# Patient Record
Sex: MALE | Race: BLACK OR AFRICAN AMERICAN | Employment: OTHER | URBAN - METROPOLITAN AREA
[De-identification: names, ages, dates, MRNs, and addresses within clinical notes are randomized per-mention and may not be internally consistent; named-entity substitution may affect disease eponyms.]

---

## 2017-12-01 ENCOUNTER — HOSPITAL ENCOUNTER (OUTPATIENT)
Age: 72
Discharge: HOME | End: 2017-12-01
Payer: MEDICARE

## 2017-12-01 LAB
ABSOLUTE EOS #: 0 K/UL (ref 0–0.4)
ABSOLUTE IMMATURE GRANULOCYTE: 0 K/UL (ref 0–0.3)
ABSOLUTE LYMPH #: 1.66 K/UL (ref 1–4.8)
ABSOLUTE MONO #: 0.55 K/UL (ref 0.1–0.8)
ANION GAP SERPL CALCULATED.3IONS-SCNC: 14 MMOL/L (ref 9–17)
BASOPHILS # BLD: 0 % (ref 0–2)
BASOPHILS ABSOLUTE: 0 K/UL (ref 0–0.2)
BUN BLDV-MCNC: 11 MG/DL (ref 8–23)
BUN/CREAT BLD: ABNORMAL (ref 9–20)
CALCIUM SERPL-MCNC: 9.5 MG/DL (ref 8.6–10.4)
CHLORIDE BLD-SCNC: 101 MMOL/L (ref 98–107)
CO2: 29 MMOL/L (ref 20–31)
CREAT SERPL-MCNC: 0.89 MG/DL (ref 0.7–1.2)
DIFFERENTIAL TYPE: ABNORMAL
EOSINOPHILS RELATIVE PERCENT: 0 % (ref 1–4)
GFR AFRICAN AMERICAN: >60 ML/MIN
GFR NON-AFRICAN AMERICAN: >60 ML/MIN
GFR SERPL CREATININE-BSD FRML MDRD: ABNORMAL ML/MIN/{1.73_M2}
GFR SERPL CREATININE-BSD FRML MDRD: ABNORMAL ML/MIN/{1.73_M2}
GLUCOSE BLD-MCNC: 208 MG/DL (ref 70–99)
HCT VFR BLD CALC: 32.7 % (ref 40.7–50.3)
HEMOGLOBIN: 8.8 G/DL (ref 13–17)
IMMATURE GRANULOCYTES: 0 %
LYMPHOCYTES # BLD: 21 % (ref 24–44)
MCH RBC QN AUTO: 24 PG (ref 25.2–33.5)
MCHC RBC AUTO-ENTMCNC: 26.9 G/DL (ref 28.4–34.8)
MCV RBC AUTO: 89.3 FL (ref 82.6–102.9)
MONOCYTES # BLD: 7 % (ref 1–7)
MORPHOLOGY: ABNORMAL
PDW BLD-RTO: 18.5 % (ref 11.8–14.4)
PLATELET # BLD: 386 K/UL (ref 138–453)
PLATELET ESTIMATE: ABNORMAL
PMV BLD AUTO: 9.9 FL (ref 8.1–13.5)
POTASSIUM SERPL-SCNC: 3.4 MMOL/L (ref 3.7–5.3)
RBC # BLD: 3.66 M/UL (ref 4.21–5.77)
RBC # BLD: ABNORMAL 10*6/UL
SEG NEUTROPHILS: 72 % (ref 36–66)
SEGMENTED NEUTROPHILS ABSOLUTE COUNT: 5.69 K/UL (ref 1.8–7.7)
SODIUM BLD-SCNC: 144 MMOL/L (ref 135–144)
WBC # BLD: 7.9 K/UL (ref 3.5–11.3)
WBC # BLD: ABNORMAL 10*3/UL

## 2017-12-01 PROCEDURE — 85025 COMPLETE CBC W/AUTO DIFF WBC: CPT

## 2017-12-01 PROCEDURE — 80048 BASIC METABOLIC PNL TOTAL CA: CPT

## 2017-12-01 PROCEDURE — 36415 COLL VENOUS BLD VENIPUNCTURE: CPT

## 2017-12-13 ENCOUNTER — HOSPITAL ENCOUNTER (INPATIENT)
Dept: CARDIAC CATH/INVASIVE PROCEDURES | Age: 72
LOS: 4 days | Discharge: HOME | DRG: 287 | End: 2017-12-17
Attending: INTERNAL MEDICINE | Admitting: INTERNAL MEDICINE
Payer: MEDICARE

## 2017-12-13 DIAGNOSIS — I50.23 ACUTE ON CHRONIC SYSTOLIC CHF (CONGESTIVE HEART FAILURE) (HCC): ICD-10-CM

## 2017-12-13 LAB
ANION GAP SERPL CALCULATED.3IONS-SCNC: 18 MMOL/L (ref 9–17)
BUN BLDV-MCNC: 13 MG/DL (ref 8–23)
BUN/CREAT BLD: ABNORMAL (ref 9–20)
CALCIUM SERPL-MCNC: 8.4 MG/DL (ref 8.6–10.4)
CHLORIDE BLD-SCNC: 100 MMOL/L (ref 98–107)
CO2: 25 MMOL/L (ref 20–31)
CREAT SERPL-MCNC: 0.92 MG/DL (ref 0.7–1.2)
GFR AFRICAN AMERICAN: >60 ML/MIN
GFR NON-AFRICAN AMERICAN: >60 ML/MIN
GFR SERPL CREATININE-BSD FRML MDRD: ABNORMAL ML/MIN/{1.73_M2}
GFR SERPL CREATININE-BSD FRML MDRD: ABNORMAL ML/MIN/{1.73_M2}
GLUCOSE BLD-MCNC: 118 MG/DL (ref 74–100)
GLUCOSE BLD-MCNC: 197 MG/DL (ref 70–99)
GLUCOSE BLD-MCNC: 225 MG/DL (ref 75–110)
MAGNESIUM: 1.5 MG/DL (ref 1.6–2.6)
POC POTASSIUM: 3.3 MMOL/L (ref 3.5–4.5)
POTASSIUM SERPL-SCNC: 3.1 MMOL/L (ref 3.7–5.3)
SODIUM BLD-SCNC: 143 MMOL/L (ref 135–144)

## 2017-12-13 PROCEDURE — 1200000000 HC SEMI PRIVATE

## 2017-12-13 PROCEDURE — C1894 INTRO/SHEATH, NON-LASER: HCPCS

## 2017-12-13 PROCEDURE — 93571 IV DOP VEL&/PRESS C FLO 1ST: CPT | Performed by: INTERNAL MEDICINE

## 2017-12-13 PROCEDURE — 6370000000 HC RX 637 (ALT 250 FOR IP)

## 2017-12-13 PROCEDURE — 6360000002 HC RX W HCPCS

## 2017-12-13 PROCEDURE — C1725 CATH, TRANSLUMIN NON-LASER: HCPCS

## 2017-12-13 PROCEDURE — 93458 L HRT ARTERY/VENTRICLE ANGIO: CPT | Performed by: INTERNAL MEDICINE

## 2017-12-13 PROCEDURE — B2111ZZ FLUOROSCOPY OF MULTIPLE CORONARY ARTERIES USING LOW OSMOLAR CONTRAST: ICD-10-PCS | Performed by: INTERNAL MEDICINE

## 2017-12-13 PROCEDURE — 7100000011 HC PHASE II RECOVERY - ADDTL 15 MIN

## 2017-12-13 PROCEDURE — 7100000010 HC PHASE II RECOVERY - FIRST 15 MIN

## 2017-12-13 PROCEDURE — 2500000003 HC RX 250 WO HCPCS

## 2017-12-13 PROCEDURE — 93005 ELECTROCARDIOGRAM TRACING: CPT

## 2017-12-13 PROCEDURE — C1769 GUIDE WIRE: HCPCS

## 2017-12-13 PROCEDURE — 80048 BASIC METABOLIC PNL TOTAL CA: CPT

## 2017-12-13 PROCEDURE — 2580000003 HC RX 258: Performed by: INTERNAL MEDICINE

## 2017-12-13 PROCEDURE — 36620 INSERTION CATHETER ARTERY: CPT

## 2017-12-13 PROCEDURE — C1887 CATHETER, GUIDING: HCPCS

## 2017-12-13 PROCEDURE — 4A023N8 MEASUREMENT OF CARDIAC SAMPLING AND PRESSURE, BILATERAL, PERCUTANEOUS APPROACH: ICD-10-PCS | Performed by: INTERNAL MEDICINE

## 2017-12-13 PROCEDURE — B2151ZZ FLUOROSCOPY OF LEFT HEART USING LOW OSMOLAR CONTRAST: ICD-10-PCS | Performed by: INTERNAL MEDICINE

## 2017-12-13 PROCEDURE — 82947 ASSAY GLUCOSE BLOOD QUANT: CPT

## 2017-12-13 PROCEDURE — 83735 ASSAY OF MAGNESIUM: CPT

## 2017-12-13 PROCEDURE — 6370000000 HC RX 637 (ALT 250 FOR IP): Performed by: INTERNAL MEDICINE

## 2017-12-13 PROCEDURE — 6360000002 HC RX W HCPCS: Performed by: INTERNAL MEDICINE

## 2017-12-13 PROCEDURE — 2060000000 HC ICU INTERMEDIATE R&B

## 2017-12-13 PROCEDURE — 36415 COLL VENOUS BLD VENIPUNCTURE: CPT

## 2017-12-13 PROCEDURE — 84132 ASSAY OF SERUM POTASSIUM: CPT

## 2017-12-13 RX ORDER — TRAMADOL HYDROCHLORIDE 50 MG/1
50 TABLET ORAL DAILY PRN
Status: ON HOLD | COMMUNITY
End: 2018-08-24 | Stop reason: HOSPADM

## 2017-12-13 RX ORDER — LOSARTAN POTASSIUM 50 MG/1
50 TABLET ORAL DAILY
Status: DISCONTINUED | OUTPATIENT
Start: 2017-12-14 | End: 2017-12-17 | Stop reason: HOSPADM

## 2017-12-13 RX ORDER — BENZONATATE 100 MG/1
100 CAPSULE ORAL 3 TIMES DAILY PRN
Status: DISCONTINUED | OUTPATIENT
Start: 2017-12-13 | End: 2017-12-17 | Stop reason: HOSPADM

## 2017-12-13 RX ORDER — LOSARTAN POTASSIUM 25 MG/1
25 TABLET ORAL ONCE
Status: COMPLETED | OUTPATIENT
Start: 2017-12-13 | End: 2017-12-14

## 2017-12-13 RX ORDER — ONDANSETRON 2 MG/ML
4 INJECTION INTRAMUSCULAR; INTRAVENOUS EVERY 6 HOURS PRN
Status: DISCONTINUED | OUTPATIENT
Start: 2017-12-13 | End: 2017-12-17 | Stop reason: HOSPADM

## 2017-12-13 RX ORDER — GLIPIZIDE 5 MG/1
5 TABLET ORAL
Status: DISCONTINUED | OUTPATIENT
Start: 2017-12-14 | End: 2017-12-17 | Stop reason: HOSPADM

## 2017-12-13 RX ORDER — LENALIDOMIDE 10 MG/1
10 CAPSULE ORAL DAILY
Status: ON HOLD | COMMUNITY
End: 2018-01-14 | Stop reason: HOSPADM

## 2017-12-13 RX ORDER — POTASSIUM CHLORIDE 20MEQ/15ML
40 LIQUID (ML) ORAL ONCE
Status: COMPLETED | OUTPATIENT
Start: 2017-12-13 | End: 2017-12-13

## 2017-12-13 RX ORDER — FENOFIBRATE 54 MG/1
54 TABLET ORAL DAILY
Status: DISCONTINUED | OUTPATIENT
Start: 2017-12-14 | End: 2017-12-17 | Stop reason: HOSPADM

## 2017-12-13 RX ORDER — NICOTINE POLACRILEX 4 MG
15 LOZENGE BUCCAL PRN
Status: DISCONTINUED | OUTPATIENT
Start: 2017-12-13 | End: 2017-12-17 | Stop reason: HOSPADM

## 2017-12-13 RX ORDER — DEXTROSE MONOHYDRATE 50 MG/ML
100 INJECTION, SOLUTION INTRAVENOUS PRN
Status: DISCONTINUED | OUTPATIENT
Start: 2017-12-13 | End: 2017-12-17 | Stop reason: HOSPADM

## 2017-12-13 RX ORDER — SIMVASTATIN 40 MG
40 TABLET ORAL NIGHTLY
Status: DISCONTINUED | OUTPATIENT
Start: 2017-12-13 | End: 2017-12-17 | Stop reason: HOSPADM

## 2017-12-13 RX ORDER — DEXAMETHASONE 4 MG/1
4 TABLET ORAL WEEKLY
Status: ON HOLD | COMMUNITY
End: 2018-01-14 | Stop reason: HOSPADM

## 2017-12-13 RX ORDER — FENOFIBRIC ACID 35 MG/1
35 TABLET ORAL DAILY
Status: ON HOLD | COMMUNITY
End: 2018-01-14 | Stop reason: HOSPADM

## 2017-12-13 RX ORDER — POTASSIUM CHLORIDE 20MEQ/15ML
40 LIQUID (ML) ORAL PRN
Status: DISCONTINUED | OUTPATIENT
Start: 2017-12-13 | End: 2017-12-17 | Stop reason: HOSPADM

## 2017-12-13 RX ORDER — FAMOTIDINE 20 MG/1
20 TABLET, FILM COATED ORAL 2 TIMES DAILY
Status: DISCONTINUED | OUTPATIENT
Start: 2017-12-13 | End: 2017-12-17 | Stop reason: HOSPADM

## 2017-12-13 RX ORDER — ASPIRIN 81 MG/1
81 TABLET, CHEWABLE ORAL DAILY
Status: DISCONTINUED | OUTPATIENT
Start: 2017-12-14 | End: 2017-12-17 | Stop reason: HOSPADM

## 2017-12-13 RX ORDER — LOSARTAN POTASSIUM 25 MG/1
25 TABLET ORAL DAILY
Status: ON HOLD | COMMUNITY
End: 2017-12-17 | Stop reason: HOSPADM

## 2017-12-13 RX ORDER — HYDROCHLOROTHIAZIDE 12.5 MG/1
12.5 CAPSULE, GELATIN COATED ORAL DAILY
COMMUNITY
End: 2018-01-11

## 2017-12-13 RX ORDER — LOSARTAN POTASSIUM 25 MG/1
25 TABLET ORAL DAILY
Status: DISCONTINUED | OUTPATIENT
Start: 2017-12-14 | End: 2017-12-13

## 2017-12-13 RX ORDER — DIPHENHYDRAMINE HCL 25 MG
25 TABLET ORAL EVERY 6 HOURS PRN
Status: DISCONTINUED | OUTPATIENT
Start: 2017-12-13 | End: 2017-12-17 | Stop reason: HOSPADM

## 2017-12-13 RX ORDER — SIMVASTATIN 40 MG
40 TABLET ORAL NIGHTLY
Status: ON HOLD | COMMUNITY
End: 2019-06-13 | Stop reason: HOSPADM

## 2017-12-13 RX ORDER — SODIUM CHLORIDE 9 MG/ML
INJECTION, SOLUTION INTRAVENOUS CONTINUOUS
Status: DISCONTINUED | OUTPATIENT
Start: 2017-12-13 | End: 2017-12-16

## 2017-12-13 RX ORDER — CARVEDILOL 12.5 MG/1
12.5 TABLET ORAL 2 TIMES DAILY WITH MEALS
Status: DISCONTINUED | OUTPATIENT
Start: 2017-12-13 | End: 2017-12-17 | Stop reason: HOSPADM

## 2017-12-13 RX ORDER — TRAMADOL HYDROCHLORIDE 50 MG/1
50 TABLET ORAL EVERY 6 HOURS PRN
Status: DISCONTINUED | OUTPATIENT
Start: 2017-12-13 | End: 2017-12-17 | Stop reason: HOSPADM

## 2017-12-13 RX ORDER — FUROSEMIDE 10 MG/ML
40 INJECTION INTRAMUSCULAR; INTRAVENOUS 2 TIMES DAILY
Status: DISCONTINUED | OUTPATIENT
Start: 2017-12-14 | End: 2017-12-14

## 2017-12-13 RX ORDER — GLIPIZIDE 5 MG/1
5 TABLET ORAL
Status: ON HOLD | COMMUNITY
End: 2019-06-13 | Stop reason: HOSPADM

## 2017-12-13 RX ORDER — POTASSIUM CHLORIDE 7.45 MG/ML
10 INJECTION INTRAVENOUS PRN
Status: DISCONTINUED | OUTPATIENT
Start: 2017-12-13 | End: 2017-12-17 | Stop reason: HOSPADM

## 2017-12-13 RX ORDER — MAGNESIUM SULFATE 1 G/100ML
1 INJECTION INTRAVENOUS PRN
Status: DISCONTINUED | OUTPATIENT
Start: 2017-12-13 | End: 2017-12-17 | Stop reason: HOSPADM

## 2017-12-13 RX ORDER — BENZONATATE 100 MG/1
100 CAPSULE ORAL 3 TIMES DAILY PRN
COMMUNITY
End: 2018-01-11

## 2017-12-13 RX ORDER — DEXTROSE MONOHYDRATE 25 G/50ML
12.5 INJECTION, SOLUTION INTRAVENOUS PRN
Status: DISCONTINUED | OUTPATIENT
Start: 2017-12-13 | End: 2017-12-17 | Stop reason: HOSPADM

## 2017-12-13 RX ORDER — CARVEDILOL 12.5 MG/1
12.5 TABLET ORAL 2 TIMES DAILY WITH MEALS
Status: DISCONTINUED | OUTPATIENT
Start: 2017-12-14 | End: 2017-12-13

## 2017-12-13 RX ORDER — ZOLPIDEM TARTRATE 5 MG/1
5 TABLET ORAL NIGHTLY PRN
Status: DISPENSED | OUTPATIENT
Start: 2017-12-13 | End: 2017-12-16

## 2017-12-13 RX ORDER — POTASSIUM CHLORIDE 20 MEQ/1
40 TABLET, EXTENDED RELEASE ORAL PRN
Status: DISCONTINUED | OUTPATIENT
Start: 2017-12-13 | End: 2017-12-17 | Stop reason: HOSPADM

## 2017-12-13 RX ORDER — LENALIDOMIDE 10 MG/1
10 CAPSULE ORAL DAILY
Status: DISCONTINUED | OUTPATIENT
Start: 2017-12-14 | End: 2017-12-15

## 2017-12-13 RX ADMIN — TRAMADOL HYDROCHLORIDE 50 MG: 50 TABLET, FILM COATED ORAL at 22:57

## 2017-12-13 RX ADMIN — INSULIN LISPRO 1 UNITS: 100 INJECTION, SOLUTION INTRAVENOUS; SUBCUTANEOUS at 23:07

## 2017-12-13 RX ADMIN — SODIUM CHLORIDE: 0.9 INJECTION, SOLUTION INTRAVENOUS at 17:18

## 2017-12-13 RX ADMIN — SODIUM CHLORIDE: 0.9 INJECTION, SOLUTION INTRAVENOUS at 10:47

## 2017-12-13 RX ADMIN — SIMVASTATIN 40 MG: 40 TABLET, FILM COATED ORAL at 22:57

## 2017-12-13 RX ADMIN — Medication 40 MEQ: at 11:04

## 2017-12-13 RX ADMIN — ZOLPIDEM TARTRATE 5 MG: 5 TABLET ORAL at 22:57

## 2017-12-13 RX ADMIN — ENOXAPARIN SODIUM 90 MG: 100 INJECTION SUBCUTANEOUS at 22:57

## 2017-12-13 RX ADMIN — FAMOTIDINE 20 MG: 20 TABLET, FILM COATED ORAL at 22:57

## 2017-12-13 ASSESSMENT — PAIN SCALES - GENERAL
PAINLEVEL_OUTOF10: 6
PAINLEVEL_OUTOF10: 0

## 2017-12-13 NOTE — PROGRESS NOTES
Patient post cath to [facility] # 5 for recovery. Right groin soft with arterial line sutured with gauze dressing intact. Assessment obtained. Reviewed recovery pathway with patient and wife. Taking fluids well. Food ordered. Resting with side rails up 2/2 with call light in reach. Dr. Myers Suly in and spoke with patient and wife.

## 2017-12-13 NOTE — PROGRESS NOTES
Patient admitted, consent signed, all questions answered. Pt ready for procedure. Call light to reach with side rails up 2 of 2. Bilateral groins clipped. Wife at bedside with patient.

## 2017-12-13 NOTE — H&P
Review of patient's allergies indicates not on file. Social History:        Family History: family history is not on file. REVIEW OF SYSTEMS:    · Constitutional: there has been no unanticipated weight loss. There's been No change in energy level, No change in activity level. · Eyes: No visual changes or diplopia. No scleral icterus. · ENT: No Headaches  · Cardiovascular: As mentioned in HPI  · Respiratory: As mentioned in HPI  · Gastrointestinal: No abdominal pain. No change in bowel or bladder habits. · Genitourinary: No dysuria, trouble voiding, or hematuria. · Musculoskeletal:  No gait disturbance, No weakness or joint complaints. · Integumentary: No rash or pruritis. · Neurological: No headache, diplopia, change in muscle strength, numbness or tingling. No change in gait, balance, coordination, mood, affect, memory, mentation, behavior. · Psychiatric: No anxiety, or depression. · Endocrine: No temperature intolerance. No excessive thirst, fluid intake, or urination. No tremor. · Hematologic/Lymphatic: No abnormal bruising or bleeding, blood clots or swollen lymph nodes. · Allergic/Immunologic: No nasal congestion or hives. PHYSICAL EXAM:      There were no vitals taken for this visit. Constitutional and General Appearance: alert, cooperative, no distress and appears stated age  [de-identified]: PERRL, no cervical lymphadenopathy. No masses palpable. Normal oral mucosa  Respiratory:  · Normal excursion and expansion without use of accessory muscles  · Resp Auscultation: Good respiratory effort. No for increased work of breathing.  On auscultation: clear to auscultation bilaterally  Cardiovascular:  · The apical impulse is not displaced  · Heart tones are crisp and normal. regular S1 and S2.  · Jugular venous pulsation Normal  · The carotid upstroke is normal in amplitude and contour without delay or bruit  · Peripheral pulses are symmetrical and full   Abdomen:   · No masses or tenderness  · Bowel sounds present  Extremities:  ·  No Cyanosis or Clubbing  ·  Lower extremity edema: No  ·  Skin: Warm and dry  Neurological:  · Alert and oriented. · Moves all extremities well  · No abnormalities of mood, affect, memory, mentation, or behavior are noted    DATA:    Diagnostics:      ECHO:     .    Labs:     CBC: No results for input(s): WBC, HGB, HCT, PLT in the last 72 hours. BMP: No results for input(s): NA, K, CO2, BUN, CREATININE, LABGLOM, GLUCOSE in the last 72 hours. BNP: No results for input(s): BNP in the last 72 hours. PT/INR: No results for input(s): PROTIME, INR in the last 72 hours. APTT:No results for input(s): APTT in the last 72 hours. CARDIAC ENZYMES:No results for input(s): CKTOTAL, CKMB, CKMBINDEX, TROPONINI in the last 72 hours. FASTING LIPID PANEL:  Lab Results   Component Value Date    HDL 36 06/10/2016    TRIG 159 06/10/2016     LIVER PROFILE:No results for input(s): AST, ALT, LABALBU in the last 72 hours. IMPRESSION:      1. 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 History of multiple myeloma on chemotherapy3. History of hyperlipidemia on Zocor.   4. History of DVT on Xarelto.   5. History of Type 2 diabetes mellitus.   6. History of chronic smoking and I did  him to stop smoking.   7. History of echocardiogram done a few weeks back which showed severely reduced left ventricular systolic function with  No significant valvular regurgitation or stenosis seen.   8. History of hypertension.     There is no problem list on file for this patient. RECOMMENDATIONS:  1. Proceed with left and right heart cath to assess for CAD and assess volume status      Discussed with patient and Nurse.     Electronically signed by Jai Chirinos MD on 12/13/2017 at 9:01 AM  Cardiology Fellow

## 2017-12-13 NOTE — PROGRESS NOTES
Right arterial line removed per J. [place]air RN per manual pressure for 12 mins then venous line removed manually with pressure held for 5 mins. Recovery pathway reviewed with patient. Patient tolerated well. Right groin soft with band aide dry and intact. Resting with side rails up 2/2 and call light in reach. Continues taking fluids well.

## 2017-12-14 ENCOUNTER — APPOINTMENT (OUTPATIENT)
Dept: GENERAL RADIOLOGY | Age: 72
DRG: 287 | End: 2017-12-14
Attending: INTERNAL MEDICINE
Payer: MEDICARE

## 2017-12-14 LAB
ANION GAP SERPL CALCULATED.3IONS-SCNC: 15 MMOL/L (ref 9–17)
BNP INTERPRETATION: ABNORMAL
BUN BLDV-MCNC: 11 MG/DL (ref 8–23)
BUN/CREAT BLD: ABNORMAL (ref 9–20)
CALCIUM SERPL-MCNC: 7.9 MG/DL (ref 8.6–10.4)
CHLORIDE BLD-SCNC: 104 MMOL/L (ref 98–107)
CHOLESTEROL/HDL RATIO: 3.1
CHOLESTEROL: 98 MG/DL
CO2: 25 MMOL/L (ref 20–31)
CREAT SERPL-MCNC: 0.83 MG/DL (ref 0.7–1.2)
EKG ATRIAL RATE: 81 BPM
EKG ATRIAL RATE: 86 BPM
EKG P AXIS: 52 DEGREES
EKG P AXIS: 54 DEGREES
EKG P-R INTERVAL: 126 MS
EKG P-R INTERVAL: 134 MS
EKG Q-T INTERVAL: 432 MS
EKG Q-T INTERVAL: 440 MS
EKG QRS DURATION: 134 MS
EKG QRS DURATION: 138 MS
EKG QTC CALCULATION (BAZETT): 501 MS
EKG QTC CALCULATION (BAZETT): 526 MS
EKG R AXIS: -48 DEGREES
EKG R AXIS: -74 DEGREES
EKG T AXIS: -133 DEGREES
EKG T AXIS: 72 DEGREES
EKG VENTRICULAR RATE: 81 BPM
EKG VENTRICULAR RATE: 86 BPM
ESTIMATED AVERAGE GLUCOSE: 148 MG/DL
GFR AFRICAN AMERICAN: >60 ML/MIN
GFR NON-AFRICAN AMERICAN: >60 ML/MIN
GFR SERPL CREATININE-BSD FRML MDRD: ABNORMAL ML/MIN/{1.73_M2}
GFR SERPL CREATININE-BSD FRML MDRD: ABNORMAL ML/MIN/{1.73_M2}
GLUCOSE BLD-MCNC: 152 MG/DL (ref 75–110)
GLUCOSE BLD-MCNC: 154 MG/DL (ref 75–110)
GLUCOSE BLD-MCNC: 157 MG/DL (ref 70–99)
HBA1C MFR BLD: 6.8 % (ref 4–6)
HCT VFR BLD CALC: 27.6 % (ref 40.7–50.3)
HDLC SERPL-MCNC: 32 MG/DL
HEMOGLOBIN: 7.5 G/DL (ref 13–17)
LDL CHOLESTEROL: 33 MG/DL (ref 0–130)
MAGNESIUM: 1.6 MG/DL (ref 1.6–2.6)
MCH RBC QN AUTO: 24.1 PG (ref 25.2–33.5)
MCHC RBC AUTO-ENTMCNC: 27.2 G/DL (ref 28.4–34.8)
MCV RBC AUTO: 88.7 FL (ref 82.6–102.9)
PDW BLD-RTO: 18.8 % (ref 11.8–14.4)
PLATELET # BLD: 305 K/UL (ref 138–453)
PMV BLD AUTO: 10.4 FL (ref 8.1–13.5)
POTASSIUM SERPL-SCNC: 3.7 MMOL/L (ref 3.7–5.3)
PRO-BNP: 822 PG/ML
RBC # BLD: 3.11 M/UL (ref 4.21–5.77)
SODIUM BLD-SCNC: 144 MMOL/L (ref 135–144)
TRIGL SERPL-MCNC: 166 MG/DL
TSH SERPL DL<=0.05 MIU/L-ACNC: 1.13 MIU/L (ref 0.3–5)
VLDLC SERPL CALC-MCNC: ABNORMAL MG/DL (ref 1–30)
WBC # BLD: 5.9 K/UL (ref 3.5–11.3)

## 2017-12-14 PROCEDURE — 94664 DEMO&/EVAL PT USE INHALER: CPT

## 2017-12-14 PROCEDURE — 93970 EXTREMITY STUDY: CPT

## 2017-12-14 PROCEDURE — 80048 BASIC METABOLIC PNL TOTAL CA: CPT

## 2017-12-14 PROCEDURE — 6370000000 HC RX 637 (ALT 250 FOR IP): Performed by: INTERNAL MEDICINE

## 2017-12-14 PROCEDURE — 6360000002 HC RX W HCPCS: Performed by: INTERNAL MEDICINE

## 2017-12-14 PROCEDURE — 83880 ASSAY OF NATRIURETIC PEPTIDE: CPT

## 2017-12-14 PROCEDURE — 99223 1ST HOSP IP/OBS HIGH 75: CPT | Performed by: THORACIC SURGERY (CARDIOTHORACIC VASCULAR SURGERY)

## 2017-12-14 PROCEDURE — 94726 PLETHYSMOGRAPHY LUNG VOLUMES: CPT

## 2017-12-14 PROCEDURE — 85027 COMPLETE CBC AUTOMATED: CPT

## 2017-12-14 PROCEDURE — 1200000000 HC SEMI PRIVATE

## 2017-12-14 PROCEDURE — 83036 HEMOGLOBIN GLYCOSYLATED A1C: CPT

## 2017-12-14 PROCEDURE — 84443 ASSAY THYROID STIM HORMONE: CPT

## 2017-12-14 PROCEDURE — 36415 COLL VENOUS BLD VENIPUNCTURE: CPT

## 2017-12-14 PROCEDURE — 94760 N-INVAS EAR/PLS OXIMETRY 1: CPT

## 2017-12-14 PROCEDURE — 94060 EVALUATION OF WHEEZING: CPT

## 2017-12-14 PROCEDURE — 94727 GAS DIL/WSHOT DETER LNG VOL: CPT

## 2017-12-14 PROCEDURE — 93005 ELECTROCARDIOGRAM TRACING: CPT

## 2017-12-14 PROCEDURE — 94640 AIRWAY INHALATION TREATMENT: CPT

## 2017-12-14 PROCEDURE — 71020 XR CHEST STANDARD TWO VW: CPT

## 2017-12-14 PROCEDURE — 83735 ASSAY OF MAGNESIUM: CPT

## 2017-12-14 PROCEDURE — 80061 LIPID PANEL: CPT

## 2017-12-14 PROCEDURE — 94728 AIRWY RESIST BY OSCILLOMETRY: CPT

## 2017-12-14 PROCEDURE — 93880 EXTRACRANIAL BILAT STUDY: CPT

## 2017-12-14 PROCEDURE — 6360000002 HC RX W HCPCS: Performed by: NURSE PRACTITIONER

## 2017-12-14 PROCEDURE — 88738 HGB QUANT TRANSCUTANEOUS: CPT

## 2017-12-14 PROCEDURE — 2060000000 HC ICU INTERMEDIATE R&B

## 2017-12-14 PROCEDURE — 94729 DIFFUSING CAPACITY: CPT

## 2017-12-14 PROCEDURE — 82947 ASSAY GLUCOSE BLOOD QUANT: CPT

## 2017-12-14 PROCEDURE — 76937 US GUIDE VASCULAR ACCESS: CPT

## 2017-12-14 RX ORDER — CHLORHEXIDINE GLUCONATE 0.12 MG/ML
15 RINSE ORAL ONCE
Status: CANCELLED | OUTPATIENT
Start: 2017-12-14 | End: 2017-12-14

## 2017-12-14 RX ORDER — CHLORHEXIDINE GLUCONATE 4 G/100ML
SOLUTION TOPICAL SEE ADMIN INSTRUCTIONS
Status: CANCELLED | OUTPATIENT
Start: 2017-12-14

## 2017-12-14 RX ORDER — ASPIRIN 81 MG/1
81 TABLET ORAL
Status: CANCELLED | OUTPATIENT
Start: 2017-12-18 | End: 2017-12-18

## 2017-12-14 RX ORDER — MAGNESIUM OXIDE 400 MG/1
400 TABLET ORAL 2 TIMES DAILY
Status: DISCONTINUED | OUTPATIENT
Start: 2017-12-14 | End: 2017-12-14

## 2017-12-14 RX ORDER — FUROSEMIDE 10 MG/ML
40 INJECTION INTRAMUSCULAR; INTRAVENOUS 3 TIMES DAILY
Status: DISCONTINUED | OUTPATIENT
Start: 2017-12-14 | End: 2017-12-15

## 2017-12-14 RX ADMIN — ZOLPIDEM TARTRATE 5 MG: 5 TABLET ORAL at 21:21

## 2017-12-14 RX ADMIN — FENOFIBRATE 54 MG: 54 TABLET ORAL at 10:34

## 2017-12-14 RX ADMIN — INSULIN LISPRO 1 UNITS: 100 INJECTION, SOLUTION INTRAVENOUS; SUBCUTANEOUS at 10:34

## 2017-12-14 RX ADMIN — CARVEDILOL 12.5 MG: 12.5 TABLET, FILM COATED ORAL at 10:33

## 2017-12-14 RX ADMIN — POTASSIUM CHLORIDE 40 MEQ: 20 TABLET, EXTENDED RELEASE ORAL at 00:47

## 2017-12-14 RX ADMIN — GLIPIZIDE 5 MG: 5 TABLET ORAL at 10:33

## 2017-12-14 RX ADMIN — FUROSEMIDE 40 MG: 10 INJECTION, SOLUTION INTRAMUSCULAR; INTRAVENOUS at 14:39

## 2017-12-14 RX ADMIN — ENOXAPARIN SODIUM 90 MG: 100 INJECTION SUBCUTANEOUS at 10:32

## 2017-12-14 RX ADMIN — LOSARTAN POTASSIUM 25 MG: 25 TABLET, FILM COATED ORAL at 00:47

## 2017-12-14 RX ADMIN — SIMVASTATIN 40 MG: 40 TABLET, FILM COATED ORAL at 21:21

## 2017-12-14 RX ADMIN — FAMOTIDINE 20 MG: 20 TABLET, FILM COATED ORAL at 10:32

## 2017-12-14 RX ADMIN — MAGNESIUM GLUCONATE 500 MG ORAL TABLET 400 MG: 500 TABLET ORAL at 21:21

## 2017-12-14 RX ADMIN — ENOXAPARIN SODIUM 90 MG: 100 INJECTION SUBCUTANEOUS at 21:21

## 2017-12-14 RX ADMIN — MAGNESIUM GLUCONATE 500 MG ORAL TABLET 400 MG: 500 TABLET ORAL at 02:28

## 2017-12-14 RX ADMIN — CARVEDILOL 12.5 MG: 12.5 TABLET, FILM COATED ORAL at 17:52

## 2017-12-14 RX ADMIN — INSULIN LISPRO 1 UNITS: 100 INJECTION, SOLUTION INTRAVENOUS; SUBCUTANEOUS at 21:31

## 2017-12-14 RX ADMIN — MAGNESIUM GLUCONATE 500 MG ORAL TABLET 400 MG: 500 TABLET ORAL at 10:33

## 2017-12-14 RX ADMIN — TRAMADOL HYDROCHLORIDE 50 MG: 50 TABLET, FILM COATED ORAL at 21:21

## 2017-12-14 RX ADMIN — CARVEDILOL 12.5 MG: 12.5 TABLET, FILM COATED ORAL at 02:29

## 2017-12-14 RX ADMIN — LENALIDOMIDE 10 MG: 10 CAPSULE ORAL at 10:46

## 2017-12-14 RX ADMIN — FAMOTIDINE 20 MG: 20 TABLET, FILM COATED ORAL at 21:21

## 2017-12-14 ASSESSMENT — PAIN SCALES - GENERAL
PAINLEVEL_OUTOF10: 0

## 2017-12-14 NOTE — PROGRESS NOTES
Patient transported to room 3011. Right groin assessed by Delio Simmons and senthilr. Groin soft, no bleeding or hematoma noted.   Pedal pulses to be checked by Jasmin with doppler

## 2017-12-14 NOTE — PROGRESS NOTES
Patient seen this morning. Dr. Bartlett Lat aware of consult. He will see the patient later today. Planning for CABG on Monday 12/18/17. Pre op testing ordered.     Electronically signed by Karen López CNP on 12/14/2017 at 8:47 AM

## 2017-12-14 NOTE — PROGRESS NOTES
Nutrition Assessment    Type and Reason for Visit: Initial, Positive Nutrition Screen (Difficulty swallowing- sometimes)    Nutrition Recommendations: Continue current diet. Start ONS- Ensure Clear BID to provide 400 kcals and 14 g protein for decreased appetite. Malnutrition Assessment:  · Malnutrition Status: No malnutrition    Nutrition Diagnosis:   · Problem: Inadequate oral intake  · Etiology: related to Insufficient energy/nutrient consumption     Signs and symptoms:  as evidenced by Intake 25-50%    Nutrition Assessment:  · Subjective Assessment: Plan for CABG Monday-12/18. Pt reported appetite is very low. Observed pt lunch (grilled cheese and chicken noodle soup)- approximately 25% consumed. Pt seemed nervous and stated that all the testing is contributing to decreased appetite. Discussed swallowing difficulties w/ pt who reports he is able to tolerate normal texutred foods w/ no difficulty. Pt reported taking Boost supplement at home and would like to try Ensure Clear ONS. · Current Nutrition Therapies:  · Oral Diet Orders: 2gm Sodium, Carb Control 4 Carbs/Meal, Cardiac   · Oral Diet intake: 1-25%  · Anthropometric Measures:  · Ht: 5' 9\" (175.3 cm)   · Current Body Wt: 178 lb 2.1 oz (80.8 kg)  · Ideal Body Wt: 160 lb (72.6 kg)   · % Ideal Body 11%  · BMI Classification: BMI 25.0 - 29.9 Overweight  · Comparative Standards (Estimated Nutrition Needs):  · Estimated Daily Total Kcal: 0935-4368 kcals/day  · Estimated Daily Protein (g): 80-90 g/day    Estimated Intake vs Estimated Needs: Intake Less Than Needs    Nutrition Risk Level: Moderate    Nutrition Interventions:   Continue current diet, Start ONS  Continued Inpatient Monitoring, Education Not Indicated    Nutrition Evaluation:   · Evaluation: Goals set   · Goals: Po intake to meet >75% of estimated nutrient needs    · Monitoring: Meal Intake, Diet Tolerance    See Adult Nutrition Doc Flowsheet for more detail.      Electronically signed by Shawanda Hinds DTR on 12/14/17 at 2:59 PM    Contact Number: [phone]

## 2017-12-15 LAB
ABSOLUTE EOS #: 0.32 K/UL (ref 0–0.4)
ABSOLUTE IMMATURE GRANULOCYTE: 0 K/UL (ref 0–0.3)
ABSOLUTE LYMPH #: 3.81 K/UL (ref 1–4.8)
ABSOLUTE MONO #: 0.49 K/UL (ref 0.1–0.8)
ALBUMIN SERPL-MCNC: 3.6 G/DL (ref 3.5–5.2)
ALBUMIN/GLOBULIN RATIO: 1.5 (ref 1–2.5)
ALP BLD-CCNC: 48 U/L (ref 40–129)
ALT SERPL-CCNC: 11 U/L (ref 5–41)
ANION GAP SERPL CALCULATED.3IONS-SCNC: 18 MMOL/L (ref 9–17)
AST SERPL-CCNC: 9 U/L
BASOPHILS # BLD: 0 % (ref 0–2)
BASOPHILS ABSOLUTE: 0 K/UL (ref 0–0.2)
BILIRUB SERPL-MCNC: 0.47 MG/DL (ref 0.3–1.2)
BUN BLDV-MCNC: 13 MG/DL (ref 8–23)
BUN/CREAT BLD: ABNORMAL (ref 9–20)
CALCIUM SERPL-MCNC: 8.6 MG/DL (ref 8.6–10.4)
CHLORIDE BLD-SCNC: 100 MMOL/L (ref 98–107)
CO2: 25 MMOL/L (ref 20–31)
CREAT SERPL-MCNC: 0.97 MG/DL (ref 0.7–1.2)
DIFFERENTIAL TYPE: ABNORMAL
EOSINOPHILS RELATIVE PERCENT: 4 % (ref 1–4)
GFR AFRICAN AMERICAN: >60 ML/MIN
GFR NON-AFRICAN AMERICAN: >60 ML/MIN
GFR SERPL CREATININE-BSD FRML MDRD: ABNORMAL ML/MIN/{1.73_M2}
GFR SERPL CREATININE-BSD FRML MDRD: ABNORMAL ML/MIN/{1.73_M2}
GLUCOSE BLD-MCNC: 100 MG/DL (ref 75–110)
GLUCOSE BLD-MCNC: 116 MG/DL (ref 70–99)
GLUCOSE BLD-MCNC: 138 MG/DL (ref 75–110)
GLUCOSE BLD-MCNC: 172 MG/DL (ref 75–110)
GLUCOSE BLD-MCNC: 173 MG/DL (ref 75–110)
HCT VFR BLD CALC: 32 % (ref 40.7–50.3)
HEMOGLOBIN: 8.6 G/DL (ref 13–17)
IMMATURE GRANULOCYTES: 0 %
INR BLD: 1
LV EF: 36 %
LVEF MODALITY: NORMAL
LYMPHOCYTES # BLD: 47 % (ref 24–44)
MCH RBC QN AUTO: 23.6 PG (ref 25.2–33.5)
MCHC RBC AUTO-ENTMCNC: 26.9 G/DL (ref 28.4–34.8)
MCV RBC AUTO: 87.9 FL (ref 82.6–102.9)
MONOCYTES # BLD: 6 % (ref 1–7)
MORPHOLOGY: ABNORMAL
NUCLEATED RED BLOOD CELLS: 2 PER 100 WBC
PARTIAL THROMBOPLASTIN TIME: 26.3 SEC (ref 21.3–31.3)
PDW BLD-RTO: 18.8 % (ref 11.8–14.4)
PLATELET # BLD: 349 K/UL (ref 138–453)
PLATELET ESTIMATE: ABNORMAL
PMV BLD AUTO: 10.2 FL (ref 8.1–13.5)
POTASSIUM SERPL-SCNC: 3.3 MMOL/L (ref 3.7–5.3)
POTASSIUM SERPL-SCNC: 3.8 MMOL/L (ref 3.7–5.3)
PROTHROMBIN TIME: 10.4 SEC (ref 9.4–12.6)
RBC # BLD: 3.64 M/UL (ref 4.21–5.77)
RBC # BLD: ABNORMAL 10*6/UL
SEG NEUTROPHILS: 43 % (ref 36–66)
SEGMENTED NEUTROPHILS ABSOLUTE COUNT: 3.48 K/UL (ref 1.8–7.7)
SODIUM BLD-SCNC: 143 MMOL/L (ref 135–144)
TOTAL PROTEIN: 6 G/DL (ref 6.4–8.3)
WBC # BLD: 8.1 K/UL (ref 3.5–11.3)
WBC # BLD: ABNORMAL 10*3/UL

## 2017-12-15 PROCEDURE — 99232 SBSQ HOSP IP/OBS MODERATE 35: CPT | Performed by: INTERNAL MEDICINE

## 2017-12-15 PROCEDURE — 93306 TTE W/DOPPLER COMPLETE: CPT

## 2017-12-15 PROCEDURE — 85730 THROMBOPLASTIN TIME PARTIAL: CPT

## 2017-12-15 PROCEDURE — 99233 SBSQ HOSP IP/OBS HIGH 50: CPT | Performed by: NURSE PRACTITIONER

## 2017-12-15 PROCEDURE — 85025 COMPLETE CBC W/AUTO DIFF WBC: CPT

## 2017-12-15 PROCEDURE — 1200000000 HC SEMI PRIVATE

## 2017-12-15 PROCEDURE — 84132 ASSAY OF SERUM POTASSIUM: CPT

## 2017-12-15 PROCEDURE — 6370000000 HC RX 637 (ALT 250 FOR IP): Performed by: INTERNAL MEDICINE

## 2017-12-15 PROCEDURE — 99223 1ST HOSP IP/OBS HIGH 75: CPT | Performed by: INTERNAL MEDICINE

## 2017-12-15 PROCEDURE — 80053 COMPREHEN METABOLIC PANEL: CPT

## 2017-12-15 PROCEDURE — 2060000000 HC ICU INTERMEDIATE R&B

## 2017-12-15 PROCEDURE — 6370000000 HC RX 637 (ALT 250 FOR IP): Performed by: NURSE PRACTITIONER

## 2017-12-15 PROCEDURE — 85610 PROTHROMBIN TIME: CPT

## 2017-12-15 PROCEDURE — 6360000002 HC RX W HCPCS: Performed by: INTERNAL MEDICINE

## 2017-12-15 PROCEDURE — 36415 COLL VENOUS BLD VENIPUNCTURE: CPT

## 2017-12-15 PROCEDURE — 82947 ASSAY GLUCOSE BLOOD QUANT: CPT

## 2017-12-15 RX ORDER — ALBUTEROL SULFATE 2.5 MG/3ML
2.5 SOLUTION RESPIRATORY (INHALATION) EVERY 6 HOURS PRN
Status: DISCONTINUED | OUTPATIENT
Start: 2017-12-15 | End: 2017-12-17 | Stop reason: HOSPADM

## 2017-12-15 RX ORDER — DEXAMETHASONE 4 MG/1
40 TABLET ORAL
Status: DISCONTINUED | OUTPATIENT
Start: 2017-12-18 | End: 2017-12-17 | Stop reason: HOSPADM

## 2017-12-15 RX ORDER — FLUTICASONE PROPIONATE 50 MCG
1 SPRAY, SUSPENSION (ML) NASAL DAILY
Status: DISCONTINUED | OUTPATIENT
Start: 2017-12-15 | End: 2017-12-17 | Stop reason: HOSPADM

## 2017-12-15 RX ORDER — FUROSEMIDE 10 MG/ML
40 INJECTION INTRAMUSCULAR; INTRAVENOUS 2 TIMES DAILY
Status: DISCONTINUED | OUTPATIENT
Start: 2017-12-15 | End: 2017-12-16

## 2017-12-15 RX ORDER — POTASSIUM CHLORIDE 20 MEQ/1
40 TABLET, EXTENDED RELEASE ORAL ONCE
Status: COMPLETED | OUTPATIENT
Start: 2017-12-15 | End: 2017-12-15

## 2017-12-15 RX ADMIN — POTASSIUM CHLORIDE 40 MEQ: 20 TABLET, EXTENDED RELEASE ORAL at 12:07

## 2017-12-15 RX ADMIN — CARVEDILOL 12.5 MG: 12.5 TABLET, FILM COATED ORAL at 09:21

## 2017-12-15 RX ADMIN — ENOXAPARIN SODIUM 90 MG: 100 INJECTION SUBCUTANEOUS at 09:21

## 2017-12-15 RX ADMIN — FAMOTIDINE 20 MG: 20 TABLET, FILM COATED ORAL at 21:06

## 2017-12-15 RX ADMIN — SIMVASTATIN 40 MG: 40 TABLET, FILM COATED ORAL at 21:06

## 2017-12-15 RX ADMIN — ENOXAPARIN SODIUM 90 MG: 100 INJECTION SUBCUTANEOUS at 21:06

## 2017-12-15 RX ADMIN — INSULIN LISPRO 1 UNITS: 100 INJECTION, SOLUTION INTRAVENOUS; SUBCUTANEOUS at 09:24

## 2017-12-15 RX ADMIN — FLUTICASONE PROPIONATE 1 SPRAY: 50 SPRAY, METERED NASAL at 18:17

## 2017-12-15 RX ADMIN — LOSARTAN POTASSIUM 50 MG: 50 TABLET, FILM COATED ORAL at 09:21

## 2017-12-15 RX ADMIN — MAGNESIUM GLUCONATE 500 MG ORAL TABLET 400 MG: 500 TABLET ORAL at 09:21

## 2017-12-15 RX ADMIN — CARVEDILOL 12.5 MG: 12.5 TABLET, FILM COATED ORAL at 18:14

## 2017-12-15 RX ADMIN — INSULIN LISPRO 1 UNITS: 100 INJECTION, SOLUTION INTRAVENOUS; SUBCUTANEOUS at 21:08

## 2017-12-15 RX ADMIN — POTASSIUM CHLORIDE 40 MEQ: 40 SOLUTION ORAL at 09:31

## 2017-12-15 RX ADMIN — GLIPIZIDE 5 MG: 5 TABLET ORAL at 09:21

## 2017-12-15 RX ADMIN — INSULIN LISPRO 1 UNITS: 100 INJECTION, SOLUTION INTRAVENOUS; SUBCUTANEOUS at 12:07

## 2017-12-15 RX ADMIN — ASPIRIN 81 MG: 81 TABLET, CHEWABLE ORAL at 09:21

## 2017-12-15 RX ADMIN — MAGNESIUM GLUCONATE 500 MG ORAL TABLET 400 MG: 500 TABLET ORAL at 21:06

## 2017-12-15 RX ADMIN — FAMOTIDINE 20 MG: 20 TABLET, FILM COATED ORAL at 09:20

## 2017-12-15 RX ADMIN — BENZONATATE 100 MG: 100 CAPSULE ORAL at 09:24

## 2017-12-15 NOTE — CONSULTS
[address] [facility] 30                                   CONSULTATION    PATIENT NAME: Alberto Smith :        1945  MED REC NO:   [id number]                             ROOM:       4263  ACCOUNT NO:   [de-identified]                           ADMIT DATE: 2017  PROVIDER:     Ryan Min MD    CONSULT DATE:  2017    REFERRING PROVIDER:  Dr. Oni Castillo. REASON FOR CONSULTATION:  Consideration evaluation of coronary artery  disease. CHIEF COMPLAINT:  \"I can't do anything. \"    HISTORY OF PRESENT ILLNESS:  The patient is a 61-year-old black male who  for the past 2-3 months has been having difficulty with shortness of breath  with exertion and fatigue. This has been coming on gradually. It has made  him such that his activity has curtailed. The patient also has chest  heaviness at times with exertion. He denies syncope or presyncope. He  denies palpitations. The patient has had work up and was seen by Dr. Nay Johnston  in consultation. An echocardiogram in his office was performed, the  results are not available to me at this time, but the ejection on left  ventriculogram was approximately 20% during the time of his cardiac  catheterization. After discussion with Dr. Nay Johnston who also did fractional flow reserve on the  left anterior descending artery at my request, which was positive, we had  decided to admit the patient and diurese him and treat him with some after  load reduction in preparation for recommend for a coronary bypass. ALLERGIES:  None known allergies. PAST SURGICAL HISTORY:  The patient has had bowel resection for tumor in  the past.  This was done in . He has had colonoscopy. He has had  worked for glaucoma in both eyes and he feels much better that way.     PAST MEDICAL HISTORY:  The patient has been diagnosed with multiple myeloma  approximately

## 2017-12-15 NOTE — PROCEDURES
[address] [facility] 30                                PULMONARY FUNCTION    PATIENT NAME: Yuliya Lubin                       :        1945  MED REC NO:   [id number]                             ROOM:       3011  ACCOUNT NO:   [de-identified]                           ADMIT DATE: 2017  PROVIDER:     Marielle Vidales    DATE OF PROCEDURE:  2017    Spirometry is within normal range. No change with bronchodilator therapy. Lung volumes show some air trapping. Diffusion capacity is normal.  Flow  volume loop shows an obstructive ventilatory pattern. IMPRESSION:  The patient could possibly have an obstructive ventilatory  defect. Clinical correlation is recommended, but the patient does not meet  the GOLD criteria for obstruction.         Luci Connell    D: 2017 17:19:44       T: 12/15/2017 0:47:17     [email or web address]  Job#: [id number]     Doc#: [id number]    CC:

## 2017-12-15 NOTE — CONSULTS
Pulmonary/Critical Care consultation    Patient's name:  Mitchell Esteban. Medical Record Number: [id number]  Patient's account/billing number: [de-identified]  Patient's YOB: 1945  Age: 81 y.o. Date of Admission: 12/13/2017  8:32 PM  Date of Consult: 12/15/2017      Primary Care Physician: Stephani Villarreal MD      Code Status: Full Code    Reason for consult: COPD      HISTORY OF PRESENT ILLNESS:   History was obtained from the patient. Mr. Mitchell Carrillo is a 81 y.o. Male with PMHx of multiple myeloma, DM type II, CHF, CAD, Asthma and COPD, who was admitted for worsening SOB. He is s/p cath. Patient complains of some shortness of breath and a productive cough of clear sputum. He says the shortness of breath is worse than his baseline but his cough is better than his baseline. He has no chest pain. He denies any hospitalizations for his COPD. He says he has not had an asthma exacerbation since he was a kid. He uses albuterol inhaler at home every couple of weeks. He does not have MORE and does not use home oxygen or CPAP. He is a smoker of 1ppd for many years except for the last 3 days during his hospital stay. Chest xray (12.14.17) - no pulmonary edema or lung consolidation.          Past Medical History:        Diagnosis Date    Arthritis     all over    Cardiomyopathy [facility])     CHF (congestive heart failure) (HCC)     COPD (chronic obstructive pulmonary disease) (HCC)     [facility] [address]     DVT of leg (deep venous thrombosis) ([facility] [address]     Hyperlipidemia     Hypertension     Multiple myeloma (HCC)     Neuropathy ([facility] [address]        Past Surgical History:        Procedure Laterality Date    ABDOMEN SURGERY  2002    CARDIAC CATHETERIZATION  12/13/2017    right and left heart cath with Dr. Jabari Gonzalez       Allergies:    No Known Allergies      Home Meds:   Prior to Admission medications    Medication Sig Start Date End Date Taking? Authorizing Provider   losartan (COZAAR) 25 MG tablet Take 25 mg by mouth daily   Yes Historical Provider, MD   simvastatin (ZOCOR) 40 MG tablet Take 40 mg by mouth nightly   Yes Historical Provider, MD   fenofibric acid (FIBRICOR) 35 MG TABS tablet Take 35 mg by mouth daily   Yes Historical Provider, MD   NONFORMULARY Latanoprost opth 0.005% 1 drop ou   Yes Historical Provider, MD   NONFORMULARY Dextran 70-hypromellose 0.3% opth sol one drop OU PRN   Yes Historical Provider, MD   rivaroxaban (XARELTO) 20 MG TABS tablet Take 20 mg by mouth   Yes Historical Provider, MD   NONFORMULARY Pro-air HFA 1 ouff PRN   Yes Historical Provider, MD   lenalidomide (REVLIMID) 10 MG chemo capsule Take 10 mg by mouth daily On 21 days then off for 7 days   Yes Historical Provider, MD   dexamethasone (DECADRON) 4 MG tablet Take 4 mg by mouth once a week 10 pills once per week   Yes Historical Provider, MD   glipiZIDE (GLUCOTROL) 5 MG tablet Take 5 mg by mouth every morning (before breakfast)   Yes Historical Provider, MD   traMADol (ULTRAM) 50 MG tablet Take 50 mg by mouth daily as needed for Pain . Yes Historical Provider, MD   benzonatate (TESSALON) 100 MG capsule Take 100 mg by mouth 3 times daily as needed for Cough    Historical Provider, MD   hydrochlorothiazide (MICROZIDE) 12.5 MG capsule Take 12.5 mg by mouth daily    Historical Provider, MD   Cyanocobalamin (VITAMIN B 12 PO) Take 1,000 mcg by mouth daily    Historical Provider, MD       Family History:   History reviewed. No pertinent family history. Social History:   TOBACCO:   reports that he has been smoking. He has never used smokeless tobacco.  ETOH:   reports that he drinks alcohol. DRUGS:  reports that he uses drugs, including Marijuana.             REVIEW OF SYSTEMS:    Review of Systems -   General ROS: Completed and except as mentioned above were negative   Psychological ROS:  Completed and except as mentioned above were negative  Ophthalmic old male who is alert, oriented, cooperative and in no apparent distress. He is on room air with Spo2 97%. Head:normocephalic and atraumatic. EENT:   XENIA. No conjunctival injections. Septum was midline, mucosa was without erythema, exudates or cobblestoning. No thrush was noted. Mallampati   Neck: Supple without thyromegaly. No elevated JVP. Trachea was midline. Respiratory: Chest was symmetrical.  Breath sounds bilaterally were clear to auscultation. There were no wheezes, rhonchi or rales. There is no intercostal retraction or use of accessory muscles. Cardiovascular: Regular without murmur, clicks, gallops or rubs. Abdomen: Slightly rounded and soft without organomegaly. No rebound tenderness, rigidity or guarding was appreciated. Lymphatic: No lymphadenopathy. Musculoskeletal: Normal curvature of the spine. No gross muscle weakness. Extremities:  No lower extremity edema, ulcerations, tenderness, varicosities or erythema. Muscle size, tone and strength are normal.  No involuntary movements are noted. Skin:  Warm and dry. Good color, turgor and pigmentation. No lesions or scars. No cyanosis or clubbing  Neurological/Psychiatric: The patient's general behavior, level of consciousness, thought content and emotional status is normal.            Laboratory findings:-    CBC:   Recent Labs      12/15/17   0647   WBC  8.1   HGB  8.6*   PLT  349     BMP:  Recent Labs      12/13/17   2212  12/14/17   0626  12/15/17   0647   NA  143  144  143   K  3.1*  3.7  3.3*   CL  100  104  100   CO2  25  25  25   BUN  13  11  13   CREATININE  0.92  0.83  0.97   GLUCOSE  197*  157*  116*     S. Calcium:  Recent Labs      12/15/17   0647   CALCIUM  8.6     S. Ionized Calcium:No results for input(s): IONCA in the last 72 hours. S. Magnesium:  Recent Labs      12/14/17   0626   MG  1.6     S. Phosphorus:No results for input(s): PHOS in the last 72 hours.   S. Glucose:  Recent Labs      12/13/17   2140

## 2017-12-15 NOTE — CONSULTS
[facility]    Consultation Note                         Today's Date: 12/15/2017  Patient Name: Davie Cranker. Date of admission: 12/13/2017  8:32 PM  Patient's age: 81 y.o., 1945  Admission Dx: Acute on chronic systolic CHF (congestive heart failure) (HCC) [I50.23]  Multiple vessel coronary artery disease [I25.10]  Multiple vessel coronary artery disease [I25.10]    Reason for Consult: patient co-management  Requesting Physician: Worley Aase, MD    CHIEF COMPLAINT:  SOB    History Obtained From:  patient, electronic medical record    HISTORY OF PRESENT ILLNESS:      The patient is a 81 y.o.  male who is admitted to the hospital for SOB. He has significant past medical h/o DM, HTN, HLD, DVT on xarelto, MM on revlimid and decadron, currently admitted for worsenning SOB and found to have decompensated CHF with reduced EF of 20% and left heart cath showing multivessel disease and currently being evaluated by [facility] for CABG. Plan is for CABG on Monday [facility]. We  Have been consulted in regards of MM. He had DVT in march /april and says he was suppose to ba on a blood thinner because of increased risk of clotting because of steroids and revlimid but didn't take it for sometime and developed DVT following that in right leg and was started on xarelto following that. His hemonc dr is Dr Иван Hussein and they contacted him yesterday and per him he is not to take revlimid  till the time he sees him in feb 2018 appointment. Past Medical History:   has a past medical history of Arthritis; Cardiomyopathy ([address] CHF (congestive heart failure) ([address] COPD (chronic obstructive pulmonary disease) ([address] Diabetes mellitus ([address] DVT of leg (deep venous thrombosis) ([address] Hyperlipidemia; Hypertension; Multiple myeloma ([address] and Neuropathy ([address]     Past Surgical History:   has a past surgical history that CASSY Montaño        magnesium oxide (MAG-OX) tablet 400 mg  400 mg Oral BID Pia Gonzales MD   400 mg at 12/15/17 0921    0.9 % sodium chloride infusion   Intravenous Continuous Unique Hardy MD 50 mL/hr at 12/13/17 1718      benzonatate (TESSALON) capsule 100 mg  100 mg Oral TID PRN Pia Gonzales MD   100 mg at 12/15/17 0924    fenofibrate (TRICOR) tablet 54 mg  54 mg Oral Daily Pia Gonzales MD   54 mg at 12/14/17 1034    glipiZIDE (GLUCOTROL) tablet 5 mg  5 mg Oral QAM AC Pia Gonzales MD   5 mg at 12/15/17 1530    lenalidomide (REVLIMID) chemo capsule 10 mg  10 mg Oral Daily Pia Gonzales MD   10 mg at 12/14/17 1046    simvastatin (ZOCOR) tablet 40 mg  40 mg Oral Nightly Pia Gonzales MD   40 mg at 12/14/17 2121    traMADol (ULTRAM) tablet 50 mg  50 mg Oral Q6H PRN Pia Gonzales MD   50 mg at 12/14/17 2121    potassium chloride (KLOR-CON M) extended release tablet 40 mEq  40 mEq Oral PRN Pia Gonzales MD   40 mEq at 12/14/17 0047    Or    potassium chloride 20 MEQ/15ML (10%) oral solution 40 mEq  40 mEq Oral PRN Pia Gonzales MD   40 mEq at 12/15/17 0931    Or    potassium chloride 10 mEq/100 mL IVPB (Peripheral Line)  10 mEq Intravenous PRN Pia Gonzales MD        magnesium sulfate 1 g in dextrose 5% 100 mL IVPB  1 g Intravenous PRN Pia Gonzales MD        ondansetron [facility]) injection 4 mg  4 mg Intravenous Q6H PRN Pia Gonzales MD        famotidine (PEPCID) tablet 20 mg  20 mg Oral BID Pia Gonzales MD   20 mg at 12/15/17 0920    enoxaparin (LOVENOX) injection 90 mg  1 mg/kg Subcutaneous BID Pia Gonzales MD   90 mg at 12/15/17 5553    aspirin chewable tablet 81 mg  81 mg Oral Daily Pia Gonzales MD   81 mg at 12/15/17 0921    glucose (GLUTOSE) 40 % oral gel 15 g  15 g Oral PRN Pia Gonzales MD        dextrose 50 % solution 12.5 g  12.5 g Intravenous PRN iPa Gonzales MD        glucagon (rDNA) injection 1 mg  1 mg Intramuscular PRN Miguel Ángelis Jannette Deng MD        dextrose 5 % solution  100 mL/hr Intravenous PRN Jose Ramon Almaguer MD        insulin lispro (HUMALOG) injection vial 0-6 Units  0-6 Units Subcutaneous TID  Jose Ramon Almaguer MD   1 Units at 12/15/17 1207    insulin lispro (HUMALOG) injection vial 0-3 Units  0-3 Units Subcutaneous Nightly Jose Ramon Almaguer MD   1 Units at 12/14/17 2131    diphenhydrAMINE (BENADRYL) tablet 25 mg  25 mg Oral Q6H PRN Deep Teague MD        zolpidem (AMBIEN) tablet 5 mg  5 mg Oral Nightly PRN Jose Ramon Almaguer MD   5 mg at 12/14/17 2121    carvedilol (COREG) tablet 12.5 mg  12.5 mg Oral BID  Jose Ramon Almaguer MD   12.5 mg at 12/15/17 4926    losartan (COZAAR) tablet 50 mg  50 mg Oral Daily Jose Ramon Almaguer MD   50 mg at 12/15/17 2444       Allergies:  Review of patient's allergies indicates no known allergies. Social History:   reports that he has been smoking. He has never used smokeless tobacco. He reports that he drinks alcohol. He reports that he uses drugs, including Marijuana. Family History: family history is not on file. REVIEW OF SYSTEMS:      Constitutional: No fever or chills. No night sweats, no weight loss   Eyes: No eye discharge, double vision, or eye pain   HEENT: negative for sore mouth, sore throat, hoarseness and voice change   Respiratory: negative for cough , sputum, dyspnea, wheezing, hemoptysis, chest pain   Cardiovascular: negative for chest pain, dyspnea, palpitations, orthopnea, PND   Gastrointestinal: negative for nausea, vomiting, diarrhea, constipation, abdominal pain, Dysphagia, hematemesis and hematochezia   Genitourinary: negative for frequency, dysuria, nocturia, urinary incontinence, and hematuria   Integument: negative for rash, skin lesions, bruises.    Hematologic/Lymphatic: negative for easy bruising, bleeding, lymphadenopathy, or petechiae   Endocrine: negative for heat or cold intolerance,weight changes, change in bowel habits and hair loss   Musculoskeletal: negative for myalgias, arthralgias, pain, joint swelling,and bone pain   Neurological: negative for headaches, dizziness, seizures, weakness, numbness        PHYSICAL EXAM:        /68   Pulse 71   Temp 98.4 °F (36.9 °C) (Oral)   Resp 17   Ht 5' 9\" (1.753 m)   Wt 179 lb 12.8 oz (81.6 kg)   SpO2 98%   BMI 26.55 kg/m²    Temp (24hrs), Av.6 °F (37 °C), Min:98.4 °F (36.9 °C), Max:98.8 °F (37.1 °C)      General appearance - well appearing, no in pain or distress   Mental status - alert and cooperative   Eyes - pupils equal and reactive, extraocular eye movements intact   Ears - bilateral TM's and external ear canals normal   Mouth - mucous membranes moist, pharynx normal without lesions   Neck - supple, no significant adenopathy   Lymphatics - no palpable lymphadenopathy, no hepatosplenomegaly   Chest - clear to auscultation, no wheezes, rales or rhonchi, symmetric air entry   Heart - normal rate, regular rhythm, normal S1, S2, no murmurs  Abdomen - soft, nontender, nondistended, no masses or organomegaly   Neurological - alert, oriented, normal speech, no focal findings or movement disorder noted   Musculoskeletal - no joint tenderness, deformity or swelling   Extremities - peripheral pulses normal, no pedal edema, no clubbing or cyanosis   Skin - normal coloration and turgor, no rashes, no suspicious skin lesions noted ,      DATA:      Labs:     CBC:   Recent Labs      17   0626  12/15/17   0647   WBC  5.9  8.1   RBC  3.11*  3.64*   HGB  7.5*  8.6*   HCT  27.6*  32.0*   MCV  88.7  87.9   RDW  18.8*  18.8*   PLT  305  349     BMP:   Recent Labs      17   2212  17   0626  12/15/17   0647   NA  143  144  143   K  3.1*  3.7  3.3*   CL  100  104  100   CO2  25  25  25   BUN  13  11  13   CREATININE  0.92  0.83  0.97     ANEMIA STUDIES  No results for input(s): LABIRON, TIBC, FERRITIN, NBKSYQMR10, FOLATE, OCCULTBLD in the last 72 hours.   PT/INR:   Recent Labs      12/15/17   0647   PROTIME  10.4   INR  1.0     APTT:   Recent Labs 12/15/17   0647   APTT  26.3     LFTS  Recent Labs      12/15/17   0647   ALKPHOS  48   ALT  11   AST  9   BILITOT  0.47   LABALBU  3.6       Radiology    Doppler LE - negative for DVT        IMPRESSION:   1. IgG kappa MM on revlimid and decadron  2. Multivessel CAD with LEF 20%   3. DVT on xarelto at home - currently on full dose lovenox for CABG  4. Current smoker    RECOMMENDATIONS:  1. I had a detailed discussion with the patient and personally went over the results of your lab workup, pathology report and imaging studies. 2. Ig G kappa MM in follow up with Dr Lola Felipe and gets medicines from [place] [facility], Dr Lola Felipe consulted Dr Mauro Mancilla at [facility] - 5/19/16, for possibility of PBSCT, but he refused and also not deemed a suitable candidate for that. Currently on revlimid and decadron 40 mg once per week. 3. CABG on Monday. 4. Will stop revlimid for now and can resume after follow up with his hemonc Dr - Dr Enrique Saenz in office on Feb 2018  5. Continue decadron at same dose preoperatively - no need for stress dose. Plan to be discussed with the attending. Thank you for asking us to see this patient. Sheyla Murray MD  PGY-2, Internal medicine resident  [place], [place]    Attending Physician Statement   I have discussed the care of this patient, including pertinent history and exam findings, with the resident. I have seen and examined the patient and the key elements of all parts of the encounter have been performed by me. I agree with the assessment, plan and orders as documented by the resident and with changes made to the note.     Hold of MM treatment until recovery from surgery  Monitor counts    Ling Bell MD  Hematology/Oncology    Cell: [phone]

## 2017-12-15 NOTE — CONSULTS
Pulmonary/Critical Care consultation    Patient's name:  Savannah Burgess. Medical Record Number: [id number]  Patient's account/billing number: [de-identified]  Patient's YOB: 1945  Age: 81 y.o. Date of Admission: 12/13/2017  8:32 PM  Date of Consult: 12/15/2017      Primary Care Physician: Damaris Grewal MD      Code Status: Full Code    Reason for consult: COPD      HISTORY OF PRESENT ILLNESS:   History was obtained from the patient. Mr. Savannah Amaya is a 81 y.o. Male with PMHx of multiple myeloma, DM type II, CHF, CAD, Asthma and COPD, who was admitted for worsening SOB. He is s/p cath. Patient complains of some shortness of breath and a productive cough of clear sputum. He says the shortness of breath is worse than his baseline but his cough is better than his baseline. He has no chest pain. He denies any hospitalizations for his COPD. He says he has not had an asthma exacerbation since he was a kid. He uses albuterol inhaler at home every couple of weeks. He does not have MORE and does not use home oxygen or CPAP. He is a smoker of 1ppd for many years except for the last 3 days during his hospital stay. No corticosteroid use in the past 1 year for COPD. He is on dexamethasone once a week at 4 mg now, but he does not know why he is on medication. No problem with prolonged ventilation following abdominal surgery in 2002  No wheezing. No orthopnea or PND    Chest xray (12.14.17) - no pulmonary edema or lung consolidation.          Past Medical History:        Diagnosis Date    Arthritis     all over    Cardiomyopathy [facility])     CHF (congestive heart failure) (HCC)     [facility]     Diabetes mellitus [facility]     DVT of leg (deep venous thrombosis) [facility]     Hyperlipidemia     Hypertension     Multiple myeloma (HCC)     Neuropathy ([facility]        Past Surgical History:        Procedure Laterality Date [address]  2002    CARDIAC CATHETERIZATION  12/13/2017    right and left heart cath with Dr. Jabari Gonzalez       Allergies:    No Known Allergies      Home Meds:   Prior to Admission medications    Medication Sig Start Date End Date Taking? Authorizing Provider   losartan (COZAAR) 25 MG tablet Take 25 mg by mouth daily   Yes Historical Provider, MD   simvastatin (ZOCOR) 40 MG tablet Take 40 mg by mouth nightly   Yes Historical Provider, MD   fenofibric acid (FIBRICOR) 35 MG TABS tablet Take 35 mg by mouth daily   Yes Historical Provider, MD   NONFORMULARY Latanoprost opth 0.005% 1 drop ou   Yes Historical Provider, MD   NONFORMULARY Dextran 70-hypromellose 0.3% opth sol one drop OU PRN   Yes Historical Provider, MD   rivaroxaban (XARELTO) 20 MG TABS tablet Take 20 mg by mouth   Yes Historical Provider, MD [facility] Pro-air HFA 1 ouff PRN   Yes Historical Provider, MD   lenalidomide (REVLIMID) 10 MG chemo capsule Take 10 mg by mouth daily On 21 days then off for 7 days   Yes Historical Provider, MD   dexamethasone (DECADRON) 4 MG tablet Take 4 mg by mouth once a week 10 pills once per week   Yes Historical Provider, MD   glipiZIDE (GLUCOTROL) 5 MG tablet Take 5 mg by mouth every morning (before breakfast)   Yes Historical Provider, MD   traMADol (ULTRAM) 50 MG tablet Take 50 mg by mouth daily as needed for Pain . Yes Historical Provider, MD   benzonatate (TESSALON) 100 MG capsule Take 100 mg by mouth 3 times daily as needed for Cough    Historical Provider, MD   hydrochlorothiazide (MICROZIDE) 12.5 MG capsule Take 12.5 mg by mouth daily    Historical Provider, MD   Cyanocobalamin (VITAMIN B 12 PO) Take 1,000 mcg by mouth daily    Historical Provider, MD       Family History:   History reviewed. No pertinent family history. Social History:   TOBACCO:   reports that he has been smoking. He has never used smokeless tobacco.  ETOH:   reports that he drinks alcohol.   DRUGS:  reports that he uses drugs, including Marijuana. REVIEW OF SYSTEMS:    Review of Systems -   General ROS: Completed and except as mentioned above were negative   Psychological ROS:  Completed and except as mentioned above were negative  Ophthalmic ROS:  Completed and except as mentioned above were negative  ENT ROS:  Completed and except as mentioned above were negative  Allergy and Immunology ROS:  Completed and except as mentioned above were negative  Hematological and Lymphatic ROS:  Completed and except as mentioned above were negative  Endocrine ROS: Completed and except as mentioned above were negative  Breast ROS:  Completed and except as mentioned above were negative  Respiratory ROS:  Completed and except as mentioned above were negative  Cardiovascular ROS:  Completed and except as mentioned above were negative  Gastrointestinal ROS: Completed and except as mentioned above were negative  Genito-Urinary ROS:  Completed and except as mentioned above were negative  Musculoskeletal ROS:  Completed and except as mentioned above were negative  Neurological ROS:  Completed and except as mentioned above were negative  Dermatological ROS:  Completed and except as mentioned above were negative          Physical Exam:    Vitals: /68   Pulse 71   Temp 98.4 °F (36.9 °C) (Oral)   Resp 17   Ht 5' 9\" (1.753 m)   Wt 179 lb 12.8 oz (81.6 kg)   SpO2 98%   BMI 26.55 kg/m²     Last Body weight:   Wt Readings from Last 3 Encounters:   12/15/17 179 lb 12.8 oz (81.6 kg)       Body Mass Index : Body mass index is 26.55 kg/m².       Intake and Output summary:     Intake/Output Summary (Last 24 hours) at 12/15/17 1516  Last data filed at 12/15/17 1038   Gross per 24 hour   Intake              790 ml   Output             1300 ml   Net             -510 ml       Physical Examination:   PHYSICAL EXAMINATION:  Vitals:    12/15/17 0352 12/15/17 0430 12/15/17 0735 12/15/17 1145   BP: 117/69  113/81 114/68   Pulse: 71  70 71 Resp: 19 11 17   Temp: 98.7 °F (37.1 °C)  98.7 °F (37.1 °C) 98.4 °F (36.9 °C)   TempSrc: Oral  Oral Oral   SpO2:   97% 98%   Weight:  179 lb 12.8 oz (81.6 kg)     Height:         Constitutional: This is a well developed, well nourished,  81y.o. year old male who is alert, oriented, cooperative and in no apparent distress. He is on room air with Spo2 97%. Head:normocephalic and atraumatic. EENT:   XENIA. No conjunctival injections. Septum was midline, mucosa was without erythema, exudates or cobblestoning. No thrush was noted. Mallampati   Neck: Supple without thyromegaly. No elevated JVP. Trachea was midline. Respiratory: Chest was symmetrical.  Breath sounds bilaterally were clear to auscultation. There were no wheezes, rhonchi or rales. There is no intercostal retraction or use of accessory muscles. Cardiovascular: Regular without murmur, clicks, gallops or rubs. Abdomen: Slightly rounded and soft without organomegaly. No rebound tenderness, rigidity or guarding was appreciated. Lymphatic: No lymphadenopathy. Musculoskeletal: Normal curvature of the spine. No gross muscle weakness. Extremities:  No lower extremity edema, ulcerations, tenderness, varicosities or erythema. Muscle size, tone and strength are normal.  No involuntary movements are noted. Skin:  Warm and dry. Good color, turgor and pigmentation. No lesions or scars. No cyanosis or clubbing  Neurological/Psychiatric: The patient's general behavior, level of consciousness, thought content and emotional status is normal.            Laboratory findings:-    CBC:   Recent Labs      12/15/17   0647   WBC  8.1   HGB  8.6*   PLT  349     BMP:    Recent Labs      12/13/17   2212  12/14/17   0626  12/15/17   0647  12/15/17   1409   NA  143  144  143   --    K  3.1*  3.7  3.3*  3.8   CL  100  104  100   --    CO2  25  25  25   --    BUN  13  11  13   --    CREATININE  0.92  0.83  0.97   --    GLUCOSE  197*  157*  116*   --      S. Calcium:  Recent Labs      12/15/17   0647   CALCIUM  8.6     S. Ionized Calcium:No results for input(s): IONCA in the last 72 hours. S. Magnesium:  Recent Labs      12/14/17   0626   MG  1.6     S. Phosphorus:No results for input(s): PHOS in the last 72 hours. S. Glucose:  Recent Labs      12/14/17   2125  12/15/17   0738  12/15/17   1148   POCGLU  152*  138*  173*     Glycosylated hemoglobin A1C:   Recent Labs      12/14/17   0626   LABA1C  6.8*     INR:   Recent Labs      12/15/17   0647   INR  1.0     Hepatic functions:   Recent Labs      12/15/17   0647   ALKPHOS  48   ALT  11   AST  9   PROT  6.0*   BILITOT  0.47   LABALBU  3.6     Pancreatic functions:No results for input(s): LACTA, AMYLASE in the last 72 hours. S. Lactic Acid: No results for input(s): LACTA in the last 72 hours. Cardiac enzymes:No results for input(s): CKTOTAL, CKMB, CKMBINDEX, TROPONINI in the last 72 hours. BNP:No results for input(s): BNP in the last 72 hours. Lipid profile:   Recent Labs      12/14/17   0626   CHOL  98   TRIG  166*   HDL  32*     Blood Gases: No results found for: PH, PCO2, PO2, HCO3, O2SAT  Thyroid functions:   Lab Results   Component Value Date    TSH 1.13 12/14/2017        He had pulmonary function tests done recently which did not meet the goal criteria for COPD, but does show obstructive pattern on the flow volume loop. Radiological reports:     Chest xray (12.14.17) - no pulmonary edema or lung consolidation. Assessment and Plan     Active Problems:    Acute on chronic systolic CHF (congestive heart failure) (HCC)    Multiple vessel coronary artery disease    COPD-very early stage. Nicotine addiction. History of multiple myeloma. Previous history of DVT. Corticosteroid use    Plan:    1. Continue to monitor fluid balance   2. Supplemental oxygen as needed  3.  for smoking cessation . 4. Continue bronchodilators. 5. Supplemental oxygen as needed. 6. DVT prophylaxis.   Patient is

## 2017-12-15 NOTE — PROGRESS NOTES
[facility]   Progress Note                   Date:   12/15/2017  Patient name: Gloria Rabago. Date of admission:  12/13/2017  8:32 PM  MRN:   [id number]  YOB: 1945  PCP: Brook Gibson MD    Reason for Admission: Acute on chronic systolic CHF (congestive heart failure) ([facility] [I50.23]  Multiple vessel coronary artery disease [I25.10]  Multiple vessel coronary artery disease [I25.10]    Subjective:       Clinical Changes / Abnormalities: Patient seen and examined sitting quietly in room with family at bedside. Denies Cp. States last PM he was returning from bathroom and all of a sudden became very SOB and sweating. State his BP \"dropped to 40. \" States he returned to bed and all resolved in several minutes. Denies any further episodes such as this. Per RN patient refused Lasix and Cozaar but did take her Coreg. Patient states he is scared that his BP will drop again. Telemetry and vitals reviewed.  Could not find recording of hypotension.    -2.26 L since admission    ROS    Medications:   Scheduled Meds:   [START ON 12/18/2017] dexamethasone  40 mg Oral Once per day on Mon    potassium chloride  40 mEq Oral Once    magnesium oxide  400 mg Oral BID    furosemide  40 mg Intravenous TID    fenofibrate  54 mg Oral Daily    glipiZIDE  5 mg Oral QAM AC    lenalidomide  10 mg Oral Daily    simvastatin  40 mg Oral Nightly    famotidine  20 mg Oral BID    enoxaparin  1 mg/kg Subcutaneous BID    aspirin  81 mg Oral Daily    insulin lispro  0-6 Units Subcutaneous TID WC    insulin lispro  0-3 Units Subcutaneous Nightly    carvedilol  12.5 mg Oral BID WC    losartan  50 mg Oral Daily     Continuous Infusions:   sodium chloride 50 mL/hr at 12/13/17 1718    dextrose       CBC:   Recent Labs      12/14/17   0626  12/15/17   0647   WBC  5.9  8.1   HGB  7.5*  8.6*   PLT  305  349     BMP:    Recent Labs      12/13/17   2212  12/14/17   0626  12/15/17   0647   NA  143  144  143 scheduled for [facility] on Monday. Reviewed in detail with patient. Questions and concerns addressed. Continue ASA, Bb, Lovenox, ARB, & Statin. 2. Dm2 - Stable. Continue Glipizide & Humalog Ss. Discussed importance of diet and exercise in DM management.     Electronically signed by CASSY Caldwell on 12/15/2017 at 11:07 [facility].  [phone]

## 2017-12-15 NOTE — PROGRESS NOTES
[facility]  Progress Note      Subjective:  Mr. Tristian Nix feels well today with no acute complaints. Pain is controlled. Denies chest pain or shortness of breath. Plan of care reviewed and questions answered. Patient expressed  Concern over whether surgery will be covered by his insurance. Physical Exam  Vitals:  Vitals:    12/15/17 1145   BP: 114/68   Pulse: 71   Resp: 17   Temp: 98.4 °F (36.9 °C)   SpO2: 98%     I/O last 3 completed shifts: In: 750 [P.O.:750]  Out: 1100 [Urine:1100]  I/O this shift:  In: 240 [P.O.:240]  Out: 200 [Urine:200]      General: Alert and Oriented x3. Resting in bed. No apparent distress. HEENT:  Normocephalic and atraumatic. PERRL. EOMI. Lips and oral mucosa moist and without lesions. Neck:  Supple. Trachea midline. Chest:  No abnormality. Equal and symmetric expansion with respiration. Lungs: Scattered rhonchi throughout, clears some with cough  Cardiac:  Regular rate and rhythm without murmurs, rubs or gallops. Abdomen:  Soft, non-tender, Normoactive bowel sounds. No masses or organomegaly. Extremities:  No Edema. Intact pulses in all four extremities. Musculoskeletal:  Intact range of motion of peripheral joints. grossly normal  Neurologic:  Cranial nerves are grossly intact. Non-focal sensory deficits on exam.  Psychiatric: Mood and affect are appropriate.         Current Medications:    Current Facility-Administered Medications:     [START ON 12/18/2017] dexamethasone (DECADRON) tablet 40 mg, 40 mg, Oral, Once per day on Mon, Ruby Larsen CNP    furosemide (LASIX) injection 40 mg, 40 mg, Intravenous, BID, CASSY Le    magnesium oxide (MAG-OX) tablet 400 mg, 400 mg, Oral, BID, Edwina Robins MD, 400 mg at 12/15/17 3453    0.9 % sodium chloride infusion, , Intravenous, Continuous, Unique Hunter MD, Last Rate: 50 mL/hr at 12/13/17 1718    benzonatate (TESSALON) capsule 100 mg, 100 mg, Oral, TID PRN, Edwina Robins MD, 100 mg at 12/15/17 0924    fenofibrate (TRICOR) tablet 54 mg, 54 mg, Oral, Daily, Phuong Block MD, 54 mg at 12/14/17 1034    glipiZIDE (GLUCOTROL) tablet 5 mg, 5 mg, Oral, QAM AC, Phuong Block MD, 5 mg at 12/15/17 9090    lenalidomide (REVLIMID) chemo capsule 10 mg, 10 mg, Oral, Daily, Phuong Block MD, 10 mg at 12/14/17 1046    simvastatin (ZOCOR) tablet 40 mg, 40 mg, Oral, Nightly, Phuong Block MD, 40 mg at 12/14/17 2121    traMADol (ULTRAM) tablet 50 mg, 50 mg, Oral, Q6H PRN, Phuong Block MD, 50 mg at 12/14/17 2121    potassium chloride (KLOR-CON M) extended release tablet 40 mEq, 40 mEq, Oral, PRN, 40 mEq at 12/14/17 0047 **OR** potassium chloride 20 MEQ/15ML (10%) oral solution 40 mEq, 40 mEq, Oral, PRN, 40 mEq at 12/15/17 0931 **OR** potassium chloride 10 mEq/100 mL IVPB (Peripheral Line), 10 mEq, Intravenous, PRN, Phuong Block MD    magnesium sulfate 1 g in dextrose 5% 100 mL IVPB, 1 g, Intravenous, PRN, Phuong Block MD    ondansetron [facility]) injection 4 mg, 4 mg, Intravenous, Q6H PRN, Phuong Block MD    famotidine (PEPCID) tablet 20 mg, 20 mg, Oral, BID, Phuong Block MD, 20 mg at 12/15/17 0920    enoxaparin (LOVENOX) injection 90 mg, 1 mg/kg, Subcutaneous, BID, Phuong Block MD, 90 mg at 12/15/17 6907    aspirin chewable tablet 81 mg, 81 mg, Oral, Daily, Phuong Block MD, 81 mg at 12/15/17 0921    glucose (GLUTOSE) 40 % oral gel 15 g, 15 g, Oral, PRN, Phuong Block MD    dextrose 50 % solution 12.5 g, 12.5 g, Intravenous, PRN, Phuong Block MD    glucagon (rDNA) injection 1 mg, 1 mg, Intramuscular, PRN, Phuong Block MD    dextrose 5 % solution, 100 mL/hr, Intravenous, PRN, Phuong Block MD    insulin lispro (HUMALOG) injection vial 0-6 Units, 0-6 Units, Subcutaneous, TID WC, Phuong Block MD, 1 Units at 12/15/17 1207    insulin lispro (HUMALOG) injection vial 0-3 Units, 0-3 Units, Subcutaneous, Nightly, Phuong Block MD, 1 Units at 12/14/17 1139    diphenhydrAMINE (BENADRYL) tablet 25 mg, 25 mg, Oral, Q6H PRN, Maxim Pitt MD    zolpidem (AMBIEN) tablet 5 mg, 5 mg, Oral, Nightly PRN, Trinidad Ramirez MD, 5 mg at 12/14/17 2121    carvedilol (COREG) tablet 12.5 mg, 12.5 mg, Oral, BID WC, Trinidad Ramirez MD, 12.5 mg at 12/15/17 0900    losartan (COZAAR) tablet 50 mg, 50 mg, Oral, Daily, Trinidad Ramirez MD, 50 mg at 12/15/17 5087    Data:  CBC:   Recent Labs      12/14/17   0626  12/15/17   0647   WBC  5.9  8.1   HGB  7.5*  8.6*   HCT  27.6*  32.0*   MCV  88.7  87.9   PLT  305  349     BMP:   Recent Labs      12/13/17   2212  12/14/17   0626  12/15/17   0647   NA  143  144  143   K  3.1*  3.7  3.3*   CL  100  104  100   CO2  25  25  25   BUN  13  11  13   CREATININE  0.92  0.83  0.97   MG  1.5*  1.6   --      Accucheck Glucoses:   Recent Labs      12/13/17   2140  12/14/17   0741  12/14/17   2125  12/15/17   0738  12/15/17   1148   POCGLU  225*  154*  152*  138*  173*     Cardiac Enzymes: No results for input(s): CKTOTAL, CKMB, CKMBINDEX, TROPONINI in the last 72 hours. PTT/PT/INR:   Recent Labs      12/15/17   0647   PROTIME  10.4   INR  1.0     Recent Labs      12/15/17   0647   APTT  26.3         Assessment & Plan:   1. Planning for CABG on Monday 12/18/17   Consult to pulmonary for congestion, COPD, SOB - appreciate recs   Consult to Oncology due to multiple myeloma. Patient not taking his medication (as per his schedule) and is to restart next week. Will need guidance as to whether this therapy needs to be put on hold post op. And any other recommendations related to cardiac surgery. Echo completed - EF 35%   Potassium low, replacing with PO supplement, recheck later today   Carotid scan, vein map completed. Chronic DVT in Right femoral and popliteal.   BP low overnight, possibly due to diuresis from lasix. Cozaar held this morning, Rn will follow up with cardiology to adjust medication.       The above recommendations including medications and orders were discussed and agreed upon with  the attending on service for the cardiothoracic surgery group today.      Sammy Friday, CNP  Phone: [phone]

## 2017-12-16 LAB
ANION GAP SERPL CALCULATED.3IONS-SCNC: 13 MMOL/L (ref 9–17)
BUN BLDV-MCNC: 11 MG/DL (ref 8–23)
BUN/CREAT BLD: ABNORMAL (ref 9–20)
CALCIUM SERPL-MCNC: 8.2 MG/DL (ref 8.6–10.4)
CHLORIDE BLD-SCNC: 105 MMOL/L (ref 98–107)
CO2: 23 MMOL/L (ref 20–31)
CREAT SERPL-MCNC: 0.8 MG/DL (ref 0.7–1.2)
GFR AFRICAN AMERICAN: >60 ML/MIN
GFR NON-AFRICAN AMERICAN: >60 ML/MIN
GFR SERPL CREATININE-BSD FRML MDRD: ABNORMAL ML/MIN/{1.73_M2}
GFR SERPL CREATININE-BSD FRML MDRD: ABNORMAL ML/MIN/{1.73_M2}
GLUCOSE BLD-MCNC: 101 MG/DL (ref 75–110)
GLUCOSE BLD-MCNC: 113 MG/DL (ref 70–99)
GLUCOSE BLD-MCNC: 147 MG/DL (ref 75–110)
GLUCOSE BLD-MCNC: 151 MG/DL (ref 75–110)
GLUCOSE BLD-MCNC: 164 MG/DL (ref 75–110)
MAGNESIUM: 1.7 MG/DL (ref 1.6–2.6)
POTASSIUM SERPL-SCNC: 3.7 MMOL/L (ref 3.7–5.3)
SODIUM BLD-SCNC: 141 MMOL/L (ref 135–144)

## 2017-12-16 PROCEDURE — 83735 ASSAY OF MAGNESIUM: CPT

## 2017-12-16 PROCEDURE — 36415 COLL VENOUS BLD VENIPUNCTURE: CPT

## 2017-12-16 PROCEDURE — 99232 SBSQ HOSP IP/OBS MODERATE 35: CPT | Performed by: INTERNAL MEDICINE

## 2017-12-16 PROCEDURE — 80048 BASIC METABOLIC PNL TOTAL CA: CPT

## 2017-12-16 PROCEDURE — 82947 ASSAY GLUCOSE BLOOD QUANT: CPT

## 2017-12-16 PROCEDURE — 6360000002 HC RX W HCPCS: Performed by: INTERNAL MEDICINE

## 2017-12-16 PROCEDURE — 6360000002 HC RX W HCPCS: Performed by: NURSE PRACTITIONER

## 2017-12-16 PROCEDURE — 99233 SBSQ HOSP IP/OBS HIGH 50: CPT | Performed by: THORACIC SURGERY (CARDIOTHORACIC VASCULAR SURGERY)

## 2017-12-16 PROCEDURE — 6370000000 HC RX 637 (ALT 250 FOR IP): Performed by: INTERNAL MEDICINE

## 2017-12-16 PROCEDURE — 2060000000 HC ICU INTERMEDIATE R&B

## 2017-12-16 PROCEDURE — 1200000000 HC SEMI PRIVATE

## 2017-12-16 RX ORDER — FUROSEMIDE 40 MG/1
40 TABLET ORAL 2 TIMES DAILY
Status: DISCONTINUED | OUTPATIENT
Start: 2017-12-16 | End: 2017-12-17 | Stop reason: HOSPADM

## 2017-12-16 RX ADMIN — CARVEDILOL 12.5 MG: 12.5 TABLET, FILM COATED ORAL at 09:34

## 2017-12-16 RX ADMIN — FAMOTIDINE 20 MG: 20 TABLET, FILM COATED ORAL at 21:42

## 2017-12-16 RX ADMIN — ENOXAPARIN SODIUM 90 MG: 100 INJECTION SUBCUTANEOUS at 09:35

## 2017-12-16 RX ADMIN — FUROSEMIDE 40 MG: 40 TABLET ORAL at 16:38

## 2017-12-16 RX ADMIN — ASPIRIN 81 MG: 81 TABLET, CHEWABLE ORAL at 09:35

## 2017-12-16 RX ADMIN — TRAMADOL HYDROCHLORIDE 50 MG: 50 TABLET, FILM COATED ORAL at 22:29

## 2017-12-16 RX ADMIN — MAGNESIUM GLUCONATE 500 MG ORAL TABLET 400 MG: 500 TABLET ORAL at 21:41

## 2017-12-16 RX ADMIN — ZOLPIDEM TARTRATE 5 MG: 5 TABLET ORAL at 22:00

## 2017-12-16 RX ADMIN — INSULIN LISPRO 1 UNITS: 100 INJECTION, SOLUTION INTRAVENOUS; SUBCUTANEOUS at 21:45

## 2017-12-16 RX ADMIN — MAGNESIUM GLUCONATE 500 MG ORAL TABLET 400 MG: 500 TABLET ORAL at 09:35

## 2017-12-16 RX ADMIN — SIMVASTATIN 40 MG: 40 TABLET, FILM COATED ORAL at 21:42

## 2017-12-16 RX ADMIN — INSULIN LISPRO 1 UNITS: 100 INJECTION, SOLUTION INTRAVENOUS; SUBCUTANEOUS at 09:33

## 2017-12-16 RX ADMIN — INSULIN LISPRO 1 UNITS: 100 INJECTION, SOLUTION INTRAVENOUS; SUBCUTANEOUS at 12:33

## 2017-12-16 RX ADMIN — FAMOTIDINE 20 MG: 20 TABLET, FILM COATED ORAL at 09:35

## 2017-12-16 RX ADMIN — CARVEDILOL 12.5 MG: 12.5 TABLET, FILM COATED ORAL at 16:39

## 2017-12-16 RX ADMIN — TRAMADOL HYDROCHLORIDE 50 MG: 50 TABLET, FILM COATED ORAL at 00:06

## 2017-12-16 RX ADMIN — ENOXAPARIN SODIUM 90 MG: 100 INJECTION SUBCUTANEOUS at 21:45

## 2017-12-16 RX ADMIN — FUROSEMIDE 40 MG: 10 INJECTION, SOLUTION INTRAMUSCULAR; INTRAVENOUS at 09:36

## 2017-12-16 RX ADMIN — GLIPIZIDE 5 MG: 5 TABLET ORAL at 09:35

## 2017-12-16 RX ADMIN — BENZONATATE 100 MG: 100 CAPSULE ORAL at 09:35

## 2017-12-16 RX ADMIN — BENZONATATE 100 MG: 100 CAPSULE ORAL at 16:41

## 2017-12-16 RX ADMIN — LOSARTAN POTASSIUM 50 MG: 50 TABLET, FILM COATED ORAL at 09:35

## 2017-12-16 RX ADMIN — ZOLPIDEM TARTRATE 5 MG: 5 TABLET ORAL at 00:12

## 2017-12-16 RX ADMIN — FENOFIBRATE 54 MG: 54 TABLET ORAL at 16:40

## 2017-12-16 ASSESSMENT — PAIN SCALES - GENERAL
PAINLEVEL_OUTOF10: 7
PAINLEVEL_OUTOF10: 6

## 2017-12-16 NOTE — PROGRESS NOTES
mg/kg Subcutaneous BID    aspirin  81 mg Oral Daily    insulin lispro  0-6 Units Subcutaneous TID WC    insulin lispro  0-3 Units Subcutaneous Nightly    carvedilol  12.5 mg Oral BID WC    losartan  50 mg Oral Daily     Continuous Infusions:   sodium chloride 50 mL/hr at 12/13/17 1718    dextrose       PRN Meds:albuterol, benzonatate, traMADol, potassium chloride **OR** potassium chloride **OR** potassium chloride, magnesium sulfate, ondansetron, glucose, dextrose, glucagon (rDNA), dextrose, diphenhydrAMINE, zolpidem    Objective:      Physical Exam:  Vitals: /74   Pulse 70   Temp 97.7 °F (36.5 °C) (Oral)   Resp 12   Ht 5' 9\" (1.753 m)   Wt 179 lb 12.8 oz (81.6 kg)   SpO2 98%   BMI 26.55 kg/m²   24 hour intake/output:  Intake/Output Summary (Last 24 hours) at 12/16/17 1025  Last data filed at 12/15/17 1846   Gross per 24 hour   Intake              480 ml   Output              200 ml   Net              280 ml     Last 3 weights:   Wt Readings from Last 3 Encounters:   12/15/17 179 lb 12.8 oz (81.6 kg)         Physical Examination:   PHYSICAL EXAMINATION:  Vitals:    12/15/17 1145 12/15/17 1846 12/16/17 0000 12/16/17 0726   BP: 114/68  114/78 137/74   Pulse: 71  70    Resp: 17  12    Temp: 98.4 °F (36.9 °C) 98.2 °F (36.8 °C)  97.7 °F (36.5 °C)   TempSrc: Oral Axillary  Oral   SpO2: 98%   98%   Weight:       Height:         General appearance - alert, well appearing, and in no distress  Mental status - alert, oriented to person, place, and time  Eyes - pupils equal and reactive, extraocular eye movements intact  Mouth - mucous membranes moist, pharynx normal without lesions  Neck - supple, no significant adenopathy  Chest - clear to auscultation, no wheezes, rales or rhonchi, symmetric air entry  Heart - normal rate, regular rhythm, normal S1, S2  Abdomen - soft, nontender, nondistended  Neurological - alert, oriented, normal speech, no focal deficits   Extremities - peripheral pulses normal, no Lung volumes show some air trapping. Diffusion capacity is normal.  Flow  volume loop shows an obstructive ventilatory pattern.     IMPRESSION:  The patient could possibly have an obstructive ventilatory  defect. Clinical correlation is recommended, but the patient does not meet  the GOLD criteria for obstruction. Assessment:    Active Problems:    Acute on chronic systolic CHF (congestive heart failure) (HCC)    Multiple vessel coronary artery disease    Multiple myeloma not having achieved remission ([address]    COPD-very early stage. Nicotine addiction. History of multiple myeloma. Previous history of DVT. Corticosteroid use         Plan:    Smoking cessation  Continue bronchodilators  Diuresis per primary  Supplemental O2 PRN  DVT prophylaxis. Patient is being treated with Lovenox. Patient has no contraindications for having cardiac surgery. He is risk for postoperative pulmonary complications is low. Thank you for having us involved in the care of your patient. Please call us if you have any questions or concerns. Maninder Rodriguez MD      12/16/2017, 10:25 AM    Pulmonary & Critical Care    Attending Physician Statement  I have discussed the care of Donald Kelly., including pertinent history and exam findings with the resident. I have reviewed the key elements of all parts of the encounter with the resident. I have seen and examined the patient with the resident. I agree with the assessment and plan and status of the problem list as documented. Events noted I have reviewed the CT surgery evaluation and cardiology note. Pulmonary function test results seen and per report normal spirometry and normal diffusion capacity and flow volume loop suggestive of obstructive entry impairment summary does not have significant obstruction on Pulmicort function tests, he is okay to undergo surgery from pulmonary standpoint.   Optimize cardiac ischemic treatment and treatment for CHF per cardiology      Kirsten Goddard MD  12/16/2017 4:13 PM

## 2017-12-16 NOTE — PROGRESS NOTES
Patient history well noted. Patient feels great. Examination lungs are clear minimal fatty peripheral edema. Heart distant heart sounds. The patient prefers not to have any intervention at this point in time. He feels that an operation is not what he wants at this point in time. I have discussed the alternatives benefits and risks and treatments for his condition in the presence of his wife. I have discussed this patient with the cardiology service. Their plan is to transition him to oral diuretics today knowing now that he wants to be discharged to home. They will discharge him in the morning.

## 2017-12-16 NOTE — PROGRESS NOTES
Date:                           12/16/2017  Patient name:           Alma Rosa Armenta. Date of admission:  12/13/2017  8:32 PM  MRN:   [id number]  YOB: 1945  PCP:                           Karla Garcia MD        Reason for consult: Multiple myeloma  Subjective:       Patient seen and examined. Labs in vitals reviewed. Patient denies new complaint or any interval event. Patient's shortness of breath has improved with diuresis. His edema has also improved. Patient's open-heart surgery has been postponed for now. REVIEW OF SYSTEMS:  Constitutional: No fever or chills. No night sweats, no weight loss   Eyes: No eye discharge, double vision, or eye pain   HEENT: negative for sore mouth, sore throat, hoarseness and voice change   Respiratory: negative for cough , sputum, dyspnea, wheezing, hemoptysis, chest pain   Cardiovascular: negative for chest pain, dyspnea, palpitations, orthopnea, PND   Gastrointestinal: negative for nausea, vomiting, diarrhea, constipation, abdominal pain, Dysphagia, hematemesis and hematochezia   Genitourinary: negative for frequency, dysuria, nocturia, urinary incontinence, and hematuria   Integument: negative for rash, skin lesions, bruises.    Hematologic/Lymphatic: negative for easy bruising, bleeding, lymphadenopathy, or petechiae   Endocrine: negative for heat or cold intolerance,weight changes, change in bowel habits and hair loss   Musculoskeletal: negative for myalgias, arthralgias, pain, joint swelling,and bone pain   Neurological: negative for headaches, dizziness, seizures, weakness, numbness        Objective:     Vitals: BP (!) 127/99   Pulse 70   Temp 98 °F (36.7 °C) (Oral)   Resp 12   Ht 5' 9\" (1.753 m)   Wt 179 lb 12.8 oz (81.6 kg)   SpO2 97%   BMI 26.55 kg/m²      General appearance - well appearing, no in pain or distress   Mental status - alert and cooperative   Eyes - pupils equal and reactive, extraocular eye movements intact   Ears - bilateral TM's and external ear canals normal   Mouth - mucous membranes moist, pharynx normal without lesions   Neck - supple, no significant adenopathy   Lymphatics - no palpable lymphadenopathy, no hepatosplenomegaly   Chest - clear to auscultation, no wheezes, rales or rhonchi, symmetric air entry   Heart - normal rate, regular rhythm, normal S1, S2, no murmurs  Abdomen - soft, nontender, nondistended, no masses or organomegaly   Neurological - alert, oriented, normal speech, no focal findings or movement disorder noted   Musculoskeletal - no joint tenderness, deformity or swelling   Extremities - peripheral pulses normal, no pedal edema, no clubbing or cyanosis   Skin - normal coloration and turgor, no rashes, no suspicious skin lesions noted ,          Data:    No intake/output data recorded. In: 240 [P.O.:240]  Out: 200 [Urine:200]    CBC:   Recent Labs      12/14/17   0626  12/15/17   0647   WBC  5.9  8.1   HGB  7.5*  8.6*   PLT  305  349     BMP:    Recent Labs      12/14/17   0626  12/15/17   0647  12/15/17   1409  12/16/17   0622   NA  144  143   --   141   K  3.7  3.3*  3.8  3.7   CL  104  100   --   105   CO2  25  25   --   23   BUN  11  13   --   11   CREATININE  0.83  0.97   --   0.80   GLUCOSE  157*  116*   --   113*     Hepatic:   Recent Labs      12/15/17   0647   AST  9   ALT  11   BILITOT  0.47   ALKPHOS  48     INR:   Recent Labs      12/15/17   0647   INR  1.0     PTT:No results for input(s): PTT in the last 72 hours. Xr Chest Standard (2 Vw)    Result Date: 12/14/2017  EXAMINATION: TWO VIEWS OF THE CHEST 12/14/2017 9:08 am COMPARISON: June 27, 2006 the HISTORY: ORDERING SYSTEM PROVIDED HISTORY: pre op CABG TECHNOLOGIST PROVIDED HISTORY: Reason for exam:->pre op CABG FINDINGS: Stable mild cardiac silhouette enlargement. No pulmonary edema. No focal lung consolidation or infiltrate. No pleural effusion or pneumothorax.  Round osseous structures overlying the fibrocalcific plaque causing a <50%  fibrocalcific plaque causing a <50%  stenosis based on velocities. stenosis based on velocities. The internal carotid artery has an  irregular fibrocalcific plaque       The internal carotid artery has an  causing a <50% stenosis based on     irregular fibrocalcific plaque  velocities. causing a <50% stenosis based on                                       velocities. The external carotid artery is  normal.                              The external carotid artery has an                                       irregular fibrocalcific plaque  The vertebral artery is patent with  causing a <50% stenosis based on  antegrade flow. velocities. The vertebral artery is patent with                                       antegrade flow. Allergies   - Allergy:Aspirin(Drug). Blood Pressure:Left arm 120/72 mmHg. Patient Status: In Patient. Technical Quality:Good visualization. Limitation reason:had to use low color flow setting to fill out carotid. Velocities are measured in cm/s ; Diameters are measured in mm Carotid Right Measurements +------------+-------+-------+--------+-------+------------+---------------+ ! Location    ! PSV    ! EDV    ! Angle   ! RI     !%Stenosis   ! Tortuosity     ! +------------+-------+-------+--------+-------+------------+---------------+ ! Prox CCA    !94.3   !12.6   !60      !0.87   !            !               ! +------------+-------+-------+--------+-------+------------+---------------+ ! Mid CCA     !149    !12.8   !60      !0.91   !            !               ! +------------+-------+-------+--------+-------+------------+---------------+ ! Dist CCA    ! 55     !11.4   !60      !0.79   !            !               ! +------------+-------+-------+--------+-------+------------+---------------+ ! Bulb        !81.5   !14.1   ! 60      !0.83   !            !               ! 188 pounds, 85.3 kg  #   Patient Acct  [de-identified]   BSA:        2.03 m^2    BMI:       26.97 kg/m^2  #   MR #          [id number]     Sonographer             Kaveh Fields   Accession #   [id number]   Interpreting Physician  Renan Beaver   Referring                 Referring Physician     Noy Hyde, CNP  Nurse  Practitioner  Procedure Type of Study:   Veins: Lower Extremities DVT Study, Venous Scan Lower Bilateral.  Indications for Study:Pre-op OHS. Risk Factors   - The patient's risk factor(s) include: diet-treated diabetes mellitus and     arterial hypertension.   - The patient has a current/recent (within 1 year) tobacco history. - The patient's last creatinine was 0.9 mg/dl. Findings:   Right                                  Left  The proximal femoral vein appears      The common femoral, femoral,  minimally partially compressible with  popliteal and tibial veins  good recanalization and hyperechoic    demonstrate normal compressibility  echoes. and augmentation. A hyperechoic echo is noted in the     Normal compressibility of the great  popliteal with complete                saphenous vein. compressibility. Normal compressibility of the small  The common femoral, mid to distal      saphenous vein. femoral, popliteal and tibial veins  demonstrate normal compressibility and  augmentation. Normal compressibility of the great  saphenous vein. Normal compressibility of the small  saphenous vein. Allergies   - Allergy:Aspirin(Drug). Patient Status: In Patient. Technical Quality:Good visualization. Velocities are measured in cm/s ; Diameters are measured in mm Right Lower Extremities DVT Study Measurements Right 2D Measurements +------------------------------------+----------+---------------+----------+ ! Location                            ! Visualized! Compressibility! Thrombosis! +------------------------------------+----------+---------------+----------+ ! Common Femoral                      !Yes       ! Yes            ! None      ! +------------------------------------+----------+---------------+----------+ ! Prox Femoral                        !Yes       ! Partial        !Chronic   ! +------------------------------------+----------+---------------+----------+ ! Mid Femoral                         !Yes       ! Yes            ! None      ! +------------------------------------+----------+---------------+----------+ ! Dist Femoral                        !Yes       ! Yes            ! None      ! +------------------------------------+----------+---------------+----------+ ! Deep Femoral                        !No        !               !          ! +------------------------------------+----------+---------------+----------+ ! Popliteal                           !Yes       ! Yes            ! Chronic   ! +------------------------------------+----------+---------------+----------+ ! Sapheno Femoral Junction            ! Yes       ! Yes            ! None      ! +------------------------------------+----------+---------------+----------+ ! PTV                                 ! Yes       ! Yes            ! None      ! +------------------------------------+----------+---------------+----------+ ! Peroneal                            !Partial   !Yes            ! None      ! +------------------------------------+----------+---------------+----------+ ! Gastroc                             ! Yes       ! Yes            ! None      ! +------------------------------------+----------+---------------+----------+ ! GSV Thigh                           ! Yes       ! Yes            ! None      ! +------------------------------------+----------+---------------+----------+ ! GSV Knee                            ! Yes       ! Yes            ! None      ! +------------------------------------+----------+---------------+----------+ ! GSV Ankle Nurse  Practitioner  Procedure Type of Study:   Veins: Lower Extremity Vein Mapping. Indications for Study:Pre-op OHS. Risk Factors   - The patient's risk factor(s) include: diet-treated diabetes mellitus and     arterial hypertension.   - The patient has a current/recent (within 1 year) tobacco history. - The patient's last creatinine was 0.9 mg/dl. Findings:   Right                                      Left  The proximal femoral vein appears          Common femoral and superficial  minimally partially compressible with good femoral veins are compressible. recanalization. The common femoral vein appears  compressible. Allergies   - Allergy:Aspirin(Drug). Patient Status: In Patient. Technical Quality:Good visualization. Velocities are measured in cm/s ; Diameters are measured in mm +--------------------------------------++--------+-----+----+--------+-----+ ! Superficial - Great Saphenous Vein    ! ! Right   ! ! Left!        !     ! +--------------------------------------++--------+-----IgG kappa MM on revlimid and decadron  Multivessel CAD with LEF 20%   DVT on xarelto at home - currently on full dose lovenox for CABG  Current smoker  +----+--------+-----+ ! Location                              ! !Diameter! Depth! !Diameter! Depth! +--------------------------------------++--------+-----+----+--------+-----+ ! Sapheno Femoral Junction              ! !4.78    !     !    !3.05    !     ! +--------------------------------------++--------+-----+----+--------+-----+ ! GSV Mid Thigh                         !!2.05    !     !    !1.49    !     ! +--------------------------------------++--------+-----+----+--------+-----+ ! GSV Knee                              !!1.56    !     !    !0.91    !     ! +--------------------------------------++--------+-----+----+--------+-----+ ! GSV High Calf                         !!1.73    !     !    !0.83    !     ! +--------------------------------------++--------+-----+----+--------+-----+ ! GSV Low Calf                          !!1       !     !    !0.5     !     ! +--------------------------------------++--------+-----+----+--------+-----+ ! GSV Ankle                             ! !1.75    !     !    !0.83    !     ! +--------------------------------------++--------+-----+----+--------+-----+   Conclusions   Summary   Simultaneous real time imaging utilizing B-Mode, color doppler and  spectral waveform analysis was performed on the bilateral lower  extremities for venous examination of vein mapping.  Findings are:   Right:  Superficial veins are patent with size listed above   Left:  Superficial veins are patent with size listed above   Signature   ----------------------------------------------------------------  Electronically signed by Ness LeonardoSonographer) on  12/14/2017 01:11 PM  ----------------------------------------------------------------   ----------------------------------------------------------------  Electronically signed by Bc Torres(Interpreting physician)  on 12/14/2017 03:11 PM  ----------------------------------------------------------------          Problem Lists:   Primary Problem:  <principal problem not specified>   Current Problems:  Active Hospital Problems    Diagnosis Date Noted    Multiple vessel coronary artery disease [I25.10] 12/14/2017     Priority: High    Acute on chronic systolic CHF [facility]congestive heart failure) [facility]) [I50.23] 12/13/2017     Priority: High    Multiple myeloma not having achieved remission ([facility] [C90.00]      PMH:  Past Medical History:   Diagnosis Date    Arthritis     all over    Cardiomyopathy ([address]     CHF (congestive heart failure) ([address]     COPD (chronic obstructive pulmonary disease) ([address]     Diabetes mellitus ([address]     DVT of leg (deep venous thrombosis) ([address]     Hyperlipidemia     Hypertension     Multiple myeloma ([address]     Neuropathy ([address] Allergies: No Known Allergies     Assessment    IgG kappa MM on revlimid and decadron  Multivessel CAD with LEF 20%   DVT on xarelto at home - currently on full dose lovenox for CABG  Current smoker          Plan   I had a detailed discussion with the patient and personally went over the results of the lab workup, pathology report and imaging studies. Plans for open her surgery has been postponed for now. Patient is going to be medically managed with diuretics for CHF. Discussed the natural history of multiple myeloma. Patient has been maintained on Revlimid 10 mg daily 3 weeks on followed by 1 week off along with weekly Decadron. Recommend restarting maintains dose as previously planned. Continue anticoagulation with Xeralto at home. Patient advised to follow with his oncologist as scheduled.

## 2017-12-16 NOTE — PROGRESS NOTES
- [phone]    Attending Cardiologist Addendum: I have reviewed and performed the history, physical, subjective, objective, assessment, and plan with the resident/fellow and agree with the note. I performed the history and physical personally. I have made changes to the note above as needed. Thank you for allowing me to participate in the care of this patient, please do not hesitate to call if you have any questions.     Caitlin Quick MD MSc. [facility]  [phone]

## 2017-12-17 VITALS
HEIGHT: 69 IN | OXYGEN SATURATION: 98 % | HEART RATE: 65 BPM | BODY MASS INDEX: 25.86 KG/M2 | TEMPERATURE: 97.7 F | SYSTOLIC BLOOD PRESSURE: 121 MMHG | RESPIRATION RATE: 21 BRPM | DIASTOLIC BLOOD PRESSURE: 73 MMHG | WEIGHT: 174.6 LBS

## 2017-12-17 LAB
ANION GAP SERPL CALCULATED.3IONS-SCNC: 14 MMOL/L (ref 9–17)
BUN BLDV-MCNC: 14 MG/DL (ref 8–23)
BUN/CREAT BLD: ABNORMAL (ref 9–20)
CALCIUM SERPL-MCNC: 9.5 MG/DL (ref 8.6–10.4)
CHLORIDE BLD-SCNC: 97 MMOL/L (ref 98–107)
CO2: 26 MMOL/L (ref 20–31)
CREAT SERPL-MCNC: 1.09 MG/DL (ref 0.7–1.2)
GFR AFRICAN AMERICAN: >60 ML/MIN
GFR NON-AFRICAN AMERICAN: >60 ML/MIN
GFR SERPL CREATININE-BSD FRML MDRD: ABNORMAL ML/MIN/{1.73_M2}
GFR SERPL CREATININE-BSD FRML MDRD: ABNORMAL ML/MIN/{1.73_M2}
GLUCOSE BLD-MCNC: 119 MG/DL (ref 70–99)
GLUCOSE BLD-MCNC: 122 MG/DL (ref 75–110)
GLUCOSE BLD-MCNC: 163 MG/DL (ref 75–110)
MAGNESIUM: 1.7 MG/DL (ref 1.6–2.6)
POTASSIUM SERPL-SCNC: 3.4 MMOL/L (ref 3.7–5.3)
SODIUM BLD-SCNC: 137 MMOL/L (ref 135–144)

## 2017-12-17 PROCEDURE — 99232 SBSQ HOSP IP/OBS MODERATE 35: CPT | Performed by: INTERNAL MEDICINE

## 2017-12-17 PROCEDURE — 36415 COLL VENOUS BLD VENIPUNCTURE: CPT

## 2017-12-17 PROCEDURE — 6370000000 HC RX 637 (ALT 250 FOR IP): Performed by: INTERNAL MEDICINE

## 2017-12-17 PROCEDURE — 80048 BASIC METABOLIC PNL TOTAL CA: CPT

## 2017-12-17 PROCEDURE — 83735 ASSAY OF MAGNESIUM: CPT

## 2017-12-17 PROCEDURE — 82947 ASSAY GLUCOSE BLOOD QUANT: CPT

## 2017-12-17 PROCEDURE — 94762 N-INVAS EAR/PLS OXIMTRY CONT: CPT

## 2017-12-17 PROCEDURE — 6360000002 HC RX W HCPCS: Performed by: INTERNAL MEDICINE

## 2017-12-17 RX ORDER — LOSARTAN POTASSIUM 50 MG/1
50 TABLET ORAL DAILY
Qty: 30 TABLET | Refills: 3 | Status: ON HOLD | OUTPATIENT
Start: 2017-12-18 | End: 2018-01-14 | Stop reason: HOSPADM

## 2017-12-17 RX ORDER — ASPIRIN 81 MG/1
81 TABLET, CHEWABLE ORAL DAILY
Qty: 30 TABLET | Refills: 3 | Status: ON HOLD | OUTPATIENT
Start: 2017-12-18 | End: 2019-06-13 | Stop reason: HOSPADM

## 2017-12-17 RX ORDER — CARVEDILOL 12.5 MG/1
12.5 TABLET ORAL 2 TIMES DAILY WITH MEALS
Qty: 60 TABLET | Refills: 3 | Status: SHIPPED | OUTPATIENT
Start: 2017-12-17 | End: 2018-03-07 | Stop reason: DRUGHIGH

## 2017-12-17 RX ORDER — FUROSEMIDE 40 MG/1
40 TABLET ORAL 2 TIMES DAILY
Qty: 60 TABLET | Refills: 3 | Status: ON HOLD | OUTPATIENT
Start: 2017-12-17 | End: 2018-01-14 | Stop reason: HOSPADM

## 2017-12-17 RX ORDER — FAMOTIDINE 20 MG/1
20 TABLET, FILM COATED ORAL 2 TIMES DAILY
Qty: 60 TABLET | Refills: 3 | Status: SHIPPED | OUTPATIENT
Start: 2017-12-17

## 2017-12-17 RX ADMIN — CARVEDILOL 12.5 MG: 12.5 TABLET, FILM COATED ORAL at 09:46

## 2017-12-17 RX ADMIN — MAGNESIUM GLUCONATE 500 MG ORAL TABLET 400 MG: 500 TABLET ORAL at 09:44

## 2017-12-17 RX ADMIN — ASPIRIN 81 MG: 81 TABLET, CHEWABLE ORAL at 09:46

## 2017-12-17 RX ADMIN — FENOFIBRATE 54 MG: 54 TABLET ORAL at 09:55

## 2017-12-17 RX ADMIN — FLUTICASONE PROPIONATE 1 SPRAY: 50 SPRAY, METERED NASAL at 09:53

## 2017-12-17 RX ADMIN — ENOXAPARIN SODIUM 90 MG: 100 INJECTION SUBCUTANEOUS at 09:45

## 2017-12-17 RX ADMIN — FENOFIBRATE 54 MG: 54 TABLET ORAL at 09:53

## 2017-12-17 RX ADMIN — FAMOTIDINE 20 MG: 20 TABLET, FILM COATED ORAL at 09:44

## 2017-12-17 RX ADMIN — FUROSEMIDE 40 MG: 40 TABLET ORAL at 09:45

## 2017-12-17 RX ADMIN — LOSARTAN POTASSIUM 50 MG: 50 TABLET, FILM COATED ORAL at 09:45

## 2017-12-17 RX ADMIN — GLIPIZIDE 5 MG: 5 TABLET ORAL at 09:44

## 2017-12-17 NOTE — PROGRESS NOTES
PULMONARY PROGRESS NOTE      Patient:  Monse Shine. YOB: 1945    MRN: [id number]     Acct: [de-identified]     Admit date: 12/13/2017    REASON FOR initial CONSULT:- COPD    Pt seen and examined at bedside, he had discussion with the CT surgery yesterday and ultimately they decided not to have the CABG at this time and CT surgery had inform cardiology service   No acute issues overnight  He is ambulating without problem  Denies dyspnea, chest pain  Minimal cough cough or sputum production. Complain of mild postnasal dripping      Subjective:   Review of Systems -   General ROS: Completed and except as mentioned above were negative   Psychological ROS:  Completed and except as mentioned above were negative  Allergy and Immunology ROS:  Completed and except as mentioned above were negative  Hematological and Lymphatic ROS:  Completed and except as mentioned above were negative  Respiratory ROS:  Completed and except as mentioned above were negative  Cardiovascular ROS:  Completed and except as mentioned above were negative  Gastrointestinal ROS: Completed and except as mentioned above were negative  Genito-Urinary ROS:  Completed and except as mentioned above were negative  Musculoskeletal ROS:  Completed and except as mentioned above were negative  Neurological ROS:  Completed and except as mentioned above were negative  Dermatological ROS:  Completed and except as mentioned above were negative      Diet:  Diet NPO Time Specified Exceptions are: Sips with Meds  Dietary Nutrition Supplements: Clear Liquid Oral Supplement  DIET GENERAL; Carb Control: 4 carbs/meal (approximate 1800 kcals/day); Low Sodium (2 GM);  Daily Fluid Restriction: 1800 ml      Medications:Current Inpatient    Scheduled Meds:   furosemide  40 mg Oral BID    [START ON 12/18/2017] dexamethasone  40 mg Oral Once per day on Mon    fluticasone  1 spray Each Nare Daily    magnesium oxide  400 mg Oral BID    fenofibrate  54 mg Oral Daily    glipiZIDE  5 mg Oral QAM AC    simvastatin  40 mg Oral Nightly    famotidine  20 mg Oral BID    enoxaparin  1 mg/kg Subcutaneous BID    aspirin  81 mg Oral Daily    insulin lispro  0-6 Units Subcutaneous TID WC    insulin lispro  0-3 Units Subcutaneous Nightly    carvedilol  12.5 mg Oral BID WC    losartan  50 mg Oral Daily     Continuous Infusions:   dextrose       PRN Meds:albuterol, benzonatate, traMADol, potassium chloride **OR** potassium chloride **OR** potassium chloride, magnesium sulfate, ondansetron, glucose, dextrose, glucagon (rDNA), dextrose, diphenhydrAMINE    Objective:      Physical Exam:  Vitals: /73   Pulse 65   Temp 97.7 °F (36.5 °C) (Oral)   Resp 21   Ht 5' 9\" (1.753 m)   Wt 174 lb 9.6 oz (79.2 kg)   SpO2 98%   BMI 25.78 kg/m²   24 hour intake/output:No intake or output data in the 24 hours ending 12/17/17 0934  Last 3 weights:   Wt Readings from Last 3 Encounters:   12/17/17 174 lb 9.6 oz (79.2 kg)         Physical Examination:   PHYSICAL EXAMINATION:  Vitals:    12/16/17 2033 12/17/17 0115 12/17/17 0446 12/17/17 0654   BP: 106/67 (!) 107/93 115/74 121/73   Pulse: 77 69 65    Resp: 12 16 21    Temp: 99.4 °F (37.4 °C)   97.7 °F (36.5 °C)   TempSrc: Oral   Oral   SpO2: 96% 97%  98%   Weight:  174 lb 9.6 oz (79.2 kg)     Height:  5' 9\" (1.753 m)       General appearance - alert, well appearing, and in no distress  Mental status - alert, oriented to person, place, and time  Eyes - pupils equal and reactive, extraocular eye movements intact  Mouth - mucous membranes moist, pharynx normal without lesions  Neck - supple, no significant adenopathy  Chest - clear to auscultation, no wheezes, rales or rhonchi, symmetric air entry  Heart - normal rate, regular rhythm, normal S1, S2  Abdomen - soft, nontender, nondistended  Neurological - alert, oriented, normal speech, no focal deficits   Extremities - peripheral pulses normal, no pedal edema  Skin - normal coloration and turgor, no rashes      CBC:   Recent Labs      12/15/17   0647   WBC  8.1   HGB  8.6*   PLT  349     BMP:    Recent Labs      12/15/17   0647  12/15/17   1409  12/16/17   0622   NA  143   --   141   K  3.3*  3.8  3.7   CL  100   --   105   CO2  25   --   23   BUN  13   --   11   CREATININE  0.97   --   0.80   GLUCOSE  116*   --   113*     Calcium:  Recent Labs      12/16/17   0622   CALCIUM  8.2*     Ionized Calcium:No results for input(s): IONCA in the last 72 hours. Magnesium:  Recent Labs      12/16/17   0622   MG  1.7     Phosphorus:No results for input(s): PHOS in the last 72 hours. BNP:No results for input(s): BNP in the last 72 hours. Glucose:  Recent Labs      12/16/17   1627  12/16/17   2036  12/17/17   0658   POCGLU  101  164*  122*     HgbA1C:   No results for input(s): LABA1C in the last 72 hours. INR:   Recent Labs      12/15/17   0647   INR  1.0     Hepatic:   Recent Labs      12/15/17   0647   ALKPHOS  48   ALT  11   AST  9   PROT  6.0*   BILITOT  0.47   LABALBU  3.6     Amylase and Lipase:No results for input(s): LACTA, AMYLASE in the last 72 hours. Lactic Acid: No results for input(s): LACTA in the last 72 hours. CARDIAC ENZYMES:No results for input(s): CKTOTAL, CKMB, CKMBINDEX, TROPONINI in the last 72 hours. BNP: No results for input(s): BNP in the last 72 hours. Lipids: No results for input(s): CHOL, TRIG, HDL, LDLCALC in the last 72 hours. Invalid input(s): LDL  ABGs: No results found for: PH, PCO2, PO2, HCO3, O2SAT  Thyroid:   Lab Results   Component Value Date    TSH 1.13 12/14/2017      Urinalysis: No results for input(s): BACTERIA, BLOODU, CLARITYU, COLORU, PHUR, PROTEINU, RBCUA, SPECGRAV, BILIRUBINUR, NITRU, WBCUA, LEUKOCYTESUR, GLUCOSEU in the last 72 hours. CULTURES:    DATE OF PROCEDURE:  12/14/2017     Spirometry is within normal range. No change with bronchodilator therapy.    Lung volumes show some air trapping. Diffusion capacity is normal.  Flow  volume loop shows an obstructive ventilatory pattern.     IMPRESSION:  The patient could possibly have an obstructive ventilatory  defect. Clinical correlation is recommended, but the patient does not meet  the GOLD criteria for obstruction. Assessment:      Multivessel coronary artery disease. 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 Nicotine addiction. History of multiple myeloma. Previous history of DVT. Corticosteroid use for multiple myeloma         Plan:    Smoking cessation  Bronchodilators if needed. Optimization of cardiac treatment and coronary ischemia by cardiology  Diuresis per primary if needed  Supplemental O2 PRN  DVT prophylaxis. Patient has no contraindications for having cardiac surgery. his risk for postoperative pulmonary complications is low. Thank you for having us involved in the care of your patient. Please call us if you have any questions or concerns.       Johnson Osorio MD  12/17/2017 9:34 AM  Pulmonary & Critical Care

## 2017-12-17 NOTE — PROGRESS NOTES
movements intact   Ears - bilateral TM's and external ear canals normal   Mouth - mucous membranes moist, pharynx normal without lesions   Neck - supple, no significant adenopathy   Lymphatics - no palpable lymphadenopathy, no hepatosplenomegaly   Chest - clear to auscultation, no wheezes, rales or rhonchi, symmetric air entry   Heart - normal rate, regular rhythm, normal S1, S2, no murmurs  Abdomen - soft, nontender, nondistended, no masses or organomegaly   Neurological - alert, oriented, normal speech, no focal findings or movement disorder noted   Musculoskeletal - no joint tenderness, deformity or swelling   Extremities - peripheral pulses normal, no pedal edema, no clubbing or cyanosis   Skin - normal coloration and turgor, no rashes, no suspicious skin lesions noted ,          Data:    No intake/output data recorded. No intake/output data recorded. CBC:   Recent Labs      12/15/17   0647   WBC  8.1   HGB  8.6*   PLT  349     BMP:    Recent Labs      12/15/17   0647  12/15/17   1409  12/16/17   0622   NA  143   --   141   K  3.3*  3.8  3.7   CL  100   --   105   CO2  25   --   23   BUN  13   --   11   CREATININE  0.97   --   0.80   GLUCOSE  116*   --   113*     Hepatic:   Recent Labs      12/15/17   0647   AST  9   ALT  11   BILITOT  0.47   ALKPHOS  48     INR:   Recent Labs      12/15/17   0647   INR  1.0     PTT:No results for input(s): PTT in the last 72 hours. Xr Chest Standard (2 Vw)    Result Date: 12/14/2017  EXAMINATION: TWO VIEWS OF THE CHEST 12/14/2017 9:08 am COMPARISON: June 27, 2006 the HISTORY: ORDERING SYSTEM PROVIDED HISTORY: pre op CABG TECHNOLOGIST PROVIDED HISTORY: Reason for exam:->pre op CABG FINDINGS: Stable mild cardiac silhouette enlargement. No pulmonary edema. No focal lung consolidation or infiltrate. No pleural effusion or pneumothorax. Round osseous structures overlying the proximal left humerus suspicious for intra-articular loose bodies.      Stable mild cardiac silhouette enlargement. Otherwise, no pulmonary edema or lung consolidation. Vascular Carotid Duplex Bilateral    Result Date: 12/14/2017    [facility]  Vascular Carotid Procedure   Patient Name  DURAND       Date of Study           12/14/2017                Surya Us. Date of Birth 1945  Gender                  Male   Age           81 year(s)  Race                    Black   Room Number   3011        Height:                 70 inch, 177.8 cm   Corporate ID  [id number]  Weight:                 188 pounds, 85.3 kg  #   Patient Acct  [de-identified]   BSA:        2.03 m^2    BMI:       26.97 kg/m^2  #   MR #          [id number]     Sonographer             Jovon Dawkins   Accession #   [id number]   Interpreting Physician  Manoj Meng   Referring                 Referring Physician     Radha Pranay, CNP  Nurse  Practitioner  Procedure Type of Study:   Cerebral: Carotid, Carotid Scan Bilateral.  Indications for Study:Pre-op OHS. Comments:Basic Classification of ICA Stenosis: PSV - Peak Systolic Velocity Normal: No plaque or calcification identified, no elevation of PSV Mild: <50% spectral broadening without increased PSV Moderate: 50 - 69% PSV >125 - <230 cm/sec Severe: 70 - 99% PSV >230 cm/sec Critical: 80 - 99% PSV >230cm/sec and/or End Diastolic Velocities >356RD/QGC Risk Factors   - The patient's risk factor(s) include: diet-treated diabetes mellitus and     arterial hypertension.   - The patient has a current/recent (within 1 year) tobacco history. - The patient's last creatinine was 0.9 mg/dl.  Comments: Right BP not taken due to presence of an I.V. in the right arm (poor I.V. access) CHF, cardiomyopathy Findings:   Right                                Left  The common carotid artery is normal. The common carotid artery is normal.   The carotid bulb has an irregular    The carotid bulb has an irregular  fibrocalcific plaque causing a <50%  fibrocalcific plaque causing a <50%  stenosis based on velocities. stenosis based on velocities. The internal carotid artery has an  irregular fibrocalcific plaque       The internal carotid artery has an  causing a <50% stenosis based on     irregular fibrocalcific plaque  velocities. causing a <50% stenosis based on                                       velocities. The external carotid artery is  normal.                              The external carotid artery has an                                       irregular fibrocalcific plaque  The vertebral artery is patent with  causing a <50% stenosis based on  antegrade flow. velocities. The vertebral artery is patent with                                       antegrade flow. Allergies   - Allergy:Aspirin(Drug). Blood Pressure:Left arm 120/72 mmHg. Patient Status: In Patient. Technical Quality:Good visualization. Limitation reason:had to use low color flow setting to fill out carotid. Velocities are measured in cm/s ; Diameters are measured in mm Carotid Right Measurements +------------+-------+-------+--------+-------+------------+---------------+ ! Location    ! PSV    ! EDV    ! Angle   ! RI     !%Stenosis   ! Tortuosity     ! +------------+-------+-------+--------+-------+------------+---------------+ ! Prox CCA    !94.3   !12.6   !60      !0.87   !            !               ! +------------+-------+-------+--------+-------+------------+---------------+ ! Mid CCA     !149    !12.8   !60      !0.91   !            !               ! +------------+-------+-------+--------+-------+------------+---------------+ ! Dist CCA    ! 55     !11.4   !60      !0.79   !            !               ! +------------+-------+-------+--------+-------+------------+---------------+ ! Bulb        !81.5   !14.1   ! 60      !0.83   !            !               ! +------------+-------+-------+--------+-------+------------+---------------+ ! Prox ICA    !81.5 !29.3   ! 60      !0.64   !            !               ! +------------+-------+-------+--------+-------+------------+---------------+ ! Dist ICA    !83.3   !25.8   !60      !0.69   !            !               ! +------------+-------+-------+--------+-------+------------+---------------+ ! Prox ECA    !63.9   !8.79   !60      !0.86   !            !               ! +------------+-------+-------+--------+-------+------------+---------------+ ! Vertebral   !72.7   !27     !60      !0.63   !            !               ! +------------+-------+-------+--------+-------+------------+---------------+   - There is antegrade vertebral flow noted on the right side. - Additional Measurements:ICAPSV/CCAPSV 0.88. ICAEDV/CCAEDV 2.33. Carotid Left Measurements +------------+-------+-------+--------+-------+------------+---------------+ ! Location    ! PSV    ! EDV    ! Angle   ! RI     !%Stenosis   ! Tortuosity     ! +------------+-------+-------+--------+-------+------------+---------------+ ! Prox CCA    !108    !21.2   ! 60      !0.8    ! !               ! +------------+-------+-------+--------+-------+------------+---------------+ ! Mid CCA     !136    !28.3   ! 60      !0.79   !            !               ! +------------+-------+-------+--------+-------+------------+---------------+ ! Dist CCA    !175    !32.4   !60      !0.81   !            !               ! +------------+-------+-------+--------+-------+------------+---------------+ ! Bulb        !142    !29.5   !60      !0.79   !            !               ! +------------+-------+-------+--------+-------+------------+---------------+ ! Prox ICA    !49.8   !10.6   ! 60      !0.79   !            !               ! +------------+-------+-------+--------+-------+------------+---------------+ ! Mid ICA     !62.4   !21.6   !0       !0.65   !            !               ! +------------+-------+-------+--------+-------+------------+---------------+ ! Dist ICA    !61.9   !23.1   !0       !0.63 !            !               ! +------------+-------+-------+--------+-------+------------+---------------+ ! Prox ECA    !110    !17.7   ! 60      !0.84   !            !               ! +------------+-------+-------+--------+-------+------------+---------------+ ! Vertebral   !41.6   !11     !60      !0.74   !            !               ! +------------+-------+-------+--------+-------+------------+---------------+   - There is antegrade vertebral flow noted on the left side. - Additional Measurements:ICAPSV/CCAPSV 0.58. ICAEDV/CCAEDV 1.09. Conclusions   Summary   Simultaneous real time imaging utilizing B-Mode, color flow doppler and  spectral waveform analysis was performed on the bilateral extracerebral  vascular system. The study demonstrates:   Right:  Internal carotid artery has a mild, <50% stenosis based on velocities. Left:  Internal carotid artery has a mild, <50% stenosis based on velocities. Signature   ----------------------------------------------------------------  Electronically signed by Ness LeonardoSonographer) on  12/14/2017 01:02 PM  ----------------------------------------------------------------   ----------------------------------------------------------------  Electronically signed by Bc Torres(Interpreting physician)  on 12/14/2017 03:09 PM  ----------------------------------------------------------------      Vl Dup Lower Extremity Venous Bilateral    Result Date: 12/14/2017    [facility]  Vascular Lower Extremities DVT Study Procedure   Patient Name  DURAND       Date of Study           12/14/2017                Isaac Mendiola.    Date of Birth 1945  Gender                  Male   Age           81 year(s)  Race                    Black   Room Number   3011        Height:                 70 inch, 177.8 cm   Corporate ID  [id number]  Weight:                 188 pounds, 85.3 kg  #   Patient Acct  [de-identified]   BSA:        2.03 m^2    BMI:       26.97 kg/m^2  #   MR #          [id number]     Sonographer             Emilie Sebastian   Accession #   [id number]   Interpreting Physician  Lenny Boggs   Referring                 Referring Physician     Derrick Jackson, CNP  Nurse  Practitioner  Procedure Type of Study:   Veins: Lower Extremities DVT Study, Venous Scan Lower Bilateral.  Indications for Study:Pre-op OHS. Risk Factors   - The patient's risk factor(s) include: diet-treated diabetes mellitus and     arterial hypertension.   - The patient has a current/recent (within 1 year) tobacco history. - The patient's last creatinine was 0.9 mg/dl. Findings:   Right                                  Left  The proximal femoral vein appears      The common femoral, femoral,  minimally partially compressible with  popliteal and tibial veins  good recanalization and hyperechoic    demonstrate normal compressibility  echoes. and augmentation. A hyperechoic echo is noted in the     Normal compressibility of the great  popliteal with complete                saphenous vein. compressibility. Normal compressibility of the small  The common femoral, mid to distal      saphenous vein. femoral, popliteal and tibial veins  demonstrate normal compressibility and  augmentation. Normal compressibility of the great  saphenous vein. Normal compressibility of the small  saphenous vein. Allergies   - Allergy:Aspirin(Drug). Patient Status: In Patient. Technical Quality:Good visualization. Velocities are measured in cm/s ; Diameters are measured in mm Right Lower Extremities DVT Study Measurements Right 2D Measurements +------------------------------------+----------+---------------+----------+ ! Location                            ! Visualized! Compressibility! Thrombosis! +------------------------------------+----------+---------------+----------+ ! Common Femoral                      !Yes       ! Yes            ! None      ! !Phasic!      !                                ! +---------------------------+------+------+--------------------------------+  Conclusions   Summary   Simultaneous real time imaging utilizing B-Mode, color doppler and  spectral waveform analysis was performed on the bilateral lower  extremities for venous examination of the deep and superficial systems. Findings are:   Right:  Chronic deep venous thrombosis identified in the femoral vein. Chronic deep venous thrombosis identified in the popliteal vein. Left:  No evidence of deep or superficial venous thrombosis. Signature   ----------------------------------------------------------------  Electronically signed by Ness LeonardoSonographer) on  12/14/2017 01:17 PM  ----------------------------------------------------------------   ----------------------------------------------------------------  Electronically signed by Bc Torres(Interpreting physician)  on 12/14/2017 03:05 PM  ----------------------------------------------------------------      Vl Vein Mapping Lower Bilateral    Result Date: 12/14/2017    [facility]  Vascular Lower Extremity Vein Mapping Procedure   Patient Name  DURAND       Date of Study           12/14/2017                Ivan Monzon. Date of Birth 1945  Gender                  Male   Age           81 year(s)  Race                    Black   Room Number   3011        Height:                 70 inch, 177.8 cm   Corporate ID  [id number]  Weight:                 188 pounds, 85.3 kg  #   Patient Acct  [de-identified]   BSA:        2.03 m^2    BMI:       26.97 kg/m^2  #   MR #          [id number]     Sonographer             Delfina Carter   Accession #   [id number]   Interpreting Physician  Que Pratt   Referring                 Referring Physician     Hugo Lazo, CNP  Nurse  Practitioner  Procedure Type of Study:   Veins: Lower Extremity Vein Mapping. Indications for Study:Pre-op OHS.  Risk Factors   - The patient's risk factor(s) include: diet-treated diabetes mellitus and     arterial hypertension.   - The patient has a current/recent (within 1 year) tobacco history. - The patient's last creatinine was 0.9 mg/dl. Findings:   Right                                      Left  The proximal femoral vein appears          Common femoral and superficial  minimally partially compressible with good femoral veins are compressible. recanalization. The common femoral vein appears  compressible. Allergies   - Allergy:Aspirin(Drug). Patient Status: In Patient. Technical Quality:Good visualization. Velocities are measured in cm/s ; Diameters are measured in mm +--------------------------------------++--------+-----+----+--------+-----+ ! Superficial - Great Saphenous Vein    ! ! Right   ! ! Left!        !     ! +--------------------------------------++--------+-----IgG kappa MM on revlimid and decadron  Multivessel CAD with LEF 20%   DVT on xarelto at home - currently on full dose lovenox for CABG  Current smoker  +----+--------+-----+ ! Location                              ! !Diameter! Depth! !Diameter! Depth! +--------------------------------------++--------+-----+----+--------+-----+ ! Sapheno Femoral Junction              ! !4.78    !     !    !3.05    !     ! +--------------------------------------++--------+-----+----+--------+-----+ ! GSV Mid Thigh                         !!2.05    !     !    !1.49    !     ! +--------------------------------------++--------+-----+----+--------+-----+ ! GSV Knee                              !!1.56    !     !    !0.91    !     ! +--------------------------------------++--------+-----+----+--------+-----+ ! GSV High Calf                         !!1.73    !     !    !0.83    !     ! +--------------------------------------++--------+-----+----+--------+-----+ ! GSV Low Calf                          !!1       !     !    !0.5     ! ! +--------------------------------------++--------+-----+----+--------+-----+ ! GSV Ankle                             ! !1.75    !     !    !0.83    !     ! +--------------------------------------++--------+-----+----+--------+-----+   Conclusions   Summary   Simultaneous real time imaging utilizing B-Mode, color doppler and  spectral waveform analysis was performed on the bilateral lower  extremities for venous examination of vein mapping.  Findings are:   Right:  Superficial veins are patent with size listed above   Left:  Superficial veins are patent with size listed above   Signature   ----------------------------------------------------------------  Electronically signed by Ness Calzada(Sonographer) on  12/14/2017 01:11 PM  ----------------------------------------------------------------   ----------------------------------------------------------------  Electronically signed by Seda TorresInterpreting physician)  on 12/14/2017 03:11 PM  ----------------------------------------------------------------          Problem Lists:   Primary Problem:  <principal problem not specified>   Current Problems:  Active Hospital Problems    Diagnosis Date Noted    Multiple vessel coronary artery disease [I25.10] 12/14/2017     Priority: High    Acute on chronic systolic CHF (congestive heart failure) ([facility] [I50.23] 12/13/2017     Priority: High    Multiple myeloma not having achieved remission ([facility] [C90.00]     Chronic obstructive pulmonary disease ([facility] [J44.9]      PMH:  Past Medical History:   Diagnosis Date    Arthritis     all over    Cardiomyopathy ([facility]     CHF (congestive heart failure) ([facility]     COPD (chronic obstructive pulmonary disease) ([facility]     Diabetes mellitus ([facility]     DVT of leg (deep venous thrombosis) (HCC)     Hyperlipidemia     Hypertension     Multiple myeloma (HCC)     Neuropathy (HCC)       Allergies: No Known Allergies     Assessment    IgG kappa MM on revlimid and decadron  Multivessel CAD with LEF 20% now improved to 35%  DVT on xarelto at home - currently on full dose lovenox for CABG  Current smoker      Plan   I had a detailed discussion with the patient and personally went over the results of the lab workup, pathology report and imaging studies. Plans for open her surgery has been postponed for now. Patient is going to be medically managed with diuretics for CHF. Discussed the natural history of multiple myeloma. Patient has been maintained on Revlimid 10 mg daily 3 weeks on followed by 1 week off along with weekly Decadron. Recommend restarting maintains dose as previously planned. Continue anticoagulation with Xeralto at home. Patient advised to follow with his oncologist as scheduled. Electronically signed by Abel Calderon MD on 12/17/2017 at 11:08 AM      Attending Physician Statement  The patient was seen and examined during rounds, I have discussed the care of Kobi Hernandez., including pertinent history and exam findings with the resident. I have reviewed the key elements of all parts of the encounter with the resident. I agree with the assessment, and status of the problem list as documented.    Additional assessment/ plan       Electronically signed by   Barrett Holt MD    on 12/29/2017 at 10:54 AM

## 2017-12-17 NOTE — PROGRESS NOTES
[facility]   Progress Note                   Date:   12/17/2017  Patient name: Balwinder Gill. Date of admission:  12/13/2017  8:32 PM  MRN:   [id number]  YOB: 1945  PCP: Veronika Brand MD    Reason for Admission: Acute on chronic systolic CHF (congestive heart failure) ([facility] [I50.23]  Multiple vessel coronary artery disease [I25.10]  Multiple vessel coronary artery disease [I25.10]    Subjective:       Pt refused surgery and wants to go home  Blood pressure and HR stable  -2.2 l since admission    ROS    Medications:   Scheduled Meds:   furosemide  40 mg Oral BID    [START ON 12/18/2017] dexamethasone  40 mg Oral Once per day on Mon    fluticasone  1 spray Each Nare Daily    magnesium oxide  400 mg Oral BID    fenofibrate  54 mg Oral Daily    glipiZIDE  5 mg Oral QAM AC    simvastatin  40 mg Oral Nightly    famotidine  20 mg Oral BID    enoxaparin  1 mg/kg Subcutaneous BID    aspirin  81 mg Oral Daily    insulin lispro  0-6 Units Subcutaneous TID WC    insulin lispro  0-3 Units Subcutaneous Nightly    carvedilol  12.5 mg Oral BID WC    losartan  50 mg Oral Daily     Continuous Infusions:   dextrose       CBC:   Recent Labs      12/15/17   0647   WBC  8.1   HGB  8.6*   PLT  349     BMP:    Recent Labs      12/15/17   0647  12/15/17   1409  12/16/17   0622   NA  143   --   141   K  3.3*  3.8  3.7   CL  100   --   105   CO2  25   --   23   BUN  13   --   11   CREATININE  0.97   --   0.80   GLUCOSE  116*   --   113*     Hepatic:   Recent Labs      12/15/17   0647   AST  9   ALT  11   BILITOT  0.47   ALKPHOS  48     Troponin: No results for input(s): TROPONINI in the last 72 hours. BNP: No results for input(s): BNP in the last 72 hours. Lipids:   No results for input(s): CHOL, HDL in the last 72 hours.     Invalid input(s): LDLCALCU  INR:   Recent Labs      12/15/17   0647   INR  1.0       Objective:   Vitals: /73   Pulse 65   Temp 97.7 °F (36.5 °C) (Oral) Resp 21   Ht 5' 9\" (1.753 m)   Wt 174 lb 9.6 oz (79.2 kg)   SpO2 98%   BMI 25.78 kg/m²   General appearance: alert and cooperative with exam  HEENT: Head: Normocephalic, no lesions, without obvious abnormality. Neck: no JVD, trachea midline, no adenopathy  Lungs: Diminished throughout. No rales, rhonchi, wheezing. On RA  Heart: Regular rate and rhythm, s1/s2 auscultated, no murmurs  Abdomen: soft, non-tender, bowel sounds active  Extremities: no edema  Neurologic: not done    Echo 12/15/17  LV chamber dimension is mildly dilated with mild left ventricular  hypertrophy. Systolic function is moderately globally reduced with a  calculated EF of 36%. Evidence of diastolic dysfunction. Left atrium is at upper limits of normal with increased LA volume. Cardiac Cath 12/13/17  Severe multivessel CAD.   Severely reduced left ventricular systolic function.   Moderately increased pulmonary pressure, severe increased wedge pressure   Normal CO and CI. Assessment / Acute Cardiac Problems:   1. New onset Systolic CHF  2. MVD  3. DM2    Patient Active Problem List:     Acute on chronic systolic CHF (congestive heart failure) (HCC)     Multiple vessel coronary artery disease      Plan of Treatment:   1. Acute Systolic CHF - improving. On po Lasix  40 mg BID. Patient refused surgery for now. Continue ASA, Bb, Lovenox, ARB, & Statin. 2. Hem on following for MM   3. Dm2 - Stable. Continue Glipizide & Humalog Ss. Sreedhar Leyva MD      Department of Internal Medicine  [facility]         12/17/2017, 10:40 AM        Attending Cardiologist Addendum: I have reviewed and performed the history, physical, subjective, objective, assessment, and plan with the resident/fellow and agree with the note. I performed the history and physical personally. I have made changes to the note above as needed.     Thank you for allowing me to participate in the care of this patient, please do not

## 2017-12-17 NOTE — CARE COORDINATION
Discharge [id number] [facility]  Clinical Case Management Department  Written by: Verenice Hahn RN    Patient Name: Gloria Rabago.   Attending Provider: No att. providers found  Admit Date: 2017  8:32 PM  MRN: [id number]  Account: [de-identified]                     : 1945  Discharge Date: 2017      Disposition: home    Verenice Hahn RN

## 2017-12-17 NOTE — DISCHARGE INSTR - DIET

## 2017-12-18 ENCOUNTER — TELEPHONE (OUTPATIENT)
Dept: CARDIOTHORACIC SURGERY | Age: 72
End: 2017-12-18

## 2017-12-21 ENCOUNTER — HOSPITAL ENCOUNTER (OUTPATIENT)
Age: 72
Discharge: HOME | End: 2017-12-21
Payer: MEDICARE

## 2017-12-21 DIAGNOSIS — I50.23 ACUTE ON CHRONIC SYSTOLIC CHF (CONGESTIVE HEART FAILURE) (HCC): ICD-10-CM

## 2017-12-21 LAB
ANION GAP SERPL CALCULATED.3IONS-SCNC: 16 MMOL/L (ref 9–17)
BUN BLDV-MCNC: 44 MG/DL (ref 8–23)
BUN/CREAT BLD: ABNORMAL (ref 9–20)
CALCIUM SERPL-MCNC: 10.3 MG/DL (ref 8.6–10.4)
CHLORIDE BLD-SCNC: 95 MMOL/L (ref 98–107)
CO2: 26 MMOL/L (ref 20–31)
CREAT SERPL-MCNC: 1.83 MG/DL (ref 0.7–1.2)
GFR AFRICAN AMERICAN: 45 ML/MIN
GFR NON-AFRICAN AMERICAN: 37 ML/MIN
GFR SERPL CREATININE-BSD FRML MDRD: ABNORMAL ML/MIN/{1.73_M2}
GFR SERPL CREATININE-BSD FRML MDRD: ABNORMAL ML/MIN/{1.73_M2}
GLUCOSE BLD-MCNC: 219 MG/DL (ref 70–99)
POTASSIUM SERPL-SCNC: 4.2 MMOL/L (ref 3.7–5.3)
SODIUM BLD-SCNC: 137 MMOL/L (ref 135–144)

## 2017-12-21 PROCEDURE — 80048 BASIC METABOLIC PNL TOTAL CA: CPT

## 2017-12-21 PROCEDURE — 36415 COLL VENOUS BLD VENIPUNCTURE: CPT

## 2018-01-02 NOTE — DISCHARGE SUMMARY
[facility]  Discharge Note                 Name:  Balwinder Gill. YOB: 1945  Social Security Number:  [id number]  Medical Record Number:  [id number]    Date of Admission:  12/13/2017  Date of Discharge:  1/2/2018    Admitting physician: Donald Schuster MD    Discharge Attending: Dr. Rustam Hernandez 86 Physician: Veronika Brand MD  Consultants: None  Discharge to home    HOSPITAL ADMISSION PROBLEM LIST:  Patient Active Problem List   Diagnosis    Acute on chronic systolic [facility] (congestive [facility] failure) [facility]    Multiple vessel coronary artery disease    Multiple myeloma not having achieved remission ([facility]    Chronic obstructive pulmonary disease ([facility]         Procedures: echo and cardiac catherization    HOSPITAL COURSE :   The patient was admitted for: new onset systolic heart failure and elective cath. He had an abnormal Echo with reduced EF and patient was feeling more short of breath. Hospital Procedures if any: cardiac catherization and echo. Patient was offered surgery for multivessel disease. Patient made a decision to not undergo surgrey on this admission but follow up with CT surgery outpatient. We managed him medically and discharged home. Medications changes recommendation: As per discharge list   Follow Up Plan: 4 weeks as outpatient with CT surgery  Echo 12/15/17  LV chamber dimension is mildly dilated with mild left ventricular  hypertrophy. Systolic function is moderately globally reduced with a  calculated EF of 36%. Evidence of diastolic dysfunction.   Left atrium is at upper limits of normal with increased LA volume.     Cardiac Cath 12/13/17  Severe multivessel CAD.   Severely reduced left ventricular systolic function.   Moderately increased pulmonary pressure, severe increased wedge pressure   Normal CO and CI.       Discharge exam:   Vitals:    12/17/17 0654   BP: 121/73   Pulse:    Resp:    Temp: 97.7 °F (36.5 °C)   SpO2: 98%       Patient is feeling much better, denies any chest pain,k dyspnea, orthopnea or palpitations. Neuro: normal  Chest: Clear to ausculation. No wheezing. Cardiac: Cor RRR  Abdomen/groin: soft, non-tender, without masses or organomegaly  Lower extremity edema: none     Follow up with primary care provider 1 week  Follow up with cardiology 4 weeks  Follow up with other consultant physicians at their directions. Discharge Medications:   Jacob Cerrato.    Home Medication Instructions [id number]:[id number]    Printed on:01/02/18 6834   Medication Information                      aspirin 81 MG chewable tablet  Take 1 tablet by mouth daily             benzonatate (TESSALON) 100 MG capsule  Take 100 mg by mouth 3 times daily as needed for Cough             carvedilol (COREG) 12.5 MG tablet  Take 1 tablet by mouth 2 times daily (with meals)             Cyanocobalamin (VITAMIN B 12 PO)  Take 1,000 mcg by mouth daily             dexamethasone (DECADRON) 4 MG tablet  Take 4 mg by mouth once a week 10 pills once per week             famotidine (PEPCID) 20 MG tablet  Take 1 tablet by mouth 2 times daily             fenofibric acid (FIBRICOR) 35 MG TABS tablet  Take 35 mg by mouth daily             furosemide (LASIX) 40 MG tablet  Take 1 tablet by mouth 2 times daily             glipiZIDE (GLUCOTROL) 5 MG tablet  Take 5 mg by mouth every morning (before breakfast)             hydrochlorothiazide (MICROZIDE) 12.5 MG capsule  Take 12.5 mg by mouth daily             lenalidomide (REVLIMID) 10 MG chemo capsule  Take 10 mg by mouth daily On 21 days then off for 7 days             losartan (COZAAR) 50 MG tablet  Take 1 tablet by mouth daily             magnesium oxide (MAG-OX) 400 (241.3 Mg) MG TABS tablet  Take 1 tablet by mouth 2 times daily             NONFORMULARY  Latanoprost opth 0.005% 1 drop ou             NONFORMULARY  Dextran 70-hypromellose 0.3% opth sol one drop OU PRN             NONFORMULARY  Pro-air HFA 1 ouff PRN             rivaroxaban (XARELTO) 20 MG TABS tablet  Take 20 mg by mouth             simvastatin (ZOCOR) 40 MG tablet  Take 40 mg by mouth nightly             traMADol (ULTRAM) 50 MG tablet  Take 50 mg by mouth daily as needed for Pain . Discussed with patient and nursing. Medications and discharge instructions reviewed with patient and nursing. Patient counseled in detail regarding medication compliance and risk factor modification.      Albino Prabhakar MD      Department of Internal Medicine  [facility]         1/2/2018, 10:09 AM

## 2018-01-11 ENCOUNTER — HOSPITAL ENCOUNTER (INPATIENT)
Age: 73
LOS: 3 days | Discharge: HOME | DRG: 378 | End: 2018-01-14
Attending: EMERGENCY MEDICINE | Admitting: FAMILY MEDICINE
Payer: MEDICARE

## 2018-01-11 ENCOUNTER — APPOINTMENT (OUTPATIENT)
Dept: GENERAL RADIOLOGY | Age: 73
DRG: 378 | End: 2018-01-11
Payer: MEDICARE

## 2018-01-11 DIAGNOSIS — E87.6 HYPOKALEMIA: Primary | ICD-10-CM

## 2018-01-11 DIAGNOSIS — D61.811 OTHER DRUG-INDUCED PANCYTOPENIA (HCC): ICD-10-CM

## 2018-01-11 DIAGNOSIS — D64.9 LOW HEMOGLOBIN: ICD-10-CM

## 2018-01-11 LAB
ABSOLUTE EOS #: 0.43 K/UL (ref 0–0.44)
ABSOLUTE IMMATURE GRANULOCYTE: 0.07 K/UL (ref 0–0.3)
ABSOLUTE LYMPH #: 1.73 K/UL (ref 1.1–3.7)
ABSOLUTE MONO #: 0.72 K/UL (ref 0.1–1.2)
ANION GAP SERPL CALCULATED.3IONS-SCNC: 15 MMOL/L (ref 9–17)
BASOPHILS # BLD: 0 % (ref 0–2)
BASOPHILS ABSOLUTE: 0 K/UL (ref 0–0.2)
BUN BLDV-MCNC: 17 MG/DL (ref 8–23)
BUN/CREAT BLD: ABNORMAL (ref 9–20)
CALCIUM SERPL-MCNC: 8.9 MG/DL (ref 8.6–10.4)
CHLORIDE BLD-SCNC: 96 MMOL/L (ref 98–107)
CO2: 27 MMOL/L (ref 20–31)
CREAT SERPL-MCNC: 1.09 MG/DL (ref 0.7–1.2)
DIFFERENTIAL TYPE: ABNORMAL
EKG ATRIAL RATE: 78 BPM
EKG P AXIS: 78 DEGREES
EKG P-R INTERVAL: 140 MS
EKG Q-T INTERVAL: 440 MS
EKG QRS DURATION: 148 MS
EKG QTC CALCULATION (BAZETT): 501 MS
EKG R AXIS: -62 DEGREES
EKG T AXIS: 81 DEGREES
EKG VENTRICULAR RATE: 78 BPM
EOSINOPHILS RELATIVE PERCENT: 6 % (ref 1–4)
GFR AFRICAN AMERICAN: >60 ML/MIN
GFR NON-AFRICAN AMERICAN: >60 ML/MIN
GFR SERPL CREATININE-BSD FRML MDRD: ABNORMAL ML/MIN/{1.73_M2}
GFR SERPL CREATININE-BSD FRML MDRD: ABNORMAL ML/MIN/{1.73_M2}
GLUCOSE BLD-MCNC: 126 MG/DL (ref 70–99)
HCT VFR BLD CALC: 22.4 % (ref 40.7–50.3)
HCT VFR BLD CALC: 23.1 % (ref 40.7–50.3)
HEMOGLOBIN: 6.1 G/DL (ref 13–17)
HEMOGLOBIN: 6.7 G/DL (ref 13–17)
IMMATURE GRANULOCYTES: 1 %
INR BLD: 1
LACTIC ACID, WHOLE BLOOD: 2.5 MMOL/L (ref 0.7–2.1)
LACTIC ACID, WHOLE BLOOD: 2.9 MMOL/L (ref 0.7–2.1)
LYMPHOCYTES # BLD: 24 % (ref 24–43)
MCH RBC QN AUTO: 23.4 PG (ref 25.2–33.5)
MCHC RBC AUTO-ENTMCNC: 27.2 G/DL (ref 28.4–34.8)
MCV RBC AUTO: 85.8 FL (ref 82.6–102.9)
MONOCYTES # BLD: 10 % (ref 3–12)
MORPHOLOGY: ABNORMAL
PDW BLD-RTO: 20.3 % (ref 11.8–14.4)
PLATELET # BLD: 426 K/UL (ref 138–453)
PLATELET ESTIMATE: ABNORMAL
PMV BLD AUTO: 10 FL (ref 8.1–13.5)
POC TROPONIN I: 0.02 NG/ML (ref 0–0.1)
POC TROPONIN I: 0.04 NG/ML (ref 0–0.1)
POC TROPONIN INTERP: NORMAL
POC TROPONIN INTERP: NORMAL
POTASSIUM SERPL-SCNC: 2.9 MMOL/L (ref 3.7–5.3)
PROTHROMBIN TIME: 11.2 SEC (ref 9.4–12.6)
RBC # BLD: 2.61 M/UL (ref 4.21–5.77)
RBC # BLD: ABNORMAL 10*6/UL
SEG NEUTROPHILS: 59 % (ref 36–65)
SEGMENTED NEUTROPHILS ABSOLUTE COUNT: 4.25 K/UL (ref 1.5–8.1)
SODIUM BLD-SCNC: 138 MMOL/L (ref 135–144)
WBC # BLD: 7.2 K/UL (ref 3.5–11.3)
WBC # BLD: ABNORMAL 10*3/UL

## 2018-01-11 PROCEDURE — 83605 ASSAY OF LACTIC ACID: CPT

## 2018-01-11 PROCEDURE — 85014 HEMATOCRIT: CPT

## 2018-01-11 PROCEDURE — 82272 OCCULT BLD FECES 1-3 TESTS: CPT

## 2018-01-11 PROCEDURE — 86901 BLOOD TYPING SEROLOGIC RH(D): CPT

## 2018-01-11 PROCEDURE — 6370000000 HC RX 637 (ALT 250 FOR IP): Performed by: FAMILY MEDICINE

## 2018-01-11 PROCEDURE — 71046 X-RAY EXAM CHEST 2 VIEWS: CPT

## 2018-01-11 PROCEDURE — 84484 ASSAY OF TROPONIN QUANT: CPT

## 2018-01-11 PROCEDURE — 2580000003 HC RX 258: Performed by: FAMILY MEDICINE

## 2018-01-11 PROCEDURE — 2580000003 HC RX 258: Performed by: NURSE PRACTITIONER

## 2018-01-11 PROCEDURE — 93005 ELECTROCARDIOGRAM TRACING: CPT

## 2018-01-11 PROCEDURE — 2500000003 HC RX 250 WO HCPCS: Performed by: FAMILY MEDICINE

## 2018-01-11 PROCEDURE — P9016 RBC LEUKOCYTES REDUCED: HCPCS

## 2018-01-11 PROCEDURE — 2060000000 HC ICU INTERMEDIATE R&B

## 2018-01-11 PROCEDURE — 85018 HEMOGLOBIN: CPT

## 2018-01-11 PROCEDURE — 36430 TRANSFUSION BLD/BLD COMPNT: CPT

## 2018-01-11 PROCEDURE — 86920 COMPATIBILITY TEST SPIN: CPT

## 2018-01-11 PROCEDURE — 6370000000 HC RX 637 (ALT 250 FOR IP): Performed by: NURSE PRACTITIONER

## 2018-01-11 PROCEDURE — 86850 RBC ANTIBODY SCREEN: CPT

## 2018-01-11 PROCEDURE — 86900 BLOOD TYPING SEROLOGIC ABO: CPT

## 2018-01-11 PROCEDURE — 36415 COLL VENOUS BLD VENIPUNCTURE: CPT

## 2018-01-11 PROCEDURE — 80048 BASIC METABOLIC PNL TOTAL CA: CPT

## 2018-01-11 PROCEDURE — 85025 COMPLETE CBC W/AUTO DIFF WBC: CPT

## 2018-01-11 PROCEDURE — 99285 EMERGENCY DEPT VISIT HI MDM: CPT

## 2018-01-11 PROCEDURE — 85610 PROTHROMBIN TIME: CPT

## 2018-01-11 RX ORDER — SODIUM CHLORIDE 0.9 % (FLUSH) 0.9 %
10 SYRINGE (ML) INJECTION EVERY 12 HOURS
Status: DISCONTINUED | OUTPATIENT
Start: 2018-01-11 | End: 2018-01-14 | Stop reason: HOSPADM

## 2018-01-11 RX ORDER — FUROSEMIDE 20 MG/1
20 TABLET ORAL 2 TIMES DAILY
Status: DISCONTINUED | OUTPATIENT
Start: 2018-01-12 | End: 2018-01-14 | Stop reason: HOSPADM

## 2018-01-11 RX ORDER — CARVEDILOL 12.5 MG/1
12.5 TABLET ORAL 2 TIMES DAILY WITH MEALS
Status: DISCONTINUED | OUTPATIENT
Start: 2018-01-12 | End: 2018-01-14 | Stop reason: HOSPADM

## 2018-01-11 RX ORDER — SODIUM CHLORIDE 9 MG/ML
INJECTION, SOLUTION INTRAVENOUS CONTINUOUS
Status: DISCONTINUED | OUTPATIENT
Start: 2018-01-11 | End: 2018-01-14 | Stop reason: HOSPADM

## 2018-01-11 RX ORDER — MORPHINE SULFATE 2 MG/ML
2 INJECTION, SOLUTION INTRAMUSCULAR; INTRAVENOUS
Status: DISCONTINUED | OUTPATIENT
Start: 2018-01-11 | End: 2018-01-14 | Stop reason: HOSPADM

## 2018-01-11 RX ORDER — LOSARTAN POTASSIUM 50 MG/1
50 TABLET ORAL DAILY
Status: DISCONTINUED | OUTPATIENT
Start: 2018-01-12 | End: 2018-01-13

## 2018-01-11 RX ORDER — ACETAMINOPHEN 325 MG/1
650 TABLET ORAL EVERY 4 HOURS PRN
Status: DISCONTINUED | OUTPATIENT
Start: 2018-01-11 | End: 2018-01-14 | Stop reason: HOSPADM

## 2018-01-11 RX ORDER — POTASSIUM CHLORIDE 20 MEQ/1
40 TABLET, EXTENDED RELEASE ORAL DAILY
Status: DISCONTINUED | OUTPATIENT
Start: 2018-01-11 | End: 2018-01-14 | Stop reason: HOSPADM

## 2018-01-11 RX ORDER — GLIPIZIDE 5 MG/1
5 TABLET ORAL
Status: DISCONTINUED | OUTPATIENT
Start: 2018-01-12 | End: 2018-01-12

## 2018-01-11 RX ORDER — ACETAMINOPHEN 325 MG/1
650 TABLET ORAL EVERY 4 HOURS PRN
Status: DISCONTINUED | OUTPATIENT
Start: 2018-01-11 | End: 2018-01-11 | Stop reason: SDUPTHER

## 2018-01-11 RX ORDER — ALBUTEROL SULFATE 90 UG/1
1 AEROSOL, METERED RESPIRATORY (INHALATION) EVERY 6 HOURS PRN
Status: DISCONTINUED | OUTPATIENT
Start: 2018-01-11 | End: 2018-01-14 | Stop reason: HOSPADM

## 2018-01-11 RX ORDER — 0.9 % SODIUM CHLORIDE 0.9 %
250 INTRAVENOUS SOLUTION INTRAVENOUS ONCE
Status: COMPLETED | OUTPATIENT
Start: 2018-01-11 | End: 2018-01-12

## 2018-01-11 RX ORDER — SIMVASTATIN 40 MG
40 TABLET ORAL NIGHTLY
Status: DISCONTINUED | OUTPATIENT
Start: 2018-01-11 | End: 2018-01-14 | Stop reason: HOSPADM

## 2018-01-11 RX ORDER — SODIUM CHLORIDE 0.9 % (FLUSH) 0.9 %
10 SYRINGE (ML) INJECTION EVERY 12 HOURS SCHEDULED
Status: DISCONTINUED | OUTPATIENT
Start: 2018-01-11 | End: 2018-01-14 | Stop reason: HOSPADM

## 2018-01-11 RX ORDER — ALBUTEROL SULFATE 90 UG/1
1 AEROSOL, METERED RESPIRATORY (INHALATION) EVERY 6 HOURS PRN
COMMUNITY

## 2018-01-11 RX ORDER — POTASSIUM CHLORIDE 7.45 MG/ML
10 INJECTION INTRAVENOUS PRN
Status: DISCONTINUED | OUTPATIENT
Start: 2018-01-11 | End: 2018-01-14 | Stop reason: HOSPADM

## 2018-01-11 RX ORDER — MORPHINE SULFATE 4 MG/ML
4 INJECTION, SOLUTION INTRAMUSCULAR; INTRAVENOUS
Status: DISCONTINUED | OUTPATIENT
Start: 2018-01-11 | End: 2018-01-14 | Stop reason: HOSPADM

## 2018-01-11 RX ORDER — SODIUM CHLORIDE 0.9 % (FLUSH) 0.9 %
10 SYRINGE (ML) INJECTION PRN
Status: DISCONTINUED | OUTPATIENT
Start: 2018-01-11 | End: 2018-01-14 | Stop reason: HOSPADM

## 2018-01-11 RX ORDER — GABAPENTIN 100 MG/1
100 CAPSULE ORAL 3 TIMES DAILY
Status: ON HOLD | COMMUNITY
End: 2019-06-13 | Stop reason: HOSPADM

## 2018-01-11 RX ORDER — GABAPENTIN 100 MG/1
100 CAPSULE ORAL DAILY
Status: DISCONTINUED | OUTPATIENT
Start: 2018-01-12 | End: 2018-01-14 | Stop reason: HOSPADM

## 2018-01-11 RX ORDER — TRAMADOL HYDROCHLORIDE 50 MG/1
50 TABLET ORAL EVERY 6 HOURS PRN
Status: DISCONTINUED | OUTPATIENT
Start: 2018-01-11 | End: 2018-01-14 | Stop reason: HOSPADM

## 2018-01-11 RX ORDER — ONDANSETRON 2 MG/ML
4 INJECTION INTRAMUSCULAR; INTRAVENOUS EVERY 6 HOURS PRN
Status: DISCONTINUED | OUTPATIENT
Start: 2018-01-11 | End: 2018-01-14 | Stop reason: HOSPADM

## 2018-01-11 RX ADMIN — SODIUM CHLORIDE 250 ML: 0.9 INJECTION, SOLUTION INTRAVENOUS at 23:30

## 2018-01-11 RX ADMIN — ESOMEPRAZOLE SODIUM 8 MG/HR: 40 INJECTION INTRAVENOUS at 22:24

## 2018-01-11 RX ADMIN — TRAMADOL HYDROCHLORIDE 50 MG: 50 TABLET, FILM COATED ORAL at 22:24

## 2018-01-11 RX ADMIN — MAGNESIUM GLUCONATE 500 MG ORAL TABLET 400 MG: 500 TABLET ORAL at 22:36

## 2018-01-11 RX ADMIN — POTASSIUM CHLORIDE 40 MEQ: 1500 TABLET, EXTENDED RELEASE ORAL at 18:34

## 2018-01-11 RX ADMIN — SODIUM CHLORIDE: 9 INJECTION, SOLUTION INTRAVENOUS at 22:35

## 2018-01-11 RX ADMIN — Medication 10 ML: at 22:24

## 2018-01-11 RX ADMIN — SIMVASTATIN 40 MG: 40 TABLET, FILM COATED ORAL at 22:36

## 2018-01-11 ASSESSMENT — PAIN DESCRIPTION - LOCATION: LOCATION: HEAD

## 2018-01-11 ASSESSMENT — ENCOUNTER SYMPTOMS
ABDOMINAL PAIN: 0
VOMITING: 0
DIARRHEA: 0
SHORTNESS OF BREATH: 1
CONSTIPATION: 0
NAUSEA: 0

## 2018-01-11 ASSESSMENT — PAIN SCALES - GENERAL
PAINLEVEL_OUTOF10: 6
PAINLEVEL_OUTOF10: 0
PAINLEVEL_OUTOF10: 10

## 2018-01-11 ASSESSMENT — PAIN DESCRIPTION - DESCRIPTORS: DESCRIPTORS: ACHING

## 2018-01-11 NOTE — ED PROVIDER NOTES
STVZ 4B STEPDOWN  Emergency Department Encounter  Mid Level Provider     Pt Name: Ameena Gutierrez MRN: [id number]  Birthdate 1945  Date of evaluation: 1/11/18  PCP:  Eladio Rivera MD    [address] [place] [address]       Chief Complaint   Patient presents with    Abnormal Lab     sent by physician for hgb 5.9       HISTORY OF PRESENT ILLNESS  (Location/Symptom, Timing/Onset, Context/Setting, Quality, Duration, Modifying Factors, Severity.)      Ameena Gutierrez is a 81 y.o. male who presents with Low hemoglobin per [facility] physician. Patient reports he sees him every 3 months for his multiple myeloma. Patient reports he has been taking medication for myeloma since 2016 and has not felt well since. Patient reports he has chronic symptoms of shortness of breath, fatigue and just not feeling well since 2016. Patient denies any chest pain, denies abdominal pain. Patient denies any nausea vomiting or diarrhea or constipation. Patient denies fever chills or myalgias. Patient denies any dysuria or blood in his stool. Patient was once told he should have a triple bypass however they opted for medical management. Patient also with diabetes and lung disease. Patient has a history of blood clots also. Patient is on Xarelto, and aspirin. Wife at bedside. PAST MEDICAL / SURGICAL / SOCIAL / FAMILY HISTORY      has a past medical history of Arthritis; CAD (coronary artery disease); Cardiomyopathy [facility]); CHF (congestive heart failure) ([facility] [address] [facility] COPD (chronic obstructive pulmonary disease) ([facility] [address] Diabetes mellitus ([facility] [address] [facility] DVT of leg (deep venous thrombosis) ([facility] [address] Hyperlipidemia; Hypertension; Multiple myeloma ([facility] [address] and Neuropathy ([facility] [address] has a past surgical history that includes Cardiac catheterization (12/13/2017); hernia repair (1967); and Abdomen surgery (2002).     Social History     Social History    Marital status:      Spouse name: N/A    Number of children: N/A    Years of education: N/A Occupational History    Not on file. Social History Main Topics    Smoking status: Current Every Day Smoker    Smokeless tobacco: Never Used      Comment: a little less than a pack a day    Alcohol use Yes      Comment: 2-3 beers a week     Drug use: Yes     Types: Marijuana      Comment: 0.5 daily     Sexual activity: Not on file     Other Topics Concern    Not on file     Social History Narrative    No narrative on file       No family history on file. Allergies:  Review of patient's allergies indicates no known allergies. Home Medications:  Prior to Admission medications    Medication Sig Start Date End Date Taking? Authorizing Provider   albuterol sulfate  (90 Base) MCG/ACT inhaler Inhale 1 puff into the lungs every 6 hours as needed for Wheezing   Yes Historical Provider, MD   gabapentin (NEURONTIN) 100 MG capsule Take 100 mg by mouth daily.    Yes Historical Provider, MD   losartan (COZAAR) 50 MG tablet Take 1 tablet by mouth daily 12/18/17  Yes Jenise Machado MD   aspirin 81 MG chewable tablet Take 1 tablet by mouth daily 12/18/17  Yes Jenise Machado MD   carvedilol (COREG) 12.5 MG tablet Take 1 tablet by mouth 2 times daily (with meals) 12/17/17  Yes Jenise Machado MD   famotidine (PEPCID) 20 MG tablet Take 1 tablet by mouth 2 times daily 12/17/17  Yes Jenise Machado MD   furosemide (LASIX) 40 MG tablet Take 1 tablet by mouth 2 times daily 12/17/17  Yes Jenise Machado MD   magnesium oxide (MAG-OX) 400 (241.3 Mg) MG TABS tablet Take 1 tablet by mouth 2 times daily 12/17/17  Yes Jenise Machado MD   simvastatin (ZOCOR) 40 MG tablet Take 40 mg by mouth nightly   Yes Historical Provider, MD   fenofibric acid (FIBRICOR) 35 MG TABS tablet Take 35 mg by mouth daily   Yes Historical Provider, MD   NONFORMULARY Latanoprost opth 0.005% 1 drop ou   Yes Historical Provider, MD   NONFORMULARY Dextran 70-hypromellose 0.3% opth sol one drop OU PRN   Yes Historical Provider, MD   Cyanocobalamin on velocities. stenosis based on velocities. The internal carotid artery has an  irregular fibrocalcific plaque       The internal carotid artery has an  causing a <50% stenosis based on     irregular fibrocalcific plaque  velocities. causing a <50% stenosis based on                                       velocities. The external carotid artery is  normal.                              The external carotid artery has an                                       irregular fibrocalcific plaque  The vertebral artery is patent with  causing a <50% stenosis based on  antegrade flow. velocities. The vertebral artery is patent with                                       antegrade flow. Allergies   - Allergy:Aspirin(Drug). Blood Pressure:Left arm 120/72 mmHg. Patient Status: In Patient. Technical Quality:Good visualization. Limitation reason:had to use low color flow setting to fill out carotid. Velocities are measured in cm/s ; Diameters are measured in mm Carotid Right Measurements +------------+-------+-------+--------+-------+------------+---------------+ ! Location    ! PSV    ! EDV    ! Angle   ! RI     !%Stenosis   ! Tortuosity     ! +------------+-------+-------+--------+-------+------------+---------------+ ! Prox CCA    !94.3   !12.6   !60      !0.87   !            !               ! +------------+-------+-------+--------+-------+------------+---------------+ ! Mid CCA     !149    !12.8   !60      !0.91   !            !               ! +------------+-------+-------+--------+-------+------------+---------------+ ! Dist CCA    ! 55     !11.4   !60      !0.79   !            !               ! +------------+-------+-------+--------+-------+------------+---------------+ ! Bulb        !81.5   !14.1   ! 60      !0.83   !            !               ! +------------+-------+-------+--------+-------+------------+---------------+ ! Prox ICA    !81.5 #          [id number]     Sonographer             Saige Dutton   Accession #   [id number]   Interpreting Physician  Taylor Segovia   Referring                 Referring Physician     Elina Rodriguez, CNP  Nurse  Practitioner  Procedure Type of Study:   Veins: Lower Extremities DVT Study, Venous Scan Lower Bilateral.  Indications for Study:Pre-op OHS. Risk Factors   - The patient's risk factor(s) include: diet-treated diabetes mellitus and     arterial hypertension.   - The patient has a current/recent (within 1 year) tobacco history. - The patient's last creatinine was 0.9 mg/dl. Findings:   Right                                  Left  The proximal femoral vein appears      The common femoral, femoral,  minimally partially compressible with  popliteal and tibial veins  good recanalization and hyperechoic    demonstrate normal compressibility  echoes. and augmentation. A hyperechoic echo is noted in the     Normal compressibility of the great  popliteal with complete                saphenous vein. compressibility. Normal compressibility of the small  The common femoral, mid to distal      saphenous vein. femoral, popliteal and tibial veins  demonstrate normal compressibility and  augmentation. Normal compressibility of the great  saphenous vein. Normal compressibility of the small  saphenous vein. Allergies   - Allergy:Aspirin(Drug). Patient Status: In Patient. Technical Quality:Good visualization. Velocities are measured in cm/s ; Diameters are measured in mm Right Lower Extremities DVT Study Measurements Right 2D Measurements +------------------------------------+----------+---------------+----------+ ! Location                            ! Visualized! Compressibility! Thrombosis! +------------------------------------+----------+---------------+----------+ ! Common Femoral                      !Yes       ! Yes            ! None      ! !Phasic!      !                                ! +---------------------------+------+------+--------------------------------+  Conclusions   Summary   Simultaneous real time imaging utilizing B-Mode, color doppler and  spectral waveform analysis was performed on the bilateral lower  extremities for venous examination of the deep and superficial systems. Findings are:   Right:  Chronic deep venous thrombosis identified in the femoral vein. Chronic deep venous thrombosis identified in the popliteal vein. Left:  No evidence of deep or superficial venous thrombosis. Signature   ----------------------------------------------------------------  Electronically signed by Ness LeonardoSonographer) on  12/14/2017 01:17 PM  ----------------------------------------------------------------   ----------------------------------------------------------------  Electronically signed by Seda TorresInterpreting physician)  on 12/14/2017 03:05 PM  ----------------------------------------------------------------      Vl Vein Mapping Lower Bilateral    Result Date: 12/14/2017    [facility]  Vascular Lower Extremity Vein Mapping Procedure   Patient Name  DURAND       Date of Study           12/14/2017                Karyle Ida. Date of Birth 1945  Gender                  Male   Age           81 year(s)  Race                    Black   Room Number   3011        Height:                 70 inch, 177.8 cm   Corporate ID  [id number]  Weight:                 188 pounds, 85.3 kg  #   Patient Acct  [de-identified]   BSA:        2.03 m^2    BMI:       26.97 kg/m^2  #   MR #          [id number]     Sonographer             Madhav Carr   Accession #   [id number]   Interpreting Physician  Geraldene Lombard   Referring                 Referring Physician     Angie Saldana CNP  Nurse  Practitioner  Procedure Type of Study:   Veins: Lower Extremity Vein Mapping. Indications for Study:Pre-op OHS.  Risk Factors   - The patient's

## 2018-01-12 ENCOUNTER — ANESTHESIA (OUTPATIENT)
Dept: OPERATING ROOM | Age: 73
DRG: 378 | End: 2018-01-12
Payer: MEDICARE

## 2018-01-12 ENCOUNTER — ANESTHESIA EVENT (OUTPATIENT)
Dept: OPERATING ROOM | Age: 73
DRG: 378 | End: 2018-01-12
Payer: MEDICARE

## 2018-01-12 VITALS
OXYGEN SATURATION: 98 % | SYSTOLIC BLOOD PRESSURE: 100 MMHG | DIASTOLIC BLOOD PRESSURE: 46 MMHG | RESPIRATION RATE: 25 BRPM

## 2018-01-12 LAB
ABO/RH: NORMAL
ABSOLUTE EOS #: 0.32 K/UL (ref 0–0.4)
ABSOLUTE IMMATURE GRANULOCYTE: 0 K/UL (ref 0–0.3)
ABSOLUTE LYMPH #: 1.28 K/UL (ref 1–4.8)
ABSOLUTE MONO #: 0.72 K/UL (ref 0.1–0.8)
ABSOLUTE RETIC #: 0.08 M/UL (ref 0.03–0.08)
ALBUMIN SERPL-MCNC: 2.8 G/DL (ref 3.5–5.2)
ALBUMIN/GLOBULIN RATIO: 1.2 (ref 1–2.5)
ALP BLD-CCNC: 30 U/L (ref 40–129)
ALT SERPL-CCNC: 13 U/L (ref 5–41)
ANION GAP SERPL CALCULATED.3IONS-SCNC: 12 MMOL/L (ref 9–17)
ANTIBODY SCREEN: NEGATIVE
ARM BAND NUMBER: NORMAL
AST SERPL-CCNC: 20 U/L
ATYPICAL LYMPHOCYTE ABSOLUTE COUNT: 0.08 K/UL
ATYPICAL LYMPHOCYTES: 1 %
BASOPHILS # BLD: 0 % (ref 0–2)
BASOPHILS ABSOLUTE: 0 K/UL (ref 0–0.2)
BILIRUB SERPL-MCNC: 0.58 MG/DL (ref 0.3–1.2)
BLD PROD TYP BPU: NORMAL
BLD PROD TYP BPU: NORMAL
BUN BLDV-MCNC: 13 MG/DL (ref 8–23)
BUN/CREAT BLD: ABNORMAL (ref 9–20)
CALCIUM SERPL-MCNC: 8.2 MG/DL (ref 8.6–10.4)
CHLORIDE BLD-SCNC: 101 MMOL/L (ref 98–107)
CO2: 25 MMOL/L (ref 20–31)
CREAT SERPL-MCNC: 0.82 MG/DL (ref 0.7–1.2)
CROSSMATCH RESULT: NORMAL
CROSSMATCH RESULT: NORMAL
DATE, STOOL #1: NORMAL
DATE, STOOL #2: NORMAL
DATE, STOOL #3: NORMAL
DIFFERENTIAL TYPE: ABNORMAL
DISPENSE STATUS BLOOD BANK: NORMAL
DISPENSE STATUS BLOOD BANK: NORMAL
EOSINOPHILS RELATIVE PERCENT: 4 % (ref 1–4)
EXPIRATION DATE: NORMAL
FERRITIN: 72 UG/L (ref 30–400)
GFR AFRICAN AMERICAN: >60 ML/MIN
GFR NON-AFRICAN AMERICAN: >60 ML/MIN
GFR SERPL CREATININE-BSD FRML MDRD: ABNORMAL ML/MIN/{1.73_M2}
GFR SERPL CREATININE-BSD FRML MDRD: ABNORMAL ML/MIN/{1.73_M2}
GLUCOSE BLD-MCNC: 113 MG/DL (ref 70–99)
GLUCOSE BLD-MCNC: 125 MG/DL (ref 75–110)
GLUCOSE BLD-MCNC: 180 MG/DL (ref 75–110)
GLUCOSE BLD-MCNC: 64 MG/DL (ref 75–110)
GLUCOSE BLD-MCNC: 78 MG/DL (ref 75–110)
HAPTOGLOBIN: 205 MG/DL (ref 30–200)
HCT VFR BLD CALC: 26.2 % (ref 40.7–50.3)
HCT VFR BLD CALC: 26.5 % (ref 40.7–50.3)
HEMOCCULT SP1 STL QL: NEGATIVE
HEMOCCULT SP2 STL QL: NORMAL
HEMOCCULT SP3 STL QL: NORMAL
HEMOGLOBIN: 7.6 G/DL (ref 13–17)
HEMOGLOBIN: 7.8 G/DL (ref 13–17)
HEMOGLOBIN: 8.1 G/DL (ref 13–17)
IMMATURE GRANULOCYTES: 0 %
IMMATURE RETIC FRACT: 37.3 % (ref 2.7–18.3)
INR BLD: 1
IRON SATURATION: 66 % (ref 20–55)
IRON: 299 UG/DL (ref 59–158)
LYMPHOCYTES # BLD: 16 % (ref 24–44)
MAGNESIUM: 1.8 MG/DL (ref 1.6–2.6)
MCH RBC QN AUTO: 25.2 PG (ref 25.2–33.5)
MCH RBC QN AUTO: 25.4 PG (ref 25.2–33.5)
MCHC RBC AUTO-ENTMCNC: 29 G/DL (ref 28.4–34.8)
MCHC RBC AUTO-ENTMCNC: 29.4 G/DL (ref 28.4–34.8)
MCV RBC AUTO: 86.3 FL (ref 82.6–102.9)
MCV RBC AUTO: 86.8 FL (ref 82.6–102.9)
MONOCYTES # BLD: 9 % (ref 1–7)
MORPHOLOGY: ABNORMAL
NUCLEATED RED BLOOD CELLS: 1 PER 100 WBC
PDW BLD-RTO: 18.2 % (ref 11.8–14.4)
PDW BLD-RTO: 18.4 % (ref 11.8–14.4)
PLATELET # BLD: 326 K/UL (ref 138–453)
PLATELET # BLD: 335 K/UL (ref 138–453)
PLATELET ESTIMATE: ABNORMAL
PMV BLD AUTO: 10 FL (ref 8.1–13.5)
PMV BLD AUTO: 9.8 FL (ref 8.1–13.5)
POTASSIUM SERPL-SCNC: 3.3 MMOL/L (ref 3.7–5.3)
PROTHROMBIN TIME: 10.8 SEC (ref 9.4–12.6)
RBC # BLD: 3.02 M/UL (ref 4.21–5.77)
RBC # BLD: 3.07 M/UL (ref 4.21–5.77)
RBC # BLD: ABNORMAL 10*6/UL
RETIC %: 2.8 % (ref 0.5–1.9)
RETIC HEMOGLOBIN: 17.4 PG (ref 28.2–35.7)
SEG NEUTROPHILS: 70 % (ref 36–66)
SEGMENTED NEUTROPHILS ABSOLUTE COUNT: 5.6 K/UL (ref 1.8–7.7)
SODIUM BLD-SCNC: 138 MMOL/L (ref 135–144)
TIME, STOOL #1: NORMAL
TIME, STOOL #2: NORMAL
TIME, STOOL #3: NORMAL
TOTAL IRON BINDING CAPACITY: 452 UG/DL (ref 250–450)
TOTAL PROTEIN: 5.1 G/DL (ref 6.4–8.3)
TRANSFUSION STATUS: NORMAL
TRANSFUSION STATUS: NORMAL
UNIT DIVISION: 0
UNIT DIVISION: 0
UNIT NUMBER: NORMAL
UNIT NUMBER: NORMAL
UNSATURATED IRON BINDING CAPACITY: 153 UG/DL (ref 112–347)
WBC # BLD: 6.7 K/UL (ref 3.5–11.3)
WBC # BLD: 8 K/UL (ref 3.5–11.3)
WBC # BLD: ABNORMAL 10*3/UL

## 2018-01-12 PROCEDURE — 80053 COMPREHEN METABOLIC PANEL: CPT

## 2018-01-12 PROCEDURE — 99223 1ST HOSP IP/OBS HIGH 75: CPT | Performed by: INTERNAL MEDICINE

## 2018-01-12 PROCEDURE — 83550 IRON BINDING TEST: CPT

## 2018-01-12 PROCEDURE — 6370000000 HC RX 637 (ALT 250 FOR IP): Performed by: NURSE PRACTITIONER

## 2018-01-12 PROCEDURE — 3700000000 HC ANESTHESIA ATTENDED CARE: Performed by: INTERNAL MEDICINE

## 2018-01-12 PROCEDURE — 94762 N-INVAS EAR/PLS OXIMTRY CONT: CPT

## 2018-01-12 PROCEDURE — 83735 ASSAY OF MAGNESIUM: CPT

## 2018-01-12 PROCEDURE — 85610 PROTHROMBIN TIME: CPT

## 2018-01-12 PROCEDURE — 7100000000 HC PACU RECOVERY - FIRST 15 MIN: Performed by: INTERNAL MEDICINE

## 2018-01-12 PROCEDURE — 6370000000 HC RX 637 (ALT 250 FOR IP): Performed by: FAMILY MEDICINE

## 2018-01-12 PROCEDURE — 7100000001 HC PACU RECOVERY - ADDTL 15 MIN: Performed by: INTERNAL MEDICINE

## 2018-01-12 PROCEDURE — 2060000000 HC ICU INTERMEDIATE R&B

## 2018-01-12 PROCEDURE — 85027 COMPLETE CBC AUTOMATED: CPT

## 2018-01-12 PROCEDURE — 3609012800 HC EGD DIAGNOSTIC ONLY: Performed by: INTERNAL MEDICINE

## 2018-01-12 PROCEDURE — 85045 AUTOMATED RETICULOCYTE COUNT: CPT

## 2018-01-12 PROCEDURE — 99222 1ST HOSP IP/OBS MODERATE 55: CPT | Performed by: FAMILY MEDICINE

## 2018-01-12 PROCEDURE — 85018 HEMOGLOBIN: CPT

## 2018-01-12 PROCEDURE — 6360000002 HC RX W HCPCS

## 2018-01-12 PROCEDURE — 83010 ASSAY OF HAPTOGLOBIN QUANT: CPT

## 2018-01-12 PROCEDURE — 0DJ08ZZ INSPECTION OF UPPER INTESTINAL TRACT, VIA NATURAL OR ARTIFICIAL OPENING ENDOSCOPIC: ICD-10-PCS | Performed by: INTERNAL MEDICINE

## 2018-01-12 PROCEDURE — 2580000003 HC RX 258: Performed by: ANESTHESIOLOGY

## 2018-01-12 PROCEDURE — 82947 ASSAY GLUCOSE BLOOD QUANT: CPT

## 2018-01-12 PROCEDURE — 2580000003 HC RX 258

## 2018-01-12 PROCEDURE — 85025 COMPLETE CBC W/AUTO DIFF WBC: CPT

## 2018-01-12 PROCEDURE — 82728 ASSAY OF FERRITIN: CPT

## 2018-01-12 PROCEDURE — 36415 COLL VENOUS BLD VENIPUNCTURE: CPT

## 2018-01-12 PROCEDURE — 83540 ASSAY OF IRON: CPT

## 2018-01-12 RX ORDER — DEXTROSE MONOHYDRATE 25 G/50ML
12.5 INJECTION, SOLUTION INTRAVENOUS PRN
Status: DISCONTINUED | OUTPATIENT
Start: 2018-01-12 | End: 2018-01-14 | Stop reason: HOSPADM

## 2018-01-12 RX ORDER — NICOTINE POLACRILEX 4 MG
15 LOZENGE BUCCAL PRN
Status: DISCONTINUED | OUTPATIENT
Start: 2018-01-12 | End: 2018-01-14 | Stop reason: HOSPADM

## 2018-01-12 RX ORDER — DEXTROSE MONOHYDRATE 50 MG/ML
100 INJECTION, SOLUTION INTRAVENOUS PRN
Status: DISCONTINUED | OUTPATIENT
Start: 2018-01-12 | End: 2018-01-14 | Stop reason: HOSPADM

## 2018-01-12 RX ORDER — POTASSIUM CHLORIDE 20MEQ/15ML
40 LIQUID (ML) ORAL PRN
Status: DISCONTINUED | OUTPATIENT
Start: 2018-01-12 | End: 2018-01-14 | Stop reason: HOSPADM

## 2018-01-12 RX ORDER — ZOLPIDEM TARTRATE 5 MG/1
5 TABLET ORAL NIGHTLY PRN
Status: DISCONTINUED | OUTPATIENT
Start: 2018-01-12 | End: 2018-01-14 | Stop reason: HOSPADM

## 2018-01-12 RX ORDER — POTASSIUM CHLORIDE 7.45 MG/ML
10 INJECTION INTRAVENOUS PRN
Status: DISCONTINUED | OUTPATIENT
Start: 2018-01-12 | End: 2018-01-14 | Stop reason: HOSPADM

## 2018-01-12 RX ORDER — PROPOFOL 10 MG/ML
INJECTION, EMULSION INTRAVENOUS PRN
Status: DISCONTINUED | OUTPATIENT
Start: 2018-01-12 | End: 2018-01-12 | Stop reason: SDUPTHER

## 2018-01-12 RX ORDER — SODIUM CHLORIDE 9 MG/ML
INJECTION, SOLUTION INTRAVENOUS CONTINUOUS PRN
Status: DISCONTINUED | OUTPATIENT
Start: 2018-01-12 | End: 2018-01-12 | Stop reason: SDUPTHER

## 2018-01-12 RX ORDER — POTASSIUM CHLORIDE 20 MEQ/1
40 TABLET, EXTENDED RELEASE ORAL PRN
Status: DISCONTINUED | OUTPATIENT
Start: 2018-01-12 | End: 2018-01-14 | Stop reason: HOSPADM

## 2018-01-12 RX ADMIN — TRAMADOL HYDROCHLORIDE 50 MG: 50 TABLET, FILM COATED ORAL at 21:21

## 2018-01-12 RX ADMIN — CARVEDILOL 12.5 MG: 12.5 TABLET, FILM COATED ORAL at 17:46

## 2018-01-12 RX ADMIN — PROPOFOL 50 MG: 10 INJECTION, EMULSION INTRAVENOUS at 13:07

## 2018-01-12 RX ADMIN — SIMVASTATIN 40 MG: 40 TABLET, FILM COATED ORAL at 20:26

## 2018-01-12 RX ADMIN — LOSARTAN POTASSIUM 50 MG: 50 TABLET, FILM COATED ORAL at 08:03

## 2018-01-12 RX ADMIN — DEXTROSE MONOHYDRATE 12.5 G: 25 INJECTION, SOLUTION INTRAVENOUS at 12:55

## 2018-01-12 RX ADMIN — GLIPIZIDE 5 MG: 5 TABLET ORAL at 08:03

## 2018-01-12 RX ADMIN — ZOLPIDEM TARTRATE 5 MG: 5 TABLET ORAL at 22:26

## 2018-01-12 RX ADMIN — POTASSIUM CHLORIDE 40 MEQ: 1500 TABLET, EXTENDED RELEASE ORAL at 08:03

## 2018-01-12 RX ADMIN — MAGNESIUM GLUCONATE 500 MG ORAL TABLET 400 MG: 500 TABLET ORAL at 08:03

## 2018-01-12 RX ADMIN — FUROSEMIDE 20 MG: 20 TABLET ORAL at 08:03

## 2018-01-12 RX ADMIN — MAGNESIUM GLUCONATE 500 MG ORAL TABLET 400 MG: 500 TABLET ORAL at 20:26

## 2018-01-12 RX ADMIN — SODIUM CHLORIDE: 9 INJECTION, SOLUTION INTRAVENOUS at 13:00

## 2018-01-12 RX ADMIN — GABAPENTIN 100 MG: 100 CAPSULE ORAL at 08:03

## 2018-01-12 RX ADMIN — PROPOFOL 30 MG: 10 INJECTION, EMULSION INTRAVENOUS at 13:10

## 2018-01-12 RX ADMIN — FUROSEMIDE 20 MG: 20 TABLET ORAL at 17:46

## 2018-01-12 RX ADMIN — CARVEDILOL 12.5 MG: 12.5 TABLET, FILM COATED ORAL at 08:03

## 2018-01-12 ASSESSMENT — PULMONARY FUNCTION TESTS
PIF_VALUE: 1
PIF_VALUE: 0
PIF_VALUE: 1
PIF_VALUE: 0
PIF_VALUE: 1
PIF_VALUE: 0
PIF_VALUE: 0

## 2018-01-12 ASSESSMENT — PAIN DESCRIPTION - LOCATION: LOCATION: BACK

## 2018-01-12 ASSESSMENT — PAIN SCALES - GENERAL
PAINLEVEL_OUTOF10: 0
PAINLEVEL_OUTOF10: 7

## 2018-01-12 ASSESSMENT — PAIN DESCRIPTION - FREQUENCY: FREQUENCY: CONTINUOUS

## 2018-01-12 ASSESSMENT — ENCOUNTER SYMPTOMS
STRIDOR: 0
SHORTNESS OF BREATH: 0

## 2018-01-12 ASSESSMENT — PAIN DESCRIPTION - ONSET: ONSET: ON-GOING

## 2018-01-12 ASSESSMENT — PAIN DESCRIPTION - PAIN TYPE: TYPE: CHRONIC PAIN

## 2018-01-12 ASSESSMENT — PAIN - FUNCTIONAL ASSESSMENT: PAIN_FUNCTIONAL_ASSESSMENT: 0-10

## 2018-01-12 ASSESSMENT — PAIN DESCRIPTION - PROGRESSION: CLINICAL_PROGRESSION: NOT CHANGED

## 2018-01-12 ASSESSMENT — PAIN DESCRIPTION - DESCRIPTORS: DESCRIPTORS: ACHING;CONSTANT

## 2018-01-12 NOTE — H&P
[facility]    HISTORY AND PHYSICAL EXAMINATION            Date:   1/12/2018  Patient name:  Hiwot Wharton. Date of admission:  1/11/2018  5:10 PM  MRN:   [id number]  Account:  [de-identified]  YOB: 1945  PCP:    Babs Louise MD  Room:   [facility]1041-  Code Status:    Full Code    Chief Complaint:     Chief Complaint   Patient presents with    Abnormal Lab     sent by physician for hgb 5.9       History Obtained From:     patient, electronic medical record    History of Present Illness:     80 yo m with h/o multiple myeloma, h/o DVT (unprovoked, over a year ago) on xarelto, multivessel CAD on cath on 12/13, combined CHF with EF of 36% was sent to ED due to low hb of 5.9. In ED, Hb rechecked which was 6.7. Patient has been on xarelto for over a year for h/o unprovoked DVT, and patient reports having black stool for a long time. Patient denies any abdominal pain, n/v/d. Patient was taking lenalidomide for multiple myeloma, which was held yesterday. As patient is having normal platelet and wbc ct, bleeding was suspected. Guaiac was done in ED, which was positive. Transfusion started in ED, and received 2 units of prbc. Hb improved to 7.8 this morning. Patient has recently had cardiac cath done on 12/13. Found to have severe multi vessel CAD. CTS was consulted at that time. However, CABG was not done during last admission. Patient was supposed to follow up with them in 4 weeks. Pt was not aware of it.    Patient reports intermittent chest pain, which improves with rest.       Past Medical History:     Past Medical History:   Diagnosis Date    Arthritis     all over    CAD (coronary artery disease)     Cardiomyopathy ([address]     CHF (congestive heart failure) (HCC)     COPD (chronic obstructive pulmonary disease) ([address]     Diabetes mellitus ([address]     DVT of leg (deep venous thrombosis) ([address]     Hyperlipidemia Temp 98.5 °F (36.9 °C) (Oral)   Resp 16   Ht 5' 9\" (1.753 m)   Wt 177 lb 7.5 oz (80.5 kg)   SpO2 96%   BMI 26.21 kg/m²   Temp (24hrs), Av.5 °F (36.9 °C), Min:98 °F (36.7 °C), Max:99 °F (37.2 °C)    No results for input(s): POCGLU in the last 72 hours. Intake/Output Summary (Last 24 hours) at 18 1117  Last data filed at 18 0514   Gross per 24 hour   Intake             1616 ml   Output                0 ml   Net             1616 ml       General Appearance:  alert, well appearing, and in no acute distress  Mental status: oriented to person, place, and time with normal affect  Head:  normocephalic, atraumatic. Eye: no icterus, redness, pupils equal and reactive, extraocular eye movements intact, conjunctiva clear  Ear: normal external ear, no discharge, hearing intact  Nose:  no drainage noted  Mouth: mucous membranes moist  Neck: supple, no carotid bruits, thyroid not palpable  Lungs: Bilateral equal air entry, clear to ausculation, no wheezing, rales or rhonchi, normal effort  Cardiovascular: normal rate, regular rhythm, no murmur  Abdomen: Soft, nontender, nondistended, normal bowel sounds, no hepatomegaly or splenomegaly  Neurologic: There are no new focal motor or sensory deficits, normal muscle tone and bulk, no abnormal sensation, normal speech, cranial nerves II through XII grossly intact  Skin: No gross lesions, rashes, bruising or bleeding on exposed skin area  Extremities:  peripheral pulses palpable, no pedal edema or calf pain with palpation          Investigations:    Echo 12/15/17  LV chamber dimension is mildly dilated with mild left ventricular  hypertrophy. Systolic function is moderately globally reduced with a  calculated EF of 36%. Evidence of diastolic dysfunction.   Left atrium is at upper limits of normal with increased LA volume.     Cardiac Cath 17  Severe multivessel CAD.   Severely reduced left ventricular systolic function.   Moderately increased pulmonary pressure, severe increased wedge pressure   Normal CO and CI.     Laboratory Testing:  Recent Results (from the past 24 hour(s))   CBC Auto Differential    Collection Time: 01/11/18  5:33 PM   Result Value Ref Range    WBC 7.2 3.5 - 11.3 k/uL    RBC 2.61 (L) 4.21 - 5.77 m/uL    Hemoglobin 6.1 (LL) 13.0 - 17.0 g/dL    Hematocrit 22.4 (L) 40.7 - 50.3 %    MCV 85.8 82.6 - 102.9 fL    MCH 23.4 (L) 25.2 - 33.5 pg    MCHC 27.2 (L) 28.4 - 34.8 g/dL    RDW 20.3 (H) 11.8 - 14.4 %    Platelets 411 584 - 642 k/uL    MPV 10.0 8.1 - 13.5 fL    Differential Type NOT REPORTED     WBC Morphology NOT REPORTED     RBC Morphology NOT REPORTED     Platelet Estimate NOT REPORTED     Immature Granulocytes 1 (H) 0 %    Seg Neutrophils 59 36 - 65 %    Lymphocytes 24 24 - 43 %    Monocytes 10 3 - 12 %    Eosinophils % 6 (H) 1 - 4 %    Basophils 0 0 - 2 %    Absolute Immature Granulocyte 0.07 0.00 - 0.30 k/uL    Segs Absolute 4.25 1.50 - 8.10 k/uL    Absolute Lymph # 1.73 1.10 - 3.70 k/uL    Absolute Mono # 0.72 0.10 - 1.20 k/uL    Absolute Eos # 0.43 0.00 - 0.44 k/uL    Basophils # 0.00 0.00 - 0.20 k/uL    Morphology ANISOCYTOSIS PRESENT    Basic Metabolic Panel    Collection Time: 01/11/18  5:33 PM   Result Value Ref Range    Glucose 126 (H) 70 - 99 mg/dL    BUN 17 8 - 23 mg/dL    CREATININE 1.09 0.70 - 1.20 mg/dL    Bun/Cre Ratio NOT REPORTED 9 - 20    Calcium 8.9 8.6 - 10.4 mg/dL    Sodium 138 135 - 144 mmol/L    Potassium 2.9 (LL) 3.7 - 5.3 mmol/L    Chloride 96 (L) 98 - 107 mmol/L    CO2 27 20 - 31 mmol/L    Anion Gap 15 9 - 17 mmol/L    GFR Non-African American >60 >60 mL/min    GFR African American >60 >60 mL/min    GFR Comment          GFR Staging NOT REPORTED    Protime-INR    Collection Time: 01/11/18  5:33 PM   Result Value Ref Range    Protime 11.2 9.4 - 12.6 sec    INR 1.0    POCT troponin    Collection Time: 01/11/18  5:35 PM   Result Value Ref Range    POC Troponin I 0.04 0.00 - 0.10 ng/mL    POC Troponin Interp       The Troponin-I (POC) results cannot be compared to the Troponin-T results. TYPE AND SCREEN    Collection Time: 01/11/18  5:37 PM   Result Value Ref Range    Expiration Date 01/14/2018     Arm Band Number BE [id number]     ABO/Rh O POSITIVE     Antibody Screen NEGATIVE     Unit Number [id number]     Product Code Leukocyte Reduced Red Cell     Unit Divison 0     Dispense Status TRANSFUSED     Transfusion Status OK TO TRANSFUSE     Crossmatch Result COMPATIBLE     Unit Number [id number]     Product Code Leukocyte Reduced Red Cell     Unit Divison 0     Dispense Status TRANSFUSED     Transfusion Status OK TO TRANSFUSE     Crossmatch Result COMPATIBLE    Lactic Acid, Whole Blood    Collection Time: 01/11/18  5:49 PM   Result Value Ref Range    Lactic Acid, Whole Blood 2.9 (H) 0.7 - 2.1 mmol/L   EKG 12 Lead    Collection Time: 01/11/18  5:51 PM   Result Value Ref Range    Ventricular Rate 78 BPM    Atrial Rate 78 BPM    P-R Interval 140 ms    QRS Duration 148 ms    Q-T Interval 440 ms    QTc Calculation (Bazett) 501 ms    P Axis 78 degrees    R Axis -62 degrees    T Axis 81 degrees   POCT troponin    Collection Time: 01/11/18  8:35 PM   Result Value Ref Range    POC Troponin I 0.02 0.00 - 0.10 ng/mL    POC Troponin Interp       The Troponin-I (POC) results cannot be compared to the Troponin-T results.    LACTIC ACID, WHOLE BLOOD    Collection Time: 01/11/18  9:56 PM   Result Value Ref Range    Lactic Acid, Whole Blood 2.5 (H) 0.7 - 2.1 mmol/L   HEMOGLOBIN AND HEMATOCRIT, BLOOD    Collection Time: 01/11/18 10:49 PM   Result Value Ref Range    Hemoglobin 6.7 (LL) 13.0 - 17.0 g/dL    Hematocrit 23.1 (L) 40.7 - 50.3 %   CBC    Collection Time: 01/12/18  3:56 AM   Result Value Ref Range    WBC 6.7 3.5 - 11.3 k/uL    RBC 3.07 (L) 4.21 - 5.77 m/uL    Hemoglobin 7.8 (L) 13.0 - 17.0 g/dL    Hematocrit 26.5 (L) 40.7 - 50.3 %    MCV 86.3 82.6 - 102.9 fL    MCH 25.4 25.2 - 33.5 pg    MCHC 29.4 28.4 - 34.8 g/dL    RDW 18.2 (H) 11.8 -

## 2018-01-12 NOTE — PROGRESS NOTES
Cardiac Testing     ECHO 12/15/17: EF 36%, DD, LA is at upper limits of normal with ^LA volume. CATH 12/13/17: Severe MVD. EF 20%. Severely elevated left sided filling pressures. Moderate PTN. Normal CO/CI.

## 2018-01-12 NOTE — CONSULTS
 Acute on chronic systolic CHF (congestive heart failure) (HCC)    Multiple vessel coronary artery disease    Multiple myeloma not having achieved remission (HCC)    Chronic obstructive pulmonary disease (HCC)    Anemia     - preoperative stratification for need for EGD  - possible GI bleed  - Known multivessel coronary artery disease not revascularized at this time  - Known systolic heart failure with EF 36% due to above  - Hypertension  - history of DVT was on xarelto  - dyslipidemia    RECOMMENDATIONS:  - unfortunately due to the fact the patient has not been revascularized as well as his arm low ejection fraction the patient is at high risk for invasive procedures  - his risk can be slightly minimized there is definitely no general and there is very minimal minimal anesthesia  - Continue holding blood thinners at this time  - GI is following and is currently on a PPI drip  - Blood transfusions when necessary  - We'll follow    Discussed with patient and nursing. Thank you for allowing me to participate in the care of this patient, please do not hesitate to call if you have any questions. Radha Ellison, [id number] [facility]  [facility]  [phone]

## 2018-01-12 NOTE — CARE COORDINATION
Case Management Initial Discharge Plan  Hiwot Dio.,         Readmission Risk              Readmission Risk:        24.75       Age 74 or Greater:  1    Admitted from SNF or Requires Paid or Family Care:  0    Currently has CHF,COPD,ARF,CRI,or is on dialysis:  4    Takes more than 5 Prescription Medications:  4    Takes Digoxin,Insulin,Anticoagulants,Narcotics or ASA/Plavix:  [address] in Past 12 Months:  10    On Disability:  0    Patient Considers own Health:  3.75            Met with:{Persons; family members:17808:o} to discuss discharge plans. Information verified: address, contacts, phone number, , insurance {YES / NO:32137:o}  PCP: Babs Louise MD  Date of last visit: ***    Insurance Provider: ***    Discharge Planning  Current Residence:  Private Residence  Living Arrangements:  Spouse/Significant Other   Home has *** stories/*** stairs to climb  Support Systems:  Spouse/Significant Other, Family Members  Current Services PTA:  [facility] Supplier: ***  Patient able to perform ADL's:{CHP DME FADF:[id number]:W}  DME used to aid ambulation prior to admission: ***/during admission***    Potential Assistance Needed:  N/A    Pharmacy: ***   Potential Assistance Purchasing Medications:  No  Does patient want to participate in local refill/ meds to beds program?  No    Patient agreeable to home care: {YES / NO:63816:o}  Freedom of choice provided:  {Yes/No-Ex:482576}      Type of Home Care Services:  None  Patient expects to be discharged to:  home    Prior SNF/Rehab Placement and Facility: ***  Agreeable to SNF/Rehab: {YES / NO:11021:o}  Freedom of choice provided: {Yes/No-Ex:583994}   Evaluation: {Yes/No-Ex:427380}    Expected Discharge date:      Follow Up Appointment: Best Day/ Time:      Transportation provider: ***  Transportation arrangements needed for discharge: {YES / NO:05155:o}***    Discharge Plan: ***        Electronically signed by Annette Somers RN on 18 at 11:18 AM

## 2018-01-12 NOTE — PROGRESS NOTES
Fingerstick blood sugar 64. Test repeated with result of 78. Dr Nick Stewart notified of both results. Orders received.

## 2018-01-12 NOTE — CONSULTS
(ULTRAM) 50 MG tablet Take 50 mg by mouth daily as needed for Pain . Yes Historical Provider, MD   NONFORMULARY Pro-air HFA 1 ouff PRN    Historical Provider, MD       CURRENT MEDICATIONS:  Scheduled Meds:   potassium chloride  40 mEq Oral Daily    sodium chloride flush  10 mL Intravenous Q12H    carvedilol  12.5 mg Oral BID WC    furosemide  20 mg Oral BID    gabapentin  100 mg Oral Daily    glipiZIDE  5 mg Oral QAM AC    losartan  50 mg Oral Daily    magnesium oxide  400 mg Oral BID    simvastatin  40 mg Oral Nightly    sodium chloride flush  10 mL Intravenous 2 times per day    sodium chloride flush  10 mL Intravenous 2 times per day    sodium chloride  250 mL Intravenous Once     Continuous Infusions:   sodium chloride 75 mL/hr at 01/11/18 2235    pantoprozole (PROTONIX) infusion 8 mg/hr (01/11/18 2224)     PRN Meds:potassium chloride **OR** potassium chloride **OR** potassium chloride, albuterol sulfate HFA, traMADol, sodium chloride flush, acetaminophen, morphine **OR** morphine, ondansetron, potassium chloride, sodium chloride flush    SOCIAL HISTORY:     Tobacco:   reports that he has been smoking. He has never used smokeless tobacco.  Alcohol:   reports that he drinks alcohol. Illicit drugs:  reports that he uses drugs, including Marijuana. FAMILY HISTORY:     No family history on file. REVIEW OF SYSTEMS:  10 out of 14 systems were reviewed and were negative, as the patient recalls, except for what is noted in the HPI.     PHYSICAL EXAM:    /62   Pulse 75   Temp 98 °F (36.7 °C) (Oral)   Resp 22   Ht 5' 9\" (1.753 m)   Wt 177 lb 7.5 oz (80.5 kg)   SpO2 96%   BMI 26.21 kg/m²     GENERAL:   Well developed, Well nourished, No apparent distress  HEAD:   Normocephalic, Atraumatic  EENT:   EOMI, Sclera not icteric, Oropharynx moist  NECK:   Supple, Trachea midline  LUNGS:  CTA Bilaterally  HEART:  RRR, No murmur  ABDOMEN:   Soft, Nontender, Nondistended, BS WNL  EXT:   No

## 2018-01-12 NOTE — CONSULTS
cold intolerance,weight changes, change in bowel habits and hair loss   Musculoskeletal: negative for myalgias, arthralgias, pain, joint swelling,and bone pain   Neurological: negative for headaches, dizziness, seizures, weakness, numbness    PHYSICAL EXAM:      BP (!) 116/55   Pulse 67   Temp 98.5 °F (36.9 °C) (Oral)   Resp 16   Ht 5' 9\" (1.753 m)   Wt 177 lb 7.5 oz (80.5 kg)   SpO2 96%   BMI 26.21 kg/m²    Temp (24hrs), Av.5 °F (36.9 °C), Min:98 °F (36.7 °C), Max:99 °F (37.2 °C)    General appearance - well appearing, no in pain or distress   Mental status - alert and cooperative   Eyes - pupils equal and reactive, extraocular eye movements intact   Mouth - mucous membranes moist, pharynx normal without lesions   Neck - supple, no significant adenopathy   Lymphatics - no palpable lymphadenopathy, no hepatosplenomegaly   Chest - clear to auscultation, no wheezes, rales or rhonchi, symmetric air entry   Heart - normal rate, regular rhythm, normal S1, S2, no murmurs  Abdomen - soft, nontender, nondistended, no masses or organomegaly   Neurological - alert, oriented, normal speech, no focal findings or movement disorder noted   Musculoskeletal - no joint tenderness, deformity or swelling   Extremities - peripheral pulses normal, + pedal edema, no clubbing or cyanosis   Skin - normal coloration and turgor, no rashes, no suspicious skin lesions noted ,    DATA:    Labs:   CBC:   Recent Labs      18   1733  18   2249  18   0356   WBC  7.2   --   6.7   HGB  6.1*  6.7*  7.8*   HCT  22.4*  23.1*  26.5*   PLT  426   --   326     BMP:   Recent Labs      18   1733  18   0356   NA  138  138   K  2.9*  3.3*   CO2  27  25   BUN  17  13   CREATININE  1.09  0.82   LABGLOM  >60  >60   GLUCOSE  126*  113*     PT/INR:   Recent Labs      18   1733  18   0645   PROTIME  11.2  10.8   INR  1.0  1.0       IMAGING DATA:  Xr Chest Standard (2 Vw)    Result Date: 2018  EXAMINATION: TWO VIEWS OF THE CHEST 1/11/2018 6:15 pm COMPARISON: 12/14/2017 HISTORY: ORDERING SYSTEM PROVIDED HISTORY: SOB TECHNOLOGIST PROVIDED HISTORY: Reason for exam:->SOB FINDINGS: Lungs: Clear Mediastinum: Unremarkable Pleura: No pleural effusion or pneumothorax Other: Unremarkable. No acute pulmonary process. Xr Chest Standard (2 Vw)    Result Date: 12/14/2017  EXAMINATION: TWO VIEWS OF THE CHEST 12/14/2017 9:08 am COMPARISON: June 27, 2006 the HISTORY: ORDERING SYSTEM PROVIDED HISTORY: pre op CABG TECHNOLOGIST PROVIDED HISTORY: Reason for exam:->pre op CABG FINDINGS: Stable mild cardiac silhouette enlargement. No pulmonary edema. No focal lung consolidation or infiltrate. No pleural effusion or pneumothorax. Round osseous structures overlying the proximal left humerus suspicious for intra-articular loose bodies. Stable mild cardiac silhouette enlargement. Otherwise, no pulmonary edema or lung consolidation. Vascular Carotid Duplex Bilateral    Result Date: 12/14/2017    Conclusions   Summary   Simultaneous real time imaging utilizing B-Mode, color flow doppler and  spectral waveform analysis was performed on the bilateral extracerebral  vascular system. The study demonstrates:   Right:  Internal carotid artery has a mild, <50% stenosis based on velocities. Left:  Internal carotid artery has a mild, <50% stenosis based on velocities. Vl Dup Lower Extremity Venous Bilateral    Result Date: 12/14/2017   Conclusions   Summary   Simultaneous real time imaging utilizing B-Mode, color doppler and  spectral waveform analysis was performed on the bilateral lower  extremities for venous examination of the deep and superficial systems. Findings are:   Right:  Chronic deep venous thrombosis identified in the femoral vein. Chronic deep venous thrombosis identified in the popliteal vein. Left:  No evidence of deep or superficial venous thrombosis.       Vl Vein Mapping Lower Bilateral    Result Date: 12/14/2017    Conclusions   Summary   Simultaneous real time imaging utilizing B-Mode, color doppler and  spectral waveform analysis was performed on the bilateral lower  extremities for venous examination of vein mapping. 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 Immunoglobulin G kappa multiple myeloma on lenalidomide and steroids. The last dose was one day before admission. 2. History of provoked DVT in 2016 on xarelto due to chemotherapy  3. Acute blood loss anemia status post one unit of transfusion  4. Ischemic cardiomyopathy and systolic heart failure    RECOMMENDATIONS:  1. Hold on anticoagulation for now  2. Hold on chemotherapy including lenalidomide and steroids for now  3. Follow-up investigation by GI  4. We will follow    Discussed with patient and Nurse. Thank you for asking us to see this patient. MD Nasim  Hematology resident   Attending Physician Statement  The patient was seen and examined during rounds, I have discussed the care of Felton Norton., including pertinent history and exam findings with the resident. I have reviewed the key elements of all parts of the encounter with the resident. I agree with the assessment, and status of the problem list as documented.    Additional assessment/ plan        Aldair Good MD  Hematology Oncology  [phone]  Electronically signed by Roberto Garza MD on 1/12/2018 at 10:23 PM

## 2018-01-12 NOTE — ANESTHESIA PRE PROCEDURE
Sweta Cheng MD   50 mg at 01/11/18 2224    simvastatin (ZOCOR) tablet 40 mg  40 mg Oral Nightly Praful Perez MD   40 mg at 01/11/18 2236    0.9 % sodium chloride infusion   Intravenous Continuous Praful Perez MD 75 mL/hr at 01/11/18 2235      sodium chloride flush 0.9 % injection 10 mL  10 mL Intravenous 2 times per day Praful Perez MD        sodium chloride flush 0.9 % injection 10 mL  10 mL Intravenous PRN Praful Perez MD        acetaminophen (TYLENOL) tablet 650 mg  650 mg Oral Q4H PRN Praful Perez MD        morphine injection 2 mg  2 mg Intravenous Q2H PRN Praful Perez MD        Or   Serenity Buenrostro morphine (PF) injection 4 mg  4 mg Intravenous Q2H PRN Praful Perez MD        ondansetron [facility]) injection 4 mg  4 mg Intravenous Q6H PRN Praful Perez MD        esomeprazole (NEXIUM) 80 mg in sodium chloride 0.9 % 100 mL infusion  8 mg/hr Intravenous Continuous Praful Perez MD 10 mL/hr at 01/11/18 2224 8 mg/hr at 01/11/18 2224    potassium chloride 10 mEq/100 mL IVPB (Peripheral Line)  10 mEq Intravenous PRN Praful Perez MD        sodium chloride flush 0.9 % injection 10 mL  10 mL Intravenous 2 times per day Praful Perez MD   10 mL at 01/11/18 2224    sodium chloride flush 0.9 % injection 10 mL  10 mL Intravenous PRN Praful Perez MD           Allergies:  No Known Allergies    Problem List:    Patient Active Problem List   Diagnosis Code    Acute on chronic systolic CHF (congestive heart failure) ([facility]) I50.23    Multiple vessel coronary artery disease I25.10    Multiple myeloma not having achieved remission ([facility]) C90.00    Chronic obstructive pulmonary disease ([facility] [address] J44.9    Anemia D64.9       Past Medical History:        Diagnosis Date    Arthritis     all over    CAD (coronary artery disease)     Cardiomyopathy ([facility] [address]     CHF (congestive heart failure) ([facility] [address]     COPD (chronic obstructive pulmonary disease) ([facility] [address]     Diabetes mellitus ([facility] [address]     [facility]     Hyperlipidemia     Hypertension     Multiple myeloma ([facility] [address]     Neuropathy ([facility] [address]        Past Surgical History:        Procedure Laterality Date    ABDOMEN SURGERY  2002    CARDIAC CATHETERIZATION  12/13/2017    right and left heart cath with Dr. Alena Weinstein       Social History:    Social History   Substance Use Topics    Smoking status: Current Every Day Smoker    Smokeless tobacco: Never Used      Comment: a little less than a pack a day    Alcohol use Yes      Comment: 2-3 beers a week                                 Ready to quit: Not Answered  Counseling given: Not Answered      Vital Signs (Current):   Vitals:    01/12/18 0300 01/12/18 0330 01/12/18 0800 01/12/18 1220   BP: (!) 133/90 133/62 (!) 116/55 (!) 109/59   Pulse: 73 75 67 68   Resp: 24 22 16 16   Temp: 98 °F (36.7 °C)  98.5 °F (36.9 °C) 97.2 °F (36.2 °C)   TempSrc: Oral  Oral Temporal   SpO2: 98% 96%  99%   Weight:       Height:                                                  BP Readings from Last 3 Encounters:   01/12/18 (!) 109/59   12/17/17 121/73       NPO Status: Time of last liquid consumption: 1800                        Time of last solid consumption: 1800                        Date of last liquid consumption: 01/11/18                        Date of last solid food consumption: 01/11/18    BMI:   Wt Readings from Last 3 Encounters:   01/11/18 177 lb 7.5 oz (80.5 kg)   12/17/17 174 lb 9.6 oz (79.2 kg)     Body mass index is 26.21 kg/m².     CBC:   Lab Results   Component Value Date    WBC 6.7 01/12/2018    RBC 3.07 01/12/2018    HGB 8.1 01/12/2018    HCT 26.5 01/12/2018    MCV 86.3 01/12/2018    RDW 18.2 01/12/2018     01/12/2018       CMP:   Lab Results   Component Value Date     01/12/2018    K 3.3 01/12/2018     01/12/2018    CO2 25 01/12/2018    BUN 13 01/12/2018    CREATININE 0.82 01/12/2018    GFRAA >60 01/12/2018    LABGLOM >60 01/12/2018    GLUCOSE 113 01/12/2018    PROT 5.1 01/12/2018    CALCIUM 8.2 01/12/2018 dyslipidemia     RECOMMENDATIONS:  - unfortunately due to the fact the patient has not been revascularized as well as his arm low ejection fraction the patient is at high risk for invasive procedures  - his risk can be slightly minimized there is definitely no general and there is very minimal minimal anesthesia  - Continue holding blood thinners at this time  - GI is following and is currently on a PPI drip  - Blood transfusions when necessary  - We'll follow     Discussed with patient and nursing.     Thank you for allowing me to participate in the care of this patient, please do not hesitate to call if you have any questions.     Kaleb Clarke, [id number] [facility]  [address] [place]  [phone]  )  Induction: intravenous. Anesthetic plan and risks discussed with patient. Narrative     Sinus rhythm with Premature supraventricular complexes  Right bundle branch block  Left anterior fascicular block  ** Bifascicular block **  Minimal voltage criteria for LVH, may be normal variant  T wave abnormality, consider lateral ischemia  Abnormal ECG  When compared with ECG of 14-DEC-2017 02:01,  Premature ventricular complexes are no longer Present  Premature supraventricular complexes are now Present  (RBBB and left anterior fascicular block) is now Present   Scans on Order [id number]     Scan on 1/12/2018 11:53 AM by SCANNING, [facility]       CONCLUSIONS    Summary  LV chamber dimension is mildly dilated with mild left ventricular  hypertrophy. Systolic function is moderately globally reduced with a  calculated EF of 36%. Evidence of diastolic dysfunction. Left atrium is at upper limits of normal with increased LA volume.     Signature  ----------------------------------------------------------------------------   Electronically signed by Parker Morales) on 12/15/2017

## 2018-01-12 NOTE — PROGRESS NOTES
Nutrition Assessment    Type and Reason for Visit: Initial, Positive Nutrition Screen (Weight Loss, Poor Intakes, Difficulty Chewing/Swallowing)    Nutrition Recommendations: Continue Clear Liquid diet as tolerated. Provide Ensure clear liquid ONS. Monitor for chewing/swallowing issues - consult Speech Therapy for a swallow study as needed. Malnutrition Assessment:  · Malnutrition Status: Insufficient data    Nutrition Diagnosis:   · Problem: Inadequate oral intake  · Etiology: related to Insufficient energy/nutrient consumption     Signs and symptoms:  as evidenced by recent start of clear liquid diet    Nutrition Assessment:  · Subjective Assessment: Pt admitted with a low hemoglobin. Pt NPO this morning for an EGD to be completed. Noted pt reports fatigue with c/o black stools x past few days. Family reports occasional dysphagia which they report is d/t medications. Weight loss reported - minimal recent weight changes per EHR. Pt with poor oral intakes PTA. · Wound Type: None  · Current Nutrition Therapies:  · Oral Diet Orders: Clear Liquid   · Oral Diet intake:  (recently started)  · Oral Nutrition Supplement (ONS) Orders: None  · Anthropometric Measures:  · Ht: 5' 9\" (175.3 cm)   · Current Body Wt: 177 lb 7.5 oz (80.5 kg)  · Admission Body Wt: 174 lb (78.9 kg)  · Ideal Body Wt: 159 lb 13.3 oz (72.5 kg), % Ideal Body 111%  · BMI Classification: BMI 25.0 - 29.9 Overweight  · Comparative Standards (Estimated Nutrition Needs):  · Estimated Daily Total Kcal: 3416-6441 kcal  · Estimated Daily Protein (g):  gm protein    Estimated Intake vs Estimated Needs: Intake Less Than Needs    Nutrition Risk Level: High    Nutrition Interventions:   Continue current diet, Start ONS  Continued Inpatient Monitoring, Education Not Indicated    Nutrition Evaluation:   · Evaluation: Goals set   · Goals: Oral intakes to meet at least 50% of estimated nutrient needs.     · Monitoring: Diet Progression, Meal Intake,

## 2018-01-13 LAB
ANION GAP SERPL CALCULATED.3IONS-SCNC: 14 MMOL/L (ref 9–17)
BUN BLDV-MCNC: 8 MG/DL (ref 8–23)
BUN/CREAT BLD: ABNORMAL (ref 9–20)
CALCIUM SERPL-MCNC: 7.9 MG/DL (ref 8.6–10.4)
CHLORIDE BLD-SCNC: 105 MMOL/L (ref 98–107)
CO2: 22 MMOL/L (ref 20–31)
CREAT SERPL-MCNC: 0.83 MG/DL (ref 0.7–1.2)
GFR AFRICAN AMERICAN: >60 ML/MIN
GFR NON-AFRICAN AMERICAN: >60 ML/MIN
GFR SERPL CREATININE-BSD FRML MDRD: ABNORMAL ML/MIN/{1.73_M2}
GFR SERPL CREATININE-BSD FRML MDRD: ABNORMAL ML/MIN/{1.73_M2}
GLUCOSE BLD-MCNC: 104 MG/DL (ref 75–110)
GLUCOSE BLD-MCNC: 104 MG/DL (ref 75–110)
GLUCOSE BLD-MCNC: 108 MG/DL (ref 75–110)
GLUCOSE BLD-MCNC: 234 MG/DL (ref 75–110)
GLUCOSE BLD-MCNC: 94 MG/DL (ref 70–99)
HEMOGLOBIN: 7.6 G/DL (ref 13–17)
HEMOGLOBIN: 7.7 G/DL (ref 13–17)
HEMOGLOBIN: 7.8 G/DL (ref 13–17)
HEMOGLOBIN: 8.3 G/DL (ref 13–17)
POTASSIUM SERPL-SCNC: 3 MMOL/L (ref 3.7–5.3)
SODIUM BLD-SCNC: 141 MMOL/L (ref 135–144)

## 2018-01-13 PROCEDURE — 6370000000 HC RX 637 (ALT 250 FOR IP): Performed by: FAMILY MEDICINE

## 2018-01-13 PROCEDURE — 36415 COLL VENOUS BLD VENIPUNCTURE: CPT

## 2018-01-13 PROCEDURE — 99232 SBSQ HOSP IP/OBS MODERATE 35: CPT | Performed by: INTERNAL MEDICINE

## 2018-01-13 PROCEDURE — 93005 ELECTROCARDIOGRAM TRACING: CPT

## 2018-01-13 PROCEDURE — 85018 HEMOGLOBIN: CPT

## 2018-01-13 PROCEDURE — 6370000000 HC RX 637 (ALT 250 FOR IP): Performed by: NURSE PRACTITIONER

## 2018-01-13 PROCEDURE — 2060000000 HC ICU INTERMEDIATE R&B

## 2018-01-13 PROCEDURE — 94762 N-INVAS EAR/PLS OXIMTRY CONT: CPT

## 2018-01-13 PROCEDURE — 80048 BASIC METABOLIC PNL TOTAL CA: CPT

## 2018-01-13 PROCEDURE — 99232 SBSQ HOSP IP/OBS MODERATE 35: CPT | Performed by: FAMILY MEDICINE

## 2018-01-13 PROCEDURE — 82947 ASSAY GLUCOSE BLOOD QUANT: CPT

## 2018-01-13 PROCEDURE — 6370000000 HC RX 637 (ALT 250 FOR IP): Performed by: INTERNAL MEDICINE

## 2018-01-13 RX ORDER — LOSARTAN POTASSIUM 25 MG/1
25 TABLET ORAL DAILY
Status: DISCONTINUED | OUTPATIENT
Start: 2018-01-14 | End: 2018-01-14 | Stop reason: HOSPADM

## 2018-01-13 RX ADMIN — LOSARTAN POTASSIUM 50 MG: 50 TABLET, FILM COATED ORAL at 12:50

## 2018-01-13 RX ADMIN — CARVEDILOL 12.5 MG: 12.5 TABLET, FILM COATED ORAL at 12:50

## 2018-01-13 RX ADMIN — POLYETHYLENE GLYCOL 3350, SODIUM SULFATE ANHYDROUS, SODIUM BICARBONATE, SODIUM CHLORIDE, POTASSIUM CHLORIDE 2000 ML: 236; 22.74; 6.74; 5.86; 2.97 POWDER, FOR SOLUTION ORAL at 17:59

## 2018-01-13 RX ADMIN — INSULIN LISPRO 2 UNITS: 100 INJECTION, SOLUTION INTRAVENOUS; SUBCUTANEOUS at 18:23

## 2018-01-13 RX ADMIN — SIMVASTATIN 40 MG: 40 TABLET, FILM COATED ORAL at 21:12

## 2018-01-13 RX ADMIN — POTASSIUM CHLORIDE 40 MEQ: 1500 TABLET, EXTENDED RELEASE ORAL at 12:50

## 2018-01-13 RX ADMIN — FUROSEMIDE 20 MG: 20 TABLET ORAL at 12:49

## 2018-01-13 RX ADMIN — MAGNESIUM GLUCONATE 500 MG ORAL TABLET 400 MG: 500 TABLET ORAL at 21:12

## 2018-01-13 RX ADMIN — MAGNESIUM GLUCONATE 500 MG ORAL TABLET 400 MG: 500 TABLET ORAL at 12:49

## 2018-01-13 RX ADMIN — TRAMADOL HYDROCHLORIDE 50 MG: 50 TABLET, FILM COATED ORAL at 12:49

## 2018-01-13 RX ADMIN — FUROSEMIDE 20 MG: 20 TABLET ORAL at 18:00

## 2018-01-13 RX ADMIN — GABAPENTIN 100 MG: 100 CAPSULE ORAL at 12:49

## 2018-01-13 ASSESSMENT — PAIN SCALES - GENERAL: PAINLEVEL_OUTOF10: 6

## 2018-01-13 NOTE — PROGRESS NOTES
>60   --    --    --    --   >60   GFRAA  >60   --    --    --    --   >60   CALCIUM  8.9   --    --    --    --   8.2*   TROPONINI   --   0.04   --   0.02   --    --    LACTACIDWB   --    --   2.9*   --   2.5*   --      Recent Labs      01/12/18   0356  01/12/18   1245  01/12/18   1247  01/12/18   1327  01/12/18   2003   PROT  5.1*   --    --    --    --    LABALBU  2.8*   --    --    --    --    AST  20   --    --    --    --    ALT  13   --    --    --    --    ALKPHOS  30*   --    --    --    --    BILITOT  0.58   --    --    --    --    POCGLU   --   64*  78  125*  180*         No results found for: SPECIAL  No results found for: CULTURE    No results found for: POCPH, PHART, PH, POCPCO2, UQT8YBI, PCO2, POCPO2, PO2ART, PO2, POCHCO3, MZJ2ZVM, HCO3, NBEA, PBEA, BEART, BE, THGBART, THB, TPH7MMH, XJQW1TPR, N7LAGXEM, O2SAT, FIO2    Radiology:    Xr Chest Standard (2 Vw)    Result Date: 1/11/2018  EXAMINATION: TWO VIEWS OF THE CHEST 1/11/2018 6:15 pm COMPARISON: 12/14/2017 HISTORY: ORDERING SYSTEM PROVIDED HISTORY: SOB TECHNOLOGIST PROVIDED HISTORY: Reason for exam:->SOB FINDINGS: Lungs: Clear Mediastinum: Unremarkable Pleura: No pleural effusion or pneumothorax Other: Unremarkable. No acute pulmonary process. Vascular Carotid Duplex Bilateral    Result Date: 12/14/2017    [facility]  Vascular Carotid Procedure   Patient Name  DURAND       Date of Study           12/14/2017                Karel Cowan.    Date of Birth 1945  Gender                  Male   Age           81 year(s)  Race                    Black   Room Number   3011        Height:                 70 inch, 177.8 cm   Corporate ID  [id number]  Weight:                 188 pounds, 85.3 kg  #   Patient Acct  [de-identified]   BSA:        2.03 m^2    BMI:       26.97 kg/m^2  #   MR #          [id number]     Sonographer             David Pod   Accession #   [id number]   Interpreting Physician  Marisol Sharma   Referring !Chronic   ! +------------------------------------+----------+---------------+----------+ ! Sapheno Femoral Junction            ! Yes       ! Yes            ! None      ! +------------------------------------+----------+---------------+----------+ ! PTV                                 ! Yes       ! Yes            ! None      ! +------------------------------------+----------+---------------+----------+ ! Peroneal                            !Partial   !Yes            ! None      ! +------------------------------------+----------+---------------+----------+ ! Gastroc                             ! Yes       ! Yes            ! None      ! +------------------------------------+----------+---------------+----------+ ! GSV Thigh                           ! Yes       ! Yes            ! None      ! +------------------------------------+----------+---------------+----------+ ! GSV Knee                            ! Yes       ! Yes            ! None      ! +------------------------------------+----------+---------------+----------+ ! GSV Ankle                           ! Yes       ! Yes            ! None      ! +------------------------------------+----------+---------------+----------+ ! SSV                                 ! Yes       ! Yes            ! None      ! +------------------------------------+----------+---------------+----------+ Right Doppler Measurements +---------------------------+------+------+--------------------------------+ ! Location                   ! Signal!Reflux! Reflux (msec)                   ! +---------------------------+------+------+--------------------------------+ ! Common Femoral             !Phasic!      !                                ! +---------------------------+------+------+--------------------------------+ ! Prox Femoral               !Phasic!      !                                ! +---------------------------+------+------+--------------------------------+ ! Popliteal                  !Phasic!      ! ! +---------------------------+------+------+--------------------------------+ Left Lower Extremities DVT Study Measurements Left 2D Measurements +------------------------------------+----------+---------------+----------+ ! Location                            ! Visualized! Compressibility! Thrombosis! +------------------------------------+----------+---------------+----------+ ! Common Femoral                      !Yes       ! Yes            ! None      ! +------------------------------------+----------+---------------+----------+ ! Prox Femoral                        !Yes       ! Yes            ! None      ! +------------------------------------+----------+---------------+----------+ ! Mid Femoral                         !Yes       ! Yes            ! None      ! +------------------------------------+----------+---------------+----------+ ! Dist Femoral                        !Yes       ! Yes            ! None      ! +------------------------------------+----------+---------------+----------+ ! Deep Femoral                        !No        !               !          ! +------------------------------------+----------+---------------+----------+ ! Popliteal                           !Yes       ! Yes            ! None      ! +------------------------------------+----------+---------------+----------+ ! Sapheno Femoral Junction            ! Yes       ! Yes            ! None      ! +------------------------------------+----------+---------------+----------+ ! PTV                                 ! Yes       ! Yes            ! None      ! +------------------------------------+----------+---------------+----------+ ! Peroneal                            !No        !               !          ! +------------------------------------+----------+---------------+----------+ ! Gastroc                             ! Yes       ! Yes            ! None      ! +------------------------------------+----------+---------------+----------+ ! TORYV Thigh !Yes       !Yes            ! None      ! +------------------------------------+----------+---------------+----------+ ! GSV Knee                            ! Yes       ! Yes            ! None      ! +------------------------------------+----------+---------------+----------+ ! GSV Ankle                           ! Yes       ! Yes            ! None      ! +------------------------------------+----------+---------------+----------+ ! SSV                                 ! Yes       ! Yes            ! None      ! +------------------------------------+----------+---------------+----------+ Left Doppler Measurements +---------------------------+------+------+--------------------------------+ ! Location                   ! Signal!Reflux! Reflux (msec)                   ! +---------------------------+------+------+--------------------------------+ ! Common Femoral             !Phasic!      !                                ! +---------------------------+------+------+--------------------------------+ ! Prox Femoral               !Phasic!      !                                ! +---------------------------+------+------+--------------------------------+ ! Popliteal                  !Phasic!      !                                ! +---------------------------+------+------+--------------------------------+  Conclusions   Summary   Simultaneous real time imaging utilizing B-Mode, color doppler and  spectral waveform analysis was performed on the bilateral lower  extremities for venous examination of the deep and superficial systems. Findings are:   Right:  Chronic deep venous thrombosis identified in the femoral vein. Chronic deep venous thrombosis identified in the popliteal vein. Left:  No evidence of deep or superficial venous thrombosis.    Signature   ----------------------------------------------------------------  Electronically signed by Ness LeonardoSonographer) on  12/14/2017 01:17 PM !Superficial - Great Saphenous Vein    ! ! Right   ! ! Left!        !     ! +--------------------------------------++--------+-----+----+--------+-----+ ! Location                              ! !Diameter! Depth! !Diameter! Depth! +--------------------------------------++--------+-----+----+--------+-----+ ! Sapheno Femoral Junction              ! !4.78    !     !    !3.05    !     ! +--------------------------------------++--------+-----+----+--------+-----+ ! GSV Mid Thigh                         !!2.05    !     !    !1.49    !     ! +--------------------------------------++--------+-----+----+--------+-----+ ! GSV Knee                              !!1.56    !     !    !0.91    !     ! +--------------------------------------++--------+-----+----+--------+-----+ ! GSV High Calf                         !!1.73    !     !    !0.83    !     ! +--------------------------------------++--------+-----+----+--------+-----+ ! GSV Low Calf                          !!1       !     !    !0.5     !     ! +--------------------------------------++--------+-----+----+--------+-----+ ! GSV Ankle                             ! !1.75    !     !    !0.83    !     ! +--------------------------------------++--------+-----+----+--------+-----+   Conclusions   Summary   Simultaneous real time imaging utilizing B-Mode, color doppler and  spectral waveform analysis was performed on the bilateral lower  extremities for venous examination of vein mapping.  Findings are:   Right:  Superficial veins are patent with size listed above   Left:  Superficial veins are patent with size listed above   Signature   ----------------------------------------------------------------  Electronically signed by Ness LeonardoSonographer) on  12/14/2017 01:11 PM  ----------------------------------------------------------------   ----------------------------------------------------------------  Electronically signed by Bc Torres(Interpreting physician)  on

## 2018-01-13 NOTE — PLAN OF CARE
Yandel Das 21    Second Visit Note  For more detailed information please refer to the progress note of the day      1/13/2018    6:58 PM    Name:   Bar Ramires MRN:     [id number]     Acct:      [de-identified]   Room:   1528/9286-37   Day:  2  Admit Date:  1/11/2018  5:10 PM    PCP:   Dickson Meza MD  Code Status:  Full Code        Pt vitals were reviewed   New labs were reviewed   Patient was seen    Patient was initially refusing to stay for colonoscopy. Patient now is willing to stay.        Updated plan :     - plan for colonoscopy tomorrow         Sahara Gaines MD  1/13/2018  6:58 PM

## 2018-01-14 ENCOUNTER — ANESTHESIA EVENT (OUTPATIENT)
Dept: OPERATING ROOM | Age: 73
DRG: 378 | End: 2018-01-14
Payer: MEDICARE

## 2018-01-14 ENCOUNTER — ANESTHESIA (OUTPATIENT)
Dept: OPERATING ROOM | Age: 73
DRG: 378 | End: 2018-01-14
Payer: MEDICARE

## 2018-01-14 VITALS
OXYGEN SATURATION: 100 % | WEIGHT: 181.4 LBS | HEIGHT: 69 IN | RESPIRATION RATE: 16 BRPM | BODY MASS INDEX: 26.87 KG/M2 | SYSTOLIC BLOOD PRESSURE: 110 MMHG | TEMPERATURE: 98.1 F | HEART RATE: 59 BPM | DIASTOLIC BLOOD PRESSURE: 61 MMHG

## 2018-01-14 VITALS — DIASTOLIC BLOOD PRESSURE: 31 MMHG | OXYGEN SATURATION: 100 % | SYSTOLIC BLOOD PRESSURE: 81 MMHG

## 2018-01-14 LAB
ABSOLUTE EOS #: 0.31 K/UL (ref 0–0.44)
ABSOLUTE IMMATURE GRANULOCYTE: 0.03 K/UL (ref 0–0.3)
ABSOLUTE LYMPH #: 2.09 K/UL (ref 1.1–3.7)
ABSOLUTE MONO #: 1.04 K/UL (ref 0.1–1.2)
ANION GAP SERPL CALCULATED.3IONS-SCNC: 15 MMOL/L (ref 9–17)
BASOPHILS # BLD: 0 % (ref 0–2)
BASOPHILS ABSOLUTE: <0.03 K/UL (ref 0–0.2)
BUN BLDV-MCNC: 6 MG/DL (ref 8–23)
BUN/CREAT BLD: ABNORMAL (ref 9–20)
CALCIUM SERPL-MCNC: 7.9 MG/DL (ref 8.6–10.4)
CHLORIDE BLD-SCNC: 105 MMOL/L (ref 98–107)
CO2: 21 MMOL/L (ref 20–31)
CREAT SERPL-MCNC: 0.77 MG/DL (ref 0.7–1.2)
DIFFERENTIAL TYPE: ABNORMAL
EOSINOPHILS RELATIVE PERCENT: 5 % (ref 1–4)
GFR AFRICAN AMERICAN: >60 ML/MIN
GFR NON-AFRICAN AMERICAN: >60 ML/MIN
GFR SERPL CREATININE-BSD FRML MDRD: ABNORMAL ML/MIN/{1.73_M2}
GFR SERPL CREATININE-BSD FRML MDRD: ABNORMAL ML/MIN/{1.73_M2}
GLUCOSE BLD-MCNC: 117 MG/DL (ref 75–110)
GLUCOSE BLD-MCNC: 86 MG/DL (ref 70–99)
GLUCOSE BLD-MCNC: 93 MG/DL (ref 75–110)
GLUCOSE BLD-MCNC: 94 MG/DL (ref 75–110)
GLUCOSE BLD-MCNC: 94 MG/DL (ref 75–110)
HCT VFR BLD CALC: 26.6 % (ref 40.7–50.3)
HEMOGLOBIN: 7.6 G/DL (ref 13–17)
HEMOGLOBIN: 7.6 G/DL (ref 13–17)
HEMOGLOBIN: 7.8 G/DL (ref 13–17)
HEMOGLOBIN: 7.9 G/DL (ref 13–17)
IMMATURE GRANULOCYTES: 1 %
LYMPHOCYTES # BLD: 32 % (ref 24–43)
MAGNESIUM: 1.6 MG/DL (ref 1.6–2.6)
MCH RBC QN AUTO: 26 PG (ref 25.2–33.5)
MCHC RBC AUTO-ENTMCNC: 29.3 G/DL (ref 28.4–34.8)
MCV RBC AUTO: 88.7 FL (ref 82.6–102.9)
MONOCYTES # BLD: 16 % (ref 3–12)
PDW BLD-RTO: 19.3 % (ref 11.8–14.4)
PLATELET # BLD: 257 K/UL (ref 138–453)
PLATELET ESTIMATE: ABNORMAL
PMV BLD AUTO: 9.9 FL (ref 8.1–13.5)
POTASSIUM SERPL-SCNC: 3.1 MMOL/L (ref 3.7–5.3)
RBC # BLD: 3 M/UL (ref 4.21–5.77)
RBC # BLD: ABNORMAL 10*6/UL
SEG NEUTROPHILS: 47 % (ref 36–65)
SEGMENTED NEUTROPHILS ABSOLUTE COUNT: 3.11 K/UL (ref 1.5–8.1)
SODIUM BLD-SCNC: 141 MMOL/L (ref 135–144)
WBC # BLD: 6.6 K/UL (ref 3.5–11.3)
WBC # BLD: ABNORMAL 10*3/UL

## 2018-01-14 PROCEDURE — 94762 N-INVAS EAR/PLS OXIMTRY CONT: CPT

## 2018-01-14 PROCEDURE — 80048 BASIC METABOLIC PNL TOTAL CA: CPT

## 2018-01-14 PROCEDURE — 0DJD8ZZ INSPECTION OF LOWER INTESTINAL TRACT, VIA NATURAL OR ARTIFICIAL OPENING ENDOSCOPIC: ICD-10-PCS | Performed by: INTERNAL MEDICINE

## 2018-01-14 PROCEDURE — 6370000000 HC RX 637 (ALT 250 FOR IP): Performed by: NURSE PRACTITIONER

## 2018-01-14 PROCEDURE — 99232 SBSQ HOSP IP/OBS MODERATE 35: CPT | Performed by: FAMILY MEDICINE

## 2018-01-14 PROCEDURE — 2500000003 HC RX 250 WO HCPCS: Performed by: NURSE ANESTHETIST, CERTIFIED REGISTERED

## 2018-01-14 PROCEDURE — 99232 SBSQ HOSP IP/OBS MODERATE 35: CPT | Performed by: INTERNAL MEDICINE

## 2018-01-14 PROCEDURE — 3609027000 HC COLONOSCOPY: Performed by: INTERNAL MEDICINE

## 2018-01-14 PROCEDURE — 82947 ASSAY GLUCOSE BLOOD QUANT: CPT

## 2018-01-14 PROCEDURE — 6360000002 HC RX W HCPCS: Performed by: NURSE ANESTHETIST, CERTIFIED REGISTERED

## 2018-01-14 PROCEDURE — 7100000000 HC PACU RECOVERY - FIRST 15 MIN: Performed by: INTERNAL MEDICINE

## 2018-01-14 PROCEDURE — 83735 ASSAY OF MAGNESIUM: CPT

## 2018-01-14 PROCEDURE — 3700000001 HC ADD 15 MINUTES (ANESTHESIA): Performed by: INTERNAL MEDICINE

## 2018-01-14 PROCEDURE — 3700000000 HC ANESTHESIA ATTENDED CARE: Performed by: INTERNAL MEDICINE

## 2018-01-14 PROCEDURE — 7100000001 HC PACU RECOVERY - ADDTL 15 MIN: Performed by: INTERNAL MEDICINE

## 2018-01-14 PROCEDURE — 85018 HEMOGLOBIN: CPT

## 2018-01-14 PROCEDURE — 85025 COMPLETE CBC W/AUTO DIFF WBC: CPT

## 2018-01-14 PROCEDURE — 36415 COLL VENOUS BLD VENIPUNCTURE: CPT

## 2018-01-14 PROCEDURE — 6370000000 HC RX 637 (ALT 250 FOR IP): Performed by: FAMILY MEDICINE

## 2018-01-14 PROCEDURE — 2580000003 HC RX 258: Performed by: NURSE ANESTHETIST, CERTIFIED REGISTERED

## 2018-01-14 PROCEDURE — 6370000000 HC RX 637 (ALT 250 FOR IP): Performed by: INTERNAL MEDICINE

## 2018-01-14 RX ORDER — PROPOFOL 10 MG/ML
INJECTION, EMULSION INTRAVENOUS PRN
Status: DISCONTINUED | OUTPATIENT
Start: 2018-01-14 | End: 2018-01-14 | Stop reason: SDUPTHER

## 2018-01-14 RX ORDER — FUROSEMIDE 20 MG/1
20 TABLET ORAL 2 TIMES DAILY
Qty: 60 TABLET | Refills: 3 | Status: ON HOLD | OUTPATIENT
Start: 2018-01-14 | End: 2019-06-13 | Stop reason: HOSPADM

## 2018-01-14 RX ORDER — MIDAZOLAM HYDROCHLORIDE 1 MG/ML
INJECTION INTRAMUSCULAR; INTRAVENOUS PRN
Status: DISCONTINUED | OUTPATIENT
Start: 2018-01-14 | End: 2018-01-14 | Stop reason: SDUPTHER

## 2018-01-14 RX ORDER — SODIUM CHLORIDE 9 MG/ML
INJECTION, SOLUTION INTRAVENOUS CONTINUOUS PRN
Status: DISCONTINUED | OUTPATIENT
Start: 2018-01-14 | End: 2018-01-14 | Stop reason: SDUPTHER

## 2018-01-14 RX ORDER — LOSARTAN POTASSIUM 25 MG/1
25 TABLET ORAL DAILY
Qty: 30 TABLET | Refills: 3 | Status: ON HOLD | OUTPATIENT
Start: 2018-01-14 | End: 2018-08-26 | Stop reason: HOSPADM

## 2018-01-14 RX ORDER — LIDOCAINE HYDROCHLORIDE 10 MG/ML
INJECTION, SOLUTION INFILTRATION; PERINEURAL PRN
Status: DISCONTINUED | OUTPATIENT
Start: 2018-01-14 | End: 2018-01-14 | Stop reason: SDUPTHER

## 2018-01-14 RX ORDER — FENTANYL CITRATE 50 UG/ML
INJECTION, SOLUTION INTRAMUSCULAR; INTRAVENOUS PRN
Status: DISCONTINUED | OUTPATIENT
Start: 2018-01-14 | End: 2018-01-14 | Stop reason: SDUPTHER

## 2018-01-14 RX ADMIN — SODIUM CHLORIDE: 9 INJECTION, SOLUTION INTRAVENOUS at 14:31

## 2018-01-14 RX ADMIN — LIDOCAINE HYDROCHLORIDE 10 MG: 10 INJECTION, SOLUTION INFILTRATION; PERINEURAL at 14:34

## 2018-01-14 RX ADMIN — POLYETHYLENE GLYCOL 3350, SODIUM SULFATE ANHYDROUS, SODIUM BICARBONATE, SODIUM CHLORIDE, POTASSIUM CHLORIDE 2000 ML: 236; 22.74; 6.74; 5.86; 2.97 POWDER, FOR SOLUTION ORAL at 00:16

## 2018-01-14 RX ADMIN — ZOLPIDEM TARTRATE 5 MG: 5 TABLET ORAL at 03:32

## 2018-01-14 RX ADMIN — FENTANYL CITRATE 100 MCG: 50 INJECTION, SOLUTION INTRAMUSCULAR; INTRAVENOUS at 14:32

## 2018-01-14 RX ADMIN — PROPOFOL 20 MG: 10 INJECTION, EMULSION INTRAVENOUS at 14:38

## 2018-01-14 RX ADMIN — PROPOFOL 20 MG: 10 INJECTION, EMULSION INTRAVENOUS at 14:34

## 2018-01-14 RX ADMIN — MIDAZOLAM HYDROCHLORIDE 2 MG: 1 INJECTION, SOLUTION INTRAMUSCULAR; INTRAVENOUS at 14:32

## 2018-01-14 RX ADMIN — TRAMADOL HYDROCHLORIDE 50 MG: 50 TABLET, FILM COATED ORAL at 00:14

## 2018-01-14 ASSESSMENT — PULMONARY FUNCTION TESTS
PIF_VALUE: 1

## 2018-01-14 ASSESSMENT — PAIN SCALES - GENERAL
PAINLEVEL_OUTOF10: 0
PAINLEVEL_OUTOF10: 0
PAINLEVEL_OUTOF10: 7
PAINLEVEL_OUTOF10: 0

## 2018-01-14 NOTE — FLOWSHEET NOTE
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 Chaplains will remain available to offer spiritual and emotional support as needed.

## 2018-01-14 NOTE — DISCHARGE SUMMARY
normal   Transverse colon: normal   Descending/Sigmoid colon: normal   Rectum/Anus: examined in normal and retroflexed positions and was abnormal:  very minor hemorrhoids   Significant Diagnostic Studies:   Radiology:    Xr Chest Standard (2 Vw)    Result Date: 1/11/2018  EXAMINATION: TWO VIEWS OF THE CHEST 1/11/2018 6:15 pm COMPARISON: 12/14/2017 HISTORY: ORDERING SYSTEM PROVIDED HISTORY: SOB TECHNOLOGIST PROVIDED HISTORY: Reason for exam:->SOB FINDINGS: Lungs: Clear Mediastinum: Unremarkable Pleura: No pleural effusion or pneumothorax Other: Unremarkable. No acute pulmonary process. Consultations:    Consults:     Final Specialist Recommendations/Findings:   IP CONSULT TO HOSPITALIST  IP CONSULT TO GI  IP CONSULT TO HEM/ONC  IP CONSULT TO CARDIOLOGY      The patient was seen and examined on day of discharge and this discharge summary is in conjunction with any daily progress note from day of discharge. Discharge plan:     Disposition: Home    Physician Follow Up:     Candice Quintana MD  [address] [address] [place] [address]  [address]  [phone]    In 1 week  [address] [facility] [address]  [address] [address]  [phone]    In 1 week      Alex Glover MD  [address] [address] [phone]    Schedule an appointment as soon as possible for a visit         Requiring Further Evaluation/Follow Up POST HOSPITALIZATION/Incidental Findings:   Patient need follow up with cardiothoracic surgery for possible.      Diet: cardiac diet    Activity: As tolerated      Discharge Medications:      Medication List      CHANGE how you take these medications    furosemide 20 MG tablet  Commonly known as:  LASIX  Take 1 tablet by mouth 2 times daily  What changed:  · medication strength  · how much to take     losartan 25 MG tablet  Commonly known as:  COZAAR  Take 1 tablet by mouth daily  What changed:  · medication strength  · how much to take        CONTINUE taking these medications    albuterol sulfate  (90 Base) MCG/ACT inhaler     aspirin 81 MG chewable tablet  Take 1 tablet by mouth daily     carvedilol 12.5 MG tablet  Commonly known as:  COREG  Take 1 tablet by mouth 2 times daily (with meals)     famotidine 20 MG tablet  Commonly known as:  PEPCID  Take 1 tablet by mouth 2 times daily     gabapentin 100 MG capsule  Commonly known as:  NEURONTIN     glipiZIDE 5 MG tablet  Commonly known as:  GLUCOTROL     magnesium oxide 400 (241.3 Mg) MG Tabs tablet  Commonly known as:  MAG-OX  Take 1 tablet by mouth 2 times daily     * NONFORMULARY     * NONFORMULARY     * NONFORMULARY     rivaroxaban 20 MG Tabs tablet  Commonly known as:  XARELTO     simvastatin 40 MG tablet  Commonly known as:  ZOCOR     traMADol 50 MG tablet  Commonly known as:  ULTRAM     VITAMIN B 12 PO        * This list has 3 medication(s) that are the same as other medications prescribed for you. Read the directions carefully, and ask your doctor or other care provider to review them with you.             STOP taking these medications    benzonatate 100 MG capsule  Commonly known as:  TESSALON     dexamethasone 4 MG tablet  Commonly known as:  DECADRON     fenofibric acid 35 MG Tabs tablet  Commonly known as:  FIBRICOR     hydrochlorothiazide 12.5 MG capsule  Commonly known as:  MICROZIDE     REVLIMID 10 MG chemo capsule  Generic drug:  lenalidomide           Where to Get Your Medications      These medications were sent to [facility], [address] [place] [address]    Phone:  [phone]   · furosemide 20 MG tablet  · losartan 25 MG tablet         Time Spent on discharge is  34 mins in patient examination, evaluation, counseling as well as medication reconciliation, prescriptions for required medications, discharge plan

## 2018-01-14 NOTE — ANESTHESIA PRE PROCEDURE
Answered  Counseling given: Not Answered      Vital Signs (Current):   Vitals:    01/14/18 0327 01/14/18 0809 01/14/18 1158 01/14/18 1418   BP: (!) 104/47 (!) 106/43 114/62 (!) 80/45   Pulse: 67 66 64 93   Resp: 12 21 13 18   Temp: 98.4 °F (36.9 °C) 98.7 °F (37.1 °C) 98 °F (36.7 °C) 96.8 °F (36 °C)   TempSrc: Oral Oral Oral Temporal   SpO2: 99% 100% 100% 100%   Weight:       Height:                                                  BP Readings from Last 3 Encounters:   01/14/18 (!) 80/45   01/14/18 (!) 84/35   01/12/18 (!) 100/46       NPO Status: Time of last liquid consumption: 1800                        Time of last solid consumption: 1800                        Date of last liquid consumption: 01/11/18                        Date of last solid food consumption: 01/11/18    BMI:   Wt Readings from Last 3 Encounters:   01/13/18 181 lb 6.4 oz (82.3 kg)   12/17/17 174 lb 9.6 oz (79.2 kg)     Body mass index is 26.79 kg/m².     CBC:   Lab Results   Component Value Date    WBC 6.6 01/14/2018    RBC 3.00 01/14/2018    HGB 7.9 01/14/2018    HCT 26.6 01/14/2018    MCV 88.7 01/14/2018    RDW 19.3 01/14/2018     01/14/2018       CMP:   Lab Results   Component Value Date     01/14/2018    K 3.1 01/14/2018     01/14/2018    CO2 21 01/14/2018    BUN 6 01/14/2018    CREATININE 0.77 01/14/2018    GFRAA >60 01/14/2018    LABGLOM >60 01/14/2018    GLUCOSE 86 01/14/2018    PROT 5.1 01/12/2018    CALCIUM 7.9 01/14/2018    BILITOT 0.58 01/12/2018    ALKPHOS 30 01/12/2018    AST 20 01/12/2018    ALT 13 01/12/2018       POC Tests:   Recent Labs      01/14/18   1201   POCGLU  117*       Coags:   Lab Results   Component Value Date    PROTIME 10.8 01/12/2018    INR 1.0 01/12/2018    APTT 26.3 12/15/2017       HCG (If Applicable): No results found for: PREGTESTUR, PREGSERUM, HCG, HCGQUANT     ABGs: No results found for: PHART, PO2ART, OEL5RUO, KNV4KII, BEART, G6MHOBSW     Type & Screen (If Applicable):  No results found

## 2018-01-14 NOTE — PROGRESS NOTES
tomorrow. Discussed with patient and Nurse. Thank you for asking us to see this patient. Autumn Carrel Alkhalili,MD  Cell: [phone]    This note is created with the assistance of a speech recognition program.  While intending to generate a document that actually reflects the content of the visit, the document can still have some errors including those of syntax and sound a like substitutions which may escape proof reading. It such instances, actual meaning can be extrapolated by contextual diversion.

## 2018-01-14 NOTE — PROGRESS NOTES
Yandel Das 21    Progress Note    1/14/2018    7:57 AM    Name:   Kimberly Martinez. MRN:     [id number]     Acct:      [de-identified]   Room:   [address] [place] [address] Day:  3  Admit Date:  1/11/2018  5:10 PM    PCP:   Benitez Dove MD  Code Status:  Full Code    Subjective:     C/C:   Chief Complaint   Patient presents with    Abnormal Lab     sent by physician for hgb 5.9     Interval History Status:     No acute event overnight. Pt asking if he can go home after colonoscopy. Patient denies any chest pain, dizziness, HA, sob. Cardio rec noted: keep Hb > 7.5. Heme/onc : hold AC, lenalidomide and steroid     Brief History:     80 yo m with h/o multiple myeloma, h/o DVT (d/t chemo meds, over a year ago) on xarelto, multivessel CAD on cath on 12/13, combined CHF with EF of 36% was sent to ED due to low hb of 5.9. In ED, Hb rechecked which was 6.7.      Patient has been on xarelto for over a year and patient reports having black stool for a long time. Patient denies any abdominal pain, n/v/d. Patient was taking lenalidomide for multiple myeloma, which was held prior to admission. As patient is having normal platelet and wbc ct, bleeding was suspected. Guaiac was done in ED, which was positive. Transfusion started in ED, and received 2 units of prbc. Hb improved to 7.8 this morning.      Patient has recently had cardiac cath done on 12/13. Found to have severe multi vessel CAD. CTS was consulted at that time. However, CABG was not done during last admission. Patient was supposed to follow up with them in 4 weeks. Pt was not aware of it.    Patient reports intermittent chest pain, which improves with rest.     EGD (1/12/18)  Findings[de-identified]   Esophagus: normal.   Stomach: normal  Duodenum: normal     Recommendations:   -Unremarkable study  -No source of bleeding identified on this study  -Follow Hb  -Ok to start clears and resume medications    Review of Systems:     Constitutional:  negative for chills, fevers, sweats  Respiratory:  negative for cough, dyspnea on exertion, hemoptysis, shortness of breath, wheezing  Cardiovascular:  negative for chest pain, chest pressure/discomfort, lower extremity edema, palpitations  Gastrointestinal:  + black stool, negative for abdominal pain, constipation, diarrhea, nausea, vomiting  Neurological:  negative for dizziness, headache    Medications: Allergies:  No Known Allergies    Current Meds:   Scheduled Meds:    losartan  25 mg Oral Daily    insulin lispro  0-6 Units Subcutaneous TID WC    insulin lispro  0-3 Units Subcutaneous Nightly    potassium chloride  40 mEq Oral Daily    sodium chloride flush  10 mL Intravenous Q12H    carvedilol  12.5 mg Oral BID WC    furosemide  20 mg Oral BID    gabapentin  100 mg Oral Daily    magnesium oxide  400 mg Oral BID    simvastatin  40 mg Oral Nightly    sodium chloride flush  10 mL Intravenous 2 times per day    sodium chloride flush  10 mL Intravenous 2 times per day     Continuous Infusions:    dextrose      sodium chloride 75 mL/hr at 01/11/18 2235     PRN Meds: potassium chloride **OR** potassium chloride **OR** potassium chloride, dextrose, glucose, dextrose, glucagon (rDNA), dextrose, zolpidem, albuterol sulfate HFA, traMADol, sodium chloride flush, acetaminophen, morphine **OR** morphine, ondansetron, potassium chloride, sodium chloride flush    Data:     Past Medical History:   has a past medical history of Arthritis; CAD (coronary artery disease); Cardiomyopathy [facility]); [facility] COPD (chronic obstructive pulmonary disease) ([facility] Diabetes mellitus ([facility] DVT of leg (deep venous thrombosis) ([facility] Hyperlipidemia; Hypertension; Multiple myeloma ([facility] and Neuropathy ([facility] Social History:   reports that he has been smoking. He has never used smokeless tobacco. He reports that he drinks alcohol.  He reports Measurements +---------------------------+------+------+--------------------------------+ ! Location                   ! Signal!Reflux! Reflux (msec)                   ! +---------------------------+------+------+--------------------------------+ ! Common Femoral             !Phasic!      !                                ! +---------------------------+------+------+--------------------------------+ ! Prox Femoral               !Phasic!      !                                ! +---------------------------+------+------+--------------------------------+ ! Popliteal                  !Phasic!      !                                ! +---------------------------+------+------+--------------------------------+ Left Lower Extremities DVT Study Measurements Left 2D Measurements +------------------------------------+----------+---------------+----------+ ! Location                            ! Visualized! Compressibility! Thrombosis! +------------------------------------+----------+---------------+----------+ ! Common Femoral                      !Yes       ! Yes            ! None      ! +------------------------------------+----------+---------------+----------+ ! Prox Femoral                        !Yes       ! Yes            ! None      ! +------------------------------------+----------+---------------+----------+ ! Mid Femoral                         !Yes       ! Yes            ! None      ! +------------------------------------+----------+---------------+----------+ ! Dist Femoral                        !Yes       ! Yes            ! None      ! +------------------------------------+----------+---------------+----------+ ! Deep Femoral                        !No        !               !          ! +------------------------------------+----------+---------------+----------+ ! Popliteal                           !Yes       ! Yes            ! None      ! +------------------------------------+----------+---------------+----------+ ! Sapheno Femoral Junction            ! Yes       ! Yes            ! None      ! +------------------------------------+----------+---------------+----------+ ! PTV                                 ! Yes       ! Yes            ! None      ! +------------------------------------+----------+---------------+----------+ ! Peroneal                            !No        !               !          ! +------------------------------------+----------+---------------+----------+ ! Gastroc                             ! Yes       ! Yes            ! None      ! +------------------------------------+----------+---------------+----------+ ! GSV Thigh                           ! Yes       ! Yes            ! None      ! +------------------------------------+----------+---------------+----------+ ! GSV Knee                            ! Yes       ! Yes            ! None      ! +------------------------------------+----------+---------------+----------+ ! GSV Ankle                           ! Yes       ! Yes            ! None      ! +------------------------------------+----------+---------------+----------+ ! SSV                                 ! Yes       ! Yes            ! None      ! +------------------------------------+----------+---------------+----------+ Left Doppler Measurements +---------------------------+------+------+--------------------------------+ ! Location                   ! Signal!Reflux! Reflux (msec)                   ! +---------------------------+------+------+--------------------------------+ ! Common Femoral             !Phasic!      !                                ! +---------------------------+------+------+--------------------------------+ ! Prox Femoral               !Phasic!      !                                ! +---------------------------+------+------+--------------------------------+ ! Popliteal                  !Phasic!      !                                ! +---------------------------+------+------+--------------------------------+  Conclusions Summary   Simultaneous real time imaging utilizing B-Mode, color doppler and  spectral waveform analysis was performed on the bilateral lower  extremities for venous examination of the deep and superficial systems. Findings are:   Right:  Chronic deep venous thrombosis identified in the femoral vein. Chronic deep venous thrombosis identified in the popliteal vein. Left:  No evidence of deep or superficial venous thrombosis. Signature   ----------------------------------------------------------------  Electronically signed by Ness Calzada(Sonographer) on  12/14/2017 01:17 PM  ----------------------------------------------------------------   ----------------------------------------------------------------  Electronically signed by Bc Torres(Interpreting physician)  on 12/14/2017 03:05 PM  ----------------------------------------------------------------      Vl Vein Mapping Lower Bilateral    Result Date: 12/14/2017    [facility]  Vascular Lower Extremity Vein Mapping Procedure   Patient Name  DURAND       Date of Study           12/14/2017                Roberto Comp. Date of Birth 1945  Gender                  Male   Age           81 year(s)  Race                    Black   Room Number   3011        Height:                 70 inch, 177.8 cm   Corporate ID  [id number]  Weight:                 188 pounds, 85.3 kg  #   Patient Acct  [de-identified]   BSA:        2.03 m^2    BMI:       26.97 kg/m^2  #   MR #          [id number]     Sonographer             Leeann Height   Accession #   [id number]   Interpreting Physician  Anna Moe   Referring                 Referring Physician     Zainab Bahtia CNP  Nurse  Practitioner  Procedure Type of Study:   Veins: Lower Extremity Vein Mapping. Indications for Study:Pre-op [facility].  Risk Factors   - The patient's risk factor(s) include: diet-treated diabetes mellitus and     arterial hypertension.   - The patient has a current/recent (within 1 year) tobacco history. - The patient's last creatinine was 0.9 mg/dl. Findings:   Right                                      Left  The proximal femoral vein appears          Common femoral and superficial  minimally partially compressible with good femoral veins are compressible. recanalization. The common femoral vein appears  compressible. Allergies   - Allergy:Aspirin(Drug). Patient Status: In Patient. Technical Quality:Good visualization. Velocities are measured in cm/s ; Diameters are measured in mm +--------------------------------------++--------+-----+----+--------+-----+ ! Superficial - Great Saphenous Vein    ! ! Right   ! ! Left!        !     ! +--------------------------------------++--------+-----+----+--------+-----+ ! Location                              ! !Diameter! Depth! !Diameter! Depth! +--------------------------------------++--------+-----+----+--------+-----+ ! Sapheno Femoral Junction              ! !4.78    !     !    !3.05    !     ! +--------------------------------------++--------+-----+----+--------+-----+ ! GSV Mid Thigh                         !!2.05    !     !    !1.49    !     ! +--------------------------------------++--------+-----+----+--------+-----+ ! GSV Knee                              !!1.56    !     !    !0.91    !     ! +--------------------------------------++--------+-----+----+--------+-----+ ! GSV High Calf                         !!1.73    !     !    !0.83    !     ! +--------------------------------------++--------+-----+----+--------+-----+ ! GSV Low Calf                          !!1       !     !    !0.5     !     ! +--------------------------------------++--------+-----+----+--------+-----+ ! GSV Ankle                             ! !1.75    !     !    !0.83    !     !  +--------------------------------------++--------+-----+----+--------+-----+   Conclusions   Summary   Simultaneous real time imaging utilizing B-Mode, color doppler and  spectral waveform analysis was performed on the bilateral lower  extremities for venous examination of vein mapping. Findings are:   Right:  Superficial veins are patent with size listed above   Left:  Superficial veins are patent with size listed above   Signature   ----------------------------------------------------------------  Electronically signed by Ness Calzada(Sonographer) on  12/14/2017 01:11 PM  ----------------------------------------------------------------   ----------------------------------------------------------------  Electronically signed by Bc Torres(Interpreting physician)  on 12/14/2017 03:11 PM  ----------------------------------------------------------------        Physical Examination:        General appearance:  alert, cooperative and no distress  Mental Status:  oriented to person, place and time and normal affect  Lungs:  clear to auscultation bilaterally, normal effort  Heart:  regular rate and rhythm, no murmur  Abdomen:  soft, nontender, nondistended, normal bowel sounds, no masses, hepatomegaly, splenomegaly  Extremities:  no edema, redness, tenderness in the calves  Skin:  no gross lesions, rashes, induration    Assessment:        Primary Problem  Low hemoglobin    Active Hospital Problems    Diagnosis Date Noted    Multiple vessel coronary artery disease [I25.10] 12/14/2017     Priority: High    Hypokalemia [E87.6]     Low hemoglobin [D64.9] 01/11/2018    Multiple myel[facility] not having achieved [facility] ([facility] [C90.00]        Plan:        Anemia   - likely from GI bleed. Having black stool.   - received 2units prbc. - lenalidomide held, xarelto, and asa held. - EGD normal, PPI drip stopped   - gentle hydration due to low EF (36%)  - continue to trend H&H- stable       CAD, multivessel disease on cardiac cath on 12/13/17  - supposed to follow up with CTS in 1 month. Refused surgery at last admission. Pt denies refusing surgery.    - cardio consult: keep Hb > 7.5      Multiple myeloma  - pt on

## 2018-01-14 NOTE — PROGRESS NOTES
_    Today's Date: 1/14/2018  Patient Name: Kai Romeo. Date of admission: 1/11/2018  5:10 PM  Patient's age: 81 y.o., 1945  Admission Dx: Anemia [D64.9]    Reason for Consult: management recommendations  Requesting Physician: Garret Young MD    CHIEF COMPLAINT:  Fatigue and black stool    SUBJECTIVE:  The patient was seen and examined. Clinically stable. Continues to have weakness and fatigue. No more black tarry stool. No red blood. He is off anticoagulation. BRIEF CASE HISTORY:    The patient is a 81 y.o.  male who is admitted to the hospital for acute blood loss anemia    Patient has history of multiple myeloma on lenalidomide and steroid, this was complicated by DVT and he has been on Xarelto  He also has a history of ischemic cardiomyopathy and he was evaluated for CABG in December 2017 but he refused and his ejection fraction got better so they didn't proceed with the surgery at that time    Patient was complaining of fatigue for long time however he noticed black stool in the last 2 days. He denied any use of bismuth. He took only 1 pill of iron a day before admission. Denied any fever or any night sweats, weight change, change in bowel habits, hematochezia    He does not use over-the-counter anti-inflammatory medication    We were consulted because of history of multiple myeloma    Past Medical History:   has a past medical history of Arthritis; CAD (coronary artery disease); Cardiomyopathy [facility]); CHF (congestive heart failure) ([facility] COPD (chronic obstructive pulmonary disease) ([facility] Diabetes mellitus ([facility] DVT of leg (deep venous thrombosis) ([facility] Hyperlipidemia; Hypertension; Multiple myeloma ([facility] and Neuropathy ([facility] Past Surgical History:   has a past surgical history that includes Cardiac catheterization (12/13/2017); hernia repair (1967); and Abdomen surgery (2002).      Medications:    Prior to Admission medications    Medication Sig Start Date End

## 2018-01-15 LAB
EKG ATRIAL RATE: 63 BPM
EKG P AXIS: 36 DEGREES
EKG P-R INTERVAL: 140 MS
EKG Q-T INTERVAL: 480 MS
EKG QRS DURATION: 136 MS
EKG QTC CALCULATION (BAZETT): 491 MS
EKG R AXIS: -65 DEGREES
EKG T AXIS: -30 DEGREES
EKG VENTRICULAR RATE: 63 BPM
SURGICAL PATHOLOGY REPORT: NORMAL

## 2018-01-16 LAB — PATHOLOGIST REVIEW: NORMAL

## 2018-01-17 ENCOUNTER — HOSPITAL ENCOUNTER (OUTPATIENT)
Age: 73
Discharge: HOME | End: 2018-01-17
Payer: MEDICARE

## 2018-01-17 DIAGNOSIS — D64.9 LOW HEMOGLOBIN: ICD-10-CM

## 2018-01-17 DIAGNOSIS — D61.811 OTHER DRUG-INDUCED PANCYTOPENIA (HCC): ICD-10-CM

## 2018-01-17 LAB
HCT VFR BLD CALC: 31.4 % (ref 40.7–50.3)
HEMOGLOBIN: 9 G/DL (ref 13–17)
MCH RBC QN AUTO: 25.2 PG (ref 25.2–33.5)
MCHC RBC AUTO-ENTMCNC: 28.7 G/DL (ref 28.4–34.8)
MCV RBC AUTO: 88 FL (ref 82.6–102.9)
NRBC AUTOMATED: 0.4 PER 100 WBC
PDW BLD-RTO: 18.8 % (ref 11.8–14.4)
PLATELET # BLD: 362 K/UL (ref 138–453)
PMV BLD AUTO: 10.3 FL (ref 8.1–13.5)
RBC # BLD: 3.57 M/UL (ref 4.21–5.77)
WBC # BLD: 6.8 K/UL (ref 3.5–11.3)

## 2018-01-17 PROCEDURE — 36415 COLL VENOUS BLD VENIPUNCTURE: CPT

## 2018-01-17 PROCEDURE — 85027 COMPLETE CBC AUTOMATED: CPT

## 2018-01-27 ENCOUNTER — APPOINTMENT (OUTPATIENT)
Dept: GENERAL RADIOLOGY | Age: 73
End: 2018-01-27
Payer: MEDICARE

## 2018-01-27 ENCOUNTER — HOSPITAL ENCOUNTER (EMERGENCY)
Age: 73
Discharge: HOME | End: 2018-01-27
Attending: EMERGENCY MEDICINE
Payer: MEDICARE

## 2018-01-27 VITALS
SYSTOLIC BLOOD PRESSURE: 98 MMHG | HEART RATE: 116 BPM | TEMPERATURE: 100 F | RESPIRATION RATE: 16 BRPM | BODY MASS INDEX: 25.7 KG/M2 | DIASTOLIC BLOOD PRESSURE: 73 MMHG | OXYGEN SATURATION: 96 % | WEIGHT: 174 LBS

## 2018-01-27 DIAGNOSIS — J10.1 INFLUENZA B: Primary | ICD-10-CM

## 2018-01-27 DIAGNOSIS — J20.9 ACUTE BRONCHITIS, UNSPECIFIED ORGANISM: ICD-10-CM

## 2018-01-27 LAB
ABSOLUTE EOS #: 0 K/UL (ref 0–0.44)
ABSOLUTE IMMATURE GRANULOCYTE: 0.21 K/UL (ref 0–0.3)
ABSOLUTE LYMPH #: 1.5 K/UL (ref 1.1–3.7)
ABSOLUTE MONO #: 3 K/UL (ref 0.1–1.2)
ANION GAP SERPL CALCULATED.3IONS-SCNC: 12 MMOL/L (ref 9–17)
BASOPHILS # BLD: 0 % (ref 0–2)
BASOPHILS ABSOLUTE: 0 K/UL (ref 0–0.2)
BUN BLDV-MCNC: 16 MG/DL (ref 8–23)
BUN/CREAT BLD: ABNORMAL (ref 9–20)
CALCIUM SERPL-MCNC: 8.9 MG/DL (ref 8.6–10.4)
CHLORIDE BLD-SCNC: 98 MMOL/L (ref 98–107)
CO2: 28 MMOL/L (ref 20–31)
CREAT SERPL-MCNC: 1.01 MG/DL (ref 0.7–1.2)
DIFFERENTIAL TYPE: ABNORMAL
EOSINOPHILS RELATIVE PERCENT: 0 % (ref 1–4)
GFR AFRICAN AMERICAN: >60 ML/MIN
GFR NON-AFRICAN AMERICAN: >60 ML/MIN
GFR SERPL CREATININE-BSD FRML MDRD: ABNORMAL ML/MIN/{1.73_M2}
GFR SERPL CREATININE-BSD FRML MDRD: ABNORMAL ML/MIN/{1.73_M2}
GLUCOSE BLD-MCNC: 213 MG/DL (ref 70–99)
HCT VFR BLD CALC: 26.2 % (ref 40.7–50.3)
HEMOGLOBIN: 7.5 G/DL (ref 13–17)
IMMATURE GRANULOCYTES: 1 %
LYMPHOCYTES # BLD: 7 % (ref 24–43)
MCH RBC QN AUTO: 24.8 PG (ref 25.2–33.5)
MCHC RBC AUTO-ENTMCNC: 28.6 G/DL (ref 28.4–34.8)
MCV RBC AUTO: 86.8 FL (ref 82.6–102.9)
MONOCYTES # BLD: 14 % (ref 3–12)
MORPHOLOGY: ABNORMAL
NRBC AUTOMATED: 0.1 PER 100 WBC
PDW BLD-RTO: 21.4 % (ref 11.8–14.4)
PLATELET # BLD: 414 K/UL (ref 138–453)
PLATELET ESTIMATE: ABNORMAL
PMV BLD AUTO: 9 FL (ref 8.1–13.5)
POTASSIUM SERPL-SCNC: 3.6 MMOL/L (ref 3.7–5.3)
RBC # BLD: 3.02 M/UL (ref 4.21–5.77)
RBC # BLD: ABNORMAL 10*6/UL
SEG NEUTROPHILS: 78 % (ref 36–65)
SEGMENTED NEUTROPHILS ABSOLUTE COUNT: 16.69 K/UL (ref 1.5–8.1)
SODIUM BLD-SCNC: 138 MMOL/L (ref 135–144)
WBC # BLD: 21.4 K/UL (ref 3.5–11.3)
WBC # BLD: ABNORMAL 10*3/UL

## 2018-01-27 PROCEDURE — 85025 COMPLETE CBC W/AUTO DIFF WBC: CPT

## 2018-01-27 PROCEDURE — 99284 EMERGENCY DEPT VISIT MOD MDM: CPT

## 2018-01-27 PROCEDURE — 2580000003 HC RX 258: Performed by: STUDENT IN AN ORGANIZED HEALTH CARE EDUCATION/TRAINING PROGRAM

## 2018-01-27 PROCEDURE — 71046 X-RAY EXAM CHEST 2 VIEWS: CPT

## 2018-01-27 PROCEDURE — 80048 BASIC METABOLIC PNL TOTAL CA: CPT

## 2018-01-27 PROCEDURE — 6370000000 HC RX 637 (ALT 250 FOR IP): Performed by: STUDENT IN AN ORGANIZED HEALTH CARE EDUCATION/TRAINING PROGRAM

## 2018-01-27 RX ORDER — DIPHENHYDRAMINE HCL 25 MG
25 TABLET ORAL EVERY 6 HOURS PRN
Status: DISCONTINUED | OUTPATIENT
Start: 2018-01-27 | End: 2018-01-27 | Stop reason: HOSPADM

## 2018-01-27 RX ORDER — 0.9 % SODIUM CHLORIDE 0.9 %
1000 INTRAVENOUS SOLUTION INTRAVENOUS ONCE
Status: COMPLETED | OUTPATIENT
Start: 2018-01-27 | End: 2018-01-27

## 2018-01-27 RX ORDER — OSELTAMIVIR PHOSPHATE 75 MG/1
75 CAPSULE ORAL 2 TIMES DAILY
Qty: 10 CAPSULE | Refills: 0 | Status: SHIPPED | OUTPATIENT
Start: 2018-01-27 | End: 2018-02-01

## 2018-01-27 RX ORDER — TRAMADOL HYDROCHLORIDE 50 MG/1
50 TABLET ORAL ONCE
Status: COMPLETED | OUTPATIENT
Start: 2018-01-27 | End: 2018-01-27

## 2018-01-27 RX ORDER — AZITHROMYCIN 250 MG/1
TABLET, FILM COATED ORAL
Qty: 1 PACKET | Refills: 0 | Status: ON HOLD | OUTPATIENT
Start: 2018-01-27 | End: 2018-02-04

## 2018-01-27 RX ORDER — OSELTAMIVIR PHOSPHATE 75 MG/1
75 CAPSULE ORAL ONCE
Status: COMPLETED | OUTPATIENT
Start: 2018-01-27 | End: 2018-01-27

## 2018-01-27 RX ADMIN — SODIUM CHLORIDE 1000 ML: 9 INJECTION, SOLUTION INTRAVENOUS at 12:32

## 2018-01-27 RX ADMIN — TRAMADOL HYDROCHLORIDE 50 MG: 50 TABLET, FILM COATED ORAL at 12:23

## 2018-01-27 RX ADMIN — DIPHENHYDRAMINE HCL 25 MG: 25 TABLET ORAL at 13:07

## 2018-01-27 RX ADMIN — OSELTAMIVIR PHOSPHATE 75 MG: 75 CAPSULE ORAL at 12:23

## 2018-01-27 ASSESSMENT — ENCOUNTER SYMPTOMS
SORE THROAT: 1
APNEA: 0
VOMITING: 0
COUGH: 0
TROUBLE SWALLOWING: 0
WHEEZING: 0
SHORTNESS OF BREATH: 0
ABDOMINAL PAIN: 0
CHEST TIGHTNESS: 0
RHINORRHEA: 1
EYE PAIN: 0
ABDOMINAL DISTENTION: 0
DIARRHEA: 0
CONSTIPATION: 0
BLOOD IN STOOL: 0
NAUSEA: 0

## 2018-01-27 ASSESSMENT — PAIN DESCRIPTION - PROGRESSION: CLINICAL_PROGRESSION: GRADUALLY IMPROVING

## 2018-01-27 ASSESSMENT — PAIN SCALES - GENERAL: PAINLEVEL_OUTOF10: 7

## 2018-01-27 NOTE — ED PROVIDER NOTES
[facility]     Emergency Department     Faculty Attestation    I performed a history and physical examination of the patient and discussed management with the resident. I reviewed the residents note and agree with the documented findings and plan of care. Any areas of disagreement are noted on the chart. I was personally present for the key portions of any procedures. I have documented in the chart those procedures where I was not present during the key portions. I have reviewed the emergency nurses triage note. I agree with the chief complaint, past medical history, past surgical history, allergies, medications, social and family history as documented unless otherwise noted below. For Physician Assistant/ Nurse Practitioner cases/documentation I have personally evaluated this patient and have completed at least one if not all key elements of the E/M (history, physical exam, and MDM). Additional findings are as noted. Patient sent over from the clinic because he has flu and for further workup for chronic anemia. Patient denies any pain. Patient's awake and alert in no distress,'s skin warm and dry, few rhonchi at the bases, no respiratory distress, abdomen soft and nontender, no lower extremity pain or swelling on examination.   Heart exam normal.    Pinky Chung MD  Attending Emergency  Physician              Norm Manzanares MD  01/27/18 [id number]
appointment as soon as possible for a visit in 1 week        DISCHARGE MEDICATIONS:  New Prescriptions    AZITHROMYCIN (ZITHROMAX) 250 MG TABLET    Take 2 tablets on first day, then 1 tablet daily for next 4 days.     OSELTAMIVIR (TAMIFLU) 75 MG CAPSULE    Take 1 capsule by mouth 2 times daily for 5 days       (Please note that portions of this note were completed with a voice recognition program.  Efforts were made to edit the dictations but occasionally words are mis-transcribed.)    Preeti Mckay  Emergency Medicine Resident             Preeti Mckay MD  01/27/18 [id number]

## 2018-02-04 ENCOUNTER — HOSPITAL ENCOUNTER (INPATIENT)
Age: 73
LOS: 2 days | Discharge: HOME | DRG: 683 | End: 2018-02-06
Attending: EMERGENCY MEDICINE | Admitting: FAMILY MEDICINE
Payer: MEDICARE

## 2018-02-04 ENCOUNTER — APPOINTMENT (OUTPATIENT)
Dept: GENERAL RADIOLOGY | Age: 73
DRG: 683 | End: 2018-02-04
Payer: MEDICARE

## 2018-02-04 ENCOUNTER — APPOINTMENT (OUTPATIENT)
Dept: ULTRASOUND IMAGING | Age: 73
DRG: 683 | End: 2018-02-04
Payer: MEDICARE

## 2018-02-04 DIAGNOSIS — E87.6 HYPOKALEMIA: ICD-10-CM

## 2018-02-04 DIAGNOSIS — N17.9 AKI (ACUTE KIDNEY INJURY) (HCC): Primary | ICD-10-CM

## 2018-02-04 DIAGNOSIS — R06.02 SHORTNESS OF BREATH: ICD-10-CM

## 2018-02-04 LAB
-: ABNORMAL
ABSOLUTE EOS #: 0.08 K/UL (ref 0–0.4)
ABSOLUTE IMMATURE GRANULOCYTE: 0.08 K/UL (ref 0–0.3)
ABSOLUTE LYMPH #: 2.64 K/UL (ref 1–4.8)
ABSOLUTE MONO #: 0.64 K/UL (ref 0.1–0.8)
AMORPHOUS: ABNORMAL
ANION GAP SERPL CALCULATED.3IONS-SCNC: 20 MMOL/L (ref 9–17)
BACTERIA: ABNORMAL
BASOPHILS # BLD: 0 % (ref 0–2)
BASOPHILS ABSOLUTE: 0 K/UL (ref 0–0.2)
BILIRUBIN URINE: NEGATIVE
BUN BLDV-MCNC: 22 MG/DL (ref 8–23)
BUN/CREAT BLD: ABNORMAL (ref 9–20)
CALCIUM SERPL-MCNC: 10.5 MG/DL (ref 8.6–10.4)
CASTS UA: ABNORMAL /LPF (ref 0–8)
CHLORIDE BLD-SCNC: 93 MMOL/L (ref 98–107)
CO2: 26 MMOL/L (ref 20–31)
COLOR: ABNORMAL
CREAT SERPL-MCNC: 1.34 MG/DL (ref 0.7–1.2)
CRYSTALS, UA: ABNORMAL /HPF
DIFFERENTIAL TYPE: ABNORMAL
EOSINOPHILS RELATIVE PERCENT: 1 % (ref 1–4)
EPITHELIAL CELLS UA: ABNORMAL /HPF (ref 0–5)
GFR AFRICAN AMERICAN: >60 ML/MIN
GFR NON-AFRICAN AMERICAN: 53 ML/MIN
GFR SERPL CREATININE-BSD FRML MDRD: ABNORMAL ML/MIN/{1.73_M2}
GFR SERPL CREATININE-BSD FRML MDRD: ABNORMAL ML/MIN/{1.73_M2}
GLUCOSE BLD-MCNC: 205 MG/DL (ref 70–99)
GLUCOSE URINE: ABNORMAL
HCT VFR BLD CALC: 30.9 % (ref 40.7–50.3)
HEMOGLOBIN: 9 G/DL (ref 13–17)
IMMATURE GRANULOCYTES: 1 %
KETONES, URINE: NEGATIVE
LEUKOCYTE ESTERASE, URINE: NEGATIVE
LYMPHOCYTES # BLD: 33 % (ref 24–44)
MAGNESIUM: 1.7 MG/DL (ref 1.6–2.6)
MCH RBC QN AUTO: 24.2 PG (ref 25.2–33.5)
MCHC RBC AUTO-ENTMCNC: 29.1 G/DL (ref 28.4–34.8)
MCV RBC AUTO: 83.1 FL (ref 82.6–102.9)
MONOCYTES # BLD: 8 % (ref 1–7)
MORPHOLOGY: ABNORMAL
MUCUS: ABNORMAL
MYOGLOBIN: 41 NG/ML (ref 28–72)
MYOGLOBIN: 59 NG/ML (ref 28–72)
NITRITE, URINE: NEGATIVE
NRBC AUTOMATED: 0.2 PER 100 WBC
OTHER OBSERVATIONS UA: ABNORMAL
PDW BLD-RTO: 20.1 % (ref 11.8–14.4)
PH UA: 5 (ref 5–8)
PLATELET # BLD: 491 K/UL (ref 138–453)
PLATELET ESTIMATE: ABNORMAL
PMV BLD AUTO: 10.1 FL (ref 8.1–13.5)
POC TROPONIN I: 0.05 NG/ML (ref 0–0.1)
POC TROPONIN INTERP: NORMAL
POTASSIUM SERPL-SCNC: 3 MMOL/L (ref 3.7–5.3)
PROTEIN UA: NEGATIVE
RBC # BLD: 3.72 M/UL (ref 4.21–5.77)
RBC # BLD: ABNORMAL 10*6/UL
RBC UA: ABNORMAL /HPF (ref 0–4)
RENAL EPITHELIAL, UA: ABNORMAL /HPF
SEG NEUTROPHILS: 57 % (ref 36–66)
SEGMENTED NEUTROPHILS ABSOLUTE COUNT: 4.56 K/UL (ref 1.8–7.7)
SODIUM BLD-SCNC: 139 MMOL/L (ref 135–144)
SODIUM,UR: <20 MMOL/L
SPECIFIC GRAVITY UA: 1.02 (ref 1–1.03)
TOTAL PROTEIN, URINE: 16 MG/DL
TRICHOMONAS: ABNORMAL
TROPONIN INTERP: NORMAL
TROPONIN INTERP: NORMAL
TROPONIN T: <0.03 NG/ML
TROPONIN T: <0.03 NG/ML
TURBIDITY: ABNORMAL
URINE HGB: NEGATIVE
UROBILINOGEN, URINE: NORMAL
WBC # BLD: 8 K/UL (ref 3.5–11.3)
WBC # BLD: ABNORMAL 10*3/UL
WBC UA: ABNORMAL /HPF (ref 0–5)
YEAST: ABNORMAL

## 2018-02-04 PROCEDURE — 96375 TX/PRO/DX INJ NEW DRUG ADDON: CPT

## 2018-02-04 PROCEDURE — 71045 X-RAY EXAM CHEST 1 VIEW: CPT

## 2018-02-04 PROCEDURE — 83874 ASSAY OF MYOGLOBIN: CPT

## 2018-02-04 PROCEDURE — 80048 BASIC METABOLIC PNL TOTAL CA: CPT

## 2018-02-04 PROCEDURE — 6360000002 HC RX W HCPCS: Performed by: STUDENT IN AN ORGANIZED HEALTH CARE EDUCATION/TRAINING PROGRAM

## 2018-02-04 PROCEDURE — 84300 ASSAY OF URINE SODIUM: CPT

## 2018-02-04 PROCEDURE — 81001 URINALYSIS AUTO W/SCOPE: CPT

## 2018-02-04 PROCEDURE — 83735 ASSAY OF MAGNESIUM: CPT

## 2018-02-04 PROCEDURE — 96374 THER/PROPH/DIAG INJ IV PUSH: CPT

## 2018-02-04 PROCEDURE — 96376 TX/PRO/DX INJ SAME DRUG ADON: CPT

## 2018-02-04 PROCEDURE — 76770 US EXAM ABDO BACK WALL COMP: CPT

## 2018-02-04 PROCEDURE — 6370000000 HC RX 637 (ALT 250 FOR IP): Performed by: FAMILY MEDICINE

## 2018-02-04 PROCEDURE — 84484 ASSAY OF TROPONIN QUANT: CPT

## 2018-02-04 PROCEDURE — 85025 COMPLETE CBC W/AUTO DIFF WBC: CPT

## 2018-02-04 PROCEDURE — 2580000003 HC RX 258: Performed by: STUDENT IN AN ORGANIZED HEALTH CARE EDUCATION/TRAINING PROGRAM

## 2018-02-04 PROCEDURE — 99285 EMERGENCY DEPT VISIT HI MDM: CPT

## 2018-02-04 PROCEDURE — 93005 ELECTROCARDIOGRAM TRACING: CPT

## 2018-02-04 PROCEDURE — 99223 1ST HOSP IP/OBS HIGH 75: CPT | Performed by: FAMILY MEDICINE

## 2018-02-04 PROCEDURE — 1200000000 HC SEMI PRIVATE

## 2018-02-04 PROCEDURE — 36415 COLL VENOUS BLD VENIPUNCTURE: CPT

## 2018-02-04 PROCEDURE — 2580000003 HC RX 258: Performed by: FAMILY MEDICINE

## 2018-02-04 PROCEDURE — 84156 ASSAY OF PROTEIN URINE: CPT

## 2018-02-04 PROCEDURE — 6370000000 HC RX 637 (ALT 250 FOR IP): Performed by: STUDENT IN AN ORGANIZED HEALTH CARE EDUCATION/TRAINING PROGRAM

## 2018-02-04 RX ORDER — CARVEDILOL 6.25 MG/1
6.25 TABLET ORAL 2 TIMES DAILY WITH MEALS
Status: DISCONTINUED | OUTPATIENT
Start: 2018-02-04 | End: 2018-02-06 | Stop reason: HOSPADM

## 2018-02-04 RX ORDER — GABAPENTIN 100 MG/1
100 CAPSULE ORAL DAILY
Status: DISCONTINUED | OUTPATIENT
Start: 2018-02-04 | End: 2018-02-06 | Stop reason: HOSPADM

## 2018-02-04 RX ORDER — MORPHINE SULFATE 2 MG/ML
2 INJECTION, SOLUTION INTRAMUSCULAR; INTRAVENOUS ONCE
Status: COMPLETED | OUTPATIENT
Start: 2018-02-04 | End: 2018-02-04

## 2018-02-04 RX ORDER — CHOLECALCIFEROL (VITAMIN D3) 125 MCG
1000 CAPSULE ORAL DAILY
Status: DISCONTINUED | OUTPATIENT
Start: 2018-02-04 | End: 2018-02-06 | Stop reason: HOSPADM

## 2018-02-04 RX ORDER — TRAMADOL HYDROCHLORIDE 50 MG/1
50 TABLET ORAL DAILY PRN
Status: DISCONTINUED | OUTPATIENT
Start: 2018-02-04 | End: 2018-02-04 | Stop reason: SDUPTHER

## 2018-02-04 RX ORDER — GLIPIZIDE 5 MG/1
5 TABLET ORAL
Status: DISCONTINUED | OUTPATIENT
Start: 2018-02-05 | End: 2018-02-06 | Stop reason: HOSPADM

## 2018-02-04 RX ORDER — SODIUM CHLORIDE 0.9 % (FLUSH) 0.9 %
10 SYRINGE (ML) INJECTION EVERY 12 HOURS SCHEDULED
Status: DISCONTINUED | OUTPATIENT
Start: 2018-02-04 | End: 2018-02-06 | Stop reason: HOSPADM

## 2018-02-04 RX ORDER — DIPHENHYDRAMINE HYDROCHLORIDE 50 MG/ML
12.5 INJECTION INTRAMUSCULAR; INTRAVENOUS ONCE
Status: COMPLETED | OUTPATIENT
Start: 2018-02-04 | End: 2018-02-04

## 2018-02-04 RX ORDER — POTASSIUM CHLORIDE 20MEQ/15ML
40 LIQUID (ML) ORAL PRN
Status: DISCONTINUED | OUTPATIENT
Start: 2018-02-04 | End: 2018-02-06 | Stop reason: HOSPADM

## 2018-02-04 RX ORDER — SIMVASTATIN 40 MG
40 TABLET ORAL NIGHTLY
Status: DISCONTINUED | OUTPATIENT
Start: 2018-02-04 | End: 2018-02-06 | Stop reason: HOSPADM

## 2018-02-04 RX ORDER — POTASSIUM CHLORIDE 20 MEQ/1
40 TABLET, EXTENDED RELEASE ORAL ONCE
Status: COMPLETED | OUTPATIENT
Start: 2018-02-04 | End: 2018-02-04

## 2018-02-04 RX ORDER — SODIUM CHLORIDE 0.9 % (FLUSH) 0.9 %
10 SYRINGE (ML) INJECTION PRN
Status: DISCONTINUED | OUTPATIENT
Start: 2018-02-04 | End: 2018-02-06 | Stop reason: HOSPADM

## 2018-02-04 RX ORDER — ACETAMINOPHEN 325 MG/1
650 TABLET ORAL EVERY 4 HOURS PRN
Status: DISCONTINUED | OUTPATIENT
Start: 2018-02-04 | End: 2018-02-06 | Stop reason: HOSPADM

## 2018-02-04 RX ORDER — POTASSIUM CHLORIDE 20 MEQ/1
40 TABLET, EXTENDED RELEASE ORAL PRN
Status: DISCONTINUED | OUTPATIENT
Start: 2018-02-04 | End: 2018-02-06 | Stop reason: HOSPADM

## 2018-02-04 RX ORDER — 0.9 % SODIUM CHLORIDE 0.9 %
1000 INTRAVENOUS SOLUTION INTRAVENOUS ONCE
Status: COMPLETED | OUTPATIENT
Start: 2018-02-04 | End: 2018-02-04

## 2018-02-04 RX ORDER — 0.9 % SODIUM CHLORIDE 0.9 %
500 INTRAVENOUS SOLUTION INTRAVENOUS ONCE
Status: DISCONTINUED | OUTPATIENT
Start: 2018-02-04 | End: 2018-02-04

## 2018-02-04 RX ORDER — TRAMADOL HYDROCHLORIDE 50 MG/1
100 TABLET ORAL EVERY 6 HOURS PRN
Status: DISCONTINUED | OUTPATIENT
Start: 2018-02-04 | End: 2018-02-06 | Stop reason: HOSPADM

## 2018-02-04 RX ORDER — NICOTINE 21 MG/24HR
1 PATCH, TRANSDERMAL 24 HOURS TRANSDERMAL DAILY PRN
Status: DISCONTINUED | OUTPATIENT
Start: 2018-02-04 | End: 2018-02-06 | Stop reason: HOSPADM

## 2018-02-04 RX ORDER — SODIUM POLYSTYRENE SULFONATE 15 G/60ML
15 SUSPENSION ORAL; RECTAL
Status: DISPENSED | OUTPATIENT
Start: 2018-02-04 | End: 2018-02-04

## 2018-02-04 RX ORDER — SODIUM POLYSTYRENE SULFONATE 15 G/60ML
30 SUSPENSION ORAL; RECTAL
Status: DISPENSED | OUTPATIENT
Start: 2018-02-04 | End: 2018-02-04

## 2018-02-04 RX ORDER — ONDANSETRON 2 MG/ML
4 INJECTION INTRAMUSCULAR; INTRAVENOUS EVERY 6 HOURS PRN
Status: DISCONTINUED | OUTPATIENT
Start: 2018-02-04 | End: 2018-02-06 | Stop reason: HOSPADM

## 2018-02-04 RX ORDER — MORPHINE SULFATE 2 MG/ML
1 INJECTION, SOLUTION INTRAMUSCULAR; INTRAVENOUS
Status: DISCONTINUED | OUTPATIENT
Start: 2018-02-04 | End: 2018-02-04

## 2018-02-04 RX ORDER — ASPIRIN 81 MG/1
81 TABLET, CHEWABLE ORAL DAILY
Status: DISCONTINUED | OUTPATIENT
Start: 2018-02-04 | End: 2018-02-06 | Stop reason: HOSPADM

## 2018-02-04 RX ORDER — POTASSIUM CHLORIDE 7.45 MG/ML
10 INJECTION INTRAVENOUS PRN
Status: DISCONTINUED | OUTPATIENT
Start: 2018-02-04 | End: 2018-02-06 | Stop reason: HOSPADM

## 2018-02-04 RX ORDER — FAMOTIDINE 20 MG/1
20 TABLET, FILM COATED ORAL 2 TIMES DAILY
Status: DISCONTINUED | OUTPATIENT
Start: 2018-02-04 | End: 2018-02-05

## 2018-02-04 RX ORDER — CARVEDILOL 12.5 MG/1
12.5 TABLET ORAL 2 TIMES DAILY WITH MEALS
Status: DISCONTINUED | OUTPATIENT
Start: 2018-02-04 | End: 2018-02-04

## 2018-02-04 RX ORDER — ONDANSETRON 2 MG/ML
4 INJECTION INTRAMUSCULAR; INTRAVENOUS EVERY 8 HOURS PRN
Status: DISCONTINUED | OUTPATIENT
Start: 2018-02-04 | End: 2018-02-04

## 2018-02-04 RX ORDER — SODIUM CHLORIDE 9 MG/ML
INJECTION, SOLUTION INTRAVENOUS CONTINUOUS
Status: DISCONTINUED | OUTPATIENT
Start: 2018-02-04 | End: 2018-02-06

## 2018-02-04 RX ORDER — DOCUSATE SODIUM 100 MG/1
100 CAPSULE, LIQUID FILLED ORAL 2 TIMES DAILY
Status: DISCONTINUED | OUTPATIENT
Start: 2018-02-04 | End: 2018-02-06 | Stop reason: HOSPADM

## 2018-02-04 RX ORDER — TRAMADOL HYDROCHLORIDE 50 MG/1
50 TABLET ORAL EVERY 6 HOURS PRN
Status: DISCONTINUED | OUTPATIENT
Start: 2018-02-04 | End: 2018-02-06 | Stop reason: HOSPADM

## 2018-02-04 RX ADMIN — POTASSIUM CHLORIDE 40 MEQ: 1500 TABLET, EXTENDED RELEASE ORAL at 13:44

## 2018-02-04 RX ADMIN — FAMOTIDINE 20 MG: 20 TABLET, FILM COATED ORAL at 21:11

## 2018-02-04 RX ADMIN — TRAMADOL HYDROCHLORIDE 100 MG: 50 TABLET, FILM COATED ORAL at 21:18

## 2018-02-04 RX ADMIN — MAGNESIUM GLUCONATE 500 MG ORAL TABLET 400 MG: 500 TABLET ORAL at 21:11

## 2018-02-04 RX ADMIN — MORPHINE SULFATE 1 MG: 2 INJECTION, SOLUTION INTRAMUSCULAR; INTRAVENOUS at 13:10

## 2018-02-04 RX ADMIN — SODIUM CHLORIDE 500 ML: 9 INJECTION, SOLUTION INTRAVENOUS at 13:09

## 2018-02-04 RX ADMIN — ONDANSETRON 4 MG: 2 INJECTION, SOLUTION INTRAMUSCULAR; INTRAVENOUS at 13:44

## 2018-02-04 RX ADMIN — CARVEDILOL 6.25 MG: 6.25 TABLET, FILM COATED ORAL at 18:14

## 2018-02-04 RX ADMIN — SIMVASTATIN 40 MG: 40 TABLET, FILM COATED ORAL at 21:11

## 2018-02-04 RX ADMIN — RIVAROXABAN 20 MG: 20 TABLET, FILM COATED ORAL at 18:14

## 2018-02-04 RX ADMIN — DIPHENHYDRAMINE HYDROCHLORIDE 12.5 MG: 50 INJECTION, SOLUTION INTRAMUSCULAR; INTRAVENOUS at 14:41

## 2018-02-04 RX ADMIN — MORPHINE SULFATE 2 MG: 2 INJECTION, SOLUTION INTRAMUSCULAR; INTRAVENOUS at 13:44

## 2018-02-04 RX ADMIN — SODIUM CHLORIDE: 9 INJECTION, SOLUTION INTRAVENOUS at 15:15

## 2018-02-04 ASSESSMENT — ENCOUNTER SYMPTOMS
SINUS PRESSURE: 0
CHOKING: 0
APNEA: 0
VOMITING: 0
PHOTOPHOBIA: 0
SORE THROAT: 0
COUGH: 1
VOICE CHANGE: 0
FACIAL SWELLING: 0
SINUS PAIN: 0
ABDOMINAL DISTENTION: 0
BLOOD IN STOOL: 0
DIARRHEA: 1
COUGH: 0
NAUSEA: 0
CONSTIPATION: 0
CHEST TIGHTNESS: 0
WHEEZING: 0
DIARRHEA: 0
SHORTNESS OF BREATH: 1
ABDOMINAL PAIN: 0

## 2018-02-04 ASSESSMENT — PAIN SCALES - GENERAL
PAINLEVEL_OUTOF10: 9
PAINLEVEL_OUTOF10: 7

## 2018-02-04 ASSESSMENT — PAIN DESCRIPTION - LOCATION: LOCATION: CHEST

## 2018-02-04 ASSESSMENT — PAIN DESCRIPTION - ORIENTATION: ORIENTATION: MID;LEFT

## 2018-02-04 ASSESSMENT — PAIN DESCRIPTION - PAIN TYPE: TYPE: ACUTE PAIN

## 2018-02-04 ASSESSMENT — PAIN DESCRIPTION - FREQUENCY: FREQUENCY: CONTINUOUS

## 2018-02-04 ASSESSMENT — PAIN DESCRIPTION - ONSET: ONSET: PROGRESSIVE

## 2018-02-04 ASSESSMENT — PAIN DESCRIPTION - DESCRIPTORS: DESCRIPTORS: DISCOMFORT

## 2018-02-04 NOTE — H&P
Yandel Das 21    HISTORY AND PHYSICAL EXAMINATION            Date:   2/4/2018  Patient name:  Freedom Mckinney. Date of admission:  2/4/2018 11:57 AM  MRN:   [id number]  Account:  [de-identified]  YOB: 1945  PCP:    Jag Bell MD  Room:   SAMANTHA/SAMANTHA  Code Status:    Prior    Chief Complaint:     Chief Complaint   Patient presents with    Shortness of Breath    Chest Pain       History Obtained From:     patient, electronic medical record    History of Present Illness: The patient is a 81 y.o. Non-/non  male who presents with Shortness of Breath and Chest Pain   and he is admitted to the hospital for the management of  Shortness of breath. Patient came to emergency room with fatigue, exertional dyspnea. He was recently diagnosed with influenza B infection and was discharged home on Tamiflu and Z-Adolfo. For the last few days patient was feeling fatigued and a decrease appetite and increased shortness of breath on mild to moderate exertion . Patient had poor oral intake. Patient denies any chest pain to me . He also had lower ext edema . Patient has known history of multivessel coronary artery disease and was recommended CABG previously in Dec 2017 and  Later decided  To have medical management . Patient also has history of multiple myeloma and is on treatment. He was also recently admitted for anemia and got 2 unit blood transfusion. Patient had EGD and colonoscopy and no source of bleeding was observed. Initial evaluation showed temperature 98.3, tachycardia 110, hypotension 109/55. Lab work showed hypokalemia 3.0, elevated creatinine 1.34, glucose 205, normal white count 8.0, hemoglobin low 9.0. Chest x-ray was negative for pneumothorax, pneumonia, pulmonary congestion. .    Past Medical History:     Past Medical History:   Diagnosis Date    Arthritis     all over    CAD (coronary artery disease)     Cardiomyopathy [facility] [address]     CHF (congestive heart failure) (HCC)     COPD (chronic obstructive pulmonary disease) (HCC)     Diabetes mellitus ([facility] [address]     DVT of leg (deep venous thrombosis) ([facility] [address]     Hyperlipidemia     Hypertension     Multiple myeloma (HCC)     Neuropathy ([facility] [address]         Past Surgical History:     Past Surgical History:   Procedure Laterality Date    ABDOMEN SURGERY  2002    CARDIAC CATHETERIZATION  12/13/2017    right and left heart cath with Dr. Americo Block FLX DX W/TRAMAINE [place] 1978 PFRMD N/A 1/14/2018    COLONOSCOPY performed by Bruno Terry MD at [address] [facility] ESOPHAGOGASTRODUODENOSCOPY TRANSORAL DIAGNOSTIC N/A 1/12/2018    EGD DIAGNOSTIC ONLY performed by Lakisha Herman MD at [place] [address]        Medications Prior to Admission:     Prior to Admission medications    Medication Sig Start Date End Date Taking? Authorizing Provider   azithromycin (ZITHROMAX) 250 MG tablet Take 2 tablets on first day, then 1 tablet daily for next 4 days. 1/27/18   Sveta Schmitz MD   losartan (COZAAR) 25 MG tablet Take 1 tablet by mouth daily 1/14/18   Andres Palma MD   furosemide (LASIX) 20 MG tablet Take 1 tablet by mouth 2 times daily 1/14/18   Andres Palma MD   albuterol sulfate  (90 Base) MCG/ACT inhaler Inhale 1 puff into the lungs every 6 hours as needed for Wheezing    Historical Provider, MD   gabapentin (NEURONTIN) 100 MG capsule Take 100 mg by mouth daily.     Historical Provider, MD   aspirin 81 MG chewable tablet Take 1 tablet by mouth daily 12/18/17   Tanner Jean-Baptiste MD   carvedilol (COREG) 12.5 MG tablet Take 1 tablet by mouth 2 times daily (with meals) 12/17/17   Tanner Jean-Baptiste MD   famotidine (PEPCID) 20 MG tablet Take 1 tablet by mouth 2 times daily 12/17/17   Tanner Jean-Baptiste MD   magnesium oxide (MAG-OX) 400 (241.3 Mg) MG TABS tablet Take 1 tablet by mouth 2 times daily 12/17/17   Tanner Jean-Baptiste MD   simvastatin (ZOCOR) 40 MG tablet Take 40 mg by mouth light-headedness and headaches. Physical Exam:   BP (!) 122/104   Pulse 103   Temp (S) 98.3 °F (36.8 °C) (Oral)   Resp 18   SpO2 96%   Temp (24hrs), Av.3 °F (36.8 °C), Min:98.3 °F (36.8 °C), Max:98.3 °F (36.8 °C)    No results for input(s): POCGLU in the last 72 hours. No intake or output data in the 24 hours ending 18 1447  Physical Exam   Constitutional: He is oriented to person, place, and time. He appears well-developed and well-nourished. No distress. HENT:   Mouth/Throat: Oropharynx is clear and moist. No oropharyngeal exudate. Eyes: Pupils are equal, round, and reactive to light. No scleral icterus. Neck: Neck supple. No JVD present. No tracheal deviation present. No thyromegaly present. Cardiovascular: Normal rate, regular rhythm, normal heart sounds and intact distal pulses. Exam reveals no gallop and no friction rub. No murmur heard. Pulmonary/Chest: Effort normal and breath sounds normal. No respiratory distress. He has no wheezes. He has no rales. He exhibits no tenderness. Abdominal: Soft. Bowel sounds are normal. He exhibits no mass. There is no tenderness. There is no rebound and no guarding. Lymphadenopathy:     He has no cervical adenopathy. Neurological: He is alert and oriented to person, place, and time. No cranial nerve deficit. He exhibits normal muscle tone. Skin: Skin is warm. No rash noted. He is not diaphoretic. Psychiatric: He has a normal mood and affect. His behavior is normal.   Nursing note and vitals reviewed. Lower extremity - right ankle edema . Investigations:      Laboratory Testing:  Recent Results (from the past 24 hour(s))   POCT troponin    Collection Time: 18 12:20 PM   Result Value Ref Range    POC Troponin I 0.05 0.00 - 0.10 ng/mL    POC Troponin Interp       The Troponin-I (POC) results cannot be compared to the Troponin-T results.    CBC WITH AUTO DIFFERENTIAL    Collection Time: 18 12:35 PM   Result Value Ref 12/15/17-LV dilation with LVH, decreased LVEF 36%, evidence of diastolic dysfunction.     CATH 12/13/17:   Findings:     LMCA: Mild irregularities 10-20%. LAD: 70% heavily calcified stenosis, FFR 0.79       LCx: 80% long segment stenosis  OM1: small with proximal hazy 80% stenosis. RCA: 100% proximal stenosis, left to right collaterals.      The LV gram was performed in the BAE 30 position. LVEF: 20%. LV Wall Motion: inferior wall severe hypokinesis with moderate hypokinesis of the anterior wall. Summary -   Severe MVD. EF 20%. Severely elevated left sided filling pressures. Moderate PTN. Normal CO/CI. Colonoscopy 1/14/18  Findings:  Terminal ileum: normal for the last 3-4 inches      Cecum/Ascending colon: normal     Transverse colon: normal     Descending/Sigmoid colon: normal     Rectum/Anus: examined in normal and retroflexed positions and was abnormal:  very minor hemorrhoids     EGD 1/12/18  Findings[de-identified]   Esophagus: normal.   Stomach: normal  Duodenum: normal    Carotid Doppler 12/14/17-mild stenosis bilateral.  Assessment :      Primary Problem  RANDALL (acute kidney injury) [facility])    Active Hospital Problems    Diagnosis Date Noted    Multiple vessel coronary artery disease [I25.10] 12/14/2017     Priority: High    RANDALL (acute kidney injury) ([facility] [address] [N17.9] 02/04/2018    Multiple myeloma not having achieved remission ([facility] [address] [C90.00]     Chronic obstructive pulmonary disease ([facility] [address] [J44.9]        Plan:     Patient status Admit as inpatient in the  Med/Surge    1. Acute kidney injury - likely secondary to dehydration   ( hypotension, tachycardia) likely secondary to recent flu - gentle hydration . Hold losartan and lasix . Check kidney ultrasound, urine lites . 2. Hypotension - decrease Coreg dose. 3. Multivessel CAD - cardiology evaluation. 4. Ischemic cardiomyopathy - EF 35 % - monitor for signs of CHF. Strict I's and O's  5. Hypokalemia - replace   6.  IgG, Kappa multiple myeloma on lenalodomide and dexamethasone -   7. H/o provoked DVT in 2016 - on Xarelto 20 mg daily. 8. Type 2 diabetes mellitus. Controlled on glipizide. Last A1c 6.8.  9. COPD - stable     Consultations:   IP CONSULT TO INTERNAL MEDICINE    Patient is admitted as inpatient status because of co-morbidities listed above, severity of signs and symptoms as outlined, requirement for current medical therapies and most importantly because of direct risk to patient if care not provided in a hospital setting.     Carolina Coffey MD  2/4/2018    Copy sent to Dr. Sánchez Jacobson MD    (Please note that portions of this note were completed with a voice recognition program. Efforts were made to edit the dictations but occasionally words are mis-transcribed.)

## 2018-02-04 NOTE — ED NOTES
Pt states no relief from pain after 1 mg morphine. Pt requesting more pain medication.   Noted pt appears in no acute distress resting on bed     Frankey Splinter, RN  02/04/18 [id number]

## 2018-02-04 NOTE — ED PROVIDER NOTES
[address]  ED  eMERGENCY dEPARTMENT eNCOUnter  Resident    Pt Name: Jesús Antonio. MRN: [id number]  Birthdate 1945  Date of evaluation: 2/4/2018  PCP:  Donald Clayton MD    CHIEF COMPLAINT       Chief Complaint   Patient presents with    Shortness of Breath    Chest Pain       HISTORY OF PRESENT ILLNESS    Jesús Lagunas is a 81 y.o. male :   Pt reports that he has \"felt funny\" for the past two days. He reports mild cough and congestion, increased from his baseline. No change in sputum color. No home O2 use. He quit smoking 5 weeks ago. He was recently in the ED 1/27 and was diagnosed with influenza B. He was sent home on a z-pack and tamiflu. He was supposed to undergo CABG in December but the plan was changed to medical management since her cardiac output improved. He reports dizziness, lightheadedness and mild diarrhea today. Says he has lost 15 lbs over the past 2 weeks. Denies HA, CP, N/V/abd pain. No increased swelling in the legs. REVIEW OF SYSTEMS       Review of Systems   Constitutional: Positive for activity change, appetite change, fatigue and unexpected weight change. Negative for chills, diaphoresis and fever. HENT: Positive for congestion. Negative for facial swelling, sinus pain, sinus pressure and sore throat. Eyes: Negative for photophobia and visual disturbance. Respiratory: Positive for cough and shortness of breath. Negative for apnea, choking, chest tightness and wheezing. Cardiovascular: Positive for palpitations. Negative for chest pain and leg swelling. Gastrointestinal: Positive for diarrhea. Negative for abdominal distention, abdominal pain, constipation, nausea and vomiting. Genitourinary: Negative for difficulty urinating and dysuria. Musculoskeletal: Positive for gait problem. Neurological: Positive for dizziness, weakness and light-headedness. Negative for tremors, numbness and headaches.    Psychiatric/Behavioral: Negative for
15.   Skin: Skin is warm and dry. No rash noted. No erythema. Psychiatric: Mood and affect normal.     Patient is a 11year-old male with significant past medical history, recent heart failure, recent influenza, taking his relatives so, vitals improved with fluids. Concern for dehydration since the patient has not been eating well. EKG Interpretation    Interpreted by emergency department physician    Rhythm: sinus tachycardia  Rate: normal  Axis: normal  Ectopy: none  Conduction: normal  ST Segments: no acute change  T Waves: no acute change  Q Waves: none    Clinical Impression: non-specific EKG, overall grossly unchanged from previous. Jen Dias M.D. Hypokalemia and parish in Generally unwell 80 y/o male. Heart rate improved significantly with fluids. Xray chest negative. Will admit to medicine for further evaluation and treatment. I performed a history and physical examination of the patient and discussed management with the resident. I reviewed the residents note and agree with the documented findings and plan of care. Any areas of disagreement are noted on the chart. I was personally present for the key portions of any procedures. I have documented in the chart those procedures where I was not present during the key portions. I have personally reviewed all images and agree with the resident's interpretation. I have reviewed the emergency nurses triage note. I agree with the chief complaint, past medical history, past surgical history, allergies, medications, social and family history as documented unless otherwise noted below. Documentation of the HPI, Physical Exam and Medical Decision Making performed by medical students or scribes is based on my personal performance of the HPI, PE and MDM.  For Phys Assistant/ Nurse Practitioner cases/documentation I have had a face to face evaluation of this patient and have completed at least one if not all key elements of the E/M (history,

## 2018-02-04 NOTE — PLAN OF CARE
Problem: Falls - Risk of  Goal: Absence of falls  Outcome: Ongoing      Problem: Fluid Volume - Imbalance:  Goal: Absence of imbalanced fluid volume signs and symptoms  Absence of imbalanced fluid volume signs and symptoms   Outcome: Ongoing

## 2018-02-05 LAB
ALBUMIN SERPL-MCNC: 3.2 G/DL (ref 3.5–5.2)
ALBUMIN/GLOBULIN RATIO: 1.7 (ref 1–2.5)
ALP BLD-CCNC: 42 U/L (ref 40–129)
ALT SERPL-CCNC: 18 U/L (ref 5–41)
ANION GAP SERPL CALCULATED.3IONS-SCNC: 9 MMOL/L (ref 9–17)
AST SERPL-CCNC: 11 U/L
BILIRUB SERPL-MCNC: 0.29 MG/DL (ref 0.3–1.2)
BLOOD BANK SPECIMEN: NORMAL
BNP INTERPRETATION: ABNORMAL
BUN BLDV-MCNC: 18 MG/DL (ref 8–23)
BUN/CREAT BLD: ABNORMAL (ref 9–20)
CALCIUM SERPL-MCNC: 9.3 MG/DL (ref 8.6–10.4)
CHLORIDE BLD-SCNC: 101 MMOL/L (ref 98–107)
CO2: 28 MMOL/L (ref 20–31)
CREAT SERPL-MCNC: 0.85 MG/DL (ref 0.7–1.2)
DATE, STOOL #1: NORMAL
DATE, STOOL #2: NORMAL
DATE, STOOL #3: NORMAL
GFR AFRICAN AMERICAN: >60 ML/MIN
GFR NON-AFRICAN AMERICAN: >60 ML/MIN
GFR SERPL CREATININE-BSD FRML MDRD: ABNORMAL ML/MIN/{1.73_M2}
GFR SERPL CREATININE-BSD FRML MDRD: ABNORMAL ML/MIN/{1.73_M2}
GLUCOSE BLD-MCNC: 199 MG/DL (ref 70–99)
HCT VFR BLD CALC: 23.9 % (ref 40.7–50.3)
HCT VFR BLD CALC: 30.9 % (ref 40.7–50.3)
HEMOCCULT SP1 STL QL: NEGATIVE
HEMOCCULT SP2 STL QL: NORMAL
HEMOCCULT SP3 STL QL: NORMAL
HEMOGLOBIN: 6.8 G/DL (ref 13–17)
HEMOGLOBIN: 9.2 G/DL (ref 13–17)
INR BLD: 1.3
MAGNESIUM: 1.8 MG/DL (ref 1.6–2.6)
MCH RBC QN AUTO: 24.3 PG (ref 25.2–33.5)
MCHC RBC AUTO-ENTMCNC: 28.5 G/DL (ref 28.4–34.8)
MCV RBC AUTO: 85.4 FL (ref 82.6–102.9)
MYOGLOBIN: 34 NG/ML (ref 28–72)
NRBC AUTOMATED: 0 PER 100 WBC
PDW BLD-RTO: 19.9 % (ref 11.8–14.4)
PLATELET # BLD: 422 K/UL (ref 138–453)
PMV BLD AUTO: 9.6 FL (ref 8.1–13.5)
POTASSIUM SERPL-SCNC: 3.9 MMOL/L (ref 3.7–5.3)
PRO-BNP: 514 PG/ML
PROTHROMBIN TIME: 13.6 SEC (ref 9.4–12.6)
RBC # BLD: 2.8 M/UL (ref 4.21–5.77)
SODIUM BLD-SCNC: 138 MMOL/L (ref 135–144)
TIME, STOOL #1: NORMAL
TIME, STOOL #2: NORMAL
TIME, STOOL #3: NORMAL
TOTAL PROTEIN: 5.1 G/DL (ref 6.4–8.3)
TROPONIN INTERP: NORMAL
TROPONIN T: <0.03 NG/ML
WBC # BLD: 7.1 K/UL (ref 3.5–11.3)

## 2018-02-05 PROCEDURE — 2500000003 HC RX 250 WO HCPCS: Performed by: FAMILY MEDICINE

## 2018-02-05 PROCEDURE — 86850 RBC ANTIBODY SCREEN: CPT

## 2018-02-05 PROCEDURE — 83880 ASSAY OF NATRIURETIC PEPTIDE: CPT

## 2018-02-05 PROCEDURE — 36415 COLL VENOUS BLD VENIPUNCTURE: CPT

## 2018-02-05 PROCEDURE — 85018 HEMOGLOBIN: CPT

## 2018-02-05 PROCEDURE — G0328 FECAL BLOOD SCRN IMMUNOASSAY: HCPCS

## 2018-02-05 PROCEDURE — 6370000000 HC RX 637 (ALT 250 FOR IP): Performed by: FAMILY MEDICINE

## 2018-02-05 PROCEDURE — 83874 ASSAY OF MYOGLOBIN: CPT

## 2018-02-05 PROCEDURE — 6360000002 HC RX W HCPCS: Performed by: FAMILY MEDICINE

## 2018-02-05 PROCEDURE — 86901 BLOOD TYPING SEROLOGIC RH(D): CPT

## 2018-02-05 PROCEDURE — 85610 PROTHROMBIN TIME: CPT

## 2018-02-05 PROCEDURE — 1200000000 HC SEMI PRIVATE

## 2018-02-05 PROCEDURE — 99232 SBSQ HOSP IP/OBS MODERATE 35: CPT | Performed by: FAMILY MEDICINE

## 2018-02-05 PROCEDURE — 36430 TRANSFUSION BLD/BLD COMPNT: CPT

## 2018-02-05 PROCEDURE — 2580000003 HC RX 258: Performed by: FAMILY MEDICINE

## 2018-02-05 PROCEDURE — 80053 COMPREHEN METABOLIC PANEL: CPT

## 2018-02-05 PROCEDURE — 6370000000 HC RX 637 (ALT 250 FOR IP): Performed by: NURSE PRACTITIONER

## 2018-02-05 PROCEDURE — 83735 ASSAY OF MAGNESIUM: CPT

## 2018-02-05 PROCEDURE — P9016 RBC LEUKOCYTES REDUCED: HCPCS

## 2018-02-05 PROCEDURE — 85027 COMPLETE CBC AUTOMATED: CPT

## 2018-02-05 PROCEDURE — 85014 HEMATOCRIT: CPT

## 2018-02-05 PROCEDURE — 84484 ASSAY OF TROPONIN QUANT: CPT

## 2018-02-05 PROCEDURE — 86920 COMPATIBILITY TEST SPIN: CPT

## 2018-02-05 PROCEDURE — 86900 BLOOD TYPING SEROLOGIC ABO: CPT

## 2018-02-05 RX ORDER — ZOLPIDEM TARTRATE 5 MG/1
5 TABLET ORAL NIGHTLY PRN
Status: DISCONTINUED | OUTPATIENT
Start: 2018-02-05 | End: 2018-02-06 | Stop reason: HOSPADM

## 2018-02-05 RX ORDER — ESOMEPRAZOLE SODIUM 40 MG/5ML
40 INJECTION INTRAVENOUS 2 TIMES DAILY
Status: DISCONTINUED | OUTPATIENT
Start: 2018-02-05 | End: 2018-02-06 | Stop reason: HOSPADM

## 2018-02-05 RX ORDER — 0.9 % SODIUM CHLORIDE 0.9 %
250 INTRAVENOUS SOLUTION INTRAVENOUS ONCE
Status: COMPLETED | OUTPATIENT
Start: 2018-02-05 | End: 2018-02-06

## 2018-02-05 RX ORDER — FUROSEMIDE 10 MG/ML
20 INJECTION INTRAMUSCULAR; INTRAVENOUS ONCE
Status: COMPLETED | OUTPATIENT
Start: 2018-02-05 | End: 2018-02-05

## 2018-02-05 RX ORDER — 0.9 % SODIUM CHLORIDE 0.9 %
10 VIAL (ML) INJECTION DAILY
Status: DISCONTINUED | OUTPATIENT
Start: 2018-02-05 | End: 2018-02-06 | Stop reason: HOSPADM

## 2018-02-05 RX ADMIN — ESOMEPRAZOLE SODIUM 40 MG: 40 INJECTION INTRAVENOUS at 20:46

## 2018-02-05 RX ADMIN — ASPIRIN 81 MG: 81 TABLET, CHEWABLE ORAL at 09:21

## 2018-02-05 RX ADMIN — SODIUM CHLORIDE 10 ML: 9 INJECTION, SOLUTION INTRAMUSCULAR; INTRAVENOUS; SUBCUTANEOUS at 11:37

## 2018-02-05 RX ADMIN — FUROSEMIDE 20 MG: 10 INJECTION, SOLUTION INTRAMUSCULAR; INTRAVENOUS at 15:15

## 2018-02-05 RX ADMIN — ESOMEPRAZOLE SODIUM 40 MG: 40 INJECTION INTRAVENOUS at 11:37

## 2018-02-05 RX ADMIN — MAGNESIUM GLUCONATE 500 MG ORAL TABLET 400 MG: 500 TABLET ORAL at 20:46

## 2018-02-05 RX ADMIN — SODIUM CHLORIDE 250 ML: 9 INJECTION, SOLUTION INTRAVENOUS at 11:36

## 2018-02-05 RX ADMIN — CARVEDILOL 6.25 MG: 6.25 TABLET, FILM COATED ORAL at 09:21

## 2018-02-05 RX ADMIN — SIMVASTATIN 40 MG: 40 TABLET, FILM COATED ORAL at 20:46

## 2018-02-05 RX ADMIN — FAMOTIDINE 20 MG: 20 TABLET, FILM COATED ORAL at 09:21

## 2018-02-05 RX ADMIN — MAGNESIUM GLUCONATE 500 MG ORAL TABLET 400 MG: 500 TABLET ORAL at 09:21

## 2018-02-05 RX ADMIN — GLIPIZIDE 5 MG: 5 TABLET ORAL at 09:21

## 2018-02-05 RX ADMIN — Medication 1000 MCG: at 09:21

## 2018-02-05 RX ADMIN — ZOLPIDEM TARTRATE 5 MG: 5 TABLET ORAL at 21:20

## 2018-02-05 RX ADMIN — GABAPENTIN 100 MG: 100 CAPSULE ORAL at 09:21

## 2018-02-05 RX ADMIN — TRAMADOL HYDROCHLORIDE 50 MG: 50 TABLET, FILM COATED ORAL at 21:20

## 2018-02-05 ASSESSMENT — ENCOUNTER SYMPTOMS
VOMITING: 0
SHORTNESS OF BREATH: 1
NAUSEA: 0
COUGH: 1
CONSTIPATION: 0
WHEEZING: 0
ABDOMINAL PAIN: 0

## 2018-02-05 ASSESSMENT — PAIN SCALES - GENERAL: PAINLEVEL_OUTOF10: 6

## 2018-02-05 NOTE — PROGRESS NOTES
Cardiac Testing     ECHO 12/15/17: EF 36%, DD, LA is at upper limits of normal with ^LA volume.      CATH 12/13/17: Severe MVD. EF 20%. Severely elevated left sided filling pressures. Moderate PTN. Normal CO/CI. 1. History of multiple myeloma on chemotherapy. 2. History of hypertension. 3. History of hyperlipidemia on Zocor. 4. History of DVT on Xarelto. 5. History of Type 2 diabetes mellitus. 6. History of chronic smoking and I did  him to stop smoking. 7. History of echocardiogram done a few weeks back which showed severely reduced left ventricular systolic function with no significant valvular regurgitation or stenosis seen. 8. Cardiac cath 12/13/17 SEVERE MVD EF 20%- Pt refusing CABG at this time   9.  ECHO 12/15/17 Mild LV hypertrophy, EF 36%, DD, Left atrium is at upper limits of normal with increased LA volume

## 2018-02-05 NOTE — PLAN OF CARE
Problem: Fluid Volume - Imbalance:  Goal: Absence of imbalanced fluid volume signs and symptoms  Absence of imbalanced fluid volume signs and symptoms   Outcome: Ongoing  Hemoglobin 6.8 today, 2 units PRBC's ordered for today, patient voiding well and tolerating diet well

## 2018-02-05 NOTE — CONSULTS
1/14/2018); and esophagogastroduodenoscopy transoral diagnostic (N/A, 1/12/2018). Home Medications:    Prior to Admission medications    Medication Sig Start Date End Date Taking? Authorizing Provider   losartan (COZAAR) 25 MG tablet Take 1 tablet by mouth daily 1/14/18   Deforest Essex, MD   furosemide (LASIX) 20 MG tablet Take 1 tablet by mouth 2 times daily 1/14/18   Deforest Essex, MD   albuterol sulfate  (90 Base) MCG/ACT inhaler Inhale 1 puff into the lungs every 6 hours as needed for Wheezing    Historical Provider, MD   gabapentin (NEURONTIN) 100 MG capsule Take 100 mg by mouth daily. Historical Provider, MD   aspirin 81 MG chewable tablet Take 1 tablet by mouth daily 12/18/17   Luis Cuevas MD   carvedilol (COREG) 12.5 MG tablet Take 1 tablet by mouth 2 times daily (with meals) 12/17/17   Luis Cuevas MD   famotidine (PEPCID) 20 MG tablet Take 1 tablet by mouth 2 times daily 12/17/17   Luis Cuevas MD   magnesium oxide (MAG-OX) 400 (241.3 Mg) MG TABS tablet Take 1 tablet by mouth 2 times daily 12/17/17   Luis Cuevas MD   simvastatin (ZOCOR) 40 MG tablet Take 40 mg by mouth nightly    Historical Provider, MD   NONFORMULARY Latanoprost opth 0.005% 1 drop ou    Historical Provider, MD   NONFORMULARY Dextran 70-hypromellose 0.3% opth sol one drop OU PRN    Historical Provider, MD   Cyanocobalamin (VITAMIN B 12 PO) Take 1,000 mcg by mouth daily    Historical Provider, MD   rivaroxaban (XARELTO) 20 MG TABS tablet Take 20 mg by mouth    Historical Provider, MD   NONFORMULARY Pro-air HFA 1 ouff PRN    Historical Provider, MD   glipiZIDE (GLUCOTROL) 5 MG tablet Take 5 mg by mouth every morning (before breakfast)    Historical Provider, MD   traMADol (ULTRAM) 50 MG tablet Take 50 mg by mouth daily as needed for Pain . Historical Provider, MD       Allergies:  Review of patient's allergies indicates no known allergies. Social History:   reports that he has been smoking.   He has never used

## 2018-02-05 NOTE — PROGRESS NOTES
Vitals for the past 24 hrs:   BP Temp Temp src Pulse Resp SpO2 Height Weight   18 1946 121/67 97.5 °F (36.4 °C) Oral 116 16 98 % - -   18 1814 (!) 147/89 - - 100 - - - -   18 1445 112/86 97.9 °F (36.6 °C) Oral 98 18 95 % 5' 9\" (1.753 m) 167 lb 6.4 oz (75.9 kg)   18 1247 (!) 122/104 - - 103 18 96 % - -   18 1233 (!) 155/132 - - 106 20 96 % - -   18 1218 120/80 - - 104 14 97 % - -   18 1213 (!) 109/55 98.3 °F (36.8 °C) Oral 109 15 98 % - -   18 1207 102/66 - - 110 23 96 % - -     Temp (24hrs), Av.9 °F (36.6 °C), Min:97.5 °F (36.4 °C), Max:98.3 °F (36.8 °C)    No results for input(s): POCGLU in the last 72 hours. I/O (24Hr): Intake/Output Summary (Last 24 hours) at 18 0812  Last data filed at 18 0545   Gross per 24 hour   Intake             1366 ml   Output              200 ml   Net             1166 ml       Labs:  Recent Results (from the past 24 hour(s))   EKG 12 Lead    Collection Time: 18 12:02 PM   Result Value Ref Range    Ventricular Rate 108 BPM    Atrial Rate 108 BPM    P-R Interval 148 ms    QRS Duration 136 ms    Q-T Interval 382 ms    QTc Calculation (Bazett) 511 ms    P Axis 67 degrees    R Axis -58 degrees    T Axis 129 degrees   POCT troponin    Collection Time: 18 12:20 PM   Result Value Ref Range    POC Troponin I 0.05 0.00 - 0.10 ng/mL    POC Troponin Interp       The Troponin-I (POC) results cannot be compared to the Troponin-T results.    CBC WITH AUTO DIFFERENTIAL    Collection Time: 18 12:35 PM   Result Value Ref Range    WBC 8.0 3.5 - 11.3 k/uL    RBC 3.72 (L) 4.21 - 5.77 m/uL    Hemoglobin 9.0 (L) 13.0 - 17.0 g/dL    Hematocrit 30.9 (L) 40.7 - 50.3 %    MCV 83.1 82.6 - 102.9 fL    MCH 24.2 (L) 25.2 - 33.5 pg    MCHC 29.1 28.4 - 34.8 g/dL    RDW 20.1 (H) 11.8 - 14.4 %    Platelets 747 (H) 398 - 453 k/uL    MPV 10.1 8.1 - 13.5 fL    NRBC Automated 0.2 (H) 0.0 per 100 WBC    Differential Type NOT REPORTED     WBC respiratory distress. He has no wheezes. Abdominal: Soft. Bowel sounds are normal. He exhibits no distension. There is no tenderness. Musculoskeletal: Normal range of motion. He exhibits no edema, tenderness or deformity. Neurological: He is alert and oriented to person, place, and time. He has normal reflexes. Assessment:        Principal Problem:    RANDALL (acute kidney injury) ([facility] [address]  Active Problems:    Multiple vessel coronary artery disease    Multiple myeloma not having achieved remission ([address] [address]    Chronic obstructive pulmonary disease ([facility] [address]      Plan:          Acute on chronic anemia: Hemoglobin today is 6.8, will transfuse 2 units packed RBCs with Lasix in between    Acute kidney injury: Resolved. Mostly was prerenal secondary to dehydration, hypotension: Continue gentle hydration and BMP daily. Hold Lasix for today and restart it tomorrow    Multivessel coronary artery disease: pending cardiology recommendation    Ischemic cardiac myopathy with ejection fraction of 35%    History of recent influenza B: Finished course of Tamiflu    Hypokalemia: As per protocol    History of DVT in 2016: hold Xarelto    History of multiple myeloma: On chemotherapy    HTN: continue home meds    HPL: continue home meds    DVT and GI prophylaxis    DM2: Continue diabetes home meds, insulin sliding scale, hypoglycemia protocol. COPD: Stable       IV Fluids: sodium chloride Last Rate: 75 mL/hr at 02/04/18 1515,    Electrolytes: Supp as needed  Nutrition:  DIET RENAL;    DVT prophylaxis: reason for no prophylaxis: Acute anemia      Consults: IP CONSULT TO INTERNAL MEDICINE  IP CONSULT TO CARDIOLOGY        Discussed care plan with nurse after getting input from the nurse.     Above plan discussed with the patient in room, who agreed to the above plan     Please call if any questions    Raymond Murphy MD  [facility] Hospitalist  2/5/2018  8:12 AM

## 2018-02-06 VITALS
BODY MASS INDEX: 24.79 KG/M2 | WEIGHT: 167.4 LBS | RESPIRATION RATE: 16 BRPM | HEIGHT: 69 IN | DIASTOLIC BLOOD PRESSURE: 77 MMHG | SYSTOLIC BLOOD PRESSURE: 144 MMHG | HEART RATE: 69 BPM | OXYGEN SATURATION: 94 % | TEMPERATURE: 98.9 F

## 2018-02-06 LAB
ABO/RH: NORMAL
ANION GAP SERPL CALCULATED.3IONS-SCNC: 12 MMOL/L (ref 9–17)
ANTIBODY SCREEN: NEGATIVE
ARM BAND NUMBER: NORMAL
BLD PROD TYP BPU: NORMAL
BLD PROD TYP BPU: NORMAL
BUN BLDV-MCNC: 14 MG/DL (ref 8–23)
BUN/CREAT BLD: ABNORMAL (ref 9–20)
CALCIUM SERPL-MCNC: 8.8 MG/DL (ref 8.6–10.4)
CHLORIDE BLD-SCNC: 104 MMOL/L (ref 98–107)
CO2: 26 MMOL/L (ref 20–31)
CREAT SERPL-MCNC: 0.86 MG/DL (ref 0.7–1.2)
CROSSMATCH RESULT: NORMAL
CROSSMATCH RESULT: NORMAL
DISPENSE STATUS BLOOD BANK: NORMAL
DISPENSE STATUS BLOOD BANK: NORMAL
EXPIRATION DATE: NORMAL
GFR AFRICAN AMERICAN: >60 ML/MIN
GFR NON-AFRICAN AMERICAN: >60 ML/MIN
GFR SERPL CREATININE-BSD FRML MDRD: ABNORMAL ML/MIN/{1.73_M2}
GFR SERPL CREATININE-BSD FRML MDRD: ABNORMAL ML/MIN/{1.73_M2}
GLUCOSE BLD-MCNC: 97 MG/DL (ref 70–99)
HCT VFR BLD CALC: 31 % (ref 40.7–50.3)
HEMOGLOBIN: 9.3 G/DL (ref 13–17)
MCH RBC QN AUTO: 25.8 PG (ref 25.2–33.5)
MCHC RBC AUTO-ENTMCNC: 30 G/DL (ref 28.4–34.8)
MCV RBC AUTO: 86.1 FL (ref 82.6–102.9)
NRBC AUTOMATED: 0 PER 100 WBC
PDW BLD-RTO: 18.4 % (ref 11.8–14.4)
PLATELET # BLD: 431 K/UL (ref 138–453)
PMV BLD AUTO: 9.7 FL (ref 8.1–13.5)
POTASSIUM SERPL-SCNC: 3.4 MMOL/L (ref 3.7–5.3)
RBC # BLD: 3.6 M/UL (ref 4.21–5.77)
SODIUM BLD-SCNC: 142 MMOL/L (ref 135–144)
TRANSFUSION STATUS: NORMAL
TRANSFUSION STATUS: NORMAL
UNIT DIVISION: 0
UNIT DIVISION: 0
UNIT NUMBER: NORMAL
UNIT NUMBER: NORMAL
WBC # BLD: 8 K/UL (ref 3.5–11.3)

## 2018-02-06 PROCEDURE — 80048 BASIC METABOLIC PNL TOTAL CA: CPT

## 2018-02-06 PROCEDURE — 99238 HOSP IP/OBS DSCHRG MGMT 30/<: CPT | Performed by: FAMILY MEDICINE

## 2018-02-06 PROCEDURE — 36415 COLL VENOUS BLD VENIPUNCTURE: CPT

## 2018-02-06 PROCEDURE — 2500000003 HC RX 250 WO HCPCS: Performed by: FAMILY MEDICINE

## 2018-02-06 PROCEDURE — 6370000000 HC RX 637 (ALT 250 FOR IP): Performed by: FAMILY MEDICINE

## 2018-02-06 PROCEDURE — 2580000003 HC RX 258: Performed by: FAMILY MEDICINE

## 2018-02-06 PROCEDURE — 85027 COMPLETE CBC AUTOMATED: CPT

## 2018-02-06 PROCEDURE — 94762 N-INVAS EAR/PLS OXIMTRY CONT: CPT

## 2018-02-06 RX ORDER — 0.9 % SODIUM CHLORIDE 0.9 %
VIAL (ML) INJECTION
Status: DISCONTINUED
Start: 2018-02-06 | End: 2018-02-06 | Stop reason: HOSPADM

## 2018-02-06 RX ORDER — LOSARTAN POTASSIUM 25 MG/1
25 TABLET ORAL DAILY
Status: DISCONTINUED | OUTPATIENT
Start: 2018-02-06 | End: 2018-02-06 | Stop reason: HOSPADM

## 2018-02-06 RX ORDER — FUROSEMIDE 20 MG/1
20 TABLET ORAL 2 TIMES DAILY
Status: DISCONTINUED | OUTPATIENT
Start: 2018-02-06 | End: 2018-02-06 | Stop reason: HOSPADM

## 2018-02-06 RX ADMIN — ASPIRIN 81 MG: 81 TABLET, CHEWABLE ORAL at 08:52

## 2018-02-06 RX ADMIN — DOCUSATE SODIUM 100 MG: 100 CAPSULE ORAL at 08:52

## 2018-02-06 RX ADMIN — GABAPENTIN 100 MG: 100 CAPSULE ORAL at 08:52

## 2018-02-06 RX ADMIN — MAGNESIUM GLUCONATE 500 MG ORAL TABLET 400 MG: 500 TABLET ORAL at 08:52

## 2018-02-06 RX ADMIN — GLIPIZIDE 5 MG: 5 TABLET ORAL at 08:52

## 2018-02-06 RX ADMIN — SODIUM CHLORIDE 10 ML: 9 INJECTION, SOLUTION INTRAMUSCULAR; INTRAVENOUS; SUBCUTANEOUS at 08:58

## 2018-02-06 RX ADMIN — Medication 1000 MCG: at 08:52

## 2018-02-06 RX ADMIN — ESOMEPRAZOLE SODIUM 40 MG: 40 INJECTION INTRAVENOUS at 08:52

## 2018-02-06 RX ADMIN — FUROSEMIDE 20 MG: 20 TABLET ORAL at 09:56

## 2018-02-06 ASSESSMENT — ENCOUNTER SYMPTOMS
COUGH: 0
DIARRHEA: 0
ABDOMINAL PAIN: 0
WHEEZING: 0
VOMITING: 0
NAUSEA: 0
SHORTNESS OF BREATH: 0
CONSTIPATION: 0

## 2018-02-06 NOTE — PROGRESS NOTES
[facility]   Progress Note                   Date:   2/6/2018  Patient name: Iwona Barba. Date of admission:  2/4/2018 11:57 AM  MRN:   [id number]  YOB: 1945  PCP: Melva Estrella MD    Reason for Admission: RANDALL (acute kidney injury) [facility]) [N17.9]    Subjective:       Clinical Changes / Abnormalities: Patient seen & examined in room with wife at bedside. Denies CP or SOB. States he finally gave a stool sample to check for blood and that was negative. Per wife they are going to f/u as outpatient with the GI doctor. NO new cardiac issues/concerns. ROS    Medications:   Scheduled Meds:   furosemide  20 mg Oral BID    sodium chloride (PF)        esomeprazole  40 mg Intravenous BID    And    sodium chloride (PF)  10 mL Intravenous Daily    simvastatin  40 mg Oral Nightly    vitamin B-12  1,000 mcg Oral Daily    glipiZIDE  5 mg Oral QAM AC    aspirin  81 mg Oral Daily    magnesium oxide  400 mg Oral BID    gabapentin  100 mg Oral Daily    sodium chloride flush  10 mL Intravenous 2 times per day    docusate sodium  100 mg Oral BID    carvedilol  6.25 mg Oral BID WC     Continuous Infusions:   CBC:   Recent Labs      02/04/18   1235  02/05/18   0848  02/05/18   2120  02/06/18   0637   WBC  8.0  7.1   --   8.0   HGB  9.0*  6.8*  9.2*  9.3*   PLT  491*  422   --   431     BMP:    Recent Labs      02/04/18   1235  02/05/18   0701  02/06/18   0637   NA  139  138  142   K  3.0*  3.9  3.4*   CL  93*  101  104   CO2  26  28  26   BUN  22  18  14   CREATININE  1.34*  0.85  0.86   GLUCOSE  205*  199*  97     Hepatic:   Recent Labs      02/05/18   0701   AST  11   ALT  18   BILITOT  0.29*   ALKPHOS  42     Troponin:   Recent Labs      02/04/18   1220   TROPONINI  0.05     BNP: No results for input(s): BNP in the last 72 hours. Lipids: No results for input(s): CHOL, HDL in the last 72 hours.     Invalid input(s): LDLCALCU  INR:   Recent Labs      02/05/18   0701   INR  1.3

## 2018-02-06 NOTE — PLAN OF CARE
Problem: Falls - Risk of  Goal: Absence of falls  Outcome: Ongoing      Problem: Fluid Volume - Imbalance:  Goal: Absence of imbalanced fluid volume signs and symptoms  Absence of imbalanced fluid volume signs and symptoms   Outcome: Ongoing      Problem: Pain:  Goal: Pain level will decrease  Pain level will decrease   Outcome: Ongoing    Goal: Control of acute pain  Control of acute pain   Outcome: Ongoing    Goal: Control of chronic pain  Control of chronic pain   Outcome: Ongoing      Problem: Nutrition  Goal: Optimal nutrition therapy  Outcome: Ongoing

## 2018-02-06 NOTE — PROGRESS NOTES
[facility]   [address]    Progress Note    2/6/2018    7:59 AM    Name:   Bar Kurtz. MRN:     [id number]     Acct:      [de-identified]   Room:   [address] [place] [address] Day:  2  Admit Date:  2/4/2018 11:57 AM    PCP:   Dickson Meza MD  Code Status:  Full Code    Subjective:     C/C:   Chief Complaint   Patient presents with    Shortness of Breath    Chest Pain       Interval History Status: significantly  improved    The patient is a 81 y.o.  male who is admitted to the hospital for the management of Shortness of Breath and Chest Pain   Patient seen and examined at the bed side , no new acute events overnight . Feels much better, much less SOB. Stool occult came back negative  Pt denies any Chest pain , new pain, vomiting. Notes from nursing staff and Consults had been reviewed, and the overnight progress had been checked with the nursing staff as well. Brief History:        Patient came to emergency room with fatigue, exertional dyspnea. He was recently diagnosed with influenza B infection and was discharged home on Tamiflu and Z-Adolfo. For the last few days patient was feeling fatigued and a decrease appetite and increased shortness of breath on mild to moderate exertion . Patient had poor oral intake. Patient denies any chest pain to me . He also had lower ext edema . Patient has known history of multivessel coronary artery disease and was recommended CABG previously in Dec 2017 and  Later decided  To have medical management . Patient also has history of multiple myeloma and is on treatment. He was also recently admitted for anemia and got 2 unit blood transfusion. Patient had EGD and colonoscopy and no source of bleeding was observed. Initial evaluation showed temperature 98.3, tachycardia 110, hypotension 109/55. Lab work showed hypokalemia 3.0, elevated creatinine 1.34, glucose 205, normal white count 8.0, hemoglobin low 9.0.   Chest x-ray was negative for pneumothorax, pneumonia, pulmonary congestion. .      Review of Systems:   Review of Systems   Constitutional: Negative for chills, diaphoresis and fever. Respiratory: Negative for cough, shortness of breath and wheezing. Cardiovascular: Negative for chest pain, palpitations and leg swelling. Gastrointestinal: Negative for abdominal pain, constipation, diarrhea, nausea and vomiting. Dark stools ( negative for blood, takes iron)   Genitourinary: Negative for urgency. Skin: Negative for rash. Neurological: Negative for tingling, focal weakness, weakness and headaches. Psychiatric/Behavioral: The patient is not nervous/anxious. Medications: Allergies:  No Known Allergies    Current Meds:   Scheduled Meds:    furosemide  20 mg Oral BID    esomeprazole  40 mg Intravenous BID    And    sodium chloride (PF)  10 mL Intravenous Daily    simvastatin  40 mg Oral Nightly    vitamin B-12  1,000 mcg Oral Daily    glipiZIDE  5 mg Oral QAM AC    aspirin  81 mg Oral Daily    magnesium oxide  400 mg Oral BID    gabapentin  100 mg Oral Daily    sodium chloride flush  10 mL Intravenous 2 times per day    docusate sodium  100 mg Oral BID    carvedilol  6.25 mg Oral BID WC     Continuous Infusions:      PRN Meds: zolpidem, sodium chloride flush, acetaminophen, traMADol **OR** traMADol, ondansetron, nicotine, potassium chloride **OR** potassium chloride **OR** potassium chloride    Data:     Past Medical History:   has a past medical history of Arthritis; CAD (coronary artery disease); Cardiomyopathy [facility]); CHF (congestive heart failure) ([facility] [address] COPD (chronic obstructive pulmonary disease) ([facility] [address] Diabetes mellitus ([facility] [address] DVT of leg (deep venous thrombosis) ([facility] [address] Hyperlipidemia; Hypertension; Multiple myeloma ([facility] [address] and Neuropathy ([facility] [address] Social History:   reports that he has been smoking. He has never used smokeless tobacco. He reports that he drinks alcohol.  He reports that he Stool #1 20,518     Time, Stool #1 UNKNOWN     Occult Blood, Stool #2 NOT REPORTED NEG    Date, Stool #2 NOT REPORTED     Time, Stool #2 NOT REPORTED     Occult Blood, Stool #3 NOT REPORTED NEG    Date, Stool #3 NOT REPORTED     Time, Stool #3 NOT REPORTED    TYPE AND SCREEN    Collection Time: 02/05/18 10:55 AM   Result Value Ref Range    Expiration Date 02/08/2018     Arm Band Number [id number]     ABO/Rh O POSITIVE     Antibody Screen NEGATIVE     Unit Number [id number]     Product Code Leukocyte Reduced Red Cell     Unit Divison 0     Dispense Status TRANSFUSED     Transfusion Status OK TO TRANSFUSE     Crossmatch Result COMPATIBLE     Unit Number [id number]     Product Code Leukocyte Reduced Red Cell     Unit Divison 0     Dispense Status TRANSFUSED     Transfusion Status OK TO TRANSFUSE     Crossmatch Result COMPATIBLE    Blood Bank Specimen    Collection Time: 02/05/18 11:12 AM   Result Value Ref Range    Blood Bank Specimen NOT REPORTED    HEMOGLOBIN AND HEMATOCRIT, BLOOD    Collection Time: 02/05/18  9:20 PM   Result Value Ref Range    Hemoglobin 9.2 (L) 13.0 - 17.0 g/dL    Hematocrit 30.9 (L) 40.7 - 50.3 %   Basic Metabolic Panel    Collection Time: 02/06/18  6:37 AM   Result Value Ref Range    Glucose 97 70 - 99 mg/dL    BUN 14 8 - 23 mg/dL    CREATININE 0.86 0.70 - 1.20 mg/dL    Bun/Cre Ratio NOT REPORTED 9 - 20    Calcium 8.8 8.6 - 10.4 mg/dL    Sodium 142 135 - 144 mmol/L    Potassium 3.4 (L) 3.7 - 5.3 mmol/L    Chloride 104 98 - 107 mmol/L    CO2 26 20 - 31 mmol/L    Anion Gap 12 9 - 17 mmol/L    GFR Non-African American >60 >60 mL/min    GFR African American >60 >60 mL/min    GFR Comment          GFR Staging NOT REPORTED        Radiology:    Us Retroperitoneal Complete 2/4/2018   The right kidney measures 11.6 cm in length and the left kidney measures 11.1 cm in length. Kidneys demonstrate normal cortical echogenicity. No evidence of hydronephrosis or intrarenal stones.  Bladder: Unremarkable

## 2018-02-09 LAB
EKG ATRIAL RATE: 108 BPM
EKG P AXIS: 67 DEGREES
EKG P-R INTERVAL: 148 MS
EKG Q-T INTERVAL: 382 MS
EKG QRS DURATION: 136 MS
EKG QTC CALCULATION (BAZETT): 511 MS
EKG R AXIS: -58 DEGREES
EKG T AXIS: 129 DEGREES
EKG VENTRICULAR RATE: 108 BPM

## 2018-02-12 ENCOUNTER — TELEPHONE (OUTPATIENT)
Dept: GASTROENTEROLOGY | Age: 73
End: 2018-02-12

## 2018-02-22 ENCOUNTER — APPOINTMENT (OUTPATIENT)
Dept: GENERAL RADIOLOGY | Age: 73
End: 2018-02-22
Payer: MEDICARE

## 2018-02-22 ENCOUNTER — HOSPITAL ENCOUNTER (EMERGENCY)
Age: 73
Discharge: HOME | End: 2018-02-22
Attending: EMERGENCY MEDICINE
Payer: MEDICARE

## 2018-02-22 VITALS
BODY MASS INDEX: 24.22 KG/M2 | SYSTOLIC BLOOD PRESSURE: 104 MMHG | OXYGEN SATURATION: 100 % | TEMPERATURE: 98.2 F | WEIGHT: 164 LBS | DIASTOLIC BLOOD PRESSURE: 83 MMHG | HEART RATE: 94 BPM | RESPIRATION RATE: 18 BRPM

## 2018-02-22 DIAGNOSIS — E86.0 DEHYDRATION: ICD-10-CM

## 2018-02-22 DIAGNOSIS — I95.9 HYPOTENSION, UNSPECIFIED HYPOTENSION TYPE: Primary | ICD-10-CM

## 2018-02-22 DIAGNOSIS — E87.6 HYPOKALEMIA: ICD-10-CM

## 2018-02-22 LAB
ABSOLUTE EOS #: 0.23 K/UL (ref 0–0.4)
ABSOLUTE IMMATURE GRANULOCYTE: 0.7 K/UL (ref 0–0.3)
ABSOLUTE LYMPH #: 2.81 K/UL (ref 1–4.8)
ABSOLUTE MONO #: 1.05 K/UL (ref 0.1–0.8)
ANION GAP SERPL CALCULATED.3IONS-SCNC: 16 MMOL/L (ref 9–17)
BASOPHILS # BLD: 0 % (ref 0–2)
BASOPHILS ABSOLUTE: 0 K/UL (ref 0–0.2)
BUN BLDV-MCNC: 20 MG/DL (ref 8–23)
BUN/CREAT BLD: ABNORMAL (ref 9–20)
CALCIUM SERPL-MCNC: 9 MG/DL (ref 8.6–10.4)
CHLORIDE BLD-SCNC: 93 MMOL/L (ref 98–107)
CO2: 28 MMOL/L (ref 20–31)
CREAT SERPL-MCNC: 1.26 MG/DL (ref 0.7–1.2)
DIFFERENTIAL TYPE: ABNORMAL
EKG ATRIAL RATE: 91 BPM
EKG P AXIS: 54 DEGREES
EKG P-R INTERVAL: 136 MS
EKG Q-T INTERVAL: 418 MS
EKG QRS DURATION: 136 MS
EKG QTC CALCULATION (BAZETT): 514 MS
EKG R AXIS: -54 DEGREES
EKG T AXIS: 110 DEGREES
EKG VENTRICULAR RATE: 91 BPM
EOSINOPHILS RELATIVE PERCENT: 2 % (ref 1–4)
GFR AFRICAN AMERICAN: >60 ML/MIN
GFR NON-AFRICAN AMERICAN: 57 ML/MIN
GFR SERPL CREATININE-BSD FRML MDRD: ABNORMAL ML/MIN/{1.73_M2}
GFR SERPL CREATININE-BSD FRML MDRD: ABNORMAL ML/MIN/{1.73_M2}
GLUCOSE BLD-MCNC: 104 MG/DL (ref 70–99)
HCT VFR BLD CALC: 31.9 % (ref 40.7–50.3)
HEMOGLOBIN: 9.4 G/DL (ref 13–17)
IMMATURE GRANULOCYTES: 6 %
LYMPHOCYTES # BLD: 24 % (ref 24–44)
MCH RBC QN AUTO: 25.4 PG (ref 25.2–33.5)
MCHC RBC AUTO-ENTMCNC: 29.5 G/DL (ref 28.4–34.8)
MCV RBC AUTO: 86.2 FL (ref 82.6–102.9)
MONOCYTES # BLD: 9 % (ref 1–7)
MORPHOLOGY: ABNORMAL
NRBC AUTOMATED: 0.3 PER 100 WBC
PDW BLD-RTO: 21.2 % (ref 11.8–14.4)
PLATELET # BLD: 316 K/UL (ref 138–453)
PLATELET ESTIMATE: ABNORMAL
PMV BLD AUTO: 10.7 FL (ref 8.1–13.5)
POC TROPONIN I: 0.02 NG/ML (ref 0–0.1)
POC TROPONIN I: 0.03 NG/ML (ref 0–0.1)
POC TROPONIN INTERP: NORMAL
POC TROPONIN INTERP: NORMAL
POTASSIUM SERPL-SCNC: 2.6 MMOL/L (ref 3.7–5.3)
RBC # BLD: 3.7 M/UL (ref 4.21–5.77)
RBC # BLD: ABNORMAL 10*6/UL
SEG NEUTROPHILS: 59 % (ref 36–66)
SEGMENTED NEUTROPHILS ABSOLUTE COUNT: 6.91 K/UL (ref 1.8–7.7)
SODIUM BLD-SCNC: 137 MMOL/L (ref 135–144)
WBC # BLD: 11.7 K/UL (ref 3.5–11.3)
WBC # BLD: ABNORMAL 10*3/UL

## 2018-02-22 PROCEDURE — 85025 COMPLETE CBC W/AUTO DIFF WBC: CPT

## 2018-02-22 PROCEDURE — 6370000000 HC RX 637 (ALT 250 FOR IP): Performed by: EMERGENCY MEDICINE

## 2018-02-22 PROCEDURE — 84484 ASSAY OF TROPONIN QUANT: CPT

## 2018-02-22 PROCEDURE — 99285 EMERGENCY DEPT VISIT HI MDM: CPT

## 2018-02-22 PROCEDURE — 96360 HYDRATION IV INFUSION INIT: CPT

## 2018-02-22 PROCEDURE — 93005 ELECTROCARDIOGRAM TRACING: CPT

## 2018-02-22 PROCEDURE — 71046 X-RAY EXAM CHEST 2 VIEWS: CPT

## 2018-02-22 PROCEDURE — 96361 HYDRATE IV INFUSION ADD-ON: CPT

## 2018-02-22 PROCEDURE — 2580000003 HC RX 258: Performed by: EMERGENCY MEDICINE

## 2018-02-22 PROCEDURE — 80048 BASIC METABOLIC PNL TOTAL CA: CPT

## 2018-02-22 RX ORDER — CARVEDILOL 3.12 MG/1
3.12 TABLET ORAL 2 TIMES DAILY WITH MEALS
Status: ON HOLD | COMMUNITY
End: 2018-08-26 | Stop reason: HOSPADM

## 2018-02-22 RX ORDER — 0.9 % SODIUM CHLORIDE 0.9 %
1000 INTRAVENOUS SOLUTION INTRAVENOUS ONCE
Status: COMPLETED | OUTPATIENT
Start: 2018-02-22 | End: 2018-02-22

## 2018-02-22 RX ORDER — POTASSIUM CHLORIDE 20 MEQ/1
40 TABLET, EXTENDED RELEASE ORAL ONCE
Status: COMPLETED | OUTPATIENT
Start: 2018-02-22 | End: 2018-02-22

## 2018-02-22 RX ADMIN — POTASSIUM CHLORIDE 40 MEQ: 20 TABLET, EXTENDED RELEASE ORAL at 20:24

## 2018-02-22 RX ADMIN — SODIUM CHLORIDE 1000 ML: 9 INJECTION, SOLUTION INTRAVENOUS at 20:23

## 2018-02-22 ASSESSMENT — ENCOUNTER SYMPTOMS
NAUSEA: 1
DIARRHEA: 0
SHORTNESS OF BREATH: 0
SORE THROAT: 0
CHEST TIGHTNESS: 0
CONSTIPATION: 0
ABDOMINAL PAIN: 0
VOMITING: 0

## 2018-02-23 NOTE — ED PROVIDER NOTES
DAYS AGO FOR LOW BP AND HIS COREG DOSE WAS CUT IN HALF. PO INTAKE SUBOPTIMAL TODAY. NO CHEST PAIN, FALL/TRAUMA, FEVER, VOMITING, DIARRHEA. AWAKE, ALERT, COOP, RESPONSIVE. ORIENTED X4, SPEECH AND COMPREHENSION NORMAL. LUNGS CLEAR DEMETRIUS. CARDIAC-S1S2, RRR, NO MRG. ABD SOFT, NONDISTENDED, NONTENDER, NORMAL BOWEL SOUNDS. VITAL SIGNS AS NOTED. PRIOR TO DISCHARGE, PATIENT REPORTS HE FEELS MUCH IMPROVED. HE WAS ABLE TO AMBULATE IN THE ED AND REPORTED HE FELT WELL. LAB RESULTS REVIEWED-MOD HYPOKALEMIA(ORAL REPLACEMENT PROVIDED), MILD RANDALL. IMP-HYPOTENSION, RESOLVED. PLAN-DISCHARGE, HOLD COREG FOR NOW. SPEAK WITH YOUR CARDIOLOGIST TOMORROW PRIOR TO RESTARTING COREG. RETURN IF SX WORSEN, PROGRESS, RECUR.            Dario Chappell MD  02/22/18 [id number] Merlin Ruvalcaba MD  02/22/18 [id number]

## 2018-02-23 NOTE — ED NOTES
Writer and Giuliano Olguin, [address] [facility] ambulate patient around department. Pt. Ambulates with slow steady gait. Tolerated well. Negative orthostatic BP's, see vitals. Dr. Adrian Mckeon notified.        Leah Camilo RN  02/22/18 [id number]

## 2018-02-23 NOTE — ED PROVIDER NOTES
normocephalic, atraumatic   EYES: PERRL, EOMI, anicteric sclera   ENT: Moist mucous membranes, uvula midline   NECK: Supple, symmetric, trachea midline   BACK: Symmetric, no deformity   LUNGS: Bilateral breath sounds, CTAB, no rales/ronchi/wheezes   CARDIOVASCULAR: RRR, no m/r/g, 2+ pulses throughout   ABDOMEN: Soft, non-tender, non-distended, +BS   NEUROLOGIC:  MAEx4, normal sensorium and gait; normal strength throughout   MUSCULOSKELETAL: No clubbing, cyanosis or edema; bilateral 1+ pitting edema; no calf swelling or tenderness.  Negative Homans sign; no palpable cord, 2+ pulses bilateral upper extremities    SKIN: No rash, pallor or wounds        DIFFERENTIAL  DIAGNOSIS     PLAN (Syed Riggs / Idania Ramirez / EKG):  Orders Placed This Encounter   Procedures    XR CHEST STANDARD (2 VW)    CBC Auto Differential    BASIC METABOLIC PANEL    Telemetry monitoring    Continuous Pulse Oximetry    POCT troponin    POCT troponin    POCT troponin    EKG 12 Lead    Insert peripheral IV       MEDICATIONS ORDERED:  Orders Placed This Encounter   Medications    0.9 % sodium chloride bolus    potassium chloride (KLOR-CON M) extended release tablet 40 mEq       DDX:   Ingestion, infectious, trauma, seizure, AMS, electrolytes, encephalopathy, insulin, opiates, uremia, toxins, sepsis, stroke, N/V, seizure, elderly age, alcohol / drugs / toxidromes, hypoglycemia, Dehydration    DIAGNOSTIC RESULTS / EMERGENCY DEPARTMENT COURSE / MDM     LABS:  Results for orders placed or performed during the hospital encounter of 02/22/18   CBC Auto Differential   Result Value Ref Range    WBC 11.7 (H) 3.5 - 11.3 k/uL    RBC 3.70 (L) 4.21 - 5.77 m/uL    Hemoglobin 9.4 (L) 13.0 - 17.0 g/dL    Hematocrit 31.9 (L) 40.7 - 50.3 %    MCV 86.2 82.6 - 102.9 fL    MCH 25.4 25.2 - 33.5 pg    MCHC 29.5 28.4 - 34.8 g/dL    RDW 21.2 (H) 11.8 - 14.4 %    Platelets 983 422 - 933 k/uL    MPV 10.7 8.1 - 13.5 fL    NRBC Automated 0.3 (H) 0.0 per 100 WBC    Differential midline. Osseous structures and soft tissues are grossly intact. Borderline cardiomegaly and mild central vascular congestion. Xr Chest Portable    Result Date: 2/4/2018  EXAMINATION: SINGLE VIEW OF THE CHEST 2/4/2018 12:41 pm COMPARISON: Chest January 27, 2018. HISTORY: ORDERING SYSTEM PROVIDED HISTORY: SOB TECHNOLOGIST PROVIDED HISTORY: Reason for exam:->SOB FINDINGS: Cardiomegaly is unchanged. No focal lung consolidation, pneumothorax, pleural effusion or free air. Cardiomegaly without acute process. EKG  EKG Interpretation    Interpreted by me    Rhythm: normal sinus   Rate: normal, 91  Axis: normal  Ectopy: none  Conduction: Left bundle branch block  ST Segments: no acute change  T Waves: no acute change  Q Waves: none    Clinical Impression: Normal sinus rhythm with left bundle branch block, no acute change when compared to EKG from 2/4/18    All EKG's are interpreted by the Emergency Department Physician who either signs or Co-signs this chart in the absence of a cardiologist.    EMERGENCY DEPARTMENT COURSE:  ED Course as of Feb 22 2236   Thu Feb 22, 2018 2021 Patient complaining of some lightheadedness. Blood pressure 69/49.  1 L IV fluid bolus ordered. Patient's potassium also 2.6. Oral K-Dur 40 mEq ordered. [DS]   9870 Second troponin 0.02 which is decreased from initial 0.03. Patient was ambulated around the department and had no dizziness. Patient's blood pressure has been stable at approximately 105/85. Patient did have his potassium supplemented in the ED. Instructed to follow up with his primary care physician for recheck of his potassium. Patient also instructed to return to the emergency room for worsening dizziness, syncope, chest pain, shortness of breath, or any other concerning symptoms. Patient and wife state understanding. All questions answered.   [DS]      ED Course User Index  [DS] Tiffanie Covington MD       PROCEDURES:  None    CONSULTS:  None    CRITICAL

## 2018-02-28 ENCOUNTER — OFFICE VISIT (OUTPATIENT)
Dept: GASTROENTEROLOGY | Age: 73
End: 2018-02-28
Payer: MEDICARE

## 2018-02-28 VITALS
HEIGHT: 69 IN | BODY MASS INDEX: 25.03 KG/M2 | HEART RATE: 68 BPM | OXYGEN SATURATION: 99 % | SYSTOLIC BLOOD PRESSURE: 122 MMHG | RESPIRATION RATE: 14 BRPM | DIASTOLIC BLOOD PRESSURE: 70 MMHG | WEIGHT: 169 LBS

## 2018-02-28 DIAGNOSIS — D64.9 LOW HEMOGLOBIN: Primary | ICD-10-CM

## 2018-02-28 PROCEDURE — 99214 OFFICE O/P EST MOD 30 MIN: CPT | Performed by: INTERNAL MEDICINE

## 2018-02-28 PROCEDURE — G8420 CALC BMI NORM PARAMETERS: HCPCS | Performed by: INTERNAL MEDICINE

## 2018-02-28 PROCEDURE — G8484 FLU IMMUNIZE NO ADMIN: HCPCS | Performed by: INTERNAL MEDICINE

## 2018-02-28 PROCEDURE — 4004F PT TOBACCO SCREEN RCVD TLK: CPT | Performed by: INTERNAL MEDICINE

## 2018-02-28 PROCEDURE — 3017F COLORECTAL CA SCREEN DOC REV: CPT | Performed by: INTERNAL MEDICINE

## 2018-02-28 PROCEDURE — 4040F PNEUMOC VAC/ADMIN/RCVD: CPT | Performed by: INTERNAL MEDICINE

## 2018-02-28 PROCEDURE — G8427 DOCREV CUR MEDS BY ELIG CLIN: HCPCS | Performed by: INTERNAL MEDICINE

## 2018-02-28 PROCEDURE — 1111F DSCHRG MED/CURRENT MED MERGE: CPT | Performed by: INTERNAL MEDICINE

## 2018-02-28 PROCEDURE — G8598 ASA/ANTIPLAT THER USED: HCPCS | Performed by: INTERNAL MEDICINE

## 2018-02-28 PROCEDURE — 1123F ACP DISCUSS/DSCN MKR DOCD: CPT | Performed by: INTERNAL MEDICINE

## 2018-02-28 ASSESSMENT — ENCOUNTER SYMPTOMS
COUGH: 1
BACK PAIN: 0
DIARRHEA: 0
GASTROINTESTINAL NEGATIVE: 1
CONSTIPATION: 0
BLOOD IN STOOL: 0
ABDOMINAL DISTENTION: 0
SINUS PRESSURE: 1
ANAL BLEEDING: 0
ALLERGIC/IMMUNOLOGIC NEGATIVE: 1
ABDOMINAL PAIN: 0
NAUSEA: 0
RECTAL PAIN: 0
VOMITING: 0

## 2018-02-28 NOTE — PROGRESS NOTES
Hematological: Bruises/bleeds easily. Psychiatric/Behavioral: Positive for sleep disturbance. Objective:   Physical Exam   Constitutional: He is oriented to person, place, and time. He appears well-developed and well-nourished. No distress. HENT:   Head: Normocephalic and atraumatic. Mouth/Throat: Oropharynx is clear and moist.   Eyes: Pupils are equal, round, and reactive to light. No scleral icterus. Neck: Normal range of motion. Neck supple. No JVD present. No tracheal deviation present. No thyromegaly present. Cardiovascular: Normal rate, regular rhythm, normal heart sounds and intact distal pulses. No murmur heard. Pulmonary/Chest: Effort normal and breath sounds normal. No respiratory distress. He has no wheezes. Abdominal: Soft. Bowel sounds are normal. He exhibits no distension and no mass. There is no tenderness. There is no rebound and no guarding. Musculoskeletal: Normal range of motion. He exhibits no edema or tenderness. Neurological: He is alert and oriented to person, place, and time. He has normal reflexes. Skin: Skin is warm. No rash noted. He is not diaphoretic. No erythema. No pallor. He is not diaphoretic   Psychiatric: He has a normal mood and affect. His behavior is normal. Judgment and thought content normal.   Nursing note and vitals reviewed. Assessment:      1. Low hemoglobin  MRI ENTEROGRAPHY           Plan: Will get an MRI enterography to avoid any stopping of his medication.   As mentioned the EGD and colonoscopy were not significant his anemia could be related to multiple myeloma

## 2018-03-07 ENCOUNTER — INITIAL CONSULT (OUTPATIENT)
Dept: CARDIOTHORACIC SURGERY | Age: 73
End: 2018-03-07
Payer: MEDICARE

## 2018-03-07 VITALS
DIASTOLIC BLOOD PRESSURE: 71 MMHG | HEART RATE: 96 BPM | WEIGHT: 179 LBS | TEMPERATURE: 97.4 F | BODY MASS INDEX: 26.43 KG/M2 | SYSTOLIC BLOOD PRESSURE: 102 MMHG | OXYGEN SATURATION: 99 %

## 2018-03-07 DIAGNOSIS — I25.10 CAD, MULTIPLE VESSEL: Primary | ICD-10-CM

## 2018-03-07 DIAGNOSIS — R06.02 SHORTNESS OF BREATH: ICD-10-CM

## 2018-03-07 PROCEDURE — 99213 OFFICE O/P EST LOW 20 MIN: CPT | Performed by: THORACIC SURGERY (CARDIOTHORACIC VASCULAR SURGERY)

## 2018-03-07 PROCEDURE — G8484 FLU IMMUNIZE NO ADMIN: HCPCS | Performed by: THORACIC SURGERY (CARDIOTHORACIC VASCULAR SURGERY)

## 2018-03-07 PROCEDURE — G8419 CALC BMI OUT NRM PARAM NOF/U: HCPCS | Performed by: THORACIC SURGERY (CARDIOTHORACIC VASCULAR SURGERY)

## 2018-03-07 PROCEDURE — 1123F ACP DISCUSS/DSCN MKR DOCD: CPT | Performed by: THORACIC SURGERY (CARDIOTHORACIC VASCULAR SURGERY)

## 2018-03-07 PROCEDURE — 3017F COLORECTAL CA SCREEN DOC REV: CPT | Performed by: THORACIC SURGERY (CARDIOTHORACIC VASCULAR SURGERY)

## 2018-03-07 PROCEDURE — 4040F PNEUMOC VAC/ADMIN/RCVD: CPT | Performed by: THORACIC SURGERY (CARDIOTHORACIC VASCULAR SURGERY)

## 2018-03-07 PROCEDURE — 1111F DSCHRG MED/CURRENT MED MERGE: CPT | Performed by: THORACIC SURGERY (CARDIOTHORACIC VASCULAR SURGERY)

## 2018-03-07 PROCEDURE — G8598 ASA/ANTIPLAT THER USED: HCPCS | Performed by: THORACIC SURGERY (CARDIOTHORACIC VASCULAR SURGERY)

## 2018-03-07 PROCEDURE — 4004F PT TOBACCO SCREEN RCVD TLK: CPT | Performed by: THORACIC SURGERY (CARDIOTHORACIC VASCULAR SURGERY)

## 2018-03-07 PROCEDURE — G8427 DOCREV CUR MEDS BY ELIG CLIN: HCPCS | Performed by: THORACIC SURGERY (CARDIOTHORACIC VASCULAR SURGERY)

## 2018-03-07 RX ORDER — FERROUS SULFATE 325(65) MG
325 TABLET ORAL EVERY OTHER DAY
Status: ON HOLD | COMMUNITY
End: 2019-06-13 | Stop reason: HOSPADM

## 2018-03-07 RX ORDER — DEXAMETHASONE 4 MG/1
40 TABLET ORAL WEEKLY
Status: ON HOLD | COMMUNITY
End: 2019-06-13 | Stop reason: HOSPADM

## 2018-03-07 RX ORDER — LENALIDOMIDE 10 MG/1
10 CAPSULE ORAL DAILY
Status: ON HOLD | COMMUNITY
End: 2019-06-13 | Stop reason: HOSPADM

## 2018-03-07 RX ORDER — TRAZODONE HYDROCHLORIDE 50 MG/1
1 TABLET ORAL NIGHTLY PRN
COMMUNITY
Start: 2018-02-27

## 2018-03-07 RX ORDER — NITROGLYCERIN 0.4 MG/1
TABLET SUBLINGUAL PRN
Status: ON HOLD | COMMUNITY
Start: 2018-01-05 | End: 2018-08-26 | Stop reason: HOSPADM

## 2018-03-07 RX ORDER — FENOFIBRIC ACID 135 MG/1
1 CAPSULE, DELAYED RELEASE ORAL DAILY
Status: ON HOLD | COMMUNITY
Start: 2018-01-08 | End: 2019-06-13 | Stop reason: HOSPADM

## 2018-03-07 NOTE — PROGRESS NOTES
Chief Complaint:  SOB, second opinion for CABG    HPI:  Mr. Denzel Rider is a 81y.o. year-old male who presents with a history of CAD. Patient was scheduled for a CABG on 12/18/18, but was cancelled due to the decision to medically manage the patient. Since that date, the patient has been in and out of the ER with low hemoglobin levels and low blood pressure. Patient has multiple mylenoma and has had blood transfused due to the low HB levels. Patient had an endoscopy and colonoscopy completed where no bleeding was found. Patient is scheduled for an MRE on 3/13/18. Patient admits to being a 1/2 a pack smoker per day and occasional marijuana user. He reports having SOB on exertion and has gotten worse since getting the flu. He was diagnosed with Type B influenza on 1/27/18 and has had a worsening cough since. Patient also admits to having a DVT and has been on Xarelto since May. Patient denies current chest pain, palpitations, syncope, discomfort, pain ,fever, chills, night sweats, stroke, N/V headache, seizures,  GI and kidney issues. Patent admits to COPD but no use of additional oxygen needed. Cardiac workup results are below. He has been referred for consideration of CABG. PMH:   has a past medical history of Arthritis; CAD (coronary artery disease); Cardiomyopathy [facility]); CHF (congestive heart failure) ([facility] [address] COPD (chronic obstructive pulmonary disease) ([facility] [address] Diabetes mellitus ([facility] [address] DVT of leg (deep venous thrombosis) ([facility] [address] Hyperlipidemia; Hypertension; Multiple myeloma ([facility] [address] and Neuropathy ([facility] [address] PSH:   has a past surgical history that includes Cardiac catheterization (12/13/2017); hernia repair (8228); Abdomen surgery (2002); pr colonoscopy flx dx w/collj spec when pfrmd (N/A, 1/14/2018); and pr esophagogastroduodenoscopy transoral diagnostic (N/A, 1/12/2018).   Allergies:  No Known Allergies  Medications:    Current Outpatient Prescriptions:     fenofibric acid (FIBRICOR) 135 MG CPDR capsule, Take 1 capsule

## 2018-03-13 ENCOUNTER — HOSPITAL ENCOUNTER (OUTPATIENT)
Dept: MRI IMAGING | Age: 73
Discharge: HOME | End: 2018-03-15
Payer: MEDICARE

## 2018-03-13 DIAGNOSIS — D64.9 LOW HEMOGLOBIN: ICD-10-CM

## 2018-03-13 PROCEDURE — 72197 MRI PELVIS W/O & W/DYE: CPT

## 2018-03-13 PROCEDURE — 6360000004 HC RX CONTRAST MEDICATION: Performed by: INTERNAL MEDICINE

## 2018-03-13 PROCEDURE — A9579 GAD-BASE MR CONTRAST NOS,1ML: HCPCS | Performed by: INTERNAL MEDICINE

## 2018-03-13 RX ORDER — SODIUM CHLORIDE 0.9 % (FLUSH) 0.9 %
20 SYRINGE (ML) INJECTION PRN
Status: DISCONTINUED | OUTPATIENT
Start: 2018-03-13 | End: 2018-03-16 | Stop reason: HOSPADM

## 2018-03-13 RX ADMIN — GADOTERIDOL 16 ML: 279.3 INJECTION, SOLUTION INTRAVENOUS at 11:05

## 2018-06-27 ENCOUNTER — OFFICE VISIT (OUTPATIENT)
Dept: GASTROENTEROLOGY | Age: 73
End: 2018-06-27
Payer: MEDICARE

## 2018-06-27 VITALS
SYSTOLIC BLOOD PRESSURE: 122 MMHG | DIASTOLIC BLOOD PRESSURE: 72 MMHG | HEART RATE: 78 BPM | OXYGEN SATURATION: 100 % | RESPIRATION RATE: 14 BRPM | HEIGHT: 70 IN

## 2018-06-27 DIAGNOSIS — D64.9 LOW HEMOGLOBIN: Primary | ICD-10-CM

## 2018-06-27 DIAGNOSIS — D49.0 IPMN (INTRADUCTAL PAPILLARY MUCINOUS NEOPLASM): ICD-10-CM

## 2018-06-27 DIAGNOSIS — C90.01 MULTIPLE MYELOMA IN REMISSION (HCC): ICD-10-CM

## 2018-06-27 PROCEDURE — 4004F PT TOBACCO SCREEN RCVD TLK: CPT | Performed by: INTERNAL MEDICINE

## 2018-06-27 PROCEDURE — 3017F COLORECTAL CA SCREEN DOC REV: CPT | Performed by: INTERNAL MEDICINE

## 2018-06-27 PROCEDURE — 99214 OFFICE O/P EST MOD 30 MIN: CPT | Performed by: INTERNAL MEDICINE

## 2018-06-27 PROCEDURE — 4040F PNEUMOC VAC/ADMIN/RCVD: CPT | Performed by: INTERNAL MEDICINE

## 2018-06-27 PROCEDURE — G8419 CALC BMI OUT NRM PARAM NOF/U: HCPCS | Performed by: INTERNAL MEDICINE

## 2018-06-27 PROCEDURE — 1123F ACP DISCUSS/DSCN MKR DOCD: CPT | Performed by: INTERNAL MEDICINE

## 2018-06-27 PROCEDURE — G8598 ASA/ANTIPLAT THER USED: HCPCS | Performed by: INTERNAL MEDICINE

## 2018-06-27 PROCEDURE — G8427 DOCREV CUR MEDS BY ELIG CLIN: HCPCS | Performed by: INTERNAL MEDICINE

## 2018-06-27 ASSESSMENT — ENCOUNTER SYMPTOMS
BACK PAIN: 0
COUGH: 1
ABDOMINAL PAIN: 0
CONSTIPATION: 0
ALLERGIC/IMMUNOLOGIC NEGATIVE: 1
NAUSEA: 0
SINUS PRESSURE: 0
GASTROINTESTINAL NEGATIVE: 1
ABDOMINAL DISTENTION: 0
BLOOD IN STOOL: 0
VOMITING: 0
RECTAL PAIN: 0
SHORTNESS OF BREATH: 1
DIARRHEA: 0
ANAL BLEEDING: 0

## 2018-08-08 ENCOUNTER — OFFICE VISIT (OUTPATIENT)
Dept: CARDIOTHORACIC SURGERY | Age: 73
End: 2018-08-08
Payer: MEDICARE

## 2018-08-08 VITALS
SYSTOLIC BLOOD PRESSURE: 120 MMHG | TEMPERATURE: 97.5 F | WEIGHT: 184 LBS | HEART RATE: 109 BPM | BODY MASS INDEX: 26.34 KG/M2 | DIASTOLIC BLOOD PRESSURE: 68 MMHG | HEIGHT: 70 IN

## 2018-08-08 DIAGNOSIS — Z01.818 PRE-OP TESTING: ICD-10-CM

## 2018-08-08 DIAGNOSIS — I25.10 MULTIPLE VESSEL CORONARY ARTERY DISEASE: Primary | ICD-10-CM

## 2018-08-08 PROCEDURE — 1101F PT FALLS ASSESS-DOCD LE1/YR: CPT | Performed by: THORACIC SURGERY (CARDIOTHORACIC VASCULAR SURGERY)

## 2018-08-08 PROCEDURE — 4004F PT TOBACCO SCREEN RCVD TLK: CPT | Performed by: THORACIC SURGERY (CARDIOTHORACIC VASCULAR SURGERY)

## 2018-08-08 PROCEDURE — G8427 DOCREV CUR MEDS BY ELIG CLIN: HCPCS | Performed by: THORACIC SURGERY (CARDIOTHORACIC VASCULAR SURGERY)

## 2018-08-08 PROCEDURE — 1123F ACP DISCUSS/DSCN MKR DOCD: CPT | Performed by: THORACIC SURGERY (CARDIOTHORACIC VASCULAR SURGERY)

## 2018-08-08 PROCEDURE — G8598 ASA/ANTIPLAT THER USED: HCPCS | Performed by: THORACIC SURGERY (CARDIOTHORACIC VASCULAR SURGERY)

## 2018-08-08 PROCEDURE — 3017F COLORECTAL CA SCREEN DOC REV: CPT | Performed by: THORACIC SURGERY (CARDIOTHORACIC VASCULAR SURGERY)

## 2018-08-08 PROCEDURE — 99214 OFFICE O/P EST MOD 30 MIN: CPT | Performed by: THORACIC SURGERY (CARDIOTHORACIC VASCULAR SURGERY)

## 2018-08-08 PROCEDURE — G8419 CALC BMI OUT NRM PARAM NOF/U: HCPCS | Performed by: THORACIC SURGERY (CARDIOTHORACIC VASCULAR SURGERY)

## 2018-08-08 PROCEDURE — 4040F PNEUMOC VAC/ADMIN/RCVD: CPT | Performed by: THORACIC SURGERY (CARDIOTHORACIC VASCULAR SURGERY)

## 2018-08-08 ASSESSMENT — ENCOUNTER SYMPTOMS
DIARRHEA: 0
WHEEZING: 0
COUGH: 0
VOMITING: 0
CHEST TIGHTNESS: 0
SHORTNESS OF BREATH: 1
CONSTIPATION: 0
ABDOMINAL PAIN: 0
NAUSEA: 0

## 2018-08-08 NOTE — PROGRESS NOTES
36%.  Evidence of diastolic dysfunction. Left atrium is at upper limits of normal with increased LA volume. Cath 12/13/17   Cardiac Arteries and Lesion Findings     LMCA: Mild irregularities 10-20%. LAD: 70% heavily calcified stenosis, FFR 0.79       Lesion on Prox LAD: Proximal subsection. 70% stenosis 15 mm length. Pre    procedure RITA III flow was noted. Good runoff was present.       Devices used       - Verrata Pressure Wire . 014. Number of passes: 1.     LCx: 80% long segment stenosis    RCA: 100% proximal stenosis, left to right collaterals     Coronary Tree      Dominance: Right     LV Analysis  LV function assessed as:Abnormal.  Ejection Fraction  +----------------------------------------------------------------------+---+  ! Method                                                                !EF%! +----------------------------------------------------------------------+---+  ! LV gram                                                               !20 !  +----------------------------------------------------------------------+---+    Assessment & Plan:   1. R/B/A of CABG surgery reviewed with patient and family. Patient understands and agrees to proceed with surgery. Plan for surgery next Wednesday, August 15 at 10 AM with Dr. Junaid Russell. Patient to stop Xarelto on Sunday. Patient had carotids and vein mapping done in December, no need to repeat. Patient will need PFTs and other PAT's. Patient educated on smoking cessation. 2. Follow up with Dr. Junaid Russell as needed    The above recommendations including medications and orders were discussed and agreed upon with Dr. Junaid Russell.     Electronically signed by BI Kern on 8/8/2018 at 10:59 AM

## 2018-08-09 ENCOUNTER — HOSPITAL ENCOUNTER (OUTPATIENT)
Dept: PULMONOLOGY | Age: 73
Discharge: HOME | End: 2018-08-09
Payer: MEDICARE

## 2018-08-09 ENCOUNTER — HOSPITAL ENCOUNTER (OUTPATIENT)
Dept: GENERAL RADIOLOGY | Age: 73
Discharge: HOME | End: 2018-08-11
Payer: MEDICARE

## 2018-08-09 ENCOUNTER — HOSPITAL ENCOUNTER (OUTPATIENT)
Dept: PREADMISSION TESTING | Age: 73
Discharge: HOME | End: 2018-08-13
Payer: MEDICARE

## 2018-08-09 VITALS
BODY MASS INDEX: 25.77 KG/M2 | HEIGHT: 70 IN | WEIGHT: 180 LBS | HEART RATE: 79 BPM | DIASTOLIC BLOOD PRESSURE: 76 MMHG | OXYGEN SATURATION: 100 % | TEMPERATURE: 98.6 F | SYSTOLIC BLOOD PRESSURE: 127 MMHG | RESPIRATION RATE: 16 BRPM

## 2018-08-09 LAB
ABSOLUTE EOS #: 0.53 K/UL (ref 0–0.4)
ABSOLUTE IMMATURE GRANULOCYTE: 0.08 K/UL (ref 0–0.3)
ABSOLUTE LYMPH #: 1.58 K/UL (ref 1–4.8)
ABSOLUTE MONO #: 1.35 K/UL (ref 0.1–0.8)
ALBUMIN SERPL-MCNC: 3.6 G/DL (ref 3.5–5.2)
ALBUMIN/GLOBULIN RATIO: 1.4 (ref 1–2.5)
ALLEN TEST: ABNORMAL
ALP BLD-CCNC: 58 U/L (ref 40–129)
ALT SERPL-CCNC: 23 U/L (ref 5–41)
ANION GAP SERPL CALCULATED.3IONS-SCNC: 12 MMOL/L (ref 9–17)
AST SERPL-CCNC: 11 U/L
BASOPHILS # BLD: 0 % (ref 0–2)
BASOPHILS ABSOLUTE: 0 K/UL (ref 0–0.2)
BILIRUB SERPL-MCNC: 0.31 MG/DL (ref 0.3–1.2)
BILIRUBIN URINE: NEGATIVE
BUN BLDV-MCNC: 14 MG/DL (ref 8–23)
BUN/CREAT BLD: ABNORMAL (ref 9–20)
CALCIUM SERPL-MCNC: 9.2 MG/DL (ref 8.6–10.4)
CARBOXYHEMOGLOBIN: 7.4 % (ref 0–5)
CHLORIDE BLD-SCNC: 103 MMOL/L (ref 98–107)
CO2: 29 MMOL/L (ref 20–31)
COLOR: YELLOW
COMMENT UA: ABNORMAL
CREAT SERPL-MCNC: 0.87 MG/DL (ref 0.7–1.2)
DIFFERENTIAL TYPE: ABNORMAL
EKG ATRIAL RATE: 85 BPM
EKG P AXIS: 71 DEGREES
EKG P-R INTERVAL: 144 MS
EKG Q-T INTERVAL: 452 MS
EKG QRS DURATION: 152 MS
EKG QTC CALCULATION (BAZETT): 537 MS
EKG R AXIS: -48 DEGREES
EKG T AXIS: 71 DEGREES
EKG VENTRICULAR RATE: 85 BPM
EOSINOPHILS RELATIVE PERCENT: 7 % (ref 1–4)
FIO2: ABNORMAL
GFR AFRICAN AMERICAN: >60 ML/MIN
GFR NON-AFRICAN AMERICAN: >60 ML/MIN
GFR SERPL CREATININE-BSD FRML MDRD: ABNORMAL ML/MIN/{1.73_M2}
GFR SERPL CREATININE-BSD FRML MDRD: ABNORMAL ML/MIN/{1.73_M2}
GLUCOSE BLD-MCNC: 85 MG/DL (ref 70–99)
GLUCOSE URINE: NEGATIVE
HCO3 ARTERIAL: 27.5 MMOL/L (ref 22–27)
HCT VFR BLD CALC: 31.3 % (ref 40.7–50.3)
HEMOGLOBIN: 8.8 G/DL (ref 13–17)
IMMATURE GRANULOCYTES: 1 %
INR BLD: 1.3
KETONES, URINE: ABNORMAL
LEUKOCYTE ESTERASE, URINE: NEGATIVE
LYMPHOCYTES # BLD: 21 % (ref 24–44)
MCH RBC QN AUTO: 24.9 PG (ref 25.2–33.5)
MCHC RBC AUTO-ENTMCNC: 28.1 G/DL (ref 28.4–34.8)
MCV RBC AUTO: 88.4 FL (ref 82.6–102.9)
METHEMOGLOBIN: ABNORMAL % (ref 0–1.5)
MODE: ABNORMAL
MONOCYTES # BLD: 18 % (ref 1–7)
MORPHOLOGY: ABNORMAL
MRSA, DNA, NASAL: NORMAL
NEGATIVE BASE EXCESS, ART: ABNORMAL MMOL/L (ref 0–2)
NITRITE, URINE: NEGATIVE
NOTIFICATION TIME: ABNORMAL
NOTIFICATION: ABNORMAL
NRBC AUTOMATED: 1.3 PER 100 WBC
O2 DEVICE/FLOW/%: ABNORMAL
O2 SAT, ARTERIAL: 97.1 % (ref 94–100)
OXYHEMOGLOBIN: ABNORMAL % (ref 95–98)
PARTIAL THROMBOPLASTIN TIME: 29.9 SEC (ref 20.5–30.5)
PATIENT TEMP: 37
PCO2 ARTERIAL: 40.3 MMHG (ref 32–45)
PCO2, ART, TEMP ADJ: ABNORMAL (ref 32–45)
PDW BLD-RTO: 17.2 % (ref 11.8–14.4)
PEEP/CPAP: ABNORMAL
PH ARTERIAL: 7.45 (ref 7.35–7.45)
PH UA: 6.5 (ref 5–8)
PH, ART, TEMP ADJ: ABNORMAL (ref 7.35–7.45)
PLATELET # BLD: 344 K/UL (ref 138–453)
PLATELET ESTIMATE: ABNORMAL
PMV BLD AUTO: 10.4 FL (ref 8.1–13.5)
PO2 ARTERIAL: 71.4 MMHG (ref 75–95)
PO2, ART, TEMP ADJ: ABNORMAL MMHG (ref 75–95)
POSITIVE BASE EXCESS, ART: 3.6 MMOL/L (ref 0–2)
POTASSIUM SERPL-SCNC: 3.3 MMOL/L (ref 3.7–5.3)
PROTEIN UA: NEGATIVE
PROTHROMBIN TIME: 14 SEC (ref 9–12)
PSV: ABNORMAL
PT. POSITION: ABNORMAL
RBC # BLD: 3.54 M/UL (ref 4.21–5.77)
RBC # BLD: ABNORMAL 10*6/UL
RESPIRATORY RATE: ABNORMAL
SAMPLE SITE: ABNORMAL
SEG NEUTROPHILS: 53 % (ref 36–66)
SEGMENTED NEUTROPHILS ABSOLUTE COUNT: 3.96 K/UL (ref 1.8–7.7)
SET RATE: ABNORMAL
SODIUM BLD-SCNC: 144 MMOL/L (ref 135–144)
SPECIFIC GRAVITY UA: 1.02 (ref 1–1.03)
SPECIMEN DESCRIPTION: NORMAL
TEXT FOR RESPIRATORY: ABNORMAL
TOTAL HB: ABNORMAL G/DL (ref 12–16)
TOTAL PROTEIN: 6.1 G/DL (ref 6.4–8.3)
TOTAL RATE: ABNORMAL
TURBIDITY: CLEAR
URINE HGB: NEGATIVE
UROBILINOGEN, URINE: NORMAL
VT: ABNORMAL
WBC # BLD: 7.5 K/UL (ref 3.5–11.3)
WBC # BLD: ABNORMAL 10*3/UL

## 2018-08-09 PROCEDURE — 94727 GAS DIL/WSHOT DETER LNG VOL: CPT

## 2018-08-09 PROCEDURE — 94726 PLETHYSMOGRAPHY LUNG VOLUMES: CPT

## 2018-08-09 PROCEDURE — 81003 URINALYSIS AUTO W/O SCOPE: CPT

## 2018-08-09 PROCEDURE — 86901 BLOOD TYPING SEROLOGIC RH(D): CPT

## 2018-08-09 PROCEDURE — 93005 ELECTROCARDIOGRAM TRACING: CPT

## 2018-08-09 PROCEDURE — 86850 RBC ANTIBODY SCREEN: CPT

## 2018-08-09 PROCEDURE — 94664 DEMO&/EVAL PT USE INHALER: CPT

## 2018-08-09 PROCEDURE — 71046 X-RAY EXAM CHEST 2 VIEWS: CPT

## 2018-08-09 PROCEDURE — 85610 PROTHROMBIN TIME: CPT

## 2018-08-09 PROCEDURE — 86900 BLOOD TYPING SEROLOGIC ABO: CPT

## 2018-08-09 PROCEDURE — 82805 BLOOD GASES W/O2 SATURATION: CPT

## 2018-08-09 PROCEDURE — 87086 URINE CULTURE/COLONY COUNT: CPT

## 2018-08-09 PROCEDURE — 36415 COLL VENOUS BLD VENIPUNCTURE: CPT

## 2018-08-09 PROCEDURE — 36600 WITHDRAWAL OF ARTERIAL BLOOD: CPT

## 2018-08-09 PROCEDURE — 85025 COMPLETE CBC W/AUTO DIFF WBC: CPT

## 2018-08-09 PROCEDURE — 94729 DIFFUSING CAPACITY: CPT

## 2018-08-09 PROCEDURE — 94640 AIRWAY INHALATION TREATMENT: CPT

## 2018-08-09 PROCEDURE — 87641 MR-STAPH DNA AMP PROBE: CPT

## 2018-08-09 PROCEDURE — 94060 EVALUATION OF WHEEZING: CPT

## 2018-08-09 PROCEDURE — 85730 THROMBOPLASTIN TIME PARTIAL: CPT

## 2018-08-09 PROCEDURE — 80053 COMPREHEN METABOLIC PANEL: CPT

## 2018-08-09 PROCEDURE — 88738 HGB QUANT TRANSCUTANEOUS: CPT

## 2018-08-09 RX ORDER — POTASSIUM CHLORIDE 750 MG/1
10 CAPSULE, EXTENDED RELEASE ORAL 2 TIMES DAILY
COMMUNITY
End: 2020-04-10 | Stop reason: SDUPTHER

## 2018-08-09 RX ORDER — LATANOPROST 50 UG/ML
1 SOLUTION/ DROPS OPHTHALMIC NIGHTLY
COMMUNITY
End: 2019-09-18 | Stop reason: ALTCHOICE

## 2018-08-09 RX ORDER — SODIUM CHLORIDE, SODIUM LACTATE, POTASSIUM CHLORIDE, CALCIUM CHLORIDE 600; 310; 30; 20 MG/100ML; MG/100ML; MG/100ML; MG/100ML
1000 INJECTION, SOLUTION INTRAVENOUS CONTINUOUS
Status: CANCELLED | OUTPATIENT
Start: 2018-08-09

## 2018-08-09 NOTE — PRE-PROCEDURE INSTRUCTIONS
CV[facility] Pre-op teaching done with pt and his spouse during PAT, questions answered    OR date: Aug 15, 2018 Wednesday 10am arrival 7am   Procedure Planned:  Cabg  Surgeon:  C. Mandy Harada    Family present: spouse    Pt nickname:  Linda  CONTACT PERSONS :    #1 Fannie Burns                                          [phone]    #2 Kailee Mei [phone]      Content of Class  Waiting room                                  Chest tubes                                  General Routines  Visiting hours                                  Invasive lines                                 Incentive spirometer  ETT/Wean                                      Pacemaker wires                          C&DB  Hibiclens shower                            Discharge plans                              Post-[facility]    DISCHARGE PLANNING: goal is to go home they will think about VNS as spouse is a RN but worked NICU    Living Arrangements: Lives with spouse in [address] home with 8 steps up to [address] and BR 7 steps down to FR    Will there be someone at home to assist you at discharge: yes spouse    : discharge planning     Face sheet verified address ph# and PCP Dr. João Cooper as correct. He is also seen in the [place] with Dr. Regino Wharton. He was not very active at home \"I have a lot of medical issues\"  He does ambulate without any assistive devices. He has been treated for Multiple Myeloma and is currently receiving steroids and has elevated Glucose levels. He has some pedal edema whiich he states is normal for him and takes Lasix daily. They stated he has not been taking his Coreg related to his BP low 90's sys and plan on calling the Cardiologist office today regarding when to hold med as I explained it was an important medication.     Hub sent correction of face sheet to add cell phone number of Hilton Lam pt's spouse

## 2018-08-10 LAB
CULTURE: NORMAL
Lab: NORMAL
SPECIMEN DESCRIPTION: NORMAL
STATUS: NORMAL

## 2018-08-10 NOTE — PROCEDURES
[address] [address]                                PULMONARY FUNCTION    PATIENT NAME: Max Gates                       :        1945  MED REC NO:   [id number]                             ROOM:  ACCOUNT NO:   [de-identified]                           ADMIT DATE: 2018  PROVIDER:     Laureano Weinstein    DATE OF PROCEDURE:  2018    The patient's flow-volume loop shows a combined obstructive and restrictive  defect. FEV1 is 68% of predicted with 4% bronchodilator change. FVC is  71% of predicted with 2% bronchodilator change. FEV1/FVC is ratio is 73. Lung volumes by body box show TLC 74% of predicted, % of predicted. Diffusion capacity is severely reduced at 38% of predicted. 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 Diffusion capacity is  severely reduced, which could be due to underlying anemia, parenchymal  disease, or pulmonary vascular disease. Clinical correlation is advised.         Maribel Abdul    D: 2018 21:53:54       T: 08/10/2018 3:22:36     [email or web address]  Job#: [id number]     Doc#: [id number]    CC:

## 2018-08-14 PROCEDURE — 99024 POSTOP FOLLOW-UP VISIT: CPT | Performed by: NURSE PRACTITIONER

## 2018-08-14 ASSESSMENT — ENCOUNTER SYMPTOMS
VOMITING: 0
EYE DISCHARGE: 0
NAUSEA: 0
CONSTIPATION: 0
TROUBLE SWALLOWING: 0
COUGH: 0
SHORTNESS OF BREATH: 1
ABDOMINAL PAIN: 0
CHEST TIGHTNESS: 0
EYE REDNESS: 0

## 2018-08-14 NOTE — H&P
disturbance. The patient is not nervous/anxious. Past Medical History:   Diagnosis Date    Arthritis     all over    CAD (coronary artery disease)     [facility]     CHF (congestive heart failure) (HCC)     COPD (chronic obstructive pulmonary disease) ([facility]     Diabetes mellitus ([facility]     DVT of leg (deep venous thrombosis) ([facility]     RIGHT    Hyperlipidemia     Hypertension     Multiple myeloma ([facility] 2014    Neuropathy     Wears dentures     FULL UPPER, PARTIAL LOWER     Past Surgical History:   Procedure Laterality Date    ABDOMEN SURGERY  2002    STOMAL TUMOR    CARDIAC CATHETERIZATION  12/13/2017    right and left heart cath with Dr. Romie Boas Bilateral     HERNIA REPAIR  1967    [place] COLONOSCOPY FLX DX W/COLLJ SPEC WHEN PFRMD N/A 1/14/2018    COLONOSCOPY performed by Sharif Gorman MD at [address] [place] [address] ESOPHAGOGASTRODUODENOSCOPY TRANSORAL DIAGNOSTIC N/A 1/12/2018    EGD DIAGNOSTIC ONLY performed by Barrett Cabrera MD at [facility] [address] Right      Allergies   Allergen Reactions    Nuts [Peanut-Containing Drug Products] Other (See Comments)     abd pain     Family History   Problem Relation Age of Onset    Diabetes Mother     Stroke Father     Other Sister         PE    Stroke Brother      Social History     Social History    Marital status:      Spouse name: N/A    Number of children: N/A    Years of education: N/A     Occupational History    Not on file.      Social History Main Topics    Smoking status: Current Every Day Smoker     Packs/day: 0.50     Years: 30.00     Types: Cigarettes    Smokeless tobacco: Never Used      Comment: a little less than a pack a day    Alcohol use Yes      Comment: rarely    Drug use: Yes     Types: Marijuana      Comment: 0.5 daily     Sexual activity: Not on file     Other Topics Concern    Not on file     Social History Narrative    No narrative on file       No current Exam:  There were no vitals filed for this visit. Weight:           No intake/output data recorded. Physical Exam   Constitutional: He is oriented to person, place, and time. He appears well-developed and well-nourished. HENT:   Head: Normocephalic and atraumatic. Eyes: Pupils are equal, round, and reactive to light. Conjunctivae are normal. Left eye exhibits no discharge. Neck: Normal range of motion. Neck supple. No JVD present. Cardiovascular: Normal rate, regular rhythm, normal heart sounds and intact distal pulses. PMI is not displaced. Exam reveals no gallop, no distant heart sounds and no friction rub. No murmur heard. Pulmonary/Chest: Effort normal and breath sounds normal.   Abdominal: Soft. Bowel sounds are normal.   Musculoskeletal: Normal range of motion. Neurological: He is alert and oriented to person, place, and time. He has normal reflexes. Skin: Skin is warm and dry. Psychiatric: He has a normal mood and affect. His behavior is normal. Judgment and thought content normal.   Nursing note and vitals reviewed. Data:    CBC: No results for input(s): WBC, HGB, HCT, MCV, PLT in the last 72 hours. BMP: No results for input(s): NA, K, CL, CO2, PHOS, BUN, CREATININE, MG in the last 72 hours. Invalid input(s): CA  Accucheck Glucoses:   No results for input(s): POCGLU in the last 72 hours. Cardiac Enzymes: No results for input(s): CKTOTAL, CKMB, CKMBINDEX, TROPONINI in the last 72 hours. PTT/PT/INR:   No results for input(s): PROTIME, INR in the last 72 hours. No results for input(s): APTT in the last 72 hours.   Liver Profile:  Lab Results   Component Value Date    AST 11 08/09/2018    ALT 23 08/09/2018    BILITOT 0.31 08/09/2018    ALKPHOS 58 08/09/2018     Lab Results   Component Value Date    CHOL 98 12/14/2017    HDL 32 12/14/2017    TRIG 166 12/14/2017     TSH:   Lab Results   Component Value Date    TSH 1.13 12/14/2017     UA:   Lab Results   Component Value Date COLORU YELLOW 08/09/2018    PHUR 6.5 08/09/2018    WBCUA 0 TO 2 02/04/2018    RBCUA 0 TO 2 02/04/2018    MUCUS NOT REPORTED 02/04/2018    TRICHOMONAS NOT REPORTED 02/04/2018    YEAST NOT REPORTED 02/04/2018    BACTERIA NOT REPORTED 02/04/2018    SPECGRAV 1.022 08/09/2018    LEUKOCYTESUR NEGATIVE 08/09/2018    UROBILINOGEN Normal 08/09/2018    BILIRUBINUR NEGATIVE 08/09/2018    GLUCOSEU NEGATIVE 08/09/2018    AMORPHOUS NOT REPORTED 02/04/2018       Imaging Studies:    ECHO 12/15/17  Summary  LV chamber dimension is mildly dilated with mild left ventricular  hypertrophy. Systolic function is moderately globally reduced with a  calculated EF of 36%. Evidence of diastolic dysfunction. Left atrium is at upper limits of normal with increased LA volume.     Cath 12/13/17   Cardiac Arteries and Lesion Findings     LMCA: Mild irregularities 10-20%. LAD: 70% heavily calcified stenosis, FFR 0.79       Lesion on Prox LAD: Proximal subsection. 70% stenosis 15 mm length. Pre    procedure RITA III flow was noted. Good runoff was present.       Devices used       - Crucellrata Pressure Wire . 014. Number of passes: 1.     LCx: 80% long segment stenosis    RCA: 100% proximal stenosis, left to right collaterals     Coronary Tree      Dominance: Right     LV Analysis  LV function assessed as:Abnormal.  Ejection Fraction  +----------------------------------------------------------------------+---+  ! Method                                                                !EF%! +----------------------------------------------------------------------+---+  ! LV gram                                                               !20 !  +----------------------------------------------------------------------+---+       Assessment & Plan:  Patient Active Problem List   Diagnosis    Acute on chronic systolic CHF (congestive heart failure) (HCC)    Multiple vessel coronary artery disease    Multiple myeloma not having achieved remission ([facility]    Chronic obstructive pulmonary disease (HCC)    Low hemoglobin    Hypokalemia    Other drug-induced pancytopenia (HCC)    RANDALL (acute kidney injury) ([facility]     Plan for CABG tomorrow with Dr. Sandy Lawrence. Pre op testing reviewed. The above recommendations including medications and orders were discussed and agreed upon with Dr. Sandy Lawrence, the attending on service for the cardiothoracic surgery group today.      CASSY Suggs CNP

## 2018-08-15 ENCOUNTER — ANESTHESIA EVENT (OUTPATIENT)
Dept: OPERATING ROOM | Age: 73
DRG: 235 | End: 2018-08-15
Payer: MEDICARE

## 2018-08-15 ENCOUNTER — HOSPITAL ENCOUNTER (INPATIENT)
Age: 73
LOS: 11 days | Discharge: NURSING, REHAB OR LONG-TERM CARE | DRG: 235 | End: 2018-08-26
Attending: THORACIC SURGERY (CARDIOTHORACIC VASCULAR SURGERY) | Admitting: THORACIC SURGERY (CARDIOTHORACIC VASCULAR SURGERY)
Payer: MEDICARE

## 2018-08-15 DIAGNOSIS — Z95.1 S/P CABG (CORONARY ARTERY BYPASS GRAFT): Primary | ICD-10-CM

## 2018-08-15 PROCEDURE — 2580000003 HC RX 258: Performed by: THORACIC SURGERY (CARDIOTHORACIC VASCULAR SURGERY)

## 2018-08-15 PROCEDURE — 2500000003 HC RX 250 WO HCPCS: Performed by: THORACIC SURGERY (CARDIOTHORACIC VASCULAR SURGERY)

## 2018-08-15 PROCEDURE — 6370000000 HC RX 637 (ALT 250 FOR IP): Performed by: PHYSICIAN ASSISTANT

## 2018-08-15 PROCEDURE — 6360000002 HC RX W HCPCS: Performed by: THORACIC SURGERY (CARDIOTHORACIC VASCULAR SURGERY)

## 2018-08-15 PROCEDURE — 6370000000 HC RX 637 (ALT 250 FOR IP): Performed by: THORACIC SURGERY (CARDIOTHORACIC VASCULAR SURGERY)

## 2018-08-15 PROCEDURE — 6370000000 HC RX 637 (ALT 250 FOR IP): Performed by: NURSE PRACTITIONER

## 2018-08-15 PROCEDURE — 2000000000 HC ICU R&B

## 2018-08-15 PROCEDURE — 2580000003 HC RX 258: Performed by: NURSE PRACTITIONER

## 2018-08-15 PROCEDURE — 6370000000 HC RX 637 (ALT 250 FOR IP)

## 2018-08-15 RX ORDER — CARVEDILOL 3.12 MG/1
3.12 TABLET ORAL ONCE
Status: CANCELLED | OUTPATIENT
Start: 2018-08-15 | End: 2018-08-15

## 2018-08-15 RX ORDER — TRAZODONE HYDROCHLORIDE 50 MG/1
50 TABLET ORAL NIGHTLY
Status: DISCONTINUED | OUTPATIENT
Start: 2018-08-15 | End: 2018-08-16

## 2018-08-15 RX ORDER — SODIUM CHLORIDE, SODIUM LACTATE, POTASSIUM CHLORIDE, CALCIUM CHLORIDE 600; 310; 30; 20 MG/100ML; MG/100ML; MG/100ML; MG/100ML
INJECTION, SOLUTION INTRAVENOUS CONTINUOUS
Status: DISCONTINUED | OUTPATIENT
Start: 2018-08-15 | End: 2018-08-16

## 2018-08-15 RX ORDER — SODIUM CHLORIDE 0.9 % (FLUSH) 0.9 %
10 SYRINGE (ML) INJECTION PRN
Status: CANCELLED | OUTPATIENT
Start: 2018-08-15

## 2018-08-15 RX ORDER — ASPIRIN 81 MG/1
81 TABLET ORAL
Status: DISCONTINUED | OUTPATIENT
Start: 2018-08-15 | End: 2018-08-16

## 2018-08-15 RX ORDER — CARVEDILOL 3.12 MG/1
3.12 TABLET ORAL 2 TIMES DAILY WITH MEALS
Status: DISCONTINUED | OUTPATIENT
Start: 2018-08-15 | End: 2018-08-16

## 2018-08-15 RX ORDER — AMIODARONE HYDROCHLORIDE 200 MG/1
200 TABLET ORAL 3 TIMES DAILY
Status: DISCONTINUED | OUTPATIENT
Start: 2018-08-15 | End: 2018-08-16

## 2018-08-15 RX ORDER — GABAPENTIN 300 MG/1
300 CAPSULE ORAL DAILY
Status: DISCONTINUED | OUTPATIENT
Start: 2018-08-15 | End: 2018-08-26 | Stop reason: HOSPADM

## 2018-08-15 RX ORDER — LATANOPROST 50 UG/ML
1 SOLUTION/ DROPS OPHTHALMIC NIGHTLY
Status: DISCONTINUED | OUTPATIENT
Start: 2018-08-15 | End: 2018-08-26 | Stop reason: HOSPADM

## 2018-08-15 RX ORDER — ATORVASTATIN CALCIUM 40 MG/1
40 TABLET, FILM COATED ORAL NIGHTLY
Status: DISCONTINUED | OUTPATIENT
Start: 2018-08-15 | End: 2018-08-16

## 2018-08-15 RX ORDER — SODIUM CHLORIDE 9 MG/ML
INJECTION, SOLUTION INTRAVENOUS CONTINUOUS
Status: CANCELLED | OUTPATIENT
Start: 2018-08-16

## 2018-08-15 RX ORDER — SODIUM CHLORIDE 0.9 % (FLUSH) 0.9 %
10 SYRINGE (ML) INJECTION EVERY 12 HOURS SCHEDULED
Status: CANCELLED | OUTPATIENT
Start: 2018-08-15

## 2018-08-15 RX ORDER — MAGNESIUM HYDROXIDE 1200 MG/15ML
LIQUID ORAL CONTINUOUS PRN
Status: COMPLETED | OUTPATIENT
Start: 2018-08-15 | End: 2018-08-16

## 2018-08-15 RX ORDER — CHLORHEXIDINE GLUCONATE 0.12 MG/ML
15 RINSE ORAL ONCE
Status: COMPLETED | OUTPATIENT
Start: 2018-08-15 | End: 2018-08-15

## 2018-08-15 RX ORDER — CHLORHEXIDINE GLUCONATE 4 G/100ML
SOLUTION TOPICAL SEE ADMIN INSTRUCTIONS
Status: CANCELLED | OUTPATIENT
Start: 2018-08-15

## 2018-08-15 RX ORDER — ASPIRIN 81 MG/1
81 TABLET ORAL
Status: CANCELLED | OUTPATIENT
Start: 2018-08-15 | End: 2018-08-15

## 2018-08-15 RX ORDER — CHLORHEXIDINE GLUCONATE 4 G/100ML
SOLUTION TOPICAL SEE ADMIN INSTRUCTIONS
Status: DISCONTINUED | OUTPATIENT
Start: 2018-08-15 | End: 2018-08-16 | Stop reason: HOSPADM

## 2018-08-15 RX ORDER — SODIUM CHLORIDE 0.9 % (FLUSH) 0.9 %
10 SYRINGE (ML) INJECTION EVERY 12 HOURS SCHEDULED
Status: DISCONTINUED | OUTPATIENT
Start: 2018-08-15 | End: 2018-08-16

## 2018-08-15 RX ORDER — ASPIRIN 81 MG/1
TABLET, CHEWABLE ORAL
Status: COMPLETED
Start: 2018-08-15 | End: 2018-08-15

## 2018-08-15 RX ORDER — CARVEDILOL 3.12 MG/1
3.12 TABLET ORAL ONCE
Status: COMPLETED | OUTPATIENT
Start: 2018-08-15 | End: 2018-08-15

## 2018-08-15 RX ORDER — SODIUM CHLORIDE 0.9 % (FLUSH) 0.9 %
10 SYRINGE (ML) INJECTION PRN
Status: DISCONTINUED | OUTPATIENT
Start: 2018-08-15 | End: 2018-08-16

## 2018-08-15 RX ORDER — CHLORHEXIDINE GLUCONATE 0.12 MG/ML
15 RINSE ORAL ONCE
Status: CANCELLED | OUTPATIENT
Start: 2018-08-15 | End: 2018-08-15

## 2018-08-15 RX ORDER — POLYVINYL ALCOHOL 14 MG/ML
1 SOLUTION/ DROPS OPHTHALMIC PRN
Status: DISCONTINUED | OUTPATIENT
Start: 2018-08-15 | End: 2018-08-26 | Stop reason: HOSPADM

## 2018-08-15 RX ORDER — TRAMADOL HYDROCHLORIDE 50 MG/1
50 TABLET ORAL DAILY
Status: DISCONTINUED | OUTPATIENT
Start: 2018-08-15 | End: 2018-08-16

## 2018-08-15 RX ADMIN — AMIODARONE HYDROCHLORIDE 200 MG: 200 TABLET ORAL at 22:06

## 2018-08-15 RX ADMIN — DESMOPRESSIN ACETATE 40 MG: 0.2 TABLET ORAL at 22:06

## 2018-08-15 RX ADMIN — CARVEDILOL 3.12 MG: 3.12 TABLET, FILM COATED ORAL at 18:30

## 2018-08-15 RX ADMIN — TRAMADOL HYDROCHLORIDE 50 MG: 50 TABLET, FILM COATED ORAL at 14:16

## 2018-08-15 RX ADMIN — GABAPENTIN 300 MG: 300 CAPSULE ORAL at 14:15

## 2018-08-15 RX ADMIN — MUPIROCIN: 20 OINTMENT TOPICAL at 22:08

## 2018-08-15 RX ADMIN — CHLORHEXIDINE GLUCONATE 15 ML: 1.2 RINSE ORAL at 09:03

## 2018-08-15 RX ADMIN — AMIODARONE HYDROCHLORIDE 200 MG: 200 TABLET ORAL at 09:04

## 2018-08-15 RX ADMIN — SODIUM CHLORIDE, POTASSIUM CHLORIDE, SODIUM LACTATE AND CALCIUM CHLORIDE: 600; 310; 30; 20 INJECTION, SOLUTION INTRAVENOUS at 09:00

## 2018-08-15 RX ADMIN — LATANOPROST 1 DROP: 50 SOLUTION OPHTHALMIC at 22:07

## 2018-08-15 RX ADMIN — ASPIRIN 81 MG 81 MG: 81 TABLET ORAL at 09:04

## 2018-08-15 RX ADMIN — TRAZODONE HYDROCHLORIDE 50 MG: 50 TABLET ORAL at 22:06

## 2018-08-15 RX ADMIN — AMIODARONE HYDROCHLORIDE 200 MG: 200 TABLET ORAL at 14:15

## 2018-08-15 RX ADMIN — CARVEDILOL 3.12 MG: 3.12 TABLET, FILM COATED ORAL at 09:04

## 2018-08-15 RX ADMIN — SODIUM CHLORIDE, PRESERVATIVE FREE 10 ML: 5 INJECTION INTRAVENOUS at 22:09

## 2018-08-15 RX ADMIN — MUPIROCIN: 20 OINTMENT TOPICAL at 09:03

## 2018-08-15 ASSESSMENT — PAIN DESCRIPTION - PAIN TYPE: TYPE: CHRONIC PAIN;NEUROPATHIC PAIN

## 2018-08-15 ASSESSMENT — PAIN DESCRIPTION - FREQUENCY: FREQUENCY: INTERMITTENT

## 2018-08-15 ASSESSMENT — PAIN DESCRIPTION - ONSET: ONSET: GRADUAL

## 2018-08-15 ASSESSMENT — PAIN DESCRIPTION - LOCATION: LOCATION: LEG;FOOT

## 2018-08-15 ASSESSMENT — PAIN SCALES - GENERAL: PAINLEVEL_OUTOF10: 6

## 2018-08-15 ASSESSMENT — PAIN DESCRIPTION - PROGRESSION: CLINICAL_PROGRESSION: GRADUALLY WORSENING

## 2018-08-15 ASSESSMENT — PAIN DESCRIPTION - ORIENTATION: ORIENTATION: RIGHT;LEFT;LOWER

## 2018-08-15 ASSESSMENT — PAIN DESCRIPTION - DESCRIPTORS: DESCRIPTORS: NUMBNESS;TINGLING

## 2018-08-15 NOTE — PLAN OF CARE
Problem: Falls - Risk of:  Goal: Will remain free from falls  Will remain free from falls   Outcome: Ongoing  No falls this shift.

## 2018-08-15 NOTE — PROGRESS NOTES
Pt. Admitted to room 1005, direct admit, for planned CABG with Dr. Naseem Bettencourt. Clipped, VSS. Home medications reviewed. Per Dr. Fany Mora, anesthesiologist, writer inquired with CT surgery team about transfusing 1 u PRBC for preoperative Hgb of 8.8. Per Dr. Naseem Bettencourt, surgeon, transfusion not needed. Will monitor Hgb/Hct in surgery.

## 2018-08-15 NOTE — ANESTHESIA PRE PROCEDURE
chlorhexidine (HIBICLENS) 4 % liquid   Topical See Admin Instructions CASSY Mixon CNP        sodium chloride 0.9 % irrigation    Continuous PRN Ingrid Schwartz MD   4,000 mL at 08/15/18 0915    gelatin adsorbable (GELFOAM) powder    PRN Ingrid Schwartz MD   2 g at 08/15/18 0915    heparin infusion 2 units/mL in 0.9% NaCl    Continuous PRN Ingrid Schwartz MD   Stopped at 08/15/18 1030    sodium chloride 0.9 % 100 mL with papaverine 60 mg    PRN Ingrid Schwartz MD        thrombin kit    PRN Ingrid Schwartz MD   10,000 Units at 08/15/18 0915    vancomycin (VANCOCIN) injection    PRN Ingrid Schwartz MD   2 g at 08/15/18 0915    carvedilol (COREG) tablet 3.125 mg  3.125 mg Oral BID WC Lauri Patient, PA   3.125 mg at 08/15/18 1830    gabapentin (NEURONTIN) capsule 300 mg  300 mg Oral Daily Salix Patient, PA   300 mg at 08/15/18 1415    latanoprost (XALATAN) 0.005 % ophthalmic solution 1 drop  1 drop Both Eyes Nightly Lauri Patient, PA   1 drop at 08/15/18 2207    traZODone (DESYREL) tablet 50 mg  50 mg Oral Nightly Salix Patient, PA   50 mg at 08/15/18 2206    atorvastatin (LIPITOR) tablet 40 mg  40 mg Oral Nightly Lauri Patient, PA   40 mg at 08/15/18 2206    polyvinyl alcohol (LIQUIFILM TEARS) 1.4 % ophthalmic solution 1 drop  1 drop Both Eyes PRN Lauri Patient, PA        traMADol (ULTRAM) tablet 50 mg  50 mg Oral Daily CASSY Mixon CNP   50 mg at 08/15/18 1416       Allergies:     Allergies   Allergen Reactions    Nuts [Peanut-Containing Drug Products] Other (See Comments)     abd pain       Problem List:    Patient Active Problem List   Diagnosis Code    Acute on chronic systolic CHF (congestive heart failure) ([facility]) I50.23    Multiple vessel coronary artery disease I25.10    Multiple myeloma not having achieved remission ([facility]) C90.00    Chronic obstructive pulmonary disease ([facility]) J44.9    Low hemoglobin D64.9    Hypokalemia E87.6    Other drug-induced pancytopenia ([facility] D61.811    RANDALL (acute kidney injury) ([facility] N17.9    CAD in native artery I25.10       Past Medical History:        Diagnosis Date    Arthritis     all over    CAD (coronary artery disease)     Cardi[facility]     CHF (congestive heart failure) (HCC)     COPD [facility]ch[facility] obstructive pulmonary disease) ([facility]     Diabetes mellitus ([facility]     DVT of leg (deep venous thrombosis) ([facility]     RIGHT    Hyperlipidemia     Hypertension     Multiple myeloma ([facility] 2014    Neuropathy     Wears dentures     FULL UPPER, PARTIAL LOWER       Past Surgical History:        Procedure Laterality Date    ABDOMEN SURGERY  2002    STOMAL TUMOR    CARDIAC CATHETERIZATION  12/13/2017    right and left heart cath with Dr. Johanna Cheek COLONOSCOPY FLX DX W/TRAMAINE [place] 1978 PFRMD N/A 1/14/2018    COLONOSCOPY performed by Nimisha Park MD at [address] [place] [address] ESOPHAGOGASTRODUODENOSCOPY TRANSORAL DIAGNOSTIC N/A 1/12/2018    EGD DIAGNOSTIC ONLY performed by Hansa Flaherty MD at [facility] [address] Right        Social History:    Social History   Substance Use Topics    Smoking status: Current Every Day Smoker     Packs/day: 0.50     Years: 30.00     Types: Cigarettes    Smokeless tobacco: Never Used      Comment: a little less than a pack a day    Alcohol use Yes      Comment: rarely                                Ready to quit: Not Answered  Counseling given: Not Answered      Vital Signs (Current):   Vitals:    08/15/18 2353 08/16/18 0100 08/16/18 0400 08/16/18 0624   BP: 117/73  126/73 138/77   Pulse: 87 86 84 96   Resp: 22 24 19 26   Temp: 99.3 °F (37.4 °C)  99.5 °F (37.5 °C) 99.2 °F (37.3 °C)   TempSrc: Oral  Oral Oral   SpO2: 96% 96% 99% 94%   Weight:       Height:                                                  BP Readings from Last 3 Encounters:   08/16/18 138/77   08/09/18 127/76   08/08/18 120/68       NPO Status: Time of last Ventricle  Normal right ventricular size and function. Mitral Valve  Normal mitral valve leaflets. Aortic Valve  Normal aortic valve structure and function without stenosis or  regurgitation. Tricuspid Valve  Tricuspid valve leaflets appear to be normal.  Estimated right ventricular systolic pressure is 29 mmHg. Pulmonic Valve  Normal doppler velocities across the pulmonic valve. Pericardial Effusion  No significant pericardial effusion is seen.

## 2018-08-15 NOTE — CARE COORDINATION
Case Management Initial Discharge Plan  Bernadavid Tay,         Readmission Risk              Risk of Unplanned Readmission:        18               Met with:patient to discuss discharge plans.    Information verified: address, contacts, phone number, , insurance Yes  PCP: Cecelia Mariano MD  Date of last visit: July 3    Insurance Provider: Medicare, [address]    Discharge Planning    Living Arrangements:  Spouse/Significant Other   Support Systems:  Spouse/Significant Other    Home has 2 stories  0 stairs to climb to get into front door, 8stairs to climb to reach second floor  Location of bedroom/bathroom in home second    Patient able to perform ADL's:Independent    Current Services (outpatient & in home) none  DME equipment: none  DME provider: none    Pharmacy: [facility] on [address] Medications:  No  Does patient want to participate in local refill/ meds to beds program?  No    Potential Assistance Needed:  Home Care    Patient agreeable to home care: TBD  [place] of choice provided:  TBD    Prior SNF/Rehab Placement and Facility: no   Agreeable to SNF/Rehab: No  [place] of choice provided: no   Evaluation: no    Expected Discharge date:  18  Patient expects to be discharged to:  home  Follow Up Appointment: Best Day/ Time: Monday PM    Transportation provider: wife  Transportation arrangements needed for discharge: No    Discharge Plan: surgery postponed until  plan is home with wife, may be interested in home care        Electronically signed by Wan Meyer RN on 8/15/18 at 10:31 AM

## 2018-08-16 ENCOUNTER — ANESTHESIA (OUTPATIENT)
Dept: OPERATING ROOM | Age: 73
DRG: 235 | End: 2018-08-16
Payer: MEDICARE

## 2018-08-16 ENCOUNTER — APPOINTMENT (OUTPATIENT)
Dept: GENERAL RADIOLOGY | Age: 73
DRG: 235 | End: 2018-08-16
Attending: THORACIC SURGERY (CARDIOTHORACIC VASCULAR SURGERY)
Payer: MEDICARE

## 2018-08-16 VITALS — DIASTOLIC BLOOD PRESSURE: 69 MMHG | OXYGEN SATURATION: 100 % | SYSTOLIC BLOOD PRESSURE: 103 MMHG

## 2018-08-16 LAB
ALLEN TEST: ABNORMAL
ALLEN TEST: NORMAL
ANION GAP SERPL CALCULATED.3IONS-SCNC: 22 MMOL/L (ref 9–17)
ANION GAP: 14 MMOL/L (ref 7–16)
BUN BLDV-MCNC: 14 MG/DL (ref 8–23)
BUN/CREAT BLD: ABNORMAL (ref 9–20)
CALCIUM IONIZED: 1.11 MMOL/L (ref 1.13–1.33)
CALCIUM SERPL-MCNC: 7.9 MG/DL (ref 8.6–10.4)
CHLORIDE BLD-SCNC: 103 MMOL/L (ref 98–107)
CO2: 18 MMOL/L (ref 20–31)
CREAT SERPL-MCNC: 0.94 MG/DL (ref 0.7–1.2)
FIO2: 30
FIO2: 60
FIO2: ABNORMAL
GFR AFRICAN AMERICAN: >60 ML/MIN
GFR NON-AFRICAN AMERICAN: >60 ML/MIN
GFR NON-AFRICAN AMERICAN: >60 ML/MIN
GFR SERPL CREATININE-BSD FRML MDRD: >60 ML/MIN
GFR SERPL CREATININE-BSD FRML MDRD: ABNORMAL ML/MIN/{1.73_M2}
GFR SERPL CREATININE-BSD FRML MDRD: ABNORMAL ML/MIN/{1.73_M2}
GFR SERPL CREATININE-BSD FRML MDRD: NORMAL ML/MIN/{1.73_M2}
GLUCOSE BLD-MCNC: 125 MG/DL (ref 75–110)
GLUCOSE BLD-MCNC: 130 MG/DL (ref 75–110)
GLUCOSE BLD-MCNC: 147 MG/DL (ref 75–110)
GLUCOSE BLD-MCNC: 147 MG/DL (ref 75–110)
GLUCOSE BLD-MCNC: 152 MG/DL (ref 75–110)
GLUCOSE BLD-MCNC: 159 MG/DL (ref 74–100)
GLUCOSE BLD-MCNC: 166 MG/DL (ref 75–110)
GLUCOSE BLD-MCNC: 174 MG/DL (ref 74–100)
GLUCOSE BLD-MCNC: 174 MG/DL (ref 75–110)
GLUCOSE BLD-MCNC: 191 MG/DL (ref 74–100)
GLUCOSE BLD-MCNC: 199 MG/DL (ref 74–100)
GLUCOSE BLD-MCNC: 208 MG/DL (ref 74–100)
GLUCOSE BLD-MCNC: 212 MG/DL (ref 70–99)
GLUCOSE BLD-MCNC: 215 MG/DL (ref 75–110)
GLUCOSE BLD-MCNC: 225 MG/DL (ref 74–100)
HCT VFR BLD CALC: 29 % (ref 40.7–50.3)
HEMOGLOBIN: 8.3 G/DL (ref 13–17)
INR BLD: 1.4
MAGNESIUM: 2.8 MG/DL (ref 1.6–2.6)
MCH RBC QN AUTO: 25.9 PG (ref 25.2–33.5)
MCHC RBC AUTO-ENTMCNC: 28.6 G/DL (ref 28.4–34.8)
MCV RBC AUTO: 90.3 FL (ref 82.6–102.9)
MODE: ABNORMAL
MODE: NORMAL
NEGATIVE BASE EXCESS, ART: 1 (ref 0–2)
NEGATIVE BASE EXCESS, ART: 2 (ref 0–2)
NEGATIVE BASE EXCESS, ART: 3 (ref 0–2)
NEGATIVE BASE EXCESS, ART: ABNORMAL (ref 0–2)
NEGATIVE BASE EXCESS, ART: NORMAL (ref 0–2)
NRBC AUTOMATED: 1.1 PER 100 WBC
O2 DEVICE/FLOW/%: ABNORMAL
O2 DEVICE/FLOW/%: NORMAL
PARTIAL THROMBOPLASTIN TIME: 24.3 SEC (ref 20.5–30.5)
PATIENT TEMP: ABNORMAL
PATIENT TEMP: NORMAL
PDW BLD-RTO: 17.4 % (ref 11.8–14.4)
PLATELET # BLD: 226 K/UL (ref 138–453)
PMV BLD AUTO: 9.9 FL (ref 8.1–13.5)
POC CHLORIDE: 104 MMOL/L (ref 98–107)
POC CREATININE: 0.83 MG/DL (ref 0.51–1.19)
POC HCO3: 21.6 MMOL/L (ref 21–28)
POC HCO3: 23.4 MMOL/L (ref 21–28)
POC HCO3: 24.5 MMOL/L (ref 21–28)
POC HCO3: 25 MMOL/L (ref 21–28)
POC HCO3: 25.2 MMOL/L (ref 21–28)
POC HCO3: 26.5 MMOL/L (ref 21–28)
POC HCO3: 28.5 MMOL/L (ref 21–28)
POC HEMATOCRIT: 17 % (ref 41–53)
POC HEMATOCRIT: 18 % (ref 41–53)
POC HEMATOCRIT: 22 % (ref 41–53)
POC HEMATOCRIT: 26 % (ref 41–53)
POC HEMATOCRIT: 29 % (ref 41–53)
POC HEMATOCRIT: 29 % (ref 41–53)
POC HEMOGLOBIN: 5.7 G/DL (ref 13.5–17.5)
POC HEMOGLOBIN: 6 G/DL (ref 13.5–17.5)
POC HEMOGLOBIN: 7.5 G/DL (ref 13.5–17.5)
POC HEMOGLOBIN: 9 G/DL (ref 13.5–17.5)
POC HEMOGLOBIN: 9.8 G/DL (ref 13.5–17.5)
POC HEMOGLOBIN: 9.9 G/DL (ref 13.5–17.5)
POC IONIZED CALCIUM: 0.99 MMOL/L (ref 1.15–1.33)
POC IONIZED CALCIUM: 0.99 MMOL/L (ref 1.15–1.33)
POC IONIZED CALCIUM: 1.14 MMOL/L (ref 1.15–1.33)
POC IONIZED CALCIUM: 1.17 MMOL/L (ref 1.15–1.33)
POC IONIZED CALCIUM: 1.19 MMOL/L (ref 1.15–1.33)
POC IONIZED CALCIUM: 1.22 MMOL/L (ref 1.15–1.33)
POC O2 SATURATION: 100 % (ref 94–98)
POC O2 SATURATION: 98 % (ref 94–98)
POC O2 SATURATION: 98 % (ref 94–98)
POC PCO2 TEMP: ABNORMAL MM HG
POC PCO2 TEMP: NORMAL MM HG
POC PCO2: 35.3 MM HG (ref 35–48)
POC PCO2: 39.7 MM HG (ref 35–48)
POC PCO2: 42 MM HG (ref 35–48)
POC PCO2: 42.4 MM HG (ref 35–48)
POC PCO2: 44.1 MM HG (ref 35–48)
POC PCO2: 44.2 MM HG (ref 35–48)
POC PCO2: 44.8 MM HG (ref 35–48)
POC PH TEMP: ABNORMAL
POC PH TEMP: NORMAL
POC PH: 7.33 (ref 7.35–7.45)
POC PH: 7.35 (ref 7.35–7.45)
POC PH: 7.36 (ref 7.35–7.45)
POC PH: 7.38 (ref 7.35–7.45)
POC PH: 7.39 (ref 7.35–7.45)
POC PH: 7.43 (ref 7.35–7.45)
POC PH: 7.44 (ref 7.35–7.45)
POC PO2 TEMP: ABNORMAL MM HG
POC PO2 TEMP: NORMAL MM HG
POC PO2: 104.3 MM HG (ref 83–108)
POC PO2: 107.8 MM HG (ref 83–108)
POC PO2: 236.8 MM HG (ref 83–108)
POC PO2: 306.2 MM HG (ref 83–108)
POC PO2: 357.5 MM HG (ref 83–108)
POC PO2: 405.7 MM HG (ref 83–108)
POC PO2: 437.9 MM HG (ref 83–108)
POC POTASSIUM: 3.3 MMOL/L (ref 3.5–4.5)
POC POTASSIUM: 3.3 MMOL/L (ref 3.5–4.5)
POC POTASSIUM: 3.4 MMOL/L (ref 3.5–4.5)
POC POTASSIUM: 3.9 MMOL/L (ref 3.5–4.5)
POC POTASSIUM: 4 MMOL/L (ref 3.5–4.5)
POC POTASSIUM: 4 MMOL/L (ref 3.5–4.5)
POC SODIUM: 144 MMOL/L (ref 138–146)
POSITIVE BASE EXCESS, ART: 0 (ref 0–3)
POSITIVE BASE EXCESS, ART: 0 (ref 0–3)
POSITIVE BASE EXCESS, ART: 2 (ref 0–3)
POSITIVE BASE EXCESS, ART: 4 (ref 0–3)
POSITIVE BASE EXCESS, ART: ABNORMAL (ref 0–3)
POTASSIUM SERPL-SCNC: 3.6 MMOL/L (ref 3.7–5.3)
POTASSIUM SERPL-SCNC: 4.6 MMOL/L (ref 3.7–5.3)
PROTHROMBIN TIME: 14.2 SEC (ref 9–12)
RBC # BLD: 3.21 M/UL (ref 4.21–5.77)
SAMPLE SITE: ABNORMAL
SAMPLE SITE: NORMAL
SODIUM BLD-SCNC: 143 MMOL/L (ref 135–144)
TCO2 (CALC), ART: 23 MMOL/L (ref 22–29)
TCO2 (CALC), ART: 25 MMOL/L (ref 22–29)
TCO2 (CALC), ART: 26 MMOL/L (ref 22–29)
TCO2 (CALC), ART: 26 MMOL/L (ref 22–29)
TCO2 (CALC), ART: 27 MMOL/L (ref 22–29)
TCO2 (CALC), ART: 28 MMOL/L (ref 22–29)
TCO2 (CALC), ART: 30 MMOL/L (ref 22–29)
WBC # BLD: 15.2 K/UL (ref 3.5–11.3)

## 2018-08-16 PROCEDURE — L8670 VASCULAR GRAFT, SYNTHETIC: HCPCS | Performed by: THORACIC SURGERY (CARDIOTHORACIC VASCULAR SURGERY)

## 2018-08-16 PROCEDURE — 2780000010 HC IMPLANT OTHER: Performed by: THORACIC SURGERY (CARDIOTHORACIC VASCULAR SURGERY)

## 2018-08-16 PROCEDURE — 82947 ASSAY GLUCOSE BLOOD QUANT: CPT

## 2018-08-16 PROCEDURE — 84132 ASSAY OF SERUM POTASSIUM: CPT

## 2018-08-16 PROCEDURE — 5A1221Z PERFORMANCE OF CARDIAC OUTPUT, CONTINUOUS: ICD-10-PCS | Performed by: THORACIC SURGERY (CARDIOTHORACIC VASCULAR SURGERY)

## 2018-08-16 PROCEDURE — 82435 ASSAY OF BLOOD CHLORIDE: CPT

## 2018-08-16 PROCEDURE — 82803 BLOOD GASES ANY COMBINATION: CPT

## 2018-08-16 PROCEDURE — 6360000002 HC RX W HCPCS: Performed by: THORACIC SURGERY (CARDIOTHORACIC VASCULAR SURGERY)

## 2018-08-16 PROCEDURE — 33517 CABG ARTERY-VEIN SINGLE: CPT | Performed by: THORACIC SURGERY (CARDIOTHORACIC VASCULAR SURGERY)

## 2018-08-16 PROCEDURE — 85027 COMPLETE CBC AUTOMATED: CPT

## 2018-08-16 PROCEDURE — 85347 COAGULATION TIME ACTIVATED: CPT

## 2018-08-16 PROCEDURE — 94770 HC ETCO2 MONITOR DAILY: CPT

## 2018-08-16 PROCEDURE — 6360000002 HC RX W HCPCS: Performed by: NURSE ANESTHETIST, CERTIFIED REGISTERED

## 2018-08-16 PROCEDURE — 36415 COLL VENOUS BLD VENIPUNCTURE: CPT

## 2018-08-16 PROCEDURE — 87086 URINE CULTURE/COLONY COUNT: CPT

## 2018-08-16 PROCEDURE — 3700000000 HC ANESTHESIA ATTENDED CARE: Performed by: THORACIC SURGERY (CARDIOTHORACIC VASCULAR SURGERY)

## 2018-08-16 PROCEDURE — 82565 ASSAY OF CREATININE: CPT

## 2018-08-16 PROCEDURE — 83735 ASSAY OF MAGNESIUM: CPT

## 2018-08-16 PROCEDURE — 71045 X-RAY EXAM CHEST 1 VIEW: CPT

## 2018-08-16 PROCEDURE — 82330 ASSAY OF CALCIUM: CPT

## 2018-08-16 PROCEDURE — 2580000003 HC RX 258: Performed by: PHYSICIAN ASSISTANT

## 2018-08-16 PROCEDURE — 06BP4ZZ EXCISION OF RIGHT SAPHENOUS VEIN, PERCUTANEOUS ENDOSCOPIC APPROACH: ICD-10-PCS | Performed by: THORACIC SURGERY (CARDIOTHORACIC VASCULAR SURGERY)

## 2018-08-16 PROCEDURE — 3600000018 HC SURGERY OHS ADDTL 15MIN: Performed by: THORACIC SURGERY (CARDIOTHORACIC VASCULAR SURGERY)

## 2018-08-16 PROCEDURE — 2580000003 HC RX 258: Performed by: THORACIC SURGERY (CARDIOTHORACIC VASCULAR SURGERY)

## 2018-08-16 PROCEDURE — 94762 N-INVAS EAR/PLS OXIMTRY CONT: CPT

## 2018-08-16 PROCEDURE — 2500000003 HC RX 250 WO HCPCS: Performed by: THORACIC SURGERY (CARDIOTHORACIC VASCULAR SURGERY)

## 2018-08-16 PROCEDURE — 6370000000 HC RX 637 (ALT 250 FOR IP): Performed by: NURSE ANESTHETIST, CERTIFIED REGISTERED

## 2018-08-16 PROCEDURE — P9041 ALBUMIN (HUMAN),5%, 50ML: HCPCS | Performed by: PHYSICIAN ASSISTANT

## 2018-08-16 PROCEDURE — 021009W BYPASS CORONARY ARTERY, ONE ARTERY FROM AORTA WITH AUTOLOGOUS VENOUS TISSUE, OPEN APPROACH: ICD-10-PCS | Performed by: THORACIC SURGERY (CARDIOTHORACIC VASCULAR SURGERY)

## 2018-08-16 PROCEDURE — 33508 ENDOSCOPIC VEIN HARVEST: CPT | Performed by: THORACIC SURGERY (CARDIOTHORACIC VASCULAR SURGERY)

## 2018-08-16 PROCEDURE — 6370000000 HC RX 637 (ALT 250 FOR IP): Performed by: NURSE PRACTITIONER

## 2018-08-16 PROCEDURE — 7100000011 HC PHASE II RECOVERY - ADDTL 15 MIN

## 2018-08-16 PROCEDURE — 2100000001 HC CVICU R&B

## 2018-08-16 PROCEDURE — 7100000010 HC PHASE II RECOVERY - FIRST 15 MIN

## 2018-08-16 PROCEDURE — 84295 ASSAY OF SERUM SODIUM: CPT

## 2018-08-16 PROCEDURE — 80048 BASIC METABOLIC PNL TOTAL CA: CPT

## 2018-08-16 PROCEDURE — 02100Z9 BYPASS CORONARY ARTERY, ONE ARTERY FROM LEFT INTERNAL MAMMARY, OPEN APPROACH: ICD-10-PCS | Performed by: THORACIC SURGERY (CARDIOTHORACIC VASCULAR SURGERY)

## 2018-08-16 PROCEDURE — 2720000010 HC SURG SUPPLY STERILE: Performed by: THORACIC SURGERY (CARDIOTHORACIC VASCULAR SURGERY)

## 2018-08-16 PROCEDURE — C1773 RET DEV, INSERTABLE: HCPCS | Performed by: THORACIC SURGERY (CARDIOTHORACIC VASCULAR SURGERY)

## 2018-08-16 PROCEDURE — 2500000003 HC RX 250 WO HCPCS: Performed by: PHYSICIAN ASSISTANT

## 2018-08-16 PROCEDURE — 6370000000 HC RX 637 (ALT 250 FOR IP)

## 2018-08-16 PROCEDURE — C9290 INJ, BUPIVACAINE LIPOSOME: HCPCS | Performed by: THORACIC SURGERY (CARDIOTHORACIC VASCULAR SURGERY)

## 2018-08-16 PROCEDURE — 85576 BLOOD PLATELET AGGREGATION: CPT

## 2018-08-16 PROCEDURE — 2580000003 HC RX 258: Performed by: NURSE ANESTHETIST, CERTIFIED REGISTERED

## 2018-08-16 PROCEDURE — 6360000002 HC RX W HCPCS: Performed by: PHYSICIAN ASSISTANT

## 2018-08-16 PROCEDURE — 85390 FIBRINOLYSINS SCREEN I&R: CPT

## 2018-08-16 PROCEDURE — 94002 VENT MGMT INPAT INIT DAY: CPT

## 2018-08-16 PROCEDURE — 85384 FIBRINOGEN ACTIVITY: CPT

## 2018-08-16 PROCEDURE — B24BZZ4 ULTRASONOGRAPHY OF HEART WITH AORTA, TRANSESOPHAGEAL: ICD-10-PCS | Performed by: ANESTHESIOLOGY

## 2018-08-16 PROCEDURE — S0028 INJECTION, FAMOTIDINE, 20 MG: HCPCS | Performed by: PHYSICIAN ASSISTANT

## 2018-08-16 PROCEDURE — 6370000000 HC RX 637 (ALT 250 FOR IP): Performed by: PHYSICIAN ASSISTANT

## 2018-08-16 PROCEDURE — 2500000003 HC RX 250 WO HCPCS: Performed by: NURSE ANESTHETIST, CERTIFIED REGISTERED

## 2018-08-16 PROCEDURE — 33533 CABG ARTERIAL SINGLE: CPT | Performed by: THORACIC SURGERY (CARDIOTHORACIC VASCULAR SURGERY)

## 2018-08-16 PROCEDURE — 85610 PROTHROMBIN TIME: CPT

## 2018-08-16 PROCEDURE — 3700000001 HC ADD 15 MINUTES (ANESTHESIA): Performed by: THORACIC SURGERY (CARDIOTHORACIC VASCULAR SURGERY)

## 2018-08-16 PROCEDURE — 2709999900 HC NON-CHARGEABLE SUPPLY: Performed by: THORACIC SURGERY (CARDIOTHORACIC VASCULAR SURGERY)

## 2018-08-16 PROCEDURE — 85014 HEMATOCRIT: CPT

## 2018-08-16 PROCEDURE — 3600000008 HC SURGERY OHS BASE: Performed by: THORACIC SURGERY (CARDIOTHORACIC VASCULAR SURGERY)

## 2018-08-16 PROCEDURE — 2720000000 HC MISC SURG SUPPLY STERILE $0-50: Performed by: THORACIC SURGERY (CARDIOTHORACIC VASCULAR SURGERY)

## 2018-08-16 PROCEDURE — 6370000000 HC RX 637 (ALT 250 FOR IP): Performed by: THORACIC SURGERY (CARDIOTHORACIC VASCULAR SURGERY)

## 2018-08-16 PROCEDURE — 85730 THROMBOPLASTIN TIME PARTIAL: CPT

## 2018-08-16 RX ORDER — SODIUM CHLORIDE 9 MG/ML
INJECTION, SOLUTION INTRAVENOUS CONTINUOUS
Status: DISCONTINUED | OUTPATIENT
Start: 2018-08-16 | End: 2018-08-17

## 2018-08-16 RX ORDER — DEXTROSE MONOHYDRATE 25 G/50ML
12.5 INJECTION, SOLUTION INTRAVENOUS PRN
Status: DISCONTINUED | OUTPATIENT
Start: 2018-08-16 | End: 2018-08-26 | Stop reason: HOSPADM

## 2018-08-16 RX ORDER — ONDANSETRON 2 MG/ML
4 INJECTION INTRAMUSCULAR; INTRAVENOUS EVERY 8 HOURS PRN
Status: DISCONTINUED | OUTPATIENT
Start: 2018-08-16 | End: 2018-08-26 | Stop reason: HOSPADM

## 2018-08-16 RX ORDER — ROCURONIUM BROMIDE 10 MG/ML
INJECTION, SOLUTION INTRAVENOUS PRN
Status: DISCONTINUED | OUTPATIENT
Start: 2018-08-16 | End: 2018-08-16 | Stop reason: SDUPTHER

## 2018-08-16 RX ORDER — FENTANYL CITRATE 50 UG/ML
50 INJECTION, SOLUTION INTRAMUSCULAR; INTRAVENOUS
Status: DISCONTINUED | OUTPATIENT
Start: 2018-08-16 | End: 2018-08-26 | Stop reason: HOSPADM

## 2018-08-16 RX ORDER — HEPARIN SODIUM 1000 [USP'U]/ML
INJECTION, SOLUTION INTRAVENOUS; SUBCUTANEOUS PRN
Status: DISCONTINUED | OUTPATIENT
Start: 2018-08-16 | End: 2018-08-16 | Stop reason: HOSPADM

## 2018-08-16 RX ORDER — FAMOTIDINE 20 MG/1
20 TABLET, FILM COATED ORAL 2 TIMES DAILY
Status: DISCONTINUED | OUTPATIENT
Start: 2018-08-18 | End: 2018-08-26 | Stop reason: HOSPADM

## 2018-08-16 RX ORDER — ACETAMINOPHEN 325 MG/1
650 TABLET ORAL EVERY 4 HOURS PRN
Status: DISCONTINUED | OUTPATIENT
Start: 2018-08-16 | End: 2018-08-26 | Stop reason: HOSPADM

## 2018-08-16 RX ORDER — FENTANYL CITRATE 50 UG/ML
INJECTION, SOLUTION INTRAMUSCULAR; INTRAVENOUS PRN
Status: DISCONTINUED | OUTPATIENT
Start: 2018-08-16 | End: 2018-08-16 | Stop reason: SDUPTHER

## 2018-08-16 RX ORDER — SODIUM CHLORIDE, SODIUM LACTATE, POTASSIUM CHLORIDE, CALCIUM CHLORIDE 600; 310; 30; 20 MG/100ML; MG/100ML; MG/100ML; MG/100ML
INJECTION, SOLUTION INTRAVENOUS CONTINUOUS PRN
Status: DISCONTINUED | OUTPATIENT
Start: 2018-08-16 | End: 2018-08-16 | Stop reason: SDUPTHER

## 2018-08-16 RX ORDER — HEPARIN SODIUM 1000 [USP'U]/ML
INJECTION, SOLUTION INTRAVENOUS; SUBCUTANEOUS PRN
Status: DISCONTINUED | OUTPATIENT
Start: 2018-08-16 | End: 2018-08-16 | Stop reason: SDUPTHER

## 2018-08-16 RX ORDER — CLOPIDOGREL BISULFATE 75 MG/1
75 TABLET ORAL DAILY
Status: DISCONTINUED | OUTPATIENT
Start: 2018-08-17 | End: 2018-08-20

## 2018-08-16 RX ORDER — HYDRALAZINE HYDROCHLORIDE 20 MG/ML
5 INJECTION INTRAMUSCULAR; INTRAVENOUS EVERY 5 MIN PRN
Status: DISCONTINUED | OUTPATIENT
Start: 2018-08-16 | End: 2018-08-26 | Stop reason: HOSPADM

## 2018-08-16 RX ORDER — CHLORHEXIDINE GLUCONATE 0.12 MG/ML
15 RINSE ORAL 2 TIMES DAILY
Status: DISCONTINUED | OUTPATIENT
Start: 2018-08-16 | End: 2018-08-24

## 2018-08-16 RX ORDER — LIDOCAINE HYDROCHLORIDE 10 MG/ML
INJECTION, SOLUTION EPIDURAL; INFILTRATION; INTRACAUDAL; PERINEURAL PRN
Status: DISCONTINUED | OUTPATIENT
Start: 2018-08-16 | End: 2018-08-16 | Stop reason: SDUPTHER

## 2018-08-16 RX ORDER — POLYETHYLENE GLYCOL 3350 17 G/17G
17 POWDER, FOR SOLUTION ORAL DAILY
Status: DISCONTINUED | OUTPATIENT
Start: 2018-08-17 | End: 2018-08-26 | Stop reason: HOSPADM

## 2018-08-16 RX ORDER — PROTAMINE SULFATE 10 MG/ML
INJECTION, SOLUTION INTRAVENOUS PRN
Status: DISCONTINUED | OUTPATIENT
Start: 2018-08-16 | End: 2018-08-16 | Stop reason: SDUPTHER

## 2018-08-16 RX ORDER — POTASSIUM CHLORIDE 29.8 MG/ML
20 INJECTION INTRAVENOUS PRN
Status: DISCONTINUED | OUTPATIENT
Start: 2018-08-16 | End: 2018-08-26 | Stop reason: HOSPADM

## 2018-08-16 RX ORDER — PROPOFOL 10 MG/ML
INJECTION, EMULSION INTRAVENOUS CONTINUOUS PRN
Status: DISCONTINUED | OUTPATIENT
Start: 2018-08-16 | End: 2018-08-16 | Stop reason: SDUPTHER

## 2018-08-16 RX ORDER — ASPIRIN 81 MG/1
TABLET, CHEWABLE ORAL
Status: COMPLETED
Start: 2018-08-16 | End: 2018-08-16

## 2018-08-16 RX ORDER — AMIODARONE HYDROCHLORIDE 200 MG/1
200 TABLET ORAL 2 TIMES DAILY
Status: DISCONTINUED | OUTPATIENT
Start: 2018-08-16 | End: 2018-08-20

## 2018-08-16 RX ORDER — MEPERIDINE HYDROCHLORIDE 50 MG/ML
25 INJECTION INTRAMUSCULAR; INTRAVENOUS; SUBCUTANEOUS
Status: ACTIVE | OUTPATIENT
Start: 2018-08-16 | End: 2018-08-16

## 2018-08-16 RX ORDER — SODIUM CHLORIDE 0.9 % (FLUSH) 0.9 %
10 SYRINGE (ML) INJECTION PRN
Status: DISCONTINUED | OUTPATIENT
Start: 2018-08-16 | End: 2018-08-26 | Stop reason: HOSPADM

## 2018-08-16 RX ORDER — TRAZODONE HYDROCHLORIDE 50 MG/1
50 TABLET ORAL NIGHTLY
Status: DISCONTINUED | OUTPATIENT
Start: 2018-08-17 | End: 2018-08-26 | Stop reason: HOSPADM

## 2018-08-16 RX ORDER — DOCUSATE SODIUM 100 MG/1
100 CAPSULE, LIQUID FILLED ORAL 2 TIMES DAILY
Status: DISCONTINUED | OUTPATIENT
Start: 2018-08-17 | End: 2018-08-26 | Stop reason: HOSPADM

## 2018-08-16 RX ORDER — DEXTROSE MONOHYDRATE 50 MG/ML
100 INJECTION, SOLUTION INTRAVENOUS PRN
Status: DISCONTINUED | OUTPATIENT
Start: 2018-08-16 | End: 2018-08-26 | Stop reason: HOSPADM

## 2018-08-16 RX ORDER — MAGNESIUM SULFATE 1 G/100ML
1 INJECTION INTRAVENOUS PRN
Status: DISCONTINUED | OUTPATIENT
Start: 2018-08-16 | End: 2018-08-26 | Stop reason: HOSPADM

## 2018-08-16 RX ORDER — ACETAMINOPHEN 650 MG/1
650 SUPPOSITORY RECTAL EVERY 4 HOURS PRN
Status: DISCONTINUED | OUTPATIENT
Start: 2018-08-16 | End: 2018-08-26 | Stop reason: HOSPADM

## 2018-08-16 RX ORDER — OXYCODONE HYDROCHLORIDE AND ACETAMINOPHEN 5; 325 MG/1; MG/1
1 TABLET ORAL EVERY 4 HOURS PRN
Status: DISCONTINUED | OUTPATIENT
Start: 2018-08-16 | End: 2018-08-17

## 2018-08-16 RX ORDER — ATORVASTATIN CALCIUM 40 MG/1
40 TABLET, FILM COATED ORAL NIGHTLY
Status: DISCONTINUED | OUTPATIENT
Start: 2018-08-17 | End: 2018-08-22

## 2018-08-16 RX ORDER — EPHEDRINE SULFATE 50 MG/ML
INJECTION, SOLUTION INTRAVENOUS PRN
Status: DISCONTINUED | OUTPATIENT
Start: 2018-08-16 | End: 2018-08-16 | Stop reason: SDUPTHER

## 2018-08-16 RX ORDER — SODIUM CHLORIDE 0.9 % (FLUSH) 0.9 %
10 SYRINGE (ML) INJECTION EVERY 12 HOURS SCHEDULED
Status: DISCONTINUED | OUTPATIENT
Start: 2018-08-16 | End: 2018-08-26 | Stop reason: HOSPADM

## 2018-08-16 RX ORDER — MIDAZOLAM HYDROCHLORIDE 1 MG/ML
INJECTION INTRAMUSCULAR; INTRAVENOUS PRN
Status: DISCONTINUED | OUTPATIENT
Start: 2018-08-16 | End: 2018-08-16 | Stop reason: SDUPTHER

## 2018-08-16 RX ORDER — ASPIRIN 81 MG/1
81 TABLET ORAL DAILY
Status: DISCONTINUED | OUTPATIENT
Start: 2018-08-16 | End: 2018-08-26 | Stop reason: HOSPADM

## 2018-08-16 RX ORDER — GLYCOPYRROLATE 1 MG/5 ML
SYRINGE (ML) INTRAVENOUS PRN
Status: DISCONTINUED | OUTPATIENT
Start: 2018-08-16 | End: 2018-08-16 | Stop reason: SDUPTHER

## 2018-08-16 RX ORDER — ALBUMIN, HUMAN INJ 5% 5 %
25 SOLUTION INTRAVENOUS PRN
Status: DISCONTINUED | OUTPATIENT
Start: 2018-08-16 | End: 2018-08-26 | Stop reason: HOSPADM

## 2018-08-16 RX ORDER — BISACODYL 10 MG
10 SUPPOSITORY, RECTAL RECTAL DAILY PRN
Status: DISCONTINUED | OUTPATIENT
Start: 2018-08-16 | End: 2018-08-26 | Stop reason: HOSPADM

## 2018-08-16 RX ORDER — LISINOPRIL 2.5 MG/1
2.5 TABLET ORAL DAILY
Status: DISCONTINUED | OUTPATIENT
Start: 2018-08-17 | End: 2018-08-17

## 2018-08-16 RX ORDER — OXYCODONE HYDROCHLORIDE AND ACETAMINOPHEN 5; 325 MG/1; MG/1
2 TABLET ORAL EVERY 4 HOURS PRN
Status: DISCONTINUED | OUTPATIENT
Start: 2018-08-16 | End: 2018-08-17

## 2018-08-16 RX ORDER — DIPHENHYDRAMINE HCL 25 MG
25 TABLET ORAL NIGHTLY PRN
Status: DISCONTINUED | OUTPATIENT
Start: 2018-08-17 | End: 2018-08-26 | Stop reason: HOSPADM

## 2018-08-16 RX ORDER — PROTAMINE SULFATE 10 MG/ML
50 INJECTION, SOLUTION INTRAVENOUS
Status: ACTIVE | OUTPATIENT
Start: 2018-08-16 | End: 2018-08-16

## 2018-08-16 RX ORDER — M-VIT,TX,IRON,MINS/CALC/FOLIC 27MG-0.4MG
1 TABLET ORAL
Status: DISCONTINUED | OUTPATIENT
Start: 2018-08-17 | End: 2018-08-26 | Stop reason: HOSPADM

## 2018-08-16 RX ORDER — FENTANYL CITRATE 50 UG/ML
25 INJECTION, SOLUTION INTRAMUSCULAR; INTRAVENOUS
Status: DISCONTINUED | OUTPATIENT
Start: 2018-08-16 | End: 2018-08-26 | Stop reason: HOSPADM

## 2018-08-16 RX ORDER — PROPOFOL 10 MG/ML
INJECTION, EMULSION INTRAVENOUS PRN
Status: DISCONTINUED | OUTPATIENT
Start: 2018-08-16 | End: 2018-08-16 | Stop reason: SDUPTHER

## 2018-08-16 RX ORDER — NICOTINE POLACRILEX 4 MG
15 LOZENGE BUCCAL PRN
Status: DISCONTINUED | OUTPATIENT
Start: 2018-08-16 | End: 2018-08-26 | Stop reason: HOSPADM

## 2018-08-16 RX ADMIN — LIDOCAINE HYDROCHLORIDE 50 MG: 10 INJECTION, SOLUTION EPIDURAL; INFILTRATION; INTRACAUDAL at 09:01

## 2018-08-16 RX ADMIN — HEPARIN SODIUM 25000 UNITS: 1000 INJECTION, SOLUTION INTRAVENOUS; SUBCUTANEOUS at 11:18

## 2018-08-16 RX ADMIN — ROCURONIUM BROMIDE 25 MG: 10 INJECTION INTRAVENOUS at 10:59

## 2018-08-16 RX ADMIN — DEXTROSE MONOHYDRATE 150 MG: 50 INJECTION, SOLUTION INTRAVENOUS at 10:05

## 2018-08-16 RX ADMIN — FAMOTIDINE 20 MG: 10 INJECTION, SOLUTION INTRAVENOUS at 19:57

## 2018-08-16 RX ADMIN — SODIUM CHLORIDE, POTASSIUM CHLORIDE, SODIUM LACTATE AND CALCIUM CHLORIDE: 600; 310; 30; 20 INJECTION, SOLUTION INTRAVENOUS at 08:52

## 2018-08-16 RX ADMIN — EPHEDRINE SULFATE 10 MG: 50 INJECTION, SOLUTION INTRAMUSCULAR; INTRAVENOUS; SUBCUTANEOUS at 10:59

## 2018-08-16 RX ADMIN — FENTANYL CITRATE 50 MCG: 50 INJECTION, SOLUTION INTRAMUSCULAR; INTRAVENOUS at 11:26

## 2018-08-16 RX ADMIN — ASPIRIN 81 MG 81 MG: 81 TABLET ORAL at 06:30

## 2018-08-16 RX ADMIN — ROCURONIUM BROMIDE 25 MG: 10 INJECTION INTRAVENOUS at 10:23

## 2018-08-16 RX ADMIN — EPHEDRINE SULFATE 10 MG: 50 INJECTION, SOLUTION INTRAMUSCULAR; INTRAVENOUS; SUBCUTANEOUS at 11:21

## 2018-08-16 RX ADMIN — OXYCODONE HYDROCHLORIDE AND ACETAMINOPHEN 2 TABLET: 5; 325 TABLET ORAL at 17:42

## 2018-08-16 RX ADMIN — PROTAMINE SULFATE 240 MG: 10 INJECTION, SOLUTION INTRAVENOUS at 12:41

## 2018-08-16 RX ADMIN — SODIUM CHLORIDE 2 G: 0.9 INJECTION, SOLUTION INTRAVENOUS at 10:04

## 2018-08-16 RX ADMIN — FENTANYL CITRATE 100 MCG: 50 INJECTION, SOLUTION INTRAMUSCULAR; INTRAVENOUS at 13:10

## 2018-08-16 RX ADMIN — Medication 2 G: at 19:57

## 2018-08-16 RX ADMIN — FENTANYL CITRATE 100 MCG: 50 INJECTION, SOLUTION INTRAMUSCULAR; INTRAVENOUS at 10:27

## 2018-08-16 RX ADMIN — LATANOPROST 1 DROP: 50 SOLUTION OPHTHALMIC at 19:57

## 2018-08-16 RX ADMIN — NICARDIPINE HYDROCHLORIDE 10 MG/HR: 0.1 INJECTION, SOLUTION INTRAVENOUS at 18:07

## 2018-08-16 RX ADMIN — PHENYLEPHRINE HYDROCHLORIDE 100 MCG: 10 INJECTION INTRAVENOUS at 09:11

## 2018-08-16 RX ADMIN — SODIUM CHLORIDE, POTASSIUM CHLORIDE, SODIUM LACTATE AND CALCIUM CHLORIDE: 600; 310; 30; 20 INJECTION, SOLUTION INTRAVENOUS at 10:15

## 2018-08-16 RX ADMIN — FENTANYL CITRATE 50 MCG: 50 INJECTION, SOLUTION INTRAMUSCULAR; INTRAVENOUS at 17:56

## 2018-08-16 RX ADMIN — EPHEDRINE SULFATE 15 MG: 50 INJECTION, SOLUTION INTRAMUSCULAR; INTRAVENOUS; SUBCUTANEOUS at 10:12

## 2018-08-16 RX ADMIN — ALBUMIN (HUMAN) 25 G: 12.5 INJECTION, SOLUTION INTRAVENOUS at 17:05

## 2018-08-16 RX ADMIN — FENTANYL CITRATE 100 MCG: 50 INJECTION, SOLUTION INTRAMUSCULAR; INTRAVENOUS at 10:35

## 2018-08-16 RX ADMIN — SODIUM CHLORIDE: 9 INJECTION, SOLUTION INTRAVENOUS at 13:54

## 2018-08-16 RX ADMIN — SODIUM CHLORIDE 10 G/HR: 900 INJECTION, SOLUTION INTRAVENOUS at 10:04

## 2018-08-16 RX ADMIN — ASPIRIN 81 MG: 81 TABLET, DELAYED RELEASE ORAL at 15:11

## 2018-08-16 RX ADMIN — FENTANYL CITRATE 50 MCG: 50 INJECTION, SOLUTION INTRAMUSCULAR; INTRAVENOUS at 09:57

## 2018-08-16 RX ADMIN — AMIODARONE HYDROCHLORIDE 200 MG: 200 TABLET ORAL at 15:01

## 2018-08-16 RX ADMIN — POTASSIUM CHLORIDE 20 MEQ: 400 INJECTION, SOLUTION INTRAVENOUS at 14:59

## 2018-08-16 RX ADMIN — POTASSIUM CHLORIDE 20 MEQ: 400 INJECTION, SOLUTION INTRAVENOUS at 13:56

## 2018-08-16 RX ADMIN — HYDRALAZINE HYDROCHLORIDE 5 MG: 20 INJECTION INTRAMUSCULAR; INTRAVENOUS at 17:55

## 2018-08-16 RX ADMIN — FENTANYL CITRATE 50 MCG: 50 INJECTION, SOLUTION INTRAMUSCULAR; INTRAVENOUS at 08:55

## 2018-08-16 RX ADMIN — PROPOFOL 140 MG: 10 INJECTION, EMULSION INTRAVENOUS at 09:01

## 2018-08-16 RX ADMIN — ROCURONIUM BROMIDE 50 MG: 10 INJECTION INTRAVENOUS at 09:01

## 2018-08-16 RX ADMIN — CALCIUM CHLORIDE 1 G: 100 INJECTION INTRAVENOUS; INTRAVENTRICULAR at 23:25

## 2018-08-16 RX ADMIN — FENTANYL CITRATE 150 MCG: 50 INJECTION, SOLUTION INTRAMUSCULAR; INTRAVENOUS at 09:52

## 2018-08-16 RX ADMIN — SODIUM CHLORIDE, POTASSIUM CHLORIDE, SODIUM LACTATE AND CALCIUM CHLORIDE: 600; 310; 30; 20 INJECTION, SOLUTION INTRAVENOUS at 11:32

## 2018-08-16 RX ADMIN — HEPARIN SODIUM 10000 UNITS: 1000 INJECTION, SOLUTION INTRAVENOUS; SUBCUTANEOUS at 11:31

## 2018-08-16 RX ADMIN — MIDAZOLAM HYDROCHLORIDE 2 MG: 1 INJECTION, SOLUTION INTRAMUSCULAR; INTRAVENOUS at 08:55

## 2018-08-16 RX ADMIN — ROCURONIUM BROMIDE 20 MG: 10 INJECTION INTRAVENOUS at 12:16

## 2018-08-16 RX ADMIN — EPHEDRINE SULFATE 15 MG: 50 INJECTION, SOLUTION INTRAMUSCULAR; INTRAVENOUS; SUBCUTANEOUS at 11:19

## 2018-08-16 RX ADMIN — Medication 10 ML: at 18:45

## 2018-08-16 RX ADMIN — SODIUM CHLORIDE, POTASSIUM CHLORIDE, SODIUM LACTATE AND CALCIUM CHLORIDE: 600; 310; 30; 20 INJECTION, SOLUTION INTRAVENOUS at 12:51

## 2018-08-16 RX ADMIN — ROCURONIUM BROMIDE 30 MG: 10 INJECTION INTRAVENOUS at 11:33

## 2018-08-16 RX ADMIN — EPINEPHRINE 0.02 MCG/KG/MIN: 1 INJECTION PARENTERAL at 10:10

## 2018-08-16 RX ADMIN — PROPOFOL 15 MCG/KG/MIN: 10 INJECTION, EMULSION INTRAVENOUS at 12:59

## 2018-08-16 RX ADMIN — CEFAZOLIN SODIUM 2 G: 2 SOLUTION INTRAVENOUS at 10:02

## 2018-08-16 RX ADMIN — CHLORHEXIDINE GLUCONATE 0.12% ORAL RINSE 15 ML: 1.2 LIQUID ORAL at 19:57

## 2018-08-16 RX ADMIN — DEXTROSE 1 MG/MIN: 5 SOLUTION INTRAVENOUS at 10:16

## 2018-08-16 RX ADMIN — FENTANYL CITRATE 50 MCG: 50 INJECTION, SOLUTION INTRAMUSCULAR; INTRAVENOUS at 22:02

## 2018-08-16 RX ADMIN — SODIUM CHLORIDE 4 UNITS/HR: 9 INJECTION, SOLUTION INTRAVENOUS at 10:22

## 2018-08-16 RX ADMIN — MUPIROCIN: 20 OINTMENT TOPICAL at 06:31

## 2018-08-16 RX ADMIN — CEFAZOLIN SODIUM 2 G: 2 SOLUTION INTRAVENOUS at 12:53

## 2018-08-16 RX ADMIN — MUPIROCIN: 20 OINTMENT TOPICAL at 19:57

## 2018-08-16 RX ADMIN — SODIUM CHLORIDE 2 UNITS/HR: 9 INJECTION, SOLUTION INTRAVENOUS at 13:25

## 2018-08-16 RX ADMIN — HYDRALAZINE HYDROCHLORIDE 5 MG: 20 INJECTION INTRAMUSCULAR; INTRAVENOUS at 18:06

## 2018-08-16 RX ADMIN — AMIODARONE HYDROCHLORIDE 200 MG: 200 TABLET ORAL at 06:37

## 2018-08-16 RX ADMIN — FAMOTIDINE 20 MG: 10 INJECTION, SOLUTION INTRAVENOUS at 15:11

## 2018-08-16 RX ADMIN — Medication 0.1 MG: at 11:01

## 2018-08-16 RX ADMIN — ROCURONIUM BROMIDE 50 MG: 10 INJECTION INTRAVENOUS at 09:29

## 2018-08-16 RX ADMIN — SODIUM CHLORIDE, POTASSIUM CHLORIDE, SODIUM LACTATE AND CALCIUM CHLORIDE: 600; 310; 30; 20 INJECTION, SOLUTION INTRAVENOUS at 09:35

## 2018-08-16 RX ADMIN — FENTANYL CITRATE 100 MCG: 50 INJECTION, SOLUTION INTRAMUSCULAR; INTRAVENOUS at 10:21

## 2018-08-16 RX ADMIN — PROTAMINE SULFATE 10 MG: 10 INJECTION, SOLUTION INTRAVENOUS at 12:37

## 2018-08-16 RX ADMIN — MUPIROCIN: 20 OINTMENT TOPICAL at 15:01

## 2018-08-16 RX ADMIN — CHLORHEXIDINE GLUCONATE 0.12% ORAL RINSE 15 ML: 1.2 LIQUID ORAL at 15:01

## 2018-08-16 RX ADMIN — FENTANYL CITRATE 150 MCG: 50 INJECTION, SOLUTION INTRAMUSCULAR; INTRAVENOUS at 10:39

## 2018-08-16 RX ADMIN — HYDRALAZINE HYDROCHLORIDE 5 MG: 20 INJECTION INTRAMUSCULAR; INTRAVENOUS at 18:00

## 2018-08-16 RX ADMIN — SODIUM CHLORIDE, POTASSIUM CHLORIDE, SODIUM LACTATE AND CALCIUM CHLORIDE: 600; 310; 30; 20 INJECTION, SOLUTION INTRAVENOUS at 09:28

## 2018-08-16 ASSESSMENT — PULMONARY FUNCTION TESTS
PIF_VALUE: 22
PIF_VALUE: 23
PIF_VALUE: 22
PIF_VALUE: 20
PIF_VALUE: 20
PIF_VALUE: 22
PIF_VALUE: 23
PIF_VALUE: 1
PIF_VALUE: 21
PIF_VALUE: 22
PIF_VALUE: 28
PIF_VALUE: 1
PIF_VALUE: 22
PIF_VALUE: 22
PIF_VALUE: 21
PIF_VALUE: 19
PIF_VALUE: 27
PIF_VALUE: 22
PIF_VALUE: 14
PIF_VALUE: 22
PIF_VALUE: 0
PIF_VALUE: 21
PIF_VALUE: 23
PIF_VALUE: 23
PIF_VALUE: 21
PIF_VALUE: 22
PIF_VALUE: 21
PIF_VALUE: 22
PIF_VALUE: 26
PIF_VALUE: 3
PIF_VALUE: 21
PIF_VALUE: 23
PIF_VALUE: 21
PIF_VALUE: 21
PIF_VALUE: 20
PIF_VALUE: 22
PIF_VALUE: 21
PIF_VALUE: 21
PIF_VALUE: 25
PIF_VALUE: 23
PIF_VALUE: 1
PIF_VALUE: 1
PIF_VALUE: 23
PIF_VALUE: 21
PIF_VALUE: 1
PIF_VALUE: 21
PIF_VALUE: 20
PIF_VALUE: 23
PIF_VALUE: 1
PIF_VALUE: 22
PIF_VALUE: 1
PIF_VALUE: 14
PIF_VALUE: 1
PIF_VALUE: 20
PIF_VALUE: 2
PIF_VALUE: 23
PIF_VALUE: 24
PIF_VALUE: 22
PIF_VALUE: 22
PIF_VALUE: 21
PIF_VALUE: 22
PIF_VALUE: 27
PIF_VALUE: 27
PIF_VALUE: 24
PIF_VALUE: 24
PIF_VALUE: 27
PIF_VALUE: 19
PIF_VALUE: 0
PIF_VALUE: 21
PIF_VALUE: 21
PIF_VALUE: 20
PIF_VALUE: 22
PIF_VALUE: 1
PIF_VALUE: 22
PIF_VALUE: 1
PIF_VALUE: 0
PIF_VALUE: 6
PIF_VALUE: 23
PIF_VALUE: 26
PIF_VALUE: 22
PIF_VALUE: 23
PIF_VALUE: 25
PIF_VALUE: 22
PIF_VALUE: 22
PIF_VALUE: 1
PIF_VALUE: 23
PIF_VALUE: 21
PIF_VALUE: 22
PIF_VALUE: 22
PIF_VALUE: 0
PIF_VALUE: 1
PIF_VALUE: 2
PIF_VALUE: 2
PIF_VALUE: 22
PIF_VALUE: 1
PIF_VALUE: 22
PIF_VALUE: 1
PIF_VALUE: 25
PIF_VALUE: 21
PIF_VALUE: 0
PIF_VALUE: 1
PIF_VALUE: 25
PIF_VALUE: 21
PIF_VALUE: 20
PIF_VALUE: 1
PIF_VALUE: 22
PIF_VALUE: 24
PIF_VALUE: 22
PIF_VALUE: 25
PIF_VALUE: 21
PIF_VALUE: 1
PIF_VALUE: 2
PIF_VALUE: 24
PIF_VALUE: 3
PIF_VALUE: 25
PIF_VALUE: 21
PIF_VALUE: 20
PIF_VALUE: 23
PIF_VALUE: 21
PIF_VALUE: 22
PIF_VALUE: 1
PIF_VALUE: 22
PIF_VALUE: 0
PIF_VALUE: 21
PIF_VALUE: 20
PIF_VALUE: 22
PIF_VALUE: 21
PIF_VALUE: 1
PIF_VALUE: 21
PIF_VALUE: 21
PIF_VALUE: 0
PIF_VALUE: 22
PIF_VALUE: 22
PIF_VALUE: 0
PIF_VALUE: 28
PIF_VALUE: 24
PIF_VALUE: 23
PIF_VALUE: 22
PIF_VALUE: 22
PIF_VALUE: 25
PIF_VALUE: 1
PIF_VALUE: 23
PIF_VALUE: 23
PIF_VALUE: 1
PIF_VALUE: 25
PIF_VALUE: 22
PIF_VALUE: 21
PIF_VALUE: 1
PIF_VALUE: 21
PIF_VALUE: 20
PIF_VALUE: 0
PIF_VALUE: 26
PIF_VALUE: 22
PIF_VALUE: 22
PIF_VALUE: 23
PIF_VALUE: 27
PIF_VALUE: 1
PIF_VALUE: 21
PIF_VALUE: 24
PIF_VALUE: 22
PIF_VALUE: 22
PIF_VALUE: 1
PIF_VALUE: 22
PIF_VALUE: 0
PIF_VALUE: 22
PIF_VALUE: 1
PIF_VALUE: 22
PIF_VALUE: 1
PIF_VALUE: 19
PIF_VALUE: 22
PIF_VALUE: 0
PIF_VALUE: 22
PIF_VALUE: 21
PIF_VALUE: 1
PIF_VALUE: 22
PIF_VALUE: 23
PIF_VALUE: 25
PIF_VALUE: 22
PIF_VALUE: 24
PIF_VALUE: 23
PIF_VALUE: 29
PIF_VALUE: 25
PIF_VALUE: 20
PIF_VALUE: 0
PIF_VALUE: 21
PIF_VALUE: 19
PIF_VALUE: 1
PIF_VALUE: 22
PIF_VALUE: 0
PIF_VALUE: 20
PIF_VALUE: 1
PIF_VALUE: 23
PIF_VALUE: 21
PIF_VALUE: 0
PIF_VALUE: 23
PIF_VALUE: 22
PIF_VALUE: 23
PIF_VALUE: 1
PIF_VALUE: 0
PIF_VALUE: 20
PIF_VALUE: 22
PIF_VALUE: 1
PIF_VALUE: 23
PIF_VALUE: 22
PIF_VALUE: 22
PIF_VALUE: 24
PIF_VALUE: 22
PIF_VALUE: 21
PIF_VALUE: 23
PIF_VALUE: 0
PIF_VALUE: 24
PIF_VALUE: 23
PIF_VALUE: 20
PIF_VALUE: 27
PIF_VALUE: 23
PIF_VALUE: 0
PIF_VALUE: 23
PIF_VALUE: 22
PIF_VALUE: 21
PIF_VALUE: 23
PIF_VALUE: 0
PIF_VALUE: 23
PIF_VALUE: 21
PIF_VALUE: 23
PIF_VALUE: 22
PIF_VALUE: 20
PIF_VALUE: 21
PIF_VALUE: 22
PIF_VALUE: 26
PIF_VALUE: 11
PIF_VALUE: 23
PIF_VALUE: 23
PIF_VALUE: 25
PIF_VALUE: 22
PIF_VALUE: 21
PIF_VALUE: 23
PIF_VALUE: 1
PIF_VALUE: 22
PIF_VALUE: 20
PIF_VALUE: 20
PIF_VALUE: 21
PIF_VALUE: 1
PIF_VALUE: 22
PIF_VALUE: 20
PIF_VALUE: 0
PIF_VALUE: 22
PIF_VALUE: 19
PIF_VALUE: 0
PIF_VALUE: 25
PIF_VALUE: 0
PIF_VALUE: 22
PIF_VALUE: 24
PIF_VALUE: 22
PIF_VALUE: 23
PIF_VALUE: 21
PIF_VALUE: 35
PIF_VALUE: 24
PIF_VALUE: 22
PIF_VALUE: 1
PIF_VALUE: 22
PIF_VALUE: 1
PIF_VALUE: 25
PIF_VALUE: 21
PIF_VALUE: 22
PIF_VALUE: 21
PIF_VALUE: 1
PIF_VALUE: 22
PIF_VALUE: 21
PIF_VALUE: 1
PIF_VALUE: 1
PIF_VALUE: 21

## 2018-08-16 ASSESSMENT — PAIN SCALES - GENERAL
PAINLEVEL_OUTOF10: 0
PAINLEVEL_OUTOF10: 9

## 2018-08-16 ASSESSMENT — PAIN DESCRIPTION - LOCATION: LOCATION: CHEST

## 2018-08-16 ASSESSMENT — PAIN DESCRIPTION - PAIN TYPE: TYPE: SURGICAL PAIN

## 2018-08-16 NOTE — ANESTHESIA PROCEDURE NOTES
Central Venous Line:    A central venous line was placed using surface landmarks, in the OR for the following indication(s): central venous access and CVP monitoring. 8/16/2018 9:23 AM8/16/2018 9:28 AM    Sterility preparation included the following: hand hygiene performed prior to procedure, maximum sterile barriers used and sterile technique used to drape from head to toe. The patient was placed in Trendelenburg position. The left subclavian vein was prepped. The site was prepped with Chloraprep. Catheter size: 8 Fr. 16 (length), double lumen was placed. During the procedure, the following specific steps were taken: target vein identified, needle advanced into vein and blood aspirated and guidewire advanced into vein. Intravenous verification was obtained by venous blood return. Post insertion care included: all ports aspirated, all ports flushed easily, guidewire removed intact, Biopatch applied, line sutured in place and dressing applied. During the procedure the patient experienced: patient tolerated procedure well with no complications.       Insertion site scrubbed per usage guidelines?: Yes  Skin prep agent dried for 3 minutes prior to procedure?:yes  Anesthesia type: general  Staffing  Anesthesiologist: Gayle Short  Performed: Anesthesiologist   Preanesthetic Checklist  Completed: patient identified, site marked, surgical consent, pre-op evaluation, timeout performed, IV checked, risks and benefits discussed, monitors and equipment checked, anesthesia consent given, oxygen available and patient being monitored
performed without complications. 2. LVEF 30 % preop. Post-CPB is 35%. Inferior segments remain hypokinetic. LV dilatation, moderate. 3. Pre-op Valvular abnormalities Moderate mitral regurgitation. Mild TR. Post-op: Mild to moderate mitral regurgitation. 4. No Aortic dissection  5. Significant findings were communicated to CTS.     Electronically signed by Kip Xavier MD on 8/16/2018 at 1:08 PM

## 2018-08-16 NOTE — PROGRESS NOTES
08/16/18 1944   Vent Information   Vent Mode CPAP   Pressure Support 6 cmH20   FiO2  30 %   PEEP/CPAP 5   Vent Patient Data   Vt Exhaled 389 mL   Rate Measured 20 br/min     Weaning trial started.

## 2018-08-16 NOTE — BRIEF OP NOTE
Brief Postoperative Note  ______________________________________________________________    Patient: Marcos Epps. YOB: 1945  MRN: [id number]  Date of Procedure: 8/16/2018    Pre-Op Diagnosis: CORONARY ARTERY DISEASE     Post-Op Diagnosis: Same       Procedure(s):  CABG CORONARY ARTERY BYPASS ON  PUMP, DEV AGUILAR ORESTES ([facility]# [id number])  Attempted off pump based on cardiomegaly unsuccessful convert to on pump  1.  Lima-lad  2. [id number]  Anesthesia: General    Surgeon(s):  Mihaela Johnson MD    Staff:  First Assistant: eRnetta Duggan RN  Scrub Person First: Delta Toney     Estimated Blood Loss: 437SM    Complications: none    Specimens:   ID Type Source Tests Collected by Time Destination   1 : DIAZ INSERTION URINE CULTURE Urine Bladder URINE CULTURE Mihaela Johnson MD 8/16/2018 2830              Drains:   Chest Tube 2 Anterior Pleural 28 Setswana (Active)       Urethral Catheter Temperature probe 16 fr (Active)       Findings: preop ef% 20 postop ef35% medistem flows 40-60ml/min pi<1.3    Oksana Muller MD  Date: 8/16/2018  Time: 2:03 PM

## 2018-08-16 NOTE — PLAN OF CARE
Problem: OXYGENATION/RESPIRATORY FUNCTION  Goal: Patient will maintain patent airway  Outcome: Ongoing    Goal: Patient will achieve/maintain normal respiratory rate/effort  Respiratory rate and effort will be within normal limits for the patient  Outcome: Ongoing      Problem: MECHANICAL VENTILATION  Goal: Patient will maintain patent airway  Outcome: Ongoing    Goal: Oral health is maintained or improved  Outcome: Ongoing    Goal: ET tube will be managed safely  Outcome: Ongoing    Goal: Ability to express needs and understand communication  Outcome: Ongoing    Goal: Mobility/activity is maintained at optimum level for patient  Outcome: Ongoing      Problem: SKIN INTEGRITY  Goal: Skin integrity is maintained or improved  Outcome: Ongoing

## 2018-08-16 NOTE — FLOWSHEET NOTE
visited patient per pre-surgery list and initial rounding visits. Patient was sitting up in hospital bed, watching TV, when  visited. Patient shared that he had anticipated having \"surgery this morning,\" however, it was delayed and rescheduled for \"tomorrow morning. \" Per patient, he will be undergoing \"at least a triple bypass. \" Patient shared that he has good support in his spouse, Ashley Chen, and sister-in-law, Tenzin Felder. Patient was coping well during visit, however, shared feelings of frustration over the delay in surgery. Per patient, he does not have a home [place], however, he receives spiritual care from the  at his [facility].  provided presence and support to patient and encouraged him to reach out for spiritual support throughout his healing and recovery in the hospital. Patient thanked  for visit and support. Chaplains will remain available to offer spiritual and emotional support as needed. [facility]     08/15/18 2128   Encounter Summary   Services provided to: Patient   Referral/Consult From: Multi-disciplinary team  (Pre-Surgery List)   Support System Spouse; Family members   Place of [facility] None   Continue Visiting (8/15/2018)   Complexity of Encounter Moderate   Length of Encounter 15 minutes   Spiritual Assessment Completed Yes   Spiritual/Pentecostal   Type Spiritual support   Assessment Approachable;Helplessness; Resentful   Intervention Active listening;Explored feelings, thoughts, concerns;Nurtured hope;Sustaining presence/ Ministry of presence   Outcome Comfort;Expressed gratitude

## 2018-08-16 NOTE — PLAN OF CARE
Problem: Falls - Risk of:  Goal: Will remain free from falls  Will remain free from falls   Outcome: Ongoing  No falls this shift. Problem: Cardiac Output - Decreased:  Goal: Hemodynamic stability will improve  Hemodynamic stability will improve   Outcome: Ongoing  Continuous cardiac monitoring continues, with [place] catheter. Problem: Pain:  Goal: Control of acute pain  Control of acute pain   Outcome: Ongoing  Medicated for incisional pain, as ordered.

## 2018-08-17 ENCOUNTER — APPOINTMENT (OUTPATIENT)
Dept: GENERAL RADIOLOGY | Age: 73
DRG: 235 | End: 2018-08-17
Attending: THORACIC SURGERY (CARDIOTHORACIC VASCULAR SURGERY)
Payer: MEDICARE

## 2018-08-17 LAB
ANION GAP SERPL CALCULATED.3IONS-SCNC: 15 MMOL/L (ref 9–17)
BUN BLDV-MCNC: 15 MG/DL (ref 8–23)
BUN/CREAT BLD: ABNORMAL (ref 9–20)
CALCIUM SERPL-MCNC: 8.7 MG/DL (ref 8.6–10.4)
CHLORIDE BLD-SCNC: 111 MMOL/L (ref 98–107)
CO2: 22 MMOL/L (ref 20–31)
CREAT SERPL-MCNC: 1.1 MG/DL (ref 0.7–1.2)
CULTURE: NO GROWTH
GFR AFRICAN AMERICAN: >60 ML/MIN
GFR NON-AFRICAN AMERICAN: >60 ML/MIN
GFR SERPL CREATININE-BSD FRML MDRD: ABNORMAL ML/MIN/{1.73_M2}
GFR SERPL CREATININE-BSD FRML MDRD: ABNORMAL ML/MIN/{1.73_M2}
GLUCOSE BLD-MCNC: 102 MG/DL (ref 75–110)
GLUCOSE BLD-MCNC: 104 MG/DL (ref 70–99)
GLUCOSE BLD-MCNC: 137 MG/DL (ref 75–110)
GLUCOSE BLD-MCNC: 142 MG/DL (ref 75–110)
GLUCOSE BLD-MCNC: 160 MG/DL (ref 75–110)
GLUCOSE BLD-MCNC: 166 MG/DL (ref 75–110)
GLUCOSE BLD-MCNC: 310 MG/DL (ref 75–110)
GLUCOSE BLD-MCNC: 67 MG/DL (ref 75–110)
GLUCOSE BLD-MCNC: 75 MG/DL (ref 75–110)
GLUCOSE BLD-MCNC: 84 MG/DL (ref 75–110)
GLUCOSE BLD-MCNC: 87 MG/DL (ref 75–110)
GLUCOSE BLD-MCNC: 87 MG/DL (ref 75–110)
GLUCOSE BLD-MCNC: 88 MG/DL (ref 75–110)
GLUCOSE BLD-MCNC: 94 MG/DL (ref 75–110)
HCT VFR BLD CALC: 26.4 % (ref 40.7–50.3)
HCT VFR BLD CALC: 26.6 % (ref 40.7–50.3)
HEMOGLOBIN: 7.8 G/DL (ref 13–17)
HEMOGLOBIN: 7.8 G/DL (ref 13–17)
INR BLD: 1.3
Lab: NORMAL
MAGNESIUM: 2 MG/DL (ref 1.6–2.6)
MCH RBC QN AUTO: 25.8 PG (ref 25.2–33.5)
MCH RBC QN AUTO: 25.9 PG (ref 25.2–33.5)
MCHC RBC AUTO-ENTMCNC: 29.3 G/DL (ref 28.4–34.8)
MCHC RBC AUTO-ENTMCNC: 29.5 G/DL (ref 28.4–34.8)
MCV RBC AUTO: 87.7 FL (ref 82.6–102.9)
MCV RBC AUTO: 88.1 FL (ref 82.6–102.9)
NRBC AUTOMATED: 3 PER 100 WBC
NRBC AUTOMATED: 3.4 PER 100 WBC
PDW BLD-RTO: 17.2 % (ref 11.8–14.4)
PDW BLD-RTO: 17.3 % (ref 11.8–14.4)
PLATELET # BLD: 194 K/UL (ref 138–453)
PLATELET # BLD: 198 K/UL (ref 138–453)
PMV BLD AUTO: 10.4 FL (ref 8.1–13.5)
PMV BLD AUTO: 9.5 FL (ref 8.1–13.5)
POC ANGLE TEG W HEP: 73.5 DEG (ref 59–74)
POC ANGLE TEG W HEP: 77.4 DEG (ref 59–74)
POC ANGLE TEG: 55.1 DEG (ref 59–74)
POC ANGLE TEG: 70 DEG (ref 59–74)
POC EPL TEG W/HEP: 2 % (ref 0–15)
POC EPL TEG W/HEP: 7.9 % (ref 0–15)
POC EPL TEG: 0.4 % (ref 0–15)
POC EPL TEG: 3.4 % (ref 0–15)
POC KINETICS TEG W HEP: 1 MIN (ref 1–3)
POC KINETICS TEG W HEP: 1.2 MIN (ref 1–3)
POC KINETICS TEG: 1.1 MIN (ref 1–3)
POC KINETICS TEG: 1.5 MIN (ref 1–3)
POC LY30(LYSIS) TEG W HEP: 2 % (ref 0–8)
POC LY30(LYSIS) TEG W HEP: 7.9 % (ref 0–8)
POC LY30(LYSIS) TEG: 0.4 % (ref 0–8)
POC LY30(LYSIS) TEG: 3.4 % (ref 0–8)
POC MA(MAX CLOT) TEG: 68.3 MM (ref 55–74)
POC MA(MAX CLOT) TEG: 75.6 MM (ref 55–74)
POC MAX CLOT TEG W HEP: 70.1 MM (ref 55–74)
POC MAX CLOT TEG W HEP: 77.8 MM (ref 55–74)
POC REACTION TIME TEG W HEP: 6.5 MIN (ref 4–9)
POC REACTION TIME TEG W HEP: 6.6 MIN (ref 4–9)
POC REACTION TIME TEG: 5.4 MIN (ref 4–9)
POC REACTION TIME TEG: 6.2 MIN (ref 4–9)
POTASSIUM SERPL-SCNC: 4.1 MMOL/L (ref 3.7–5.3)
POTASSIUM SERPL-SCNC: 4.2 MMOL/L (ref 3.7–5.3)
PROTHROMBIN TIME: 13.6 SEC (ref 9–12)
RBC # BLD: 3.01 M/UL (ref 4.21–5.77)
RBC # BLD: 3.02 M/UL (ref 4.21–5.77)
SODIUM BLD-SCNC: 148 MMOL/L (ref 135–144)
SPECIMEN DESCRIPTION: NORMAL
STATUS: NORMAL
TEG COMMENT: ABNORMAL
TEG COMMENT: NORMAL
WBC # BLD: 11.2 K/UL (ref 3.5–11.3)
WBC # BLD: 11.5 K/UL (ref 3.5–11.3)

## 2018-08-17 PROCEDURE — 6370000000 HC RX 637 (ALT 250 FOR IP): Performed by: INTERNAL MEDICINE

## 2018-08-17 PROCEDURE — 97535 SELF CARE MNGMENT TRAINING: CPT

## 2018-08-17 PROCEDURE — 97166 OT EVAL MOD COMPLEX 45 MIN: CPT

## 2018-08-17 PROCEDURE — 6360000002 HC RX W HCPCS

## 2018-08-17 PROCEDURE — 2500000003 HC RX 250 WO HCPCS: Performed by: THORACIC SURGERY (CARDIOTHORACIC VASCULAR SURGERY)

## 2018-08-17 PROCEDURE — G8987 SELF CARE CURRENT STATUS: HCPCS

## 2018-08-17 PROCEDURE — 2580000003 HC RX 258: Performed by: THORACIC SURGERY (CARDIOTHORACIC VASCULAR SURGERY)

## 2018-08-17 PROCEDURE — 6370000000 HC RX 637 (ALT 250 FOR IP): Performed by: PHYSICIAN ASSISTANT

## 2018-08-17 PROCEDURE — 6360000002 HC RX W HCPCS: Performed by: PHYSICIAN ASSISTANT

## 2018-08-17 PROCEDURE — 83735 ASSAY OF MAGNESIUM: CPT

## 2018-08-17 PROCEDURE — 94660 CPAP INITIATION&MGMT: CPT

## 2018-08-17 PROCEDURE — 2140000001 HC CVICU INTERMEDIATE R&B

## 2018-08-17 PROCEDURE — 2500000003 HC RX 250 WO HCPCS: Performed by: PHYSICIAN ASSISTANT

## 2018-08-17 PROCEDURE — 99024 POSTOP FOLLOW-UP VISIT: CPT | Performed by: PHYSICIAN ASSISTANT

## 2018-08-17 PROCEDURE — P9041 ALBUMIN (HUMAN),5%, 50ML: HCPCS | Performed by: PHYSICIAN ASSISTANT

## 2018-08-17 PROCEDURE — 36415 COLL VENOUS BLD VENIPUNCTURE: CPT

## 2018-08-17 PROCEDURE — 71045 X-RAY EXAM CHEST 1 VIEW: CPT

## 2018-08-17 PROCEDURE — S0028 INJECTION, FAMOTIDINE, 20 MG: HCPCS | Performed by: PHYSICIAN ASSISTANT

## 2018-08-17 PROCEDURE — 85027 COMPLETE CBC AUTOMATED: CPT

## 2018-08-17 PROCEDURE — 85610 PROTHROMBIN TIME: CPT

## 2018-08-17 PROCEDURE — G8988 SELF CARE GOAL STATUS: HCPCS

## 2018-08-17 PROCEDURE — 99406 BEHAV CHNG SMOKING 3-10 MIN: CPT

## 2018-08-17 PROCEDURE — 80048 BASIC METABOLIC PNL TOTAL CA: CPT

## 2018-08-17 PROCEDURE — 2100000001 HC CVICU R&B

## 2018-08-17 PROCEDURE — 82947 ASSAY GLUCOSE BLOOD QUANT: CPT

## 2018-08-17 PROCEDURE — 2580000003 HC RX 258: Performed by: PHYSICIAN ASSISTANT

## 2018-08-17 PROCEDURE — 6360000002 HC RX W HCPCS: Performed by: THORACIC SURGERY (CARDIOTHORACIC VASCULAR SURGERY)

## 2018-08-17 PROCEDURE — 94762 N-INVAS EAR/PLS OXIMTRY CONT: CPT

## 2018-08-17 RX ORDER — SODIUM CHLORIDE 9 MG/ML
INJECTION, SOLUTION INTRAVENOUS CONTINUOUS
Status: DISCONTINUED | OUTPATIENT
Start: 2018-08-17 | End: 2018-08-23

## 2018-08-17 RX ORDER — LORAZEPAM 0.5 MG/1
0.25 TABLET ORAL 3 TIMES DAILY PRN
Status: DISCONTINUED | OUTPATIENT
Start: 2018-08-17 | End: 2018-08-17

## 2018-08-17 RX ORDER — BUPROPION HYDROCHLORIDE 100 MG/1
100 TABLET ORAL 2 TIMES DAILY
Status: DISCONTINUED | OUTPATIENT
Start: 2018-08-17 | End: 2018-08-17

## 2018-08-17 RX ORDER — TRAMADOL HYDROCHLORIDE 50 MG/1
50 TABLET ORAL EVERY 6 HOURS PRN
Status: DISCONTINUED | OUTPATIENT
Start: 2018-08-17 | End: 2018-08-26 | Stop reason: HOSPADM

## 2018-08-17 RX ORDER — FUROSEMIDE 10 MG/ML
20 INJECTION INTRAMUSCULAR; INTRAVENOUS 2 TIMES DAILY
Status: COMPLETED | OUTPATIENT
Start: 2018-08-17 | End: 2018-08-17

## 2018-08-17 RX ORDER — NICOTINE 21 MG/24HR
1 PATCH, TRANSDERMAL 24 HOURS TRANSDERMAL DAILY
Status: DISCONTINUED | OUTPATIENT
Start: 2018-08-17 | End: 2018-08-23

## 2018-08-17 RX ORDER — FUROSEMIDE 10 MG/ML
20 INJECTION INTRAMUSCULAR; INTRAVENOUS ONCE
Status: COMPLETED | OUTPATIENT
Start: 2018-08-17 | End: 2018-08-17

## 2018-08-17 RX ORDER — GLIPIZIDE 10 MG/1
10 TABLET ORAL
Status: DISCONTINUED | OUTPATIENT
Start: 2018-08-18 | End: 2018-08-17

## 2018-08-17 RX ORDER — GLIPIZIDE 5 MG/1
5 TABLET ORAL
Status: DISCONTINUED | OUTPATIENT
Start: 2018-08-18 | End: 2018-08-18

## 2018-08-17 RX ORDER — BUPROPION HYDROCHLORIDE 150 MG/1
150 TABLET, EXTENDED RELEASE ORAL 2 TIMES DAILY
Status: DISCONTINUED | OUTPATIENT
Start: 2018-08-17 | End: 2018-08-18

## 2018-08-17 RX ORDER — FUROSEMIDE 10 MG/ML
40 INJECTION INTRAMUSCULAR; INTRAVENOUS ONCE
Status: COMPLETED | OUTPATIENT
Start: 2018-08-17 | End: 2018-08-17

## 2018-08-17 RX ORDER — FUROSEMIDE 10 MG/ML
INJECTION INTRAMUSCULAR; INTRAVENOUS
Status: COMPLETED
Start: 2018-08-17 | End: 2018-08-17

## 2018-08-17 RX ADMIN — MUPIROCIN: 20 OINTMENT TOPICAL at 20:08

## 2018-08-17 RX ADMIN — ENOXAPARIN SODIUM 40 MG: 40 INJECTION SUBCUTANEOUS at 08:52

## 2018-08-17 RX ADMIN — TRAZODONE HYDROCHLORIDE 50 MG: 50 TABLET ORAL at 20:08

## 2018-08-17 RX ADMIN — CHLORHEXIDINE GLUCONATE 0.12% ORAL RINSE 15 ML: 1.2 LIQUID ORAL at 20:08

## 2018-08-17 RX ADMIN — AMIODARONE HYDROCHLORIDE 200 MG: 200 TABLET ORAL at 21:07

## 2018-08-17 RX ADMIN — AMIODARONE HYDROCHLORIDE 200 MG: 200 TABLET ORAL at 08:51

## 2018-08-17 RX ADMIN — Medication 2 G: at 14:00

## 2018-08-17 RX ADMIN — ALBUMIN (HUMAN) 25 G: 12.5 INJECTION, SOLUTION INTRAVENOUS at 14:05

## 2018-08-17 RX ADMIN — FUROSEMIDE 20 MG: 10 INJECTION, SOLUTION INTRAMUSCULAR; INTRAVENOUS at 01:15

## 2018-08-17 RX ADMIN — POTASSIUM CHLORIDE 20 MEQ: 400 INJECTION, SOLUTION INTRAVENOUS at 01:25

## 2018-08-17 RX ADMIN — FUROSEMIDE 20 MG: 10 INJECTION INTRAMUSCULAR; INTRAVENOUS at 01:15

## 2018-08-17 RX ADMIN — SODIUM CHLORIDE: 9 INJECTION, SOLUTION INTRAVENOUS at 08:44

## 2018-08-17 RX ADMIN — GABAPENTIN 300 MG: 300 CAPSULE ORAL at 08:51

## 2018-08-17 RX ADMIN — FUROSEMIDE 40 MG: 10 INJECTION INTRAMUSCULAR; INTRAVENOUS at 06:26

## 2018-08-17 RX ADMIN — FOLIC ACID: 5 INJECTION, SOLUTION INTRAMUSCULAR; INTRAVENOUS; SUBCUTANEOUS at 16:09

## 2018-08-17 RX ADMIN — DESMOPRESSIN ACETATE 40 MG: 0.2 TABLET ORAL at 20:08

## 2018-08-17 RX ADMIN — METOPROLOL TARTRATE 25 MG: 25 TABLET ORAL at 08:51

## 2018-08-17 RX ADMIN — FUROSEMIDE 20 MG: 10 INJECTION, SOLUTION INTRAVENOUS at 10:08

## 2018-08-17 RX ADMIN — MUPIROCIN: 20 OINTMENT TOPICAL at 08:52

## 2018-08-17 RX ADMIN — ASPIRIN 81 MG: 81 TABLET, DELAYED RELEASE ORAL at 08:51

## 2018-08-17 RX ADMIN — FAMOTIDINE 20 MG: 10 INJECTION, SOLUTION INTRAVENOUS at 08:52

## 2018-08-17 RX ADMIN — CLOPIDOGREL 75 MG: 75 TABLET, FILM COATED ORAL at 08:52

## 2018-08-17 RX ADMIN — LATANOPROST 1 DROP: 50 SOLUTION OPHTHALMIC at 20:09

## 2018-08-17 RX ADMIN — CHLORHEXIDINE GLUCONATE 0.12% ORAL RINSE 15 ML: 1.2 LIQUID ORAL at 08:52

## 2018-08-17 RX ADMIN — Medication 2 G: at 20:08

## 2018-08-17 RX ADMIN — BUPROPION HYDROCHLORIDE 150 MG: 150 TABLET, EXTENDED RELEASE ORAL at 21:07

## 2018-08-17 RX ADMIN — POTASSIUM CHLORIDE 20 MEQ: 400 INJECTION, SOLUTION INTRAVENOUS at 06:27

## 2018-08-17 RX ADMIN — DOCUSATE SODIUM 100 MG: 100 CAPSULE ORAL at 20:08

## 2018-08-17 RX ADMIN — OXYCODONE HYDROCHLORIDE AND ACETAMINOPHEN 2 TABLET: 5; 325 TABLET ORAL at 05:05

## 2018-08-17 RX ADMIN — MULTIPLE VITAMINS W/ MINERALS TAB 1 TABLET: TAB at 08:51

## 2018-08-17 RX ADMIN — Medication 2 G: at 05:05

## 2018-08-17 RX ADMIN — OXYCODONE HYDROCHLORIDE AND ACETAMINOPHEN 2 TABLET: 5; 325 TABLET ORAL at 09:12

## 2018-08-17 RX ADMIN — DOCUSATE SODIUM 100 MG: 100 CAPSULE ORAL at 08:52

## 2018-08-17 RX ADMIN — FUROSEMIDE 40 MG: 10 INJECTION, SOLUTION INTRAVENOUS at 06:26

## 2018-08-17 RX ADMIN — POLYETHYLENE GLYCOL 3350 17 G: 17 POWDER, FOR SOLUTION ORAL at 08:53

## 2018-08-17 RX ADMIN — FUROSEMIDE 20 MG: 10 INJECTION, SOLUTION INTRAVENOUS at 20:08

## 2018-08-17 ASSESSMENT — PAIN DESCRIPTION - ORIENTATION: ORIENTATION: RIGHT;LEFT

## 2018-08-17 ASSESSMENT — PAIN DESCRIPTION - PAIN TYPE
TYPE: SURGICAL PAIN
TYPE: SURGICAL PAIN

## 2018-08-17 ASSESSMENT — PAIN DESCRIPTION - LOCATION: LOCATION: INCISION;CHEST

## 2018-08-17 ASSESSMENT — PAIN SCALES - GENERAL
PAINLEVEL_OUTOF10: 7

## 2018-08-17 ASSESSMENT — PULMONARY FUNCTION TESTS: PIF_VALUE: 13

## 2018-08-17 NOTE — PROGRESS NOTES
Physical Therapy  DATE: 2018    NAME: Pa Main MRN: [id number]   : 1945    Patient not seen this date for Physical Therapy due to:  [] Blood transfusion in progress  [] Hemodialysis  []  Patient Declined  [] Spine Precautions   [] Strict Bedrest  [] Surgery/ Procedure  [] Testing      [x] Other--pt difficult transfer to chair via OT/RN; RN requested PT to check pt PM        [] PT being discontinued at this time. Patient independent. No further needs. [] PT being discontinued at this time as the patient has been transferred to palliative care. No further needs.     Eric Valadez, PT

## 2018-08-17 NOTE — PROGRESS NOTES
metoprolol tartrate  25 mg Oral BID    lisinopril  2.5 mg Oral Daily    amiodarone  200 mg Oral BID    chlorhexidine  15 mL Mouth/Throat BID    mupirocin   Nasal BID    therapeutic multivitamin-minerals  1 tablet Oral Daily with breakfast    atorvastatin  40 mg Oral Nightly    enoxaparin  40 mg Subcutaneous Daily    aspirin  81 mg Oral Daily    clopidogrel  75 mg Oral Daily    insulin lispro  0-12 Units Subcutaneous TID WC    insulin lispro  0-6 Units Subcutaneous Nightly    gabapentin  300 mg Oral Daily    latanoprost  1 drop Both Eyes Nightly     Continuous Infusions:    sodium chloride 75 mL/hr at 08/16/18 1354    insulin (HUMAN R) non-weight based infusion 2.04 Units/hr (08/17/18 0559)    dextrose      niCARdipine Stopped (08/16/18 1825)       Exam:   General: alert and oriented to person, place and time, well-developed and well-nourished, in no acute distress  Heart:Normal S1 and S2.  Regular rhythm. No murmurs, gallops, or rubs. , Pacing wires  Yes  Lungs: diminished breath sounds bibasilar Equal and symmetric expansion with respiration. Chest tubes to suction  Abdomen: soft, non tender, non distended, BSx4  Extremities: 1+ edema, intact pulses in all four extremities  Musculoskeletal:  Intact range of motion of peripheral joints. grossly normal  Neurologic:  Non-focal sensory deficits on exam.  Psychiatric: Mood and affect are appropriate. Wounds: clean and dry, healing appropriately, dressing in place       Assessment/Plan:   Patient Active Problem List   Diagnosis    Acute on chronic systolic CHF (congestive heart failure) ([address]    Multiple vessel coronary artery disease    Multiple myeloma not having achieved remission ([address]    Chronic obstructive pulmonary disease (HCC)    Low hemoglobin    Hypokalemia    Other drug-induced pancytopenia ([address]    RANDALL (acute kidney injury) ([address]    CAD in native artery       1. S/p cabg x 2   POD 1  2.  HD - NSR, stable, off pressors, extubated  Will start lopressor 25, hold on ACE today  Lasix 20 bid  D/c Dave Pederson cordis, Foley  Will need ECHO later to eval for LifeVest  Ambulate, IS, OOBTC  ADT, d/c insulin post op 24 hrs  Stable creat - 1.10  ABLA - hb 7.8, plt 198    DVT ppx: Lovenox & SCD's while stationary  Patient is on BB, Plavix, ASA, Statin therapy per protocol   Plan for discharge when medically stable    The above recommendations including medications and orders were discussed and agreed upon with  the attending on service for the cardiothoracic surgery group today.     Electronically signed by BI Ricardo on 8/17/2018 at 7:22 AM

## 2018-08-17 NOTE — PROGRESS NOTES
Pt on CPAP 5 / PS 6 with 30% FiO2 since 1944. ABG results and pulmonary mechanics:    PH 7.40  PaCO2 35.3  PaO2 104.3  HCO3 21.6  BE -2.9       Vt 500ml  VC 1000ml  NIF-25 cmH20  Cuff Leak Poitive  RR 25 non labored br/min     Pt awake and following commands.      RN to notify Dr Keegan Ordaz for extubation orders

## 2018-08-17 NOTE — PROGRESS NOTES
Smoking Cessation - topics covered   [x]  Health Risks  [x]  Benefits of Quitting   [x]  Smoking Cessation  []  Patient has no history of tobacco use  []  Patient is former smoker. []  No need for tobacco cessation education. [x]  Booklet given (declined)  [x]  Patient verbalizes understanding. []  Patient denies need for tobacco cessation education.   Jolyn Aschoff  3:17 PM

## 2018-08-17 NOTE — PLAN OF CARE
Problem: Gas Exchange - Impaired:  Goal: Levels of oxygenation will improve  Levels of oxygenation will improve   Outcome: Ongoing  Pt on continuous bipap, O2 sats and SVO2 monitoring in place.

## 2018-08-17 NOTE — CONSULTS
BID  metoprolol tartrate (LOPRESSOR) tablet 25 mg, 25 mg, Oral, BID  amiodarone (CORDARONE) tablet 200 mg, 200 mg, Oral, BID  chlorhexidine (PERIDEX) 0.12 % solution 15 mL, 15 mL, Mouth/Throat, BID  hydrALAZINE (APRESOLINE) injection 5 mg, 5 mg, Intravenous, Q5 Min PRN  mupirocin (BACTROBAN) 2 % ointment, , Nasal, BID  therapeutic multivitamin-minerals 1 tablet, 1 tablet, Oral, Daily with breakfast  atorvastatin (LIPITOR) tablet 40 mg, 40 mg, Oral, Nightly  enoxaparin (LOVENOX) injection 40 mg, 40 mg, Subcutaneous, Daily  aspirin EC tablet 81 mg, 81 mg, Oral, Daily  clopidogrel (PLAVIX) tablet 75 mg, 75 mg, Oral, Daily  albumin human 5 % bottle 25 g, 25 g, Intravenous, PRN  insulin regular (HUMULIN R;NOVOLIN R) 100 Units in sodium chloride 0.9 % 100 mL infusion, 1 Units/hr, Intravenous, Continuous  insulin lispro (HUMALOG) injection vial 0-12 Units, 0-12 Units, Subcutaneous, TID WC  insulin lispro (HUMALOG) injection vial 0-6 Units, 0-6 Units, Subcutaneous, Nightly  glucose (GLUTOSE) 40 % oral gel 15 g, 15 g, Oral, PRN  dextrose 50 % solution 12.5 g, 12.5 g, Intravenous, PRN  glucagon (rDNA) injection 1 mg, 1 mg, Intramuscular, PRN  dextrose 5 % solution, 100 mL/hr, Intravenous, PRN  niCARdipine (CARDENE) 20 mg in 0.9 % sodium chloride 200 mL infusion, 10 mg/hr, Intravenous, Continuous  gabapentin (NEURONTIN) capsule 300 mg, 300 mg, Oral, Daily  latanoprost (XALATAN) 0.005 % ophthalmic solution 1 drop, 1 drop, Both Eyes, Nightly  polyvinyl alcohol (LIQUIFILM TEARS) 1.4 % ophthalmic solution 1 drop, 1 drop, Both Eyes, PRN    Allergies:  Nuts [peanut-containing drug products]    Social History:   reports that he has been smoking Cigarettes. He has a 15.00 pack-year smoking history. He has never used smokeless tobacco. He reports that he drinks alcohol. He reports that he uses drugs, including Marijuana. Family History: family history includes Diabetes in his mother;  Other in his sister; Stroke in his brother and father. No h/o sudden cardiac death. No for premature CAD    REVIEW OF SYSTEMS:    · Constitutional: Fatigue  · Eyes: No visual changes or diplopia. No scleral icterus. · ENT: No Headaches  · Cardiovascular: Remaining as above  · Respiratory: Mild shortness of breath   · Gastrointestinal: No abdominal pain. No change in bowel or bladder habits. · Genitourinary: No dysuria, trouble voiding, or hematuria. · Musculoskeletal:  No gait disturbance, No weakness or joint complaints. · Integumentary: No rash or pruritis. · Neurological: No headache, diplopia, change in muscle strength, numbness or tingling. No change in gait, balance, coordination, mood, affect, memory, mentation, behavior. · Psychiatric: No anxiety, or depression. · Endocrine: No temperature intolerance. No excessive thirst, fluid intake, or urination. No tremor. · Hematologic/Lymphatic: No abnormal bruising or bleeding, blood clots or swollen lymph nodes. · Allergic/Immunologic: No nasal congestion or hives. PHYSICAL EXAM:      /72   Pulse 76   Temp 98.1 °F (36.7 °C)   Resp 22   Ht 5' 9.5\" (1.765 m)   Wt 177 lb 0.5 oz (80.3 kg) Comment: per bed scale  SpO2 100%   BMI 25.77 kg/m²      Intake/Output Summary (Last 24 hours) at 08/17/18 0908  Last data filed at 08/17/18 0800   Gross per 24 hour   Intake          5856.29 ml   Output             4400 ml   Net          1456.29 ml         Constitutional and General Appearance: alert, cooperative, no distress and appears stated age  HEENT: PERRL, no cervical lymphadenopathy. No masses palpable. Normal oral mucosa  Respiratory:  · Normal excursion and expansion without use of accessory muscles  · Resp Auscultation: Good respiratory effort. No for increased work of breathing.  On auscultation: clear to auscultation bilaterally  Cardiovascular:  · The apical impulse is not displaced  · Heart tones are crisp and normal. regular S1 and S2.  · Jugular venous pulsation Normal  · The carotid

## 2018-08-17 NOTE — OP NOTE
[address] [facility] [address]                                 OPERATIVE REPORT    PATIENT NAME: Nilson Lockhart                       :        1945  MED REC NO:   [id number]                             ROOM:       0738  ACCOUNT NO:   [de-identified]                           ADMIT DATE: 08/15/2018  PROVIDER:     Royal Misha Figueredo    DATE OF PROCEDURE:  2018    IDENTIFICATION:  A 89-QOBG-AFW gentleman. PREOPERATIVE DIAGNOSIS:  Coronary artery disease. POSTOPERATIVE DIAGNOSIS:  Coronary artery disease. PROCEDURE:  CABG x2, initially planned for off-pump, but due to the  patient's cardiomegaly, unable to manipulate the heart without severe  hypotension. The patient converted to on-pump, short run, kept warm during  the procedure. Bypass was performed LIMA to LAD followed by saphenous vein  graft to OM1. SURGEON:  Royal June. Woo Vásquez MD    ASSISTED BY:  Gabriel Atkins CNP and Francoise Butcher PA-C who were essential  in harvesting a length-and-a-half of vein graft for the right greater  saphenous vein with ligation of 4-0 silk sutures of the branches. ESTIMATED BLOOD LOSS:  250 mL. FINDINGS:  Preop EF was 20%, postop EF was approximately 35%, Medistim flow  was 40-60 mL per minute with PI of less than 1.3. INDICATION FOR PROCEDURE:  The patient is a 82-year-old gentleman,  complaining of shortness of breath, chest discomfort; underwent cardiac  catheterization, found to have severe multi-vessel coronary artery disease. The patient had a 100% occluded right with significant disease of the LAD  and circumflex territory. The patient was referred for elective coronary  bypass. The patient understood the risks and benefits of the procedure and  freely consented. DESCRIPTION OF PROCEDURE:  The patient was brought to the operating room  and placed supine on the operating table.   After adequate IV Echo demonstrated improved cardiac contractility. EF, preop  assessment, as an outpatient was 20% and postop 35%. The Medistim flows  were 40-60 mL per minute with PI of less than 1.2 on average. Sites were  checked for hemostasis. Two chest tubes were placed in the subxiphoid  position through separate stab incision and secured to the skin with 2-0  silk pop-off, placed deep in the pericardial well. Six #6 sternal wires  were used to reapproximate the sternum. 250 mL of Exparel were injected in  the periosteum. Deep sternal fascia and linea alba was closed with 0 PDS  suture. Skin was closed with 3-0 and 4-0 Monocryl. Staples applied. The  patient was transferred to the ICU. Needle and sponge counts correct at  the end of the case.         Patt Ly    D: 08/16/2018 14:11:25       T: 08/16/2018 16:11:33     [email or web address]  Job#: [id number]     Doc#: [id number]    CC:

## 2018-08-17 NOTE — PROGRESS NOTES
Order obtain for extubation. SpO2 of 100 on 30% FiO2. Patient extubated and placed on 4 liters/min via nasal cannula. Post extubation SpO2 is 100% with HR  83 bpm and RR 20 breaths/min. Patient had moderate cough that was productive of white sputum. Extubation Well tolerated by patient. .   Breath Sounds: clear / diminished throughout     [place]   9:10 PM

## 2018-08-17 NOTE — PROGRESS NOTES
Cardiac Testing      CABG 8/16/18: LIMA-LAD, SVG-OM1.     ECHO 12/15/17: EF 36%, DD, LA is at upper limits of normal with ^LA volume.      CATH 12/13/17: Severe MVD. EF 20%. Severely elevated left sided filling pressures. Moderate PTN. Normal CO/CI.      1. History of multiple myeloma on chemotherapy. 2. History of hypertension. 3. History of hyperlipidemia on Zocor. 4. History of DVT on Xarelto. 5. History of Type 2 diabetes mellitus. 6. History of chronic smoking and I did  him to stop smoking. 7. History of echocardiogram done a few weeks back which showed severely reduced left ventricular systolic function with no significant valvular regurgitation or stenosis seen. 8. Cardiac cath 12/13/17 SEVERE MVD EF 20%- Pt refusing CABG at this time   9.  ECHO 12/15/17 Mild LV hypertrophy, EF 36%, DD, Left atrium is at upper limits of normal with increased LA volume

## 2018-08-17 NOTE — FLOWSHEET NOTE
visited patient per spiritual care consult for \"post-O[facility]\" visit. Per nurse, patient's surgery \"went well\" and patient has been \"extubated. \" Patient was awake when  entered room. Per patient, he had a \"really hard day yesterday. \" Patient talked about feeling that he \"was going to die. \" Patient shared that he had been in pain and was beginning to feel better this morning. Patient expressed relief when acknowledging that his wife would be visiting with him today.  validated the difficult recovery patient is experiencing and shared words of encouragement with him. Patient's spouse called unit while  was outside room. She received update from nurse and was expected to arrive this morning. Patient thanked  for visit and support. Chaplains will remain available to offer spiritual and emotional support as needed. Maddie Casper        08/17/18 0600   Encounter Summary   Services provided to: Patient   Referral/Consult From: Multi-disciplinary team  ([address] [place] [address])   Continue Visiting (8/16/2018)   Complexity of Encounter Moderate   Length of Encounter 15 minutes   Routine   Type Post-procedure   Assessment Calm; Approachable;Coping;Helplessness   Intervention Active listening;Explored feelings, thoughts, concerns;Nurtured hope;Sustaining presence/ Ministry of presence   Outcome Comfort;Expressed gratitude   Spiritual/Congregation   Type Spiritual support

## 2018-08-18 ENCOUNTER — APPOINTMENT (OUTPATIENT)
Dept: GENERAL RADIOLOGY | Age: 73
DRG: 235 | End: 2018-08-18
Attending: THORACIC SURGERY (CARDIOTHORACIC VASCULAR SURGERY)
Payer: MEDICARE

## 2018-08-18 LAB
ABO/RH: NORMAL
ALLEN TEST: NORMAL
ALLEN TEST: POSITIVE
ANION GAP SERPL CALCULATED.3IONS-SCNC: 21 MMOL/L (ref 9–17)
ANION GAP SERPL CALCULATED.3IONS-SCNC: 24 MMOL/L (ref 9–17)
ANION GAP: 18 MMOL/L (ref 7–16)
ANTIBODY SCREEN: NEGATIVE
ARM BAND NUMBER: NORMAL
BLD PROD TYP BPU: NORMAL
BLD PROD TYP BPU: NORMAL
BUN BLDV-MCNC: 33 MG/DL (ref 8–23)
BUN BLDV-MCNC: 35 MG/DL (ref 8–23)
BUN/CREAT BLD: ABNORMAL (ref 9–20)
BUN/CREAT BLD: ABNORMAL (ref 9–20)
CALCIUM SERPL-MCNC: 8 MG/DL (ref 8.6–10.4)
CALCIUM SERPL-MCNC: 8.2 MG/DL (ref 8.6–10.4)
CHLORIDE BLD-SCNC: 102 MMOL/L (ref 98–107)
CHLORIDE BLD-SCNC: 108 MMOL/L (ref 98–107)
CO2: 19 MMOL/L (ref 20–31)
CO2: 19 MMOL/L (ref 20–31)
CREAT SERPL-MCNC: 1.99 MG/DL (ref 0.7–1.2)
CREAT SERPL-MCNC: 2.18 MG/DL (ref 0.7–1.2)
CROSSMATCH RESULT: NORMAL
CROSSMATCH RESULT: NORMAL
DISPENSE STATUS BLOOD BANK: NORMAL
DISPENSE STATUS BLOOD BANK: NORMAL
EKG ATRIAL RATE: 77 BPM
EKG P AXIS: 61 DEGREES
EKG P-R INTERVAL: 148 MS
EKG Q-T INTERVAL: 406 MS
EKG QRS DURATION: 134 MS
EKG QTC CALCULATION (BAZETT): 459 MS
EKG R AXIS: -50 DEGREES
EKG T AXIS: 109 DEGREES
EKG VENTRICULAR RATE: 77 BPM
EXPIRATION DATE: NORMAL
FIO2: 50
FIO2: 50
GFR AFRICAN AMERICAN: 36 ML/MIN
GFR AFRICAN AMERICAN: 40 ML/MIN
GFR NON-AFRICAN AMERICAN: 30 ML/MIN
GFR NON-AFRICAN AMERICAN: 33 ML/MIN
GFR NON-AFRICAN AMERICAN: 35 ML/MIN
GFR SERPL CREATININE-BSD FRML MDRD: 43 ML/MIN
GFR SERPL CREATININE-BSD FRML MDRD: ABNORMAL ML/MIN/{1.73_M2}
GLUCOSE BLD-MCNC: 109 MG/DL (ref 74–100)
GLUCOSE BLD-MCNC: 124 MG/DL (ref 70–99)
GLUCOSE BLD-MCNC: 128 MG/DL (ref 75–110)
GLUCOSE BLD-MCNC: 151 MG/DL (ref 74–100)
GLUCOSE BLD-MCNC: 157 MG/DL (ref 70–99)
GLUCOSE BLD-MCNC: 199 MG/DL (ref 75–110)
GLUCOSE BLD-MCNC: 217 MG/DL (ref 75–110)
GLUCOSE BLD-MCNC: 226 MG/DL (ref 75–110)
GLUCOSE BLD-MCNC: 61 MG/DL (ref 75–110)
HCT VFR BLD CALC: 24.4 % (ref 40.7–50.3)
HCT VFR BLD CALC: 25.9 % (ref 40.7–50.3)
HEMOGLOBIN: 6.9 G/DL (ref 13–17)
HEMOGLOBIN: 7.3 G/DL (ref 13–17)
INR BLD: 2.1
INR BLD: 2.2
LACTIC ACID, WHOLE BLOOD: 2.2 MMOL/L (ref 0.7–2.1)
LACTIC ACID, WHOLE BLOOD: 6.1 MMOL/L (ref 0.7–2.1)
LV EF: 29 %
LVEF MODALITY: NORMAL
MAGNESIUM: 2.3 MG/DL (ref 1.6–2.6)
MCH RBC QN AUTO: 25.4 PG (ref 25.2–33.5)
MCH RBC QN AUTO: 26.6 PG (ref 25.2–33.5)
MCHC RBC AUTO-ENTMCNC: 26.6 G/DL (ref 28.4–34.8)
MCHC RBC AUTO-ENTMCNC: 29.9 G/DL (ref 28.4–34.8)
MCV RBC AUTO: 89.1 FL (ref 82.6–102.9)
MCV RBC AUTO: 95.2 FL (ref 82.6–102.9)
MODE: ABNORMAL
MODE: NORMAL
NEGATIVE BASE EXCESS, ART: 10 (ref 0–2)
NEGATIVE BASE EXCESS, ART: 2 (ref 0–2)
NRBC AUTOMATED: 4.6 PER 100 WBC
NRBC AUTOMATED: 8.5 PER 100 WBC
O2 DEVICE/FLOW/%: ABNORMAL
O2 DEVICE/FLOW/%: NORMAL
PARTIAL THROMBOPLASTIN TIME: 31.6 SEC (ref 20.5–30.5)
PATIENT TEMP: ABNORMAL
PATIENT TEMP: NORMAL
PDW BLD-RTO: 17.2 % (ref 11.8–14.4)
PDW BLD-RTO: 18.3 % (ref 11.8–14.4)
PLATELET # BLD: 102 K/UL (ref 138–453)
PLATELET # BLD: 132 K/UL (ref 138–453)
PMV BLD AUTO: 11 FL (ref 8.1–13.5)
PMV BLD AUTO: 11.5 FL (ref 8.1–13.5)
POC CHLORIDE: 111 MMOL/L (ref 98–107)
POC CREATININE: 1.89 MG/DL (ref 0.51–1.19)
POC HCO3: 17 MMOL/L (ref 21–28)
POC HCO3: 22.1 MMOL/L (ref 21–28)
POC HEMATOCRIT: 21 % (ref 41–53)
POC HEMOGLOBIN: 7.2 G/DL (ref 13.5–17.5)
POC IONIZED CALCIUM: 1.28 MMOL/L (ref 1.15–1.33)
POC LACTIC ACID: 9.71 MMOL/L (ref 0.56–1.39)
POC O2 SATURATION: 93 % (ref 94–98)
POC O2 SATURATION: 97 % (ref 94–98)
POC PCO2 TEMP: ABNORMAL MM HG
POC PCO2 TEMP: NORMAL MM HG
POC PCO2: 35.4 MM HG (ref 35–48)
POC PCO2: 42.1 MM HG (ref 35–48)
POC PH TEMP: ABNORMAL
POC PH TEMP: NORMAL
POC PH: 7.21 (ref 7.35–7.45)
POC PH: 7.4 (ref 7.35–7.45)
POC PO2 TEMP: ABNORMAL MM HG
POC PO2 TEMP: NORMAL MM HG
POC PO2: 82 MM HG (ref 83–108)
POC PO2: 93.9 MM HG (ref 83–108)
POC POTASSIUM: 4.3 MMOL/L (ref 3.5–4.5)
POC SODIUM: 146 MMOL/L (ref 138–146)
POSITIVE BASE EXCESS, ART: ABNORMAL (ref 0–3)
POSITIVE BASE EXCESS, ART: NORMAL (ref 0–3)
POTASSIUM SERPL-SCNC: 4.6 MMOL/L (ref 3.7–5.3)
POTASSIUM SERPL-SCNC: 5.2 MMOL/L (ref 3.7–5.3)
PROTHROMBIN TIME: 21.3 SEC (ref 9–12)
PROTHROMBIN TIME: 22.1 SEC (ref 9–12)
RBC # BLD: 2.72 M/UL (ref 4.21–5.77)
RBC # BLD: 2.74 M/UL (ref 4.21–5.77)
SAMPLE SITE: ABNORMAL
SAMPLE SITE: NORMAL
SODIUM BLD-SCNC: 145 MMOL/L (ref 135–144)
SODIUM BLD-SCNC: 148 MMOL/L (ref 135–144)
TCO2 (CALC), ART: 18 MMOL/L (ref 22–29)
TCO2 (CALC), ART: 23 MMOL/L (ref 22–29)
TRANSFUSION STATUS: NORMAL
TRANSFUSION STATUS: NORMAL
TROPONIN INTERP: ABNORMAL
TROPONIN INTERP: ABNORMAL
TROPONIN T: 0.24 NG/ML
TROPONIN T: 0.26 NG/ML
UNIT DIVISION: 0
UNIT DIVISION: 0
UNIT NUMBER: NORMAL
UNIT NUMBER: NORMAL
WBC # BLD: 13.3 K/UL (ref 3.5–11.3)
WBC # BLD: 21.9 K/UL (ref 3.5–11.3)

## 2018-08-18 PROCEDURE — 6360000002 HC RX W HCPCS: Performed by: PHYSICIAN ASSISTANT

## 2018-08-18 PROCEDURE — 86900 BLOOD TYPING SEROLOGIC ABO: CPT

## 2018-08-18 PROCEDURE — 36430 TRANSFUSION BLD/BLD COMPNT: CPT

## 2018-08-18 PROCEDURE — 51702 INSERT TEMP BLADDER CATH: CPT

## 2018-08-18 PROCEDURE — 93005 ELECTROCARDIOGRAM TRACING: CPT

## 2018-08-18 PROCEDURE — 5A1945Z RESPIRATORY VENTILATION, 24-96 CONSECUTIVE HOURS: ICD-10-PCS | Performed by: THORACIC SURGERY (CARDIOTHORACIC VASCULAR SURGERY)

## 2018-08-18 PROCEDURE — 87086 URINE CULTURE/COLONY COUNT: CPT

## 2018-08-18 PROCEDURE — 6360000002 HC RX W HCPCS: Performed by: EMERGENCY MEDICINE

## 2018-08-18 PROCEDURE — 94003 VENT MGMT INPAT SUBQ DAY: CPT

## 2018-08-18 PROCEDURE — 86901 BLOOD TYPING SEROLOGIC RH(D): CPT

## 2018-08-18 PROCEDURE — 6360000002 HC RX W HCPCS: Performed by: THORACIC SURGERY (CARDIOTHORACIC VASCULAR SURGERY)

## 2018-08-18 PROCEDURE — 83735 ASSAY OF MAGNESIUM: CPT

## 2018-08-18 PROCEDURE — 6370000000 HC RX 637 (ALT 250 FOR IP): Performed by: PHYSICIAN ASSISTANT

## 2018-08-18 PROCEDURE — P9041 ALBUMIN (HUMAN),5%, 50ML: HCPCS | Performed by: PHYSICIAN ASSISTANT

## 2018-08-18 PROCEDURE — 6370000000 HC RX 637 (ALT 250 FOR IP): Performed by: THORACIC SURGERY (CARDIOTHORACIC VASCULAR SURGERY)

## 2018-08-18 PROCEDURE — 2580000003 HC RX 258: Performed by: PHYSICIAN ASSISTANT

## 2018-08-18 PROCEDURE — 2500000003 HC RX 250 WO HCPCS: Performed by: THORACIC SURGERY (CARDIOTHORACIC VASCULAR SURGERY)

## 2018-08-18 PROCEDURE — 93306 TTE W/DOPPLER COMPLETE: CPT

## 2018-08-18 PROCEDURE — 36600 WITHDRAWAL OF ARTERIAL BLOOD: CPT

## 2018-08-18 PROCEDURE — 71045 X-RAY EXAM CHEST 1 VIEW: CPT

## 2018-08-18 PROCEDURE — 6370000000 HC RX 637 (ALT 250 FOR IP): Performed by: STUDENT IN AN ORGANIZED HEALTH CARE EDUCATION/TRAINING PROGRAM

## 2018-08-18 PROCEDURE — 82565 ASSAY OF CREATININE: CPT

## 2018-08-18 PROCEDURE — 85014 HEMATOCRIT: CPT

## 2018-08-18 PROCEDURE — 82330 ASSAY OF CALCIUM: CPT

## 2018-08-18 PROCEDURE — 80048 BASIC METABOLIC PNL TOTAL CA: CPT

## 2018-08-18 PROCEDURE — 82803 BLOOD GASES ANY COMBINATION: CPT

## 2018-08-18 PROCEDURE — 6360000002 HC RX W HCPCS

## 2018-08-18 PROCEDURE — 85027 COMPLETE CBC AUTOMATED: CPT

## 2018-08-18 PROCEDURE — 82435 ASSAY OF BLOOD CHLORIDE: CPT

## 2018-08-18 PROCEDURE — P9016 RBC LEUKOCYTES REDUCED: HCPCS

## 2018-08-18 PROCEDURE — 85730 THROMBOPLASTIN TIME PARTIAL: CPT

## 2018-08-18 PROCEDURE — 0BH18EZ INSERTION OF ENDOTRACHEAL AIRWAY INTO TRACHEA, VIA NATURAL OR ARTIFICIAL OPENING ENDOSCOPIC: ICD-10-PCS | Performed by: THORACIC SURGERY (CARDIOTHORACIC VASCULAR SURGERY)

## 2018-08-18 PROCEDURE — 2100000001 HC CVICU R&B

## 2018-08-18 PROCEDURE — 36415 COLL VENOUS BLD VENIPUNCTURE: CPT

## 2018-08-18 PROCEDURE — 84132 ASSAY OF SERUM POTASSIUM: CPT

## 2018-08-18 PROCEDURE — 84484 ASSAY OF TROPONIN QUANT: CPT

## 2018-08-18 PROCEDURE — 2500000003 HC RX 250 WO HCPCS

## 2018-08-18 PROCEDURE — 86920 COMPATIBILITY TEST SPIN: CPT

## 2018-08-18 PROCEDURE — 84295 ASSAY OF SERUM SODIUM: CPT

## 2018-08-18 PROCEDURE — 94770 HC ETCO2 MONITOR DAILY: CPT

## 2018-08-18 PROCEDURE — 94762 N-INVAS EAR/PLS OXIMTRY CONT: CPT

## 2018-08-18 PROCEDURE — 94002 VENT MGMT INPAT INIT DAY: CPT

## 2018-08-18 PROCEDURE — 2500000003 HC RX 250 WO HCPCS: Performed by: EMERGENCY MEDICINE

## 2018-08-18 PROCEDURE — 86850 RBC ANTIBODY SCREEN: CPT

## 2018-08-18 PROCEDURE — 85610 PROTHROMBIN TIME: CPT

## 2018-08-18 PROCEDURE — 83605 ASSAY OF LACTIC ACID: CPT

## 2018-08-18 PROCEDURE — 82947 ASSAY GLUCOSE BLOOD QUANT: CPT

## 2018-08-18 RX ORDER — FUROSEMIDE 10 MG/ML
INJECTION INTRAMUSCULAR; INTRAVENOUS
Status: COMPLETED
Start: 2018-08-18 | End: 2018-08-18

## 2018-08-18 RX ORDER — PROPOFOL 10 MG/ML
20 INJECTION, EMULSION INTRAVENOUS
Status: DISCONTINUED | OUTPATIENT
Start: 2018-08-18 | End: 2018-08-18

## 2018-08-18 RX ORDER — PROPOFOL 10 MG/ML
INJECTION, EMULSION INTRAVENOUS
Status: COMPLETED
Start: 2018-08-18 | End: 2018-08-18

## 2018-08-18 RX ORDER — ETOMIDATE 2 MG/ML
10 INJECTION INTRAVENOUS ONCE
Status: COMPLETED | OUTPATIENT
Start: 2018-08-18 | End: 2018-08-18

## 2018-08-18 RX ORDER — FUROSEMIDE 10 MG/ML
20 INJECTION INTRAMUSCULAR; INTRAVENOUS 2 TIMES DAILY
Status: DISCONTINUED | OUTPATIENT
Start: 2018-08-18 | End: 2018-08-20

## 2018-08-18 RX ORDER — SUCCINYLCHOLINE CHLORIDE 20 MG/ML
100 INJECTION INTRAMUSCULAR; INTRAVENOUS ONCE
Status: COMPLETED | OUTPATIENT
Start: 2018-08-18 | End: 2018-08-18

## 2018-08-18 RX ORDER — CALCIUM CHLORIDE 100 MG/ML
1 INJECTION INTRAVENOUS; INTRAVENTRICULAR ONCE
Status: COMPLETED | OUTPATIENT
Start: 2018-08-18 | End: 2018-08-18

## 2018-08-18 RX ADMIN — INSULIN LISPRO 2 UNITS: 100 INJECTION, SOLUTION INTRAVENOUS; SUBCUTANEOUS at 08:57

## 2018-08-18 RX ADMIN — SODIUM BICARBONATE 50 MEQ: 84 INJECTION, SOLUTION INTRAVENOUS at 03:40

## 2018-08-18 RX ADMIN — CLOPIDOGREL 75 MG: 75 TABLET, FILM COATED ORAL at 08:55

## 2018-08-18 RX ADMIN — Medication 10 ML: at 08:56

## 2018-08-18 RX ADMIN — CHLORHEXIDINE GLUCONATE 0.12% ORAL RINSE 15 ML: 1.2 LIQUID ORAL at 08:56

## 2018-08-18 RX ADMIN — Medication 2 G: at 06:00

## 2018-08-18 RX ADMIN — FAMOTIDINE 20 MG: 20 TABLET, FILM COATED ORAL at 20:27

## 2018-08-18 RX ADMIN — FENTANYL CITRATE 50 MCG: 50 INJECTION, SOLUTION INTRAMUSCULAR; INTRAVENOUS at 04:14

## 2018-08-18 RX ADMIN — FAMOTIDINE 20 MG: 20 TABLET, FILM COATED ORAL at 08:55

## 2018-08-18 RX ADMIN — MUPIROCIN: 20 OINTMENT TOPICAL at 20:27

## 2018-08-18 RX ADMIN — EPINEPHRINE 1 MG: 0.1 INJECTION INTRACARDIAC; INTRAVENOUS at 02:45

## 2018-08-18 RX ADMIN — FUROSEMIDE 20 MG: 10 INJECTION INTRAMUSCULAR; INTRAVENOUS at 18:11

## 2018-08-18 RX ADMIN — INSULIN LISPRO 2 UNITS: 100 INJECTION, SOLUTION INTRAVENOUS; SUBCUTANEOUS at 21:23

## 2018-08-18 RX ADMIN — LATANOPROST 1 DROP: 50 SOLUTION OPHTHALMIC at 20:27

## 2018-08-18 RX ADMIN — ENOXAPARIN SODIUM 40 MG: 40 INJECTION SUBCUTANEOUS at 08:55

## 2018-08-18 RX ADMIN — POLYETHYLENE GLYCOL 3350 17 G: 17 POWDER, FOR SOLUTION ORAL at 08:55

## 2018-08-18 RX ADMIN — PROPOFOL 25 MCG/KG/MIN: 10 INJECTION, EMULSION INTRAVENOUS at 03:15

## 2018-08-18 RX ADMIN — ALBUMIN (HUMAN) 25 G: 12.5 INJECTION, SOLUTION INTRAVENOUS at 04:21

## 2018-08-18 RX ADMIN — GABAPENTIN 300 MG: 300 CAPSULE ORAL at 09:00

## 2018-08-18 RX ADMIN — PROPOFOL 25 MCG/KG/MIN: 10 INJECTION, EMULSION INTRAVENOUS at 08:02

## 2018-08-18 RX ADMIN — INSULIN LISPRO 4 UNITS: 100 INJECTION, SOLUTION INTRAVENOUS; SUBCUTANEOUS at 18:05

## 2018-08-18 RX ADMIN — ETOMIDATE 10 MG: 2 INJECTION INTRAVENOUS at 02:52

## 2018-08-18 RX ADMIN — FENTANYL CITRATE 50 MCG: 50 INJECTION, SOLUTION INTRAMUSCULAR; INTRAVENOUS at 16:21

## 2018-08-18 RX ADMIN — Medication 0.04 MCG/KG/MIN: at 05:00

## 2018-08-18 RX ADMIN — ASPIRIN 81 MG: 81 TABLET, DELAYED RELEASE ORAL at 08:55

## 2018-08-18 RX ADMIN — SODIUM BICARBONATE 50 MEQ: 84 INJECTION, SOLUTION INTRAVENOUS at 02:48

## 2018-08-18 RX ADMIN — INSULIN LISPRO 2 UNITS: 100 INJECTION, SOLUTION INTRAVENOUS; SUBCUTANEOUS at 12:11

## 2018-08-18 RX ADMIN — SUCCINYLCHOLINE CHLORIDE 100 MG: 20 INJECTION, SOLUTION INTRAMUSCULAR; INTRAVENOUS at 02:52

## 2018-08-18 RX ADMIN — FUROSEMIDE 20 MG: 10 INJECTION, SOLUTION INTRAMUSCULAR; INTRAVENOUS at 13:35

## 2018-08-18 RX ADMIN — CALCIUM CHLORIDE 1 G: 100 INJECTION INTRAVENOUS; INTRAVENTRICULAR at 02:49

## 2018-08-18 RX ADMIN — CHLORHEXIDINE GLUCONATE 0.12% ORAL RINSE 15 ML: 1.2 LIQUID ORAL at 20:27

## 2018-08-18 RX ADMIN — DESMOPRESSIN ACETATE 40 MG: 0.2 TABLET ORAL at 20:27

## 2018-08-18 RX ADMIN — GLIPIZIDE 5 MG: 5 TABLET ORAL at 08:55

## 2018-08-18 RX ADMIN — FENTANYL CITRATE 50 MCG: 50 INJECTION, SOLUTION INTRAMUSCULAR; INTRAVENOUS at 05:53

## 2018-08-18 RX ADMIN — MUPIROCIN: 20 OINTMENT TOPICAL at 01:00

## 2018-08-18 RX ADMIN — FENTANYL CITRATE 25 MCG: 50 INJECTION, SOLUTION INTRAMUSCULAR; INTRAVENOUS at 07:59

## 2018-08-18 RX ADMIN — Medication 2 MG/HR: at 14:27

## 2018-08-18 ASSESSMENT — PAIN SCALES - GENERAL
PAINLEVEL_OUTOF10: 0
PAINLEVEL_OUTOF10: 0
PAINLEVEL_OUTOF10: 7
PAINLEVEL_OUTOF10: 5
PAINLEVEL_OUTOF10: 0
PAINLEVEL_OUTOF10: 0

## 2018-08-18 ASSESSMENT — PULMONARY FUNCTION TESTS
PIF_VALUE: 22
PIF_VALUE: 22
PIF_VALUE: 23
PIF_VALUE: 22
PIF_VALUE: 21
PIF_VALUE: 24
PIF_VALUE: 23
PIF_VALUE: 22
PIF_VALUE: 24

## 2018-08-18 NOTE — PLAN OF CARE
Problem: Falls - Risk of:  Goal: Will remain free from falls  Will remain free from falls   Outcome: Ongoing    Goal: Absence of physical injury  Absence of physical injury   Outcome: Ongoing      Problem:  Activity Intolerance:  Goal: Able to perform prescribed physical activity  Able to perform prescribed physical activity   Outcome: Ongoing    Goal: Ability to tolerate increased activity will improve  Ability to tolerate increased activity will improve   Outcome: Ongoing      Problem: Cardiac Output - Decreased:  Goal: Cardiac output within specified parameters  Cardiac output within specified parameters   Outcome: Ongoing      Problem: Fluid Volume - Imbalance:  Goal: Ability to achieve a balanced intake and output will improve  Ability to achieve a balanced intake and output will improve   Outcome: Ongoing    Goal: Chest tube drainage is within specified parameters  Chest tube drainage is within specified parameters   Outcome: Ongoing      Problem: Gas Exchange - Impaired:  Goal: Levels of oxygenation will improve  Levels of oxygenation will improve   Outcome: Ongoing    Goal: Ability to maintain adequate ventilation will improve  Ability to maintain adequate ventilation will improve   Outcome: Ongoing      Problem: Pain:  Goal: Pain level will decrease  Pain level will decrease    Outcome: Ongoing    Goal: Control of chronic pain  Control of chronic pain   Outcome: Ongoing      Problem: Tobacco Use:  Goal: Will participate in inpatient tobacco-use cessation counseling  Will participate in inpatient tobacco-use cessation counseling   Outcome: Ongoing      Problem: Pain:  Goal: Pain level will decrease  Pain level will decrease    Outcome: Ongoing      Problem: Restraint Use - Nonviolent/Non-Self-Destructive Behavior:  Goal: Absence of restraint indications  Absence of restraint indications   Outcome: Ongoing    Goal: Absence of restraint-related injury  Absence of restraint-related injury   Outcome: Ongoing

## 2018-08-18 NOTE — PROCEDURES
PROCEDURE NOTE - EMERGENCY INTUBATION    PATIENT NAME: Andree Huitron. MEDICAL RECORD NO. [id number]  DATE: 8/18/2018  ATTENDING PHYSICIAN: Dr. Lucien García DIAGNOSIS:  Acute Respiratory Failure in the setting of cardiac arrest  POSTOPERATIVE DIAGNOSIS:  Same  PROCEDURE PERFORMED:  Emergency endotracheal intubation  PERFORMING PHYSICIAN: Rose Bacon DO    MEDICATIONS: etomidate intravenously and succinycholine intravenously    DISCUSSION:  Andree Celis is a 72y.o.-year-old male who requires intubation and ventilatory support due to acute respiratory failure in the setting of cardiac arrest.  The history and physical examination were reviewed with nursing staff. CONSENT: Unable to be obtained due to the emergent nature of this procedure. PROCEDURE:  A timeout was initiated by the bedside nurse and was confirmed by those present. The patient was placed in the appropriate position. Cricoid pressure was not required. Intubation was performed by indirect laryngoscopy using a glidescope and a 7.5 cuffed endotracheal tube. There was no vomitus or excessive secretions in the patient's mouth or oropharynx that required suctioning during laryngoscopy. The cuff was then inflated and the tube was secured appropriately at a distance of 25 cm to the dental ridge. Initial confirmation of placement included bilateral breath sounds, an end tidal CO2 detector, absence of sounds over the stomach, tube fogging, adequate chest rise and adequate pulse oximetry reading. A chest x-ray to verify correct placement of the tube has been ordered but is still pending. The patient tolerated the procedure well.      COMPLICATIONS:  None     Rose Bacon DO  3:41 AM, 8/18/18

## 2018-08-18 NOTE — PROCEDURES
Insert Arterial Line  Date/Time:  08/18/18, 5:36 AM  Performed by: Vandana Fox    Patient identity confirmed: arm band and provided demographic data   Time out: Immediately prior to procedure a \"time out\" was called to verify the correct patient, procedure, equipment, support staff. Preparation: Patient was prepped and draped in the usual sterile fashion.     Location:left radial    Kendall's test normal: yes  Needle gauge: 20     Number of attempts: 1  Post-procedure: transparent dressing applied and line secured    Patient tolerance: well

## 2018-08-18 NOTE — PROGRESS NOTES
[facility]   Progress Note                    Date:   8/18/2018  Patient name:  Jose Hernandez. Date of admission:  8/15/2018  7:38 AM  MRN:   [id number]  YOB: 1945  PCP:    Rupali Whitten MD    Reason for Admission:  CAD in native artery [I25.10]    Subjective:       HPI  - The patient is a 72 y.o.  male  with a history of multiple myeloma and anemia requiring multiple blood transfusions, COPD, DVT and CAD who presented for elective CABG. Patient was diagnosed with multivessel disease last year and was initially declined CABG due to his comorbidities. After consultation with his hematologist and further optimization, he eventually underwent CABG on 8/16.      He was extubated and was weaned off pressors yesterday. He received Lasix 20 mg IV and had an output of 3 L but remained net + 2306. He was given a dose of Lasix 40 mg IV yesterday    Clinical Changes / Abnormalities:     ON: stopped pacing and code blue for PEA arrest around 2 42 am, coded for almost a min and Intubated. Received bolus of albumin, Hb 6.8 required one unit of transfusion. ROSC achieved    I/O last 3 completed shifts: In: 6012 [P.O.:240; I.V.:2778; Blood:350]  Out: 7132 [Urine:1405; Chest Tube:190]  No intake/output data recorded. In: 5520 [P.O.:240; I.V.:2778;  Blood:350]  Out: 645 [Urine:455]      Intake/Output Summary (Last 24 hours) at 08/18/18 0834  Last data filed at 08/18/18 0700   Gross per 24 hour   Intake             3368 ml   Output             1145 ml   Net             2223 ml         I/O since admission: 1.3 L liters    Medications:   Scheduled Meds:   glipiZIDE  5 mg Oral QAM AC    nicotine  1 patch Transdermal Daily    buPROPion  150 mg Oral BID    traZODone  50 mg Oral Nightly    sodium chloride flush  10 mL Intravenous 2 times per day    docusate sodium  100 mg Oral BID    polyethylene glycol  17 g Oral Daily    famotidine  20 mg Oral BID    amiodarone  200 mg Oral kg) Comment: per bed scale  SpO2 96%   BMI 26.14 kg/m²    Last Recorded Weight:  [unfilled]  General appearance: intubated sedated  HEENT: Head: Normocephalic, atraumatic without any obvious abnormalities. Neck: JVD absent  Lungs:b/l equal air entry   Heart: S1, S2 no murmurs  Abdomen: soft, nontender with bowel sounds present in all 4 quadrants. Extremities: Lower extremity edema is absent   Integumentum:Intact with no rashes noted.       Diagnostic Studies:   EK/9  Normal sinus rhythm  Left axis deviation  Left bundle branch block  Abnormal ECG    EKG post arrest   - normals sinus, LBBB     2-D echo 12/15/17  LV chamber dimension is mildly dilated with mild left ventricular  hypertrophy. Systolic function is moderately globally reduced with a  calculated EF of 36%. Evidence of diastolic dysfunction.        Cardiac cath: 17  Severe multivessel CAD. % proximal stenosis, left-to-right collaterals , LCx 80% long segment stenosis, LAD 70% LVEF 20%        Intraoperative ORESTES 18  Pre-Op 20%, postoperative LVEF 30%, moderate LV dilatation, hypokinetic inferior segments       Patient Active Problem List:     Acute on chronic systolic CHF (congestive heart failure) (HCC)     Multiple vessel coronary artery disease     Multiple myeloma not having achieved remission (HCC)     Chronic obstructive pulmonary disease (HCC)     Low hemoglobin     Hypokalemia     Other drug-induced pancytopenia (HCC)     RANDALL (acute kidney injury) ([facility] [address]     CAD in native artery      Assessment / Acute Cardiac Problems:   1. MVD s/p CABG ×2 LIMA-LAD, SVG-OM1- 2018  2. Cardiac arrest - PEA  Possible cause intermittent high degree AV block. Evaluated for pacer function no issues with capture at 0.2 mA  3. Lactic acidosis - likely related to hypotension with arrest -  4. COPD  5. Diabetes  6. Multiple myeloma and anemia on chemotherapy  7. DVT on  Xarelto 20 mg as out patient   8.  Anemia - related to blood loss,

## 2018-08-18 NOTE — SIGNIFICANT EVENT
CODE BLUE NOTE      8/18/2018  Lida .  1945  [id number]    Primary Care:  Mariam Gama MD  Referring physician:  Vazquez Stuart MD   Date of Admission:  8/15/2018    A Code Blue was called due to: PEA @ 2:42am. As a member of the overnight critical care team in the neuro ICU I responded to this code blue. The patient was admitted for a CABG. CPR was in progress on my arrival, and the patient was being bagged by the respiratory therapist.     The patient was intubated orally by me and the chest was noted to rise with bagging, and there was appropriate color change noted, with bilaterally equal breath sounds. Please see separate procedure note for additional details. ACLS protocol was followed, and the patient received 1 Epinephrine, 1 calcium, and 1 bicarb. ROSC was achieved 2:48am.  A post ROSC EKG was obtained. Patient was placed on a propofol drip for sedation post intubation. Labs were drawn during the code blue and have not yet resulted. The patient's cardiothoracic surgeon was paged by the nurse, and the MICU resident spoke with cardiology. The patient's cardiothoracic surgeon arrived at bedside before I left the cardiovascular ICU. Critical care time: 20 minutes, excluding procedures.     Patient Active Problem List   Diagnosis Code    Acute on chronic systolic CHF (congestive heart failure) ([facility]) I50.23    Multiple vessel coronary artery disease I25.10    Multiple myeloma not having achieved remission ([facility]) C90.00    Chronic obstructive pulmonary disease ([facility] J44.9    Low hemoglobin D64.9    Hypokalemia E87.6    Other drug-induced pancytopenia ([facility] D61.811    RANDALL (acute kidney injury) ([facility] N17.9    CAD in native artery I25.10        Benji Zhao DO  PGY-2 Emergency Medicine Resident Physician  8/18/2018 3:22 AM

## 2018-08-18 NOTE — PROGRESS NOTES
Physical Therapy  DATE: 2018    NAME: Chang Gonzalez. MRN: [id number]   : 1945    Patient not seen this date for Physical Therapy due to:  [] Blood transfusion in progress  [] Hemodialysis  []  Patient Declined  [] Spine Precautions   [] Strict Bedrest  [] Surgery/ Procedure  [] Testing      [x] Other- RN cx this AM, pt reintubated last night. PT will check back as time allows. [] PT being discontinued at this time. Patient independent. No further needs. [] PT being discontinued at this time as the patient has been transferred to palliative care. No further needs.     Guy Anthony, PT

## 2018-08-19 ENCOUNTER — APPOINTMENT (OUTPATIENT)
Dept: GENERAL RADIOLOGY | Age: 73
DRG: 235 | End: 2018-08-19
Attending: THORACIC SURGERY (CARDIOTHORACIC VASCULAR SURGERY)
Payer: MEDICARE

## 2018-08-19 LAB
ABO/RH: NORMAL
ALLEN TEST: ABNORMAL
ALLEN TEST: ABNORMAL
ANION GAP SERPL CALCULATED.3IONS-SCNC: 13 MMOL/L (ref 9–17)
ANTIBODY SCREEN: NEGATIVE
ARM BAND NUMBER: NORMAL
BLD PROD TYP BPU: NORMAL
BUN BLDV-MCNC: 48 MG/DL (ref 8–23)
BUN/CREAT BLD: ABNORMAL (ref 9–20)
CALCIUM SERPL-MCNC: 8 MG/DL (ref 8.6–10.4)
CHLORIDE BLD-SCNC: 106 MMOL/L (ref 98–107)
CO2: 26 MMOL/L (ref 20–31)
CREAT SERPL-MCNC: 1.33 MG/DL (ref 0.7–1.2)
CROSSMATCH RESULT: NORMAL
CULTURE: NO GROWTH
DISPENSE STATUS BLOOD BANK: NORMAL
EXPIRATION DATE: NORMAL
FIO2: 30
FIO2: 30
GFR AFRICAN AMERICAN: >60 ML/MIN
GFR NON-AFRICAN AMERICAN: 53 ML/MIN
GFR SERPL CREATININE-BSD FRML MDRD: ABNORMAL ML/MIN/{1.73_M2}
GFR SERPL CREATININE-BSD FRML MDRD: ABNORMAL ML/MIN/{1.73_M2}
GLUCOSE BLD-MCNC: 191 MG/DL (ref 75–110)
GLUCOSE BLD-MCNC: 196 MG/DL (ref 75–110)
GLUCOSE BLD-MCNC: 221 MG/DL (ref 70–99)
GLUCOSE BLD-MCNC: 229 MG/DL (ref 75–110)
GLUCOSE BLD-MCNC: 230 MG/DL (ref 74–100)
GLUCOSE BLD-MCNC: 243 MG/DL (ref 74–100)
GLUCOSE BLD-MCNC: 243 MG/DL (ref 75–110)
HCT VFR BLD CALC: 27.9 % (ref 40.7–50.3)
HEMOGLOBIN: 8.4 G/DL (ref 13–17)
HEPARIN INDUCED PLATELET ANTIBODY: 0.09 O.D. (ref 0–0.4)
INR BLD: 1.4
Lab: NORMAL
MAGNESIUM: 2.2 MG/DL (ref 1.6–2.6)
MCH RBC QN AUTO: 26.3 PG (ref 25.2–33.5)
MCHC RBC AUTO-ENTMCNC: 30.1 G/DL (ref 28.4–34.8)
MCV RBC AUTO: 87.2 FL (ref 82.6–102.9)
MODE: ABNORMAL
MODE: ABNORMAL
NEGATIVE BASE EXCESS, ART: ABNORMAL (ref 0–2)
NEGATIVE BASE EXCESS, ART: ABNORMAL (ref 0–2)
NRBC AUTOMATED: 6.5 PER 100 WBC
O2 DEVICE/FLOW/%: ABNORMAL
O2 DEVICE/FLOW/%: ABNORMAL
PATIENT TEMP: ABNORMAL
PATIENT TEMP: ABNORMAL
PDW BLD-RTO: 17.8 % (ref 11.8–14.4)
PLATELET # BLD: 68 K/UL (ref 138–453)
PMV BLD AUTO: 12.3 FL (ref 8.1–13.5)
POC HCO3: 27.9 MMOL/L (ref 21–28)
POC HCO3: 29 MMOL/L (ref 21–28)
POC O2 SATURATION: 97 % (ref 94–98)
POC O2 SATURATION: 97 % (ref 94–98)
POC PCO2 TEMP: ABNORMAL MM HG
POC PCO2 TEMP: ABNORMAL MM HG
POC PCO2: 36.3 MM HG (ref 35–48)
POC PCO2: 37.3 MM HG (ref 35–48)
POC PH TEMP: ABNORMAL
POC PH TEMP: ABNORMAL
POC PH: 7.49 (ref 7.35–7.45)
POC PH: 7.5 (ref 7.35–7.45)
POC PO2 TEMP: ABNORMAL MM HG
POC PO2 TEMP: ABNORMAL MM HG
POC PO2: 83.6 MM HG (ref 83–108)
POC PO2: 86.1 MM HG (ref 83–108)
POSITIVE BASE EXCESS, ART: 4 (ref 0–3)
POSITIVE BASE EXCESS, ART: 5 (ref 0–3)
POTASSIUM SERPL-SCNC: 3.9 MMOL/L (ref 3.7–5.3)
PROTHROMBIN TIME: 14.6 SEC (ref 9–12)
RBC # BLD: 3.2 M/UL (ref 4.21–5.77)
SAMPLE SITE: ABNORMAL
SAMPLE SITE: ABNORMAL
SODIUM BLD-SCNC: 145 MMOL/L (ref 135–144)
SPECIMEN DESCRIPTION: NORMAL
STATUS: NORMAL
TCO2 (CALC), ART: 29 MMOL/L (ref 22–29)
TCO2 (CALC), ART: 30 MMOL/L (ref 22–29)
TRANSFUSION STATUS: NORMAL
UNIT DIVISION: 0
UNIT NUMBER: NORMAL
WBC # BLD: 12.8 K/UL (ref 3.5–11.3)

## 2018-08-19 PROCEDURE — 82803 BLOOD GASES ANY COMBINATION: CPT

## 2018-08-19 PROCEDURE — 6370000000 HC RX 637 (ALT 250 FOR IP): Performed by: STUDENT IN AN ORGANIZED HEALTH CARE EDUCATION/TRAINING PROGRAM

## 2018-08-19 PROCEDURE — 85027 COMPLETE CBC AUTOMATED: CPT

## 2018-08-19 PROCEDURE — 83735 ASSAY OF MAGNESIUM: CPT

## 2018-08-19 PROCEDURE — 6360000002 HC RX W HCPCS

## 2018-08-19 PROCEDURE — 94770 HC ETCO2 MONITOR DAILY: CPT

## 2018-08-19 PROCEDURE — 6360000002 HC RX W HCPCS: Performed by: PHYSICIAN ASSISTANT

## 2018-08-19 PROCEDURE — 6370000000 HC RX 637 (ALT 250 FOR IP): Performed by: PHYSICIAN ASSISTANT

## 2018-08-19 PROCEDURE — 94003 VENT MGMT INPAT SUBQ DAY: CPT

## 2018-08-19 PROCEDURE — 82947 ASSAY GLUCOSE BLOOD QUANT: CPT

## 2018-08-19 PROCEDURE — 94762 N-INVAS EAR/PLS OXIMTRY CONT: CPT

## 2018-08-19 PROCEDURE — 71045 X-RAY EXAM CHEST 1 VIEW: CPT

## 2018-08-19 PROCEDURE — 2100000001 HC CVICU R&B

## 2018-08-19 PROCEDURE — 80048 BASIC METABOLIC PNL TOTAL CA: CPT

## 2018-08-19 PROCEDURE — 6360000002 HC RX W HCPCS: Performed by: THORACIC SURGERY (CARDIOTHORACIC VASCULAR SURGERY)

## 2018-08-19 PROCEDURE — 6370000000 HC RX 637 (ALT 250 FOR IP): Performed by: INTERNAL MEDICINE

## 2018-08-19 PROCEDURE — 85610 PROTHROMBIN TIME: CPT

## 2018-08-19 PROCEDURE — 2500000003 HC RX 250 WO HCPCS

## 2018-08-19 PROCEDURE — 86022 PLATELET ANTIBODIES: CPT

## 2018-08-19 PROCEDURE — 6370000000 HC RX 637 (ALT 250 FOR IP): Performed by: THORACIC SURGERY (CARDIOTHORACIC VASCULAR SURGERY)

## 2018-08-19 PROCEDURE — 2580000003 HC RX 258: Performed by: PHYSICIAN ASSISTANT

## 2018-08-19 RX ADMIN — INSULIN LISPRO 4 UNITS: 100 INJECTION, SOLUTION INTRAVENOUS; SUBCUTANEOUS at 13:13

## 2018-08-19 RX ADMIN — FENTANYL CITRATE 50 MCG: 50 INJECTION, SOLUTION INTRAMUSCULAR; INTRAVENOUS at 15:52

## 2018-08-19 RX ADMIN — CHLORHEXIDINE GLUCONATE 0.12% ORAL RINSE 15 ML: 1.2 LIQUID ORAL at 07:51

## 2018-08-19 RX ADMIN — Medication 4 MG/HR: at 09:10

## 2018-08-19 RX ADMIN — Medication 10 ML: at 11:58

## 2018-08-19 RX ADMIN — GABAPENTIN 300 MG: 300 CAPSULE ORAL at 07:48

## 2018-08-19 RX ADMIN — INSULIN LISPRO 1 UNITS: 100 INJECTION, SOLUTION INTRAVENOUS; SUBCUTANEOUS at 20:44

## 2018-08-19 RX ADMIN — POTASSIUM CHLORIDE 20 MEQ: 400 INJECTION, SOLUTION INTRAVENOUS at 07:17

## 2018-08-19 RX ADMIN — CHLORHEXIDINE GLUCONATE 0.12% ORAL RINSE 15 ML: 1.2 LIQUID ORAL at 20:13

## 2018-08-19 RX ADMIN — AMIODARONE HYDROCHLORIDE 200 MG: 200 TABLET ORAL at 07:48

## 2018-08-19 RX ADMIN — FUROSEMIDE 20 MG: 10 INJECTION INTRAMUSCULAR; INTRAVENOUS at 18:24

## 2018-08-19 RX ADMIN — POTASSIUM CHLORIDE 20 MEQ: 400 INJECTION, SOLUTION INTRAVENOUS at 15:58

## 2018-08-19 RX ADMIN — DESMOPRESSIN ACETATE 40 MG: 0.2 TABLET ORAL at 20:15

## 2018-08-19 RX ADMIN — FENTANYL CITRATE 50 MCG: 50 INJECTION, SOLUTION INTRAMUSCULAR; INTRAVENOUS at 00:25

## 2018-08-19 RX ADMIN — FENTANYL CITRATE 25 MCG: 50 INJECTION, SOLUTION INTRAMUSCULAR; INTRAVENOUS at 08:15

## 2018-08-19 RX ADMIN — FUROSEMIDE 20 MG: 10 INJECTION INTRAMUSCULAR; INTRAVENOUS at 07:51

## 2018-08-19 RX ADMIN — FAMOTIDINE 20 MG: 20 TABLET, FILM COATED ORAL at 07:48

## 2018-08-19 RX ADMIN — ENOXAPARIN SODIUM 40 MG: 40 INJECTION SUBCUTANEOUS at 11:57

## 2018-08-19 RX ADMIN — TRAMADOL HYDROCHLORIDE 50 MG: 50 TABLET, FILM COATED ORAL at 20:21

## 2018-08-19 RX ADMIN — INSULIN LISPRO 4 UNITS: 100 INJECTION, SOLUTION INTRAVENOUS; SUBCUTANEOUS at 07:52

## 2018-08-19 RX ADMIN — TRAMADOL HYDROCHLORIDE 50 MG: 50 TABLET, FILM COATED ORAL at 07:54

## 2018-08-19 RX ADMIN — MUPIROCIN: 20 OINTMENT TOPICAL at 20:16

## 2018-08-19 RX ADMIN — FENTANYL CITRATE 50 MCG: 50 INJECTION, SOLUTION INTRAMUSCULAR; INTRAVENOUS at 05:00

## 2018-08-19 RX ADMIN — INSULIN LISPRO 2 UNITS: 100 INJECTION, SOLUTION INTRAVENOUS; SUBCUTANEOUS at 18:19

## 2018-08-19 RX ADMIN — ASPIRIN 81 MG: 81 TABLET, DELAYED RELEASE ORAL at 07:48

## 2018-08-19 RX ADMIN — CLOPIDOGREL 75 MG: 75 TABLET, FILM COATED ORAL at 07:48

## 2018-08-19 RX ADMIN — FENTANYL CITRATE 50 MCG: 50 INJECTION, SOLUTION INTRAMUSCULAR; INTRAVENOUS at 11:56

## 2018-08-19 RX ADMIN — LATANOPROST 1 DROP: 50 SOLUTION OPHTHALMIC at 20:16

## 2018-08-19 RX ADMIN — FAMOTIDINE 20 MG: 20 TABLET, FILM COATED ORAL at 20:15

## 2018-08-19 RX ADMIN — MUPIROCIN: 20 OINTMENT TOPICAL at 07:51

## 2018-08-19 RX ADMIN — METOPROLOL TARTRATE 12.5 MG: 25 TABLET ORAL at 20:15

## 2018-08-19 RX ADMIN — MULTIPLE VITAMINS W/ MINERALS TAB 1 TABLET: TAB at 07:48

## 2018-08-19 ASSESSMENT — PULMONARY FUNCTION TESTS
PIF_VALUE: 12
PIF_VALUE: 34
PIF_VALUE: 22
PIF_VALUE: 11
PIF_VALUE: 21
PIF_VALUE: 22
PIF_VALUE: 11
PIF_VALUE: 12
PIF_VALUE: 20
PIF_VALUE: 11
PIF_VALUE: 22

## 2018-08-19 NOTE — PROGRESS NOTES
Physical Therapy  DATE: 2018    NAME: Michael Layne MRN: [id number]   : 1945    Patient not seen this date for Physical Therapy due to:  [] Blood transfusion in progress  [] Hemodialysis  []  Patient Declined  [] Spine Precautions   [] Strict Bedrest  [] Surgery/ Procedure  [] Testing      [x] Other   Per Verline Stage RN: still intubated, hope to extubate . Hold today. Recheck        [] PT being discontinued at this time. Patient independent. No further needs. [] PT being discontinued at this time as the patient has been transferred to palliative care. No further needs.     Azalia Garzon, PT

## 2018-08-19 NOTE — PLAN OF CARE
Problem: Restraint Use - Nonviolent/Non-Self-Destructive Behavior:  Goal: Absence of restraint indications  Absence of restraint indications   Outcome: Ongoing    Goal: Absence of restraint-related injury  Absence of restraint-related injury   Outcome: Ongoing  q1h vs and visual checks, circulation checks

## 2018-08-19 NOTE — PROGRESS NOTES
abnormality  Left atrium is mildly dilated. Right ventricle, severe dysfunction with mild dilatation  Moderate to severe tricuspid regurgitation. Estimated right ventricular systolic pressure is 38 mmHg. Mild pulmonary hypertension. Mild pulmonic insufficiency. EK/9  Normal sinus rhythm  Left axis deviation  Left bundle branch block  Abnormal ECG     EKG post arrest   - normals sinus, LBBB     2-D echo 12/15/17  LV chamber dimension is mildly dilated with mild left ventricular  hypertrophy. Systolic function is moderately globally reduced with a  calculated EF of 36%. Evidence of diastolic dysfunction.        Cardiac cath: 17  Severe multivessel CAD. % proximal stenosis, left-to-right collaterals , LCx 80% long segment stenosis, LAD 70% LVEF 20%        Intraoperative ORESTES 18  Pre-Op 20%, postoperative LVEF 30%, moderate LV dilatation, hypokinetic inferior segments       Patient Active Problem List:     Acute on chronic systolic CHF (congestive heart failure) (HCC)     Multiple vessel coronary artery disease     Multiple myeloma not having achieved remission (HCC)     Chronic obstructive pulmonary disease (HCC)     Low hemoglobin     Hypokalemia     Other drug-induced pancytopenia ([facility])     RANDALL (acute kidney injury) ([address]     CAD in native artery      Assessment / Acute Cardiac Problems:   1. MVD s/p CABG ×2 LIMA-LAD, SVG-OM1- 2018  2. Cardiac arrest 2018- PEA  Possible cause intermittent high degree AV block. Evaluated for pacer function no issues with capture at 0.2 mA  3. Noted to have worsened cardiomyopathy after PEA arrest   4. Lactic acidosis - trended down   5. COPD  6. Diabetes  7. Multiple myeloma and anemia on chemotherapy  8. DVT on  Xarelto 20 mg as out patient   9. Anemia - related to blood loss, multiple myeloma       Plan of Treatment:   1. Continue aspirin, Lipitor 40 and Plavix 75 mg.  Okay to be started on heparin per CTS for h/o LE DVT- will hold off today due to worsening thrombocytopenia and re-address when improved   2. Recommend to hold of amiodarone. 3. Continue with IV lasix 20 mg BID - diuresing well if slows down increase the dose of lasix   4. Will give trial of small dose of beta blocker while pacer still in place - metoprolol 12.5 mg BID and follow rhythm and need for pacing with pacer at back of 50   5. Hold ACE inhibitor today due to starting betablocker  6. Will need a repeat limited ECHO in few days once extubated to follow up on systolic function if improved from post arrest state      Renetta Enriquez MD  Fellow, [address]          Attending Cardiologist Addendum: I have reviewed and performed the history, physical, subjective, objective, assessment, and plan with the resident/fellow and agree with the note. I performed the history and physical personally. I have made changes to the note above as needed. Thank you for allowing me to participate in the care of this patient, please do not hesitate to call if you have any questions.     Sherian Habermann, MD MSc. [facility]  [phone]

## 2018-08-20 ENCOUNTER — APPOINTMENT (OUTPATIENT)
Dept: CT IMAGING | Age: 73
DRG: 235 | End: 2018-08-20
Attending: THORACIC SURGERY (CARDIOTHORACIC VASCULAR SURGERY)
Payer: MEDICARE

## 2018-08-20 ENCOUNTER — APPOINTMENT (OUTPATIENT)
Dept: GENERAL RADIOLOGY | Age: 73
DRG: 235 | End: 2018-08-20
Attending: THORACIC SURGERY (CARDIOTHORACIC VASCULAR SURGERY)
Payer: MEDICARE

## 2018-08-20 LAB
ALLEN TEST: ABNORMAL
ANION GAP SERPL CALCULATED.3IONS-SCNC: 8 MMOL/L (ref 9–17)
BUN BLDV-MCNC: 43 MG/DL (ref 8–23)
BUN/CREAT BLD: ABNORMAL (ref 9–20)
CALCIUM SERPL-MCNC: 8.7 MG/DL (ref 8.6–10.4)
CHLORIDE BLD-SCNC: 112 MMOL/L (ref 98–107)
CO2: 28 MMOL/L (ref 20–31)
CREAT SERPL-MCNC: 0.85 MG/DL (ref 0.7–1.2)
FIO2: 30
GFR AFRICAN AMERICAN: >60 ML/MIN
GFR NON-AFRICAN AMERICAN: >60 ML/MIN
GFR SERPL CREATININE-BSD FRML MDRD: ABNORMAL ML/MIN/{1.73_M2}
GFR SERPL CREATININE-BSD FRML MDRD: ABNORMAL ML/MIN/{1.73_M2}
GLUCOSE BLD-MCNC: 177 MG/DL (ref 70–99)
GLUCOSE BLD-MCNC: 187 MG/DL (ref 74–100)
GLUCOSE BLD-MCNC: 188 MG/DL (ref 75–110)
GLUCOSE BLD-MCNC: 194 MG/DL (ref 75–110)
GLUCOSE BLD-MCNC: 195 MG/DL (ref 75–110)
GLUCOSE BLD-MCNC: 219 MG/DL (ref 75–110)
HCT VFR BLD CALC: 27.5 % (ref 40.7–50.3)
HEMOGLOBIN: 7.9 G/DL (ref 13–17)
INR BLD: 1.2
MAGNESIUM: 2 MG/DL (ref 1.6–2.6)
MCH RBC QN AUTO: 26.3 PG (ref 25.2–33.5)
MCHC RBC AUTO-ENTMCNC: 28.7 G/DL (ref 28.4–34.8)
MCV RBC AUTO: 91.7 FL (ref 82.6–102.9)
MODE: ABNORMAL
NEGATIVE BASE EXCESS, ART: ABNORMAL (ref 0–2)
NRBC AUTOMATED: 30.6 PER 100 WBC
O2 DEVICE/FLOW/%: ABNORMAL
PATIENT TEMP: ABNORMAL
PDW BLD-RTO: 18.2 % (ref 11.8–14.4)
PLATELET # BLD: 85 K/UL (ref 138–453)
PMV BLD AUTO: 12 FL (ref 8.1–13.5)
POC HCO3: 33.6 MMOL/L (ref 21–28)
POC O2 SATURATION: 97 % (ref 94–98)
POC PCO2 TEMP: ABNORMAL MM HG
POC PCO2: 45.8 MM HG (ref 35–48)
POC PH TEMP: ABNORMAL
POC PH: 7.47 (ref 7.35–7.45)
POC PO2 TEMP: ABNORMAL MM HG
POC PO2: 86.4 MM HG (ref 83–108)
POSITIVE BASE EXCESS, ART: 9 (ref 0–3)
POTASSIUM SERPL-SCNC: 4.1 MMOL/L (ref 3.7–5.3)
PROTHROMBIN TIME: 12.3 SEC (ref 9–12)
RBC # BLD: 3 M/UL (ref 4.21–5.77)
SAMPLE SITE: ABNORMAL
SODIUM BLD-SCNC: 148 MMOL/L (ref 135–144)
TCO2 (CALC), ART: 35 MMOL/L (ref 22–29)
WBC # BLD: 12.3 K/UL (ref 3.5–11.3)

## 2018-08-20 PROCEDURE — 70450 CT HEAD/BRAIN W/O DYE: CPT

## 2018-08-20 PROCEDURE — 82803 BLOOD GASES ANY COMBINATION: CPT

## 2018-08-20 PROCEDURE — 6370000000 HC RX 637 (ALT 250 FOR IP): Performed by: PHYSICIAN ASSISTANT

## 2018-08-20 PROCEDURE — 89220 SPUTUM SPECIMEN COLLECTION: CPT

## 2018-08-20 PROCEDURE — 97110 THERAPEUTIC EXERCISES: CPT

## 2018-08-20 PROCEDURE — 97162 PT EVAL MOD COMPLEX 30 MIN: CPT

## 2018-08-20 PROCEDURE — 87070 CULTURE OTHR SPECIMN AEROBIC: CPT

## 2018-08-20 PROCEDURE — 85610 PROTHROMBIN TIME: CPT

## 2018-08-20 PROCEDURE — 94770 HC ETCO2 MONITOR DAILY: CPT

## 2018-08-20 PROCEDURE — 2500000003 HC RX 250 WO HCPCS: Performed by: THORACIC SURGERY (CARDIOTHORACIC VASCULAR SURGERY)

## 2018-08-20 PROCEDURE — G8979 MOBILITY GOAL STATUS: HCPCS

## 2018-08-20 PROCEDURE — 80048 BASIC METABOLIC PNL TOTAL CA: CPT

## 2018-08-20 PROCEDURE — 82947 ASSAY GLUCOSE BLOOD QUANT: CPT

## 2018-08-20 PROCEDURE — 6370000000 HC RX 637 (ALT 250 FOR IP): Performed by: STUDENT IN AN ORGANIZED HEALTH CARE EDUCATION/TRAINING PROGRAM

## 2018-08-20 PROCEDURE — 94003 VENT MGMT INPAT SUBQ DAY: CPT

## 2018-08-20 PROCEDURE — 94762 N-INVAS EAR/PLS OXIMTRY CONT: CPT

## 2018-08-20 PROCEDURE — 2100000001 HC CVICU R&B

## 2018-08-20 PROCEDURE — 87205 SMEAR GRAM STAIN: CPT

## 2018-08-20 PROCEDURE — 6360000002 HC RX W HCPCS: Performed by: STUDENT IN AN ORGANIZED HEALTH CARE EDUCATION/TRAINING PROGRAM

## 2018-08-20 PROCEDURE — 6360000002 HC RX W HCPCS: Performed by: PHYSICIAN ASSISTANT

## 2018-08-20 PROCEDURE — 99024 POSTOP FOLLOW-UP VISIT: CPT | Performed by: PHYSICIAN ASSISTANT

## 2018-08-20 PROCEDURE — G8978 MOBILITY CURRENT STATUS: HCPCS

## 2018-08-20 PROCEDURE — 6370000000 HC RX 637 (ALT 250 FOR IP): Performed by: INTERNAL MEDICINE

## 2018-08-20 PROCEDURE — 83735 ASSAY OF MAGNESIUM: CPT

## 2018-08-20 PROCEDURE — 2580000003 HC RX 258: Performed by: PHYSICIAN ASSISTANT

## 2018-08-20 PROCEDURE — 6360000002 HC RX W HCPCS: Performed by: THORACIC SURGERY (CARDIOTHORACIC VASCULAR SURGERY)

## 2018-08-20 PROCEDURE — 71250 CT THORAX DX C-: CPT

## 2018-08-20 PROCEDURE — 85027 COMPLETE CBC AUTOMATED: CPT

## 2018-08-20 PROCEDURE — 71045 X-RAY EXAM CHEST 1 VIEW: CPT

## 2018-08-20 RX ORDER — FLUMAZENIL 0.1 MG/ML
0.5 INJECTION, SOLUTION INTRAVENOUS ONCE
Status: COMPLETED | OUTPATIENT
Start: 2018-08-20 | End: 2018-08-20

## 2018-08-20 RX ORDER — LISINOPRIL 5 MG/1
5 TABLET ORAL DAILY
Status: DISCONTINUED | OUTPATIENT
Start: 2018-08-20 | End: 2018-08-24

## 2018-08-20 RX ORDER — FUROSEMIDE 10 MG/ML
20 INJECTION INTRAMUSCULAR; INTRAVENOUS ONCE
Status: COMPLETED | OUTPATIENT
Start: 2018-08-20 | End: 2018-08-20

## 2018-08-20 RX ORDER — AMIODARONE HYDROCHLORIDE 200 MG/1
200 TABLET ORAL DAILY
Status: DISCONTINUED | OUTPATIENT
Start: 2018-08-21 | End: 2018-08-21

## 2018-08-20 RX ADMIN — MULTIPLE VITAMINS W/ MINERALS TAB 1 TABLET: TAB at 08:08

## 2018-08-20 RX ADMIN — CLOPIDOGREL 75 MG: 75 TABLET, FILM COATED ORAL at 08:09

## 2018-08-20 RX ADMIN — Medication 10 ML: at 21:00

## 2018-08-20 RX ADMIN — INSULIN LISPRO 2 UNITS: 100 INJECTION, SOLUTION INTRAVENOUS; SUBCUTANEOUS at 21:00

## 2018-08-20 RX ADMIN — CHLORHEXIDINE GLUCONATE 0.12% ORAL RINSE 15 ML: 1.2 LIQUID ORAL at 20:45

## 2018-08-20 RX ADMIN — POLYETHYLENE GLYCOL 3350 17 G: 17 POWDER, FOR SOLUTION ORAL at 08:08

## 2018-08-20 RX ADMIN — FLUMAZENIL 0.5 MG: 0.1 INJECTION, SOLUTION INTRAVENOUS at 20:00

## 2018-08-20 RX ADMIN — FAMOTIDINE 20 MG: 20 TABLET, FILM COATED ORAL at 08:08

## 2018-08-20 RX ADMIN — MUPIROCIN: 20 OINTMENT TOPICAL at 08:08

## 2018-08-20 RX ADMIN — CHLORHEXIDINE GLUCONATE 0.12% ORAL RINSE 15 ML: 1.2 LIQUID ORAL at 08:09

## 2018-08-20 RX ADMIN — METOPROLOL TARTRATE 12.5 MG: 25 TABLET ORAL at 08:08

## 2018-08-20 RX ADMIN — MUPIROCIN: 20 OINTMENT TOPICAL at 20:30

## 2018-08-20 RX ADMIN — ENOXAPARIN SODIUM 40 MG: 40 INJECTION SUBCUTANEOUS at 08:08

## 2018-08-20 RX ADMIN — FENTANYL CITRATE 50 MCG: 50 INJECTION, SOLUTION INTRAMUSCULAR; INTRAVENOUS at 04:12

## 2018-08-20 RX ADMIN — FUROSEMIDE 20 MG: 10 INJECTION, SOLUTION INTRAVENOUS at 13:23

## 2018-08-20 RX ADMIN — POTASSIUM CHLORIDE 20 MEQ: 400 INJECTION, SOLUTION INTRAVENOUS at 07:24

## 2018-08-20 RX ADMIN — AMIODARONE HYDROCHLORIDE 200 MG: 200 TABLET ORAL at 08:08

## 2018-08-20 RX ADMIN — ASPIRIN 81 MG: 81 TABLET, DELAYED RELEASE ORAL at 08:09

## 2018-08-20 RX ADMIN — Medication 10 ML: at 08:09

## 2018-08-20 RX ADMIN — FLUMAZENIL 0.5 MG: 0.1 INJECTION, SOLUTION INTRAVENOUS at 11:03

## 2018-08-20 RX ADMIN — GABAPENTIN 300 MG: 300 CAPSULE ORAL at 08:09

## 2018-08-20 RX ADMIN — FLUMAZENIL 0.5 MG: 0.1 INJECTION, SOLUTION INTRAVENOUS at 13:19

## 2018-08-20 RX ADMIN — LATANOPROST 1 DROP: 50 SOLUTION OPHTHALMIC at 20:30

## 2018-08-20 RX ADMIN — FUROSEMIDE 20 MG: 10 INJECTION INTRAMUSCULAR; INTRAVENOUS at 08:08

## 2018-08-20 ASSESSMENT — PULMONARY FUNCTION TESTS
PIF_VALUE: 22
PIF_VALUE: 10
PIF_VALUE: 14
PIF_VALUE: 12

## 2018-08-20 ASSESSMENT — PAIN SCALES - GENERAL
PAINLEVEL_OUTOF10: 0
PAINLEVEL_OUTOF10: 0

## 2018-08-20 NOTE — PROGRESS NOTES
Chuck  Occupational Therapy Not Seen Note    DATE: 2018  Name: Hemanth Perez. : 1945  MRN: [id number]    Patient not available for Occupational Therapy due to:    [] Testing:    [] Hemodialysis    [] Blood Transfusion in Progress    []Refusal by Patient:    [] Surgery/Procedure:    [] Strict Bedrest    [] Sedation    [] Spine Precautions     [] Pt being transferred to palliative care at this time. Spoke with pt/family and OT services to be defered. [] Pt independent with functional mobility and functional tasks. Pt with no OT acute care needs at this time, will defer OT eval.    [x] Other: Just extubated, Dr's in with pt, will try back later this afternoon. @1120am    Next Scheduled Treatment: Later this afternoon.     Signature: RASHID Peters/JOSELYN

## 2018-08-20 NOTE — PROGRESS NOTES
distressed b/c pt not yet extubated; reassurance given)  Follows Commands: Impaired (pt squeezed R hand x1; questionable to command)  Pain Screening  Patient Currently in Pain: Unable to Assess  Vital Signs  WFL       Orientation  Orientation  Overall Orientation Status: Impaired  Orientation Level: Unable to assess (pt didn't answer any questions, didn't follow commands consistently)    Social/Functional History  Social/Functional History  Lives With: Spouse  Type of Home: House  Home Layout: Multi-level  Home Access: Stairs to enter without rails  Entrance Stairs - Number of Steps: 2+2  Bathroom Shower/Tub: Tub/Shower unit  Bathroom Toilet: Standard  Bathroom Equipment: Shower chair, Grab bars in shower  Receives Help From: Family  ADL Assistance: Independent  Homemaking Assistance: Independent  Homemaking Responsibilities: Yes (shared with wife )  Meal Prep Responsibility: Primary  Laundry Responsibility: Secondary  Cleaning Responsibility: Secondary  Shopping Responsibility: Secondary  Ambulation Assistance: Independent  Transfer Assistance: Independent  Active : Yes  Mode of Transportation: Car  Occupation: Retired  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 Info taken from OT evaluation  Objective          PROM RLE (degrees)  RLE PROM: WFL  PROM LLE (degrees)  LLE PROM: WFL  PROM RUE (degrees)  RUE PROM: WFL  PROM LUE (degrees)  LUE PROM: WFL  Strength RLE  Strength RLE: pt did squeeze R hand x 1--per RN, following commands but very delayed response. Pt only squeezed hand x 1--followed no other commands, questionable whether grasp response was to command since pt unable to complete a 2nd time.     Tone RLE  RLE Tone: Normotonic  Tone LLE  LLE Tone: Normotonic  Sensation  Overall Sensation Status:  (AL--per OT eval, pt has neuropathy B feet at Score  AM-PAC Inpatient Mobility Raw Score : 6  AM-PAC Inpatient T-Scale Score : 23.55  Mobility Inpatient CMS 0-100% Score: 100  Mobility Inpatient CMS G-Code Modifier : CN          Goals  Short term goals  Time Frame for Short term goals: 14 visits  Short term goal 1: prevent contractures x 4  Short term goal 2: facilitate active movement x 4  Short term goal 3: mobilize pt as medical status allows and set goals  Patient Goals   Patient goals : pt unable to verbalize goals       Therapy Time   Individual Concurrent Group Co-treatment   Time In [address]         Time Out 0932         Minutes [address] [facility] [address], [place], [place]

## 2018-08-20 NOTE — PROGRESS NOTES
 midazolam Stopped (08/20/18 0600)    sodium chloride 15 mL/hr at 08/17/18 0844    insulin (HUMAN R) non-weight based infusion 2.04 Units/hr (08/17/18 0559)    dextrose       CBC:   Recent Labs      08/18/18   0557  08/19/18   0537  08/20/18   0504   WBC  13.3*  12.8*  12.3*   HGB  7.3*  8.4*  7.9*   PLT  102*  68*  85*     BMP:    Recent Labs      08/18/18   0557  08/19/18   0537  08/20/18   0504   NA  148*  145*  148*   K  4.6  3.9  4.1   CL  108*  106  112*   CO2  19*  26  28   BUN  35*  48*  43*   CREATININE  1.99*  1.33*  0.85   GLUCOSE  157*  221*  177*     Hepatic: No results for input(s): AST, ALT, ALB, BILITOT, ALKPHOS in the last 72 hours. Troponin: No results for input(s): TROPONINI in the last 72 hours. Recent Labs      08/18/18   0305  08/18/18   1106   TROPONINT  0.26*  0.24*     BNP: No results for input(s): PROBNP in the last 72 hours. No results for input(s): BNP in the last 72 hours. Lipids: No results for input(s): CHOL, HDL in the last 72 hours. Invalid input(s): LDLCALCU  INR:   Recent Labs      08/18/18   0557  08/19/18   0537  08/20/18   0504   INR  2.2  1.4  1.2       Objective:   Vitals: /67   Pulse 80   Temp 98.1 °F (36.7 °C) (Oral)   Resp 26   Ht 5' 9\" (1.753 m)   Wt 178 lb 9.2 oz (81 kg) Comment: per bed scale  SpO2 98%   BMI 26.37 kg/m²    Last Recorded Weight:  [unfilled]  General appearance: sedated, intuabted  HEENT: intubated, RIJ   Neck: JVD present  Lungs: Equal air entry b/l  Heart: regular rate and rhythm, S1, S2 normal, no murmur, click, rub or gallop. No pain on palpation of the anterior chest.  Abdomen: soft, nontender with bowel sounds present in all 4 quadrants. Extremities: + LE and UE edema- more in LLE compared to R  Integumentum:Intact with no rashes noted.       Diagnostic Studies:   ECHO     2D echo 8/18/2018  LV EF 29%    2D Echo 08/17/2018  Technically difficult study. Severe LV systolic dysfunction without dilatation.   Global hypokinesis , but cannot rule out segmental wall motion abnormality  Left atrium is mildly dilated. Right ventricle, severe dysfunction with mild dilatation  Moderate to severe tricuspid regurgitation. Estimated right ventricular systolic pressure is 38 mmHg. Mild pulmonary hypertension. Mild pulmonic insufficiency. Intraoperative ORESTES 18  Pre-Op 20%, postoperative LVEF 30%, moderate LV dilatation, hypokinetic inferior segments      EK/9  Normal sinus rhythm  Left axis deviation  Left bundle branch block  Abnormal ECG     EKG post arrest   - normals sinus, LBBB     2-D echo 12/15/17  LV chamber dimension is mildly dilated with mild left ventricular  hypertrophy. Systolic function is moderately globally reduced with a  calculated EF of 36%. Evidence of diastolic dysfunction.        Cardiac cath: 17  Severe multivessel CAD. % proximal stenosis, left-to-right collaterals , LCx 80% long segment stenosis, LAD 70% LVEF 20%               Patient Active Problem List:     Acute on chronic systolic CHF (congestive heart failure) (HCC)     Multiple vessel coronary artery disease     Multiple myeloma not having achieved remission (HCC)     Chronic obstructive pulmonary disease (HCC)     Low hemoglobin     Hypokalemia     Other drug-induced pancytopenia (HCC)     RANDALL (acute kidney injury) ([facility] [address]     CAD in native artery      Assessment / Acute Cardiac Problems:   1. MVD s/p CABG ×2 LIMA-LAD, SVG-OM1- 2018  2. Cardiac arrest 2018- PEA  Possible cause intermittent high degree AV block. Evaluated for pacer function no issues with capture at 0.2 mA  3. Noted to have worsened cardiomyopathy after PEA arrest   4. Lactic acidosis - trended down   5. COPD  6. Diabetes  7. Multiple myeloma and anemia on chemotherapy  8. DVT on  Xarelto 20 mg as out patient   9. Anemia - related to blood loss, multiple myeloma       Plan of Treatment:   1. Continue aspirin, Lipitor 40. Plavix stopped.   Resumed home Xarelto 20 mg daily this evening. 2. Amiodarone reduced to 200 mg daily   3. Reduced IV lasix 20 mg from BID to once daily due to -3L I/O yesterday  4. Continue metoprolol 12.5 mg twice a day while pacer still in place -  follow rhythm and need for pacing with pacer at back up rate of 50   5. Started ACE inhibitor today at 5 mg daily  6. Will need a repeat limited ECHO in few days once extubated to follow up on systolic function if improved from post arrest state. Will likely need Life vest at discharge      Chet Guidry MD  Fellow, [address]                 Attending Physician Statement  I have discussed the care of Yasmeen Powers., including pertinent history and exam findings,  with the cardiology fellow/resident. I have seen and examined the patient and the key elements of all parts of the encounter have been performed by me. I agree with the assessment, plan and orders as documented by the resident with additional recommendations as below:     Patient is now extubated and tolerating NIPPV. He is off pressors and hemodynamically stable. He is in sinus rhythm and did not require further pacing over the last 24 hours. Okay to resume low-dose lopressor and Lisinopril. Decrease amiodarone to 200 daily. Resume anticoagulation with Xarelto if okay with CT surgery.       Sal Kwok MD   [facility]  [facility],  [address]   [phone]

## 2018-08-20 NOTE — PROGRESS NOTES
Order obtained for extubation. SpO2 of 96% on 30% FiO2. Patient extubated and placed on 6 liters/min via nasal cannula. Post extubation SpO2 is 98% with HR  76 bpm and RR 24 breaths/min. Patient had strong cough that was non-productive. Extubation Well tolerated by patient. .   Breath Sounds: clear    ABDRACQUELJADAO VAZ   10:50 AM

## 2018-08-20 NOTE — PROGRESS NOTES
[facility]  Progress Note    8/19/2018 9:01 PM  Surgeon:  Naseem Bettencourt MD          POD# 3    S/P :  Coronary artery bypassx 2  EF: 35 %    Subjective:  Mr. Carol Cervantes seen and examined today. extubated overnight events. Pain controlled. C/o minor site discomfort. Gtt insulin    Vital Signs: /64   Pulse 75   Temp 98.4 °F (36.9 °C) (Oral)   Resp 15   Ht 5' 9\" (1.753 m)   Wt 187 lb 2.7 oz (84.9 kg) Comment: per bed scale  SpO2 96%   BMI 27.64 kg/m²      Admit Weight: Weight: 178 lb 9.2 oz (81 kg)   WEIGHTWeight: 187 lb 2.7 oz (84.9 kg) (per bed scale)     Rhythm: NSR    Labs:   CBC:   Recent Labs      08/18/18 0305 08/18/18   0557  08/19/18   0537   WBC  21.9*  13.3*  12.8*   HGB  6.9*  7.3*  8.4*   HCT  25.9*  24.4*  27.9*   MCV  95.2  89.1  87.2   PLT  132*  102*  68*     BMP:   Recent Labs      08/18/18   0305 08/18/18   0330  08/18/18   0557  08/19/18   0537   NA  145*   --   148*  145*   K  5.2   --   4.6  3.9   CL  102   --   108*  106   CO2  19*   --   19*  26   BUN  33*   --   35*  48*   CREATININE  2.18*  1.89*  1.99*  1.33*     PT/INR:   Recent Labs      08/18/18 0305 08/18/18   0557  08/19/18   0537   PROTIME  21.3*  22.1*  14.6*   INR  2.1  2.2  1.4     APTT:   Recent Labs      08/18/18 0305   APTT  31.6*       Chest X-Ray:  Some vascular congestion. I/O:  I/O last 3 completed shifts: In: 7605 [I.V.:1302]  Out: 4255 [Urine:4095;  Chest Tube:160]  Air Leak:  No air leak     Scheduled Meds:    metoprolol tartrate  12.5 mg Per NG tube BID    furosemide  20 mg Intravenous BID    nicotine  1 patch Transdermal Daily    traZODone  50 mg Oral Nightly    sodium chloride flush  10 mL Intravenous 2 times per day    docusate sodium  100 mg Oral BID    polyethylene glycol  17 g Oral Daily    famotidine  20 mg Oral BID    amiodarone  200 mg Oral BID    chlorhexidine  15 mL Mouth/Throat BID    mupirocin   Nasal BID    therapeutic multivitamin-minerals  1 tablet Oral Daily

## 2018-08-20 NOTE — PROGRESS NOTES
[facility] Cardiothoracic Surgeons  Progress Note    8/20/2018 8:48 AM  Surgeon:  Marija Wells MD          POD# 4     S/P :  Coronary artery bypassx 2  EF: 35 %    Subjective:  Mr. Reva Mortimer seen and examined today. PEA on Friday mary lou. ? Cause. Repeat ECHo no effusion, EF 29%. Vital Signs: /67   Pulse 80   Temp 98.1 °F (36.7 °C) (Oral)   Resp 26   Ht 5' 9\" (1.753 m)   Wt 178 lb 9.2 oz (81 kg) Comment: per bed scale  SpO2 98%   BMI 26.37 kg/m²      Admit Weight: Weight: 178 lb 9.2 oz (81 kg)   WEIGHTWeight: 178 lb 9.2 oz (81 kg) (per bed scale)     Rhythm: NSR    Labs:   CBC:   Recent Labs      08/18/18   0557  08/19/18   0537  08/20/18   0504   WBC  13.3*  12.8*  12.3*   HGB  7.3*  8.4*  7.9*   HCT  24.4*  27.9*  27.5*   MCV  89.1  87.2  91.7   PLT  102*  68*  85*     BMP:   Recent Labs      08/18/18   0557  08/19/18   0537  08/20/18   0504   NA  148*  145*  148*   K  4.6  3.9  4.1   CL  108*  106  112*   CO2  19*  26  28   BUN  35*  48*  43*   CREATININE  1.99*  1.33*  0.85     PT/INR:   Recent Labs      08/18/18   0557  08/19/18   0537  08/20/18   0504   PROTIME  22.1*  14.6*  12.3*   INR  2.2  1.4  1.2     APTT:   Recent Labs      08/18/18   0305   APTT  31.6*         I/O:  I/O last 3 completed shifts: In: 350 [I.V.:290; NG/GT:60]  Out: 3920 [Urine:3900;  Chest Tube:20]  Air Leak:  No air leak     Scheduled Meds:    metoprolol tartrate  12.5 mg Per NG tube BID    furosemide  20 mg Intravenous BID    nicotine  1 patch Transdermal Daily    traZODone  50 mg Oral Nightly    sodium chloride flush  10 mL Intravenous 2 times per day    docusate sodium  100 mg Oral BID    polyethylene glycol  17 g Oral Daily    famotidine  20 mg Oral BID    amiodarone  200 mg Oral BID    chlorhexidine  15 mL Mouth/Throat BID    mupirocin   Nasal BID    therapeutic multivitamin-minerals  1 tablet Oral Daily with breakfast    atorvastatin  40 mg Oral Nightly    enoxaparin  40 mg Subcutaneous Daily    aspirin  81 mg Oral Daily    clopidogrel  75 mg Oral Daily    insulin lispro  0-12 Units Subcutaneous TID     insulin lispro  0-6 Units Subcutaneous Nightly    gabapentin  300 mg Oral Daily    latanoprost  1 drop Both Eyes Nightly     Continuous Infusions:    norepinephrine Stopped (08/18/18 1430)    midazolam Stopped (08/20/18 0600)    sodium chloride 15 mL/hr at 08/17/18 0844    insulin (HUMAN R) non-weight based infusion 2.04 Units/hr (08/17/18 0559)    dextrose         Exam:   General: intubated and sedated  Heart:Normal S1 and S2.  Regular rhythm. No murmurs, gallops, or rubs. , Pacing wires  Yes  Lungs: diminished breath sounds bibasilar Equal and symmetric expansion with respiration. Chest tubes to suction  Abdomen: soft, non tender, non distended, BSx4  Extremities: 1+ edema, intact pulses in all four extremities  Musculoskeletal:  Intact range of motion of peripheral joints. grossly normal  Neurologic:  Non-focal sensory deficits on exam.  Psychiatric: Mood and affect are appropriate. Wounds: clean and dry, healing appropriately, dressing in place       Assessment/Plan:   Patient Active Problem List   Diagnosis    Acute on chronic systolic CHF (congestive heart failure) (HCC)    Multiple vessel coronary artery disease    Multiple myeloma not having achieved remission ([address]    Chronic obstructive pulmonary disease (HCC)    Low hemoglobin    Hypokalemia    Other drug-induced pancytopenia ([address]    RANDALL (acute kidney injury) ([address]    CAD in native artery     ECHO 8/18/18  FINDINGS  Left Atrium  Left atrium is mildly dilated. Left Ventricle  Left ventricle is normal in size. Mild left ventricular hypertrophy. Global left ventricular systolic function is severely reduced. Leftward  compression interventricular septum (D-sign) suggests RV pressure /or volume  overload  Calculated ejection fraction is 29 % . Concentric mild wall thickening with diastolic dysfunction.   Right Atrium  Right atrium was not well visualized. Right Ventricle  Right ventricle, severe dysfunction with mild dilatation  Mitral Valve  Mitral valve is not well visualized. Mitral valve appears normal in structure. Moderate mitral regurgitation. No mitral stenosis. Aortic Valve  Normal aortic valve structure and function without stenosis or  regurgitation. Aortic valve is trileaflet. Tricuspid Valve  Tricuspid valve was not well visualized. Moderate to severe tricuspid regurgitation. Estimated right ventricular systolic pressure is 38 mmHg. Mild pulmonary hypertension. Pulmonic Valve  Pulmonic valve was not well visualized. Mild pulmonic insufficiency. Pericardial Effusion  No significant pericardial effusion is seen. 1. S/p cabg x 2   POD 4  2. HD - NSR, stable, off pressors,intubated  Wean to extubate  Repeat ECHO  Stable creat - 0.85  ABLA - hb 7.9  Thrombocytopenia - improving 85    DVT ppx: Lovenox & SCD's while stationary  Patient is on Plavix, ASA, Statin therapy per protocol   Plan for discharge when medically stable    The above recommendations including medications and orders were discussed and agreed upon with  the attending on service for the cardiothoracic surgery group today.     Electronically signed by BI Odom on 8/20/2018 at 8:48 AM

## 2018-08-21 ENCOUNTER — APPOINTMENT (OUTPATIENT)
Dept: ULTRASOUND IMAGING | Age: 73
DRG: 235 | End: 2018-08-21
Attending: THORACIC SURGERY (CARDIOTHORACIC VASCULAR SURGERY)
Payer: MEDICARE

## 2018-08-21 ENCOUNTER — APPOINTMENT (OUTPATIENT)
Dept: GENERAL RADIOLOGY | Age: 73
DRG: 235 | End: 2018-08-21
Attending: THORACIC SURGERY (CARDIOTHORACIC VASCULAR SURGERY)
Payer: MEDICARE

## 2018-08-21 LAB
ALBUMIN SERPL-MCNC: 3.5 G/DL (ref 3.5–5.2)
ALBUMIN/GLOBULIN RATIO: 1.8 (ref 1–2.5)
ALP BLD-CCNC: 114 U/L (ref 40–129)
ALT SERPL-CCNC: 250 U/L (ref 5–41)
ANION GAP SERPL CALCULATED.3IONS-SCNC: 11 MMOL/L (ref 9–17)
AST SERPL-CCNC: 99 U/L
BILIRUB SERPL-MCNC: 7.69 MG/DL (ref 0.3–1.2)
BILIRUBIN DIRECT: 5.66 MG/DL
BILIRUBIN, INDIRECT: 2.03 MG/DL (ref 0–1)
BUN BLDV-MCNC: 36 MG/DL (ref 8–23)
BUN/CREAT BLD: ABNORMAL (ref 9–20)
CALCIUM SERPL-MCNC: 9.7 MG/DL (ref 8.6–10.4)
CHLORIDE BLD-SCNC: 112 MMOL/L (ref 98–107)
CO2: 28 MMOL/L (ref 20–31)
CREAT SERPL-MCNC: 0.61 MG/DL (ref 0.7–1.2)
GFR AFRICAN AMERICAN: >60 ML/MIN
GFR NON-AFRICAN AMERICAN: >60 ML/MIN
GFR SERPL CREATININE-BSD FRML MDRD: ABNORMAL ML/MIN/{1.73_M2}
GFR SERPL CREATININE-BSD FRML MDRD: ABNORMAL ML/MIN/{1.73_M2}
GLOBULIN: ABNORMAL G/DL (ref 1.5–3.8)
GLUCOSE BLD-MCNC: 185 MG/DL (ref 75–110)
GLUCOSE BLD-MCNC: 197 MG/DL (ref 75–110)
GLUCOSE BLD-MCNC: 213 MG/DL (ref 70–99)
GLUCOSE BLD-MCNC: 218 MG/DL (ref 75–110)
GLUCOSE BLD-MCNC: 226 MG/DL (ref 75–110)
HCT VFR BLD CALC: 28.9 % (ref 40.7–50.3)
HEMOGLOBIN: 8.1 G/DL (ref 13–17)
INR BLD: 1.2
MAGNESIUM: 1.8 MG/DL (ref 1.6–2.6)
MCH RBC QN AUTO: 26.7 PG (ref 25.2–33.5)
MCHC RBC AUTO-ENTMCNC: 28 G/DL (ref 28.4–34.8)
MCV RBC AUTO: 95.4 FL (ref 82.6–102.9)
NRBC AUTOMATED: 4.1 PER 100 WBC
PDW BLD-RTO: 18.3 % (ref 11.8–14.4)
PLATELET # BLD: 145 K/UL (ref 138–453)
PMV BLD AUTO: 10.9 FL (ref 8.1–13.5)
POTASSIUM SERPL-SCNC: 4.7 MMOL/L (ref 3.7–5.3)
PROTHROMBIN TIME: 12.2 SEC (ref 9–12)
RBC # BLD: 3.03 M/UL (ref 4.21–5.77)
SODIUM BLD-SCNC: 151 MMOL/L (ref 135–144)
TOTAL PROTEIN: 5.5 G/DL (ref 6.4–8.3)
WBC # BLD: 14.5 K/UL (ref 3.5–11.3)

## 2018-08-21 PROCEDURE — 99024 POSTOP FOLLOW-UP VISIT: CPT | Performed by: PHYSICIAN ASSISTANT

## 2018-08-21 PROCEDURE — 76604 US EXAM CHEST: CPT

## 2018-08-21 PROCEDURE — 6360000002 HC RX W HCPCS: Performed by: PHYSICIAN ASSISTANT

## 2018-08-21 PROCEDURE — 6370000000 HC RX 637 (ALT 250 FOR IP): Performed by: THORACIC SURGERY (CARDIOTHORACIC VASCULAR SURGERY)

## 2018-08-21 PROCEDURE — 94660 CPAP INITIATION&MGMT: CPT

## 2018-08-21 PROCEDURE — 97535 SELF CARE MNGMENT TRAINING: CPT

## 2018-08-21 PROCEDURE — 80048 BASIC METABOLIC PNL TOTAL CA: CPT

## 2018-08-21 PROCEDURE — 2580000003 HC RX 258: Performed by: PHYSICIAN ASSISTANT

## 2018-08-21 PROCEDURE — 83735 ASSAY OF MAGNESIUM: CPT

## 2018-08-21 PROCEDURE — 97110 THERAPEUTIC EXERCISES: CPT

## 2018-08-21 PROCEDURE — 94640 AIRWAY INHALATION TREATMENT: CPT

## 2018-08-21 PROCEDURE — 94667 MNPJ CHEST WALL 1ST: CPT

## 2018-08-21 PROCEDURE — 71045 X-RAY EXAM CHEST 1 VIEW: CPT

## 2018-08-21 PROCEDURE — 6370000000 HC RX 637 (ALT 250 FOR IP): Performed by: PHYSICIAN ASSISTANT

## 2018-08-21 PROCEDURE — 80076 HEPATIC FUNCTION PANEL: CPT

## 2018-08-21 PROCEDURE — 85610 PROTHROMBIN TIME: CPT

## 2018-08-21 PROCEDURE — 82947 ASSAY GLUCOSE BLOOD QUANT: CPT

## 2018-08-21 PROCEDURE — 94762 N-INVAS EAR/PLS OXIMTRY CONT: CPT

## 2018-08-21 PROCEDURE — 6370000000 HC RX 637 (ALT 250 FOR IP): Performed by: STUDENT IN AN ORGANIZED HEALTH CARE EDUCATION/TRAINING PROGRAM

## 2018-08-21 PROCEDURE — 94668 MNPJ CHEST WALL SBSQ: CPT

## 2018-08-21 PROCEDURE — 94664 DEMO&/EVAL PT USE INHALER: CPT

## 2018-08-21 PROCEDURE — 97530 THERAPEUTIC ACTIVITIES: CPT

## 2018-08-21 PROCEDURE — 85027 COMPLETE CBC AUTOMATED: CPT

## 2018-08-21 PROCEDURE — 2100000001 HC CVICU R&B

## 2018-08-21 RX ORDER — FLUCONAZOLE 2 MG/ML
200 INJECTION, SOLUTION INTRAVENOUS EVERY 24 HOURS
Status: CANCELLED | OUTPATIENT
Start: 2018-08-21

## 2018-08-21 RX ADMIN — CHLORHEXIDINE GLUCONATE 0.12% ORAL RINSE 15 ML: 1.2 LIQUID ORAL at 20:52

## 2018-08-21 RX ADMIN — CHLORHEXIDINE GLUCONATE 0.12% ORAL RINSE 15 ML: 1.2 LIQUID ORAL at 10:49

## 2018-08-21 RX ADMIN — Medication 10 ML: at 20:53

## 2018-08-21 RX ADMIN — NYSTATIN 500000 UNITS: 100000 SUSPENSION ORAL at 18:10

## 2018-08-21 RX ADMIN — LATANOPROST 1 DROP: 50 SOLUTION OPHTHALMIC at 20:52

## 2018-08-21 RX ADMIN — INSULIN LISPRO 2 UNITS: 100 INJECTION, SOLUTION INTRAVENOUS; SUBCUTANEOUS at 18:15

## 2018-08-21 RX ADMIN — HYDRALAZINE HYDROCHLORIDE 5 MG: 20 INJECTION INTRAMUSCULAR; INTRAVENOUS at 23:45

## 2018-08-21 RX ADMIN — INSULIN LISPRO 4 UNITS: 100 INJECTION, SOLUTION INTRAVENOUS; SUBCUTANEOUS at 10:53

## 2018-08-21 RX ADMIN — HYDRALAZINE HYDROCHLORIDE 5 MG: 20 INJECTION INTRAMUSCULAR; INTRAVENOUS at 20:57

## 2018-08-21 ASSESSMENT — PULMONARY FUNCTION TESTS
PIF_VALUE: 13
PIF_VALUE: 12
PIF_VALUE: 11
PIF_VALUE: 12
PIF_VALUE: 11
PIF_VALUE: 11
PIF_VALUE: 12

## 2018-08-21 ASSESSMENT — PAIN SCALES - GENERAL
PAINLEVEL_OUTOF10: 0
PAINLEVEL_OUTOF10: 0

## 2018-08-21 NOTE — PROGRESS NOTES
functional activities   Short term goal 6: demo min A for functional transfers/ mobility to assist with ADL/ functional activities with LRD  Patient Goals   Patient goals : to go home with wife     Therapy Time   Individual Concurrent Group Co-treatment   Time In  0900         Time Out  0930         Minutes                 [address]/JOSELYN

## 2018-08-21 NOTE — PROGRESS NOTES
Nightly     Continuous Infusions:    norepinephrine Stopped (08/18/18 1430)    midazolam Stopped (08/20/18 0600)    sodium chloride 15 mL/hr at 08/17/18 0844    insulin (HUMAN R) non-weight based infusion 2.04 Units/hr (08/17/18 0559)    dextrose         Exam:   General: intubated and sedated  Heart:Normal S1 and S2.  Regular rhythm. No murmurs, gallops, or rubs. , Pacing wires  Yes  Lungs: diminished breath sounds bibasilar Equal and symmetric expansion with respiration. Chest tubes out  Abdomen: soft, non tender, non distended, BSx4  Extremities: 1+ edema, intact pulses in all four extremities  Musculoskeletal:  Intact range of motion of peripheral joints. grossly normal  Neurologic:  Non-focal sensory deficits on exam.  Psychiatric: Mood and affect are appropriate. Wounds: clean and dry, healing appropriately, dressing in place       Assessment/Plan:   Patient Active Problem List   Diagnosis    Acute on chronic systolic CHF (congestive heart failure) (HCC)    Multiple vessel coronary artery disease    Multiple myeloma not having achieved remission ([address]    Chronic obstructive pulmonary disease (HCC)    Low hemoglobin    Hypokalemia    Other drug-induced pancytopenia ([address]    RANDALL (acute kidney injury) ([address]    CAD in native artery     ECHO 8/18/18  FINDINGS  Left Atrium  Left atrium is mildly dilated. Left Ventricle  Left ventricle is normal in size. Mild left ventricular hypertrophy. Global left ventricular systolic function is severely reduced. Leftward  compression interventricular septum (D-sign) suggests RV pressure /or volume  overload  Calculated ejection fraction is 29 % . Concentric mild wall thickening with diastolic dysfunction. Right Atrium  Right atrium was not well visualized. Right Ventricle  Right ventricle, severe dysfunction with mild dilatation  Mitral Valve  Mitral valve is not well visualized. Mitral valve appears normal in structure. Moderate mitral regurgitation.   No

## 2018-08-21 NOTE — PROGRESS NOTES
[facility]   Progress Note                    Date:   8/21/2018  Patient name:  Rose Rodriguez. Date of admission:  8/15/2018  7:38 AM  MRN:   [id number]  YOB: 1945  PCP:    Giovanna Dobbs MD    Reason for Admission:  CAD in native artery [I25.10]    Subjective:       Clinical Changes / Abnormalities:    Patient was extubated yesterday. He continues to require Bipap support. CXR from this morning showed bilateral pleural effusions more on the left. CT surg planning U/S of the left sided pleural effusion to assess the feasibility of tap. He received lasix 20 mg IV yesterday with net I/O -4L  Had intrinsic rhythm through the night          I/O last 3 completed shifts: In: 596 [P.O.:50; I.V.:446; NG/GT:100]  Out: 4763 [Urine:4763]  No intake/output data recorded.       In: 496 [P.O.:50; I.V.:446]  Out: 2688 [Urine:2688]   Net -ve 2.1 L yesterday     Intake/Output Summary (Last 24 hours) at 08/21/18 0813  Last data filed at 08/21/18 0600   Gross per 24 hour   Intake              496 ml   Output             4763 ml   Net            -4267 ml         I/O since admission: +2.2 L liters    Medications:   Scheduled Meds:   lisinopril  5 mg Oral Daily    rivaroxaban  20 mg Oral Daily    metoprolol tartrate  12.5 mg Per NG tube BID    nicotine  1 patch Transdermal Daily    traZODone  50 mg Oral Nightly    sodium chloride flush  10 mL Intravenous 2 times per day    docusate sodium  100 mg Oral BID    polyethylene glycol  17 g Oral Daily    famotidine  20 mg Oral BID    chlorhexidine  15 mL Mouth/Throat BID    therapeutic multivitamin-minerals  1 tablet Oral Daily with breakfast    atorvastatin  40 mg Oral Nightly    aspirin  81 mg Oral Daily    insulin lispro  0-12 Units Subcutaneous TID     insulin lispro  0-6 Units Subcutaneous Nightly    gabapentin  300 mg Oral Daily    latanoprost  1 drop Both Eyes Nightly     Continuous Infusions:   norepinephrine Stopped (08/18/18 dilatation  Moderate to severe tricuspid regurgitation. Estimated right ventricular systolic pressure is 38 mmHg. Mild pulmonary hypertension. Mild pulmonic insufficiency. Intraoperative ORESTES 18  Pre-Op 20%, postoperative LVEF 30%, moderate LV dilatation, hypokinetic inferior segments      EK/9  Normal sinus rhythm  Left axis deviation  Left bundle branch block  Abnormal ECG     EKG post arrest   - normals sinus, LBBB     2-D echo 12/15/17  LV chamber dimension is mildly dilated with mild left ventricular  hypertrophy. Systolic function is moderately globally reduced with a  calculated EF of 36%. Evidence of diastolic dysfunction.        Cardiac cath: 17  Severe multivessel CAD. % proximal stenosis, left-to-right collaterals , LCx 80% long segment stenosis, LAD 70% LVEF 20%               Patient Active Problem List:     Acute on chronic systolic CHF (congestive heart failure) (HCC)     Multiple vessel coronary artery disease     Multiple myeloma not having achieved remission (HCC)     Chronic obstructive pulmonary disease (HCC)     Low hemoglobin     Hypokalemia     Other drug-induced pancytopenia (HCC)     RANDALL (acute kidney injury) ([facility] [address]     CAD in native artery      Assessment / Acute Cardiac Problems:   1. MVD s/p CABG ×2 LIMA-LAD, SVG-OM1- 2018  2. Cardiac arrest 2018- PEA  Possible cause intermittent high degree AV block. Evaluated for pacer function no issues with capture at 0.2 mA  3. Ischemic cardiomyopathy EF 30% 18  4. COPD  5. Diabetes  6. Multiple myeloma and anemia on chemotherapy  7. DVT on  Xarelto 20 mg as out patient   8. Anemia - related to blood loss, multiple myeloma       Plan of Treatment:   1. Continue aspirin, Lipitor 40. Plavix stopped. Resumed home Xarelto 20 mg daily yesterday. 2. Amiodarone reduced to 200 mg daily   3. continue IV lasix 20 mg daily   4.  Increased metoprolol to 25 mg  bid while pacer still in place -  follow rhythm and need

## 2018-08-21 NOTE — CARE COORDINATION
Spoke with patient and wife regarding transitional planning. Discussed SNF, they have SNF list, but have not made a decision.   Will follow for choice

## 2018-08-21 NOTE — PLAN OF CARE
Problem: RESPIRATORY  Intervention: Respiratory assessment  ATELECTASIS     [x]  PREVENT ATELECTASIS  [x]   ASSESS BREATH SOUNDS  []   IMPLEMENT HYPERINFLATION PROTOCOL  [x]  INCENTIVE SPIROMETRY INSTRUCTION  [x]   PATIENT EDUCATION AS NEEDED    PROVIDE ADEQUATE OXYGENATION WITH ACCEPTABLE SP02/ABG'S    [x]  IDENTIFY APPROPRIATE OXYGEN THERAPY  [x]   MONITOR SP02/ABG'S AS NEEDED   [x]   PATIENT EDUCATION AS NEEDED

## 2018-08-21 NOTE — PROGRESS NOTES
Physical Therapy  Facility/Department: [facility] CAR 1  Daily Treatment Note  NAME: Marcos Epps. : 1945  MRN: [id number]    Date of Service: 2018    Discharge Recommendations:  Continue to assess pending progress, Subacute/Skilled Nursing Facility   PT Equipment Recommendations  Equipment Needed:  (TBD)    Patient Diagnosis(es): There were no encounter diagnoses. has a past medical history of Arthritis; CAD (coronary artery disease); Cardiomyopathy [facility]); CHF (congestive heart failure) ([facility] [address] COPD (chronic obstructive pulmonary disease) ([facility] [address] Diabetes mellitus ([facility] [address] [facility] leg [facility] Hyperlipidemia; Hypertension; Multiple myeloma ([facility] Neuropathy; and Wears dentures. has a past surgical history that includes Cardiac catheterization (2017); hernia repair (); Abdomen surgery (); pr colonoscopy flx dx w/collj spec when pfrmd (N/A, 2018); pr esophagogastroduodenoscopy transoral diagnostic (N/A, 2018); Cataract removal with implant (Bilateral); Tear duct surgery (Right); and pr cabg, artery-vein, single (N/A, 2018). Restrictions  Restrictions/Precautions  Restrictions/Precautions: Cardiac, Surgical Protocols, Fall Risk  Required Braces or Orthoses?: No  Position Activity Restriction  Sternal Precautions: No Pushing, No Pulling, 5# Lifting Restrictions  Sternal Precautions: CABG X  Other position/activity restrictions: IV, ART Line, O2, Del Cid, External Pacemaker  Subjective   General  Chart Reviewed: Yes  Response To Previous Treatment: Patient reporting fatigue but able to participate. Family / Caregiver Present: Yes (wife)  Subjective  Subjective: RN agreeable to PT. Pt supine in bed upon arrival, very lethargic. Wife present in room, very supportive to pt and encouraging mobility.   General Comment  Comments: Pt left in bed with call light within reach, wife present  Pain Screening  Patient Currently in Pain: Denies  Vital Signs  Patient Currently in Pain: Denies       Orientation  Orientation  Overall Orientation Status: Impaired  Orientation Level: Oriented to person (to lethargic to answer most questions asked this date)  Objective   Bed mobility  Rolling to Left: Moderate assistance  Rolling to Right: Moderate assistance  Supine to Sit: Maximum assistance (x2)  Sit to Supine: Maximum assistance (x2)  Scooting: Maximal assistance  Comment: HOB elevated, pt offering very little assistance  Transfers  Sit to Stand: Unable to assess  Comment: not safe to attempt at this time  Ambulation  Ambulation?: No     Balance  Posture: Poor (pt unable to lift head while seated EOB)  Sitting - Static: Poor  Sitting - Dynamic: Poor  Comments: pt sat EOB ~3-4 mins with ModA. Pt demo's some attempt to maintain seated balance with core muscles and L UE, unable to lift head to look despite Max vc's  Exercises  Comments: AAROM B UE/LE's in all planes 10x each supine, seated LAQ 15x AAROM         Assessment   Body structures, Functions, Activity limitations: Decreased functional mobility ; Decreased strength;Decreased safe awareness;Decreased cognition;Decreased endurance;Decreased sensation  Assessment: Pt required MaxA x2 for bed mobility and ModA for seated EOB ~3-4 mins. Pt demo's significantly impaired functional mobility, wife reports high PLOF.  Will continue to assess for discharge rec, may be appropriate for IP rehab pending progress with PT, currently most appropriate for SNF  Prognosis: Good  Patient Education: importance of EOB  REQUIRES PT FOLLOW UP: Yes  Activity Tolerance  Activity Tolerance: Patient limited by fatigue;Patient limited by endurance     Goals  Short term goals  Time Frame for Short term goals: 14 visits  Short term goal 1: prevent contractures x 4  Short term goal 2: facilitate active movement x 4  Short term goal 3: mobilize pt as medical status allows and set goals  Patient Goals   Patient goals : pt unable to verbalize goals    Plan Plan  Times per week: 5-6 visits weekly   Times per day: Daily  Current Treatment Recommendations: Strengthening, ROM, Functional Mobility Training  Plan Comment: increase to BID when appropriate  Safety Devices  Type of devices:  All fall risk precautions in place, Call light within reach, Patient at risk for falls, Left in bed, Nurse notified  Restraints  Initially in place: No  Restraints: none     Therapy Time   Individual Concurrent Group Co-treatment   Time In 1005         Time Out 1048         Minutes [place], [place]

## 2018-08-22 ENCOUNTER — APPOINTMENT (OUTPATIENT)
Dept: GENERAL RADIOLOGY | Age: 73
DRG: 235 | End: 2018-08-22
Attending: THORACIC SURGERY (CARDIOTHORACIC VASCULAR SURGERY)
Payer: MEDICARE

## 2018-08-22 ENCOUNTER — APPOINTMENT (OUTPATIENT)
Dept: ULTRASOUND IMAGING | Age: 73
DRG: 235 | End: 2018-08-22
Attending: THORACIC SURGERY (CARDIOTHORACIC VASCULAR SURGERY)
Payer: MEDICARE

## 2018-08-22 LAB
ANION GAP SERPL CALCULATED.3IONS-SCNC: 11 MMOL/L (ref 9–17)
BUN BLDV-MCNC: 33 MG/DL (ref 8–23)
BUN/CREAT BLD: ABNORMAL (ref 9–20)
CALCIUM SERPL-MCNC: 9.8 MG/DL (ref 8.6–10.4)
CHLORIDE BLD-SCNC: 114 MMOL/L (ref 98–107)
CO2: 28 MMOL/L (ref 20–31)
CREAT SERPL-MCNC: 0.61 MG/DL (ref 0.7–1.2)
CULTURE: ABNORMAL
DIRECT EXAM: ABNORMAL
GFR AFRICAN AMERICAN: >60 ML/MIN
GFR NON-AFRICAN AMERICAN: >60 ML/MIN
GFR SERPL CREATININE-BSD FRML MDRD: ABNORMAL ML/MIN/{1.73_M2}
GFR SERPL CREATININE-BSD FRML MDRD: ABNORMAL ML/MIN/{1.73_M2}
GLUCOSE BLD-MCNC: 205 MG/DL (ref 75–110)
GLUCOSE BLD-MCNC: 216 MG/DL (ref 70–99)
GLUCOSE BLD-MCNC: 219 MG/DL (ref 75–110)
GLUCOSE BLD-MCNC: 338 MG/DL (ref 75–110)
GLUCOSE BLD-MCNC: 357 MG/DL (ref 75–110)
HCT VFR BLD CALC: 29.8 % (ref 40.7–50.3)
HEMOGLOBIN: 8.4 G/DL (ref 13–17)
INR BLD: 1.3
Lab: ABNORMAL
MAGNESIUM: 1.8 MG/DL (ref 1.6–2.6)
MAGNESIUM: 2 MG/DL (ref 1.6–2.6)
MCH RBC QN AUTO: 27 PG (ref 25.2–33.5)
MCHC RBC AUTO-ENTMCNC: 28.2 G/DL (ref 28.4–34.8)
MCV RBC AUTO: 95.8 FL (ref 82.6–102.9)
NRBC AUTOMATED: 2 PER 100 WBC
PDW BLD-RTO: 19.5 % (ref 11.8–14.4)
PLATELET # BLD: 173 K/UL (ref 138–453)
PMV BLD AUTO: 11.2 FL (ref 8.1–13.5)
POTASSIUM SERPL-SCNC: 4.3 MMOL/L (ref 3.7–5.3)
POTASSIUM SERPL-SCNC: 4.6 MMOL/L (ref 3.7–5.3)
PROTHROMBIN TIME: 13.6 SEC (ref 9–12)
RBC # BLD: 3.11 M/UL (ref 4.21–5.77)
SODIUM BLD-SCNC: 144 MMOL/L (ref 135–144)
SODIUM BLD-SCNC: 153 MMOL/L (ref 135–144)
SPECIMEN DESCRIPTION: ABNORMAL
STATUS: ABNORMAL
WBC # BLD: 15.2 K/UL (ref 3.5–11.3)

## 2018-08-22 PROCEDURE — 83735 ASSAY OF MAGNESIUM: CPT

## 2018-08-22 PROCEDURE — 92611 MOTION FLUOROSCOPY/SWALLOW: CPT

## 2018-08-22 PROCEDURE — 84295 ASSAY OF SERUM SODIUM: CPT

## 2018-08-22 PROCEDURE — 74230 X-RAY XM SWLNG FUNCJ C+: CPT

## 2018-08-22 PROCEDURE — 2580000003 HC RX 258: Performed by: INTERNAL MEDICINE

## 2018-08-22 PROCEDURE — 36415 COLL VENOUS BLD VENIPUNCTURE: CPT

## 2018-08-22 PROCEDURE — 71045 X-RAY EXAM CHEST 1 VIEW: CPT

## 2018-08-22 PROCEDURE — 6370000000 HC RX 637 (ALT 250 FOR IP): Performed by: THORACIC SURGERY (CARDIOTHORACIC VASCULAR SURGERY)

## 2018-08-22 PROCEDURE — 94762 N-INVAS EAR/PLS OXIMTRY CONT: CPT

## 2018-08-22 PROCEDURE — 80048 BASIC METABOLIC PNL TOTAL CA: CPT

## 2018-08-22 PROCEDURE — G8979 MOBILITY GOAL STATUS: HCPCS

## 2018-08-22 PROCEDURE — 85027 COMPLETE CBC AUTOMATED: CPT

## 2018-08-22 PROCEDURE — 97535 SELF CARE MNGMENT TRAINING: CPT

## 2018-08-22 PROCEDURE — 82947 ASSAY GLUCOSE BLOOD QUANT: CPT

## 2018-08-22 PROCEDURE — 99024 POSTOP FOLLOW-UP VISIT: CPT | Performed by: PHYSICIAN ASSISTANT

## 2018-08-22 PROCEDURE — 2580000003 HC RX 258: Performed by: PHYSICIAN ASSISTANT

## 2018-08-22 PROCEDURE — 6360000002 HC RX W HCPCS: Performed by: INTERNAL MEDICINE

## 2018-08-22 PROCEDURE — 84132 ASSAY OF SERUM POTASSIUM: CPT

## 2018-08-22 PROCEDURE — G8996 SWALLOW CURRENT STATUS: HCPCS

## 2018-08-22 PROCEDURE — 76705 ECHO EXAM OF ABDOMEN: CPT

## 2018-08-22 PROCEDURE — G8978 MOBILITY CURRENT STATUS: HCPCS

## 2018-08-22 PROCEDURE — 6370000000 HC RX 637 (ALT 250 FOR IP): Performed by: STUDENT IN AN ORGANIZED HEALTH CARE EDUCATION/TRAINING PROGRAM

## 2018-08-22 PROCEDURE — 6370000000 HC RX 637 (ALT 250 FOR IP): Performed by: PHYSICIAN ASSISTANT

## 2018-08-22 PROCEDURE — 6360000002 HC RX W HCPCS: Performed by: PHYSICIAN ASSISTANT

## 2018-08-22 PROCEDURE — 85610 PROTHROMBIN TIME: CPT

## 2018-08-22 PROCEDURE — 97530 THERAPEUTIC ACTIVITIES: CPT

## 2018-08-22 PROCEDURE — 6370000000 HC RX 637 (ALT 250 FOR IP): Performed by: INTERNAL MEDICINE

## 2018-08-22 PROCEDURE — G8997 SWALLOW GOAL STATUS: HCPCS

## 2018-08-22 PROCEDURE — 94640 AIRWAY INHALATION TREATMENT: CPT

## 2018-08-22 PROCEDURE — 2140000001 HC CVICU INTERMEDIATE R&B

## 2018-08-22 PROCEDURE — 97164 PT RE-EVAL EST PLAN CARE: CPT

## 2018-08-22 RX ORDER — METOPROLOL TARTRATE 5 MG/5ML
5 INJECTION INTRAVENOUS EVERY 4 HOURS PRN
Status: DISCONTINUED | OUTPATIENT
Start: 2018-08-22 | End: 2018-08-26 | Stop reason: HOSPADM

## 2018-08-22 RX ORDER — FUROSEMIDE 40 MG/1
40 TABLET ORAL DAILY
Status: DISCONTINUED | OUTPATIENT
Start: 2018-08-22 | End: 2018-08-23

## 2018-08-22 RX ORDER — DEXTROSE MONOHYDRATE 50 MG/ML
INJECTION, SOLUTION INTRAVENOUS CONTINUOUS
Status: DISCONTINUED | OUTPATIENT
Start: 2018-08-22 | End: 2018-08-26 | Stop reason: HOSPADM

## 2018-08-22 RX ORDER — AMIODARONE HYDROCHLORIDE 200 MG/1
100 TABLET ORAL 2 TIMES DAILY
Status: DISCONTINUED | OUTPATIENT
Start: 2018-08-22 | End: 2018-08-25

## 2018-08-22 RX ORDER — METOPROLOL TARTRATE 50 MG/1
50 TABLET, FILM COATED ORAL 2 TIMES DAILY
Status: DISCONTINUED | OUTPATIENT
Start: 2018-08-22 | End: 2018-08-22

## 2018-08-22 RX ADMIN — NYSTATIN 500000 UNITS: 100000 SUSPENSION ORAL at 09:54

## 2018-08-22 RX ADMIN — AMIODARONE HYDROCHLORIDE 100 MG: 200 TABLET ORAL at 21:02

## 2018-08-22 RX ADMIN — LATANOPROST 1 DROP: 50 SOLUTION OPHTHALMIC at 21:02

## 2018-08-22 RX ADMIN — AMIODARONE HYDROCHLORIDE 1 MG/MIN: 50 INJECTION, SOLUTION INTRAVENOUS at 07:43

## 2018-08-22 RX ADMIN — DEXTROSE MONOHYDRATE: 50 INJECTION, SOLUTION INTRAVENOUS at 10:05

## 2018-08-22 RX ADMIN — Medication 10 ML: at 21:02

## 2018-08-22 RX ADMIN — RIVAROXABAN 20 MG: 20 TABLET, FILM COATED ORAL at 21:13

## 2018-08-22 RX ADMIN — INSULIN LISPRO 2 UNITS: 100 INJECTION, SOLUTION INTRAVENOUS; SUBCUTANEOUS at 20:23

## 2018-08-22 RX ADMIN — POLYETHYLENE GLYCOL 3350 17 G: 17 POWDER, FOR SOLUTION ORAL at 09:54

## 2018-08-22 RX ADMIN — LISINOPRIL 5 MG: 5 TABLET ORAL at 09:55

## 2018-08-22 RX ADMIN — TRAZODONE HYDROCHLORIDE 50 MG: 50 TABLET ORAL at 21:02

## 2018-08-22 RX ADMIN — INSULIN LISPRO 8 UNITS: 100 INJECTION, SOLUTION INTRAVENOUS; SUBCUTANEOUS at 17:29

## 2018-08-22 RX ADMIN — NYSTATIN 500000 UNITS: 100000 SUSPENSION ORAL at 21:02

## 2018-08-22 RX ADMIN — DOCUSATE SODIUM 100 MG: 100 CAPSULE ORAL at 09:54

## 2018-08-22 RX ADMIN — MULTIPLE VITAMINS W/ MINERALS TAB 1 TABLET: TAB at 09:55

## 2018-08-22 RX ADMIN — INSULIN LISPRO 4 UNITS: 100 INJECTION, SOLUTION INTRAVENOUS; SUBCUTANEOUS at 10:10

## 2018-08-22 RX ADMIN — FAMOTIDINE 20 MG: 20 TABLET, FILM COATED ORAL at 21:03

## 2018-08-22 RX ADMIN — FUROSEMIDE 40 MG: 40 TABLET ORAL at 11:56

## 2018-08-22 RX ADMIN — METOPROLOL TARTRATE 50 MG: 50 TABLET, FILM COATED ORAL at 10:13

## 2018-08-22 RX ADMIN — METOPROLOL TARTRATE 25 MG: 25 TABLET ORAL at 21:03

## 2018-08-22 RX ADMIN — NYSTATIN 500000 UNITS: 100000 SUSPENSION ORAL at 17:33

## 2018-08-22 RX ADMIN — CHLORHEXIDINE GLUCONATE 0.12% ORAL RINSE 15 ML: 1.2 LIQUID ORAL at 09:54

## 2018-08-22 RX ADMIN — CHLORHEXIDINE GLUCONATE 0.12% ORAL RINSE 15 ML: 1.2 LIQUID ORAL at 21:02

## 2018-08-22 RX ADMIN — GABAPENTIN 300 MG: 300 CAPSULE ORAL at 09:55

## 2018-08-22 RX ADMIN — ASPIRIN 81 MG: 81 TABLET, DELAYED RELEASE ORAL at 09:55

## 2018-08-22 RX ADMIN — DOCUSATE SODIUM 100 MG: 100 CAPSULE ORAL at 21:03

## 2018-08-22 RX ADMIN — ENOXAPARIN SODIUM 40 MG: 40 INJECTION SUBCUTANEOUS at 10:11

## 2018-08-22 RX ADMIN — INSULIN LISPRO 10 UNITS: 100 INJECTION, SOLUTION INTRAVENOUS; SUBCUTANEOUS at 11:53

## 2018-08-22 RX ADMIN — FAMOTIDINE 20 MG: 20 TABLET, FILM COATED ORAL at 09:55

## 2018-08-22 ASSESSMENT — PAIN SCALES - GENERAL
PAINLEVEL_OUTOF10: 0

## 2018-08-22 NOTE — PROGRESS NOTES
[facility]   Progress Note                    Date:   8/22/2018  Patient name:  Gladis Lopez. Date of admission:  8/15/2018  7:38 AM  MRN:   [id number]  YOB: 1945  PCP:    Lisa Brambila MD    Reason for Admission:  CAD in native artery [I25.10]    Subjective:       Clinical Changes / Abnormalities:    Patient went into A. Fib  last night and was started on amiodarone drip with which he converted back to sinus rhythm. He continues to be slightly tachycardic with a chart ranging from 80s-100s. He had a swallow study done today. He was weaned off nasal cannula this morning. I/O last 3 completed shifts: In: 580 [I.V.:580]  Out: 2650 [Urine:2650]  No intake/output data recorded.       In: 580 [I.V.:580]  Out: 2650 [Urine:2650]   Net -ve 2.1 L yesterday     Intake/Output Summary (Last 24 hours) at 08/22/18 1009  Last data filed at 08/22/18 0549   Gross per 24 hour   Intake              580 ml   Output             2650 ml   Net            -2070 ml         I/O since admission: +2.2 L liters    Medications:   Scheduled Meds:   amiodarone bolus  150 mg Intravenous Once    enoxaparin  40 mg Subcutaneous Daily    metoprolol tartrate  50 mg Per NG tube BID    nystatin  5 mL Oral 4x Daily    lisinopril  5 mg Oral Daily    rivaroxaban  20 mg Oral Daily    nicotine  1 patch Transdermal Daily    traZODone  50 mg Oral Nightly    sodium chloride flush  10 mL Intravenous 2 times per day    docusate sodium  100 mg Oral BID    polyethylene glycol  17 g Oral Daily    famotidine  20 mg Oral BID    chlorhexidine  15 mL Mouth/Throat BID    therapeutic multivitamin-minerals  1 tablet Oral Daily with breakfast    atorvastatin  40 mg Oral Nightly    aspirin  81 mg Oral Daily    insulin lispro  0-12 Units Subcutaneous TID WC    insulin lispro  0-6 Units Subcutaneous Nightly    gabapentin  300 mg Oral Daily    latanoprost  1 drop Both Eyes Nightly     Continuous Infusions:   amiodarone 450mg/250ml D5W infusion 1 mg/min (08/22/18 0743)    Followed by   [facility] amiodarone 450mg/250ml D5W infusion      dextrose 50 mL/hr at 08/22/18 1005    sodium chloride 15 mL/hr at 08/17/18 0844    insulin (HUMAN R) non-weight based infusion 2.04 Units/hr (08/17/18 0559)    dextrose       CBC:   Recent Labs      08/20/18   0504  08/21/18   1715 08/22/18   0500   WBC  12.3*  14.5*  15.2*   HGB  7.9*  8.1*  8.4*   PLT  85*  145  173     BMP:    Recent Labs      08/20/18   0504  08/21/18   1715 08/22/18   0500   NA  148*  151*  153*   K  4.1  4.7  4.3   CL  112*  112*  114*   CO2  28  28  28   BUN  43*  36*  33*   CREATININE  0.85  0.61*  0.61*   GLUCOSE  177*  213*  216*     Hepatic:   Recent Labs      08/21/18 1715   AST  99*   ALT  250*   BILITOT  7.69*   ALKPHOS  114     Troponin: No results for input(s): TROPONINI in the last 72 hours. No results for input(s): TROPONINT in the last 72 hours. BNP: No results for input(s): PROBNP in the last 72 hours. No results for input(s): BNP in the last 72 hours. Lipids: No results for input(s): CHOL, HDL in the last 72 hours. Invalid input(s): LDLCALCU  INR:   Recent Labs      08/20/18   0504  08/21/18   1715 08/22/18   0500   INR  1.2  1.2  1.3       Objective:   Vitals: /71   Pulse 92   Temp 98.3 °F (36.8 °C) (Oral)   Resp 20   Ht 5' 9\" (1.753 m)   Wt 184 lb 1.4 oz (83.5 kg)   SpO2 99%   BMI 27.18 kg/m²    Last Recorded Weight:  [unfilled]  General appearance: alert and oriented ×2,   HEENT:sclera icteric  Neck: JVD absent   Lungs: Equal air entry b/l  Heart: regular rate and rhythm, S1, S2 normal, no murmur, click, rub or gallop. No pain on palpation of the anterior chest.  Abdomen: soft, nontender with bowel sounds present in all 4 quadrants.   Extremities: No LE edema  Integumentum:Intact with no rashes noted.       Diagnostic Studies:   ECHO     2D echo 8/18/2018  LV EF 29%    2D Echo 08/17/2018  Technically difficult study. Severe LV systolic dysfunction without dilatation. Global hypokinesis , but cannot rule out segmental wall motion abnormality  Left atrium is mildly dilated. Right ventricle, severe dysfunction with mild dilatation  Moderate to severe tricuspid regurgitation. Estimated right ventricular systolic pressure is 38 mmHg. Mild pulmonary hypertension. Mild pulmonic insufficiency. Intraoperative ORESTES 18  Pre-Op 20%, postoperative LVEF 30%, moderate LV dilatation, hypokinetic inferior segments      EK/9  Normal sinus rhythm  Left axis deviation  Left bundle branch block  Abnormal ECG     EKG post arrest   - normals sinus, LBBB     2-D echo 12/15/17  LV chamber dimension is mildly dilated with mild left ventricular  hypertrophy. Systolic function is moderately globally reduced with a  calculated EF of 36%. Evidence of diastolic dysfunction.        Cardiac cath: 17  Severe multivessel CAD. % proximal stenosis, left-to-right collaterals , LCx 80% long segment stenosis, LAD 70% LVEF 20%               Patient Active Problem List:     Acute on chronic systolic CHF (congestive heart failure) ([facility])     Multiple vessel coronary artery disease     Multiple myeloma not having achieved remission (HCC)     Chronic obstructive pulmonary disease (HCC)     Low hemoglobin     Hypokalemia     Other drug-induced pancytopenia (HCC)     RANDALL (acute kidney injury) ([facility] [address]     CAD in native artery      Assessment / Acute Cardiac Problems:   1. MVD s/p CABG ×2 LIMA-LAD, SVG-OM1- 2018  2. Cardiac arrest 2018- PEA  Possible cause intermittent high degree AV block. Evaluated for pacer function no issues with capture at 0.2 mA  3. Ischemic cardiomyopathy EF 30% 18  4. Single episode of A fib with RVR on  am - Currently in sinus rhythm  5. COPD  6. Diabetes  7. Multiple myeloma and anemia on chemotherapy  8. DVT on  Xarelto 20 mg as out patient   9.  Anemia - related to blood loss, multiple

## 2018-08-22 NOTE — PROGRESS NOTES
tolerate at least 30mins of UE/LE exercises in order to increase overall strength   Patient Goals   Patient goals : pt unable to verbalize goals       Therapy Time   Individual Concurrent Group Co-treatment   Time In 1010         Time Out 1033         Minutes [address], [address] [facility]

## 2018-08-22 NOTE — CARE COORDINATION
Met with patient and wife to discuss transitional planning. They are open to SNF, would like referrals made to #1 [facility], #2 [facility], referrals faxed    [phone]  Received call from [facility] at [facility], he received referral and is reviewing    [phone] received call from Rasta Rosales at [facility] asking about Revlimid and marijuana usage. Spoke with patient's wife, they get Revlimid from [place] and have a supply at home.   Patient's wife states patient has not smoked marijuana in 3 months and was smoking about twice a week before that, left  for Roberta at [facility] regarding above information    [phone] received call from Rasta Rosales at [facility], they are accepting    [phone]  Received call from [facility] at [facility], they are declining

## 2018-08-22 NOTE — FLOWSHEET NOTE
08/22/18 0506   Provider Notification   Reason for Communication Evaluate;Critical Value (comment)  (new afib)   Provider Name Dr. Tra Alfaro   Provider Notification Physician  (fellow)   Method of Communication Secure Message   Response Waiting for response   RN spoke with Dr. Tra Alfaro over the phone. Dr. Tra Alfaro ordered metoprolol IVP PRN, amiodarone gtt, and a heparin gtt to be started if OK with CT surgery.

## 2018-08-22 NOTE — PROGRESS NOTES
Nutrition Assessment    Type and Reason for Visit: Initial, Consult (Length of stay, CAD Education)    Nutrition Recommendations:   Continue current diet as tolerated. Start Glucerna (Diabetic) ONS 2 times daily. Encourage/monitor adequacy of oral intakes. Malnutrition Assessment:  · Malnutrition Status: At risk for malnutrition  · Context: Acute illness or injury  · Findings of the 6 clinical characteristics of malnutrition (Minimum of 2 out of 6 clinical characteristics is required to make the diagnosis of moderate or severe Protein Calorie Malnutrition based on AND/ASPEN Guidelines):  1. Energy Intake-Less than or equal to 75%, greater than or equal to 5 days    2. Weight Loss-No significant weight loss,    3. Fat Loss-Unable to assess,    4. Muscle Loss-Unable to assess,    5. Fluid Accumulation-Mild fluid accumulation, Extremities  6.  Strength-Not measured    Nutrition Diagnosis:   · Problem: Inadequate oral intake  · Etiology: related to recent extubation     Signs and symptoms:  as evidenced by diet recently advanced    Nutrition Assessment:  · Subjective Assessment: Chart reviewed for length of stay. Pt asleep x multiple visits- pt family at bedside. Pt s/p CABG 8/16. Pt recently advanced to oral diet this morning. Pt family at bedside states pt had some sherbert today and was hungry. Discussed with family at bedside post CABG nutrition therapy. Pt reports herself and pt are familiar with heart healthy diet. Handout discussed and left at pt bedside. Will follow up with patient on nutrition education as appropriate. · Nutrition-Focused Physical Findings: +1 BLE edema. +2 facial edema. Hypoactive bowel sounds.    · Wound Type: Surgical Wound  · Current Nutrition Therapies:  · Oral Diet Orders: Carb Control 4 Carbs/Meal, Dental Soft   · Oral Diet intake: Unable to assess  · Oral Nutrition Supplement (ONS) Orders: None  · ONS intake: Unable to assess  · Anthropometric Measures:  · Ht: 5' 9\"

## 2018-08-22 NOTE — PROGRESS NOTES
Occupational Therapy  Facility/Department: [facility] CAR 1  Daily Treatment Note  NAME: Gladis Lopez. : 1945  MRN: [id number]    Date of Service: 2018    Discharge Recommendations:  Subacute/Skilled Nursing Facility, Continue to assess pending progress       Patient Diagnosis(es): There were no encounter diagnoses. has a past medical history of Arthritis; CAD (coronary artery disease); Cardiomyopathy [facility]); CHF (congestive heart failure) ([facility] [address] COPD (chronic obstructive pulmonary disease) ([facility] [address] Diabetes mellitus ([facility] DVT of leg (deep venous thrombosis) ([facility] Hyperlipidemia; Hypertension; Multiple myeloma ([facility] Neuropathy; and Wears dentures. has a past surgical history that includes Cardiac catheterization (2017); hernia repair (75); Abdomen surgery (); pr colonoscopy flx dx w/collj spec when pfrmd (N/A, 2018); pr esophagogastroduodenoscopy transoral diagnostic (N/A, 2018); Cataract removal with implant (Bilateral); Tear duct surgery (Right); and pr cabg, artery-vein, single (N/A, 2018). Restrictions  Restrictions/Precautions  Restrictions/Precautions: Cardiac, Surgical Protocols, Fall Risk (orally intubated; per RN, working on extubating)  Required Braces or Orthoses?: No  Position Activity Restriction  Sternal Precautions: No Pushing, No Pulling, 5# Lifting Restrictions  Sternal Precautions: CABG X2  Other position/activity restrictions: O2  Subjective   General  Patient assessed for rehabilitation services?: Yes  Family / Caregiver Present: Yes (wife )  Pain Assessment  Patient Currently in Pain: Denies  Response to Pain Intervention: Patient Satisfied   Objective    ADL  Feeding: Minimal assistance;Setup; Increased time to complete; Adaptive utensils; Beverage management (Issued red foam build up handles for utensils)  Grooming: Minimal assistance;Setup;Verbal cueing; Increased time to complete (washed face, brushed teeth)  Toileting: Dependent/Total RASHID SUTTON/L

## 2018-08-22 NOTE — PLAN OF CARE
Problem: RESPIRATORY  Intervention: Respiratory assessment  ATELECTASIS     [x]  PREVENT ATELECTASIS  [x]   ASSESS BREATH SOUNDS  [x]   IMPLEMENT HYPERINFLATION PROTOCOL  []  INCENTIVE SPIROMETRY INSTRUCTION  [x]   PATIENT EDUCATION AS NEEDED          Comments: PROVIDE ADEQUATE OXYGENATION WITH ACCEPTABLE SP02/ABG'S    [x]  IDENTIFY APPROPRIATE OXYGEN THERAPY  [x]   MONITOR SP02/ABG'S AS NEEDED   [x]   PATIENT EDUCATION AS NEEDED

## 2018-08-22 NOTE — CONSULTS
INITIAL CONSULTATION     HISTORY OF PRESENT ILLNESS: Lida . is a 72 y.o. male with a past history remarkable for chronic anemia, CAD, CHF, DM2, admitted to the hospital on 8/15/2018 and s/p CABG two days ago. GI consulted for abnormal LFT's post op (Tranaminase elevation, & hyperbilirubinemia). He denies history of liver disease, ascites, hepatic encephalopathy or overt GI bleeding. Of note:   EGD/colonoscopy in Feb 2018 was normal except for non-bleeding hemorrhoids. MRE also did not show a small bowel tumor.   Patient had significant, coronary artery disease and congestive heart failure with an ejection fraction of 36%  He was on Xeralto for history of DVT, also he has a  Hx of M M , seen by Dr Racquel Pearson and the [place] [facility]   Hx od stromal tumor 20 years ago    His denying any sign of bleeding at this time  Denied any odynophagia or dysphagia denied any diarrhea or any significant other GI symptoms      PAST MEDICAL HISTORY:     Past Medical History:   Diagnosis Date    Arthritis     all over    CAD (coronary artery disease)     Cardiomyopathy ([address]     CHF (congestive heart failure) (HCC)     COPD (chronic obstructive pulmonary disease) ([address]     Diabetes mellitus ([address]     DVT of leg (deep venous thrombosis) ([address]     RIGHT    Hyperlipidemia     Hypertension     Multiple myeloma ([address] 2014    Neuropathy     Wears dentures     FULL UPPER, PARTIAL LOWER        Past Surgical History:   Procedure Laterality Date    ABDOMEN SURGERY  2002    STOMAL TUMOR    CARDIAC CATHETERIZATION  12/13/2017    right and left heart cath with Dr. Abdulaziz Edmond Bilateral     HERNIA REPAIR  1967    MN CABG, ARTERY-VEIN, SINGLE N/A 8/16/2018    CABG CORONARY ARTERY BYPASS ON  PUMP x 2, SWAN LAUREN, ORESTES ([facility]# [id number]) performed by Vazquez Stuart MD at [address] FLX DX W/TRAMAINE [place] 1976 PFRMD N/A 1/14/2018    COLONOSCOPY performed by Miriam Elias MD at [facility]    IN ESOPHAGOGASTRODUODENOSCOPY TRANSORAL DIAGNOSTIC N/A 1/12/2018    EGD DIAGNOSTIC ONLY performed by Ambrose Maynard MD at [facility] [address] Right           CURRENT MEDICATIONS:       Current Facility-Administered Medications:     metoprolol (LOPRESSOR) injection 5 mg, 5 mg, Intravenous, Q4H PRN, Cecile Ramirez MD    amiodarone (CORDARONE) 150 mg in dextrose 5 % 100 mL bolus, 150 mg, Intravenous, Once **FOLLOWED BY** amiodarone (CORDARONE) 450 mg in dextrose 5 % 250 mL infusion, 1 mg/min, Intravenous, Continuous, Last Rate: 33.3 mL/hr at 08/22/18 0743, 1 mg/min at 08/22/18 0743 **FOLLOWED BY** amiodarone (CORDARONE) 450 mg in dextrose 5 % 250 mL infusion, 0.5 mg/min, Intravenous, Continuous, Cecile Ramirez MD    dextrose 5 % solution, , Intravenous, Continuous, BI Blas    enoxaparin (LOVENOX) injection 40 mg, 40 mg, Subcutaneous, Daily, BI Blas    metoprolol tartrate (LOPRESSOR) tablet 50 mg, 50 mg, Per NG tube, BID, Lanny Morales MD    nystatin (MYCOSTATIN) 830229 UNIT/ML suspension 500,000 Units, 5 mL, Oral, 4x Daily, Taryn Palacios MD, 500,000 Units at 08/21/18 1810    lisinopril (PRINIVIL;ZESTRIL) tablet 5 mg, 5 mg, Oral, Daily, Jd Unger MD, Stopped at 08/20/18 1255    rivaroxaban (XARELTO) tablet 20 mg, 20 mg, Oral, Daily, Jd Unger MD    0.9 % sodium chloride infusion, , Intravenous, Continuous, BI Blas, Last Rate: 15 mL/hr at 08/17/18 0844    traMADol (ULTRAM) tablet 50 mg, 50 mg, Oral, Q6H PRN, Toby Perez MD, 50 mg at 08/19/18 2021    nicotine (NICODERM CQ) 21 MG/24HR 1 patch, 1 patch, Transdermal, Daily, Lanny Morales MD, 1 patch at 08/21/18 1049    traZODone (DESYREL) tablet 50 mg, 50 mg, Oral, Nightly, BI Blas, Stopped at 08/19/18 2000    sodium chloride flush 0.9 % injection 10 mL, 10 mL, Intravenous, 2 times per day, BI Blas, 10 mL at 08/21/18 2053    sodium chloride flush 0.9 % injection 10 mL, 10 mL, Intravenous, PRN, BI Jones, 10 mL at 08/16/18 1845    calcium chloride 1 g in sodium chloride 0.9 % 100 mL IVPB, 1 g, Intravenous, PRN, BI Jones, Stopped at 08/17/18 0025    magnesium sulfate 1 g in dextrose 5% 100 mL IVPB, 1 g, Intravenous, PRN, BI Jones    potassium chloride 20 mEq/50 mL IVPB (Central Line), 20 mEq, Intravenous, PRN, BI Jones, Last Rate: 50 mL/hr at 08/20/18 0724, 20 mEq at 08/20/18 0724    acetaminophen (TYLENOL) tablet 650 mg, 650 mg, Oral, Q4H PRN, BI Jones    acetaminophen (TYLENOL) suppository 650 mg, 650 mg, Rectal, Q4H PRN, BI Jones    fentaNYL (SUBLIMAZE) injection 25 mcg, 25 mcg, Intravenous, Q1H PRN, 25 mcg at 08/19/18 0815 **OR** fentaNYL (SUBLIMAZE) injection 50 mcg, 50 mcg, Intravenous, Q1H PRN, BI Jones, 50 mcg at 08/20/18 0412    diphenhydrAMINE (BENADRYL) tablet 25 mg, 25 mg, Oral, Nightly PRN, BI Jones    docusate sodium (COLACE) capsule 100 mg, 100 mg, Oral, BID, BI Jones, Stopped at 08/18/18 2026    polyethylene glycol (GLYCOLAX) packet 17 g, 17 g, Oral, Daily, BI Jones, 17 g at 08/20/18 0808    magnesium hydroxide (MILK OF MAGNESIA) 400 MG/5ML suspension 30 mL, 30 mL, Oral, Daily PRN, BI Jones    bisacodyl (DULCOLAX) suppository 10 mg, 10 mg, Rectal, Daily PRN, BI Jones    ondansetron (ZOFRAN) injection 4 mg, 4 mg, Intravenous, Q8H PRN, BI Jones    famotidine (PEPCID) tablet 20 mg, 20 mg, Oral, BID, BI Jones, Stopped at 08/20/18 2342    chlorhexidine (PERIDEX) 0.12 % solution 15 mL, 15 mL, Mouth/Throat, BID, BI Jones, 15 mL at 08/21/18 2052    hydrALAZINE (APRESOLINE) injection 5 mg, 5 mg, Intravenous, Q5 Min PRN, BI Jones, 5 mg at 08/21/18 2345    therapeutic multivitamin-minerals 1 tablet, 1 tablet, Oral, Daily with breakfast, BI Jones, 1 tablet at 08/20/18 0808    atorvastatin (LIPITOR) tablet 40 mg, 40 mg, Oral, Nightly, BI Jones, Stopped at 08/20/18 2342    aspirin EC tablet 81 mg, 81 mg, Oral, Daily, BI Aranog, 81 mg at 08/20/18 0809    albumin human 5 % bottle 25 g, 25 g, Intravenous, PRN, BI Arango, 25 g at 08/18/18 0421    insulin regular (HUMULIN R;NOVOLIN R) 100 Units in sodium chloride 0.9 % 100 mL infusion, 1 Units/hr, Intravenous, Continuous, BI Arango, Last Rate: 2 mL/hr at 08/17/18 0559, 2.04 Units/hr at 08/17/18 0559    insulin lispro (HUMALOG) injection vial 0-12 Units, 0-12 Units, Subcutaneous, TID WC, BI Arango, 2 Units at 08/21/18 1815    insulin lispro (HUMALOG) injection vial 0-6 Units, 0-6 Units, Subcutaneous, Nightly, BI Arango, 2 Units at 08/20/18 2100    glucose (GLUTOSE) 40 % oral gel 15 g, 15 g, Oral, PRN, BI Arango    dextrose 50 % solution 12.5 g, 12.5 g, Intravenous, PRN, BI Arango    glucagon (rDNA) injection 1 mg, 1 mg, Intramuscular, PRN, BI Arango    dextrose 5 % solution, 100 mL/hr, Intravenous, PRN, BI Arango    gabapentin (NEURONTIN) capsule 300 mg, 300 mg, Oral, Daily, BI Arango, 300 mg at 08/20/18 0809    latanoprost (XALATAN) 0.005 % ophthalmic solution 1 drop, 1 drop, Both Eyes, Nightly, BI Arango, 1 drop at 08/21/18 2052    polyvinyl alcohol (LIQUIFILM TEARS) 1.4 % ophthalmic solution 1 drop, 1 drop, Both Eyes, PRN, BI Arango       ALLERGIES:   Allergies   Allergen Reactions    Nuts [Peanut-Containing Drug Products] Other (See Comments)     abd pain          FAMILY HISTORY: The patient's family history was reviewed.         SOCIAL HISTORY:   Social History     Social History    Marital status:      Spouse name: N/A    Number of children: N/A    Years of education: N/A     Occupational History    Retired      Social History Main Topics    Smoking status: Current Every Day Smoker     Packs/day: 0.50     Years: 30.00     Types: Cigarettes    Smokeless tobacco: Never Used      Comment: a little less than a pack a day    Alcohol use Yes      Comment: rarely    Drug use: Yes     Types: Marijuana      Comment: 0.5 daily     Sexual activity: Not on file     Other Topics Concern    Not on file     Social History Narrative    No narrative on file         REVIEW OF SYSTEMS: A 12-point review of systems was obtained and pertinent positives and negatives were enumerated above in the history of present illness. All other reviewed systems / symptoms were negative. ROS      PHYSICAL EXAMINATION: Vital signs reviewed per the nursing documentation. /71   Pulse 92   Temp 98.3 °F (36.8 °C) (Oral)   Resp 20   Ht 5' 9\" (1.753 m)   Wt 184 lb 1.4 oz (83.5 kg)   SpO2 99%   BMI 27.18 kg/m²    [unfilled]   Body mass index is 27.18 kg/m². General:  A O x 3 in NAD   Psych: . Normal affect. Mentation normal   HEENT: PERRLA. Clear conjunctivae and sclerae. Moist oral mucosae, no lesions or ulcers. The neck is supple, without lymphadenopathy or jugular venous distension. No masses. Normal thyroid. Cardiovascular: S1 S2 RRR no rubs or murmurs. Pulmonary: clear BL. No accessory muscle usage. Abdominal Exam: Soft, NT ND, no hepato or spleno megaly, +BS, no ascites. No groin masses or lymphadenopathy. Extremities: No edema. Skin: Warm skin. No skin rash. No spider nevi palmar erythema nail dystrophy. Joint: No joint swelling or deformity. Neurological: intact sensory. DTR+.  No asterixis     LABORATORY DATA: Reviewed   Lab Results   Component Value Date    WBC 15.2 (H) 08/22/2018    HGB 8.4 (L) 08/22/2018    HCT 29.8 (L) 08/22/2018    MCV 95.8 08/22/2018     08/22/2018     (H) 08/22/2018    K 4.3 08/22/2018     (H) 08/22/2018    CO2 28 08/22/2018    BUN 33 (H) 08/22/2018    CREATININE 0.61 (L) 08/22/2018    LABALBU 3.5 08/21/2018    BILITOT 7.69 (H) 08/21/2018    ALKPHOS 114 08/21/2018    AST 99 (H) 08/21/2018     (H) 08/21/2018    INR 1.3 08/22/2018      Lab Results   Component Value Date    RBC infarct. There is no evidence of hydrocephalus. Vertebral and internal carotid arteries are calcified. Basal ganglial calcifications are noted. Patchy hypodensity is present in the white matter consistent with chronic microvascular change, moderate. ORBITS: The visualized portion of the orbits demonstrate no acute abnormality. SINUSES: Mild mucoperiosteal thickening is noted in the ethmoid air cells. Remainder of the paranasal sinuses are unremarkable. Mastoid air cells are within normal limits. SOFT TISSUES/SKULL:  No acute abnormality of the visualized skull or soft tissues. Estimated biologic radiation dose for this procedure:1192.61 mGy/cm2. No acute intracranial abnormality. Senescent changes, including chronic microvascular change. Mild sinus inflammation. RECOMMENDATIONS: MRI imaging of the brain may prove helpful for further assessment. Ct Chest Wo Contrast    Result Date: 8/20/2018  EXAMINATION: CT OF THE CHEST WITHOUT CONTRAST 8/20/2018 12:33 pm TECHNIQUE: CT of the chest was performed without the administration of intravenous contrast. Multiplanar reformatted images are provided for review. Dose modulation, iterative reconstruction, and/or weight based adjustment of the mA/kV was utilized to reduce the radiation dose to as low as reasonably achievable. COMPARISON: None. HISTORY: ORDERING SYSTEM PROVIDED HISTORY: CHEST TRAUMA, BLUNT, AORTIC INJURY SUSPECTED FINDINGS: Mediastinum: Atherosclerosis and multivessel coronary artery disease. Patient appears anemic. Tiny lymph nodes in the mediastinum. Patient appears anemic. Lungs/pleura: Moderate bilateral pleural effusions. Small bibasilar atelectatic changes noted. There is mild interlobular septal thickening superimposed. Less than 4 mm nodule in the middle lobe periphery image 71. Some peripheral tiny ground-glass attenuating foci are identified in the anterior right upper lobe periphery image 50. Upper Abdomen: Unremarkable.  Soft tube traverses the diaphragm. Small bilateral pleural effusions are identified. New mild right lower lobe opacification may represent atelectasis versus airspace disease. There is no evidence of pneumothorax. No acute osseous abnormality is detected. The postsurgical lines and tubes are in satisfactory position. Small bilateral pleural effusions. Mild opacification of the right lower lobe which may represent airspace disease versus atelectasis. Us Chest Including Mediastinum    Result Date: 8/21/2018  EXAMINATION: ULTRASOUND OF THE CHEST 8/21/2018 9:26 am COMPARISON: Chest x-ray from 08/21/2018 HISTORY: ORDERING SYSTEM PROVIDED HISTORY: r/o b/l pleural effusions vs atelectasis TECHNOLOGIST PROVIDED HISTORY: Bedside plz r/o b/l pleural effusions vs atelectasis FINDINGS: There are bilateral pleural effusions. Bilateral pleural effusions. IMPRESSION: Mr. Braeden Gilmore is a 72 y.o. male with a past history remarkable for chronic anemia, CAD, CHF, DM2, admitted to the hospital on 8/15/2018 and s/p CABG two days ago. GI consulted for abnormal LFT's post op (Tranaminase elevation, & hyperbilirubinemia). He likely has ischemic hepatopathy 2/2 hypotension during recent surgery  His Bili's are expected to remain high for a few days , prior to dropping. No need for further investigative testing for now, unless, his LFT's and Bili's continue to rise  Optimize hemodynamics and avoid hypotension  Outpatient GI follow-up with Dr. Anita South      Thank you for allowing me to participate in the care of Mr. Dey. For any further questions please do not hesitate to contact me.        Savi Pineda MD

## 2018-08-23 ENCOUNTER — APPOINTMENT (OUTPATIENT)
Dept: GENERAL RADIOLOGY | Age: 73
DRG: 235 | End: 2018-08-23
Attending: THORACIC SURGERY (CARDIOTHORACIC VASCULAR SURGERY)
Payer: MEDICARE

## 2018-08-23 LAB
ANION GAP SERPL CALCULATED.3IONS-SCNC: 11 MMOL/L (ref 9–17)
BUN BLDV-MCNC: 29 MG/DL (ref 8–23)
BUN/CREAT BLD: ABNORMAL (ref 9–20)
CALCIUM SERPL-MCNC: 9.7 MG/DL (ref 8.6–10.4)
CHLORIDE BLD-SCNC: 108 MMOL/L (ref 98–107)
CO2: 25 MMOL/L (ref 20–31)
CREAT SERPL-MCNC: 0.67 MG/DL (ref 0.7–1.2)
GFR AFRICAN AMERICAN: >60 ML/MIN
GFR NON-AFRICAN AMERICAN: >60 ML/MIN
GFR SERPL CREATININE-BSD FRML MDRD: ABNORMAL ML/MIN/{1.73_M2}
GFR SERPL CREATININE-BSD FRML MDRD: ABNORMAL ML/MIN/{1.73_M2}
GLUCOSE BLD-MCNC: 152 MG/DL (ref 70–99)
GLUCOSE BLD-MCNC: 172 MG/DL (ref 75–110)
GLUCOSE BLD-MCNC: 267 MG/DL (ref 75–110)
GLUCOSE BLD-MCNC: 286 MG/DL (ref 75–110)
GLUCOSE BLD-MCNC: 316 MG/DL (ref 75–110)
HCT VFR BLD CALC: 32.3 % (ref 40.7–50.3)
HEMOGLOBIN: 8.9 G/DL (ref 13–17)
INR BLD: 1.8
MAGNESIUM: 1.7 MG/DL (ref 1.6–2.6)
MCH RBC QN AUTO: 26.9 PG (ref 25.2–33.5)
MCHC RBC AUTO-ENTMCNC: 27.6 G/DL (ref 28.4–34.8)
MCV RBC AUTO: 97.6 FL (ref 82.6–102.9)
NRBC AUTOMATED: 1.4 PER 100 WBC
PDW BLD-RTO: 20.9 % (ref 11.8–14.4)
PLATELET # BLD: 192 K/UL (ref 138–453)
PMV BLD AUTO: 11.6 FL (ref 8.1–13.5)
POTASSIUM SERPL-SCNC: 3.7 MMOL/L (ref 3.7–5.3)
PROTHROMBIN TIME: 18.9 SEC (ref 9–12)
RBC # BLD: 3.31 M/UL (ref 4.21–5.77)
SODIUM BLD-SCNC: 144 MMOL/L (ref 135–144)
WBC # BLD: 15.2 K/UL (ref 3.5–11.3)

## 2018-08-23 PROCEDURE — 6370000000 HC RX 637 (ALT 250 FOR IP): Performed by: THORACIC SURGERY (CARDIOTHORACIC VASCULAR SURGERY)

## 2018-08-23 PROCEDURE — 93325 DOPPLER ECHO COLOR FLOW MAPG: CPT

## 2018-08-23 PROCEDURE — 2140000001 HC CVICU INTERMEDIATE R&B

## 2018-08-23 PROCEDURE — 71045 X-RAY EXAM CHEST 1 VIEW: CPT

## 2018-08-23 PROCEDURE — 97530 THERAPEUTIC ACTIVITIES: CPT

## 2018-08-23 PROCEDURE — 82947 ASSAY GLUCOSE BLOOD QUANT: CPT

## 2018-08-23 PROCEDURE — 36415 COLL VENOUS BLD VENIPUNCTURE: CPT

## 2018-08-23 PROCEDURE — 94762 N-INVAS EAR/PLS OXIMTRY CONT: CPT

## 2018-08-23 PROCEDURE — 94640 AIRWAY INHALATION TREATMENT: CPT

## 2018-08-23 PROCEDURE — 97110 THERAPEUTIC EXERCISES: CPT

## 2018-08-23 PROCEDURE — 97116 GAIT TRAINING THERAPY: CPT

## 2018-08-23 PROCEDURE — 83735 ASSAY OF MAGNESIUM: CPT

## 2018-08-23 PROCEDURE — 6370000000 HC RX 637 (ALT 250 FOR IP): Performed by: INTERNAL MEDICINE

## 2018-08-23 PROCEDURE — 85027 COMPLETE CBC AUTOMATED: CPT

## 2018-08-23 PROCEDURE — 93308 TTE F-UP OR LMTD: CPT

## 2018-08-23 PROCEDURE — 80048 BASIC METABOLIC PNL TOTAL CA: CPT

## 2018-08-23 PROCEDURE — 85610 PROTHROMBIN TIME: CPT

## 2018-08-23 PROCEDURE — 6360000002 HC RX W HCPCS: Performed by: PHYSICIAN ASSISTANT

## 2018-08-23 PROCEDURE — 99024 POSTOP FOLLOW-UP VISIT: CPT | Performed by: PHYSICIAN ASSISTANT

## 2018-08-23 PROCEDURE — 97535 SELF CARE MNGMENT TRAINING: CPT

## 2018-08-23 PROCEDURE — 6370000000 HC RX 637 (ALT 250 FOR IP): Performed by: STUDENT IN AN ORGANIZED HEALTH CARE EDUCATION/TRAINING PROGRAM

## 2018-08-23 PROCEDURE — 6370000000 HC RX 637 (ALT 250 FOR IP): Performed by: PHYSICIAN ASSISTANT

## 2018-08-23 PROCEDURE — 2580000003 HC RX 258: Performed by: PHYSICIAN ASSISTANT

## 2018-08-23 RX ORDER — FUROSEMIDE 20 MG/1
20 TABLET ORAL 2 TIMES DAILY
Status: DISCONTINUED | OUTPATIENT
Start: 2018-08-23 | End: 2018-08-26 | Stop reason: HOSPADM

## 2018-08-23 RX ADMIN — NYSTATIN 500000 UNITS: 100000 SUSPENSION ORAL at 09:32

## 2018-08-23 RX ADMIN — Medication 10 ML: at 09:32

## 2018-08-23 RX ADMIN — DOCUSATE SODIUM 100 MG: 100 CAPSULE ORAL at 20:52

## 2018-08-23 RX ADMIN — METOPROLOL TARTRATE 12.5 MG: 25 TABLET ORAL at 10:53

## 2018-08-23 RX ADMIN — CHLORHEXIDINE GLUCONATE 0.12% ORAL RINSE 15 ML: 1.2 LIQUID ORAL at 09:32

## 2018-08-23 RX ADMIN — AMIODARONE HYDROCHLORIDE 100 MG: 200 TABLET ORAL at 20:52

## 2018-08-23 RX ADMIN — METOPROLOL TARTRATE 25 MG: 25 TABLET ORAL at 20:52

## 2018-08-23 RX ADMIN — TRAMADOL HYDROCHLORIDE 50 MG: 50 TABLET, FILM COATED ORAL at 18:46

## 2018-08-23 RX ADMIN — FUROSEMIDE 20 MG: 20 TABLET ORAL at 09:52

## 2018-08-23 RX ADMIN — INSULIN LISPRO 2 UNITS: 100 INJECTION, SOLUTION INTRAVENOUS; SUBCUTANEOUS at 09:25

## 2018-08-23 RX ADMIN — FAMOTIDINE 20 MG: 20 TABLET, FILM COATED ORAL at 20:53

## 2018-08-23 RX ADMIN — INSULIN LISPRO 3 UNITS: 100 INJECTION, SOLUTION INTRAVENOUS; SUBCUTANEOUS at 20:58

## 2018-08-23 RX ADMIN — POTASSIUM CHLORIDE 20 MEQ: 400 INJECTION, SOLUTION INTRAVENOUS at 08:00

## 2018-08-23 RX ADMIN — TRAMADOL HYDROCHLORIDE 50 MG: 50 TABLET, FILM COATED ORAL at 03:39

## 2018-08-23 RX ADMIN — ASPIRIN 81 MG: 81 TABLET, DELAYED RELEASE ORAL at 09:32

## 2018-08-23 RX ADMIN — FAMOTIDINE 20 MG: 20 TABLET, FILM COATED ORAL at 09:31

## 2018-08-23 RX ADMIN — POTASSIUM CHLORIDE 20 MEQ: 400 INJECTION, SOLUTION INTRAVENOUS at 05:47

## 2018-08-23 RX ADMIN — FUROSEMIDE 20 MG: 20 TABLET ORAL at 17:08

## 2018-08-23 RX ADMIN — MAGNESIUM SULFATE HEPTAHYDRATE 1 G: 1 INJECTION, SOLUTION INTRAVENOUS at 06:28

## 2018-08-23 RX ADMIN — MAGNESIUM SULFATE HEPTAHYDRATE 1 G: 1 INJECTION, SOLUTION INTRAVENOUS at 05:21

## 2018-08-23 RX ADMIN — LATANOPROST 1 DROP: 50 SOLUTION OPHTHALMIC at 20:53

## 2018-08-23 RX ADMIN — TRAZODONE HYDROCHLORIDE 50 MG: 50 TABLET ORAL at 20:52

## 2018-08-23 RX ADMIN — GABAPENTIN 300 MG: 300 CAPSULE ORAL at 09:31

## 2018-08-23 RX ADMIN — MULTIPLE VITAMINS W/ MINERALS TAB 1 TABLET: TAB at 09:31

## 2018-08-23 RX ADMIN — NYSTATIN 500000 UNITS: 100000 SUSPENSION ORAL at 20:53

## 2018-08-23 RX ADMIN — AMIODARONE HYDROCHLORIDE 100 MG: 200 TABLET ORAL at 10:53

## 2018-08-23 RX ADMIN — POLYETHYLENE GLYCOL 3350 17 G: 17 POWDER, FOR SOLUTION ORAL at 09:31

## 2018-08-23 RX ADMIN — INSULIN LISPRO 6 UNITS: 100 INJECTION, SOLUTION INTRAVENOUS; SUBCUTANEOUS at 17:08

## 2018-08-23 RX ADMIN — NYSTATIN 500000 UNITS: 100000 SUSPENSION ORAL at 17:08

## 2018-08-23 RX ADMIN — CHLORHEXIDINE GLUCONATE 0.12% ORAL RINSE 15 ML: 1.2 LIQUID ORAL at 20:53

## 2018-08-23 RX ADMIN — TRAMADOL HYDROCHLORIDE 50 MG: 50 TABLET, FILM COATED ORAL at 12:58

## 2018-08-23 RX ADMIN — INSULIN LISPRO 8 UNITS: 100 INJECTION, SOLUTION INTRAVENOUS; SUBCUTANEOUS at 12:54

## 2018-08-23 RX ADMIN — DOCUSATE SODIUM 100 MG: 100 CAPSULE ORAL at 09:31

## 2018-08-23 RX ADMIN — RIVAROXABAN 20 MG: 20 TABLET, FILM COATED ORAL at 17:08

## 2018-08-23 RX ADMIN — LISINOPRIL 5 MG: 5 TABLET ORAL at 14:41

## 2018-08-23 ASSESSMENT — PAIN DESCRIPTION - PROGRESSION: CLINICAL_PROGRESSION: GRADUALLY WORSENING

## 2018-08-23 ASSESSMENT — PAIN SCALES - GENERAL
PAINLEVEL_OUTOF10: 0
PAINLEVEL_OUTOF10: 5
PAINLEVEL_OUTOF10: 6
PAINLEVEL_OUTOF10: 8

## 2018-08-23 NOTE — PROGRESS NOTES
08/23/18   0439   WBC  14.5*  15.2*  15.2*   HGB  8.1*  8.4*  8.9*   PLT  145  173  192     BMP:    Recent Labs      08/21/18   1715  08/22/18   0500  08/22/18   1351  08/23/18   0439   NA  151*  153*  144  144   K  4.7  4.3  4.6  3.7   CL  112*  114*   --   108*   CO2  28  28   --   25   BUN  36*  33*   --   29*   CREATININE  0.61*  0.61*   --   0.67*   GLUCOSE  213*  216*   --   152*     Hepatic:   Recent Labs      08/21/18   1715   AST  99*   ALT  250*   BILITOT  7.69*   ALKPHOS  114     Troponin: No results for input(s): TROPONINI in the last 72 hours. No results for input(s): TROPONINT in the last 72 hours. BNP: No results for input(s): PROBNP in the last 72 hours. No results for input(s): BNP in the last 72 hours. Lipids: No results for input(s): CHOL, HDL in the last 72 hours. Invalid input(s): LDLCALCU  INR:   Recent Labs      08/21/18   1715  08/22/18   0500  08/23/18   0439   INR  1.2  1.3  1.8       Objective:   Vitals: /73   Pulse 77   Temp 98 °F (36.7 °C) (Oral)   Resp 14   Ht 5' 9\" (1.753 m)   Wt 169 lb 15.6 oz (77.1 kg)   SpO2 95%   BMI 25.10 kg/m²    Last Recorded Weight:  [unfilled]  General appearance: alert and oriented ×2,   HEENT:sclera icteric  Neck: JVD absent   Lungs: Equal air entry b/l  Heart: regular rate and rhythm, S1, S2 normal, no murmur, click, rub or gallop. No pain on palpation of the anterior chest.  Abdomen: soft, nontender with bowel sounds present in all 4 quadrants. Extremities: No LE edema  Integumentum:Intact with no rashes noted.       Diagnostic Studies:   ECHO     2D echo 8/18/2018  LV EF 29%    2D Echo 08/17/2018  Technically difficult study. Severe LV systolic dysfunction without dilatation. Global hypokinesis , but cannot rule out segmental wall motion abnormality  Left atrium is mildly dilated. Right ventricle, severe dysfunction with mild dilatation  Moderate to severe tricuspid regurgitation.   Estimated right ventricular systolic bid  4. Metoprolol switched to12.5 mg bid due to BP in 100s and HR in 70s.  5. On lisinopril 5 mg daily - Held this morning due to low BP  6. RUQ ultrasound normal  7. Will  need Life vest at discharge. Meena Pratt MD  Fellow, [address]               Attending Physician Statement  I have discussed the care of Gomez Tay, including pertinent history and exam findings,  with the cardiology fellow/resident. I have seen and examined the patient and the key elements of all parts of the encounter have been performed by me. I agree with the assessment, plan and orders as documented by the resident with additional recommendations as below:     Patient is more awake and alert. He remains hemodynamically stable. His rhythm has been sinus without any further episodes of bradycardia or ventricular arrhythmias. Given history from PEA arrest after his CABG and severe ischemic cardiomyopathy I have recommended Life-vest on discharge. Monitor INR and LFT tomorrow.       Yves Jon MD   [facility]  [facility], [address]  [phone]

## 2018-08-23 NOTE — PROGRESS NOTES
Physical Therapy  Facility/Department: [facility] CAR 1  Daily Treatment Note  NAME: Meghann Santacruz. : 1945  MRN: [id number]    Date of Service: 2018    Discharge Recommendations:  [address]        Patient Diagnosis(es): There were no encounter diagnoses. has a past medical history of Arthritis; CAD (coronary artery disease); Cardiomyopathy [facility]); CHF (congestive heart failure) ([facility] COPD (chronic obstructive pulmonary disease) ([facility] Diabetes mellitus ([facility] DVT of leg (deep venous thrombosis) ([facility] Hyperlipidemia; Hypertension; Multiple myeloma ([facility] Neuropathy; and Wears dentures. has a past surgical history that includes Cardiac catheterization (2017); hernia repair (8549); Abdomen surgery (); pr colonoscopy flx dx w/collj spec when pfrmd (N/A, 2018); pr esophagogastroduodenoscopy transoral diagnostic (N/A, 2018); Cataract removal with implant (Bilateral); Tear duct surgery (Right); and pr cabg, artery-vein, single (N/A, 2018). Restrictions  Restrictions/Precautions  Restrictions/Precautions: Cardiac, Surgical Protocols, Fall Risk (orally intubated; per RN, working on extubating)  Required Braces or Orthoses?: No  Position Activity Restriction  Sternal Precautions: No Pushing, No Pulling, 5# Lifting Restrictions  Sternal Precautions: CABG X2  Other position/activity restrictions: O2  Subjective   Pt sitting up in bed. Pt willing to start his day. Pt has an external pacemaker  Pt has no c/o pain. Orientation    Overall Orientation Status: WFL  Objective     Bed mobility  Supine to sit; Mod.A x 1  Scooting; Mod.A x 1  Comment; Fair trunk control sitting on the EOB  Transfers  Sit to Stand: Mod Assistance (x2)  Stand to sit: Mod. Assistance (x2)  Comment: Verbal cues for hand placement with all transfers also for exhaling when standing/sitting.   Ambulation  Ambulation?: Yes,  Device; PF RW  Distance; 25' x 1,seated rest 50' x 1  Assist; Max A x 2

## 2018-08-23 NOTE — PLAN OF CARE
Problem: Falls - Risk of:  Goal: Will remain free from falls  Will remain free from falls   Outcome: Ongoing  Patient not attempting to ambulate per self. Non skid footwear in use. Utilizing call light appropriately. Remains free of falls at this time. Will monitor.

## 2018-08-23 NOTE — PROGRESS NOTES
insulin lispro  0-12 Units Subcutaneous TID     insulin lispro  0-6 Units Subcutaneous Nightly    gabapentin  300 mg Oral Daily    latanoprost  1 drop Both Eyes Nightly     Continuous Infusions:    dextrose 50 mL/hr at 08/22/18 1005    dextrose         Exam:   General: intubated and sedated  Heart:Normal S1 and S2.  Regular rhythm. No murmurs, gallops, or rubs. , Pacing wires  Yes  Lungs: diminished breath sounds bibasilar Equal and symmetric expansion with respiration. Chest tubes out  Abdomen: soft, non tender, non distended, BSx4  Extremities: 1+ edema, intact pulses in all four extremities  Musculoskeletal:  Intact range of motion of peripheral joints. grossly normal  Neurologic:  Non-focal sensory deficits on exam.  Psychiatric: Mood and affect are appropriate. Wounds: clean and dry, healing appropriately, dressing in place       Assessment/Plan:   Patient Active Problem List   Diagnosis    Acute on chronic systolic CHF (congestive heart failure) (HCC)    Multiple vessel coronary artery disease    Multiple myeloma not having achieved remission ([address]    Chronic obstructive pulmonary disease (HCC)    Low hemoglobin    Hypokalemia    Other drug-induced pancytopenia ([address]    RANDALL (acute kidney injury) ([address]    CAD in native artery     ECHO 8/18/18  FINDINGS  Left Atrium  Left atrium is mildly dilated. Left Ventricle  Left ventricle is normal in size. Mild left ventricular hypertrophy. Global left ventricular systolic function is severely reduced. Leftward  compression interventricular septum (D-sign) suggests RV pressure /or volume  overload  Calculated ejection fraction is 29 % . Concentric mild wall thickening with diastolic dysfunction. Right Atrium  Right atrium was not well visualized. Right Ventricle  Right ventricle, severe dysfunction with mild dilatation  Mitral Valve  Mitral valve is not well visualized. Mitral valve appears normal in structure. Moderate mitral regurgitation.   No

## 2018-08-23 NOTE — PROGRESS NOTES
25 mcg, 25 mcg, Intravenous, Q1H PRN, 25 mcg at 08/19/18 0815 **OR** fentaNYL (SUBLIMAZE) injection 50 mcg, 50 mcg, Intravenous, Q1H PRN, BI Ku, 50 mcg at 08/20/18 0412    diphenhydrAMINE (BENADRYL) tablet 25 mg, 25 mg, Oral, Nightly PRN, BI Ku    docusate sodium (COLACE) capsule 100 mg, 100 mg, Oral, BID, BI Ku, 100 mg at 08/23/18 0931    polyethylene glycol (GLYCOLAX) packet 17 g, 17 g, Oral, Daily, BI Ku, 17 g at 08/23/18 0931    magnesium hydroxide (MILK OF MAGNESIA) 400 MG/5ML suspension 30 mL, 30 mL, Oral, Daily PRN, BI Ku    bisacodyl (DULCOLAX) suppository 10 mg, 10 mg, Rectal, Daily PRN, BI Ku    ondansetron (ZOFRAN) injection 4 mg, 4 mg, Intravenous, Q8H PRN, BI Ku    famotidine (PEPCID) tablet 20 mg, 20 mg, Oral, BID, BI Ku, 20 mg at 08/23/18 0931    chlorhexidine (PERIDEX) 0.12 % solution 15 mL, 15 mL, Mouth/Throat, BID, BI Ku, 15 mL at 08/23/18 0932    hydrALAZINE (APRESOLINE) injection 5 mg, 5 mg, Intravenous, Q5 Min PRN, BI Ku, 5 mg at 08/21/18 2345    therapeutic multivitamin-minerals 1 tablet, 1 tablet, Oral, Daily with breakfast, BI Ku, 1 tablet at 08/23/18 0931    aspirin EC tablet 81 mg, 81 mg, Oral, Daily, BI Ku, 81 mg at 08/23/18 0932    albumin human 5 % bottle 25 g, 25 g, Intravenous, PRN, BI Ku, 25 g at 08/18/18 0421    insulin lispro (HUMALOG) injection vial 0-12 Units, 0-12 Units, Subcutaneous, TID WC, BI Ku, 2 Units at 08/23/18 0925    insulin lispro (HUMALOG) injection vial 0-6 Units, 0-6 Units, Subcutaneous, Nightly, BI Ku, 2 Units at 08/22/18 2023    glucose (GLUTOSE) 40 % oral gel 15 g, 15 g, Oral, PRN, BI Ku    dextrose 50 % solution 12.5 g, 12.5 g, Intravenous, PRN, BI Ku    glucagon (rDNA) injection 1 mg, 1 mg, Intramuscular, PRN, BI Ku    dextrose 5 % solution, 100 mL/hr, Intravenous, PRN, Simone Brian anterior right 2nd and 3rd rib fractures. Displaced fracture of the anterior right 4th rib. Lateral left 2nd rib fracture, lateral left 3rd, 4th, 5th and 6th rib fractures. Small soft tissue air related to the left pectoralis muscle. Sternotomy wires. 1. Multifocal rib fractures identified including the anterior right 2nd through 4th ribs, and the left 2nd through 6th lateral ribs. 2. Bilateral pleural effusions at least moderate. Also identified is interlobular septal thickening. Degree of fluid overload/CHF should be considered. 3. Subtle ground-glass attenuating foci appear quite small and may be in keeping with an acute inflammatory process. Six-month follow-up chest CT may be performed at the clinician's discretion. 4. CT angiography protocol is generally recommended to assess injuries of the aorta. Us Gallbladder Ruq    Result Date: 8/22/2018  EXAMINATION: RIGHT UPPER QUADRANT ULTRASOUND 8/22/2018 7:45 pm COMPARISON: None. HISTORY: ORDERING SYSTEM PROVIDED HISTORY: ABDOMINAL PAIN FINDINGS: LIVER:  The liver demonstrates normal echogenicity without evidence of intrahepatic biliary ductal dilatation. BILIARY SYSTEM:  Gallbladder appears to contain minimal sludge without evidence of pericholecystic fluid, wall thickening or stones. Negative sonographic Morton's sign. Common bile duct is within normal limits measuring 3 mm. RIGHT KIDNEY: The right kidney is grossly unremarkable without evidence of hydronephrosis. PANCREAS:  Not well visualized. OTHER: No evidence of right upper quadrant ascites. No evidence for acute cholecystitis, cholelithiasis or biliary dilatation. Xr Chest Portable    Result Date: 8/23/2018  EXAMINATION: SINGLE XRAY VIEW OF THE CHEST 8/23/2018 6:51 am COMPARISON: August 22, 2018 HISTORY: ORDERING SYSTEM PROVIDED HISTORY: b/l pleural effusion TECHNOLOGIST PROVIDED HISTORY: b/l pleural effusion FINDINGS: The patient is status post median sternotomy.   There are skin staples along the midline of the chest.  A left subclavian catheter is seen with its tip at the brachiocephalic/SVC junction. The cardiomediastinal silhouette is stable. There is atelectasis at the left lung base. There is stable mild left pleural effusion. There is no evidence of pneumothorax. There are stable left-sided rib fractures. Stable mild cardiomegaly. Atelectasis and small pleural effusion at the left lung base, stable. Stable left-sided rib fractures. Xr Chest Portable    Result Date: 8/22/2018  EXAMINATION: SINGLE XRAY VIEW OF THE CHEST 8/22/2018 9:15 am COMPARISON: Chest August 21, 2018. HISTORY: ORDERING SYSTEM PROVIDED HISTORY: b/l pleural effusion TECHNOLOGIST PROVIDED HISTORY: b/l pleural effusion FINDINGS: Sternotomy wires and skin staples are noted. Left-sided subclavian line is identified with tip at the brachiocephalic/SVC junction. Right IJ sheath has now been removed. Heart and mediastinal structures appear unchanged. Left lower lobe airspace disease with small bilateral pleural effusions persist. No pneumothorax or free air. The patient's known rib fractures are less conspicuous on today's study. No significant change in chest findings. Xr Chest Portable    Result Date: 8/21/2018  EXAMINATION: SINGLE XRAY VIEW OF THE CHEST 8/21/2018 6:02 am COMPARISON: Chest x-ray from 08/20/2018 HISTORY: ORDERING SYSTEM PROVIDED HISTORY: post ohs TECHNOLOGIST PROVIDED HISTORY: post ohs FINDINGS: There has been interval removal of the endotracheal and enteric tubes. Sternotomy changes are identified. Right IJ catheter sheath extends to the level of the mid SVC. There is no focal airspace consolidation, pleural effusion or pneumothorax. The cardiac silhouette appears moderately enlarged. No overt pulmonary vascular congestion. There are hazy layering densities in the lung bases, left greater than right causing obscuration of the left hemidiaphragm.   A few of the left lateral rib fractures are subtly appreciated. The right rib fractures noted on the chest CT from 08/20/2018 are not clearly identified. 1. Interval extubation and removal of the enteric tube. 2. Bilateral pleural effusions, left greater than right with likely associated atelectasis. 3. Some of patient's left-sided rib fractures are subtly appreciated on this exam.  Please refer to chest CT from 08/20/2018 for further details. Xr Chest Portable    Result Date: 8/20/2018  EXAMINATION: SINGLE XRAY VIEW OF THE CHEST 8/20/2018 9:59 am COMPARISON: Chest x-ray from 08/19/2018 HISTORY: ORDERING SYSTEM PROVIDED HISTORY: post ohs TECHNOLOGIST PROVIDED HISTORY: Reason for exam:->post ohs FINDINGS: Endotracheal tube extends to the mid thoracic trachea approximately 6.0 cm above the peter. Enteric tube extends to the stomach. There are sternotomy changes. Additional monitoring leads overlie the chest. There is stable enlargement of cardiac silhouette. There is similar mild diffuse pulmonary haziness. There is similar hazy density in the left lung base with obscuration of the left hemidiaphragm. No sizable right pleural effusion. No pneumothorax. There are calcifications of the aortic arch. Visualized osseous structures appear intact and grossly unremarkable, given the non dedicated imaging. 1. Expected positions of medical support devices. 2. Similar findings suggesting mild residual pulmonary edema. 3. Suspected small left pleural effusion and associated atelectasis. Underlying pneumonia is not excluded. Continued imaging follow-up is recommended. Xr Chest Portable    Result Date: 8/20/2018  EXAMINATION: SINGLE XRAY VIEW OF THE CHEST 8/18/2018 3:52 am COMPARISON: August 17, 2018 HISTORY: ORDERING SYSTEM PROVIDED HISTORY: post extubation TECHNOLOGIST PROVIDED HISTORY: Reason for exam:->post extubation FINDINGS: Interval placement of endotracheal tube with tip terminating 4 cm above the peter.   Enteric tube extends into the left upper abdominal quadrant, below the field of view. Left subclavian catheter tip projects over the mid SVC. Mediastinal drainage catheter present. Enlarged cardiac silhouette. Perihilar airspace disease is stable. Blunted left costophrenic angle. Osseous structures are stable. Degenerative changes of the shoulders are suggestive of chronic rotator cuff tears bilaterally. Endotracheal tube in satisfactory position. Enteric tube extends below the field of view. Central venous catheter and drainage catheters are stable in appearance. Cardiomegaly and worsening central pulmonary vascular congestion. Left lower lobe airspace disease is stable. Xr Chest Portable    Result Date: 8/19/2018  EXAMINATION: SINGLE XRAY VIEW OF THE CHEST 8/19/2018 6:05 am COMPARISON: 08/18/2018. HISTORY: ORDERING SYSTEM PROVIDED HISTORY: post op TECHNOLOGIST PROVIDED HISTORY: Reason for exam:->post op Acuity: Unknown Type of Exam: Unknown FINDINGS: ET tube 6.4 cm above the peter. Orogastric tube occurs into the stomach. A mediastinal drainage catheter is identified. Left central venous catheter terminates in the brachiocephalic vein. Right IJ catheter sheath noted. The heart is enlarged with mild pulmonary edema. Small left-sided pleural effusion versus developing consolidation. 1. Overall, appearance of the left lung base has mildly worsened, which could indicate a developing effusion or consolidation. 2. Moderate pulmonary edema persists. 3. Life support apparatus as described. Xr Chest Portable    Result Date: 8/18/2018  EXAMINATION: SINGLE XRAY VIEW OF THE CHEST 8/18/2018 6:04 am COMPARISON: Chest x-ray from 08/18/2018 HISTORY: ORDERING SYSTEM PROVIDED HISTORY: post op TECHNOLOGIST PROVIDED HISTORY: Reason for exam:->post op FINDINGS: Endotracheal tube extends to the mid thoracic trachea approximately 4.2 cm above the peter. Enteric tube courses below the diaphragm, distal tip not included.

## 2018-08-23 NOTE — PROGRESS NOTES
PROVIDE ADEQUATE OXYGENATION WITH ACCEPTABLE SP02/ABG'S    [x]  IDENTIFY APPROPRIATE OXYGEN THERAPY  [x]   MONITOR SP02/ABG'S AS NEEDED   [x]   PATIENT EDUCATION AS NEEDED

## 2018-08-24 LAB
ALBUMIN SERPL-MCNC: 3.2 G/DL (ref 3.5–5.2)
ALBUMIN/GLOBULIN RATIO: 1.4 (ref 1–2.5)
ALP BLD-CCNC: 271 U/L (ref 40–129)
ALT SERPL-CCNC: 146 U/L (ref 5–41)
ANION GAP SERPL CALCULATED.3IONS-SCNC: 11 MMOL/L (ref 9–17)
AST SERPL-CCNC: 51 U/L
BILIRUB SERPL-MCNC: 2.14 MG/DL (ref 0.3–1.2)
BILIRUBIN DIRECT: 1.3 MG/DL
BILIRUBIN, INDIRECT: 0.84 MG/DL (ref 0–1)
BUN BLDV-MCNC: 27 MG/DL (ref 8–23)
BUN/CREAT BLD: ABNORMAL (ref 9–20)
CALCIUM SERPL-MCNC: 9.2 MG/DL (ref 8.6–10.4)
CHLORIDE BLD-SCNC: 102 MMOL/L (ref 98–107)
CO2: 26 MMOL/L (ref 20–31)
CREAT SERPL-MCNC: 0.79 MG/DL (ref 0.7–1.2)
GFR AFRICAN AMERICAN: >60 ML/MIN
GFR NON-AFRICAN AMERICAN: >60 ML/MIN
GFR SERPL CREATININE-BSD FRML MDRD: ABNORMAL ML/MIN/{1.73_M2}
GFR SERPL CREATININE-BSD FRML MDRD: ABNORMAL ML/MIN/{1.73_M2}
GLOBULIN: ABNORMAL G/DL (ref 1.5–3.8)
GLUCOSE BLD-MCNC: 203 MG/DL (ref 75–110)
GLUCOSE BLD-MCNC: 226 MG/DL (ref 70–99)
GLUCOSE BLD-MCNC: 303 MG/DL (ref 75–110)
GLUCOSE BLD-MCNC: 320 MG/DL (ref 75–110)
GLUCOSE BLD-MCNC: 321 MG/DL (ref 75–110)
HCT VFR BLD CALC: 30.9 % (ref 40.7–50.3)
HEMOGLOBIN: 9.1 G/DL (ref 13–17)
INR BLD: 1.7
MAGNESIUM: 1.5 MG/DL (ref 1.6–2.6)
MCH RBC QN AUTO: 27.6 PG (ref 25.2–33.5)
MCHC RBC AUTO-ENTMCNC: 29.4 G/DL (ref 28.4–34.8)
MCV RBC AUTO: 93.6 FL (ref 82.6–102.9)
NRBC AUTOMATED: 0.7 PER 100 WBC
PDW BLD-RTO: 20.5 % (ref 11.8–14.4)
PLATELET # BLD: 215 K/UL (ref 138–453)
PMV BLD AUTO: 11.1 FL (ref 8.1–13.5)
POTASSIUM SERPL-SCNC: 3.9 MMOL/L (ref 3.7–5.3)
PROTHROMBIN TIME: 17.4 SEC (ref 9–12)
RBC # BLD: 3.3 M/UL (ref 4.21–5.77)
SODIUM BLD-SCNC: 139 MMOL/L (ref 135–144)
TOTAL PROTEIN: 5.5 G/DL (ref 6.4–8.3)
WBC # BLD: 12.3 K/UL (ref 3.5–11.3)

## 2018-08-24 PROCEDURE — 97110 THERAPEUTIC EXERCISES: CPT

## 2018-08-24 PROCEDURE — 6370000000 HC RX 637 (ALT 250 FOR IP): Performed by: PHYSICIAN ASSISTANT

## 2018-08-24 PROCEDURE — 6360000002 HC RX W HCPCS: Performed by: PHYSICIAN ASSISTANT

## 2018-08-24 PROCEDURE — 83735 ASSAY OF MAGNESIUM: CPT

## 2018-08-24 PROCEDURE — 6370000000 HC RX 637 (ALT 250 FOR IP): Performed by: STUDENT IN AN ORGANIZED HEALTH CARE EDUCATION/TRAINING PROGRAM

## 2018-08-24 PROCEDURE — 6370000000 HC RX 637 (ALT 250 FOR IP): Performed by: INTERNAL MEDICINE

## 2018-08-24 PROCEDURE — 80076 HEPATIC FUNCTION PANEL: CPT

## 2018-08-24 PROCEDURE — 97116 GAIT TRAINING THERAPY: CPT

## 2018-08-24 PROCEDURE — 94640 AIRWAY INHALATION TREATMENT: CPT

## 2018-08-24 PROCEDURE — 2140000001 HC CVICU INTERMEDIATE R&B

## 2018-08-24 PROCEDURE — 80048 BASIC METABOLIC PNL TOTAL CA: CPT

## 2018-08-24 PROCEDURE — 2580000003 HC RX 258: Performed by: PHYSICIAN ASSISTANT

## 2018-08-24 PROCEDURE — 6370000000 HC RX 637 (ALT 250 FOR IP): Performed by: THORACIC SURGERY (CARDIOTHORACIC VASCULAR SURGERY)

## 2018-08-24 PROCEDURE — 6370000000 HC RX 637 (ALT 250 FOR IP)

## 2018-08-24 PROCEDURE — 82947 ASSAY GLUCOSE BLOOD QUANT: CPT

## 2018-08-24 PROCEDURE — 36415 COLL VENOUS BLD VENIPUNCTURE: CPT

## 2018-08-24 PROCEDURE — 85610 PROTHROMBIN TIME: CPT

## 2018-08-24 PROCEDURE — 94762 N-INVAS EAR/PLS OXIMTRY CONT: CPT

## 2018-08-24 PROCEDURE — 99024 POSTOP FOLLOW-UP VISIT: CPT | Performed by: PHYSICIAN ASSISTANT

## 2018-08-24 PROCEDURE — 97530 THERAPEUTIC ACTIVITIES: CPT

## 2018-08-24 PROCEDURE — 85027 COMPLETE CBC AUTOMATED: CPT

## 2018-08-24 RX ORDER — LISINOPRIL 2.5 MG/1
2.5 TABLET ORAL DAILY
Status: DISCONTINUED | OUTPATIENT
Start: 2018-08-24 | End: 2018-08-25

## 2018-08-24 RX ORDER — SPIRONOLACTONE 25 MG/1
25 TABLET ORAL DAILY
Status: DISCONTINUED | OUTPATIENT
Start: 2018-08-24 | End: 2018-08-25

## 2018-08-24 RX ORDER — MIDODRINE HYDROCHLORIDE 5 MG/1
10 TABLET ORAL
Status: DISCONTINUED | OUTPATIENT
Start: 2018-08-24 | End: 2018-08-25

## 2018-08-24 RX ORDER — POTASSIUM CHLORIDE 20 MEQ/1
40 TABLET, EXTENDED RELEASE ORAL PRN
Status: DISCONTINUED | OUTPATIENT
Start: 2018-08-24 | End: 2018-08-26 | Stop reason: HOSPADM

## 2018-08-24 RX ORDER — POTASSIUM CHLORIDE 20 MEQ/1
TABLET, EXTENDED RELEASE ORAL
Status: COMPLETED
Start: 2018-08-24 | End: 2018-08-24

## 2018-08-24 RX ORDER — TRAMADOL HYDROCHLORIDE 50 MG/1
50 TABLET ORAL EVERY 6 HOURS PRN
Qty: 28 TABLET | Refills: 0 | Status: SHIPPED | OUTPATIENT
Start: 2018-08-24 | End: 2018-08-31

## 2018-08-24 RX ADMIN — METOPROLOL TARTRATE 25 MG: 25 TABLET ORAL at 08:45

## 2018-08-24 RX ADMIN — INSULIN LISPRO 4 UNITS: 100 INJECTION, SOLUTION INTRAVENOUS; SUBCUTANEOUS at 09:00

## 2018-08-24 RX ADMIN — DOCUSATE SODIUM 100 MG: 100 CAPSULE ORAL at 08:46

## 2018-08-24 RX ADMIN — MAGNESIUM SULFATE HEPTAHYDRATE 1 G: 1 INJECTION, SOLUTION INTRAVENOUS at 08:52

## 2018-08-24 RX ADMIN — NYSTATIN 500000 UNITS: 100000 SUSPENSION ORAL at 20:37

## 2018-08-24 RX ADMIN — FUROSEMIDE 20 MG: 20 TABLET ORAL at 08:45

## 2018-08-24 RX ADMIN — METOPROLOL TARTRATE 12.5 MG: 25 TABLET ORAL at 20:26

## 2018-08-24 RX ADMIN — MIDODRINE HYDROCHLORIDE 10 MG: 5 TABLET ORAL at 17:29

## 2018-08-24 RX ADMIN — RIVAROXABAN 20 MG: 20 TABLET, FILM COATED ORAL at 17:29

## 2018-08-24 RX ADMIN — FAMOTIDINE 20 MG: 20 TABLET, FILM COATED ORAL at 08:44

## 2018-08-24 RX ADMIN — MAGNESIUM SULFATE HEPTAHYDRATE 1 G: 1 INJECTION, SOLUTION INTRAVENOUS at 06:27

## 2018-08-24 RX ADMIN — POLYETHYLENE GLYCOL 3350 17 G: 17 POWDER, FOR SOLUTION ORAL at 08:47

## 2018-08-24 RX ADMIN — GABAPENTIN 300 MG: 300 CAPSULE ORAL at 08:45

## 2018-08-24 RX ADMIN — POTASSIUM CHLORIDE 30 MEQ: 1500 TABLET, EXTENDED RELEASE ORAL at 06:26

## 2018-08-24 RX ADMIN — INSULIN LISPRO 8 UNITS: 100 INJECTION, SOLUTION INTRAVENOUS; SUBCUTANEOUS at 12:54

## 2018-08-24 RX ADMIN — SPIRONOLACTONE 25 MG: 25 TABLET ORAL at 08:46

## 2018-08-24 RX ADMIN — AMIODARONE HYDROCHLORIDE 100 MG: 200 TABLET ORAL at 08:46

## 2018-08-24 RX ADMIN — LISINOPRIL 5 MG: 5 TABLET ORAL at 08:45

## 2018-08-24 RX ADMIN — Medication 10 ML: at 08:47

## 2018-08-24 RX ADMIN — TRAMADOL HYDROCHLORIDE 50 MG: 50 TABLET, FILM COATED ORAL at 08:48

## 2018-08-24 RX ADMIN — MULTIPLE VITAMINS W/ MINERALS TAB 1 TABLET: TAB at 08:46

## 2018-08-24 RX ADMIN — AMIODARONE HYDROCHLORIDE 100 MG: 200 TABLET ORAL at 20:25

## 2018-08-24 RX ADMIN — ASPIRIN 81 MG: 81 TABLET, DELAYED RELEASE ORAL at 08:46

## 2018-08-24 RX ADMIN — Medication 10 ML: at 20:32

## 2018-08-24 RX ADMIN — INSULIN LISPRO 4 UNITS: 100 INJECTION, SOLUTION INTRAVENOUS; SUBCUTANEOUS at 21:52

## 2018-08-24 RX ADMIN — FAMOTIDINE 20 MG: 20 TABLET, FILM COATED ORAL at 20:26

## 2018-08-24 RX ADMIN — LATANOPROST 1 DROP: 50 SOLUTION OPHTHALMIC at 20:31

## 2018-08-24 RX ADMIN — TRAZODONE HYDROCHLORIDE 50 MG: 50 TABLET ORAL at 20:25

## 2018-08-24 RX ADMIN — MIDODRINE HYDROCHLORIDE 10 MG: 5 TABLET ORAL at 13:13

## 2018-08-24 RX ADMIN — POTASSIUM CHLORIDE 30 MEQ: 20 TABLET, EXTENDED RELEASE ORAL at 06:26

## 2018-08-24 RX ADMIN — INSULIN LISPRO 8 UNITS: 100 INJECTION, SOLUTION INTRAVENOUS; SUBCUTANEOUS at 18:56

## 2018-08-24 RX ADMIN — CHLORHEXIDINE GLUCONATE 0.12% ORAL RINSE 15 ML: 1.2 LIQUID ORAL at 08:47

## 2018-08-24 RX ADMIN — NYSTATIN 500000 UNITS: 100000 SUSPENSION ORAL at 17:29

## 2018-08-24 RX ADMIN — NYSTATIN 500000 UNITS: 100000 SUSPENSION ORAL at 08:47

## 2018-08-24 ASSESSMENT — PAIN SCALES - GENERAL
PAINLEVEL_OUTOF10: 7
PAINLEVEL_OUTOF10: 0
PAINLEVEL_OUTOF10: 0

## 2018-08-24 NOTE — PLAN OF CARE
Outcome: Ongoing  Patients pain treated per physician order. Will continue to monitor     Problem: Musculor/Skeletal Functional Status  Goal: Highest potential functional level  Outcome: Ongoing  Pt functioning at highest potential level. Will continue to monitor   Goal: Absence of falls  Outcome: Ongoing  Patient free of falls. Bed in lowest locked position. Side rails up x2. Call light and personal items within reach. Bed and chair alarms on. Will continue to monitor     Problem: Pain:  Goal: Control of acute pain  Control of acute pain   Outcome: Ongoing  Patients pain controlled per physicians order  Goal: Control of chronic pain  Control of chronic pain   Outcome: Ongoing  Patients pain controlled per physician order  Goal: Pain level will decrease  Pain level will decrease    Outcome: Ongoing  Patients pain treated per physician order. Will continue to monitor     Problem: Neurological  Goal: Maximum potential motor/sensory/cognitive function  Outcome: Ongoing  Patient functioning and maximum motor, sensory and cognitive function    Problem: Nutrition  Goal: Optimal nutrition therapy  Outcome: Ongoing  Patient receiving optimal nutrition therapy.  Will continue to monitor

## 2018-08-24 NOTE — PROGRESS NOTES
Physical Therapy  Facility/Department: [facility] 1  Daily Treatment Note  NAME: Radha Lunsford : 1945  MRN: [id number]    Date of Service: 2018    Discharge Recommendations:  [address]         Patient Diagnosis(es): There were no encounter diagnoses. has a past medical history of Arthritis; CAD (coronary artery disease); Cardiomyopathy [facility]); CHF (congestive heart failure) ([facility] [address] COPD (chronic obstructive pulmonary disease) ([facility] [address] Diabetes mellitus ([facility] [address] DVT of leg (deep venous thrombosis) ([facility] [address] Hyperlipidemia; Hypertension; Multiple myeloma ([facility] Neuropathy; and Wears dentures. has a past surgical history that includes Cardiac catheterization (2017); hernia repair (48); Abdomen surgery (); pr colonoscopy flx dx w/collj spec when pfrmd (N/A, 2018); pr esophagogastroduodenoscopy transoral diagnostic (N/A, 2018); Cataract removal with implant (Bilateral); Tear duct surgery (Right); and pr cabg, artery-vein, single (N/A, 2018). Restrictions  Restrictions/Precautions  Restrictions/Precautions: Cardiac, Surgical Protocols, Fall Risk (orally intubated; per RN, working on extubating)  Required Braces or Orthoses?: No  Position Activity Restriction  Sternal Precautions: No Pushing, No Pulling, 5# Lifting Restrictions  Sternal Precautions: CABG X2  Other position/activity restrictions: O2  Subjective   Pt sitting up in bed. Pt willing to start his day. Pt has an external pacemaker. Pt's wife is present. Pt has no c/o pain. Orientation    Overall Orientation Status: WFL  Objective     Bed mobility  Supine to sit; Mod.A x 1  Scooting; Mod.A x 1  Comment; Fair trunk control sitting on the EOB  Transfers  Sit to Stand: Mod Assistance (x2)  Stand to sit: Mod. Assistance (x2)  Comment: Verbal cues for hand placement with all transfers also for exhaling when standing/sitting.   Ambulation  Ambulation?: Yes,  Device; PF RW  Distance; 125' x 1,with 1 standing [place] [id number], [place]

## 2018-08-25 LAB
ANION GAP SERPL CALCULATED.3IONS-SCNC: 12 MMOL/L (ref 9–17)
BUN BLDV-MCNC: 26 MG/DL (ref 8–23)
BUN/CREAT BLD: ABNORMAL (ref 9–20)
CALCIUM SERPL-MCNC: 8.9 MG/DL (ref 8.6–10.4)
CHLORIDE BLD-SCNC: 101 MMOL/L (ref 98–107)
CO2: 24 MMOL/L (ref 20–31)
CREAT SERPL-MCNC: 0.78 MG/DL (ref 0.7–1.2)
GFR AFRICAN AMERICAN: >60 ML/MIN
GFR NON-AFRICAN AMERICAN: >60 ML/MIN
GFR SERPL CREATININE-BSD FRML MDRD: ABNORMAL ML/MIN/{1.73_M2}
GFR SERPL CREATININE-BSD FRML MDRD: ABNORMAL ML/MIN/{1.73_M2}
GLUCOSE BLD-MCNC: 151 MG/DL (ref 75–110)
GLUCOSE BLD-MCNC: 162 MG/DL (ref 70–99)
GLUCOSE BLD-MCNC: 237 MG/DL (ref 75–110)
GLUCOSE BLD-MCNC: 331 MG/DL (ref 75–110)
GLUCOSE BLD-MCNC: 362 MG/DL (ref 75–110)
HCT VFR BLD CALC: 31.9 % (ref 40.7–50.3)
HEMOGLOBIN: 9 G/DL (ref 13–17)
INR BLD: 1.5
MAGNESIUM: 1.7 MG/DL (ref 1.6–2.6)
MCH RBC QN AUTO: 26.5 PG (ref 25.2–33.5)
MCHC RBC AUTO-ENTMCNC: 28.2 G/DL (ref 28.4–34.8)
MCV RBC AUTO: 94.1 FL (ref 82.6–102.9)
NRBC AUTOMATED: 0.2 PER 100 WBC
PDW BLD-RTO: 20.2 % (ref 11.8–14.4)
PLATELET # BLD: 269 K/UL (ref 138–453)
PMV BLD AUTO: 11 FL (ref 8.1–13.5)
POTASSIUM SERPL-SCNC: 4.2 MMOL/L (ref 3.7–5.3)
PROTHROMBIN TIME: 16 SEC (ref 9–12)
RBC # BLD: 3.39 M/UL (ref 4.21–5.77)
SODIUM BLD-SCNC: 137 MMOL/L (ref 135–144)
WBC # BLD: 12.3 K/UL (ref 3.5–11.3)

## 2018-08-25 PROCEDURE — 6370000000 HC RX 637 (ALT 250 FOR IP): Performed by: PHYSICIAN ASSISTANT

## 2018-08-25 PROCEDURE — 85610 PROTHROMBIN TIME: CPT

## 2018-08-25 PROCEDURE — 2580000003 HC RX 258: Performed by: PHYSICIAN ASSISTANT

## 2018-08-25 PROCEDURE — 6370000000 HC RX 637 (ALT 250 FOR IP): Performed by: INTERNAL MEDICINE

## 2018-08-25 PROCEDURE — 94640 AIRWAY INHALATION TREATMENT: CPT

## 2018-08-25 PROCEDURE — 6370000000 HC RX 637 (ALT 250 FOR IP): Performed by: THORACIC SURGERY (CARDIOTHORACIC VASCULAR SURGERY)

## 2018-08-25 PROCEDURE — 2140000001 HC CVICU INTERMEDIATE R&B

## 2018-08-25 PROCEDURE — 6360000002 HC RX W HCPCS: Performed by: PHYSICIAN ASSISTANT

## 2018-08-25 PROCEDURE — 99024 POSTOP FOLLOW-UP VISIT: CPT | Performed by: PHYSICIAN ASSISTANT

## 2018-08-25 PROCEDURE — 82947 ASSAY GLUCOSE BLOOD QUANT: CPT

## 2018-08-25 PROCEDURE — 85027 COMPLETE CBC AUTOMATED: CPT

## 2018-08-25 PROCEDURE — 97530 THERAPEUTIC ACTIVITIES: CPT

## 2018-08-25 PROCEDURE — 94762 N-INVAS EAR/PLS OXIMTRY CONT: CPT

## 2018-08-25 PROCEDURE — 6370000000 HC RX 637 (ALT 250 FOR IP): Performed by: STUDENT IN AN ORGANIZED HEALTH CARE EDUCATION/TRAINING PROGRAM

## 2018-08-25 PROCEDURE — 36415 COLL VENOUS BLD VENIPUNCTURE: CPT

## 2018-08-25 PROCEDURE — 97110 THERAPEUTIC EXERCISES: CPT

## 2018-08-25 PROCEDURE — 97116 GAIT TRAINING THERAPY: CPT

## 2018-08-25 PROCEDURE — 97542 WHEELCHAIR MNGMENT TRAINING: CPT

## 2018-08-25 PROCEDURE — 83735 ASSAY OF MAGNESIUM: CPT

## 2018-08-25 PROCEDURE — 80048 BASIC METABOLIC PNL TOTAL CA: CPT

## 2018-08-25 RX ORDER — AMIODARONE HYDROCHLORIDE 200 MG/1
100 TABLET ORAL DAILY
Status: DISCONTINUED | OUTPATIENT
Start: 2018-08-25 | End: 2018-08-26 | Stop reason: HOSPADM

## 2018-08-25 RX ORDER — PAROXETINE 10 MG/1
10 TABLET, FILM COATED ORAL DAILY
Status: DISCONTINUED | OUTPATIENT
Start: 2018-08-25 | End: 2018-08-26 | Stop reason: HOSPADM

## 2018-08-25 RX ADMIN — ASPIRIN 81 MG: 81 TABLET, DELAYED RELEASE ORAL at 09:49

## 2018-08-25 RX ADMIN — RIVAROXABAN 20 MG: 20 TABLET, FILM COATED ORAL at 17:31

## 2018-08-25 RX ADMIN — AMIODARONE HYDROCHLORIDE 100 MG: 200 TABLET ORAL at 20:19

## 2018-08-25 RX ADMIN — MAGNESIUM SULFATE HEPTAHYDRATE 1 G: 1 INJECTION, SOLUTION INTRAVENOUS at 08:45

## 2018-08-25 RX ADMIN — NYSTATIN 500000 UNITS: 100000 SUSPENSION ORAL at 20:20

## 2018-08-25 RX ADMIN — NYSTATIN 500000 UNITS: 100000 SUSPENSION ORAL at 09:49

## 2018-08-25 RX ADMIN — FAMOTIDINE 20 MG: 20 TABLET, FILM COATED ORAL at 09:49

## 2018-08-25 RX ADMIN — LATANOPROST 1 DROP: 50 SOLUTION OPHTHALMIC at 20:20

## 2018-08-25 RX ADMIN — INSULIN LISPRO 5 UNITS: 100 INJECTION, SOLUTION INTRAVENOUS; SUBCUTANEOUS at 20:20

## 2018-08-25 RX ADMIN — Medication 10 ML: at 09:49

## 2018-08-25 RX ADMIN — FUROSEMIDE 20 MG: 20 TABLET ORAL at 09:49

## 2018-08-25 RX ADMIN — INSULIN LISPRO 2 UNITS: 100 INJECTION, SOLUTION INTRAVENOUS; SUBCUTANEOUS at 09:50

## 2018-08-25 RX ADMIN — MAGNESIUM SULFATE HEPTAHYDRATE 1 G: 1 INJECTION, SOLUTION INTRAVENOUS at 09:50

## 2018-08-25 RX ADMIN — INSULIN LISPRO 8 UNITS: 100 INJECTION, SOLUTION INTRAVENOUS; SUBCUTANEOUS at 17:31

## 2018-08-25 RX ADMIN — NYSTATIN 500000 UNITS: 100000 SUSPENSION ORAL at 17:31

## 2018-08-25 RX ADMIN — FUROSEMIDE 20 MG: 20 TABLET ORAL at 17:31

## 2018-08-25 RX ADMIN — MULTIPLE VITAMINS W/ MINERALS TAB 1 TABLET: TAB at 09:49

## 2018-08-25 RX ADMIN — TRAZODONE HYDROCHLORIDE 50 MG: 50 TABLET ORAL at 20:19

## 2018-08-25 RX ADMIN — PAROXETINE HYDROCHLORIDE HEMIHYDRATE 10 MG: 10 TABLET, FILM COATED ORAL at 09:49

## 2018-08-25 RX ADMIN — INSULIN LISPRO 4 UNITS: 100 INJECTION, SOLUTION INTRAVENOUS; SUBCUTANEOUS at 12:32

## 2018-08-25 RX ADMIN — NYSTATIN 500000 UNITS: 100000 SUSPENSION ORAL at 12:33

## 2018-08-25 RX ADMIN — GABAPENTIN 300 MG: 300 CAPSULE ORAL at 09:49

## 2018-08-25 RX ADMIN — FAMOTIDINE 20 MG: 20 TABLET, FILM COATED ORAL at 20:19

## 2018-08-25 ASSESSMENT — PAIN SCALES - GENERAL
PAINLEVEL_OUTOF10: 0

## 2018-08-25 NOTE — PROGRESS NOTES
[facility]   Progress Note                    Date:   8/25/2018  Patient name:  Dianelys Hui. Date of admission:  8/15/2018  7:38 AM  MRN:   [id number]  YOB: 1945  PCP:    Aliyah Mendez MD    Reason for Admission:  CAD in native artery [I25.10]    Subjective:   Lying comfortably in bed this morning. Does not have any acute complaints overnight. Breathing well and participating with PT / OT as best as he can. Awaiting acceptance to SNF. BP on lower side but asymptomatic.     Intake/Output Summary (Last 24 hours) at 08/25/18 0536  Last data filed at 08/25/18 0500   Gross per 24 hour   Intake              370 ml   Output              875 ml   Net             -505 ml         I/O since admission: +2.2 L liters    Medications:   Scheduled Meds:   spironolactone  25 mg Oral Daily    lisinopril  2.5 mg Oral Daily    metoprolol tartrate  12.5 mg Per NG tube BID    furosemide  20 mg Oral BID    amiodarone  100 mg Oral BID    nystatin  5 mL Oral 4x Daily    rivaroxaban  20 mg Oral Daily    traZODone  50 mg Oral Nightly    sodium chloride flush  10 mL Intravenous 2 times per day    docusate sodium  100 mg Oral BID    polyethylene glycol  17 g Oral Daily    famotidine  20 mg Oral BID    therapeutic multivitamin-minerals  1 tablet Oral Daily with breakfast    aspirin  81 mg Oral Daily    insulin lispro  0-12 Units Subcutaneous TID WC    insulin lispro  0-6 Units Subcutaneous Nightly    gabapentin  300 mg Oral Daily    latanoprost  1 drop Both Eyes Nightly     Continuous Infusions:   dextrose 50 mL/hr at 08/22/18 1005    dextrose       CBC:   Recent Labs      08/23/18   0439  08/24/18   0510   WBC  15.2*  12.3*   HGB  8.9*  9.1*   PLT  192  215     BMP:    Recent Labs      08/22/18   1351  08/23/18   0439  08/24/18   0510   NA  144  144  139   K  4.6  3.7  3.9   CL   --   108*  102   CO2   --   25  26   BUN   --   29*  27*   CREATININE   --   0.67*  0.79   GLUCOSE   -- myeloma  10. Transaminitis ( LFT trending down)       Plan of Treatment:   1. Continue aspirin. Plavix stopped. On home Xarelto 20 mg daily. 2. Continue Lasix PO 20mg BID, Metoprolol, Lisinopril, Spironolactone, Amiodarone  3. LFTs are trending down. Will continue to hold lipitor. Can be resumed in O/P. Amio at decreased dose. 4. Will  need Life vest at discharge as repeat echo done yesterday showed ischemic dilated cardiomyopathy with LVEF of 17%. 5. D/C planning to SNF     Will discuss with rounding attending Dr. Askew Seen for final recommendations. Wilder Mccarthy MD  Fellow, [address]  Attending Physician Statement  I have discussed the care of Romi Lazo., including pertinent history and exam findings,  with the resident. I have seen and examined the patient and the key elements of all parts of the encounter have been performed by me. I agree with the assessment, plan and orders as documented by the resident.     BP IS LOW FOR BB/ACEI, CAN TRY SMALLER DOSE OF BB.

## 2018-08-25 NOTE — PLAN OF CARE
Problem: Falls - Risk of:  Goal: Will remain free from falls  Will remain free from falls   Outcome: Ongoing      Problem: Activity Intolerance:  Goal: Able to perform prescribed physical activity  Able to perform prescribed physical activity   Outcome: Ongoing      Problem: Cardiac Output - Decreased:  Goal: Hemodynamic stability will improve  Hemodynamic stability will improve   Outcome: Ongoing      Problem: Fluid Volume - Imbalance:  Goal: Ability to achieve a balanced intake and output will improve  Ability to achieve a balanced intake and output will improve   Outcome: Ongoing      Problem: Gas Exchange - Impaired:  Goal: Levels of oxygenation will improve  Levels of oxygenation will improve   Outcome: Ongoing      Problem: Pain:  Goal: Control of acute pain  Control of acute pain   Outcome: Ongoing      Problem: Risk for Impaired Skin Integrity  Goal: Tissue integrity - skin and mucous membranes  Structural intactness and normal physiological function of skin and  mucous membranes.    Outcome: Ongoing

## 2018-08-25 NOTE — PROGRESS NOTES
[id number] [facility]  Progress Note    8/25/2018 7:55 AM  Surgeon:  Moise Del Rio MD          POD# 9  S/P :  Coronary artery bypassx 2  EF: 17 %    Subjective:  Mr. Shabnam Baker seen and examined today. No acute ON events. Feels much better     Vital Signs: BP (!) 93/54   Pulse 66   Temp 98.2 °F (36.8 °C) (Oral)   Resp 16   Ht 5' 9\" (1.753 m)   Wt 154 lb 5.2 oz (70 kg)   SpO2 96%   BMI 22.79 kg/m²  O2 Flow Rate (L/min): 2 L/min   Admit Weight: Weight: 178 lb 9.2 oz (81 kg)   WEIGHTWeight: 154 lb 5.2 oz (70 kg)     Rhythm: NSR    Labs:   CBC:   Recent Labs      08/23/18   0439  08/24/18   0510  08/25/18   0531   WBC  15.2*  12.3*  12.3*   HGB  8.9*  9.1*  9.0*   HCT  32.3*  30.9*  31.9*   MCV  97.6  93.6  94.1   PLT  192  215  269     BMP:   Recent Labs      08/23/18   0439  08/24/18   0510  08/25/18   0531   NA  144  139  137   K  3.7  3.9  4.2   CL  108*  102  101   CO2  25  26  24   BUN  29*  27*  26*   CREATININE  0.67*  0.79  0.78     PT/INR:   Recent Labs      08/23/18   0439  08/24/18   0510  08/25/18   0531   PROTIME  18.9*  17.4*  16.0*   INR  1.8  1.7  1.5     APTT:   No results for input(s): APTT in the last 72 hours. CXR - clearer since yesterday    I/O:  I/O last 3 completed shifts:   In: 370 [P.O.:360; I.V.:10]  Out: 675 [Urine:675]  Air Leak:  No air leak     Scheduled Meds:    amiodarone  100 mg Oral Daily    furosemide  20 mg Oral BID    nystatin  5 mL Oral 4x Daily    rivaroxaban  20 mg Oral Daily    traZODone  50 mg Oral Nightly    sodium chloride flush  10 mL Intravenous 2 times per day    docusate sodium  100 mg Oral BID    polyethylene glycol  17 g Oral Daily    famotidine  20 mg Oral BID    therapeutic multivitamin-minerals  1 tablet Oral Daily with breakfast    aspirin  81 mg Oral Daily    insulin lispro  0-12 Units Subcutaneous TID     insulin lispro  0-6 Units Subcutaneous Nightly    gabapentin  300 mg Oral Daily    latanoprost  1 drop Both Eyes Nightly     Continuous visualized. Moderate to severe tricuspid regurgitation. Estimated right ventricular systolic pressure is 38 mmHg. Mild pulmonary hypertension. Pulmonic Valve  Pulmonic valve was not well visualized. Mild pulmonic insufficiency. Pericardial Effusion  No significant pericardial effusion is seen. 1. S/p cabg x 2   POD 9  2. HD - NSR, symptomatic bradycardia and hypotension  D/c lopressor, lisinopril, aldactone  Decrease AMio to 100 daily  Keep lasix  Paroxysmal Afib - Xarelto per cardio for Afib    DVT ppx: Lovenox & SCD's while stationary  Patient is on Xarelto, ASA, Statin therapy per protocol   Plan for discharge rehab tomorrow    NO NICOTINE PATCH ON CABG PT PLEASE    The above recommendations including medications and orders were discussed and agreed upon with Velma Ocampo the attending on service for the cardiothoracic surgery group today.     Electronically signed by BI Arango on 8/25/2018 at 7:55 AM

## 2018-08-25 NOTE — PROGRESS NOTES
assistance  Transfers  Sit to Stand: Moderate Assistance  Stand to sit: Moderate Assistance  Bed to Chair: Moderate assistance  Stand Pivot Transfers: Moderate Assistance  Ambulation  Ambulation?: Yes  Ambulation 1  Surface: level tile  Device: Platform Walker left;Platform Walker right  Quality of Gait: Flexed posture, forward head, unsteady  Distance: 125 feet x1 + 25 feet x1 using Green Machine plateform walker and wheelchair follow  Comments: General c/o fatigue with AMB. Secured cardiac pillow to chest with gait belt. Other exercises  Other exercises?: Yes  Other exercises 1: Supine: Bilat LE AROM: AP, QS, Heel slides, Hip ABD and SAQ x10 rep  Other exercises 2: Supine: Bilat UE AROM: Elbow flexion, chest press, shoulder flexion to 90 degrees and shoulder IR/ER x10 reps (rib soreness)  Other exercises 3: Stood at EOB, Face to Face: v/c's for posture. Stood approx 90 secs. Comment: Self propelled in wheelchair x25 feet. Sat up in chair              Assessment   Assessment: Increased AMB distance, Improved funtion with AMB. 97% SpO2 on room air s/p AMB. Pt cooperative and follows direction well. Prognosis: Good  Patient Education: PT POC and the importance of mobility  Activity Tolerance  Activity Tolerance: Patient limited by fatigue  Activity Tolerance: Increased tolerance for AMB demonstrated today. Good SpO2% with AMB. Increased AMB distance.      G-Code     OutComes Score                                                    AM-PAC Score             Goals  Short term goals  Time Frame for Short term goals: 14 visits  Short term goal 1: Pt to perform bed mobility and transfers with [place]  Short term goal 2: Pt to ambulate at least 50ft with green lift walker and ModA  Short term goal 3: Pt to improve static standing balance to Fair in order to attempt ambulation  Short term goal 4: Pt to tolerate at least 30mins of UE/LE exercises in order to increase overall strength   Patient Goals   Patient

## 2018-08-25 NOTE — PLAN OF CARE
Problem: Falls - Risk of:  Goal: Will remain free from falls  Will remain free from falls   Outcome: Ongoing  . Bed in low locked position, bed alarms on non skid slippers on, call light within reach  Goal: Absence of physical injury  Absence of physical injury   Outcome: Ongoing      Problem: Activity Intolerance:  Goal: Ability to tolerate increased activity will improve  Ability to tolerate increased activity will improve   Outcome: Ongoing  Will increase activity as pt tolerates    Problem: Pain:  Goal: Pain level will decrease  Pain level will decrease    Outcome: Ongoing      Problem: Risk for Impaired Skin Integrity  Goal: Tissue integrity - skin and mucous membranes  Structural intactness and normal physiological function of skin and  mucous membranes. Outcome: Ongoing  Monitor peripheral pulses, color and temperature to extremities. Assess for pulses and pain in extremities    Problem: Pain:  Goal: Control of acute pain  Control of acute pain   Outcome: Ongoing  Assess pain with hourly rounding using scale of 0-10. Medicate pt per orders. Reassess pain to see if pt's pain is at a tolerable level.  Use non medication to help relieve pain, ie repositioning, rest, distraction, ice  Goal: Control of chronic pain  Control of chronic pain   Outcome: Ongoing    Goal: Pain level will decrease  Pain level will decrease    Outcome: Ongoing      Problem: Restraint Use - Nonviolent/Non-Self-Destructive Behavior:  Goal: Absence of restraint indications  Absence of restraint indications   Outcome: Completed Date Met: 08/25/18    Goal: Absence of restraint-related injury  Absence of restraint-related injury   Outcome: Completed Date Met: 08/25/18

## 2018-08-25 NOTE — PROGRESS NOTES
Gareth on unit. Notified of conversation with pt regarding pt's c/o feeling sad and depressed regarding health and his condition.

## 2018-08-26 VITALS
TEMPERATURE: 97.9 F | SYSTOLIC BLOOD PRESSURE: 92 MMHG | BODY MASS INDEX: 23.93 KG/M2 | DIASTOLIC BLOOD PRESSURE: 66 MMHG | HEIGHT: 69 IN | OXYGEN SATURATION: 97 % | RESPIRATION RATE: 12 BRPM | WEIGHT: 161.6 LBS | HEART RATE: 90 BPM

## 2018-08-26 LAB
ANION GAP SERPL CALCULATED.3IONS-SCNC: 13 MMOL/L (ref 9–17)
BUN BLDV-MCNC: 22 MG/DL (ref 8–23)
BUN/CREAT BLD: ABNORMAL (ref 9–20)
CALCIUM SERPL-MCNC: 9.2 MG/DL (ref 8.6–10.4)
CHLORIDE BLD-SCNC: 97 MMOL/L (ref 98–107)
CO2: 24 MMOL/L (ref 20–31)
CREAT SERPL-MCNC: 0.74 MG/DL (ref 0.7–1.2)
GFR AFRICAN AMERICAN: >60 ML/MIN
GFR NON-AFRICAN AMERICAN: >60 ML/MIN
GFR SERPL CREATININE-BSD FRML MDRD: ABNORMAL ML/MIN/{1.73_M2}
GFR SERPL CREATININE-BSD FRML MDRD: ABNORMAL ML/MIN/{1.73_M2}
GLUCOSE BLD-MCNC: 270 MG/DL (ref 75–110)
GLUCOSE BLD-MCNC: 276 MG/DL (ref 70–99)
GLUCOSE BLD-MCNC: 364 MG/DL (ref 75–110)
HCT VFR BLD CALC: 29.9 % (ref 40.7–50.3)
HEMOGLOBIN: 8.9 G/DL (ref 13–17)
INR BLD: 1.4
MAGNESIUM: 1.5 MG/DL (ref 1.6–2.6)
MCH RBC QN AUTO: 26.9 PG (ref 25.2–33.5)
MCHC RBC AUTO-ENTMCNC: 29.8 G/DL (ref 28.4–34.8)
MCV RBC AUTO: 90.3 FL (ref 82.6–102.9)
NRBC AUTOMATED: 0 PER 100 WBC
PDW BLD-RTO: 20.3 % (ref 11.8–14.4)
PLATELET # BLD: 282 K/UL (ref 138–453)
PMV BLD AUTO: 11.1 FL (ref 8.1–13.5)
POTASSIUM SERPL-SCNC: 4.1 MMOL/L (ref 3.7–5.3)
PROTHROMBIN TIME: 14.2 SEC (ref 9–12)
RBC # BLD: 3.31 M/UL (ref 4.21–5.77)
SODIUM BLD-SCNC: 134 MMOL/L (ref 135–144)
WBC # BLD: 11.9 K/UL (ref 3.5–11.3)

## 2018-08-26 PROCEDURE — 97110 THERAPEUTIC EXERCISES: CPT

## 2018-08-26 PROCEDURE — 97116 GAIT TRAINING THERAPY: CPT

## 2018-08-26 PROCEDURE — 83735 ASSAY OF MAGNESIUM: CPT

## 2018-08-26 PROCEDURE — 97530 THERAPEUTIC ACTIVITIES: CPT

## 2018-08-26 PROCEDURE — 6370000000 HC RX 637 (ALT 250 FOR IP): Performed by: THORACIC SURGERY (CARDIOTHORACIC VASCULAR SURGERY)

## 2018-08-26 PROCEDURE — 36415 COLL VENOUS BLD VENIPUNCTURE: CPT

## 2018-08-26 PROCEDURE — 6360000002 HC RX W HCPCS: Performed by: PHYSICIAN ASSISTANT

## 2018-08-26 PROCEDURE — 85610 PROTHROMBIN TIME: CPT

## 2018-08-26 PROCEDURE — 82947 ASSAY GLUCOSE BLOOD QUANT: CPT

## 2018-08-26 PROCEDURE — 99024 POSTOP FOLLOW-UP VISIT: CPT | Performed by: PHYSICIAN ASSISTANT

## 2018-08-26 PROCEDURE — 85027 COMPLETE CBC AUTOMATED: CPT

## 2018-08-26 PROCEDURE — 6370000000 HC RX 637 (ALT 250 FOR IP): Performed by: STUDENT IN AN ORGANIZED HEALTH CARE EDUCATION/TRAINING PROGRAM

## 2018-08-26 PROCEDURE — 6370000000 HC RX 637 (ALT 250 FOR IP): Performed by: PHYSICIAN ASSISTANT

## 2018-08-26 PROCEDURE — 80048 BASIC METABOLIC PNL TOTAL CA: CPT

## 2018-08-26 PROCEDURE — 94762 N-INVAS EAR/PLS OXIMTRY CONT: CPT

## 2018-08-26 PROCEDURE — 2580000003 HC RX 258: Performed by: PHYSICIAN ASSISTANT

## 2018-08-26 RX ORDER — PAROXETINE 10 MG/1
10 TABLET, FILM COATED ORAL DAILY
Qty: 30 TABLET | Refills: 3 | DISCHARGE
Start: 2018-08-26 | End: 2019-12-04

## 2018-08-26 RX ORDER — M-VIT,TX,IRON,MINS/CALC/FOLIC 27MG-0.4MG
1 TABLET ORAL
Status: ON HOLD | DISCHARGE
Start: 2018-08-26 | End: 2019-06-13 | Stop reason: HOSPADM

## 2018-08-26 RX ORDER — AMIODARONE HYDROCHLORIDE 100 MG/1
100 TABLET ORAL DAILY
Qty: 30 TABLET | Refills: 3 | Status: ON HOLD | DISCHARGE
Start: 2018-08-26 | End: 2019-06-13 | Stop reason: HOSPADM

## 2018-08-26 RX ORDER — PSEUDOEPHEDRINE HCL 30 MG
100 TABLET ORAL 2 TIMES DAILY
DISCHARGE
Start: 2018-08-26

## 2018-08-26 RX ADMIN — Medication 10 ML: at 09:37

## 2018-08-26 RX ADMIN — FAMOTIDINE 20 MG: 20 TABLET, FILM COATED ORAL at 09:38

## 2018-08-26 RX ADMIN — ASPIRIN 81 MG: 81 TABLET, DELAYED RELEASE ORAL at 09:38

## 2018-08-26 RX ADMIN — NYSTATIN 500000 UNITS: 100000 SUSPENSION ORAL at 12:45

## 2018-08-26 RX ADMIN — INSULIN LISPRO 6 UNITS: 100 INJECTION, SOLUTION INTRAVENOUS; SUBCUTANEOUS at 09:37

## 2018-08-26 RX ADMIN — NYSTATIN 500000 UNITS: 100000 SUSPENSION ORAL at 09:38

## 2018-08-26 RX ADMIN — INSULIN LISPRO 10 UNITS: 100 INJECTION, SOLUTION INTRAVENOUS; SUBCUTANEOUS at 12:45

## 2018-08-26 RX ADMIN — MULTIPLE VITAMINS W/ MINERALS TAB 1 TABLET: TAB at 09:37

## 2018-08-26 RX ADMIN — PAROXETINE HYDROCHLORIDE HEMIHYDRATE 10 MG: 10 TABLET, FILM COATED ORAL at 09:37

## 2018-08-26 RX ADMIN — MAGNESIUM SULFATE HEPTAHYDRATE 1 G: 1 INJECTION, SOLUTION INTRAVENOUS at 06:44

## 2018-08-26 RX ADMIN — GABAPENTIN 300 MG: 300 CAPSULE ORAL at 09:38

## 2018-08-26 RX ADMIN — FUROSEMIDE 20 MG: 20 TABLET ORAL at 09:38

## 2018-08-26 RX ADMIN — MAGNESIUM SULFATE HEPTAHYDRATE 1 G: 1 INJECTION, SOLUTION INTRAVENOUS at 09:00

## 2018-08-26 ASSESSMENT — PAIN SCALES - GENERAL
PAINLEVEL_OUTOF10: 0
PAINLEVEL_OUTOF10: 0

## 2018-08-26 NOTE — PLAN OF CARE
Problem: Falls - Risk of:  Goal: Will remain free from falls  Will remain free from falls   Outcome: Ongoing  Room free of clutter, slip resistant socks on, call light within reach, bed locked and in lowest position, bed rails x2. Pt agreed to use call light and wait for assistance before getting up. Pt calls out appropriately. Goal: Absence of physical injury  Absence of physical injury   Outcome: Ongoing      Problem: Activity Intolerance:  Goal: Able to perform prescribed physical activity  Able to perform prescribed physical activity   Outcome: Ongoing    Goal: Ability to tolerate increased activity will improve  Ability to tolerate increased activity will improve   Outcome: Ongoing      Problem: Cardiac Output - Decreased:  Goal: Cardiac output within specified parameters  Cardiac output within specified parameters   Outcome: Ongoing    Goal: Hemodynamic stability will improve  Hemodynamic stability will improve   Outcome: Ongoing      Problem: Fluid Volume - Imbalance:  Goal: Ability to achieve a balanced intake and output will improve  Ability to achieve a balanced intake and output will improve   Outcome: Ongoing    Goal: Chest tube drainage is within specified parameters  Chest tube drainage is within specified parameters   Outcome: Ongoing      Problem: Risk for Impaired Skin Integrity  Goal: Tissue integrity - skin and mucous membranes  Structural intactness and normal physiological function of skin and  mucous membranes. Outcome: Ongoing  Skin assessment complete. No new tejinder of skin breakdown noted. Pt turned and repositioned q2  . Pressure relief mattress in place. Linens remain as clean and dry as possible.  Will continue to assess skin and intervene as necessary      Problem: Musculor/Skeletal Functional Status  Goal: Highest potential functional level  Outcome: Ongoing    Goal: Absence of falls  Outcome: Ongoing      Problem: Neurological  Goal: Maximum potential motor/sensory/cognitive

## 2018-08-26 NOTE — PROGRESS NOTES
[facility] Cardiothoracic Surgeons  Progress Note    8/26/2018 9:38 AM  Surgeon:  Bonnie Schroeder MD          POD# 10  S/P :  Coronary artery bypassx 2  EF: 17 %    Subjective:  Mr. Salvador Kaba seen and examined today. No acute ON events. Feels much better     Vital Signs: /62   Pulse 82   Temp 98.4 °F (36.9 °C) (Oral)   Resp 12   Ht 5' 9\" (1.753 m)   Wt 161 lb 9.6 oz (73.3 kg)   SpO2 97%   BMI 23.86 kg/m²  O2 Flow Rate (L/min): 2 L/min   Admit Weight: Weight: 178 lb 9.2 oz (81 kg)   WEIGHTWeight: 161 lb 9.6 oz (73.3 kg)     Rhythm: NSR    Labs:   CBC:   Recent Labs      08/24/18   0510  08/25/18   0531  08/26/18   0450   WBC  12.3*  12.3*  11.9*   HGB  9.1*  9.0*  8.9*   HCT  30.9*  31.9*  29.9*   MCV  93.6  94.1  90.3   PLT  215  269  282     BMP:   Recent Labs      08/24/18   0510  08/25/18   0531  08/26/18   0450   NA  139  137  134*   K  3.9  4.2  4.1   CL  102  101  97*   CO2  26  24  24   BUN  27*  26*  22   CREATININE  0.79  0.78  0.74     PT/INR:   Recent Labs      08/24/18   0510  08/25/18   0531  08/26/18   0450   PROTIME  17.4*  16.0*  14.2*   INR  1.7  1.5  1.4     APTT:   No results for input(s): APTT in the last 72 hours. CXR - clearer since yesterday    I/O:  I/O last 3 completed shifts:   In: 540 [P.O.:540]  Out: 800 [Urine:800]  Air Leak:  No air leak     Scheduled Meds:    amiodarone  100 mg Oral Daily    PARoxetine  10 mg Oral Daily    furosemide  20 mg Oral BID    nystatin  5 mL Oral 4x Daily    rivaroxaban  20 mg Oral Daily    traZODone  50 mg Oral Nightly    sodium chloride flush  10 mL Intravenous 2 times per day    docusate sodium  100 mg Oral BID    polyethylene glycol  17 g Oral Daily    famotidine  20 mg Oral BID    therapeutic multivitamin-minerals  1 tablet Oral Daily with breakfast    aspirin  81 mg Oral Daily    insulin lispro  0-12 Units Subcutaneous TID WC    insulin lispro  0-6 Units Subcutaneous Nightly    gabapentin  300 mg Oral Daily    latanoprost  1 drop Both Eyes Nightly     Continuous Infusions:    dextrose 50 mL/hr at 08/22/18 1005    dextrose         Exam:   General: intubated and sedated  Heart:Normal S1 and S2.  Regular rhythm. No murmurs, gallops, or rubs. , Pacing wires  no  Lungs: diminished breath sounds bibasilar Equal and symmetric expansion with respiration. Chest tubes out  Abdomen: soft, non tender, non distended, BSx4  Extremities: 1+ edema, intact pulses in all four extremities  Musculoskeletal:  Intact range of motion of peripheral joints. grossly normal  Neurologic:  Non-focal sensory deficits on exam.  Psychiatric: Mood and affect are appropriate. Wounds: clean and dry, healing appropriately, dressing in place       Assessment/Plan:   Patient Active Problem List   Diagnosis    Acute on chronic systolic CHF (congestive heart failure) (HCC)    Multiple vessel coronary artery disease    Multiple myeloma not having achieved remission ([address]    Chronic obstructive pulmonary disease (HCC)    Low hemoglobin    Hypokalemia    Other drug-induced pancytopenia ([address]    RANDALL (acute kidney injury) ([address]    CAD in native artery     ECHO 8/18/18  FINDINGS  Left Atrium  Left atrium is mildly dilated. Left Ventricle  Left ventricle is normal in size. Mild left ventricular hypertrophy. Global left ventricular systolic function is severely reduced. Leftward  compression interventricular septum (D-sign) suggests RV pressure /or volume  overload  Calculated ejection fraction is 29 % . Concentric mild wall thickening with diastolic dysfunction. Right Atrium  Right atrium was not well visualized. Right Ventricle  Right ventricle, severe dysfunction with mild dilatation  Mitral Valve  Mitral valve is not well visualized. Mitral valve appears normal in structure. Moderate mitral regurgitation. No mitral stenosis. Aortic Valve  Normal aortic valve structure and function without stenosis or  regurgitation. Aortic valve is trileaflet.   Tricuspid Valve  Tricuspid valve was not well visualized. Moderate to severe tricuspid regurgitation. Estimated right ventricular systolic pressure is 38 mmHg. Mild pulmonary hypertension. Pulmonic Valve  Pulmonic valve was not well visualized. Mild pulmonic insufficiency. Pericardial Effusion  No significant pericardial effusion is seen. 1. S/p cabg x 2   POD 10  2. HD - NSR, symptomatic bradycardia and hypotension  Unable to keep on BB/ACE/Aldactone  Keep lasix  Paroxysmal Afib - Xarelto per cardio for Afib    DVT ppx: Lovenox & SCD's while stationary  Patient is on Xarelto, ASA, Statin therapy per protocol   Plan for discharge rehab today    The above recommendations including medications and orders were discussed and agreed upon with Kem Hassan the attending on service for the cardiothoracic surgery group today.     Electronically signed by BI Martin on 8/26/2018 at 9:38 AM

## 2018-08-26 NOTE — PROGRESS NOTES
Physical Therapy  Facility/Department: [facility] CAR 1  Daily Treatment Note  NAME: Mariana Owusu. : 1945  MRN: [id number]    Date of Service: 2018    Discharge Recommendations:  Subacute/Skilled Nursing Facility   PT Equipment Recommendations  Equipment Needed: No    Patient Diagnosis(es): The encounter diagnosis was S/P CABG (coronary artery bypass graft). has a past medical history of Arthritis; CAD (coronary artery disease); Cardiomyopathy [facility]); CHF (congestive heart failure) ([facility] COPD (chronic obstructive pulmonary disease) ([facility] Diabetes mellitus ([facility] DVT of leg (deep venous thrombosis) ([facility] Hyperlipidemia; Hypertension; Multiple myeloma ([facility] Neuropathy; and Wears dentures. has a past surgical history that includes Cardiac catheterization (2017); hernia repair (83); Abdomen surgery (); pr colonoscopy flx dx w/collj spec when pfrmd (N/A, 2018); pr esophagogastroduodenoscopy transoral diagnostic (N/A, 2018); Cataract removal with implant (Bilateral); Tear duct surgery (Right); and pr cabg, artery-vein, single (N/A, 2018). Restrictions  Restrictions/Precautions  Restrictions/Precautions: Cardiac, Surgical Protocols, Fall Risk  Required Braces or Orthoses?: No  Position Activity Restriction  Sternal Precautions: No Pushing, No Pulling, 5# Lifting Restrictions  Sternal Precautions: CABG X2  Other position/activity restrictions: Treatment on room air today: 97% with activity  Subjective   General  Response To Previous Treatment: Patient with no complaints from previous session.   Family / Caregiver Present: Yes  Subjective  Subjective: Pt agreeable to PT, denies pain at rest.   Pain Screening  Patient Currently in Pain: Denies  Vital Signs  Patient Currently in Pain: Denies       Orientation  Orientation  Overall Orientation Status: Within Functional Limits  Objective   Bed mobility  Rolling to Left: Minimal assistance  Rolling to Right: Minimal assistance  Supine falls, Gait belt, Left in chair, Nurse notified, Chair alarm in place  Restraints  Initially in place: No  Restraints: none     Therapy Time   Individual Concurrent Group Co-treatment   Time In 0825         Time Out 0910         Minutes [address] [place], [place]

## 2018-08-26 NOTE — PLAN OF CARE
Control of chronic pain  Control of chronic pain   Outcome: Completed Date Met: 08/26/18    Goal: Pain level will decrease  Pain level will decrease    Outcome: Completed Date Met: 08/26/18

## 2018-08-26 NOTE — PROGRESS NOTES
Pt to be d/c per Gareth. Updated pt's spouse. Pt's spouse states she thought they would keep him until tomorrow. STates she did not have time to tour the facility. States she would like to speak with . Message left for .

## 2018-08-26 NOTE — PROGRESS NOTES
[facility]   Progress Note                    Date:   8/26/2018  Patient name:  Gladis Lopez. Date of admission:  8/15/2018  7:38 AM  MRN:   [id number]  YOB: 1945  PCP:    Lisa Brambila MD    Reason for Admission:  CAD in native artery [I25.10]    Subjective:   He is awake early this morning and lying comfortably in bed. He does not have any acute complaints. He has not been very active, although trying to participate with PT / OT as best as he can. His BPs have been on the lower end, but remains asymptomatic. Awaiting acceptance to SNF.       Intake/Output Summary (Last 24 hours) at 08/26/18 0558  Last data filed at 08/26/18 0100   Gross per 24 hour   Intake              540 ml   Output              800 ml   Net             -260 ml         I/O since admission: +2.2 L liters    Medications:   Scheduled Meds:   amiodarone  100 mg Oral Daily    PARoxetine  10 mg Oral Daily    furosemide  20 mg Oral BID    nystatin  5 mL Oral 4x Daily    rivaroxaban  20 mg Oral Daily    traZODone  50 mg Oral Nightly    sodium chloride flush  10 mL Intravenous 2 times per day    docusate sodium  100 mg Oral BID    polyethylene glycol  17 g Oral Daily    famotidine  20 mg Oral BID    therapeutic multivitamin-minerals  1 tablet Oral Daily with breakfast    aspirin  81 mg Oral Daily    insulin lispro  0-12 Units Subcutaneous TID WC    insulin lispro  0-6 Units Subcutaneous Nightly    gabapentin  300 mg Oral Daily    latanoprost  1 drop Both Eyes Nightly     Continuous Infusions:   dextrose 50 mL/hr at 08/22/18 1005    dextrose       CBC:   Recent Labs      08/24/18   0510  08/25/18   0531  08/26/18   0450   WBC  12.3*  12.3*  11.9*   HGB  9.1*  9.0*  8.9*   PLT  215  269  282     BMP:    Recent Labs      08/24/18   0510  08/25/18   0531  08/26/18   0450   NA  139  137  134*   K  3.9  4.2  4.1   CL  102  101  97*   CO2  26  24  24   BUN  27*  26*  22   CREATININE  0.79  0.78  0.74

## 2018-08-30 ASSESSMENT — ENCOUNTER SYMPTOMS
NAUSEA: 0
WHEEZING: 0
VOMITING: 0
CONSTIPATION: 0
DIARRHEA: 0
ABDOMINAL PAIN: 0
COUGH: 0
SHORTNESS OF BREATH: 1
CHEST TIGHTNESS: 0

## 2018-08-30 NOTE — DISCHARGE SUMMARY
[facility] Cardiothoracic Surgery  Discharge Summary    Patient's Name/Date of Birth: Yasmeen Powers. / 1945 (70 y.o.)    Admission Date: 8/15/2018  Discharge Date: 8/26/2018  2:15 PM  Discharge Physician: Dr. Jennie Hyde  Discharge Unit: [address] [facility]    Reason For Admission: CAD    HPI: Yasmeen Maldonado is a 72 y.o. Dorla Crystal seen by Dr. Deandra Brizuela in December 2017 for CABG consult. Sherman Tong was initially declined for surgery and was supposed to be managed medically. Sherman Tong has a history of multiple myeloma and anemia with blood transfusions.  Dr. Deandra Brizuela was supposed to talk to his oncologist and plan for surgery thereafter.  During the transition period and miscommunication patient was not able to be followed up. Sherman Tong has a history of DVT, GI bleed, on Xarelto, on chemotherapy for multiple myeloma, hypertension, diabetes type 2, current every day smoker.  I'm seeing this consult with Dr. Megan Ng labs and images reviewed    Brief Review of Hospital Course:   Pt underwent surgery on 8/16/18 s/p CABG x 2 with Dr. Jennie Hyde. Originally pt was schedule for 8/15/18 but had to be delayed due to emergent case. Pt was extubated on POD 0. On POD 2 at night pt went into PEA and respiratory arrest. Pt was reintubated with ROSC. Pt was extubated on POD 4. Pt than had Afib and Xarelto was started per Cardiology. Repeat ECHO with EF 29%, pt was started on BB, ACE, lasix, Spironolactone by Cardio but did not tolerate 2/2 bradycardia and hypotension and had to be discontinued. Elevated LFTs 2/2 hypotension PEA which improved. Repeat EF prior to d/c EF 17%, pt was placed on LifeVest prior to d/c. Pt was also started on Paxil for depression. Pt was discharged on POD 10 to rehab in stable condition. ROS:   Review of Systems   Constitutional: Positive for fatigue. Negative for chills, diaphoresis, fever and unexpected weight change. Respiratory: Positive for shortness of breath.  Negative for cough, chest tightness and wheezing. Cardiovascular: Negative for chest pain, palpitations and leg swelling. Gastrointestinal: Negative for abdominal pain, constipation, diarrhea, nausea and vomiting. Endocrine: Negative for polydipsia, polyphagia and polyuria. Genitourinary: Negative for dysuria and frequency. Musculoskeletal: Negative for arthralgias. Skin: Negative for pallor and rash. Neurological: Negative for dizziness, tremors, seizures, syncope, weakness, light-headedness and numbness. Psychiatric/Behavioral: Negative for confusion, sleep disturbance and suicidal ideas. Physical Exam:  Vitals:    08/26/18 1112   BP: 92/66   Pulse: 90   Resp: 12   Temp: 97.9 °F (36.6 °C)   SpO2: 97%     Weight: Weight: 161 lb 9.6 oz (73.3 kg)    Weight: 178 lb 9.2 oz (81 kg)    No intake/output data recorded. General: Alert and Oriented x3. Resting in bed. No apparent distress. HEENT:  Normocephalic and atraumatic. PERRL. EOMI. Lips and oral mucosa moist and without lesions. Neck:  Supple. Trachea midline. Chest:  No abnormality. Equal and symmetric expansion with respiration. Lungs:  Decrease bibasilar to auscultation. No fremitus. Cardiac:  normal rate and rhythm without murmurs, rubs or gallops. Abdomen:  Soft, non-tender,normoactive bowel sounds. No masses or organomegaly. Extremities:  No cyanosis, clubbing. Intact pulses in all four extremities. trace bilateral lower leg edema. Musculoskeletal:  Intact range of motion of peripheral joints. 4/5 muscular strength. Lymphatic:  No cervical or supraclavicular adenopathy. Neurologic:  Cranial nerves are grossly intact. Non-focal sensory deficits on exam.  Psychiatric: Mood and affect are appropriate. Surgical Incisions: Surgical incisions with clean, dry, intact dressings in place.        Past Medical History:   Diagnosis Date    Arthritis     all over    CAD (coronary artery disease)     Cardiomy[facility]     CH[facility] (congestive heart failure) [facility]     COPD (chronic obstructive pulmonary disease) ([facility]     Diabetes mellitus ([facility]     DVT of leg (deep venous thrombosis) ([facility]     RIGHT    Hyperlipidemia     Hypertension     Multiple myeloma ([facility] 2014    Neuropathy     Wears dentures     FULL UPPER, PARTIAL LOWER     Past Surgical History:   Procedure Laterality Date    ABDOMEN SURGERY  2002    STOMAL TUMOR    CARDIAC CATHETERIZATION  12/13/2017    right and left heart cath with Dr. Karla Salter Bilateral     HERNIA REPAIR  1967    RI CABG, ARTERY-VEIN, SINGLE N/A 8/16/2018    CABG CORONARY ARTERY BYPASS ON  PUMP x 2, SWAN LAUREN, ORESTES ([facility]# [id number]) performed by Sylvie Schwartz MD at [address] FLX DX W/TRAMAINE [place] 1978 PFRMD N/A 1/14/2018    COLONOSCOPY performed by Elizabeth Roberts MD at [address] ESOPHAGOGASTRODUODENOSCOPY TRANSORAL DIAGNOSTIC N/A 1/12/2018    E[facility]AG[facility]STIC ONLY [facility] Right      Allergies   Allergen Reactions    Nuts [Peanut-Containing Drug Products] Other (See Comments)     abd pain     Family History   Problem Relation Age of Onset    Diabetes Mother     Stroke Father     Other Sister         PE    Stroke Brother      Social History     Social History    Marital status:      Spouse name: N/A    Number of children: N/A    Years of education: N/A     Occupational History    Retired      Social History Main Topics    Smoking status: Current Every Day Smoker     Packs/day: 0.50     Years: 30.00     Types: Cigarettes    Smokeless tobacco: Never Used      Comment: a little less than a pack a day    Alcohol use Yes      Comment: rarely    Drug use: Yes     Types: Marijuana      Comment: 0.5 daily     Sexual activity: Not on file     Other Topics Concern    Not on file     Social History Narrative    No narrative on file          Medication List      START taking these medications    amiodarone 100 MG as: NITROSTAT           Where to Get Your Medications      You can get these medications from any pharmacy    Bring a paper prescription for each of these medications  · traMADol 50 MG tablet     Information about where to get these medications is not yet available    Ask your nurse or doctor about these medications  · amiodarone 100 MG tablet  · docusate 100 MG Caps  · PARoxetine 10 MG tablet  · therapeutic multivitamin-minerals tablet           Data:    CBC: No results for input(s): WBC, HGB, HCT, MCV, PLT in the last 72 hours. BMP: No results for input(s): NA, K, CL, CO2, PHOS, BUN, CREATININE, MG in the last 72 hours. Invalid input(s): CA  Accucheck Glucoses:   No results for input(s): POCGLU in the last 72 hours. Cardiac Enzymes: No results for input(s): CKTOTAL, CKMB, CKMBINDEX, TROPONINI in the last 72 hours. PTT/PT/INR:   No results for input(s): PROTIME, INR in the last 72 hours. No results for input(s): APTT in the last 72 hours.   Liver Profile:  Lab Results   Component Value Date    AST 51 08/24/2018     08/24/2018    BILIDIR 1.30 08/24/2018    BILITOT 2.14 08/24/2018    ALKPHOS 271 08/24/2018     Lab Results   Component Value Date    CHOL 98 12/14/2017    HDL 32 12/14/2017    TRIG 166 12/14/2017     TSH:   Lab Results   Component Value Date    TSH 1.13 12/14/2017     UA:   Lab Results   Component Value Date    COLORU YELLOW 08/09/2018    PHUR 6.5 08/09/2018    WBCUA 0 TO 2 02/04/2018    RBCUA 0 TO 2 02/04/2018    MUCUS NOT REPORTED 02/04/2018    TRICHOMONAS NOT REPORTED 02/04/2018    YEAST NOT REPORTED 02/04/2018    BACTERIA NOT REPORTED 02/04/2018    SPECGRAV 1.022 08/09/2018    LEUKOCYTESUR NEGATIVE 08/09/2018    UROBILINOGEN Normal 08/09/2018    BILIRUBINUR NEGATIVE 08/09/2018    GLUCOSEU NEGATIVE 08/09/2018    AMORPHOUS NOT REPORTED 02/04/2018       Active Hospital Problems    Diagnosis Date Noted    CAD in native artery [I25.10] 08/15/2018         Discharge Plan:    Follow up with our office in 1 week. Follow up with PCP and cardiology in 1-2 weeks. Patient was discharged on Aspirin, Xarelto, and Statin therapy per protocol.    No BB/ACE/ARB due to symptomatic bradycardia and hypotension     Electronically signed by BI Marshall on 8/30/2018 at 9:47 AM

## 2018-09-04 DIAGNOSIS — Z95.1 S/P CABG X 2: Primary | ICD-10-CM

## 2018-09-05 ENCOUNTER — OFFICE VISIT (OUTPATIENT)
Dept: CARDIOTHORACIC SURGERY | Age: 73
End: 2018-09-05

## 2018-09-05 ENCOUNTER — HOSPITAL ENCOUNTER (OUTPATIENT)
Age: 73
Discharge: HOME | End: 2018-09-07
Payer: MEDICARE

## 2018-09-05 ENCOUNTER — HOSPITAL ENCOUNTER (OUTPATIENT)
Dept: GENERAL RADIOLOGY | Age: 73
Discharge: HOME | End: 2018-09-07
Payer: MEDICARE

## 2018-09-05 VITALS
OXYGEN SATURATION: 98 % | SYSTOLIC BLOOD PRESSURE: 116 MMHG | HEART RATE: 91 BPM | TEMPERATURE: 97 F | DIASTOLIC BLOOD PRESSURE: 72 MMHG | WEIGHT: 161 LBS | HEIGHT: 69 IN | BODY MASS INDEX: 23.85 KG/M2

## 2018-09-05 DIAGNOSIS — Z95.1 S/P CABG X 2: Primary | ICD-10-CM

## 2018-09-05 DIAGNOSIS — Z95.1 S/P CABG X 2: ICD-10-CM

## 2018-09-05 PROCEDURE — 99024 POSTOP FOLLOW-UP VISIT: CPT | Performed by: PHYSICIAN ASSISTANT

## 2018-09-05 PROCEDURE — 71046 X-RAY EXAM CHEST 2 VIEWS: CPT

## 2018-09-05 NOTE — PROGRESS NOTES
Subjective:     Verlinda Level. returns today status post CABG x 2. No complaints. Discharge from rehab on Monday. Drainage sternal incision.     Objective:     /72 (Site: Left Arm, Position: Sitting, Cuff Size: Medium Adult)   Pulse 91   Temp 97 °F (36.1 °C) (Oral)   Ht 5' 9\" (1.753 m)   Wt 161 lb (73 kg)   SpO2 98%   BMI 23.78 kg/m²   Chest: stable no clicks or rubs + staples  CV: normal S1S2   Lungs: clear to IPPA posteriorly  Lower extremities: No edema  Saph incision: clean sternal     Medications:     Current Outpatient Prescriptions on File Prior to Visit   Medication Sig Dispense Refill    amiodarone (PACERONE) 100 MG tablet Take 1 tablet by mouth daily 30 tablet 3    PARoxetine (PAXIL) 10 MG tablet Take 1 tablet by mouth daily 30 tablet 3    docusate sodium (COLACE, DULCOLAX) 100 MG CAPS Take 100 mg by mouth 2 times daily      Multiple Vitamins-Minerals (THERAPEUTIC MULTIVITAMIN-MINERALS) tablet Take 1 tablet by mouth daily (with breakfast)      potassium chloride (MICRO-K) 10 MEQ extended release capsule Take 10 mEq by mouth daily      latanoprost (XALATAN) 0.005 % ophthalmic solution Place 1 drop into both eyes nightly      dextran 70-hypromellose (TEARS NATURALE) 0.1-0.3 % SOLN opthalmic solution Place 1 drop into both eyes as needed      fenofibric acid (FIBRICOR) 135 MG CPDR capsule Take 1 capsule by mouth daily      traZODone (DESYREL) 50 MG tablet Take 1 tablet by mouth nightly      dexamethasone (DECADRON) 4 MG tablet Take 40 mg by mouth once a week      lenalidomide (REVLIMID) 10 MG chemo capsule Take 10 mg by mouth daily      ferrous sulfate 325 (65 Fe) MG tablet Take 325 mg by mouth every other day      furosemide (LASIX) 20 MG tablet Take 1 tablet by mouth 2 times daily (Patient taking differently: Take 20 mg by mouth daily ) 60 tablet 3    albuterol sulfate  (90 Base) MCG/ACT inhaler Inhale 1 puff into the lungs every 6 hours as needed for Wheezing     

## 2018-09-19 ENCOUNTER — HOSPITAL ENCOUNTER (OUTPATIENT)
Dept: CARDIAC REHAB | Age: 73
Setting detail: THERAPIES SERIES
Discharge: HOME | End: 2018-09-19
Payer: MEDICARE

## 2018-09-19 VITALS
SYSTOLIC BLOOD PRESSURE: 128 MMHG | WEIGHT: 176.9 LBS | HEIGHT: 69 IN | BODY MASS INDEX: 26.2 KG/M2 | HEART RATE: 98 BPM | DIASTOLIC BLOOD PRESSURE: 68 MMHG

## 2018-09-19 PROCEDURE — 93798 PHYS/QHP OP CAR RHAB W/ECG: CPT

## 2018-09-19 ASSESSMENT — PATIENT HEALTH QUESTIONNAIRE - PHQ9
SUM OF ALL RESPONSES TO PHQ QUESTIONS 1-9: 1
SUM OF ALL RESPONSES TO PHQ QUESTIONS 1-9: 1

## 2018-09-21 ENCOUNTER — HOSPITAL ENCOUNTER (OUTPATIENT)
Dept: CARDIAC REHAB | Age: 73
Setting detail: THERAPIES SERIES
Discharge: HOME | End: 2018-09-21
Payer: MEDICARE

## 2018-09-21 VITALS — BODY MASS INDEX: 26.29 KG/M2 | WEIGHT: 178 LBS

## 2018-09-21 PROCEDURE — 93798 PHYS/QHP OP CAR RHAB W/ECG: CPT

## 2018-09-24 ENCOUNTER — HOSPITAL ENCOUNTER (OUTPATIENT)
Dept: CARDIAC REHAB | Age: 73
Setting detail: THERAPIES SERIES
Discharge: HOME | End: 2018-09-24
Payer: MEDICARE

## 2018-09-24 VITALS — WEIGHT: 178.7 LBS | BODY MASS INDEX: 26.39 KG/M2

## 2018-09-24 PROCEDURE — 93798 PHYS/QHP OP CAR RHAB W/ECG: CPT

## 2018-09-26 ENCOUNTER — OFFICE VISIT (OUTPATIENT)
Dept: CARDIOTHORACIC SURGERY | Age: 73
End: 2018-09-26

## 2018-09-26 ENCOUNTER — HOSPITAL ENCOUNTER (OUTPATIENT)
Dept: CARDIAC REHAB | Age: 73
Setting detail: THERAPIES SERIES
Discharge: HOME | End: 2018-09-26
Payer: MEDICARE

## 2018-09-26 VITALS
OXYGEN SATURATION: 97 % | DIASTOLIC BLOOD PRESSURE: 74 MMHG | HEIGHT: 70 IN | RESPIRATION RATE: 16 BRPM | HEART RATE: 102 BPM | SYSTOLIC BLOOD PRESSURE: 111 MMHG | WEIGHT: 177 LBS | TEMPERATURE: 97.1 F | BODY MASS INDEX: 25.34 KG/M2

## 2018-09-26 VITALS — BODY MASS INDEX: 26.26 KG/M2 | WEIGHT: 177.8 LBS

## 2018-09-26 DIAGNOSIS — Z95.1 S/P CABG X 2: Primary | ICD-10-CM

## 2018-09-26 PROCEDURE — 99024 POSTOP FOLLOW-UP VISIT: CPT | Performed by: NURSE PRACTITIONER

## 2018-09-26 PROCEDURE — 93798 PHYS/QHP OP CAR RHAB W/ECG: CPT

## 2018-09-26 NOTE — PROGRESS NOTES
[facility]  Office Visit      Subjective:  Mr. Binh Daley is a 72 y.o. male s/p CABG x 2 on 8/16/18 with Dr. Tierra Mccormack at [facility]. he feels well today. He has returned to clinic with concerns for SVG incision on right leg near his knee. States it is not draining, but it is open. He is unsure how to treat it and wanted to make sure it is not infected. He denies fevers, pain at the site, drainage, purulence, warmth or redness at the site. Physical Exam  Vitals:  Vitals:    09/26/18 1020   BP: 111/74   Pulse: 102   Resp: 16   Temp: 97.1 °F (36.2 °C)   SpO2: 97%       General: Alert and Oriented x3. Ambulatory. No apparent distress. Chest:  No abnormality. Equal and symmetric expansion with respiration. Lungs:  Clear to auscultation. Cardiac:  Regular rate and rhythm without murmurs, rubs or gallops. Abdomen:  Soft, non-tender, normoactive bowel sounds. Extremities:  No edema. Intact pulses in all four extremities. Psychiatric: Mood and affect are appropriate. Surgical Wounds: MS incision completely healed. Right leg SVG slightly open, pink and yellow granulation tissue present.  NO drainage, no redness, no tenderness    Current Medications:    Current Outpatient Prescriptions:     amiodarone (PACERONE) 100 MG tablet, Take 1 tablet by mouth daily (Patient taking differently: Take 200 mg by mouth daily ), Disp: 30 tablet, Rfl: 3    PARoxetine (PAXIL) 10 MG tablet, Take 1 tablet by mouth daily, Disp: 30 tablet, Rfl: 3    docusate sodium (COLACE, DULCOLAX) 100 MG CAPS, Take 100 mg by mouth 2 times daily, Disp: , Rfl:     Multiple Vitamins-Minerals (THERAPEUTIC MULTIVITAMIN-MINERALS) tablet, Take 1 tablet by mouth daily (with breakfast), Disp: , Rfl:     potassium chloride (MICRO-K) 10 MEQ extended release capsule, Take 10 mEq by mouth daily, Disp: , Rfl:     latanoprost (XALATAN) 0.005 % ophthalmic solution, Place 1 drop into both eyes nightly, Disp: , Rfl:     dextran 70-hypromellose (TEARS NATURALE) 0.1-0.3 % SOLN opthalmic solution, Place 1 drop into both eyes as needed, Disp: , Rfl:     fenofibric acid (FIBRICOR) 135 MG CPDR capsule, Take 1 capsule by mouth daily, Disp: , Rfl:     traZODone (DESYREL) 50 MG tablet, Take 1 tablet by mouth nightly, Disp: , Rfl:     dexamethasone (DECADRON) 4 MG tablet, Take 40 mg by mouth once a week, Disp: , Rfl:     lenalidomide (REVLIMID) 10 MG chemo capsule, Take 10 mg by mouth daily, Disp: , Rfl:     ferrous sulfate 325 (65 Fe) MG tablet, Take 325 mg by mouth every other day, Disp: , Rfl:     furosemide (LASIX) 20 MG tablet, Take 1 tablet by mouth 2 times daily (Patient taking differently: Take 20 mg by mouth 2 times daily ), Disp: 60 tablet, Rfl: 3    albuterol sulfate  (90 Base) MCG/ACT inhaler, Inhale 1 puff into the lungs every 6 hours as needed for Wheezing, Disp: , Rfl:     gabapentin (NEURONTIN) 100 MG capsule, Take 300 mg by mouth daily.  ., Disp: , Rfl:     aspirin 81 MG chewable tablet, Take 1 tablet by mouth daily, Disp: 30 tablet, Rfl: 3    famotidine (PEPCID) 20 MG tablet, Take 1 tablet by mouth 2 times daily (Patient taking differently: Take 20 mg by mouth daily ), Disp: 60 tablet, Rfl: 3    simvastatin (ZOCOR) 40 MG tablet, Take 40 mg by mouth nightly, Disp: , Rfl:     rivaroxaban (XARELTO) 20 MG TABS tablet, Take 20 mg by mouth daily (with breakfast) , Disp: , Rfl:     glipiZIDE (GLUCOTROL) 5 MG tablet, Take 5 mg by mouth every morning (before breakfast), Disp: , Rfl:     Past Surgical History:   Procedure Laterality Date    ABDOMEN SURGERY  2002    STOMAL TUMOR    CARDIAC CATHETERIZATION  12/13/2017    right and left heart cath with Dr. Gris Wood Bilateral    [address] CABG, ARTERY-VEIN, SINGLE N/A 8/16/2018    CABG CORONARY ARTERY BYPASS ON  PUMP x 2, SWAN LAUREN, ORESTES (CSI# [id number]) performed by Xenia Adan MD at [address] [place] [address]

## 2018-09-28 ENCOUNTER — HOSPITAL ENCOUNTER (OUTPATIENT)
Dept: CARDIAC REHAB | Age: 73
Setting detail: THERAPIES SERIES
Discharge: HOME | End: 2018-09-28
Payer: MEDICARE

## 2018-09-28 VITALS — BODY MASS INDEX: 25.7 KG/M2 | WEIGHT: 179.1 LBS

## 2018-09-28 PROCEDURE — 93798 PHYS/QHP OP CAR RHAB W/ECG: CPT

## 2018-10-01 ENCOUNTER — HOSPITAL ENCOUNTER (OUTPATIENT)
Dept: CARDIAC REHAB | Age: 73
Setting detail: THERAPIES SERIES
Discharge: HOME | End: 2018-10-01
Payer: MEDICARE

## 2018-10-01 VITALS — BODY MASS INDEX: 25.63 KG/M2 | WEIGHT: 178.6 LBS

## 2018-10-01 PROCEDURE — 93798 PHYS/QHP OP CAR RHAB W/ECG: CPT

## 2018-10-03 ENCOUNTER — HOSPITAL ENCOUNTER (OUTPATIENT)
Dept: CARDIAC REHAB | Age: 73
Setting detail: THERAPIES SERIES
Discharge: HOME | End: 2018-10-03
Payer: MEDICARE

## 2018-10-03 VITALS — WEIGHT: 180.9 LBS | BODY MASS INDEX: 25.96 KG/M2

## 2018-10-03 PROCEDURE — 93798 PHYS/QHP OP CAR RHAB W/ECG: CPT

## 2018-10-05 ENCOUNTER — HOSPITAL ENCOUNTER (OUTPATIENT)
Dept: CARDIAC REHAB | Age: 73
Setting detail: THERAPIES SERIES
Discharge: HOME | End: 2018-10-05
Payer: MEDICARE

## 2018-10-05 VITALS — BODY MASS INDEX: 25.97 KG/M2 | WEIGHT: 181 LBS

## 2018-10-05 PROCEDURE — 93798 PHYS/QHP OP CAR RHAB W/ECG: CPT

## 2018-10-08 ENCOUNTER — HOSPITAL ENCOUNTER (OUTPATIENT)
Dept: CARDIAC REHAB | Age: 73
Setting detail: THERAPIES SERIES
Discharge: HOME | End: 2018-10-08
Payer: MEDICARE

## 2018-10-08 VITALS — BODY MASS INDEX: 25.94 KG/M2 | WEIGHT: 180.8 LBS

## 2018-10-08 PROCEDURE — 93798 PHYS/QHP OP CAR RHAB W/ECG: CPT

## 2018-10-10 ENCOUNTER — HOSPITAL ENCOUNTER (OUTPATIENT)
Dept: CARDIAC REHAB | Age: 73
Setting detail: THERAPIES SERIES
Discharge: HOME | End: 2018-10-10
Payer: MEDICARE

## 2018-10-10 VITALS — BODY MASS INDEX: 25.83 KG/M2 | WEIGHT: 180 LBS

## 2018-10-10 PROCEDURE — 93798 PHYS/QHP OP CAR RHAB W/ECG: CPT

## 2018-10-11 ENCOUNTER — PRE-PROCEDURE TELEPHONE (OUTPATIENT)
Dept: WOUND CARE | Age: 73
End: 2018-10-11

## 2018-10-11 NOTE — PROGRESS NOTES
Rec'd call from patient spouse, Muna Samuels, to schedule a new patient appointment for RLE wound s/p bypass. [facility], however they live close to [facility] and would like to come here. Writer offered tomorrow morning with vascular and she stated unable due to cardiac rehab in the am. Offered to look at a Wednesday, with a cardiologist, however she states cardiac rehab is every M/W/F. Writer informed we are open Monday - Friday, however we only have providers here at this time on M/W/F, and Monday is podiatry. Spouse requested for Wed10/17, writer informed unfortunately the new patient appointments for that specific day are full. Spouse irritated , writer informed to call referring provider and ask about cardiac rehab or options as scheduling are only available for W/F at this center. Spouse asked if [facility] would have more openings, writer informed likely yes as they have more providers and clinic days. Before I could finish my sentence about offering to transfer call to [facility], spouse abruptly hung up.      Electronically signed by Pedro Cabello RN on 10/11/2018 at 3:39 PM

## 2018-10-12 ENCOUNTER — HOSPITAL ENCOUNTER (OUTPATIENT)
Dept: CARDIAC REHAB | Age: 73
Setting detail: THERAPIES SERIES
Discharge: HOME | End: 2018-10-12
Payer: MEDICARE

## 2018-10-12 VITALS — WEIGHT: 182 LBS | BODY MASS INDEX: 26.11 KG/M2

## 2018-10-12 PROCEDURE — 93798 PHYS/QHP OP CAR RHAB W/ECG: CPT

## 2018-10-15 ENCOUNTER — HOSPITAL ENCOUNTER (OUTPATIENT)
Dept: CARDIAC REHAB | Age: 73
Setting detail: THERAPIES SERIES
Discharge: HOME | End: 2018-10-15
Payer: MEDICARE

## 2018-10-15 VITALS — WEIGHT: 180.2 LBS | BODY MASS INDEX: 25.86 KG/M2

## 2018-10-15 PROCEDURE — 93798 PHYS/QHP OP CAR RHAB W/ECG: CPT

## 2018-10-17 ENCOUNTER — HOSPITAL ENCOUNTER (OUTPATIENT)
Dept: CARDIAC REHAB | Age: 73
Setting detail: THERAPIES SERIES
Discharge: HOME | End: 2018-10-17
Payer: MEDICARE

## 2018-10-17 VITALS — WEIGHT: 179.6 LBS | BODY MASS INDEX: 25.77 KG/M2

## 2018-10-17 PROCEDURE — 93798 PHYS/QHP OP CAR RHAB W/ECG: CPT

## 2018-10-19 ENCOUNTER — HOSPITAL ENCOUNTER (OUTPATIENT)
Dept: CARDIAC REHAB | Age: 73
Setting detail: THERAPIES SERIES
Discharge: HOME | End: 2018-10-19
Payer: MEDICARE

## 2018-10-19 VITALS — WEIGHT: 179.8 LBS | BODY MASS INDEX: 26.63 KG/M2 | HEIGHT: 69 IN

## 2018-10-19 PROCEDURE — 93798 PHYS/QHP OP CAR RHAB W/ECG: CPT

## 2018-10-19 NOTE — PROGRESS NOTES
ITP updated and reviewed, pt states will be taking a new medication soon, not sure of name, will bring it in to show us, denies any other changes.

## 2018-10-22 ENCOUNTER — HOSPITAL ENCOUNTER (OUTPATIENT)
Dept: CARDIAC REHAB | Age: 73
Setting detail: THERAPIES SERIES
Discharge: HOME | End: 2018-10-22
Payer: MEDICARE

## 2018-10-22 VITALS — WEIGHT: 181.4 LBS | BODY MASS INDEX: 26.79 KG/M2

## 2018-10-22 PROCEDURE — 93798 PHYS/QHP OP CAR RHAB W/ECG: CPT

## 2018-10-24 ENCOUNTER — HOSPITAL ENCOUNTER (OUTPATIENT)
Dept: CARDIAC REHAB | Age: 73
Setting detail: THERAPIES SERIES
Discharge: HOME | End: 2018-10-24
Payer: MEDICARE

## 2018-10-24 VITALS — WEIGHT: 180 LBS | BODY MASS INDEX: 26.58 KG/M2

## 2018-10-24 PROCEDURE — 93798 PHYS/QHP OP CAR RHAB W/ECG: CPT

## 2018-10-26 ENCOUNTER — HOSPITAL ENCOUNTER (OUTPATIENT)
Dept: CARDIAC REHAB | Age: 73
Setting detail: THERAPIES SERIES
Discharge: HOME | End: 2018-10-26
Payer: MEDICARE

## 2018-10-26 VITALS — WEIGHT: 179.9 LBS | BODY MASS INDEX: 26.57 KG/M2

## 2018-10-26 PROCEDURE — 93798 PHYS/QHP OP CAR RHAB W/ECG: CPT

## 2018-10-29 ENCOUNTER — HOSPITAL ENCOUNTER (OUTPATIENT)
Dept: CARDIAC REHAB | Age: 73
Setting detail: THERAPIES SERIES
Discharge: HOME | End: 2018-10-29
Payer: MEDICARE

## 2018-10-29 VITALS — BODY MASS INDEX: 26.43 KG/M2 | WEIGHT: 179 LBS

## 2018-10-29 PROCEDURE — 93798 PHYS/QHP OP CAR RHAB W/ECG: CPT

## 2018-10-31 ENCOUNTER — HOSPITAL ENCOUNTER (OUTPATIENT)
Dept: CARDIAC REHAB | Age: 73
Setting detail: THERAPIES SERIES
Discharge: HOME | End: 2018-10-31
Payer: MEDICARE

## 2018-10-31 VITALS — BODY MASS INDEX: 25.99 KG/M2 | WEIGHT: 176 LBS

## 2018-10-31 PROCEDURE — 93798 PHYS/QHP OP CAR RHAB W/ECG: CPT

## 2018-11-02 ENCOUNTER — HOSPITAL ENCOUNTER (OUTPATIENT)
Dept: CARDIAC REHAB | Age: 73
Setting detail: THERAPIES SERIES
Discharge: HOME | End: 2018-11-02
Payer: MEDICARE

## 2018-11-02 VITALS — BODY MASS INDEX: 26.35 KG/M2 | WEIGHT: 178.4 LBS

## 2018-11-02 PROCEDURE — 93798 PHYS/QHP OP CAR RHAB W/ECG: CPT

## 2018-11-05 ENCOUNTER — HOSPITAL ENCOUNTER (OUTPATIENT)
Dept: CARDIAC REHAB | Age: 73
Setting detail: THERAPIES SERIES
Discharge: HOME | End: 2018-11-05
Payer: MEDICARE

## 2018-11-05 VITALS — WEIGHT: 178 LBS | BODY MASS INDEX: 26.29 KG/M2

## 2018-11-05 PROCEDURE — 93798 PHYS/QHP OP CAR RHAB W/ECG: CPT

## 2018-11-07 ENCOUNTER — HOSPITAL ENCOUNTER (OUTPATIENT)
Dept: CARDIAC REHAB | Age: 73
Setting detail: THERAPIES SERIES
Discharge: HOME | End: 2018-11-07
Payer: MEDICARE

## 2018-11-07 VITALS — WEIGHT: 179.1 LBS | BODY MASS INDEX: 26.45 KG/M2

## 2018-11-07 PROCEDURE — 93798 PHYS/QHP OP CAR RHAB W/ECG: CPT

## 2018-11-09 ENCOUNTER — HOSPITAL ENCOUNTER (OUTPATIENT)
Dept: CARDIAC REHAB | Age: 73
Setting detail: THERAPIES SERIES
Discharge: HOME | End: 2018-11-09
Payer: MEDICARE

## 2018-11-09 VITALS — WEIGHT: 178.9 LBS | BODY MASS INDEX: 26.42 KG/M2

## 2018-11-09 PROCEDURE — 93798 PHYS/QHP OP CAR RHAB W/ECG: CPT

## 2018-11-12 ENCOUNTER — HOSPITAL ENCOUNTER (OUTPATIENT)
Dept: CARDIAC REHAB | Age: 73
Setting detail: THERAPIES SERIES
Discharge: HOME | End: 2018-11-12
Payer: MEDICARE

## 2018-11-12 VITALS — BODY MASS INDEX: 26.39 KG/M2 | WEIGHT: 178.7 LBS

## 2018-11-12 PROCEDURE — 93798 PHYS/QHP OP CAR RHAB W/ECG: CPT

## 2018-11-14 ENCOUNTER — HOSPITAL ENCOUNTER (OUTPATIENT)
Dept: CARDIAC REHAB | Age: 73
Setting detail: THERAPIES SERIES
Discharge: HOME | End: 2018-11-14
Payer: MEDICARE

## 2018-11-14 VITALS — WEIGHT: 179.7 LBS | BODY MASS INDEX: 26.54 KG/M2

## 2018-11-14 PROCEDURE — 93798 PHYS/QHP OP CAR RHAB W/ECG: CPT

## 2018-11-16 ENCOUNTER — HOSPITAL ENCOUNTER (OUTPATIENT)
Dept: CARDIAC REHAB | Age: 73
Setting detail: THERAPIES SERIES
Discharge: HOME | End: 2018-11-16
Payer: MEDICARE

## 2018-11-16 VITALS — WEIGHT: 179.6 LBS | BODY MASS INDEX: 26.52 KG/M2

## 2018-11-16 PROCEDURE — 93798 PHYS/QHP OP CAR RHAB W/ECG: CPT

## 2018-11-19 ENCOUNTER — HOSPITAL ENCOUNTER (OUTPATIENT)
Dept: CARDIAC REHAB | Age: 73
Setting detail: THERAPIES SERIES
Discharge: HOME | End: 2018-11-19
Payer: MEDICARE

## 2018-11-19 VITALS — HEIGHT: 69 IN | WEIGHT: 178.1 LBS | BODY MASS INDEX: 26.38 KG/M2

## 2018-11-19 PROCEDURE — 93798 PHYS/QHP OP CAR RHAB W/ECG: CPT

## 2018-11-21 ENCOUNTER — HOSPITAL ENCOUNTER (OUTPATIENT)
Dept: CARDIAC REHAB | Age: 73
Setting detail: THERAPIES SERIES
Discharge: HOME | End: 2018-11-21
Payer: MEDICARE

## 2018-11-26 ENCOUNTER — HOSPITAL ENCOUNTER (OUTPATIENT)
Dept: CARDIAC REHAB | Age: 73
Setting detail: THERAPIES SERIES
Discharge: HOME | End: 2018-11-26
Payer: MEDICARE

## 2018-11-26 VITALS — BODY MASS INDEX: 26.06 KG/M2 | WEIGHT: 176.5 LBS

## 2018-11-26 PROCEDURE — 93798 PHYS/QHP OP CAR RHAB W/ECG: CPT

## 2018-11-28 ENCOUNTER — HOSPITAL ENCOUNTER (OUTPATIENT)
Dept: CARDIAC REHAB | Age: 73
Setting detail: THERAPIES SERIES
Discharge: HOME | End: 2018-11-28
Payer: MEDICARE

## 2018-11-28 VITALS — BODY MASS INDEX: 26.21 KG/M2 | WEIGHT: 177.5 LBS

## 2018-11-28 PROCEDURE — 93798 PHYS/QHP OP CAR RHAB W/ECG: CPT

## 2018-11-30 ENCOUNTER — HOSPITAL ENCOUNTER (OUTPATIENT)
Dept: CARDIAC REHAB | Age: 73
Setting detail: THERAPIES SERIES
Discharge: HOME | End: 2018-11-30
Payer: MEDICARE

## 2018-11-30 VITALS — WEIGHT: 177.6 LBS | BODY MASS INDEX: 26.23 KG/M2

## 2018-11-30 PROCEDURE — 93798 PHYS/QHP OP CAR RHAB W/ECG: CPT

## 2018-12-03 ENCOUNTER — HOSPITAL ENCOUNTER (OUTPATIENT)
Dept: CARDIAC REHAB | Age: 73
Setting detail: THERAPIES SERIES
Discharge: HOME | End: 2018-12-03
Payer: MEDICARE

## 2018-12-03 VITALS — WEIGHT: 177 LBS | BODY MASS INDEX: 26.14 KG/M2

## 2018-12-03 PROCEDURE — 93798 PHYS/QHP OP CAR RHAB W/ECG: CPT

## 2018-12-05 ENCOUNTER — HOSPITAL ENCOUNTER (OUTPATIENT)
Dept: CARDIAC REHAB | Age: 73
Setting detail: THERAPIES SERIES
Discharge: HOME | End: 2018-12-05
Payer: MEDICARE

## 2018-12-05 VITALS — WEIGHT: 177.3 LBS | BODY MASS INDEX: 26.18 KG/M2

## 2018-12-05 PROCEDURE — 93798 PHYS/QHP OP CAR RHAB W/ECG: CPT

## 2018-12-07 ENCOUNTER — HOSPITAL ENCOUNTER (OUTPATIENT)
Dept: CARDIAC REHAB | Age: 73
Setting detail: THERAPIES SERIES
Discharge: HOME | End: 2018-12-07
Payer: MEDICARE

## 2018-12-07 VITALS — WEIGHT: 177.6 LBS | BODY MASS INDEX: 26.23 KG/M2

## 2018-12-07 PROCEDURE — 93798 PHYS/QHP OP CAR RHAB W/ECG: CPT

## 2018-12-10 ENCOUNTER — HOSPITAL ENCOUNTER (OUTPATIENT)
Dept: CARDIAC REHAB | Age: 73
Setting detail: THERAPIES SERIES
Discharge: HOME | End: 2018-12-10
Payer: MEDICARE

## 2018-12-10 ENCOUNTER — APPOINTMENT (OUTPATIENT)
Dept: CARDIAC REHAB | Age: 73
End: 2018-12-10
Payer: MEDICARE

## 2018-12-10 VITALS — WEIGHT: 178.3 LBS | BODY MASS INDEX: 26.33 KG/M2

## 2018-12-10 PROCEDURE — 93798 PHYS/QHP OP CAR RHAB W/ECG: CPT

## 2018-12-10 ASSESSMENT — PATIENT HEALTH QUESTIONNAIRE - PHQ9
SUM OF ALL RESPONSES TO PHQ QUESTIONS 1-9: 0
SUM OF ALL RESPONSES TO PHQ QUESTIONS 1-9: 0

## 2018-12-12 ENCOUNTER — HOSPITAL ENCOUNTER (OUTPATIENT)
Dept: CARDIAC REHAB | Age: 73
Setting detail: THERAPIES SERIES
Discharge: HOME | End: 2018-12-12
Payer: MEDICARE

## 2018-12-12 VITALS — BODY MASS INDEX: 26.52 KG/M2 | WEIGHT: 179.6 LBS

## 2018-12-12 PROCEDURE — 93798 PHYS/QHP OP CAR RHAB W/ECG: CPT

## 2018-12-14 ENCOUNTER — HOSPITAL ENCOUNTER (OUTPATIENT)
Dept: CARDIAC REHAB | Age: 73
Setting detail: THERAPIES SERIES
Discharge: HOME | End: 2018-12-14
Payer: MEDICARE

## 2018-12-14 VITALS — HEIGHT: 69 IN | WEIGHT: 177.3 LBS | BODY MASS INDEX: 26.26 KG/M2

## 2018-12-14 PROCEDURE — 93798 PHYS/QHP OP CAR RHAB W/ECG: CPT

## 2018-12-17 ENCOUNTER — APPOINTMENT (OUTPATIENT)
Dept: CARDIAC REHAB | Age: 73
End: 2018-12-17
Payer: MEDICARE

## 2018-12-17 ENCOUNTER — HOSPITAL ENCOUNTER (OUTPATIENT)
Dept: CARDIAC CATH/INVASIVE PROCEDURES | Age: 73
Setting detail: OBSERVATION
Discharge: HOME | End: 2018-12-18
Attending: INTERNAL MEDICINE | Admitting: INTERNAL MEDICINE
Payer: MEDICARE

## 2018-12-17 DIAGNOSIS — Z95.810 S/P ICD (INTERNAL CARDIAC DEFIBRILLATOR) PROCEDURE: ICD-10-CM

## 2018-12-17 LAB
BUN BLDV-MCNC: 11 MG/DL (ref 8–23)
EKG ATRIAL RATE: 77 BPM
EKG P AXIS: 75 DEGREES
EKG P-R INTERVAL: 174 MS
EKG Q-T INTERVAL: 440 MS
EKG QRS DURATION: 150 MS
EKG QTC CALCULATION (BAZETT): 497 MS
EKG R AXIS: -54 DEGREES
EKG T AXIS: 94 DEGREES
EKG VENTRICULAR RATE: 77 BPM
GLUCOSE BLD-MCNC: 168 MG/DL (ref 75–110)
GLUCOSE BLD-MCNC: 99 MG/DL (ref 74–100)
PLATELET # BLD: 308 K/UL (ref 138–453)
POC CHLORIDE: 111 MMOL/L (ref 98–107)
POC HEMATOCRIT: 39 % (ref 41–53)
POC HEMOGLOBIN: 13.3 G/DL (ref 13.5–17.5)
POC POTASSIUM: 7.2 MMOL/L (ref 3.5–4.5)
POC SODIUM: 144 MMOL/L (ref 138–146)
POTASSIUM SERPL-SCNC: 3.7 MMOL/L (ref 3.7–5.3)

## 2018-12-17 PROCEDURE — 2500000003 HC RX 250 WO HCPCS

## 2018-12-17 PROCEDURE — 6360000004 HC RX CONTRAST MEDICATION

## 2018-12-17 PROCEDURE — 84520 ASSAY OF UREA NITROGEN: CPT

## 2018-12-17 PROCEDURE — C1898 LEAD, PMKR, OTHER THAN TRANS: HCPCS

## 2018-12-17 PROCEDURE — 84132 ASSAY OF SERUM POTASSIUM: CPT

## 2018-12-17 PROCEDURE — 85014 HEMATOCRIT: CPT

## 2018-12-17 PROCEDURE — 6360000002 HC RX W HCPCS

## 2018-12-17 PROCEDURE — 2580000003 HC RX 258

## 2018-12-17 PROCEDURE — 85049 AUTOMATED PLATELET COUNT: CPT

## 2018-12-17 PROCEDURE — 82565 ASSAY OF CREATININE: CPT

## 2018-12-17 PROCEDURE — 82947 ASSAY GLUCOSE BLOOD QUANT: CPT

## 2018-12-17 PROCEDURE — 93005 ELECTROCARDIOGRAM TRACING: CPT

## 2018-12-17 PROCEDURE — 6360000002 HC RX W HCPCS: Performed by: INTERNAL MEDICINE

## 2018-12-17 PROCEDURE — 2580000003 HC RX 258: Performed by: INTERNAL MEDICINE

## 2018-12-17 PROCEDURE — C1894 INTRO/SHEATH, NON-LASER: HCPCS

## 2018-12-17 PROCEDURE — C1721 AICD, DUAL CHAMBER: HCPCS

## 2018-12-17 PROCEDURE — 6370000000 HC RX 637 (ALT 250 FOR IP): Performed by: INTERNAL MEDICINE

## 2018-12-17 PROCEDURE — 84295 ASSAY OF SERUM SODIUM: CPT

## 2018-12-17 PROCEDURE — 82435 ASSAY OF BLOOD CHLORIDE: CPT

## 2018-12-17 PROCEDURE — G0378 HOSPITAL OBSERVATION PER HR: HCPCS

## 2018-12-17 PROCEDURE — 96365 THER/PROPH/DIAG IV INF INIT: CPT

## 2018-12-17 PROCEDURE — 2709999900 HC NON-CHARGEABLE SUPPLY

## 2018-12-17 PROCEDURE — 33249 INSJ/RPLCMT DEFIB W/LEAD(S): CPT | Performed by: INTERNAL MEDICINE

## 2018-12-17 RX ORDER — DEXTROSE MONOHYDRATE 25 G/50ML
12.5 INJECTION, SOLUTION INTRAVENOUS PRN
Status: DISCONTINUED | OUTPATIENT
Start: 2018-12-17 | End: 2018-12-18 | Stop reason: HOSPADM

## 2018-12-17 RX ORDER — TRAZODONE HYDROCHLORIDE 50 MG/1
50 TABLET ORAL NIGHTLY
Status: DISCONTINUED | OUTPATIENT
Start: 2018-12-17 | End: 2018-12-18 | Stop reason: HOSPADM

## 2018-12-17 RX ORDER — LATANOPROST 50 UG/ML
1 SOLUTION/ DROPS OPHTHALMIC NIGHTLY
Status: DISCONTINUED | OUTPATIENT
Start: 2018-12-17 | End: 2018-12-18 | Stop reason: HOSPADM

## 2018-12-17 RX ORDER — POTASSIUM CHLORIDE 750 MG/1
10 CAPSULE, EXTENDED RELEASE ORAL 2 TIMES DAILY
Status: DISCONTINUED | OUTPATIENT
Start: 2018-12-17 | End: 2018-12-18 | Stop reason: HOSPADM

## 2018-12-17 RX ORDER — PAROXETINE 10 MG/1
10 TABLET, FILM COATED ORAL DAILY
Status: DISCONTINUED | OUTPATIENT
Start: 2018-12-17 | End: 2018-12-18 | Stop reason: HOSPADM

## 2018-12-17 RX ORDER — VANCOMYCIN HYDROCHLORIDE 1 G/200ML
1000 INJECTION, SOLUTION INTRAVENOUS ONCE
Status: DISCONTINUED | OUTPATIENT
Start: 2018-12-17 | End: 2018-12-18 | Stop reason: HOSPADM

## 2018-12-17 RX ORDER — SODIUM CHLORIDE 0.9 % (FLUSH) 0.9 %
10 SYRINGE (ML) INJECTION EVERY 12 HOURS SCHEDULED
Status: DISCONTINUED | OUTPATIENT
Start: 2018-12-17 | End: 2018-12-18 | Stop reason: HOSPADM

## 2018-12-17 RX ORDER — GLIPIZIDE 5 MG/1
5 TABLET ORAL
Status: DISCONTINUED | OUTPATIENT
Start: 2018-12-18 | End: 2018-12-18 | Stop reason: HOSPADM

## 2018-12-17 RX ORDER — FAMOTIDINE 20 MG/1
20 TABLET, FILM COATED ORAL DAILY
Status: DISCONTINUED | OUTPATIENT
Start: 2018-12-17 | End: 2018-12-18 | Stop reason: HOSPADM

## 2018-12-17 RX ORDER — DOCUSATE SODIUM 100 MG/1
100 CAPSULE, LIQUID FILLED ORAL 2 TIMES DAILY
Status: DISCONTINUED | OUTPATIENT
Start: 2018-12-17 | End: 2018-12-18 | Stop reason: HOSPADM

## 2018-12-17 RX ORDER — FENOFIBRATE 160 MG/1
160 TABLET ORAL DAILY
Status: DISCONTINUED | OUTPATIENT
Start: 2018-12-17 | End: 2018-12-17

## 2018-12-17 RX ORDER — ACETAMINOPHEN 325 MG/1
650 TABLET ORAL EVERY 4 HOURS PRN
Status: DISCONTINUED | OUTPATIENT
Start: 2018-12-17 | End: 2018-12-18 | Stop reason: HOSPADM

## 2018-12-17 RX ORDER — M-VIT,TX,IRON,MINS/CALC/FOLIC 27MG-0.4MG
1 TABLET ORAL
Status: DISCONTINUED | OUTPATIENT
Start: 2018-12-18 | End: 2018-12-18 | Stop reason: HOSPADM

## 2018-12-17 RX ORDER — SIMVASTATIN 40 MG
40 TABLET ORAL NIGHTLY
Status: DISCONTINUED | OUTPATIENT
Start: 2018-12-17 | End: 2018-12-18 | Stop reason: HOSPADM

## 2018-12-17 RX ORDER — SODIUM CHLORIDE 0.9 % (FLUSH) 0.9 %
10 SYRINGE (ML) INJECTION PRN
Status: DISCONTINUED | OUTPATIENT
Start: 2018-12-17 | End: 2018-12-18 | Stop reason: HOSPADM

## 2018-12-17 RX ORDER — FENOFIBRIC ACID 135 MG/1
135 CAPSULE, DELAYED RELEASE ORAL DAILY
Status: DISCONTINUED | OUTPATIENT
Start: 2018-12-17 | End: 2018-12-18 | Stop reason: HOSPADM

## 2018-12-17 RX ORDER — LENALIDOMIDE 10 MG/1
10 CAPSULE ORAL DAILY
Status: DISCONTINUED | OUTPATIENT
Start: 2018-12-17 | End: 2018-12-18 | Stop reason: HOSPADM

## 2018-12-17 RX ORDER — NICOTINE POLACRILEX 4 MG
15 LOZENGE BUCCAL PRN
Status: DISCONTINUED | OUTPATIENT
Start: 2018-12-17 | End: 2018-12-18 | Stop reason: HOSPADM

## 2018-12-17 RX ORDER — ALBUTEROL SULFATE 90 UG/1
1 AEROSOL, METERED RESPIRATORY (INHALATION) EVERY 6 HOURS PRN
Status: DISCONTINUED | OUTPATIENT
Start: 2018-12-17 | End: 2018-12-18 | Stop reason: HOSPADM

## 2018-12-17 RX ORDER — DEXTROSE MONOHYDRATE 50 MG/ML
100 INJECTION, SOLUTION INTRAVENOUS PRN
Status: DISCONTINUED | OUTPATIENT
Start: 2018-12-17 | End: 2018-12-18 | Stop reason: HOSPADM

## 2018-12-17 RX ORDER — GABAPENTIN 300 MG/1
300 CAPSULE ORAL 2 TIMES DAILY
Status: DISCONTINUED | OUTPATIENT
Start: 2018-12-17 | End: 2018-12-18 | Stop reason: HOSPADM

## 2018-12-17 RX ORDER — GABAPENTIN 300 MG/1
300 CAPSULE ORAL DAILY
Status: DISCONTINUED | OUTPATIENT
Start: 2018-12-17 | End: 2018-12-17

## 2018-12-17 RX ORDER — LANOLIN ALCOHOL/MO/W.PET/CERES
325 CREAM (GRAM) TOPICAL EVERY OTHER DAY
Status: DISCONTINUED | OUTPATIENT
Start: 2018-12-17 | End: 2018-12-18 | Stop reason: HOSPADM

## 2018-12-17 RX ORDER — ONDANSETRON 2 MG/ML
4 INJECTION INTRAMUSCULAR; INTRAVENOUS EVERY 6 HOURS PRN
Status: DISCONTINUED | OUTPATIENT
Start: 2018-12-17 | End: 2018-12-18 | Stop reason: HOSPADM

## 2018-12-17 RX ORDER — SODIUM CHLORIDE 9 MG/ML
INJECTION, SOLUTION INTRAVENOUS CONTINUOUS
Status: DISCONTINUED | OUTPATIENT
Start: 2018-12-17 | End: 2018-12-18 | Stop reason: HOSPADM

## 2018-12-17 RX ORDER — FUROSEMIDE 20 MG/1
20 TABLET ORAL 2 TIMES DAILY
Status: DISCONTINUED | OUTPATIENT
Start: 2018-12-17 | End: 2018-12-18 | Stop reason: HOSPADM

## 2018-12-17 RX ORDER — OXYCODONE HYDROCHLORIDE AND ACETAMINOPHEN 5; 325 MG/1; MG/1
2 TABLET ORAL EVERY 6 HOURS PRN
Status: DISCONTINUED | OUTPATIENT
Start: 2018-12-17 | End: 2018-12-18 | Stop reason: HOSPADM

## 2018-12-17 RX ORDER — VANCOMYCIN HYDROCHLORIDE 1 G/200ML
1000 INJECTION, SOLUTION INTRAVENOUS ONCE
Status: COMPLETED | OUTPATIENT
Start: 2018-12-17 | End: 2018-12-18

## 2018-12-17 RX ORDER — FENOFIBRIC ACID 135 MG/1
1 CAPSULE, DELAYED RELEASE ORAL DAILY
Status: DISCONTINUED | OUTPATIENT
Start: 2018-12-17 | End: 2018-12-17 | Stop reason: CLARIF

## 2018-12-17 RX ORDER — AMIODARONE HYDROCHLORIDE 200 MG/1
200 TABLET ORAL DAILY
Status: DISCONTINUED | OUTPATIENT
Start: 2018-12-17 | End: 2018-12-18 | Stop reason: HOSPADM

## 2018-12-17 RX ADMIN — PAROXETINE 10 MG: 10 TABLET, FILM COATED ORAL at 16:33

## 2018-12-17 RX ADMIN — FUROSEMIDE 20 MG: 20 TABLET ORAL at 16:32

## 2018-12-17 RX ADMIN — VANCOMYCIN HYDROCHLORIDE 1000 MG: 1 INJECTION, SOLUTION INTRAVENOUS at 22:41

## 2018-12-17 RX ADMIN — GABAPENTIN 300 MG: 300 CAPSULE ORAL at 21:42

## 2018-12-17 RX ADMIN — DOCUSATE SODIUM 100 MG: 100 TABLET, FILM COATED ORAL at 21:41

## 2018-12-17 RX ADMIN — LATANOPROST 1 DROP: 50 SOLUTION OPHTHALMIC at 21:45

## 2018-12-17 RX ADMIN — FENOFIBRIC ACID 135 MG: 135 CAPSULE, DELAYED RELEASE ORAL at 16:33

## 2018-12-17 RX ADMIN — Medication 40 MG: at 21:41

## 2018-12-17 RX ADMIN — POTASSIUM CHLORIDE 10 MEQ: 750 CAPSULE, EXTENDED RELEASE ORAL at 16:33

## 2018-12-17 RX ADMIN — TRAZODONE HYDROCHLORIDE 50 MG: 50 TABLET ORAL at 23:31

## 2018-12-17 RX ADMIN — OXYCODONE HYDROCHLORIDE AND ACETAMINOPHEN 2 TABLET: 5; 325 TABLET ORAL at 21:50

## 2018-12-17 RX ADMIN — FAMOTIDINE 20 MG: 20 TABLET, FILM COATED ORAL at 16:33

## 2018-12-17 RX ADMIN — OXYCODONE HYDROCHLORIDE AND ACETAMINOPHEN 2 TABLET: 5; 325 TABLET ORAL at 14:06

## 2018-12-17 RX ADMIN — Medication 10 ML: at 21:48

## 2018-12-17 RX ADMIN — AMIODARONE HYDROCHLORIDE 200 MG: 200 TABLET ORAL at 16:33

## 2018-12-17 RX ADMIN — DOCUSATE SODIUM 100 MG: 100 TABLET, FILM COATED ORAL at 16:37

## 2018-12-17 RX ADMIN — FERROUS SULFATE TAB EC 325 MG (65 MG FE EQUIVALENT) 325 MG: 325 (65 FE) TABLET DELAYED RESPONSE at 16:32

## 2018-12-17 ASSESSMENT — PAIN SCALES - GENERAL
PAINLEVEL_OUTOF10: 8
PAINLEVEL_OUTOF10: 4

## 2018-12-18 ENCOUNTER — APPOINTMENT (OUTPATIENT)
Dept: GENERAL RADIOLOGY | Age: 73
End: 2018-12-18
Attending: INTERNAL MEDICINE
Payer: MEDICARE

## 2018-12-18 VITALS
OXYGEN SATURATION: 95 % | HEIGHT: 69 IN | TEMPERATURE: 97.9 F | WEIGHT: 172.9 LBS | BODY MASS INDEX: 25.61 KG/M2 | SYSTOLIC BLOOD PRESSURE: 130 MMHG | DIASTOLIC BLOOD PRESSURE: 79 MMHG | RESPIRATION RATE: 16 BRPM | HEART RATE: 81 BPM

## 2018-12-18 LAB
ANION GAP SERPL CALCULATED.3IONS-SCNC: 12 MMOL/L (ref 9–17)
BUN BLDV-MCNC: 10 MG/DL (ref 8–23)
BUN/CREAT BLD: ABNORMAL (ref 9–20)
CALCIUM SERPL-MCNC: 9.4 MG/DL (ref 8.6–10.4)
CHLORIDE BLD-SCNC: 106 MMOL/L (ref 98–107)
CO2: 23 MMOL/L (ref 20–31)
CREAT SERPL-MCNC: 1.1 MG/DL (ref 0.7–1.2)
GFR AFRICAN AMERICAN: >60 ML/MIN
GFR NON-AFRICAN AMERICAN: 56 ML/MIN
GFR NON-AFRICAN AMERICAN: >60 ML/MIN
GFR SERPL CREATININE-BSD FRML MDRD: >60 ML/MIN
GFR SERPL CREATININE-BSD FRML MDRD: ABNORMAL ML/MIN/{1.73_M2}
GLUCOSE BLD-MCNC: 105 MG/DL (ref 70–99)
GLUCOSE BLD-MCNC: 105 MG/DL (ref 75–110)
GLUCOSE BLD-MCNC: 141 MG/DL (ref 75–110)
HCT VFR BLD CALC: 37.5 % (ref 40.7–50.3)
HEMOGLOBIN: 11.3 G/DL (ref 13–17)
MAGNESIUM: 1.5 MG/DL (ref 1.6–2.6)
MCH RBC QN AUTO: 28.2 PG (ref 25.2–33.5)
MCHC RBC AUTO-ENTMCNC: 30.1 G/DL (ref 28.4–34.8)
MCV RBC AUTO: 93.5 FL (ref 82.6–102.9)
NRBC AUTOMATED: 0 PER 100 WBC
PDW BLD-RTO: 16.3 % (ref 11.8–14.4)
PLATELET # BLD: 310 K/UL (ref 138–453)
PMV BLD AUTO: 9.6 FL (ref 8.1–13.5)
POC CREATININE: 1.26 MG/DL (ref 0.51–1.19)
POTASSIUM SERPL-SCNC: 3.5 MMOL/L (ref 3.7–5.3)
RBC # BLD: 4.01 M/UL (ref 4.21–5.77)
SODIUM BLD-SCNC: 141 MMOL/L (ref 135–144)
WBC # BLD: 7.7 K/UL (ref 3.5–11.3)

## 2018-12-18 PROCEDURE — 93005 ELECTROCARDIOGRAM TRACING: CPT

## 2018-12-18 PROCEDURE — G0378 HOSPITAL OBSERVATION PER HR: HCPCS

## 2018-12-18 PROCEDURE — 83735 ASSAY OF MAGNESIUM: CPT

## 2018-12-18 PROCEDURE — 71045 X-RAY EXAM CHEST 1 VIEW: CPT

## 2018-12-18 PROCEDURE — 2580000003 HC RX 258: Performed by: INTERNAL MEDICINE

## 2018-12-18 PROCEDURE — 94760 N-INVAS EAR/PLS OXIMETRY 1: CPT

## 2018-12-18 PROCEDURE — 36415 COLL VENOUS BLD VENIPUNCTURE: CPT

## 2018-12-18 PROCEDURE — 80048 BASIC METABOLIC PNL TOTAL CA: CPT

## 2018-12-18 PROCEDURE — 6370000000 HC RX 637 (ALT 250 FOR IP): Performed by: INTERNAL MEDICINE

## 2018-12-18 PROCEDURE — 85027 COMPLETE CBC AUTOMATED: CPT

## 2018-12-18 PROCEDURE — 82947 ASSAY GLUCOSE BLOOD QUANT: CPT

## 2018-12-18 RX ORDER — OXYCODONE HYDROCHLORIDE AND ACETAMINOPHEN 5; 325 MG/1; MG/1
1 TABLET ORAL EVERY 6 HOURS PRN
Qty: 12 TABLET | Refills: 0 | Status: SHIPPED | OUTPATIENT
Start: 2018-12-18 | End: 2018-12-21

## 2018-12-18 RX ADMIN — AMIODARONE HYDROCHLORIDE 200 MG: 200 TABLET ORAL at 09:15

## 2018-12-18 RX ADMIN — PAROXETINE 10 MG: 10 TABLET, FILM COATED ORAL at 09:14

## 2018-12-18 RX ADMIN — FUROSEMIDE 20 MG: 20 TABLET ORAL at 09:15

## 2018-12-18 RX ADMIN — FAMOTIDINE 20 MG: 20 TABLET, FILM COATED ORAL at 09:15

## 2018-12-18 RX ADMIN — GABAPENTIN 300 MG: 300 CAPSULE ORAL at 09:14

## 2018-12-18 RX ADMIN — OXYCODONE HYDROCHLORIDE AND ACETAMINOPHEN 2 TABLET: 5; 325 TABLET ORAL at 09:07

## 2018-12-18 RX ADMIN — Medication 1 TABLET: at 09:14

## 2018-12-18 RX ADMIN — GLIPIZIDE 5 MG: 5 TABLET ORAL at 09:14

## 2018-12-18 RX ADMIN — POTASSIUM CHLORIDE 10 MEQ: 750 CAPSULE, EXTENDED RELEASE ORAL at 09:14

## 2018-12-18 RX ADMIN — FENOFIBRIC ACID 135 MG: 135 CAPSULE, DELAYED RELEASE ORAL at 09:16

## 2018-12-18 RX ADMIN — Medication 10 ML: at 09:14

## 2018-12-18 ASSESSMENT — PAIN SCALES - GENERAL: PAINLEVEL_OUTOF10: 6

## 2018-12-21 LAB
EKG ATRIAL RATE: 79 BPM
EKG P AXIS: 65 DEGREES
EKG P-R INTERVAL: 182 MS
EKG Q-T INTERVAL: 404 MS
EKG QRS DURATION: 152 MS
EKG QTC CALCULATION (BAZETT): 463 MS
EKG R AXIS: -52 DEGREES
EKG T AXIS: 109 DEGREES
EKG VENTRICULAR RATE: 79 BPM

## 2019-01-09 ENCOUNTER — OFFICE VISIT (OUTPATIENT)
Dept: GASTROENTEROLOGY | Age: 74
End: 2019-01-09
Payer: MEDICARE

## 2019-01-09 VITALS
DIASTOLIC BLOOD PRESSURE: 78 MMHG | HEART RATE: 90 BPM | HEIGHT: 69 IN | SYSTOLIC BLOOD PRESSURE: 112 MMHG | WEIGHT: 177 LBS | OXYGEN SATURATION: 98 % | BODY MASS INDEX: 26.22 KG/M2

## 2019-01-09 DIAGNOSIS — C90.01 MULTIPLE MYELOMA IN REMISSION (HCC): ICD-10-CM

## 2019-01-09 DIAGNOSIS — D49.0 IPMN (INTRADUCTAL PAPILLARY MUCINOUS NEOPLASM): Primary | ICD-10-CM

## 2019-01-09 PROCEDURE — 99214 OFFICE O/P EST MOD 30 MIN: CPT | Performed by: INTERNAL MEDICINE

## 2019-01-09 PROCEDURE — G8484 FLU IMMUNIZE NO ADMIN: HCPCS | Performed by: INTERNAL MEDICINE

## 2019-01-09 PROCEDURE — G8427 DOCREV CUR MEDS BY ELIG CLIN: HCPCS | Performed by: INTERNAL MEDICINE

## 2019-01-09 PROCEDURE — G8419 CALC BMI OUT NRM PARAM NOF/U: HCPCS | Performed by: INTERNAL MEDICINE

## 2019-01-09 PROCEDURE — G8598 ASA/ANTIPLAT THER USED: HCPCS | Performed by: INTERNAL MEDICINE

## 2019-01-09 PROCEDURE — 1036F TOBACCO NON-USER: CPT | Performed by: INTERNAL MEDICINE

## 2019-01-09 PROCEDURE — 1101F PT FALLS ASSESS-DOCD LE1/YR: CPT | Performed by: INTERNAL MEDICINE

## 2019-01-09 PROCEDURE — 4040F PNEUMOC VAC/ADMIN/RCVD: CPT | Performed by: INTERNAL MEDICINE

## 2019-01-09 PROCEDURE — 3017F COLORECTAL CA SCREEN DOC REV: CPT | Performed by: INTERNAL MEDICINE

## 2019-01-09 PROCEDURE — 1123F ACP DISCUSS/DSCN MKR DOCD: CPT | Performed by: INTERNAL MEDICINE

## 2019-01-09 ASSESSMENT — ENCOUNTER SYMPTOMS
CONSTIPATION: 0
ANAL BLEEDING: 0
BACK PAIN: 0
ABDOMINAL DISTENTION: 0
GASTROINTESTINAL NEGATIVE: 1
ALLERGIC/IMMUNOLOGIC NEGATIVE: 1
COUGH: 0
DIARRHEA: 0
RECTAL PAIN: 0
BLOOD IN STOOL: 0
SINUS PRESSURE: 0
SHORTNESS OF BREATH: 0
VOMITING: 0
ABDOMINAL PAIN: 0
RESPIRATORY NEGATIVE: 1
NAUSEA: 0

## 2019-03-19 ENCOUNTER — HOSPITAL ENCOUNTER (OUTPATIENT)
Age: 74
Discharge: HOME | End: 2019-03-19
Payer: MEDICARE

## 2019-03-19 LAB
ALT SERPL-CCNC: 16 U/L (ref 5–41)
ANION GAP SERPL CALCULATED.3IONS-SCNC: 13 MMOL/L (ref 9–17)
ANION GAP SERPL CALCULATED.3IONS-SCNC: 14 MMOL/L (ref 9–17)
AST SERPL-CCNC: 27 U/L
BUN BLDV-MCNC: 23 MG/DL (ref 8–23)
BUN BLDV-MCNC: 23 MG/DL (ref 8–23)
BUN/CREAT BLD: ABNORMAL (ref 9–20)
BUN/CREAT BLD: ABNORMAL (ref 9–20)
CALCIUM SERPL-MCNC: 10 MG/DL (ref 8.6–10.4)
CALCIUM SERPL-MCNC: 10.1 MG/DL (ref 8.6–10.4)
CHLORIDE BLD-SCNC: 103 MMOL/L (ref 98–107)
CHLORIDE BLD-SCNC: 104 MMOL/L (ref 98–107)
CHOLESTEROL/HDL RATIO: 3
CHOLESTEROL: 86 MG/DL
CO2: 26 MMOL/L (ref 20–31)
CO2: 26 MMOL/L (ref 20–31)
CREAT SERPL-MCNC: 1.62 MG/DL (ref 0.7–1.2)
CREAT SERPL-MCNC: 1.67 MG/DL (ref 0.7–1.2)
GFR AFRICAN AMERICAN: 49 ML/MIN
GFR AFRICAN AMERICAN: 51 ML/MIN
GFR NON-AFRICAN AMERICAN: 41 ML/MIN
GFR NON-AFRICAN AMERICAN: 42 ML/MIN
GFR SERPL CREATININE-BSD FRML MDRD: ABNORMAL ML/MIN/{1.73_M2}
GLUCOSE BLD-MCNC: 66 MG/DL (ref 70–99)
GLUCOSE BLD-MCNC: 67 MG/DL (ref 70–99)
HDLC SERPL-MCNC: 29 MG/DL
LDL CHOLESTEROL: 40 MG/DL (ref 0–130)
POTASSIUM SERPL-SCNC: 4.9 MMOL/L (ref 3.7–5.3)
POTASSIUM SERPL-SCNC: 4.9 MMOL/L (ref 3.7–5.3)
SODIUM BLD-SCNC: 142 MMOL/L (ref 135–144)
SODIUM BLD-SCNC: 144 MMOL/L (ref 135–144)
TOTAL CK: 48 U/L (ref 39–308)
TRIGL SERPL-MCNC: 84 MG/DL
VLDLC SERPL CALC-MCNC: ABNORMAL MG/DL (ref 1–30)

## 2019-03-19 PROCEDURE — 84450 TRANSFERASE (AST) (SGOT): CPT

## 2019-03-19 PROCEDURE — 82550 ASSAY OF CK (CPK): CPT

## 2019-03-19 PROCEDURE — 36415 COLL VENOUS BLD VENIPUNCTURE: CPT

## 2019-03-19 PROCEDURE — 80061 LIPID PANEL: CPT

## 2019-03-19 PROCEDURE — 80048 BASIC METABOLIC PNL TOTAL CA: CPT

## 2019-03-19 PROCEDURE — 84460 ALANINE AMINO (ALT) (SGPT): CPT

## 2019-03-19 PROCEDURE — 83036 HEMOGLOBIN GLYCOSYLATED A1C: CPT

## 2019-03-20 LAB
ESTIMATED AVERAGE GLUCOSE: 103 MG/DL
HBA1C MFR BLD: 5.2 % (ref 4–6)

## 2019-05-15 ENCOUNTER — OFFICE VISIT (OUTPATIENT)
Dept: GASTROENTEROLOGY | Age: 74
End: 2019-05-15
Payer: MEDICARE

## 2019-05-15 VITALS
HEART RATE: 98 BPM | WEIGHT: 188 LBS | DIASTOLIC BLOOD PRESSURE: 88 MMHG | SYSTOLIC BLOOD PRESSURE: 122 MMHG | BODY MASS INDEX: 27.76 KG/M2

## 2019-05-15 DIAGNOSIS — D49.0 IPMN (INTRADUCTAL PAPILLARY MUCINOUS NEOPLASM): Primary | ICD-10-CM

## 2019-05-15 DIAGNOSIS — K59.09 OTHER CONSTIPATION: ICD-10-CM

## 2019-05-15 DIAGNOSIS — C90.01 MULTIPLE MYELOMA IN REMISSION (HCC): ICD-10-CM

## 2019-05-15 DIAGNOSIS — R13.19 ESOPHAGEAL DYSPHAGIA: ICD-10-CM

## 2019-05-15 PROCEDURE — G8419 CALC BMI OUT NRM PARAM NOF/U: HCPCS | Performed by: INTERNAL MEDICINE

## 2019-05-15 PROCEDURE — G8427 DOCREV CUR MEDS BY ELIG CLIN: HCPCS | Performed by: INTERNAL MEDICINE

## 2019-05-15 PROCEDURE — G8598 ASA/ANTIPLAT THER USED: HCPCS | Performed by: INTERNAL MEDICINE

## 2019-05-15 PROCEDURE — 1036F TOBACCO NON-USER: CPT | Performed by: INTERNAL MEDICINE

## 2019-05-15 PROCEDURE — 1123F ACP DISCUSS/DSCN MKR DOCD: CPT | Performed by: INTERNAL MEDICINE

## 2019-05-15 PROCEDURE — 4040F PNEUMOC VAC/ADMIN/RCVD: CPT | Performed by: INTERNAL MEDICINE

## 2019-05-15 PROCEDURE — 99214 OFFICE O/P EST MOD 30 MIN: CPT | Performed by: INTERNAL MEDICINE

## 2019-05-15 PROCEDURE — 3017F COLORECTAL CA SCREEN DOC REV: CPT | Performed by: INTERNAL MEDICINE

## 2019-05-15 ASSESSMENT — ENCOUNTER SYMPTOMS
WHEEZING: 0
DIARRHEA: 0
ABDOMINAL DISTENTION: 1
NAUSEA: 0
VOMITING: 0
ABDOMINAL PAIN: 0
TROUBLE SWALLOWING: 0
CHOKING: 0
CONSTIPATION: 1
RECTAL PAIN: 0
ANAL BLEEDING: 0
BLOOD IN STOOL: 0
COUGH: 0

## 2019-05-15 NOTE — PROGRESS NOTES
GI CLINIC FOLLOW UP    INTERVAL HISTORY:   No referring provider defined for this encounter. Chief Complaint   Patient presents with    Anorexia     Patient states loss of appetite for about 1-2 months. He states some days he can eat and oters he can not. He says when this happens he becomes very weak.  Constipation     Patient states he gets episodes of constipation for about 1-2 months. He states he feels bloated, and he doesn't understand why. He reports taking colace twice daily and senna once daily. HISTORY OF PRESENT ILLNESS: Candi Schilder. is a 72 y.o. male , referred for evaluation of above    was seen for :   anemia GI bleed,  When he was in 1/2018 did a colonoscopy : hemorrhoids  We I ordered MRE : showed small cyst pancreas ? IPMN    had cardiac issues and had bypass surgery   Has multiple myeloma he does follow with Dr. Ana Hernandez   also seen by GI at [place], in 8/18 for elevated LFts felt to be related to ischemic hepatopathy , at that time was advised to follow with me     Today been having epigastric pain with food getting stuck there he said , poo pep, after eat , feels like want to vomit   On Tramadol, has been constipated he did have bowel movement daily but feels incomplete defecation, fear some straining, he takes senna 8 takes Colace to have bowel movement   on ASA    Pepcid Rosey Villa /Senna         Labs at his PCP 3/2019 : cr is up    he is on Pepcid , not taking PPI   Colon 1/2018 : hemorrhoids    Past Medical,Family, and Social History reviewed and does contribute to the patient presentingcondition. Patient's PMH/PSH,SH,PSYCH Hx, MEDs, ALLERGIES, and ROS were all reviewed and updated in the appropriate sections.     PAST MEDICAL HISTORY:  Past Medical History:   Diagnosis Date    Anticoagulated     XARELTO    Arthritis     all over    CAD (coronary artery disease)     Cardiomyopathy ([address]     CHF (congestive heart failure) ([address]     COPD (chronic obstructive pulmonary disease) (HCC)     Diabetes mellitus [facility]     DVT of leg (deep venous thrombosis) ([facility]     RIGHT    Former tobacco use     Hx of blood clots     Hyperlipidemia     Hypertension     Marijuana user     Multiple myeloma ([facility] 2014    Neuropathy     Wears dentures     FULL UPPER, PARTIAL LOWER       Past Surgical History:   Procedure Laterality Date    ABDOMEN SURGERY  2002    STOMAL TUMOR    CARDIAC CATHETERIZATION  12/13/2017    right and left heart cath with Dr. Mitchell Bustamante  12/17/2018    DR Asa Perez   [place] CATARACT REMOVAL WITH IMPLANT Bilateral     HERNIA REPAIR  1967    [place] CABG, ARTERY-VEIN, SINGLE N/A 8/16/2018    CABG CORONARY ARTERY BYPASS ON  PUMP x 2, SWAN LAUREN, ORESTES (CSI# [id number]) [facility] FL[facility] W/TRAMAINE [place] 1976 PFRMD N/A 1/14/2018    COLONOSCOPY performed by Rola Landon MD at [facility] ESOPHAGOGASTRODUODENOSCOPY TRANSORAL DIAGNOSTIC N/A 1/12/2018    EGD DIAGNOSTIC ONLY performed by Good Oconnell MD at [facility] [address]        CURRENT MEDICATIONS:    Current Outpatient Medications:     amiodarone (PACERONE) 100 MG tablet, Take 1 tablet by mouth daily (Patient taking differently: Take 200 mg by mouth daily ), Disp: 30 tablet, Rfl: 3    PARoxetine (PAXIL) 10 MG tablet, Take 1 tablet by mouth daily, Disp: 30 tablet, Rfl: 3    docusate sodium (COLACE, DULCOLAX) 100 MG CAPS, Take 100 mg by mouth 2 times daily, Disp: , Rfl:     Multiple Vitamins-Minerals (THERAPEUTIC MULTIVITAMIN-MINERALS) tablet, Take 1 tablet by mouth daily (with breakfast), Disp: , Rfl:     potassium chloride (MICRO-K) 10 MEQ extended release capsule, Take 10 mEq by mouth 2 times daily , Disp: , Rfl:     latanoprost (XALATAN) 0.005 % ophthalmic solution, Place 1 drop into both eyes nightly, Disp: , Rfl:     dextran 70-hypromellose (TEARS NATURALE) 0.1-0.3 % SOLN opthalmic solution, Place 1 drop into both eyes as needed, Disp: , Rfl:     fenofibric acid (FIBRICOR) 135 MG CPDR capsule, Take 1 capsule by mouth daily, Disp: , Rfl:     traZODone (DESYREL) 50 MG tablet, Take 1 tablet by mouth nightly, Disp: , Rfl:     dexamethasone (DECADRON) 4 MG tablet, Take 40 mg by mouth once a week, Disp: , Rfl:     lenalidomide (REVLIMID) 10 MG chemo capsule, Take 10 mg by mouth daily, Disp: , Rfl:     ferrous sulfate 325 (65 Fe) MG tablet, Take 325 mg by mouth every other day, Disp: , Rfl:     furosemide (LASIX) 20 MG tablet, Take 1 tablet by mouth 2 times daily (Patient taking differently: Take 20 mg by mouth 2 times daily ), Disp: 60 tablet, Rfl: 3    albuterol sulfate  (90 Base) MCG/ACT inhaler, Inhale 1 puff into the lungs every 6 hours as needed for Wheezing, Disp: , Rfl:     gabapentin (NEURONTIN) 100 MG capsule, Take 300 mg by mouth daily.  ., Disp: , Rfl:     aspirin 81 MG chewable tablet, Take 1 tablet by mouth daily, Disp: 30 tablet, Rfl: 3    famotidine (PEPCID) 20 MG tablet, Take 1 tablet by mouth 2 times daily (Patient taking differently: Take 20 mg by mouth daily ), Disp: 60 tablet, Rfl: 3    simvastatin (ZOCOR) 40 MG tablet, Take 40 mg by mouth nightly, Disp: , Rfl:     rivaroxaban (XARELTO) 20 MG TABS tablet, Take 20 mg by mouth daily (with breakfast) , Disp: , Rfl:     glipiZIDE (GLUCOTROL) 5 MG tablet, Take 5 mg by mouth every morning (before breakfast), Disp: , Rfl:     ALLERGIES:   Allergies   Allergen Reactions    Nuts [Peanut-Containing Drug Products] Other (See Comments)     abd pain       FAMILY HISTORY:       Problem Relation Age of Onset    Diabetes Mother     Stroke Father     Other Sister         PE    Stroke Brother          SOCIAL HISTORY:   Social History     Socioeconomic History    Marital status:      Spouse name: Not on file    Number of children: Not on file    Years of education: Not on file    Highest education level: Not on file   Occupational History    Occupation: Retired   Social Needs    Financial resource strain: Not on file    Food insecurity:     Worry: Not on file     Inability: Not on file   Quotefish needs:     Medical: Not on file     Non-medical: Not on file   Tobacco Use    Smoking status: Former Smoker     Packs/day: 0.50     Years: 30.00     Pack years: 15.00     Types: Cigarettes     Last attempt to quit: 8/15/2018     Years since quittin.7    Smokeless tobacco: Never Used    Tobacco comment: a little less than a pack a day   Substance and Sexual Activity    Alcohol use: Yes     Comment: rarely    Drug use: Yes     Types: Marijuana     Comment: 0.5 daily     Sexual activity: Not on file   Lifestyle    Physical activity:     Days per week: Not on file     Minutes per session: Not on file    Stress: Not on file   Relationships    Social connections:     Talks on phone: Not on file     Gets together: Not on file     Attends Latter-day service: Not on file     Active member of club or organization: Not on file     Attends meetings of clubs or organizations: Not on file     Relationship status: Not on file    Intimate partner violence:     Fear of current or ex partner: Not on file     Emotionally abused: Not on file     Physically abused: Not on file     Forced sexual activity: Not on file   Other Topics Concern    Not on file   Social History Narrative    Not on file       REVIEW OF SYSTEMS: A 12-point review of systemswas obtained and pertinent positives and negatives were enumerated above in the history of present illness. All other reviewed systems / symptoms were negative. Review of Systems   Constitutional: Positive for appetite change (decrease) and fatigue. Negative for unexpected weight change. HENT: Negative for trouble swallowing. Respiratory: Negative for cough, choking and wheezing. Cardiovascular: Negative for chest pain, palpitations and leg swelling.    Gastrointestinal: Neck: Normal range of motion. Neck supple. No JVD present. No tracheal deviation present. No thyromegaly present. Cardiovascular: Normal rate, regular rhythm, normal heart sounds and intact distal pulses. No murmur heard. Pulmonary/Chest: Effort normal and breath sounds normal. No respiratory distress. He has no wheezes. Abdominal: Soft. Bowel sounds are normal. He exhibits no distension and no mass. There is no tenderness. There is no rebound and no guarding. Musculoskeletal: Normal range of motion. He exhibits no edema or tenderness. Neurological: He is alert and oriented to person, place, and time. He has normal reflexes. Skin: Skin is warm. No rash noted. He is not diaphoretic. No erythema. No pallor. He is not diaphoretic   Psychiatric: He has a normal mood and affect. His behavior is normal. Judgment and thought content normal.   Nursing note and vitals reviewed. IMPRESSION: Mr. Germania Javed is a 72 y.o. male with      Diagnosis Orders   1. IPMN (intraductal papillary mucinous neoplasm)  MRI Abdomen W WO Contrast Mrcp   2. Multiple myeloma in remission ([facility]     3. Esophageal dysphagia  EGD   4. Other constipation       Will repeat MRI following the I PMN appearance. Will start him on Relistor  Because of the dysphagia and epigastric pain we'll proceed with an EGD rule out esophagitis rule out esophageal stricture rule out H. pylori infection  Continue on that for now  Diet/life style/natural hx /complication of the dx were all explained in details   Past medical, past surgical, social history, psychiatric history, medications or allergies, all reviewed and  updated  ]         Thank you for allowing me to participate in the care of Mr. Dey. For any further questions please do not hesitate to contact me. I have reviewed and agree with the ROS entered by the MA/LPN. Note is dictated utilizing voice recognition software. Unfortunately this leads to occasional typographical errors. Please contact our office if you have any questions.       Dieter Nuno MD  [facility]  O: #[phone]

## 2019-05-17 PROBLEM — Z95.1 HISTORY OF CORONARY ARTERY BYPASS GRAFT: Chronic | Status: ACTIVE | Noted: 2019-05-17

## 2019-05-21 ENCOUNTER — HOSPITAL ENCOUNTER (OUTPATIENT)
Age: 74
End: 2019-05-21
Payer: MEDICARE

## 2019-05-21 ENCOUNTER — HOSPITAL ENCOUNTER (OUTPATIENT)
Dept: ULTRASOUND IMAGING | Age: 74
Discharge: HOME | End: 2019-05-23
Payer: MEDICARE

## 2019-05-21 ENCOUNTER — HOSPITAL ENCOUNTER (OUTPATIENT)
Age: 74
Discharge: HOME | End: 2019-05-21
Payer: MEDICARE

## 2019-05-21 DIAGNOSIS — N18.30 CKD (CHRONIC KIDNEY DISEASE), STAGE III (HCC): ICD-10-CM

## 2019-05-21 LAB
-: NORMAL
ALBUMIN SERPL-MCNC: 3.7 G/DL (ref 3.5–5.2)
ALBUMIN/GLOBULIN RATIO: 1.3 (ref 1–2.5)
ALP BLD-CCNC: 120 U/L (ref 40–129)
ALT SERPL-CCNC: 18 U/L (ref 5–41)
AMORPHOUS: NORMAL
ANION GAP SERPL CALCULATED.3IONS-SCNC: 15 MMOL/L (ref 9–17)
AST SERPL-CCNC: 34 U/L
BACTERIA: NORMAL
BILIRUB SERPL-MCNC: 2.4 MG/DL (ref 0.3–1.2)
BILIRUBIN URINE: ABNORMAL
BUN BLDV-MCNC: 29 MG/DL (ref 8–23)
BUN/CREAT BLD: ABNORMAL (ref 9–20)
CALCIUM SERPL-MCNC: 9.5 MG/DL (ref 8.6–10.4)
CASTS UA: NORMAL /LPF (ref 0–8)
CHLORIDE BLD-SCNC: 106 MMOL/L (ref 98–107)
CO2: 24 MMOL/L (ref 20–31)
COLOR: ABNORMAL
COMMENT UA: ABNORMAL
COMPLEMENT C3: 152 MG/DL (ref 90–180)
COMPLEMENT C4: 49 MG/DL (ref 10–40)
CREAT SERPL-MCNC: 1.44 MG/DL (ref 0.7–1.2)
CREATININE URINE: 285 MG/DL (ref 39–259)
CRYSTALS, UA: NORMAL /HPF
EPITHELIAL CELLS UA: NORMAL /HPF (ref 0–5)
FREE KAPPA/LAMBDA RATIO: 2.27 (ref 0.26–1.65)
GFR AFRICAN AMERICAN: 59 ML/MIN
GFR NON-AFRICAN AMERICAN: 48 ML/MIN
GFR SERPL CREATININE-BSD FRML MDRD: ABNORMAL ML/MIN/{1.73_M2}
GFR SERPL CREATININE-BSD FRML MDRD: ABNORMAL ML/MIN/{1.73_M2}
GLUCOSE BLD-MCNC: 132 MG/DL (ref 70–99)
GLUCOSE URINE: NEGATIVE
KAPPA FREE LIGHT CHAINS QNT: 2.5 MG/DL (ref 0.37–1.94)
KETONES, URINE: ABNORMAL
LAMBDA FREE LIGHT CHAINS QNT: 1.1 MG/DL (ref 0.57–2.63)
LEUKOCYTE ESTERASE, URINE: ABNORMAL
MUCUS: NORMAL
NITRITE, URINE: POSITIVE
OTHER OBSERVATIONS UA: NORMAL
PH UA: 5 (ref 5–8)
PHOSPHORUS: 3.3 MG/DL (ref 2.5–4.5)
POTASSIUM SERPL-SCNC: 4.2 MMOL/L (ref 3.7–5.3)
PROTEIN UA: ABNORMAL
RBC UA: NORMAL /HPF (ref 0–4)
RENAL EPITHELIAL, UA: NORMAL /HPF
SODIUM BLD-SCNC: 145 MMOL/L (ref 135–144)
SODIUM,UR: <20 MMOL/L
SPECIFIC GRAVITY UA: 1.02 (ref 1–1.03)
TOTAL PROTEIN, URINE: 97 MG/DL
TOTAL PROTEIN: 6.6 G/DL (ref 6.4–8.3)
TRICHOMONAS: NORMAL
TURBIDITY: ABNORMAL
URINE HGB: ABNORMAL
UROBILINOGEN, URINE: NORMAL
WBC UA: NORMAL /HPF (ref 0–5)
YEAST: NORMAL

## 2019-05-21 PROCEDURE — 82330 ASSAY OF CALCIUM: CPT

## 2019-05-21 PROCEDURE — 84100 ASSAY OF PHOSPHORUS: CPT

## 2019-05-21 PROCEDURE — 84155 ASSAY OF PROTEIN SERUM: CPT

## 2019-05-21 PROCEDURE — 76770 US EXAM ABDO BACK WALL COMP: CPT

## 2019-05-21 PROCEDURE — 83883 ASSAY NEPHELOMETRY NOT SPEC: CPT

## 2019-05-21 PROCEDURE — 86334 IMMUNOFIX E-PHORESIS SERUM: CPT

## 2019-05-21 PROCEDURE — 82306 VITAMIN D 25 HYDROXY: CPT

## 2019-05-21 PROCEDURE — 83970 ASSAY OF PARATHORMONE: CPT

## 2019-05-21 PROCEDURE — 84300 ASSAY OF URINE SODIUM: CPT

## 2019-05-21 PROCEDURE — 84165 PROTEIN E-PHORESIS SERUM: CPT

## 2019-05-21 PROCEDURE — 80053 COMPREHEN METABOLIC PANEL: CPT

## 2019-05-21 PROCEDURE — 86160 COMPLEMENT ANTIGEN: CPT

## 2019-05-21 PROCEDURE — 81001 URINALYSIS AUTO W/SCOPE: CPT

## 2019-05-21 PROCEDURE — 84156 ASSAY OF PROTEIN URINE: CPT

## 2019-05-21 PROCEDURE — 85025 COMPLETE CBC W/AUTO DIFF WBC: CPT

## 2019-05-21 PROCEDURE — 82570 ASSAY OF URINE CREATININE: CPT

## 2019-05-21 PROCEDURE — 36415 COLL VENOUS BLD VENIPUNCTURE: CPT

## 2019-05-22 ENCOUNTER — TELEPHONE (OUTPATIENT)
Dept: GASTROENTEROLOGY | Age: 74
End: 2019-05-22

## 2019-05-22 LAB
ABSOLUTE EOS #: 0.07 K/UL (ref 0–0.4)
ABSOLUTE IMMATURE GRANULOCYTE: 0 K/UL (ref 0–0.3)
ABSOLUTE LYMPH #: 1.93 K/UL (ref 1–4.8)
ABSOLUTE MONO #: 0.69 K/UL (ref 0.1–0.8)
BASOPHILS # BLD: 0 % (ref 0–2)
BASOPHILS ABSOLUTE: 0 K/UL (ref 0–0.2)
CALCIUM IONIZED: 1.31 MMOL/L (ref 1.13–1.33)
DIFFERENTIAL TYPE: ABNORMAL
EOSINOPHILS RELATIVE PERCENT: 1 % (ref 1–4)
HCT VFR BLD CALC: 45.4 % (ref 40.7–50.3)
HEMOGLOBIN: 13.1 G/DL (ref 13–17)
IMMATURE GRANULOCYTES: 0 %
LYMPHOCYTES # BLD: 28 % (ref 24–44)
MCH RBC QN AUTO: 26.8 PG (ref 25.2–33.5)
MCHC RBC AUTO-ENTMCNC: 28.9 G/DL (ref 28.4–34.8)
MCV RBC AUTO: 92.8 FL (ref 82.6–102.9)
MONOCYTES # BLD: 10 % (ref 1–7)
MORPHOLOGY: ABNORMAL
NRBC AUTOMATED: 0 PER 100 WBC
PDW BLD-RTO: 20.1 % (ref 11.8–14.4)
PLATELET # BLD: 293 K/UL (ref 138–453)
PLATELET ESTIMATE: ABNORMAL
PMV BLD AUTO: 10.4 FL (ref 8.1–13.5)
PTH INTACT: 88.04 PG/ML (ref 15–65)
RBC # BLD: 4.89 M/UL (ref 4.21–5.77)
RBC # BLD: ABNORMAL 10*6/UL
SEG NEUTROPHILS: 61 % (ref 36–66)
SEGMENTED NEUTROPHILS ABSOLUTE COUNT: 4.21 K/UL (ref 1.8–7.7)
VITAMIN D 25-HYDROXY: 32.4 NG/ML (ref 30–100)
WBC # BLD: 6.9 K/UL (ref 3.5–11.3)
WBC # BLD: ABNORMAL 10*3/UL

## 2019-05-22 RX ORDER — POLYETHYLENE GLYCOL 3350, SODIUM CHLORIDE, POTASSIUM CHLORIDE, SODIUM BICARBONATE, AND SODIUM SULFATE 240; 5.84; 2.98; 6.72; 22.72 G/4L; G/4L; G/4L; G/4L; G/4L
4000 POWDER, FOR SOLUTION ORAL ONCE
Qty: 4000 ML | Refills: 0 | Status: CANCELLED | OUTPATIENT
Start: 2019-05-22 | End: 2019-05-22

## 2019-05-23 LAB
ALBUMIN (CALCULATED): 4.1 G/DL (ref 3.2–5.2)
ALBUMIN PERCENT: 62 % (ref 45–65)
ALPHA 1 PERCENT: 4 % (ref 3–6)
ALPHA 2 PERCENT: 10 % (ref 6–13)
ALPHA-1-GLOBULIN: 0.3 G/DL (ref 0.1–0.4)
ALPHA-2-GLOBULIN: 0.6 G/DL (ref 0.5–0.9)
BETA GLOBULIN: 0.9 G/DL (ref 0.5–1.1)
BETA PERCENT: 14 % (ref 11–19)
GAMMA GLOBULIN %: 10 % (ref 9–20)
GAMMA GLOBULIN: 0.7 G/DL (ref 0.5–1.5)
PATHOLOGIST: NORMAL
PATHOLOGIST: NORMAL
PROTEIN ELECTROPHORESIS, SERUM: NORMAL
SERUM IFX INTERP: NORMAL
TOTAL PROT. SUM,%: 100 % (ref 98–102)
TOTAL PROT. SUM: 6.6 G/DL (ref 6.3–8.2)
TOTAL PROTEIN: 6.5 G/DL (ref 6.4–8.3)

## 2019-05-28 NOTE — TELEPHONE ENCOUNTER
Contacted patient and advised we received his cardiac clearance for his Xarelto and aspirin. Patient to hold Xarelto 2 daysand aspirin 7 days prior to procedure. Patient voiced understanding. Clearance also states that if cautery is used that patient's ICD will need to be turned off by applying a magnet before the procedure and turned back on afterward. Karla Gonzales in endo at [facility] regarding this.

## 2019-06-04 ENCOUNTER — APPOINTMENT (OUTPATIENT)
Dept: GENERAL RADIOLOGY | Age: 74
DRG: 291 | End: 2019-06-04
Payer: MEDICARE

## 2019-06-04 ENCOUNTER — HOSPITAL ENCOUNTER (INPATIENT)
Age: 74
LOS: 9 days | Discharge: HOME | DRG: 291 | End: 2019-06-13
Attending: EMERGENCY MEDICINE | Admitting: INTERNAL MEDICINE
Payer: MEDICARE

## 2019-06-04 DIAGNOSIS — N18.30 CKD (CHRONIC KIDNEY DISEASE), STAGE III (HCC): Chronic | ICD-10-CM

## 2019-06-04 DIAGNOSIS — I50.23 ACUTE ON CHRONIC SYSTOLIC CHF (CONGESTIVE HEART FAILURE) (HCC): ICD-10-CM

## 2019-06-04 DIAGNOSIS — I50.9 ACUTE ON CHRONIC CONGESTIVE HEART FAILURE, UNSPECIFIED HEART FAILURE TYPE (HCC): Primary | ICD-10-CM

## 2019-06-04 LAB
ABSOLUTE EOS #: 0 K/UL (ref 0–0.4)
ABSOLUTE EOS #: 0.14 K/UL (ref 0–0.4)
ABSOLUTE IMMATURE GRANULOCYTE: 0 K/UL (ref 0–0.3)
ABSOLUTE IMMATURE GRANULOCYTE: 0 K/UL (ref 0–0.3)
ABSOLUTE LYMPH #: 1.73 K/UL (ref 1–4.8)
ABSOLUTE LYMPH #: 2.38 K/UL (ref 1–4.8)
ABSOLUTE MONO #: 0.58 K/UL (ref 0.1–0.8)
ABSOLUTE MONO #: 0.91 K/UL (ref 0.1–0.8)
ALLEN TEST: NORMAL
ANION GAP SERPL CALCULATED.3IONS-SCNC: 14 MMOL/L (ref 9–17)
ANION GAP: 7 MMOL/L (ref 7–16)
BASOPHILS # BLD: 1 % (ref 0–2)
BASOPHILS # BLD: 1 % (ref 0–2)
BASOPHILS ABSOLUTE: 0.06 K/UL (ref 0–0.2)
BASOPHILS ABSOLUTE: 0.07 K/UL (ref 0–0.2)
BNP INTERPRETATION: ABNORMAL
BUN BLDV-MCNC: 26 MG/DL (ref 8–23)
BUN/CREAT BLD: ABNORMAL (ref 9–20)
CALCIUM SERPL-MCNC: 9.4 MG/DL (ref 8.6–10.4)
CHLORIDE BLD-SCNC: 105 MMOL/L (ref 98–107)
CHOLESTEROL/HDL RATIO: 3.7
CHOLESTEROL: 103 MG/DL
CO2: 24 MMOL/L (ref 20–31)
CREAT SERPL-MCNC: 1.13 MG/DL (ref 0.7–1.2)
DIFFERENTIAL TYPE: ABNORMAL
DIFFERENTIAL TYPE: ABNORMAL
EOSINOPHILS RELATIVE PERCENT: 0 % (ref 1–4)
EOSINOPHILS RELATIVE PERCENT: 2 % (ref 1–4)
FIO2: NORMAL
GFR AFRICAN AMERICAN: >60 ML/MIN
GFR NON-AFRICAN AMERICAN: 55 ML/MIN
GFR NON-AFRICAN AMERICAN: >60 ML/MIN
GFR SERPL CREATININE-BSD FRML MDRD: >60 ML/MIN
GFR SERPL CREATININE-BSD FRML MDRD: ABNORMAL ML/MIN/{1.73_M2}
GLUCOSE BLD-MCNC: 103 MG/DL (ref 70–99)
GLUCOSE BLD-MCNC: 99 MG/DL (ref 74–100)
HCO3 VENOUS: 26.8 MMOL/L (ref 22–29)
HCT VFR BLD CALC: 44.8 % (ref 40.7–50.3)
HCT VFR BLD CALC: 47.4 % (ref 40.7–50.3)
HDLC SERPL-MCNC: 28 MG/DL
HEMOGLOBIN: 14.1 G/DL (ref 13–17)
HEMOGLOBIN: 14.4 G/DL (ref 13–17)
IMMATURE GRANULOCYTES: 0 %
IMMATURE GRANULOCYTES: 0 %
LDL CHOLESTEROL: 55 MG/DL (ref 0–130)
LYMPHOCYTES # BLD: 27 % (ref 24–44)
LYMPHOCYTES # BLD: 34 % (ref 24–44)
MCH RBC QN AUTO: 27.4 PG (ref 25.2–33.5)
MCH RBC QN AUTO: 28 PG (ref 25.2–33.5)
MCHC RBC AUTO-ENTMCNC: 30.4 G/DL (ref 28.4–34.8)
MCHC RBC AUTO-ENTMCNC: 31.5 G/DL (ref 28.4–34.8)
MCV RBC AUTO: 88.9 FL (ref 82.6–102.9)
MCV RBC AUTO: 90.1 FL (ref 82.6–102.9)
MODE: NORMAL
MONOCYTES # BLD: 13 % (ref 1–7)
MONOCYTES # BLD: 9 % (ref 1–7)
MORPHOLOGY: ABNORMAL
NEGATIVE BASE EXCESS, VEN: NORMAL (ref 0–2)
NRBC AUTOMATED: 0 PER 100 WBC
NRBC AUTOMATED: 0 PER 100 WBC
NUCLEATED RED BLOOD CELLS: 1 PER 100 WBC
O2 DEVICE/FLOW/%: NORMAL
O2 SAT, VEN: 69 % (ref 60–85)
PATIENT TEMP: NORMAL
PCO2, VEN: 46.7 MM HG (ref 41–51)
PDW BLD-RTO: 21.2 % (ref 11.8–14.4)
PDW BLD-RTO: 21.7 % (ref 11.8–14.4)
PH VENOUS: 7.37 (ref 7.32–7.43)
PLATELET # BLD: 290 K/UL (ref 138–453)
PLATELET # BLD: 292 K/UL (ref 138–453)
PLATELET ESTIMATE: ABNORMAL
PLATELET ESTIMATE: ABNORMAL
PMV BLD AUTO: 10.7 FL (ref 8.1–13.5)
PMV BLD AUTO: 10.8 FL (ref 8.1–13.5)
PO2, VEN: 37.6 MM HG (ref 30–50)
POC CHLORIDE: 110 MMOL/L (ref 98–107)
POC CREATININE: 1.29 MG/DL (ref 0.51–1.19)
POC HEMATOCRIT: 49 % (ref 41–53)
POC HEMOGLOBIN: 16.8 G/DL (ref 13.5–17.5)
POC IONIZED CALCIUM: 1.02 MMOL/L (ref 1.15–1.33)
POC LACTIC ACID: 2.56 MMOL/L (ref 0.56–1.39)
POC PCO2 TEMP: NORMAL MM HG
POC PH TEMP: NORMAL
POC PO2 TEMP: NORMAL MM HG
POC POTASSIUM: 3.7 MMOL/L (ref 3.5–4.5)
POC SODIUM: 144 MMOL/L (ref 138–146)
POSITIVE BASE EXCESS, VEN: 1 (ref 0–3)
POTASSIUM SERPL-SCNC: 4 MMOL/L (ref 3.7–5.3)
PRO-BNP: 5671 PG/ML
RBC # BLD: 5.04 M/UL (ref 4.21–5.77)
RBC # BLD: 5.26 M/UL (ref 4.21–5.77)
RBC # BLD: ABNORMAL 10*6/UL
RBC # BLD: ABNORMAL 10*6/UL
SAMPLE SITE: NORMAL
SEG NEUTROPHILS: 50 % (ref 36–66)
SEG NEUTROPHILS: 63 % (ref 36–66)
SEGMENTED NEUTROPHILS ABSOLUTE COUNT: 3.5 K/UL (ref 1.8–7.7)
SEGMENTED NEUTROPHILS ABSOLUTE COUNT: 4.03 K/UL (ref 1.8–7.7)
SODIUM BLD-SCNC: 143 MMOL/L (ref 135–144)
THYROXINE, FREE: 1.81 NG/DL (ref 0.93–1.7)
TOTAL CO2, VENOUS: 28 MMOL/L (ref 23–30)
TRIGL SERPL-MCNC: 100 MG/DL
TROPONIN INTERP: ABNORMAL
TROPONIN INTERP: ABNORMAL
TROPONIN T: ABNORMAL NG/ML
TROPONIN T: ABNORMAL NG/ML
TROPONIN, HIGH SENSITIVITY: 31 NG/L (ref 0–22)
TROPONIN, HIGH SENSITIVITY: 41 NG/L (ref 0–22)
TSH SERPL DL<=0.05 MIU/L-ACNC: 9.79 MIU/L (ref 0.3–5)
VLDLC SERPL CALC-MCNC: ABNORMAL MG/DL (ref 1–30)
WBC # BLD: 6.4 K/UL (ref 3.5–11.3)
WBC # BLD: 7 K/UL (ref 3.5–11.3)
WBC # BLD: ABNORMAL 10*3/UL
WBC # BLD: ABNORMAL 10*3/UL

## 2019-06-04 PROCEDURE — 2580000003 HC RX 258: Performed by: STUDENT IN AN ORGANIZED HEALTH CARE EDUCATION/TRAINING PROGRAM

## 2019-06-04 PROCEDURE — 80048 BASIC METABOLIC PNL TOTAL CA: CPT

## 2019-06-04 PROCEDURE — 84443 ASSAY THYROID STIM HORMONE: CPT

## 2019-06-04 PROCEDURE — 93005 ELECTROCARDIOGRAM TRACING: CPT

## 2019-06-04 PROCEDURE — 85025 COMPLETE CBC W/AUTO DIFF WBC: CPT

## 2019-06-04 PROCEDURE — 82330 ASSAY OF CALCIUM: CPT

## 2019-06-04 PROCEDURE — 71045 X-RAY EXAM CHEST 1 VIEW: CPT

## 2019-06-04 PROCEDURE — 99223 1ST HOSP IP/OBS HIGH 75: CPT | Performed by: INTERNAL MEDICINE

## 2019-06-04 PROCEDURE — 84484 ASSAY OF TROPONIN QUANT: CPT

## 2019-06-04 PROCEDURE — 84295 ASSAY OF SERUM SODIUM: CPT

## 2019-06-04 PROCEDURE — 82803 BLOOD GASES ANY COMBINATION: CPT

## 2019-06-04 PROCEDURE — 80061 LIPID PANEL: CPT

## 2019-06-04 PROCEDURE — 6370000000 HC RX 637 (ALT 250 FOR IP): Performed by: STUDENT IN AN ORGANIZED HEALTH CARE EDUCATION/TRAINING PROGRAM

## 2019-06-04 PROCEDURE — 85014 HEMATOCRIT: CPT

## 2019-06-04 PROCEDURE — 82947 ASSAY GLUCOSE BLOOD QUANT: CPT

## 2019-06-04 PROCEDURE — 84439 ASSAY OF FREE THYROXINE: CPT

## 2019-06-04 PROCEDURE — 99285 EMERGENCY DEPT VISIT HI MDM: CPT

## 2019-06-04 PROCEDURE — 83880 ASSAY OF NATRIURETIC PEPTIDE: CPT

## 2019-06-04 PROCEDURE — 2060000000 HC ICU INTERMEDIATE R&B

## 2019-06-04 PROCEDURE — 84132 ASSAY OF SERUM POTASSIUM: CPT

## 2019-06-04 PROCEDURE — 2500000003 HC RX 250 WO HCPCS: Performed by: EMERGENCY MEDICINE

## 2019-06-04 PROCEDURE — 82435 ASSAY OF BLOOD CHLORIDE: CPT

## 2019-06-04 PROCEDURE — 93005 ELECTROCARDIOGRAM TRACING: CPT | Performed by: EMERGENCY MEDICINE

## 2019-06-04 PROCEDURE — 6360000002 HC RX W HCPCS: Performed by: EMERGENCY MEDICINE

## 2019-06-04 PROCEDURE — 83605 ASSAY OF LACTIC ACID: CPT

## 2019-06-04 PROCEDURE — 82565 ASSAY OF CREATININE: CPT

## 2019-06-04 RX ORDER — LATANOPROST 50 UG/ML
1 SOLUTION/ DROPS OPHTHALMIC NIGHTLY
Status: DISCONTINUED | OUTPATIENT
Start: 2019-06-04 | End: 2019-06-13 | Stop reason: HOSPADM

## 2019-06-04 RX ORDER — FENOFIBRATE 54 MG/1
135 TABLET ORAL DAILY
Status: DISCONTINUED | OUTPATIENT
Start: 2019-06-05 | End: 2019-06-13 | Stop reason: HOSPADM

## 2019-06-04 RX ORDER — ONDANSETRON 2 MG/ML
4 INJECTION INTRAMUSCULAR; INTRAVENOUS EVERY 6 HOURS PRN
Status: DISCONTINUED | OUTPATIENT
Start: 2019-06-04 | End: 2019-06-13 | Stop reason: HOSPADM

## 2019-06-04 RX ORDER — TRAMADOL HYDROCHLORIDE 50 MG/1
50 TABLET ORAL DAILY
Status: DISCONTINUED | OUTPATIENT
Start: 2019-06-04 | End: 2019-06-08

## 2019-06-04 RX ORDER — BENZONATATE 100 MG/1
100 CAPSULE ORAL 3 TIMES DAILY PRN
Status: DISCONTINUED | OUTPATIENT
Start: 2019-06-04 | End: 2019-06-13 | Stop reason: HOSPADM

## 2019-06-04 RX ORDER — SIMVASTATIN 40 MG
40 TABLET ORAL NIGHTLY
Status: DISCONTINUED | OUTPATIENT
Start: 2019-06-04 | End: 2019-06-13 | Stop reason: HOSPADM

## 2019-06-04 RX ORDER — PAROXETINE 10 MG/1
10 TABLET, FILM COATED ORAL DAILY
Status: DISCONTINUED | OUTPATIENT
Start: 2019-06-04 | End: 2019-06-13 | Stop reason: HOSPADM

## 2019-06-04 RX ORDER — IPRATROPIUM BROMIDE AND ALBUTEROL SULFATE 2.5; .5 MG/3ML; MG/3ML
1 SOLUTION RESPIRATORY (INHALATION)
Status: DISCONTINUED | OUTPATIENT
Start: 2019-06-05 | End: 2019-06-13 | Stop reason: HOSPADM

## 2019-06-04 RX ORDER — DOCUSATE SODIUM 100 MG/1
100 CAPSULE, LIQUID FILLED ORAL 2 TIMES DAILY
Status: DISCONTINUED | OUTPATIENT
Start: 2019-06-04 | End: 2019-06-11

## 2019-06-04 RX ORDER — SODIUM CHLORIDE 0.9 % (FLUSH) 0.9 %
10 SYRINGE (ML) INJECTION EVERY 12 HOURS SCHEDULED
Status: DISCONTINUED | OUTPATIENT
Start: 2019-06-04 | End: 2019-06-13 | Stop reason: HOSPADM

## 2019-06-04 RX ORDER — NICOTINE POLACRILEX 4 MG
15 LOZENGE BUCCAL PRN
Status: DISCONTINUED | OUTPATIENT
Start: 2019-06-04 | End: 2019-06-13 | Stop reason: HOSPADM

## 2019-06-04 RX ORDER — ASPIRIN 81 MG/1
81 TABLET, CHEWABLE ORAL DAILY
Status: DISCONTINUED | OUTPATIENT
Start: 2019-06-04 | End: 2019-06-13 | Stop reason: HOSPADM

## 2019-06-04 RX ORDER — ACETAMINOPHEN 325 MG/1
650 TABLET ORAL EVERY 4 HOURS PRN
Status: DISCONTINUED | OUTPATIENT
Start: 2019-06-04 | End: 2019-06-04 | Stop reason: SDUPTHER

## 2019-06-04 RX ORDER — AMIODARONE HYDROCHLORIDE 200 MG/1
200 TABLET ORAL DAILY
Status: DISCONTINUED | OUTPATIENT
Start: 2019-06-05 | End: 2019-06-13 | Stop reason: HOSPADM

## 2019-06-04 RX ORDER — DEXTROSE MONOHYDRATE 50 MG/ML
100 INJECTION, SOLUTION INTRAVENOUS PRN
Status: DISCONTINUED | OUTPATIENT
Start: 2019-06-04 | End: 2019-06-13 | Stop reason: HOSPADM

## 2019-06-04 RX ORDER — DEXTROSE MONOHYDRATE 25 G/50ML
12.5 INJECTION, SOLUTION INTRAVENOUS PRN
Status: DISCONTINUED | OUTPATIENT
Start: 2019-06-04 | End: 2019-06-13 | Stop reason: HOSPADM

## 2019-06-04 RX ORDER — FUROSEMIDE 10 MG/ML
40 INJECTION INTRAMUSCULAR; INTRAVENOUS ONCE
Status: COMPLETED | OUTPATIENT
Start: 2019-06-04 | End: 2019-06-04

## 2019-06-04 RX ORDER — ACETAMINOPHEN 325 MG/1
650 TABLET ORAL EVERY 4 HOURS PRN
Status: DISCONTINUED | OUTPATIENT
Start: 2019-06-04 | End: 2019-06-13 | Stop reason: HOSPADM

## 2019-06-04 RX ORDER — FUROSEMIDE 10 MG/ML
40 INJECTION INTRAMUSCULAR; INTRAVENOUS ONCE
Status: DISCONTINUED | OUTPATIENT
Start: 2019-06-05 | End: 2019-06-04

## 2019-06-04 RX ORDER — MAGNESIUM SULFATE 1 G/100ML
1 INJECTION INTRAVENOUS PRN
Status: DISCONTINUED | OUTPATIENT
Start: 2019-06-04 | End: 2019-06-13 | Stop reason: HOSPADM

## 2019-06-04 RX ORDER — SENNA PLUS 8.6 MG/1
1 TABLET ORAL NIGHTLY
Status: DISCONTINUED | OUTPATIENT
Start: 2019-06-04 | End: 2019-06-13 | Stop reason: HOSPADM

## 2019-06-04 RX ORDER — TRAZODONE HYDROCHLORIDE 50 MG/1
50 TABLET ORAL NIGHTLY
Status: DISCONTINUED | OUTPATIENT
Start: 2019-06-04 | End: 2019-06-13 | Stop reason: HOSPADM

## 2019-06-04 RX ORDER — FUROSEMIDE 10 MG/ML
40 INJECTION INTRAMUSCULAR; INTRAVENOUS ONCE
Status: DISCONTINUED | OUTPATIENT
Start: 2019-06-04 | End: 2019-06-04

## 2019-06-04 RX ORDER — FAMOTIDINE 20 MG/1
20 TABLET, FILM COATED ORAL 2 TIMES DAILY
Status: DISCONTINUED | OUTPATIENT
Start: 2019-06-04 | End: 2019-06-10

## 2019-06-04 RX ORDER — POTASSIUM CHLORIDE 20 MEQ/1
40 TABLET, EXTENDED RELEASE ORAL PRN
Status: DISCONTINUED | OUTPATIENT
Start: 2019-06-04 | End: 2019-06-13 | Stop reason: HOSPADM

## 2019-06-04 RX ORDER — ALBUTEROL SULFATE 2.5 MG/3ML
2.5 SOLUTION RESPIRATORY (INHALATION) EVERY 4 HOURS PRN
Status: DISCONTINUED | OUTPATIENT
Start: 2019-06-04 | End: 2019-06-13 | Stop reason: HOSPADM

## 2019-06-04 RX ORDER — NITROGLYCERIN 20 MG/100ML
5 INJECTION INTRAVENOUS CONTINUOUS
Status: DISCONTINUED | OUTPATIENT
Start: 2019-06-04 | End: 2019-06-04

## 2019-06-04 RX ORDER — GABAPENTIN 300 MG/1
300 CAPSULE ORAL DAILY
Status: DISCONTINUED | OUTPATIENT
Start: 2019-06-04 | End: 2019-06-07

## 2019-06-04 RX ORDER — FUROSEMIDE 10 MG/ML
40 INJECTION INTRAMUSCULAR; INTRAVENOUS DAILY
Status: DISCONTINUED | OUTPATIENT
Start: 2019-06-04 | End: 2019-06-06

## 2019-06-04 RX ORDER — SODIUM CHLORIDE 0.9 % (FLUSH) 0.9 %
10 SYRINGE (ML) INJECTION PRN
Status: DISCONTINUED | OUTPATIENT
Start: 2019-06-04 | End: 2019-06-13 | Stop reason: HOSPADM

## 2019-06-04 RX ORDER — LANOLIN ALCOHOL/MO/W.PET/CERES
325 CREAM (GRAM) TOPICAL EVERY OTHER DAY
Status: DISCONTINUED | OUTPATIENT
Start: 2019-06-04 | End: 2019-06-13 | Stop reason: HOSPADM

## 2019-06-04 RX ORDER — POTASSIUM CHLORIDE 7.45 MG/ML
10 INJECTION INTRAVENOUS PRN
Status: DISCONTINUED | OUTPATIENT
Start: 2019-06-04 | End: 2019-06-13 | Stop reason: HOSPADM

## 2019-06-04 RX ORDER — ALBUTEROL SULFATE 90 UG/1
1 AEROSOL, METERED RESPIRATORY (INHALATION) EVERY 6 HOURS PRN
Status: DISCONTINUED | OUTPATIENT
Start: 2019-06-04 | End: 2019-06-13 | Stop reason: HOSPADM

## 2019-06-04 RX ADMIN — FAMOTIDINE 20 MG: 20 TABLET, FILM COATED ORAL at 21:17

## 2019-06-04 RX ADMIN — FERROUS SULFATE TAB EC 325 MG (65 MG FE EQUIVALENT) 325 MG: 325 (65 FE) TABLET DELAYED RESPONSE at 18:36

## 2019-06-04 RX ADMIN — NITROGLYCERIN 5 MCG/MIN: 20 INJECTION INTRAVENOUS at 14:27

## 2019-06-04 RX ADMIN — LATANOPROST 1 DROP: 50 SOLUTION OPHTHALMIC at 21:18

## 2019-06-04 RX ADMIN — GABAPENTIN 300 MG: 300 CAPSULE ORAL at 18:36

## 2019-06-04 RX ADMIN — DOCUSATE SODIUM 100 MG: 100 CAPSULE, LIQUID FILLED ORAL at 21:17

## 2019-06-04 RX ADMIN — Medication 10 ML: at 21:27

## 2019-06-04 RX ADMIN — FUROSEMIDE 40 MG: 10 INJECTION, SOLUTION INTRAMUSCULAR; INTRAVENOUS at 19:00

## 2019-06-04 RX ADMIN — SIMVASTATIN 40 MG: 40 TABLET, FILM COATED ORAL at 21:17

## 2019-06-04 RX ADMIN — TRAZODONE HYDROCHLORIDE 50 MG: 50 TABLET ORAL at 21:17

## 2019-06-04 RX ADMIN — ASPIRIN 81 MG: 81 TABLET, CHEWABLE ORAL at 18:36

## 2019-06-04 RX ADMIN — TRAMADOL HYDROCHLORIDE 50 MG: 50 TABLET, FILM COATED ORAL at 19:06

## 2019-06-04 RX ADMIN — PAROXETINE HYDROCHLORIDE HEMIHYDRATE 10 MG: 10 TABLET, FILM COATED ORAL at 19:07

## 2019-06-04 ASSESSMENT — ENCOUNTER SYMPTOMS
ABDOMINAL PAIN: 0
BACK PAIN: 0
STRIDOR: 0
COLOR CHANGE: 0
EYES NEGATIVE: 1
COUGH: 1
WHEEZING: 0
NAUSEA: 1
APNEA: 1
SHORTNESS OF BREATH: 1
EYE PAIN: 0
ALLERGIC/IMMUNOLOGIC NEGATIVE: 1
VOMITING: 0
GASTROINTESTINAL NEGATIVE: 1

## 2019-06-04 ASSESSMENT — PAIN SCALES - GENERAL
PAINLEVEL_OUTOF10: 0
PAINLEVEL_OUTOF10: 8
PAINLEVEL_OUTOF10: 0

## 2019-06-04 NOTE — ED NOTES
Pt and wife updated on plan of care, denies needs at this time, pt to be started on nitro drip for pulmonary edema     Morena Rangel, VICKIE  06/04/19 3644

## 2019-06-04 NOTE — ED PROVIDER NOTES
[facility] ED  Emergency Department Encounter  Emergency Medicine Resident     Pt Name: Bhavya Underwood MRN: [id number]  Armsdomogflauren 1945  Date ofevaluation: 6/4/19  PCP:  Sujata Daniel MD    [address] [place] [address]       Chief Complaint   Patient presents with    Shortness of Breath     Pt to ED with c/o not being able to get a pulse ox at the PCP today, pt was there for weakness, denies pain, pt has been SOB, weak and coughing for several weeks now       HISTORY OF PRESENT ILLNESS  (Location/Symptom, Timing/Onset, Context/Setting, Quality,Duration, Modifying Factors, Severity.)      Bhavya Underwood is a 72 y.o. male who presents with complaint of difficulty breathing for the past one week. Patient has a history of COPD, coronary artery disease. Patient reports associated cough that is productive of thick whitish sputum, bilateral lower extremity swelling, states that difficulty breathing is worsened with laying flat, relieved with sitting up. Patient states thatdifficulty breathing has worsened since it started, he denies previous history of difficulty breathing. Patient went to see his primary care physician today, at the office tomorrow unable to get a pulse ox reading so he was sent down to ED. Patient is an eliquis and has taken his medications as prescribed. Patient denies any fever,chills, chest pain,  hemoptysis, trauma, weight changes, history of blood clots. PAST MEDICAL / SURGICAL / SOCIAL / FAMILY HISTORY      has a past medical history of Anticoagulated, Arthritis, CAD (coronary artery disease), Cardiomyopathy ([address] CHF (congestive heart failure) ([address] Chronic kidney disease, COPD (chronic obstructive pulmonary disease) ([address] Diabetes mellitus ([address] DVT of leg (deep venous thrombosis) ([address] Former tobacco use, Hx of blood clots, Hyperlipidemia, Hyperlipidemia, Hypertension, Marijuana user, Multiple myeloma ([address] Neuropathy, Neuropathy, and Wears dentures.      has a past surgical history that includes Cardiac catheterization (2017); hernia repair (); Abdomen surgery (); pr colonoscopy flx dx w/collj spec when pfrmd (N/A, 2018); pr esophagogastroduodenoscopy transoral diagnostic (N/A, 2018); Cataract removal with implant (Bilateral); Tear duct surgery (Right); pr cabg, artery-vein, single (N/A, 2018); and Cardiac defibrillator placement (2018).     Social History     Socioeconomic History    Marital status:      Spouse name: Not on file    Number of children: Not on file    Years of education: Not on file    Highest education level: Not on file   Occupational History    Occupation: Retired   Social Needs    Financial resource strain: Not on file    Food insecurity:     Worry: Not on file     Inability: Not on file   [email or web address] needs:     Medical: Not on file     Non-medical: Not on file   Tobacco Use    Smoking status: Former Smoker     Packs/day: 0.50     Years: 30.00     Pack years: 15.00     Types: Cigarettes     Last attempt to quit: 8/15/2018     Years since quittin.8    Smokeless tobacco: Never Used    Tobacco comment: a little less than a pack a day   Substance and Sexual Activity    Alcohol use: Yes     Comment: rarely    Drug use: Yes     Types: Marijuana     Comment: 0.5 daily     Sexual activity: Not on file   Lifestyle    Physical activity:     Days per week: Not on file     Minutes per session: Not on file    Stress: Not on file   Relationships    Social connections:     Talks on phone: Not on file     Gets together: Not on file     Attends [employer] service: Not on file     Active member of club or organization: Not on file     Attends meetings of clubs or organizations: Not on file     Relationship status: Not on file    Intimate partner violence:     Fear of current or ex partner: Not on file     Emotionally abused: Not on file     Physically abused: Not on file     Forced sexual activity: Not every other day    Historical Provider, MD   furosemide (LASIX) 20 MG tablet Take 1 tablet by mouth 2 times daily  Patient taking differently: Take 20 mg by mouth 2 times daily  1/14/18   Kevin Lay MD   albuterol sulfate  (90 Base) MCG/ACT inhaler Inhale 1 puff into the lungs every 6 hours as needed for Wheezing    Historical Provider, MD   gabapentin (NEURONTIN) 100 MG capsule Take 300 mg by mouth daily. Parish Conquest Historical Provider, MD   aspirin 81 MG chewable tablet Take 1 tablet by mouth daily 12/18/17   Boby Dye MD   famotidine (PEPCID) 20 MG tablet Take 1 tablet by mouth 2 times daily  Patient taking differently: Take 20 mg by mouth daily  12/17/17   Boby Dye MD   simvastatin (ZOCOR) 40 MG tablet Take 40 mg by mouth nightly    Historical Provider, MD   rivaroxaban (XARELTO) 20 MG TABS tablet Take 20 mg by mouth daily (with breakfast)     Historical Provider, MD   glipiZIDE (GLUCOTROL) 5 MG tablet Take 5 mg by mouth every morning (before breakfast)    Historical Provider, MD       REVIEW OF SYSTEMS    (2-9 systems for level 4, 10 or more for level 5)      Review of Systems   Constitutional: Positive for activity change and fatigue. HENT: Negative. Eyes: Negative. Respiratory: Positive for cough and shortness of breath. Cardiovascular: Negative. Gastrointestinal: Negative. Endocrine: Negative. Genitourinary: Negative. Musculoskeletal: Positive for joint swelling. Skin: Negative. Allergic/Immunologic: Negative. Neurological: Negative. Hematological: Negative. Psychiatric/Behavioral: Negative. PHYSICAL EXAM   (up to 7 for level 4, 8 or more for level 5)      INITIAL VITALS:   BP (!) 118/99   Pulse 100   Temp 97.9 °F (36.6 °C) (Oral)   Resp 23   Wt 190 lb (86.2 kg)   SpO2 96%   BMI 28.06 kg/m²     Physical Exam   Constitutional: He is oriented to person, place, and time. HENT:   Head: Normocephalic and atraumatic.    Right Ear: External ear COPD, Tamponade, Heart Failure, Pulmonary Embolism, Pneumothorax, Anemia, Myocardial Infarction           DIAGNOSTIC RESULTS / EMERGENCY DEPARTMENT COURSE / MDM     LABS:  Results for orders placed or performed during the hospital encounter of 06/04/19   CBC WITH AUTO DIFFERENTIAL   Result Value Ref Range    WBC 7.0 3.5 - 11.3 k/uL    RBC 5.26 4.21 - 5.77 m/uL    Hemoglobin 14.4 13.0 - 17.0 g/dL    Hematocrit 47.4 40.7 - 50.3 %    MCV 90.1 82.6 - 102.9 fL    MCH 27.4 25.2 - 33.5 pg    MCHC 30.4 28.4 - 34.8 g/dL    RDW 21.7 (H) 11.8 - 14.4 %    Platelets 947 569 - 896 k/uL    MPV 10.8 8.1 - 13.5 fL    NRBC Automated 0.0 0.0 per 100 WBC    Differential Type NOT REPORTED     WBC Morphology NOT REPORTED     RBC Morphology NOT REPORTED     Platelet Estimate NOT REPORTED     Immature Granulocytes 0 0 %    Seg Neutrophils 50 36 - 66 %    Lymphocytes 34 24 - 44 %    Monocytes 13 (H) 1 - 7 %    Eosinophils % 2 1 - 4 %    Basophils 1 0 - 2 %    Absolute Immature Granulocyte 0.00 0.00 - 0.30 k/uL    Segs Absolute 3.50 1.8 - 7.7 k/uL    Absolute Lymph # 2.38 1.0 - 4.8 k/uL    Absolute Mono # 0.91 (H) 0.1 - 0.8 k/uL    Absolute Eos # 0.14 0.0 - 0.4 k/uL    Basophils # 0.07 0.0 - 0.2 k/uL    Morphology ANISOCYTOSIS PRESENT     Morphology 1+ POLYCHROMASIA    Troponin   Result Value Ref Range    Troponin, High Sensitivity 41 (H) 0 - 22 ng/L    Troponin T NOT REPORTED <0.03 ng/mL    Troponin Interp NOT REPORTED    Brain Natriuretic Peptide   Result Value Ref Range    Pro-BNP 5,671 (H) <300 pg/mL    BNP Interpretation Pro-BNP Reference Range:    BASIC METABOLIC PANEL   Result Value Ref Range    Glucose 103 (H) 70 - 99 mg/dL    BUN 26 (H) 8 - 23 mg/dL    CREATININE 1.13 0.70 - 1.20 mg/dL    Bun/Cre Ratio NOT REPORTED 9 - 20    Calcium 9.4 8.6 - 10.4 mg/dL    Sodium 143 135 - 144 mmol/L    Potassium 4.0 3.7 - 5.3 mmol/L    Chloride 105 98 - 107 mmol/L    CO2 24 20 - 31 mmol/L    Anion Gap 14 9 - 17 mmol/L    GFR Non-African American >60 >60 mL/min    GFR African American >60 >60 mL/min    GFR Comment          GFR Staging NOT REPORTED    Hemoglobin and hematocrit, blood   Result Value Ref Range    POC Hemoglobin 16.8 13.5 - 17.5 g/dL    POC Hematocrit 49 41 - 53 %   SODIUM (POC)   Result Value Ref Range    POC Sodium 144 138 - 146 mmol/L   POTASSIUM (POC)   Result Value Ref Range    POC Potassium 3.7 3.5 - 4.5 mmol/L   CHLORIDE (POC)   Result Value Ref Range    POC Chloride 110 (H) 98 - 107 mmol/L   CALCIUM, IONIC (POC)   Result Value Ref Range    POC Ionized Calcium 1.02 (L) 1.15 - 1.33 mmol/L   Venous Blood Gas, POC   Result Value Ref Range    pH, Andrea 7.367 7.320 - 7.430    pCO2, Andrea 46.7 41.0 - 51.0 mm Hg    pO2, Andrea 37.6 30.0 - 50.0 mm Hg    HCO3, Venous 26.8 22.0 - 29.0 mmol/L    Total CO2, Venous 28 23.0 - 30.0 mmol/L    Negative Base Excess, Andrea NOT REPORTED 0.0 - 2.0    Positive Base Excess, Andrea 1 0.0 - 3.0    O2 Sat, Andrea 69 60.0 - 85.0 %    O2 Device/Flow/% NOT REPORTED     Kendall Test NOT REPORTED     Sample Site NOT REPORTED     Mode NOT REPORTED     FIO2 NOT REPORTED     Pt Temp NOT REPORTED     POC pH Temp NOT REPORTED     POC pCO2 Temp NOT REPORTED mm Hg    POC pO2 Temp NOT REPORTED mm Hg   Creatinine W/GFR Point of Care   Result Value Ref Range    POC Creatinine 1.29 (H) 0.51 - 1.19 mg/dL    GFR Comment >60 >60 mL/min    GFR Non- 55 (L) >60 mL/min    GFR Comment         Lactic Acid, POC   Result Value Ref Range    POC Lactic Acid 2.56 (H) 0.56 - 1.39 mmol/L   POCT Glucose   Result Value Ref Range    POC Glucose 99 74 - 100 mg/dL   Anion Gap (Calc) POC   Result Value Ref Range    Anion Gap 7 7 - 16 mmol/L       IMPRESSION: Patient presents with difficulty breathing and associated orthopnea, cough productive of whitish sputum, generalized fatigue, bilateral lower extremities swelling . Vitals show a blood pressure of 125/103, heart rate of 104, oxygen saturation of 100%, respiratory rate of 18. Patient is on eliquis. Physical exam is remarkable for rales on auscultation of both lung fields, mild bilateral lower extremities edema. We will proceed with CBC, BMP, BNP, chest x-ray, EKG, troponin, treat and then reassess. RADIOLOGY:  Xr Chest Portable    Result Date: 6/4/2019  EXAMINATION: ONE XRAY VIEW OF THE CHEST 6/4/2019 1:33 pm COMPARISON: 12/18/2018 radiograph HISTORY: ORDERING SYSTEM PROVIDED HISTORY: SOB TECHNOLOGIST PROVIDED HISTORY: SOB Ordering Physician Provided Reason for Exam: SOB X 4 DAYS Acuity: Unknown Type of Exam: Unknown FINDINGS: The heart is enlarged. AICD is present in the left chest.  Sternotomy wires at midline. Mild pulmonary vascular congestion is present centrally. Trace right pleural effusion and suspected small left pleural effusion. Mild bibasilar ground-glass opacities. No significant skeletal finding. CHF and mild pulmonary edema. EKG Interpretation    Rhythm: normal sinus   Rate: normal  Axis: normal  Conduction: normal  ST Segments: normal  T Waves: normal  Q Waves: normal    Clinical Impression: Normal sinus rhythm, normal rate, left axis deviation, left bundle branch block, no acute ST segment changes      Yolanda Cook MD      Patient started on a nitro drip, admitted to Internal Medicine     PROCEDURES:  None    CONSULTS:   IP CONSULT TO INTERNAL MEDICINE    CRITICAL CARE:  None    FINAL IMPRESSION      1.  Acute on chronic congestive heart failure, unspecified heart failure type [facility])          DISPOSITION / PLAN     DISPOSITION Admitted 06/04/2019 03:26:17 PM      PATIENTREFERRED TO:  Ani Loyd MD  [address] [address] [facility]            DISCHARGEMEDICATIONS:  Current Discharge Medication List          Yolanda Cook MD  Emergency Medicine Resident    (Please note that portions of this note were completed with a voice recognition program.  Efforts were made to edit thedictations but occasionally words are mis-transcribed.) Suzie Petty MD  Resident  06/04/19 [id number]

## 2019-06-04 NOTE — H&P
Internal Medicine - History and Physical Examination    Patient's name:  Magali Velez. Medical Record Number: [id number]  Patient's account/billing number: [de-identified]  Patient's YOB: 1945  Age: 72 y.o. Date of Admission: 6/4/2019 12:39 PM  Primary Care Physician: Damian Bai MD    Code Status: Prior    Chief complaint: SOB x1 week, Cough, bilateral leg swelling x4 days    HISTORY OF PRESENT ILLNESS:   History was obtained from the patient. Magali Fernandez is a 72 y.o. M w/ significant PMH of COPD, Multiple myeloma, CAD s/p CABG bypass who presents with chief complaint of difficulty breathing for 1 week and bilateral leg swelling for several days. Patient reports that he has associated cough that is at times productive of clear sputum. Patient reports several episodes of cough spells and dry heaving this week. Patient denies hemoptysis. Patient reports that SOB is worsened with laying down and improves with sitting/standing. Patient reports associated increase in fatigue and decreased exercise tolerance, stating that he is \"barely able to walk down the driveway\". Patient reports that SOB awakens him in the middle of the night and that he is unable to get sleep because of this. Patient also presents with increased swelling in the bilateral lower extremities that spreads up along his shins. Patient reports that swelling has increased in the past few days and has began taking his Lasix at home. Patient reports that he is a former smoker 40 pack-years, and quit last year. Patient reports recent nausea. Patient denies fever, vomiting, headache, visual disturbances, or RUQ pain.        Past Medical History:        Diagnosis Date    Anticoagulated     XARELTO    Arthritis     all over    CAD (coronary artery disease)     Cardiomy[facility]     CHF (congestive heart failure) (HCC)     Chronic kidney disease     COPD (chronic obstructive pulmonary disease) ([facility]     Diabetes mellitus ([facility]  DVT of leg (deep venous thrombosis) (HCC)     RIGHT    Former tobacco use     Hx of blood clots     Hyperlipidemia     Hyperlipidemia     Hypertension     Marijuana user     Multiple myeloma ([facility] 2014    Neuropathy     Neuropathy     Wears dentures     FULL UPPER, PARTIAL LOWER       Past Surgical History:        Procedure Laterality Date    ABDOMEN SURGERY  2002    STOMAL TUMOR    CARDIAC CATHETERIZATION  12/13/2017    right and left heart cath with Dr. Madi Avalos  12/17/2018    DR Nellie Johnson Ambparris CATARACT REMOVAL WITH IMPLANT Bilateral     HERNIA REPAIR  1967    MI CABG, ARTERY-VEIN, SINGLE N/A 8/16/2018    CABG CORONARY ARTERY BYPASS ON  PUMP x 2, SWAN LAUREN, ORESTES (CSI# [id number]) performed by Bigg Montgomery MD at [address] FLX DX W/[address] PFRMD N/A 1/14/2018    COLONOSCOPY performed by Óscar Duggan MD at [address] [facility] ESOPHAGOGASTRODUODENOSCOPY TRANSORAL DIAGNOSTIC N/A 1/12/2018    EGD DIAGNOSTIC ONLY performed by Dana Jarrett MD at [facility] [address]        Allergies: Allergies   Allergen Reactions    Nuts [Peanut-Containing Drug Products] Other (See Comments)     abd pain         Home Meds:   Prior to Admission medications    Medication Sig Start Date End Date Taking? Authorizing Provider   traMADol (ULTRAM) 50 MG tablet Take 50 mg by mouth daily.     Historical Provider, MD   Methylnaltrexone Bromide (RELISTOR) 150 MG TABS Take 150 mg by mouth daily 5/15/19   Carly Vlilanueva MD   amiodarone (PACERONE) 100 MG tablet Take 1 tablet by mouth daily  Patient taking differently: Take 200 mg by mouth daily  8/26/18   BI Monsalve   PARoxetine (PAXIL) 10 MG tablet Take 1 tablet by mouth daily 8/26/18   BI Monsalve   docusate sodium (COLACE, DULCOLAX) 100 MG CAPS Take 100 mg by mouth 2 times daily 8/26/18   BI Monsalve   Multiple Vitamins-Minerals (THERAPEUTIC MULTIVITAMIN-MINERALS) tablet Take 1 tablet by mouth TOBACCO:   reports that he quit smoking about 9 months ago. His smoking use included cigarettes. He has a 15.00 pack-year smoking history. He has never used smokeless tobacco.  ETOH:   reports that he drinks alcohol. DRUGS:  reports that he has current or past drug history. Drug: Marijuana. OCCUPATION:      Family History:       Problem Relation Age of Onset    Diabetes Mother     Stroke Father     Other Sister         PE    Stroke Brother        Review of Systems   Constitutional: Positive for activity change, appetite change and fatigue. Negative for chills, diaphoresis and fever. HENT: Positive for congestion. Negative for sneezing. Eyes: Negative for pain and visual disturbance. Respiratory: Positive for apnea, cough and shortness of breath. Negative for wheezing and stridor. Cardiovascular: Positive for leg swelling. Negative for chest pain and palpitations. Gastrointestinal: Positive for nausea. Negative for abdominal pain and vomiting. Endocrine: Negative for polyuria. Genitourinary: Negative for difficulty urinating, dysuria and flank pain. Musculoskeletal: Negative for arthralgias, back pain, myalgias and neck pain. Skin: Negative for color change. Neurological: Negative for dizziness, light-headedness, numbness and headaches. Physical Exam:    Vitals: BP (!) 118/99   Pulse 100   Temp 97.9 °F (36.6 °C) (Oral)   Resp 23   Wt 190 lb (86.2 kg)   SpO2 96%   BMI 28.06 kg/m²     Last Body weight:   Wt Readings from Last 3 Encounters:   06/04/19 190 lb (86.2 kg)   05/17/19 190 lb 3.2 oz (86.3 kg)   05/15/19 188 lb (85.3 kg)       Body Mass Index : Body mass index is 28.06 kg/m².         PHYSICAL EXAMINATION :    General appearance - alert, well appearing, and in no distress and oriented to person, place, and time  Mental status - alert, oriented to person, place, and time  Eyes - pupils equal and reactive, extraocular eye movements intact, sclera anicteric  Chest - clear to auscultation, no wheezes, rales or rhonchi, symmetric air entry  Heart - normal rate, regular rhythm, normal S1, S2, no murmurs, rubs, clicks or gallops, no JVD  Abdomen - soft, +BS, nontender  Extremities - peripheral pulses normal, bilateral lower leg edema, no clubbing or cyanosis     Any additional physical findings:    Laboratory findings:-    CBC:   Recent Labs     06/04/19  1317   WBC 7.0   HGB 14.4        BMP:    Recent Labs     06/04/19  1317 06/04/19  1318     --    K 4.0  --      --    CO2 24  --    BUN 26*  --    CREATININE 1.13 1.29*   GLUCOSE 103*  --      S. Calcium:  Recent Labs     06/04/19  1317   CALCIUM 9.4     S. Ionized Calcium:No results for input(s): IONCA in the last 72 hours. S. Magnesium:No results for input(s): MG in the last 72 hours. S. Phosphorus:No results for input(s): PHOS in the last 72 hours. S. Glucose:  Recent Labs     06/04/19  1318   POCGLU 99     Glycosylated hemoglobin A1C: No results for input(s): LABA1C in the last 72 hours. INR: No results for input(s): INR in the last 72 hours. Hepatic functions: No results for input(s): ALKPHOS, ALT, AST, PROT, BILITOT, BILIDIR, LABALBU in the last 72 hours. Pancreatic functions:No results for input(s): LACTA, AMYLASE in the last 72 hours. S. Lactic Acid: No results for input(s): LACTA in the last 72 hours. Cardiac enzymes:No results for input(s): CKTOTAL, CKMB, CKMBINDEX, TROPONINI in the last 72 hours. BNP:No results for input(s): BNP in the last 72 hours. Lipid profile: No results for input(s): CHOL, TRIG, HDL, LDLCALC in the last 72 hours.     Invalid input(s): LDL  Blood Gases: No results found for: PH, PCO2, PO2, HCO3, O2SAT  Thyroid functions:   Lab Results   Component Value Date    TSH 1.13 12/14/2017        -----------------------------------------------------------------  Radiological reports:    6/4 - Chest XR:    Impression   CHF and mild pulmonary edema.           Recent Cardiac Catheterization

## 2019-06-04 NOTE — ED PROVIDER NOTES
(abnormal) and his pulse is 99. His respiration is 25 and oxygen saturation is 95%. DIAGNOSTIC RESULTS       RADIOLOGY:   XR CHEST PORTABLE   Final Result   CHF and mild pulmonary edema. LABS:  Labs Reviewed   CBC WITH AUTO DIFFERENTIAL - Abnormal; Notable for the following components:       Result Value    RDW 21.7 (*)     All other components within normal limits   TROPONIN - Abnormal; Notable for the following components:    Troponin, High Sensitivity 41 (*)     All other components within normal limits   BRAIN NATRIURETIC PEPTIDE - Abnormal; Notable for the following components:    Pro-BNP 5,671 (*)     All other components within normal limits   BASIC METABOLIC PANEL - Abnormal; Notable for the following components:    Glucose 103 (*)     BUN 26 (*)     All other components within normal limits   CHLORIDE (POC) - Abnormal; Notable for the following components:    POC Chloride 110 (*)     All other components within normal limits   CALCIUM, IONIC (POC) - Abnormal; Notable for the following components:    POC Ionized Calcium 1.02 (*)     All other components within normal limits   CREATININE W/GFR POINT OF CARE - Abnormal; Notable for the following components:    POC Creatinine 1.29 (*)     GFR Non- 55 (*)     All other components within normal limits   LACTIC ACID,POINT OF CARE - Abnormal; Notable for the following components:    POC Lactic Acid 2.56 (*)     All other components within normal limits   HGB/HCT   SODIUM (POC)   POTASSIUM (POC)   TROPONIN   POC BLOOD GAS   VENOUS BLOOD GAS, POINT OF CARE   POCT GLUCOSE   ANION GAP (CALC) POC         EMERGENCY DEPARTMENT COURSE:     -------------------------       BP: (!) 125/103, Temp: 97.9 °F (36.6 °C), Pulse: 99, Resp: 25      Comments            Wright MD, F.A.C.E.P.   Attending Emergency Physician         Allyson Arauz MD  06/04/19 1400

## 2019-06-04 NOTE — CARE COORDINATION
Case Management Initial Discharge Plan  Pastora Lennon,             Met with:patient to discuss discharge plans. Information verified: address, contacts, phone number, , insurance Yes  PCP: Ismael Carrera MD  Date of last visit: today    Insurance Provider: Damaris Hidalgo    Discharge Planning    Living Arrangements:  Spouse/Significant Other   Support Systems:  Spouse/Significant Other    Home has 2 stories  0 stairs to climb to get into front door, 1 flight stairs to climb to reach second floor  Location of bedroom/bathroom in home main    Patient able to perform ADL's:Independent    Current Services (outpatient & in home) none  DME equipment: none  DME provider: non    Pharmacy: [facility] [employer] Medications:  No  Does patient want to participate in local refill/ meds to beds program?  No    Potential Assistance Needed:  N/A    Patient agreeable to home care: No  [place] of choice provided:  no    Prior SNF/Rehab Placement and Facility: [address] [place] [address] to SNF/Rehab: No  [place] of choice provided: no   Evaluation: no    Expected Discharge date:  (TBD)  Patient expects to be discharged to:  home  Follow Up Appointment: Best Day/ Time: (TBD)    Transportation provider: family  Transportation arrangements needed for discharge: No    Readmission Risk              Risk of Unplanned Readmission:        21             Does patient have a readmission risk score greater than 14?: Yes  If yes, follow-up appointment must be made within 7 days of discharge.      Discharge Plan: home with wife          Electronically signed by Yuko Kramer RN on 19 at 5:28 PM

## 2019-06-04 NOTE — LETTER
Beneficiary Notification Letter     This Gilmar Torres Provider is Participating in an Innovative Payment and [address] [place] [address] [place] from Medicare     Greetings:   Manuel is participating in a Medicare initiative called the [facility] for [facility]. You are receiving this letter because your health care provider has identified you as a patient who is receiving care through this initiative. Health care providers participating in the [facility], including Manuel, will work with Medicare to improve care for patients. Your Medicare rights have not been changed. You still have all the same Medicare rights and protections, including the right to choose which hospital, doctor, or other health care provider you see. However, because Manuel chose to participate in the [address] [place] [address], all Medicare beneficiaries who meet the eligibility criteria of this initiative will receive care under the initiative. If you do not wish to receive care under the [employer] [facility], you must choose a health care provider that does not participate in this initiative for your care. Regardless of which health care provider you see, Medicare will continue to cover all of your medically necessary services. Bundled Payments for Care Improvement Advanced aims to help improve your care     The [employer] [facility] [address] is an innovative Medicare initiative that encourages your doctors, hospitals, and other health care providers to work more closely together so you get better care during and following certain hospital stays.  In this initiative, doctors and hospitals may work closely with certain health care providers and 1-800- MEDICARE (7-333.168.3836). TTY users should call 6-326.828.6982. · To find a different doctor, visit Medicare's Physician Compare website, HD[employer]nz, or call 1-800-MEDICARE (735 6023). TTY users should call 9-511.767.7698. · To find a different skilled nursing facility, visit [employer] website, https://www.[employer]/, or call 1-800-MEDICARE (1- 724.243.9747). TTY users should call 6-324.147.7982. · To find a different long term care hospital, visit [facility] [address] Compare website, [employer], or call 1-800- MEDICARE (307 1116). TTY users should call 2-241.240.7546. · To find a different inpatient rehabilitation facility, visit [facility] Compare website, www.medicare.gov/ inpatientrehabilitation facilitycompare, or call 1-800-MEDICARE (5-390.613.2545). TTY users should call 6- 324.644.3186. · To find a different home health agency, visit [address] website, www.medicare.gov/homehealthcompare, or call 1-800-MEDICARE (7-513- 363-4168). TTY users should call 7-877.124.5942.

## 2019-06-04 NOTE — H&P
[address]     Department of Internal Medicine - Staff Internal Medicine Service          ADMISSION NOTE/HISTORY AND PHYSICAL EXAMINATION   ______________________________________________________________________    HISTORY OBTAIN FROM: Patient and chart review    CHIEF COMPLAINT: Shortness of breath x 4 days      HISTORY OF PRESENT ILLNESS:     72 y.o. male presented with PMH of ischemic cardiopathy s/p CABG and biventricular AICD placement, multiple myeloma s/p chemotherapy, GIST tumor resection in 2003, diabetes mellitus type 2, essential hypertension and COPD, paroxysmal atrial fibrillation  presented with worsening shortness of breath and leg swelling since 4 days. Patient reported that usually he is able to walk 1 block but since 2 weeks he has noticed worsening shortness of breath which is more than her baseline and gets short of breath even at rest now. He he states that initially he was able to lie flat while sleeping but recently since few days he noticed marked orthopnea and uses 2 pillows under his head at night. He also complained of dry cough that started around the same time along with other symptoms but is not able to bring up anything. And noticed loss of appetite for about 1-2 months and feels very weak. He denied any associated chest pain, fever, chills, recent travel, sick contacts, recent change in diet or medications or any change in bowel habits. He also has significant past medical history of CABG done in 2014 biventricular AICD placement for ischemic cardiomyopathy. Last ECHO in 8/18 showed EF-17%. Follows up with Dr Fidencio Reeder and recent visit was in 2/19. He also has paroxysmal atrial fibrillation for which he is on Xarelto 20 mg daily and amiodarone 200 mg daily. He was taken off metoprolol and ACE inhibitor as he felt dizzy with these medications. He also has history of GIST resection and was seen by Dr Elmer Simon  recently forow appetite and constipation. Last colonoscopy 1/18 showed hemorrhoids. His MRI showed intraductal papillary mucinous neoplasm for which he planned for repeat MRI and MRCP. He also had esophageal dysphagia for which he is scheduled for EGD to rule out esophagitis, stricture, H pylori infection). He also has underlying multiple myeloma(f/u with Dr Maddie Daley) for which he underwent chemotherapy and  currently in remission phase. He was recently seen by nephrology(Dr SMITH [place]) for worsening renal function and found to have RANDALL/CK D stage III secondary to cardiorenal syndrome. On presentation he was hemodynamically stable, afebrile, saturating well on 1l of O2 spo2-96%,not in acute distress. On evaluation he had mild bilateral wheezes and crackles and minimal 1+ lower extremity edema. His significant labs showed proBNP 5671, troponins of 31, EKG was unremarkable. TSH/T4-9.79/1. 81. CXR showed changes suggestive of CHF and mild pulmonary edema. He was given 1 dose of IV Lasix 40 mg in ER. Will continue On IV Lasix 40 mg and BTs and resumed on amiodarone 200 mg daily, Demerol to 20 mg daily, aspirin 81, Zocor 40 nightly and Neurontin 300 daily.         PAST MEDICAL HISTORY:        Diagnosis Date    Anticoagulated     XARELTO    Arthritis     all over    CAD (coronary artery disease)     Cardiomyopathy ([address]     CHF (congestive heart failure) (HCC)     Chronic kidney disease     COPD (chronic obstructive pulmonary disease) ([address]     Diabetes mellitus ([address]     DVT of leg (deep venous thrombosis) ([address]     RIGHT    Former tobacco use     Hx of blood clots     Hyperlipidemia     Hyperlipidemia     Hypertension     Marijuana user     Multiple myeloma ([address] 2014    Neuropathy     Neuropathy     Wears dentures     FULL UPPER, PARTIAL LOWER       PAST SURGICAL HISTORY:        Procedure Laterality Date    ABDOMEN SURGERY  2002    STOMAL TUMOR    CARDIAC CATHETERIZATION  12/13/2017    right and left heart cath with Dr. Geovanni Dumont differently: Take 20 mg by mouth 2 times daily )  albuterol sulfate  (90 Base) MCG/ACT inhaler, Inhale 1 puff into the lungs every 6 hours as needed for Wheezing  gabapentin (NEURONTIN) 100 MG capsule, Take 300 mg by mouth daily. Umer Delgado   aspirin 81 MG chewable tablet, Take 1 tablet by mouth daily  famotidine (PEPCID) 20 MG tablet, Take 1 tablet by mouth 2 times daily (Patient taking differently: Take 20 mg by mouth daily )  simvastatin (ZOCOR) 40 MG tablet, Take 40 mg by mouth nightly  rivaroxaban (XARELTO) 20 MG TABS tablet, Take 20 mg by mouth daily (with breakfast)   glipiZIDE (GLUCOTROL) 5 MG tablet, Take 5 mg by mouth every morning (before breakfast)    Allergies:  Nuts [peanut-containing drug products]    SOCIAL HISTORY:       FAMILY HISTORY:       Problem Relation Age of Onset    Diabetes Mother     Stroke Father     Other Sister         PE    Stroke Brother        REVIEW OF SYSTEMS:  CONSTITUTIONAL:  positive for  fatigue  RESPIRATORY:  positive for  dry cough  CARDIOVASCULAR:  positive for  dyspnea, orthopnea, fatigue, edema  GASTROINTESTINAL:  negative for nausea, vomiting and change in bowel habits  MUSCULOSKELETAL:  negative for  myalgias, arthralgias and pain  NEUROLOGICAL:  negative for headaches, dizziness, seizures, memory problems and speech problems  BEHAVIOR/PSYCH:  positive for poor appetite    PHYSICAL EXAM:  /87   Pulse 94   Temp 97.6 °F (36.4 °C) (Oral)   Resp 20   Ht 5' 9\" (1.753 m)   Wt 185 lb 11.2 oz (84.2 kg)   SpO2 94%   BMI 27.42 kg/m²   CONSTITUTIONAL:  awake, alert, cooperative, no apparent distress, and appears stated age  NECK:  Supple, symmetrical, trachea midline, no adenopathy, thyroid symmetric, not enlarged and no tenderness, skin normal  LUNGS:  No increased work of breathing, good air exchange, B/L crackles and wheezing  CARDIOVASCULAR:  Normal apical impulse, regular rate and rhythm, normal S1 and S2, no S3 or S4, and no murmur noted  ABDOMEN:  No scars, sinus rhythm with heart rate in 100. Resumed on amiodarone 200 mg daily and Xarelto 20 mg daily. Follow up with Dr. Wilder Osborn. · Essential hypertension   Currently blood pressure normal range. · COPD-stable   On room air at home. We'll start on BT with albuterol and DuoNeb. Tessalon 100 3 times a day for cough. · CKD stage III(baseline creatinine around 1.1-1.6) from cardiorenal syndrome . · Creatinine in March was 1.6 in April 2019-1.4. Recent  follow-up with Mor Valdes last month. Monitor intake and output. Daily BMP. · Multiple myeloma diagnosed in 2014 s/p chemotherapy. Currently in remission stage. F/u with Dr Eugenia Chamberlain. · Diabetes mellitus type 2 secondary to steroid use for MM since 2018   last DtN7g-9.2(3/19) . Poct glucose 103. POCT glucose checks AC, HS. Low dose Insulin sliding scale. Hypoglycemia protocol. · GIST tumor s/p resection in 2003     · Revlimid related neuropathy. · On Neurontin 300 mg daily. · Anemia from GI bleed   colonoscopy 8/18 showed hemorrhoids   Resumed on ferrous sulfate supplementation. · Constipation  · Last colonoscopy in 2018 showed hemorrhoids. on colace 100 BID and senna. · Hyperlipidemia  · Resumed fenofibric acid 135 mg daily and simvastatin 40 mg daily. Diet-Carb control, low Na diet with fluid restriction 1500 ml  DVT prophylaxis- already on xarelto   PT/OT Evaluation and treatment.  consulted for discharge planning. Michelle Lakhani, PGY 1, Internal Medicine Resident  [address]    Attending Physician Statement  I have discussed the care of Manuel Shelton, including pertinent history and exam findings with the resident. I have reviewed the key elements of all parts of the encounter with the resident. I have seen and examined the patient with the resident and the key elements of all parts of the encounter have been performed by me.      72year old man admitted with worsening dyspnea  PMH,

## 2019-06-05 LAB
ABSOLUTE EOS #: 0.06 K/UL (ref 0–0.44)
ABSOLUTE IMMATURE GRANULOCYTE: 0.06 K/UL (ref 0–0.3)
ABSOLUTE LYMPH #: 2.08 K/UL (ref 1.1–3.7)
ABSOLUTE MONO #: 1.2 K/UL (ref 0.1–1.2)
ANION GAP SERPL CALCULATED.3IONS-SCNC: 12 MMOL/L (ref 9–17)
BASOPHILS # BLD: 1 % (ref 0–2)
BASOPHILS ABSOLUTE: 0.06 K/UL (ref 0–0.2)
BUN BLDV-MCNC: 26 MG/DL (ref 8–23)
BUN/CREAT BLD: ABNORMAL (ref 9–20)
CALCIUM SERPL-MCNC: 8.9 MG/DL (ref 8.6–10.4)
CHLORIDE BLD-SCNC: 107 MMOL/L (ref 98–107)
CO2: 28 MMOL/L (ref 20–31)
CREAT SERPL-MCNC: 1.14 MG/DL (ref 0.7–1.2)
DIFFERENTIAL TYPE: ABNORMAL
EKG ATRIAL RATE: 98 BPM
EKG P AXIS: 86 DEGREES
EKG P-R INTERVAL: 202 MS
EKG Q-T INTERVAL: 412 MS
EKG QRS DURATION: 150 MS
EKG QTC CALCULATION (BAZETT): 525 MS
EKG R AXIS: -64 DEGREES
EKG T AXIS: 115 DEGREES
EKG VENTRICULAR RATE: 98 BPM
EOSINOPHILS RELATIVE PERCENT: 1 % (ref 1–4)
GFR AFRICAN AMERICAN: >60 ML/MIN
GFR NON-AFRICAN AMERICAN: >60 ML/MIN
GFR SERPL CREATININE-BSD FRML MDRD: ABNORMAL ML/MIN/{1.73_M2}
GFR SERPL CREATININE-BSD FRML MDRD: ABNORMAL ML/MIN/{1.73_M2}
GLUCOSE BLD-MCNC: 104 MG/DL (ref 75–110)
GLUCOSE BLD-MCNC: 105 MG/DL (ref 70–99)
GLUCOSE BLD-MCNC: 120 MG/DL (ref 75–110)
GLUCOSE BLD-MCNC: 137 MG/DL (ref 75–110)
GLUCOSE BLD-MCNC: 145 MG/DL (ref 75–110)
GLUCOSE BLD-MCNC: 169 MG/DL (ref 75–110)
HCT VFR BLD CALC: 42.4 % (ref 40.7–50.3)
HEMOGLOBIN: 12.9 G/DL (ref 13–17)
IMMATURE GRANULOCYTES: 1 %
LV EF: 13 %
LVEF MODALITY: NORMAL
LYMPHOCYTES # BLD: 33 % (ref 24–43)
MAGNESIUM: 1.7 MG/DL (ref 1.6–2.6)
MCH RBC QN AUTO: 26.9 PG (ref 25.2–33.5)
MCHC RBC AUTO-ENTMCNC: 30.4 G/DL (ref 28.4–34.8)
MCV RBC AUTO: 88.5 FL (ref 82.6–102.9)
MONOCYTES # BLD: 19 % (ref 3–12)
MORPHOLOGY: ABNORMAL
NRBC AUTOMATED: 0 PER 100 WBC
PDW BLD-RTO: 20.7 % (ref 11.8–14.4)
PLATELET # BLD: 249 K/UL (ref 138–453)
PLATELET ESTIMATE: ABNORMAL
PMV BLD AUTO: 10.5 FL (ref 8.1–13.5)
POTASSIUM SERPL-SCNC: 3.7 MMOL/L (ref 3.7–5.3)
RBC # BLD: 4.79 M/UL (ref 4.21–5.77)
RBC # BLD: ABNORMAL 10*6/UL
SEG NEUTROPHILS: 45 % (ref 36–65)
SEGMENTED NEUTROPHILS ABSOLUTE COUNT: 2.84 K/UL (ref 1.5–8.1)
SODIUM BLD-SCNC: 147 MMOL/L (ref 135–144)
WBC # BLD: 6.3 K/UL (ref 3.5–11.3)
WBC # BLD: ABNORMAL 10*3/UL

## 2019-06-05 PROCEDURE — 6360000002 HC RX W HCPCS: Performed by: STUDENT IN AN ORGANIZED HEALTH CARE EDUCATION/TRAINING PROGRAM

## 2019-06-05 PROCEDURE — 2580000003 HC RX 258: Performed by: STUDENT IN AN ORGANIZED HEALTH CARE EDUCATION/TRAINING PROGRAM

## 2019-06-05 PROCEDURE — 93010 ELECTROCARDIOGRAM REPORT: CPT | Performed by: INTERNAL MEDICINE

## 2019-06-05 PROCEDURE — 93306 TTE W/DOPPLER COMPLETE: CPT

## 2019-06-05 PROCEDURE — 85025 COMPLETE CBC W/AUTO DIFF WBC: CPT

## 2019-06-05 PROCEDURE — 80048 BASIC METABOLIC PNL TOTAL CA: CPT

## 2019-06-05 PROCEDURE — 2060000000 HC ICU INTERMEDIATE R&B

## 2019-06-05 PROCEDURE — 6370000000 HC RX 637 (ALT 250 FOR IP): Performed by: STUDENT IN AN ORGANIZED HEALTH CARE EDUCATION/TRAINING PROGRAM

## 2019-06-05 PROCEDURE — 94640 AIRWAY INHALATION TREATMENT: CPT

## 2019-06-05 PROCEDURE — 83735 ASSAY OF MAGNESIUM: CPT

## 2019-06-05 PROCEDURE — 80307 DRUG TEST PRSMV CHEM ANLYZR: CPT

## 2019-06-05 RX ADMIN — RIVAROXABAN 20 MG: 20 TABLET, FILM COATED ORAL at 08:38

## 2019-06-05 RX ADMIN — IPRATROPIUM BROMIDE AND ALBUTEROL SULFATE 1 AMPULE: .5; 3 SOLUTION RESPIRATORY (INHALATION) at 08:16

## 2019-06-05 RX ADMIN — SENNOSIDES 8.6 MG: 8.6 TABLET, FILM COATED ORAL at 20:16

## 2019-06-05 RX ADMIN — FENOFIBRATE 135 MG: 54 TABLET ORAL at 08:38

## 2019-06-05 RX ADMIN — DOCUSATE SODIUM 100 MG: 100 CAPSULE, LIQUID FILLED ORAL at 08:39

## 2019-06-05 RX ADMIN — IPRATROPIUM BROMIDE AND ALBUTEROL SULFATE 1 AMPULE: .5; 3 SOLUTION RESPIRATORY (INHALATION) at 15:43

## 2019-06-05 RX ADMIN — ASPIRIN 81 MG: 81 TABLET, CHEWABLE ORAL at 08:39

## 2019-06-05 RX ADMIN — FAMOTIDINE 20 MG: 20 TABLET, FILM COATED ORAL at 20:16

## 2019-06-05 RX ADMIN — PAROXETINE HYDROCHLORIDE HEMIHYDRATE 10 MG: 10 TABLET, FILM COATED ORAL at 08:38

## 2019-06-05 RX ADMIN — GABAPENTIN 300 MG: 300 CAPSULE ORAL at 08:38

## 2019-06-05 RX ADMIN — AMIODARONE HYDROCHLORIDE 200 MG: 200 TABLET ORAL at 08:39

## 2019-06-05 RX ADMIN — FAMOTIDINE 20 MG: 20 TABLET, FILM COATED ORAL at 08:39

## 2019-06-05 RX ADMIN — IPRATROPIUM BROMIDE AND ALBUTEROL SULFATE 1 AMPULE: .5; 3 SOLUTION RESPIRATORY (INHALATION) at 11:54

## 2019-06-05 RX ADMIN — TRAMADOL HYDROCHLORIDE 50 MG: 50 TABLET, FILM COATED ORAL at 08:48

## 2019-06-05 RX ADMIN — LATANOPROST 1 DROP: 50 SOLUTION OPHTHALMIC at 20:17

## 2019-06-05 RX ADMIN — INSULIN LISPRO 1 UNITS: 100 INJECTION, SOLUTION INTRAVENOUS; SUBCUTANEOUS at 20:23

## 2019-06-05 RX ADMIN — SIMVASTATIN 40 MG: 40 TABLET, FILM COATED ORAL at 20:16

## 2019-06-05 RX ADMIN — TRAZODONE HYDROCHLORIDE 50 MG: 50 TABLET ORAL at 20:16

## 2019-06-05 RX ADMIN — FUROSEMIDE 40 MG: 10 INJECTION, SOLUTION INTRAMUSCULAR; INTRAVENOUS at 08:40

## 2019-06-05 RX ADMIN — DOCUSATE SODIUM 100 MG: 100 CAPSULE, LIQUID FILLED ORAL at 20:16

## 2019-06-05 RX ADMIN — Medication 10 ML: at 20:23

## 2019-06-05 RX ADMIN — Medication 10 ML: at 08:48

## 2019-06-05 RX ADMIN — INSULIN LISPRO 1 UNITS: 100 INJECTION, SOLUTION INTRAVENOUS; SUBCUTANEOUS at 16:48

## 2019-06-05 RX ADMIN — IPRATROPIUM BROMIDE AND ALBUTEROL SULFATE 1 AMPULE: .5; 3 SOLUTION RESPIRATORY (INHALATION) at 20:18

## 2019-06-05 ASSESSMENT — PAIN SCALES - GENERAL
PAINLEVEL_OUTOF10: 0

## 2019-06-05 NOTE — PROGRESS NOTES
gabapentin  300 mg Oral Daily    latanoprost  1 drop Both Eyes Nightly    fenofibrate  135 mg Oral Daily    traZODone  50 mg Oral Nightly    traMADol  50 mg Oral Daily    simvastatin  40 mg Oral Nightly    PARoxetine  10 mg Oral Daily    rivaroxaban  20 mg Oral Daily with breakfast    furosemide  40 mg Intravenous Daily    ipratropium-albuterol  1 ampule Inhalation Q4H WA    senna  1 tablet Oral Nightly    insulin lispro  0-6 Units Subcutaneous TID WC    insulin lispro  0-3 Units Subcutaneous Nightly       PRN Medications  acetaminophen 650 mg Q4H PRN   sodium chloride flush 10 mL PRN   ondansetron 4 mg Q6H PRN   potassium chloride 40 mEq PRN   Or     potassium alternative oral replacement 40 mEq PRN   Or     potassium chloride 10 mEq PRN   magnesium sulfate 1 g PRN   albuterol sulfate HFA 1 puff Q6H PRN   benzonatate 100 mg TID PRN   albuterol 2.5 mg Q4H PRN   glucose 15 g PRN   dextrose 12.5 g PRN   glucagon (rDNA) 1 mg PRN   dextrose 100 mL/hr PRN       Diagnostic Labs and Imaging:  CBC:  Recent Labs     06/04/19  1317 06/04/19  2042 06/05/19  0604   WBC 7.0 6.4 6.3   HGB 14.4 14.1 12.9*    290 249     BMP: Recent Labs     06/04/19  1317 06/04/19  1318 06/05/19  0604     --  147*   K 4.0  --  3.7     --  107   CO2 24  --  28   BUN 26*  --  26*   CREATININE 1.13 1.29* 1.14   GLUCOSE 103*  --  105*     Hepatic: No results for input(s): AST, ALT, ALB, BILITOT, ALKPHOS in the last 72 hours. IMPRESSION  This is a 72 y.o. male with PMH of PMH of ischemic cardiopathy s/p CABG and biventricular AICD placement, multiple myeloma s/p chemotherapy, GIST tumor resection in 2003, diabetes mellitus type 2, essential hypertension and COPD  presented with worsening shortness of breath and leg swelling of 4 day duration and found to have CHF exacerbation.  Patient admitted to stepdown status.     ASSESSMENT/PLAN:  · Acute on chronic combined decompensated heart failure-resolving   Received 1 dose of IV Lasix 40 mg in ER. Will continue IV Lasix 40 mg for now. U/O 400(charted)Last ECHO 8/18 showed EF 17%(before AICD placement). Will repeat 2D Echocardiogram . Strict I/Os. Elia Nixon Bilateral legs. wt today 190 Lbs. Wt today 185 lbs. Daily weights on stand up scale. Extensive heart failure counseling. Consulted CHF educator. · Ischemic cardiomyopathy s/p CABG(EF-17%) s/p biventricular AICD placement on 8/18-stable  Follows up with Dr Ricci Lester. (Off metoprolol and ACE inhibitor's because of dizziness)     · Paroxysmal atrial fibrillation  Currently him normal sinus rhythm with heart rate in 100. On amiodarone 200 mg daily and Xarelto 20 mg daily. Follow up with Dr. Ricci Lester.     · Essential hypertension   Currently blood pressure normal range.     · COPD-stable   On room air at home. We'll start on BT with albuterol and DuoNeb. Tessalon 100 3 times a day for cough.     · CKD stage III(baseline creatinine around 1.1-1.6) from cardiorenal syndrome . · Cr-1.14this am.(Creatinine in March was 1.6 in April 2019-1. 4). Recent  follow-up with Kelli Mckay last month. Monitor intake and output. Daily BMP.      · Multiple myeloma diagnosed in 2014 s/p chemotherapy. Currently in remission stage. F/u with Dr Johnathan Roberts.      · Diabetes mellitus type 2 secondary to steroid use for MM since 2018   last QeC8g-9.2(3/19) . Poct glucose 105. POCT glucose checks AC, HS. Low dose Insulin sliding scale. Hypoglycemia protocol.     · GIST tumor s/p resection in 2003      · Revlimid related neuropathy. On Neurontin 300 mg daily.     · Anemia from GI bleed   colonoscopy 8/18 showed hemorrhoids   On ferrous sulfate supplementation.     · Constipation  · Last colonoscopy in 2018 showed hemorrhoids.   on colace 100 BID and senna.     · Hyperlipidemia  On  fenofibric acid 135 mg daily and simvastatin 40 mg daily.        Diet-Carb control, low Na diet with fluid restriction 1500 ml  DVT prophylaxis- already on xarelto   PT/OT Evaluation and treatment.    consulted for discharge planning.     Ava Guillen, PGY 1, Internal Medicine Resident  [facility], [facility]

## 2019-06-05 NOTE — PROGRESS NOTES
Smoking Cessation - topics covered   []  Health Risks  []  Benefits of Quitting   []  Smoking Cessation  []  Patient has no history of tobacco use  [x]  Patient is former smoker. Patient quit in 2018. [x]  No need for tobacco cessation education. []  Booklet given  []  Patient verbalizes understanding. []  Patient denies need for tobacco cessation education. []  Unable to meet with patient today. Will follow up as able.   Aguila Pop  8:57 AM

## 2019-06-05 NOTE — PLAN OF CARE
Pt sitting up in bed watching tv. Denies pain or concerns at this time. Vitals within normal parameters. See flow sheet. Pt educated about fluid intake and restriction. Pt verbalized understanding. Will monitor.

## 2019-06-05 NOTE — PROGRESS NOTES
Nutrition Education    Type and Reason for Visit: Patient Education    Nutrition Assessment:       · Verbally reviewed following information with patient  · Written educational materials provided. · Contact number provided. · Refer to Patient Education activity for more details.     Electronically signed by Merline Riddles, DTR on 6/5/19 at 9:39 AM

## 2019-06-05 NOTE — PROGRESS NOTES
Medicine Senior Resident Note    Pt seen and examined at bedside. Agree with intern assessment and plan. Added assessment and plan include: This is a 27-year-old male who presented with increasing shortness of breath since past 4 days, orthopnea, decreased appetite around the same time. Patient denies any increase in weight. He states that he is compliant with his medication and diet. He is usually able to talk about 1 block but he is not able to walk now. Principal Problem:    CHF (congestive heart failure), NYHA class I, acute on chronic, combined ([address]  Active Problems:    Multiple vessel coronary artery disease    Multiple myeloma in remission [facility]    Chronic obstructive pulmonary disease (HCC)    S/P ICD (internal cardiac defibrillator) procedure    CKD (chronic kidney disease), stage III ([address]  Resolved Problems:    * No resolved hospital problems. *      Plan:     1. Acute on chronic congestive heart failure: IV Lasix 40 mg daily, strict I&O, daily weights monitor urine output. 2. History of COPD: Resume home inhalers  3. History of paroxysmal atrial fibrillation: Resume Xarelto   4. CHF nurse consult  5. DVT prophylaxis: Already on anticoagulation  6.  We'll continue to monitor      Felicity Higuera MD

## 2019-06-06 LAB
ANION GAP SERPL CALCULATED.3IONS-SCNC: 16 MMOL/L (ref 9–17)
BNP INTERPRETATION: ABNORMAL
BUN BLDV-MCNC: 28 MG/DL (ref 8–23)
BUN/CREAT BLD: ABNORMAL (ref 9–20)
CALCIUM SERPL-MCNC: 8.7 MG/DL (ref 8.6–10.4)
CHLORIDE BLD-SCNC: 105 MMOL/L (ref 98–107)
CO2: 23 MMOL/L (ref 20–31)
CREAT SERPL-MCNC: 1.1 MG/DL (ref 0.7–1.2)
GFR AFRICAN AMERICAN: >60 ML/MIN
GFR NON-AFRICAN AMERICAN: >60 ML/MIN
GFR SERPL CREATININE-BSD FRML MDRD: ABNORMAL ML/MIN/{1.73_M2}
GFR SERPL CREATININE-BSD FRML MDRD: ABNORMAL ML/MIN/{1.73_M2}
GLUCOSE BLD-MCNC: 138 MG/DL (ref 75–110)
GLUCOSE BLD-MCNC: 149 MG/DL (ref 75–110)
GLUCOSE BLD-MCNC: 160 MG/DL (ref 75–110)
GLUCOSE BLD-MCNC: 82 MG/DL (ref 75–110)
GLUCOSE BLD-MCNC: 89 MG/DL (ref 70–99)
MAGNESIUM: 1.7 MG/DL (ref 1.6–2.6)
MAGNESIUM: 2.1 MG/DL (ref 1.6–2.6)
POTASSIUM SERPL-SCNC: 3.8 MMOL/L (ref 3.7–5.3)
PRO-BNP: 3704 PG/ML
SODIUM BLD-SCNC: 144 MMOL/L (ref 135–144)

## 2019-06-06 PROCEDURE — 99233 SBSQ HOSP IP/OBS HIGH 50: CPT | Performed by: INTERNAL MEDICINE

## 2019-06-06 PROCEDURE — 82947 ASSAY GLUCOSE BLOOD QUANT: CPT

## 2019-06-06 PROCEDURE — 83735 ASSAY OF MAGNESIUM: CPT

## 2019-06-06 PROCEDURE — 6370000000 HC RX 637 (ALT 250 FOR IP): Performed by: INTERNAL MEDICINE

## 2019-06-06 PROCEDURE — 6360000002 HC RX W HCPCS: Performed by: STUDENT IN AN ORGANIZED HEALTH CARE EDUCATION/TRAINING PROGRAM

## 2019-06-06 PROCEDURE — 94762 N-INVAS EAR/PLS OXIMTRY CONT: CPT

## 2019-06-06 PROCEDURE — 36415 COLL VENOUS BLD VENIPUNCTURE: CPT

## 2019-06-06 PROCEDURE — 6370000000 HC RX 637 (ALT 250 FOR IP): Performed by: STUDENT IN AN ORGANIZED HEALTH CARE EDUCATION/TRAINING PROGRAM

## 2019-06-06 PROCEDURE — 6360000002 HC RX W HCPCS: Performed by: INTERNAL MEDICINE

## 2019-06-06 PROCEDURE — 93005 ELECTROCARDIOGRAM TRACING: CPT | Performed by: STUDENT IN AN ORGANIZED HEALTH CARE EDUCATION/TRAINING PROGRAM

## 2019-06-06 PROCEDURE — 2580000003 HC RX 258: Performed by: STUDENT IN AN ORGANIZED HEALTH CARE EDUCATION/TRAINING PROGRAM

## 2019-06-06 PROCEDURE — 94640 AIRWAY INHALATION TREATMENT: CPT

## 2019-06-06 PROCEDURE — 2060000000 HC ICU INTERMEDIATE R&B

## 2019-06-06 PROCEDURE — 94760 N-INVAS EAR/PLS OXIMETRY 1: CPT

## 2019-06-06 PROCEDURE — 2700000000 HC OXYGEN THERAPY PER DAY

## 2019-06-06 PROCEDURE — 83880 ASSAY OF NATRIURETIC PEPTIDE: CPT

## 2019-06-06 PROCEDURE — 80048 BASIC METABOLIC PNL TOTAL CA: CPT

## 2019-06-06 RX ORDER — FUROSEMIDE 10 MG/ML
20 INJECTION INTRAMUSCULAR; INTRAVENOUS NIGHTLY
Status: DISCONTINUED | OUTPATIENT
Start: 2019-06-06 | End: 2019-06-06

## 2019-06-06 RX ORDER — METOPROLOL SUCCINATE 25 MG/1
25 TABLET, EXTENDED RELEASE ORAL DAILY
Status: DISCONTINUED | OUTPATIENT
Start: 2019-06-06 | End: 2019-06-13 | Stop reason: HOSPADM

## 2019-06-06 RX ORDER — LISINOPRIL 2.5 MG/1
2.5 TABLET ORAL DAILY
Status: DISCONTINUED | OUTPATIENT
Start: 2019-06-06 | End: 2019-06-06

## 2019-06-06 RX ORDER — FUROSEMIDE 10 MG/ML
60 INJECTION INTRAMUSCULAR; INTRAVENOUS ONCE
Status: COMPLETED | OUTPATIENT
Start: 2019-06-06 | End: 2019-06-06

## 2019-06-06 RX ORDER — FUROSEMIDE 10 MG/ML
40 INJECTION INTRAMUSCULAR; INTRAVENOUS 2 TIMES DAILY
Status: DISCONTINUED | OUTPATIENT
Start: 2019-06-06 | End: 2019-06-06

## 2019-06-06 RX ORDER — FUROSEMIDE 10 MG/ML
40 INJECTION INTRAMUSCULAR; INTRAVENOUS DAILY
Status: DISCONTINUED | OUTPATIENT
Start: 2019-06-07 | End: 2019-06-06

## 2019-06-06 RX ORDER — FUROSEMIDE 10 MG/ML
40 INJECTION INTRAMUSCULAR; INTRAVENOUS 2 TIMES DAILY
Status: DISCONTINUED | OUTPATIENT
Start: 2019-06-07 | End: 2019-06-08

## 2019-06-06 RX ORDER — MAGNESIUM SULFATE 1 G/100ML
1 INJECTION INTRAVENOUS
Status: COMPLETED | OUTPATIENT
Start: 2019-06-06 | End: 2019-06-06

## 2019-06-06 RX ADMIN — INSULIN LISPRO 1 UNITS: 100 INJECTION, SOLUTION INTRAVENOUS; SUBCUTANEOUS at 17:45

## 2019-06-06 RX ADMIN — AMIODARONE HYDROCHLORIDE 200 MG: 200 TABLET ORAL at 09:02

## 2019-06-06 RX ADMIN — TRAMADOL HYDROCHLORIDE 50 MG: 50 TABLET, FILM COATED ORAL at 09:08

## 2019-06-06 RX ADMIN — TRAZODONE HYDROCHLORIDE 50 MG: 50 TABLET ORAL at 20:06

## 2019-06-06 RX ADMIN — DOCUSATE SODIUM 100 MG: 100 CAPSULE, LIQUID FILLED ORAL at 20:06

## 2019-06-06 RX ADMIN — IPRATROPIUM BROMIDE AND ALBUTEROL SULFATE 1 AMPULE: .5; 3 SOLUTION RESPIRATORY (INHALATION) at 08:36

## 2019-06-06 RX ADMIN — FAMOTIDINE 20 MG: 20 TABLET, FILM COATED ORAL at 20:06

## 2019-06-06 RX ADMIN — FAMOTIDINE 20 MG: 20 TABLET, FILM COATED ORAL at 09:03

## 2019-06-06 RX ADMIN — IPRATROPIUM BROMIDE AND ALBUTEROL SULFATE 1 AMPULE: .5; 3 SOLUTION RESPIRATORY (INHALATION) at 19:49

## 2019-06-06 RX ADMIN — FERROUS SULFATE TAB EC 325 MG (65 MG FE EQUIVALENT) 325 MG: 325 (65 FE) TABLET DELAYED RESPONSE at 09:02

## 2019-06-06 RX ADMIN — LISINOPRIL 2.5 MG: 2.5 TABLET ORAL at 12:20

## 2019-06-06 RX ADMIN — METOPROLOL SUCCINATE 25 MG: 25 TABLET, FILM COATED, EXTENDED RELEASE ORAL at 18:01

## 2019-06-06 RX ADMIN — RIVAROXABAN 20 MG: 20 TABLET, FILM COATED ORAL at 09:02

## 2019-06-06 RX ADMIN — Medication 10 ML: at 09:05

## 2019-06-06 RX ADMIN — MAGNESIUM SULFATE HEPTAHYDRATE 1 G: 1 INJECTION, SOLUTION INTRAVENOUS at 10:55

## 2019-06-06 RX ADMIN — DOCUSATE SODIUM 100 MG: 100 CAPSULE, LIQUID FILLED ORAL at 09:02

## 2019-06-06 RX ADMIN — FUROSEMIDE 60 MG: 10 INJECTION, SOLUTION INTRAMUSCULAR; INTRAVENOUS at 17:48

## 2019-06-06 RX ADMIN — SENNOSIDES 8.6 MG: 8.6 TABLET, FILM COATED ORAL at 20:06

## 2019-06-06 RX ADMIN — ASPIRIN 81 MG: 81 TABLET, CHEWABLE ORAL at 09:02

## 2019-06-06 RX ADMIN — BENZONATATE 100 MG: 100 CAPSULE ORAL at 09:02

## 2019-06-06 RX ADMIN — LATANOPROST 1 DROP: 50 SOLUTION OPHTHALMIC at 20:03

## 2019-06-06 RX ADMIN — GABAPENTIN 300 MG: 300 CAPSULE ORAL at 12:20

## 2019-06-06 RX ADMIN — MAGNESIUM SULFATE HEPTAHYDRATE 1 G: 1 INJECTION, SOLUTION INTRAVENOUS at 09:08

## 2019-06-06 RX ADMIN — PAROXETINE HYDROCHLORIDE HEMIHYDRATE 10 MG: 10 TABLET, FILM COATED ORAL at 09:02

## 2019-06-06 RX ADMIN — Medication 10 ML: at 20:06

## 2019-06-06 RX ADMIN — SIMVASTATIN 40 MG: 40 TABLET, FILM COATED ORAL at 20:06

## 2019-06-06 RX ADMIN — FENOFIBRATE 135 MG: 54 TABLET ORAL at 09:03

## 2019-06-06 RX ADMIN — FUROSEMIDE 40 MG: 10 INJECTION, SOLUTION INTRAMUSCULAR; INTRAVENOUS at 10:57

## 2019-06-06 RX ADMIN — IPRATROPIUM BROMIDE AND ALBUTEROL SULFATE 1 AMPULE: .5; 3 SOLUTION RESPIRATORY (INHALATION) at 16:05

## 2019-06-06 RX ADMIN — INSULIN LISPRO 1 UNITS: 100 INJECTION, SOLUTION INTRAVENOUS; SUBCUTANEOUS at 12:21

## 2019-06-06 ASSESSMENT — PAIN SCALES - GENERAL: PAINLEVEL_OUTOF10: 6

## 2019-06-06 NOTE — PROGRESS NOTES
other medications. Will check BMP. 3. Hyperlipidemia. On Zocor. Will recheck Lipid profile AST and CK. 4. PAF. Noted post op. Has been in NSR. On Amiodarone and Xarelto.

## 2019-06-06 NOTE — PROGRESS NOTES
BID    ferrous sulfate  325 mg Oral Every Other Day    gabapentin  300 mg Oral Daily    latanoprost  1 drop Both Eyes Nightly    fenofibrate  135 mg Oral Daily    traZODone  50 mg Oral Nightly    traMADol  50 mg Oral Daily    simvastatin  40 mg Oral Nightly    PARoxetine  10 mg Oral Daily    rivaroxaban  20 mg Oral Daily with breakfast    furosemide  40 mg Intravenous Daily    ipratropium-albuterol  1 ampule Inhalation Q4H WA    senna  1 tablet Oral Nightly    insulin lispro  0-6 Units Subcutaneous TID WC    insulin lispro  0-3 Units Subcutaneous Nightly       PRN Medications    acetaminophen 650 mg Q4H PRN   sodium chloride flush 10 mL PRN   ondansetron 4 mg Q6H PRN   potassium chloride 40 mEq PRN   Or     potassium alternative oral replacement 40 mEq PRN   Or     potassium chloride 10 mEq PRN   magnesium sulfate 1 g PRN   albuterol sulfate HFA 1 puff Q6H PRN   benzonatate 100 mg TID PRN   albuterol 2.5 mg Q4H PRN   glucose 15 g PRN   dextrose 12.5 g PRN   glucagon (rDNA) 1 mg PRN   dextrose 100 mL/hr PRN       Diagnostic Labs and Imaging:  CBC:  Recent Labs     06/04/19  1317 06/04/19  2042 06/05/19  0604   WBC 7.0 6.4 6.3   HGB 14.4 14.1 12.9*    290 249     BMP:   Recent Labs     06/04/19  1317 06/04/19  1318 06/05/19  0604 06/06/19  0610     --  147* 144   K 4.0  --  3.7 3.8     --  107 105   CO2 24  --  28 23   BUN 26*  --  26* 28*   CREATININE 1.13 1.29* 1.14 1.10   GLUCOSE 103*  --  105* 89     Hepatic: No results for input(s): AST, ALT, ALB, BILITOT, ALKPHOS in the last 72 hours. ECHO (6/6/19)  Summary  Dilated left ventricle with severely reduced systolic function. Calculated ejection fraction via Arthur's Method is 12.4 %  Evidence of diastolic dysfunction. Left atrium is severely dilated. Normal right ventricle size with reduced systolic function. AICD lead seen in right atrium/ventricle. Mildly thickened mitral valve leaflets.   Moderate mitral regurgitation, central jet. Moderate tricuspid regurgitation. Trivial pulmonic insufficiency. Small circumferential pericardial effusion. Pleural effusion is noted. IMPRESSION  This is a 72 y.o. male with PMH of PMH of ischemic cardiopathy s/p CABG and biventricular AICD placement, multiple myeloma s/p chemotherapy, GIST tumor resection in 2003, diabetes mellitus type 2, essential hypertension and COPD  presented with worsening shortness of breath and leg swelling of 4 day duration and found to have CHF exacerbation. Patient admitted to stepdown status.     ASSESSMENT/PLAN:  · Acute on chronic combined decompensated heart failure-resolving     Was on IV Lasix 40 mg daily. U/O was low. Increased I.V Lasix to 40 BID. And a repeat echo done yesterday showed CZ-07.4% and diastolic dysfunction with moderate MR and TR.(ECHO in 8/18 showed EF 17%(before AICD placement). Consulted cardiology for further recommendations. Strict I/Os. Elia Hose Bilateral legs. wt today 190 Lbs. Wt today 189 lbs(190 on admission). Daily weights on stand up scale. Extensive heart failure counseling. CHF education. · Ischemic cardiomyopathy s/p CABG(EF-17%) s/p biventricular AICD placement on 8/18-worsening. Repeat echo done yesterday showed BU-70.5% and diastolic dysfunction with moderate MR and TR. Consulted cardiology for further recommendations. (Off metoprolol and ACE inhibitor's because of dizziness initially . F/u with Dr Abdoulaye Dow)     · Paroxysmal atrial fibrillation  Currently him normal sinus rhythm with heart rate in 100. On amiodarone 200 mg daily and Xarelto 20 mg daily. Follow up with Dr. Abdoulaye Dow as O/P    · Hypomagnesemia  Magnesium 1.7 today. Patient is recommended. Follow-up EKG. Ordered 2 g of magnesium are normal.-        · Essential hypertension   Currently blood pressure normal range.     · COPD-stable   On room air at home. Continued on with albuterol and DuoNeb.  Tessalon 100 3 times a day for cough.     · CKD stage

## 2019-06-06 NOTE — CARE COORDINATION
Detail Level: Simple Pt has received CHF education and zone paper from CHF nurse, understands instruction given. Plan: .\\nNo new medications, hormones were increased.\\nRash has been coming and going for years. \\n\\nContinue using TAC daily prn for flare ups. \\nReturn in 3-4 months to f.u with Dr Ramírez. \\n\\nIf any large lesions or harden return for possible bx.

## 2019-06-06 NOTE — CONSULTS
[facility] Cardiology Cardiology    Consult / H&P               Today's Date: 6/6/2019  Patient Name: Yokasta White. Date of admission: 6/4/2019 12:39 PM  Patient's age: 72 y.o., 1945  Admission Dx: CHF (congestive heart failure), NYHA class I, acute on chronic, combined ([facility]) [I50.43]  CHF (congestive heart failure), NYHA class I, acute on chronic, combined ([address] [I50.43]    Reason for Consult:  Cardiac evaluation    Requesting Physician: Terry Sanchez MD    CHIEF COMPLAINT:  Shortness of breath    History Obtained From:  patient, electronic medical record    HISTORY OF PRESENT ILLNESS:      The patient is a 72 y.o.  male who is admitted to the hospital for progressive shortness of breath. Patient with known ICM and h/o CABG as mentioned below. He presented with symptoms of progressive fatigue and dyspnea on exertion for the past weeks with acute worsening in last 2 days before admission. He complains of dyspnea with minimal exertion and some time at rest as well. These symptoms were associated with increased fatigue and orthopnea. He denies chest pain, palpitations and AICD shocks. He has not recorded any weight gain and state his usual weight is 188 lbs but he has noticed increased LE swelling. He does not follow fluid restriction. He was admitted on 06/04/2019 and started on IV lasix 40 mg daily. He did not have significant diuresis and he was switched lasix 20 mg in the morning and 40 mg in PM.       Past Medical History:   has a past medical history of Anticoagulated, Arthritis, CAD (coronary artery disease), Cardiomyopathy ([address] CHF (congestive heart failure) ([address] Chronic kidney disease, COPD (chronic obstructive pulmonary disease) ([address] Diabetes mellitus ([address] DVT of leg (deep venous thrombosis) ([address] Former tobacco use, Hx of blood clots, Hyperlipidemia, Hyperlipidemia, Hypertension, Marijuana user, Multiple myeloma ([address] Neuropathy, Neuropathy, and Wears dentures.     Past Surgical History:   has a past surgical history that includes Cardiac catheterization (12/13/2017); hernia repair (3480); Abdomen surgery (2002); pr colonoscopy flx dx w/collj spec when pfrmd (N/A, 1/14/2018); pr esophagogastroduodenoscopy transoral diagnostic (N/A, 1/12/2018); Cataract removal with implant (Bilateral); Tear duct surgery (Right); pr cabg, artery-vein, single (N/A, 8/16/2018); and Cardiac defibrillator placement (12/17/2018). Home Medications:    Prior to Admission medications    Medication Sig Start Date End Date Taking? Authorizing Provider   traMADol (ULTRAM) 50 MG tablet Take 50 mg by mouth daily.     Historical Provider, MD   Methylnaltrexone Bromide (RELISTOR) 150 MG TABS Take 150 mg by mouth daily 5/15/19   Carly Villanueva MD   amiodarone (PACERONE) 100 MG tablet Take 1 tablet by mouth daily  Patient taking differently: Take 200 mg by mouth daily  8/26/18   BI Foy   PARoxetine (PAXIL) 10 MG tablet Take 1 tablet by mouth daily 8/26/18   BI Foy   docusate sodium (COLACE, DULCOLAX) 100 MG CAPS Take 100 mg by mouth 2 times daily 8/26/18   BI Foy   Multiple Vitamins-Minerals (THERAPEUTIC MULTIVITAMIN-MINERALS) tablet Take 1 tablet by mouth daily (with breakfast) 8/26/18   BI Foy   potassium chloride (MICRO-K) 10 MEQ extended release capsule Take 10 mEq by mouth 2 times daily     Historical Provider, MD   latanoprost (XALATAN) 0.005 % ophthalmic solution Place 1 drop into both eyes nightly    Historical Provider, MD   dextran 70-hypromellose (TEARS NATURALE) 0.1-0.3 % SOLN opthalmic solution Place 1 drop into both eyes as needed    Historical Provider, MD   fenofibric acid (FIBRICOR) 135 MG CPDR capsule Take 1 capsule by mouth daily 1/8/18   Historical Provider, MD   traZODone (DESYREL) 50 MG tablet Take 1 tablet by mouth nightly 2/27/18   Historical Provider, MD   dexamethasone (DECADRON) 4 MG tablet Take 40 mg by mouth once a week HOLD    Historical Provider, MD lenalidomide (REVLIMID) 10 MG chemo capsule Take 10 mg by mouth daily HOLD    Historical Provider, MD   ferrous sulfate 325 (65 Fe) MG tablet Take 325 mg by mouth every other day    Historical Provider, MD   furosemide (LASIX) 20 MG tablet Take 1 tablet by mouth 2 times daily  Patient taking differently: Take 20 mg by mouth 2 times daily  1/14/18   Ruma Whitlock MD   albuterol sulfate  (90 Base) MCG/ACT inhaler Inhale 1 puff into the lungs every 6 hours as needed for Wheezing    Historical Provider, MD   gabapentin (NEURONTIN) 100 MG capsule Take 100 mg by mouth 3 times daily.      Historical Provider, MD   aspirin 81 MG chewable tablet Take 1 tablet by mouth daily 12/18/17   Hiral Alex MD   famotidine (PEPCID) 20 MG tablet Take 1 tablet by mouth 2 times daily  Patient taking differently: Take 20 mg by mouth daily  12/17/17   Hiral Alex MD   simvastatin (ZOCOR) 40 MG tablet Take 40 mg by mouth nightly    Historical Provider, MD   rivaroxaban (XARELTO) 20 MG TABS tablet Take 20 mg by mouth daily (with breakfast)     Historical Provider, MD   glipiZIDE (GLUCOTROL) 5 MG tablet Take 5 mg by mouth every morning (before breakfast)    Historical Provider, MD      Current Facility-Administered Medications: [START ON 6/7/2019] furosemide (LASIX) injection 40 mg, 40 mg, Intravenous, Daily  furosemide (LASIX) injection 20 mg, 20 mg, Intravenous, Nightly  lisinopril (PRINIVIL;ZESTRIL) tablet 2.5 mg, 2.5 mg, Oral, Daily  acetaminophen (TYLENOL) tablet 650 mg, 650 mg, Oral, Q4H PRN  sodium chloride flush 0.9 % injection 10 mL, 10 mL, Intravenous, 2 times per day  sodium chloride flush 0.9 % injection 10 mL, 10 mL, Intravenous, PRN  ondansetron (ZOFRAN) injection 4 mg, 4 mg, Intravenous, Q6H PRN  potassium chloride (KLOR-CON M) extended release tablet 40 mEq, 40 mEq, Oral, PRN **OR** potassium bicarb-citric acid (EFFER-K) effervescent tablet 40 mEq, 40 mEq, Oral, PRN **OR** potassium chloride 10 mEq/100 mL IVPB (Peripheral Line), 10 mEq, Intravenous, PRN  magnesium sulfate 1 g in dextrose 5% 100 mL IVPB, 1 g, Intravenous, PRN  albuterol sulfate  (90 Base) MCG/ACT inhaler 1 puff, 1 puff, Inhalation, Q6H PRN  amiodarone (CORDARONE) tablet 200 mg, 200 mg, Oral, Daily  aspirin chewable tablet 81 mg, 81 mg, Oral, Daily  famotidine (PEPCID) tablet 20 mg, 20 mg, Oral, BID  docusate sodium (COLACE) capsule 100 mg, 100 mg, Oral, BID  ferrous sulfate EC tablet 325 mg, 325 mg, Oral, Every Other Day  gabapentin (NEURONTIN) capsule 300 mg, 300 mg, Oral, Daily  latanoprost (XALATAN) 0.005 % ophthalmic solution 1 drop, 1 drop, Both Eyes, Nightly  fenofibrate (TRICOR) tablet 135 mg, 135 mg, Oral, Daily  traZODone (DESYREL) tablet 50 mg, 50 mg, Oral, Nightly  traMADol (ULTRAM) tablet 50 mg, 50 mg, Oral, Daily  simvastatin (ZOCOR) tablet 40 mg, 40 mg, Oral, Nightly  PARoxetine (PAXIL) tablet 10 mg, 10 mg, Oral, Daily  rivaroxaban (XARELTO) tablet 20 mg, 20 mg, Oral, Daily with breakfast  benzonatate (TESSALON) capsule 100 mg, 100 mg, Oral, TID PRN  ipratropium-albuterol (DUONEB) nebulizer solution 1 ampule, 1 ampule, Inhalation, Q4H WA  albuterol (PROVENTIL) nebulizer solution 2.5 mg, 2.5 mg, Nebulization, Q4H PRN  senna (SENOKOT) tablet 8.6 mg, 1 tablet, Oral, Nightly  insulin lispro (HUMALOG) injection vial 0-6 Units, 0-6 Units, Subcutaneous, TID WC  insulin lispro (HUMALOG) injection vial 0-3 Units, 0-3 Units, Subcutaneous, Nightly  glucose (GLUTOSE) 40 % oral gel 15 g, 15 g, Oral, PRN  dextrose 50 % IV solution, 12.5 g, Intravenous, PRN  glucagon (rDNA) injection 1 mg, 1 mg, Intramuscular, PRN  dextrose 5 % solution, 100 mL/hr, Intravenous, PRN    Allergies:  Nuts [peanut-containing drug products]    Social History:   reports that he quit smoking about 9 months ago. His smoking use included cigarettes. He has a 15.00 pack-year smoking history. He has never used smokeless tobacco. He reports that he drinks alcohol.  He reports that Chronic obstructive pulmonary disease (HCC)    Low hemoglobin    Other drug-induced pancytopenia (HCC)    S/P ICD (internal cardiac defibrillator) procedure    Ischemic cardiomyopathy    IPMN (intraductal papillary mucinous neoplasm)    History of coronary artery bypass graft    CKD (chronic kidney disease), stage III (HCC)    CHF (congestive heart failure), NYHA class I, acute on chronic, combined ([facility]     IMPRESSION:    1. Acute on chronic decompensated CHF with NYHA III to IV symptoms- exacerbation likely secondary to URI  2. CAD s/p CABG (LIMA-LAD and SVG-OM) 08/2018  3. ICM - s/p AICD -12/2018  4. PAfib - on xarelot  5. LBBB   6. H/O GIST s/p resection 2003      RECOMMENDATIONS:  1. CHF exacerbation likely secondary to viral illness  2. Recommend IV lasix 60 mg once today in the evening and 40 mg BID from tomorrow  3. Follow electrolytes and maintain K>4, Mg>2  4. Will recommend to start metoprolol XL 25 mg daily  5. Hold off ACE I due to border line BP  6. Evaluation for CRT as out patient     Will discuss with rounding attending Dr. Apolinar Nageotte for final recommendations. Ayana Silva MD  Cardiology Fellow      Attestation signed by      Attending Physician Statement:    I have discussed the care of  Aliyah Louise. , including pertinent history and exam findings, with the Cardiology fellow/resident. I have seen and examined the patient and the key elements of all parts of the encounter have been performed by me. I agree with the assessment, plan and orders as documented by the fellow/resident, after I modified exam findings and plan of treatments, and the final version is my approved version of the assessment.      Additional Comments:      Acute on chronic decompensated systolic heart failure secondary to URI/bronchitis, no angina, does have volume overload on exam and right pleural effusion/pulm, congestion on CXR, will recommend IV Lasix 60 mg x 1 followed 40 mg bid, monitor strict I/O and renal parameters, start low dose Toprol XL, hold lisinopril for now. His ecg shows LBBB with  msec and he is potential candidate for Biv-ICD/CRT-D in case of NYHA Class II-III symptoms and if recurrent CHF. Will consider as OP. Refer to CHF clinic on discharge.

## 2019-06-06 NOTE — PROGRESS NOTES
Congestive Heart Failure Education completed and charted. CHF booklet given. Patient was receptive to education. Discussed 2000 mg sodium restricted daily in diet. Patient instructed to limit fluid intake to  1.5 to 2 liters per day. Patient instructed to weigh self at the same time of each day each morning, reinforced teaching to monitor for 3-5 lb weight increase over 1-2 days notify physician if change noted. Signs and symptoms of CHF discussed with patient, such as feeling more tired than normal, feeling short of breath, coughing that increases when you lie down, sudden weight gain, swelling of your feet, legs or belly. Patient verbalized understanding to notify physician office if these symptoms occur. Dr. Gabriel Lloyd advises CHF clinic OP referral at time of bedside education today. Discuss CHF clinic OP referral with pt and wife, Tiffanie Encarnacion. Patient and SO  verbalize understanding and are in agreement to OP f/u.

## 2019-06-07 LAB
-: NORMAL
ALT SERPL-CCNC: 19 U/L (ref 5–41)
ANION GAP SERPL CALCULATED.3IONS-SCNC: 13 MMOL/L (ref 9–17)
AST SERPL-CCNC: 43 U/L
BUN BLDV-MCNC: 27 MG/DL (ref 8–23)
BUN/CREAT BLD: ABNORMAL (ref 9–20)
CALCIUM SERPL-MCNC: 8.9 MG/DL (ref 8.6–10.4)
CHLORIDE BLD-SCNC: 101 MMOL/L (ref 98–107)
CO2: 26 MMOL/L (ref 20–31)
CREAT SERPL-MCNC: 1.14 MG/DL (ref 0.7–1.2)
EKG ATRIAL RATE: 98 BPM
EKG P AXIS: 67 DEGREES
EKG P-R INTERVAL: 204 MS
EKG Q-T INTERVAL: 410 MS
EKG QRS DURATION: 148 MS
EKG QTC CALCULATION (BAZETT): 523 MS
EKG R AXIS: -62 DEGREES
EKG T AXIS: 116 DEGREES
EKG VENTRICULAR RATE: 98 BPM
GFR AFRICAN AMERICAN: >60 ML/MIN
GFR NON-AFRICAN AMERICAN: >60 ML/MIN
GFR SERPL CREATININE-BSD FRML MDRD: ABNORMAL ML/MIN/{1.73_M2}
GFR SERPL CREATININE-BSD FRML MDRD: ABNORMAL ML/MIN/{1.73_M2}
GLUCOSE BLD-MCNC: 103 MG/DL (ref 75–110)
GLUCOSE BLD-MCNC: 122 MG/DL (ref 75–110)
GLUCOSE BLD-MCNC: 130 MG/DL (ref 75–110)
GLUCOSE BLD-MCNC: 86 MG/DL (ref 75–110)
GLUCOSE BLD-MCNC: 87 MG/DL (ref 70–99)
MAGNESIUM: 1.9 MG/DL (ref 1.6–2.6)
POTASSIUM SERPL-SCNC: 4.4 MMOL/L (ref 3.7–5.3)
REASON FOR REJECTION: NORMAL
SODIUM BLD-SCNC: 140 MMOL/L (ref 135–144)
T3 FREE: 2.07 PG/ML (ref 2.02–4.43)
THYROXINE, FREE: 1.74 NG/DL (ref 0.93–1.7)
TSH SERPL DL<=0.05 MIU/L-ACNC: 6.8 MIU/L (ref 0.3–5)
ZZ NTE CLEAN UP: ORDERED TEST: NORMAL
ZZ NTE WITH NAME CLEAN UP: SPECIMEN SOURCE: NORMAL

## 2019-06-07 PROCEDURE — 99232 SBSQ HOSP IP/OBS MODERATE 35: CPT | Performed by: INTERNAL MEDICINE

## 2019-06-07 PROCEDURE — 6360000002 HC RX W HCPCS: Performed by: NURSE PRACTITIONER

## 2019-06-07 PROCEDURE — 84450 TRANSFERASE (AST) (SGOT): CPT

## 2019-06-07 PROCEDURE — 2580000003 HC RX 258: Performed by: STUDENT IN AN ORGANIZED HEALTH CARE EDUCATION/TRAINING PROGRAM

## 2019-06-07 PROCEDURE — 94760 N-INVAS EAR/PLS OXIMETRY 1: CPT

## 2019-06-07 PROCEDURE — 80048 BASIC METABOLIC PNL TOTAL CA: CPT

## 2019-06-07 PROCEDURE — 82947 ASSAY GLUCOSE BLOOD QUANT: CPT

## 2019-06-07 PROCEDURE — 6360000002 HC RX W HCPCS: Performed by: INTERNAL MEDICINE

## 2019-06-07 PROCEDURE — 94640 AIRWAY INHALATION TREATMENT: CPT

## 2019-06-07 PROCEDURE — 6370000000 HC RX 637 (ALT 250 FOR IP): Performed by: STUDENT IN AN ORGANIZED HEALTH CARE EDUCATION/TRAINING PROGRAM

## 2019-06-07 PROCEDURE — 2060000000 HC ICU INTERMEDIATE R&B

## 2019-06-07 PROCEDURE — 84481 FREE ASSAY (FT-3): CPT

## 2019-06-07 PROCEDURE — 94761 N-INVAS EAR/PLS OXIMETRY MLT: CPT

## 2019-06-07 PROCEDURE — 84439 ASSAY OF FREE THYROXINE: CPT

## 2019-06-07 PROCEDURE — 84460 ALANINE AMINO (ALT) (SGPT): CPT

## 2019-06-07 PROCEDURE — 83735 ASSAY OF MAGNESIUM: CPT

## 2019-06-07 PROCEDURE — 84443 ASSAY THYROID STIM HORMONE: CPT

## 2019-06-07 PROCEDURE — 93010 ELECTROCARDIOGRAM REPORT: CPT | Performed by: INTERNAL MEDICINE

## 2019-06-07 PROCEDURE — 2700000000 HC OXYGEN THERAPY PER DAY

## 2019-06-07 PROCEDURE — 36415 COLL VENOUS BLD VENIPUNCTURE: CPT

## 2019-06-07 RX ORDER — FUROSEMIDE 10 MG/ML
40 INJECTION INTRAMUSCULAR; INTRAVENOUS ONCE
Status: COMPLETED | OUTPATIENT
Start: 2019-06-07 | End: 2019-06-07

## 2019-06-07 RX ORDER — GABAPENTIN 300 MG/1
300 CAPSULE ORAL 2 TIMES DAILY
Status: DISCONTINUED | OUTPATIENT
Start: 2019-06-07 | End: 2019-06-09

## 2019-06-07 RX ADMIN — SENNOSIDES 8.6 MG: 8.6 TABLET, FILM COATED ORAL at 21:40

## 2019-06-07 RX ADMIN — TRAMADOL HYDROCHLORIDE 50 MG: 50 TABLET, FILM COATED ORAL at 08:50

## 2019-06-07 RX ADMIN — DOCUSATE SODIUM 100 MG: 100 CAPSULE, LIQUID FILLED ORAL at 21:40

## 2019-06-07 RX ADMIN — BENZONATATE 100 MG: 100 CAPSULE ORAL at 21:40

## 2019-06-07 RX ADMIN — Medication 10 ML: at 09:14

## 2019-06-07 RX ADMIN — IPRATROPIUM BROMIDE AND ALBUTEROL SULFATE 1 AMPULE: .5; 3 SOLUTION RESPIRATORY (INHALATION) at 15:35

## 2019-06-07 RX ADMIN — FUROSEMIDE 40 MG: 10 INJECTION, SOLUTION INTRAMUSCULAR; INTRAVENOUS at 17:54

## 2019-06-07 RX ADMIN — RIVAROXABAN 20 MG: 20 TABLET, FILM COATED ORAL at 08:50

## 2019-06-07 RX ADMIN — PAROXETINE HYDROCHLORIDE HEMIHYDRATE 10 MG: 10 TABLET, FILM COATED ORAL at 08:50

## 2019-06-07 RX ADMIN — LATANOPROST 1 DROP: 50 SOLUTION OPHTHALMIC at 21:40

## 2019-06-07 RX ADMIN — GABAPENTIN 300 MG: 300 CAPSULE ORAL at 23:00

## 2019-06-07 RX ADMIN — FAMOTIDINE 20 MG: 20 TABLET, FILM COATED ORAL at 21:40

## 2019-06-07 RX ADMIN — IPRATROPIUM BROMIDE AND ALBUTEROL SULFATE 1 AMPULE: .5; 3 SOLUTION RESPIRATORY (INHALATION) at 20:19

## 2019-06-07 RX ADMIN — Medication 10 ML: at 21:00

## 2019-06-07 RX ADMIN — FUROSEMIDE 40 MG: 10 INJECTION, SOLUTION INTRAMUSCULAR; INTRAVENOUS at 12:14

## 2019-06-07 RX ADMIN — SIMVASTATIN 40 MG: 40 TABLET, FILM COATED ORAL at 21:40

## 2019-06-07 RX ADMIN — IPRATROPIUM BROMIDE AND ALBUTEROL SULFATE 1 AMPULE: .5; 3 SOLUTION RESPIRATORY (INHALATION) at 12:04

## 2019-06-07 RX ADMIN — FAMOTIDINE 20 MG: 20 TABLET, FILM COATED ORAL at 08:50

## 2019-06-07 RX ADMIN — FENOFIBRATE 135 MG: 54 TABLET ORAL at 08:51

## 2019-06-07 RX ADMIN — DOCUSATE SODIUM 100 MG: 100 CAPSULE, LIQUID FILLED ORAL at 08:50

## 2019-06-07 RX ADMIN — IPRATROPIUM BROMIDE AND ALBUTEROL SULFATE 1 AMPULE: .5; 3 SOLUTION RESPIRATORY (INHALATION) at 08:34

## 2019-06-07 RX ADMIN — AMIODARONE HYDROCHLORIDE 200 MG: 200 TABLET ORAL at 08:50

## 2019-06-07 RX ADMIN — TRAZODONE HYDROCHLORIDE 50 MG: 50 TABLET ORAL at 21:40

## 2019-06-07 RX ADMIN — GABAPENTIN 300 MG: 300 CAPSULE ORAL at 08:49

## 2019-06-07 RX ADMIN — ASPIRIN 81 MG: 81 TABLET, CHEWABLE ORAL at 08:50

## 2019-06-07 ASSESSMENT — PAIN DESCRIPTION - PAIN TYPE
TYPE: CHRONIC PAIN
TYPE: CHRONIC PAIN

## 2019-06-07 ASSESSMENT — PAIN SCALES - GENERAL
PAINLEVEL_OUTOF10: 8
PAINLEVEL_OUTOF10: 5
PAINLEVEL_OUTOF10: 0

## 2019-06-07 ASSESSMENT — PAIN DESCRIPTION - LOCATION
LOCATION: LEG
LOCATION: LEG

## 2019-06-07 ASSESSMENT — PAIN DESCRIPTION - ORIENTATION
ORIENTATION: LEFT;RIGHT
ORIENTATION: RIGHT;LEFT

## 2019-06-07 ASSESSMENT — PAIN DESCRIPTION - FREQUENCY: FREQUENCY: INTERMITTENT

## 2019-06-07 NOTE — PROGRESS NOTES
famotidine  20 mg Oral BID    docusate sodium  100 mg Oral BID    ferrous sulfate  325 mg Oral Every Other Day    gabapentin  300 mg Oral Daily    latanoprost  1 drop Both Eyes Nightly    fenofibrate  135 mg Oral Daily    traZODone  50 mg Oral Nightly    traMADol  50 mg Oral Daily    simvastatin  40 mg Oral Nightly    PARoxetine  10 mg Oral Daily    rivaroxaban  20 mg Oral Daily with breakfast    ipratropium-albuterol  1 ampule Inhalation Q4H WA    senna  1 tablet Oral Nightly    insulin lispro  0-6 Units Subcutaneous TID WC    insulin lispro  0-3 Units Subcutaneous Nightly       PRN Medications    acetaminophen 650 mg Q4H PRN   sodium chloride flush 10 mL PRN   ondansetron 4 mg Q6H PRN   potassium chloride 40 mEq PRN   Or     potassium alternative oral replacement 40 mEq PRN   Or     potassium chloride 10 mEq PRN   magnesium sulfate 1 g PRN   albuterol sulfate HFA 1 puff Q6H PRN   benzonatate 100 mg TID PRN   albuterol 2.5 mg Q4H PRN   glucose 15 g PRN   dextrose 12.5 g PRN   glucagon (rDNA) 1 mg PRN   dextrose 100 mL/hr PRN       Diagnostic Labs and Imaging:  CBC:  Recent Labs     06/04/19  1317 06/04/19  2042 06/05/19  0604   WBC 7.0 6.4 6.3   HGB 14.4 14.1 12.9*    290 249     BMP:   Recent Labs     06/05/19  0604 06/06/19  0610 06/07/19  0740   * 144 140   K 3.7 3.8 4.4    105 101   CO2 28 23 26   BUN 26* 28* 27*   CREATININE 1.14 1.10 1.14   GLUCOSE 105* 89 87     Hepatic: No results for input(s): AST, ALT, ALB, BILITOT, ALKPHOS in the last 72 hours. ECHO (6/6/19)  Summary  Dilated left ventricle with severely reduced systolic function. Calculated ejection fraction via Arthur's Method is 12.4 %  Evidence of diastolic dysfunction. Left atrium is severely dilated. Normal right ventricle size with reduced systolic function. AICD lead seen in right atrium/ventricle. Mildly thickened mitral valve leaflets. Moderate mitral regurgitation, central jet.   Moderate tricuspid supplementation.     · Hyperlipidemia  On  fenofibric acid 135 mg daily and simvastatin 40 mg daily.        Diet-Carb control, low Na diet with fluid restriction 1500 ml  DVT prophylaxis- already on xarelto   PT/OT Evaluation and treatment.  consulted for discharge planning. If ok with cardio, can switch to PO meds and DC with cardio o/p follow up  Home oxygen dominguez Morales MD, PGY-2  Internal Medicine  [address]  6/7/19  9:24 AM      Attending Physician Statement  I have discussed the care of Jimisadora Robles., including pertinent history and exam findings with the resident. I have reviewed the key elements of all parts of the encounter with the resident. I have seen and examined the patient with the resident and the key elements of all parts of the encounter have been performed by me.

## 2019-06-07 NOTE — CARE COORDINATION
[address] BPCI-A Interview     2019    Patient: Cecy Castorena. Patient : 1945   MRN: [id number]  Reason for Admission: BPCI-A Medical Bundle Patient. Working DRG: CHF (26)  Admitting Location: [address] [place] [address] Date: 2019  Date of Discharge: ______     End Date: _____     90-day post-acute outreach and tracking with qualifying DRG. RARS: Readmission Risk Score: 30         Patient/family seen: yes    Informed patient/family of BPCI-A Medical Bundle Program with potential outreach by either Care Transitions Team or naviHealth Team based on hospital admission and location. BPCI-A Notification Letter given:  Yes 19    Current discharge plan: Home independently  Pt qualified for home O2 and CM supplied freedom of choice and [facility] was selected.   Pt goes to the [place] and will obtain medications through them          Readmission Risk  Patient Active Problem List   Diagnosis    Acute on chronic systolic CHF (congestive heart failure) (HCC)    Multiple vessel coronary artery disease    [facility]    Chronic obstructive pulmonary disease (HCC)    Low hemoglobin    Other drug-induced pancytopenia (HCC)    S/P ICD (internal cardiac defibrillator) procedure    Ischemic cardiomyopathy    IPMN (intraductal papillary mucinous neoplasm)    History of coronary artery bypass graft    CKD (chronic kidney disease), stage III ([facility]    CHF (congestive heart failure), NYHA class I, acute on chronic, combined [facility])       Inpatient Assessment  Care Transitions Summary    Care Transitions Inpatient Review  Medication Review  Housing Review  Social Support  Durable Medical Equipment  Functional Review  Hearing and Vision  Care Transitions Interventions         Follow Up  Future Appointments   Date Time Provider Kelby Olvera   2019  2:30 PM STV MRI RM 1 STVZ MRI STV Radiolog   2019 10:10 AM Tanya Bar MD [facility] [place] None   2019 1:45 PM Carly Villanueva MD [facility] [address] Maintenance  There are no preventive care reminders to display for this patient.     Michelle Brenner RN

## 2019-06-07 NOTE — PLAN OF CARE
BRONCHOSPASM/BRONCHOCONSTRICTION     [x]         IMPROVE AERATION/BREATH SOUNDS  PROVIDE ADEQUATE OXYGENATION WITH ACCEPTABLE SP02/ABG'S    [x]  IDENTIFY APPROPRIATE OXYGEN THERAPY  [x]   MONITOR SP02/ABG'S AS NEEDED   [x]   PATIENT EDUCATION AS NEEDED       ADMINISTER BRONCHODILATOR THERAPY AS APPROPRIATE  [x]   ASSESS BREATH SOUNDS  []   IMPLEMENT AEROSOL/MDI PROTOCOL  [x]   PATIENT EDUCATION AS NEEDED

## 2019-06-07 NOTE — PROGRESS NOTES
06/04/19  1436   TROPHS 41* 31*     BNP: No results for input(s): BNP in the last 72 hours. Lipids:   Recent Labs     06/04/19  1317   CHOL 103   HDL 28*     INR: No results for input(s): INR in the last 72 hours. EKG:   Sinus rhythm, LBBB     ECHO: 06/04/2019  Dilated left ventricle with severely reduced systolic function. Calculated ejection fraction via Arthur's Method is 12.4 %  Evidence of diastolic dysfunction. Left atrium is severely dilated. Normal right ventricle size with reduced systolic function. AICD lead seen in right atrium/ventricle. Mildly thickened mitral valve leaflets. Moderate mitral regurgitation, central jet. Moderate tricuspid regurgitation. Trivial pulmonic insufficiency. Small circumferential pericardial effusion. Pleural effusion is noted.     ECHO 08/23/2018  Limited study for EF evaluation. Dilated left ventricle with severely reduced systolic function; Calculated  ejection fraction 17.8% via Arthur's Method. Objective:   Vitals: BP 94/61   Pulse 73   Temp 98 °F (36.7 °C)   Resp 20   Ht 5' 9\" (1.753 m)   Wt 187 lb 8 oz (85 kg)   SpO2 97%   BMI 27.69 kg/m²   General appearance: alert and cooperative with exam  HEENT: Head: Normocephalic, no lesions, without obvious abnormality. Neck: no JVD, trachea midline, no adenopathy  Lungs: Clear with few crackles in bases to auscultation, 02   Heart: Regular rate and rhythm, s1/s2 auscultated, no murmurs  Abdomen: soft, non-tender, bowel sounds active  Extremities: +1 bilateral  edema  Neurologic: not done        Assessment / Acute Cardiac Problems:   1. Acute on chronic decompensated CHF with NYHA III to IV symptoms- exacerbation likely secondary to URI  2. CAD s/p CABG (LIMA-LAD and SVG-OM) 08/2018  3. ICM - s/p AICD -12/2018  4. PAfib - on xarelot  5. LBBB   6. H/O GIST s/p resection 2003      7.      Patient Active Problem List:     Acute on chronic systolic CHF (congestive heart failure) (HCC)     Multiple vessel coronary artery disease     Multiple myeloma in remission (HCC)     Chronic obstructive pulmonary disease (HCC)     Low hemoglobin     Other drug-induced pancytopenia (HCC)     S/P ICD (internal cardiac defibrillator) procedure     Ischemic cardiomyopathy     IPMN (intraductal papillary mucinous neoplasm)     History of coronary artery bypass graft     CKD (chronic kidney disease), stage III (HCC)     CHF (congestive heart failure), NYHA class I, acute on chronic, combined ([address]      Plan of Treatment:   1. CHF. Pt remains fluid overload. AM lasix held by RN due to hypotension. Will order one time IV lasix now. Will start PO lasix this evening. Continue Toprol with parameters. Will continue to hold ACE on discharge. 2. Afib. Vinh NSR Continue Amiodarone and xarelto. Will order  labs for amio while inpt. 3. Will discuss as outpt possibly upgrade to BIV-ICD/CRT due to NYHA Class II-III symptoms and recurrent CHF. 4. Will follow up in 2 weeks in our office.       Electronically signed by CASSY Black CNP on 6/7/2019 at 11:58 AM  [address]  [phone]

## 2019-06-07 NOTE — PLAN OF CARE
Problem: Falls - Risk of:  Goal: Will remain free from falls  Description  Will remain free from falls  6/7/2019 1855 by Manjinder Adhikari RN  Outcome: Ongoing  6/7/2019 0553 by Joana Vizcaino RN  Outcome: Ongoing  Goal: Absence of physical injury  Description  Absence of physical injury  6/7/2019 1855 by Manjinder Adhikari RN  Outcome: Ongoing  6/7/2019 0553 by Joana Vizcaino RN  Outcome: Ongoing     Problem: Cardiac:  Goal: Ability to maintain vital signs within normal range will improve  Description  Ability to maintain vital signs within normal range will improve  6/7/2019 1855 by Manjinder Adhikari RN  Outcome: Ongoing  6/7/2019 0553 by Joana Vizcaino RN  Outcome: Ongoing  Goal: Cardiovascular alteration will improve  Description  Cardiovascular alteration will improve  6/7/2019 1855 by Manjinder Adhikari RN  Outcome: Ongoing  6/7/2019 0553 by Joana Vizcaino RN  Outcome: Ongoing     Problem: Health Behavior:  Goal: Will modify at least one risk factor affecting health status  Description  Will modify at least one risk factor affecting health status  6/7/2019 1855 by Manjinder Adhikari RN  Outcome: Ongoing  6/7/2019 0553 by Joana Vizcaino RN  Outcome: Ongoing  Goal: Identification of resources available to assist in meeting health care needs will improve  Description  Identification of resources available to assist in meeting health care needs will improve  6/7/2019 1855 by Manjinder Adhikari RN  Outcome: Ongoing  6/7/2019 0553 by Joana Vizcaino RN  Outcome: Ongoing     Problem: Physical Regulation:  Goal: Complications related to the disease process, condition or treatment will be avoided or minimized  Description  Complications related to the disease process, condition or treatment will be avoided or minimized  6/7/2019 1855 by Manjinder Adhikari RN  Outcome: Ongoing  6/7/2019 0553 by oJana Vizcaino RN  Outcome: Ongoing

## 2019-06-08 LAB
ABSOLUTE EOS #: 0 K/UL (ref 0–0.4)
ABSOLUTE IMMATURE GRANULOCYTE: 0.07 K/UL (ref 0–0.3)
ABSOLUTE LYMPH #: 1.61 K/UL (ref 1–4.8)
ABSOLUTE MONO #: 1.1 K/UL (ref 0.1–0.8)
ABSOLUTE RETIC #: 0.14 M/UL (ref 0.03–0.08)
ANION GAP SERPL CALCULATED.3IONS-SCNC: 12 MMOL/L (ref 9–17)
BASOPHILS # BLD: 0 % (ref 0–2)
BASOPHILS ABSOLUTE: 0 K/UL (ref 0–0.2)
BUN BLDV-MCNC: 36 MG/DL (ref 8–23)
BUN BLDV-MCNC: 37 MG/DL (ref 8–23)
BUN/CREAT BLD: ABNORMAL (ref 9–20)
CALCIUM SERPL-MCNC: 8.8 MG/DL (ref 8.6–10.4)
CHLORIDE BLD-SCNC: 97 MMOL/L (ref 98–107)
CO2: 27 MMOL/L (ref 20–31)
CREAT SERPL-MCNC: 1.71 MG/DL (ref 0.7–1.2)
CREAT SERPL-MCNC: 1.9 MG/DL (ref 0.7–1.2)
DIFFERENTIAL TYPE: ABNORMAL
EOSINOPHILS RELATIVE PERCENT: 0 % (ref 1–4)
FERRITIN: 99 UG/L (ref 30–400)
FOLATE: >20 NG/ML
GFR AFRICAN AMERICAN: 43 ML/MIN
GFR AFRICAN AMERICAN: 48 ML/MIN
GFR NON-AFRICAN AMERICAN: 35 ML/MIN
GFR NON-AFRICAN AMERICAN: 40 ML/MIN
GFR SERPL CREATININE-BSD FRML MDRD: ABNORMAL ML/MIN/{1.73_M2}
GLUCOSE BLD-MCNC: 113 MG/DL (ref 75–110)
GLUCOSE BLD-MCNC: 118 MG/DL (ref 70–99)
GLUCOSE BLD-MCNC: 118 MG/DL (ref 75–110)
GLUCOSE BLD-MCNC: 154 MG/DL (ref 75–110)
GLUCOSE BLD-MCNC: 178 MG/DL (ref 75–110)
HCT VFR BLD CALC: 39.1 % (ref 40.7–50.3)
HEMOGLOBIN: 12.8 G/DL (ref 13–17)
IMMATURE GRANULOCYTES: 1 %
IMMATURE RETIC FRACT: 32.3 % (ref 2.7–18.3)
IRON SATURATION: 10 % (ref 20–55)
IRON: 49 UG/DL (ref 59–158)
LYMPHOCYTES # BLD: 22 % (ref 24–44)
MAGNESIUM: 1.8 MG/DL (ref 1.6–2.6)
MCH RBC QN AUTO: 27.6 PG (ref 25.2–33.5)
MCHC RBC AUTO-ENTMCNC: 32.7 G/DL (ref 28.4–34.8)
MCV RBC AUTO: 84.3 FL (ref 82.6–102.9)
MONOCYTES # BLD: 15 % (ref 1–7)
MORPHOLOGY: ABNORMAL
NRBC AUTOMATED: 0 PER 100 WBC
PDW BLD-RTO: 20.1 % (ref 11.8–14.4)
PLATELET # BLD: 275 K/UL (ref 138–453)
PLATELET ESTIMATE: ABNORMAL
PMV BLD AUTO: 11.1 FL (ref 8.1–13.5)
POTASSIUM SERPL-SCNC: 3.6 MMOL/L (ref 3.7–5.3)
RBC # BLD: 4.64 M/UL (ref 4.21–5.77)
RBC # BLD: ABNORMAL 10*6/UL
RETIC %: 3 % (ref 0.5–1.9)
RETIC HEMOGLOBIN: 32 PG (ref 28.2–35.7)
SEG NEUTROPHILS: 62 % (ref 36–66)
SEGMENTED NEUTROPHILS ABSOLUTE COUNT: 4.52 K/UL (ref 1.8–7.7)
SODIUM BLD-SCNC: 136 MMOL/L (ref 135–144)
TOTAL IRON BINDING CAPACITY: 471 UG/DL (ref 250–450)
UNSATURATED IRON BINDING CAPACITY: 422 UG/DL (ref 112–347)
VITAMIN B-12: 1165 PG/ML (ref 232–1245)
WBC # BLD: 7.3 K/UL (ref 3.5–11.3)
WBC # BLD: ABNORMAL 10*3/UL

## 2019-06-08 PROCEDURE — 6370000000 HC RX 637 (ALT 250 FOR IP): Performed by: STUDENT IN AN ORGANIZED HEALTH CARE EDUCATION/TRAINING PROGRAM

## 2019-06-08 PROCEDURE — 83735 ASSAY OF MAGNESIUM: CPT

## 2019-06-08 PROCEDURE — 84300 ASSAY OF URINE SODIUM: CPT

## 2019-06-08 PROCEDURE — 6360000002 HC RX W HCPCS: Performed by: STUDENT IN AN ORGANIZED HEALTH CARE EDUCATION/TRAINING PROGRAM

## 2019-06-08 PROCEDURE — 82746 ASSAY OF FOLIC ACID SERUM: CPT

## 2019-06-08 PROCEDURE — 82570 ASSAY OF URINE CREATININE: CPT

## 2019-06-08 PROCEDURE — 51798 US URINE CAPACITY MEASURE: CPT

## 2019-06-08 PROCEDURE — 2060000000 HC ICU INTERMEDIATE R&B

## 2019-06-08 PROCEDURE — 6360000002 HC RX W HCPCS: Performed by: INTERNAL MEDICINE

## 2019-06-08 PROCEDURE — 83550 IRON BINDING TEST: CPT

## 2019-06-08 PROCEDURE — 2580000003 HC RX 258: Performed by: STUDENT IN AN ORGANIZED HEALTH CARE EDUCATION/TRAINING PROGRAM

## 2019-06-08 PROCEDURE — 85045 AUTOMATED RETICULOCYTE COUNT: CPT

## 2019-06-08 PROCEDURE — 82565 ASSAY OF CREATININE: CPT

## 2019-06-08 PROCEDURE — 2700000000 HC OXYGEN THERAPY PER DAY

## 2019-06-08 PROCEDURE — 6370000000 HC RX 637 (ALT 250 FOR IP): Performed by: INTERNAL MEDICINE

## 2019-06-08 PROCEDURE — 94762 N-INVAS EAR/PLS OXIMTRY CONT: CPT

## 2019-06-08 PROCEDURE — 99233 SBSQ HOSP IP/OBS HIGH 50: CPT | Performed by: INTERNAL MEDICINE

## 2019-06-08 PROCEDURE — 94640 AIRWAY INHALATION TREATMENT: CPT

## 2019-06-08 PROCEDURE — 82607 VITAMIN B-12: CPT

## 2019-06-08 PROCEDURE — 80048 BASIC METABOLIC PNL TOTAL CA: CPT

## 2019-06-08 PROCEDURE — 82728 ASSAY OF FERRITIN: CPT

## 2019-06-08 PROCEDURE — 83540 ASSAY OF IRON: CPT

## 2019-06-08 PROCEDURE — 84156 ASSAY OF PROTEIN URINE: CPT

## 2019-06-08 PROCEDURE — 36415 COLL VENOUS BLD VENIPUNCTURE: CPT

## 2019-06-08 PROCEDURE — 85025 COMPLETE CBC W/AUTO DIFF WBC: CPT

## 2019-06-08 PROCEDURE — 84520 ASSAY OF UREA NITROGEN: CPT

## 2019-06-08 PROCEDURE — 82947 ASSAY GLUCOSE BLOOD QUANT: CPT

## 2019-06-08 RX ORDER — POTASSIUM CHLORIDE 20 MEQ/1
40 TABLET, EXTENDED RELEASE ORAL ONCE
Status: COMPLETED | OUTPATIENT
Start: 2019-06-08 | End: 2019-06-08

## 2019-06-08 RX ORDER — TORSEMIDE 20 MG/1
20 TABLET ORAL DAILY
Status: DISCONTINUED | OUTPATIENT
Start: 2019-06-09 | End: 2019-06-13 | Stop reason: HOSPADM

## 2019-06-08 RX ORDER — TRAMADOL HYDROCHLORIDE 50 MG/1
50 TABLET ORAL EVERY 6 HOURS PRN
Status: DISCONTINUED | OUTPATIENT
Start: 2019-06-08 | End: 2019-06-13 | Stop reason: HOSPADM

## 2019-06-08 RX ORDER — MAGNESIUM SULFATE 1 G/100ML
1 INJECTION INTRAVENOUS ONCE
Status: COMPLETED | OUTPATIENT
Start: 2019-06-08 | End: 2019-06-08

## 2019-06-08 RX ADMIN — GABAPENTIN 300 MG: 300 CAPSULE ORAL at 09:14

## 2019-06-08 RX ADMIN — TRAMADOL HYDROCHLORIDE 50 MG: 50 TABLET, FILM COATED ORAL at 09:47

## 2019-06-08 RX ADMIN — METOPROLOL SUCCINATE 25 MG: 25 TABLET, FILM COATED, EXTENDED RELEASE ORAL at 09:17

## 2019-06-08 RX ADMIN — PAROXETINE HYDROCHLORIDE HEMIHYDRATE 10 MG: 10 TABLET, FILM COATED ORAL at 09:17

## 2019-06-08 RX ADMIN — ONDANSETRON 4 MG: 2 INJECTION INTRAMUSCULAR; INTRAVENOUS at 21:33

## 2019-06-08 RX ADMIN — ASPIRIN 81 MG: 81 TABLET, CHEWABLE ORAL at 09:15

## 2019-06-08 RX ADMIN — POTASSIUM CHLORIDE 40 MEQ: 20 TABLET, EXTENDED RELEASE ORAL at 09:49

## 2019-06-08 RX ADMIN — FAMOTIDINE 20 MG: 20 TABLET, FILM COATED ORAL at 09:15

## 2019-06-08 RX ADMIN — IPRATROPIUM BROMIDE AND ALBUTEROL SULFATE 1 AMPULE: .5; 3 SOLUTION RESPIRATORY (INHALATION) at 13:15

## 2019-06-08 RX ADMIN — INSULIN LISPRO 1 UNITS: 100 INJECTION, SOLUTION INTRAVENOUS; SUBCUTANEOUS at 16:51

## 2019-06-08 RX ADMIN — TRAZODONE HYDROCHLORIDE 50 MG: 50 TABLET ORAL at 20:51

## 2019-06-08 RX ADMIN — IPRATROPIUM BROMIDE AND ALBUTEROL SULFATE 1 AMPULE: .5; 3 SOLUTION RESPIRATORY (INHALATION) at 16:10

## 2019-06-08 RX ADMIN — IPRATROPIUM BROMIDE AND ALBUTEROL SULFATE 1 AMPULE: .5; 3 SOLUTION RESPIRATORY (INHALATION) at 20:07

## 2019-06-08 RX ADMIN — FENOFIBRATE 135 MG: 54 TABLET ORAL at 09:14

## 2019-06-08 RX ADMIN — SENNOSIDES 8.6 MG: 8.6 TABLET, FILM COATED ORAL at 20:52

## 2019-06-08 RX ADMIN — DOCUSATE SODIUM 100 MG: 100 CAPSULE, LIQUID FILLED ORAL at 20:51

## 2019-06-08 RX ADMIN — AMIODARONE HYDROCHLORIDE 200 MG: 200 TABLET ORAL at 09:21

## 2019-06-08 RX ADMIN — SIMVASTATIN 40 MG: 40 TABLET, FILM COATED ORAL at 20:52

## 2019-06-08 RX ADMIN — TRAMADOL HYDROCHLORIDE 50 MG: 50 TABLET, FILM COATED ORAL at 20:52

## 2019-06-08 RX ADMIN — Medication 10 ML: at 09:48

## 2019-06-08 RX ADMIN — GABAPENTIN 300 MG: 300 CAPSULE ORAL at 20:52

## 2019-06-08 RX ADMIN — LATANOPROST 1 DROP: 50 SOLUTION OPHTHALMIC at 20:52

## 2019-06-08 RX ADMIN — DOCUSATE SODIUM 100 MG: 100 CAPSULE, LIQUID FILLED ORAL at 09:15

## 2019-06-08 RX ADMIN — FUROSEMIDE 40 MG: 10 INJECTION, SOLUTION INTRAMUSCULAR; INTRAVENOUS at 09:14

## 2019-06-08 RX ADMIN — RIVAROXABAN 20 MG: 20 TABLET, FILM COATED ORAL at 09:19

## 2019-06-08 RX ADMIN — ONDANSETRON 4 MG: 2 INJECTION INTRAMUSCULAR; INTRAVENOUS at 03:03

## 2019-06-08 RX ADMIN — MAGNESIUM SULFATE HEPTAHYDRATE 1 G: 1 INJECTION, SOLUTION INTRAVENOUS at 09:49

## 2019-06-08 RX ADMIN — Medication 10 ML: at 21:33

## 2019-06-08 RX ADMIN — FAMOTIDINE 20 MG: 20 TABLET, FILM COATED ORAL at 21:33

## 2019-06-08 RX ADMIN — IPRATROPIUM BROMIDE AND ALBUTEROL SULFATE 1 AMPULE: .5; 3 SOLUTION RESPIRATORY (INHALATION) at 09:45

## 2019-06-08 RX ADMIN — FERROUS SULFATE TAB EC 325 MG (65 MG FE EQUIVALENT) 325 MG: 325 (65 FE) TABLET DELAYED RESPONSE at 09:14

## 2019-06-08 RX ADMIN — INSULIN LISPRO 1 UNITS: 100 INJECTION, SOLUTION INTRAVENOUS; SUBCUTANEOUS at 12:28

## 2019-06-08 ASSESSMENT — PAIN SCALES - GENERAL
PAINLEVEL_OUTOF10: 3
PAINLEVEL_OUTOF10: 0
PAINLEVEL_OUTOF10: 0
PAINLEVEL_OUTOF10: 6
PAINLEVEL_OUTOF10: 3
PAINLEVEL_OUTOF10: 5
PAINLEVEL_OUTOF10: 0

## 2019-06-08 ASSESSMENT — PAIN DESCRIPTION - PAIN TYPE
TYPE: CHRONIC PAIN

## 2019-06-08 ASSESSMENT — PAIN DESCRIPTION - LOCATION
LOCATION: LEG

## 2019-06-08 ASSESSMENT — PAIN DESCRIPTION - ORIENTATION
ORIENTATION: RIGHT;LEFT

## 2019-06-08 ASSESSMENT — PAIN DESCRIPTION - DESCRIPTORS
DESCRIPTORS: ACHING

## 2019-06-08 ASSESSMENT — PAIN DESCRIPTION - FREQUENCY
FREQUENCY: INTERMITTENT

## 2019-06-08 NOTE — CONSULTS
Nephrology Consult Note    Reason for Consult:  CHF, worsening Cr, ? Cardiorenal, patient of Dr. Wendy Chopra  Requesting Physician:  Dr. Jessica Rome    Chief Complaint:  SOB at admission on 6/4/19    History Obtained From:  Patient, EMR, RN, wife at bedside. History of Present Illness: This is a 72 y.o. male who presented to the hospital on 6/4/19 for evaluation of progressive SOB, fatigue, dry cough, worse with exertion. Associated with orthopnea. Associated increase LE swelling. Denied CP, or AICD shocks. PMH is significant for Gist tumor resection in 2003  Multiple myeloma diagnosed in 2014 and treated with chemotherapy. Currently in remission and under surveillance. Last dose of Revlimid August 2018  Revlimid related neuropathy  Ischemic cardiomyopathy status post CABG/status post biventricular AICD placement with ejection fraction 10-15%  Type 2 diabetes. Since 2018. He stated after initiation of steroids and developed diabetes  Essential hypertension  COPD      At admission he stated he usual weight is 188 had not noticed weight gain at home, but wife admits that recently they were not weighing him often. At home he was not following a fluid restriction. He was fluid restricted at admission. He was noted started on maintenance fluids. At admission he was started on IV Lasix 40mg daily, without significant diuresis, then this was switched to Lasix 20mg IV in the am, and 40mg in the pm. Home does of Lasix 20mg am and 40mg pm. U/O did improve, once Lasix was eventually increased to 40mg IV BID, best U/O 6/6 to 6/7, but has started to worsen. Cr at admission 1.13, today it is 1.71. Mg 1.8, K 3.6, Na 136 CO2 27 chloride 97. Creatinine has been held from this am. Bladder scan not too long ago suggested 250cc, since this he has voided about 150cc's not documented but urine is in urinal in the room. Wife and he state he was voiding well at home, denies symptoms of retention.    Hx of CKD stage III from solution PRN   glucagon (rDNA) injection 1 mg PRN   dextrose 5 % solution PRN       Allergies:  Lisinopril and Nuts [peanut-containing drug products]    Social History:   Social History     Socioeconomic History    Marital status:      Spouse name: Not on file    Number of children: Not on file    Years of education: Not on file    Highest education level: Not on file   Occupational History    Occupation: Retired   Social Needs    Financial resource strain: Not on file    Food insecurity:     Worry: Not on file     Inability: Not on file   [employer] needs:     Medical: Not on file     Non-medical: Not on file   Tobacco Use    Smoking status: Former Smoker     Packs/day: 0.50     Years: 30.00     Pack years: 15.00     Types: Cigarettes     Last attempt to quit: 8/15/2018     Years since quittin.8    Smokeless tobacco: Never Used    Tobacco comment: a little less than a pack a day   Substance and Sexual Activity    Alcohol use: Yes     Comment: rarely    Drug use: Yes     Types: Marijuana     Comment: 0.5 daily     Sexual activity: Not on file   Lifestyle    Physical activity:     Days per week: Not on file     Minutes per session: Not on file    Stress: Not on file   Relationships    Social connections:     Talks on phone: Not on file     Gets together: Not on file     Attends Quaker service: Not on file     Active member of club or organization: Not on file     Attends meetings of clubs or organizations: Not on file     Relationship status: Not on file    Intimate partner violence:     Fear of current or ex partner: Not on file     Emotionally abused: Not on file     Physically abused: Not on file     Forced sexual activity: Not on file   Other Topics Concern    Not on file   Social History Narrative    Not on file       Family History:   Family History   Problem Relation Age of Onset    Diabetes Mother     Stroke Father     Other Sister         PE    Stroke Brother Review of Systems:    Constitutional: No fever, no chills, no lethargy, +weakness. HEENT:  No headache, otalgia, itchy eyes, nasal discharge or sore throat. Cardiac:  No chest pain, +dyspnea, +orthopnea   Chest:              No cough, phlegm or wheezing. Abdomen:  No abdominal pain, nausea or vomiting. Neuro:  No focal weakness, abnormal movements orseizure like activity. Skin:   No rashes, no itching. :   No hematuria, no pyuria, no dysuria, no flank pain. Extremities:  + swelling or joint pains. Objective:  CURRENT TEMPERATURE:  Temp: 98 °F (36.7 °C)  MAXIMUM TEMPERATURE OVER 24HRS:  Temp (24hrs), Av °F (36.7 °C), Min:97.3 °F (36.3 °C), Max:98.6 °F (37 °C)    CURRENT RESPIRATORY RATE:  Resp: 18  CURRENT PULSE:  Pulse: 70  CURRENT BLOOD PRESSURE:  BP: 101/69  24HR BLOOD PRESSURE RANGE:  Systolic (27VSH), CGI:493 , Min:97 , GS   ; Diastolic (56QGO), GGN:51, Min:62, Max:87    24HR INTAKE/OUTPUT:      Intake/Output Summary (Last 24 hours) at 2019 1205  Last data filed at 2019 1000  Gross per 24 hour   Intake 595 ml   Output 1050 ml   Net -455 ml     Patient Vitals for the past 96 hrs (Last 3 readings):   Weight   19 0400 187 lb 8 oz (85 kg)   19 0400 189 lb 4.8 oz (85.9 kg)   19 0537 185 lb 6.5 oz (84.1 kg)     Physical Exam:  General appearance:Awake, alert, in no acute distress  Skin: warm and dry, no rash or erythema  Eyes: conjunctivae normal and sclera anicteric  ENT: :no thrush no pharyngeal congestion    Neck: no carotid bruit ,no  JVD,no carotid Lymphadenopathy, no Thyromegaly  Pulmonary: no wheezing or rhonchi. No rales heard.   Cardiovascular: Normal S1 & S2,  No S3 or  S4, no Pericardial Rub no Murmur   Abdomen: soft nontender, bowel sounds present, no organomegaly,  no fluid wave  Extremities:no cyanosis, clubbing, +1 edema    Labs:   CBC:  Recent Labs     19  0847   WBC 7.3   RBC 4.64   HGB 12.8*   HCT 39.1*   MCV 84.3   MCH 27.6   MCHC 32.7 RDW 20.1*      MPV 11.1      BMP: Recent Labs     06/06/19  0610 06/07/19  0740 06/08/19  0625 06/08/19  0847    140 136  --    K 3.8 4.4 3.6*  --     101 97*  --    CO2 23 26 27  --    BUN 28* 27* 36* 37*   CREATININE 1.10 1.14 1.90* 1.71*   GLUCOSE 89 87 118*  --    CALCIUM 8.7 8.9 8.8  --         Phosphorus:  No results for input(s): PHOS in the last 72 hours. Magnesium:   Recent Labs     06/06/19  1600 06/07/19  0740 06/08/19  0625   MG 2.1 1.9 1.8     Albumin: No results for input(s): LABALBU in the last 72 hours. IRON:    Lab Results   Component Value Date    IRON 49 06/08/2019     Iron Saturation:  No components found for: PERCENTFE  TIBC:    Lab Results   Component Value Date    TIBC 471 06/08/2019     FERRITIN:    Lab Results   Component Value Date    FERRITIN 99 06/08/2019     SPEP: Lab Results   Component Value Date    PROT 6.6 05/21/2019    PROT 6.5 05/21/2019    ALBCAL 4.1 05/21/2019    ALBPCT 62 05/21/2019    LABALPH 0.3 05/21/2019    LABALPH 0.6 05/21/2019    A1PCT 4 05/21/2019    A2PCT 10 05/21/2019    LABBETA 0.9 05/21/2019    BETAPCT 14 05/21/2019    GAMGLOB 0.7 05/21/2019    GGPCT 10 05/21/2019    PATH ELECTRONICALLY SIGNED. Angie Ramirez M.D. 05/21/2019    PATH ELECTRONICALLY SIGNED. Angie Ramirez M.D. 05/21/2019     UPEP: No results found for: TPU     C3:   Lab Results   Component Value Date    C3 152 05/21/2019     C4:   Lab Results   Component Value Date    C4 49 (H) 05/21/2019     MPO ANCA:  No results found for: MPO .   PR3 ANCA:  No results found for: PR3  Urine Sodium:    Lab Results   Component Value Date    KAYLYNN <20 05/21/2019      Urine Creatinine:  Lab Results   Component Value Date    LABCREA 285.0 05/21/2019     Urine Eosinophils: No results found for: UREO  Urine Protein:  No results found for: TPU  Urinalysis:  U/A: Lab Results   Component Value Date    NITRU POSITIVE 05/21/2019    COLORU DARK YELLOW 05/21/2019    PHUR 5.0 05/21/2019    WBCUA 10 TO 20 05/21/2019    RBCUA TOO NUMEROUS TO COUNT 05/21/2019    MUCUS NOT REPORTED 05/21/2019    TRICHOMONAS NOT REPORTED 05/21/2019    YEAST NOT REPORTED 05/21/2019    BACTERIA NOT REPORTED 05/21/2019    SPECGRAV 1.022 05/21/2019    LEUKOCYTESUR SMALL 05/21/2019    UROBILINOGEN Normal 05/21/2019    BILIRUBINUR MOD 05/21/2019    GLUCOSEU NEGATIVE 05/21/2019    KETUA TRACE 05/21/2019    AMORPHOUS NOT REPORTED 05/21/2019         Radiology:  EXAMINATION:   RETROPERITONEAL ULTRASOUND OF THE KIDNEYS AND URINARY BLADDER       5/21/2019       COMPARISON:   February 4, 2018       HISTORY:   ORDERING SYSTEM PROVIDED HISTORY: CKD (chronic kidney disease), stage III   ([facility] [address]   TECHNOLOGIST PROVIDED HISTORY:   Please check post void residual   For cortical thickness, renal size and corticomedullary differentiation. Ordering Physician Provided Reason for Exam: CKD stage 3   Acuity: Chronic   Type of Exam: Subsequent/Follow-up       FINDINGS:   Incidentally noted mild perihepatic ascites as well as mild ascites superior   to the bladder.           Kidneys:       The right kidney measures 9.9 cm in length and the left kidney measures 10.1   cm in length.       Kidneys demonstrate normal cortical echogenicity and thickness.  No evidence   of hydronephrosis or intrarenal stones.           Bladder:       Unremarkable appearance of the bladder.  No post void residual.           Impression   Negative ultrasound of the kidneys and urinary bladder.  Incidentally noted   abdominopelvic ascites as described.             Assessment:  1. Acute kidney injury secondary to pre renal from diuresis - evolving  2. CKD 3 secondary to CRS 2 with baseline creat 1.2-1. 4. Fluctuating based on volume status. Follows up with me  3. Status post CABG with ejection fraction 10-15% status post biventricular AICD in place - compensated  4. History of multiple myeloma diagnosed in 2014 and treated with chemotherapy. Last dose of Revlimid August 2018.   In remission

## 2019-06-08 NOTE — PROGRESS NOTES
[facility]  Internal Medicine Residency Program  Inpatient Daily Progress Note  ______________________________________________________________________________    Patient: Anabell Valdovinos. YOB: 1945   MRN: [id number]    Acct: [de-identified]     Admit date: 6/4/2019  Today's date: 06/08/19  Number of days in the hospital: 4  Expected Discharge Date: (TBD)    Admitting Diagnosis: CHF exacerbation    Subjective:   Pt seen and Chart reviewed. He is hemodynamically stable with B.P 110/70, afebrile this morning. Breathing and Bilateral lower extremity edema has improved. His UOP is 850/24hrs with +375 as charted(improperly charted) in last 24 hours. On IV Lasix 40 BID. Renal function worsened cr-1.9(1.1 on admission). Mg and K low this am. On electrolyte replacement. Cardiology evaluated him and started on Toprol XL 25 which he is tolerating. No other new acute issues currently.      Objective:   Vital Sign:  BP 97/63   Pulse 70   Temp 98 °F (36.7 °C) (Oral)   Resp 17   Ht 5' 9\" (1.753 m)   Wt 187 lb 8 oz (85 kg)   SpO2 98%   BMI 27.69 kg/m²       Physical Exam:  General appearance:   alert, well appearing, and in no distress  Mental Status: alert, oriented to person, place, and time  Neurologic:  alert, oriented, normal speech, no focal findings or movement disorder noted  Lungs: wheezes and rales improved, symmetric air entry  Heart[de-identified] normal rate, regular rhythm, normal S1, S2, no murmurs, rubs, clicks or gallops  Abdomen:  soft, nontender, nondistended, no masses or organomegaly  Extremities:B/L LE edema 1+ improved  Skin: normal coloration and turgor, no rashes, no suspicious skin lesions noted    Medications:  Scheduled Medications   magnesium sulfate  1 g Intravenous Once    potassium chloride  40 mEq Oral Once    gabapentin  300 mg Oral BID    metoprolol succinate  25 mg Oral Daily    furosemide  40 mg Intravenous BID    sodium chloride flush  10 mL Intravenous 2 times per day    amiodarone  200 mg Oral Daily    aspirin  81 mg Oral Daily    famotidine  20 mg Oral BID    docusate sodium  100 mg Oral BID    ferrous sulfate  325 mg Oral Every Other Day    latanoprost  1 drop Both Eyes Nightly    fenofibrate  135 mg Oral Daily    traZODone  50 mg Oral Nightly    traMADol  50 mg Oral Daily    simvastatin  40 mg Oral Nightly    PARoxetine  10 mg Oral Daily    rivaroxaban  20 mg Oral Daily with breakfast    ipratropium-albuterol  1 ampule Inhalation Q4H WA    senna  1 tablet Oral Nightly    insulin lispro  0-6 Units Subcutaneous TID WC    insulin lispro  0-3 Units Subcutaneous Nightly       PRN Medications    acetaminophen 650 mg Q4H PRN   sodium chloride flush 10 mL PRN   ondansetron 4 mg Q6H PRN   potassium chloride 40 mEq PRN   Or     potassium alternative oral replacement 40 mEq PRN   Or     potassium chloride 10 mEq PRN   magnesium sulfate 1 g PRN   albuterol sulfate HFA 1 puff Q6H PRN   benzonatate 100 mg TID PRN   albuterol 2.5 mg Q4H PRN   glucose 15 g PRN   dextrose 12.5 g PRN   glucagon (rDNA) 1 mg PRN   dextrose 100 mL/hr PRN       Diagnostic Labs and Imaging:  CBC:  No results for input(s): WBC, HGB, PLT in the last 72 hours. BMP:   Recent Labs     06/06/19  0610 06/07/19  0740 06/08/19  0625    140 136   K 3.8 4.4 3.6*    101 97*   CO2 23 26 27   BUN 28* 27* 36*   CREATININE 1.10 1.14 1.90*   GLUCOSE 89 87 118*     Hepatic:   Recent Labs     06/07/19  1239   AST 43*   ALT 19        ECHO (6/6/19)  Summary  Dilated left ventricle with severely reduced systolic function. Calculated ejection fraction via Arthur's Method is 12.4 %  Evidence of diastolic dysfunction. Left atrium is severely dilated. Normal right ventricle size with reduced systolic function. AICD lead seen in right atrium/ventricle. Mildly thickened mitral valve leaflets. Moderate mitral regurgitation, central jet.   Moderate tricuspid regurgitation. Trivial pulmonic insufficiency. Small circumferential pericardial effusion. Pleural effusion is noted. IMPRESSION  This is a 72 y.o. male with PMH of PMH of ischemic cardiopathy s/p CABG and biventricular AICD placement, multiple myeloma s/p chemotherapy, GIST tumor resection in 2003, diabetes mellitus type 2, essential hypertension and COPD  presented with worsening shortness of breath and leg swelling of 4 day duration and found to have CHF exacerbation. Patient admitted to stepdown status.     ASSESSMENT/PLAN:  · Acute on chronic combined decompensated heart failure-resolving  Ischemic cardiomyopathy s/p CABG(EF-17% 8/18) s/p biventricular AICD placement on 12/18  - On IV Lasix to 40 BID. -Echo showed CR-21.8% and diastolic dysfunction with moderate MR and TR.  -cardio recs: IV Lasix 40 mg BID, Toprol XL 25 mg daily, amio 200 mg, hold lisinopril ( due to dry cough). Follow up as o/p regarding Biv-ICD.      · Paroxysmal atrial fibrillation  -Currently NSR in 70-80  -On amiodarone 200 mg daily, Toprol XL 25 daily and Xarelto 20 mg daily.    -Follow up with Dr. Darleen Magdaleno as O/P    · Hypomagnesemia and hypokalemia from diuretic use. On electrolyte replacement as needed. Daily BMP.       · Essential hypertension - controlled  B.p runs in low normal range since admission. On Toprol Xl 25 daily     · COPD-stable on NC  -On room air at home.   -Continued on with albuterol and DuoNeb. -Tessalon 100 3 times a day for cough. Qualified for home o2 eval in this admission.     · RANDALL on CKD stage III(baseline creatinine around 1.1-1.6) from cardiorenal syndrome .  -Cr-1.9 this am(from 1.1 on admission) sec to diuretic use. F/u urine studies.       · Multiple myeloma diagnosed in 2014 s/p chemotherapy. Currently in remission stage. F/u with Dr Rosalio Dick.      · Diabetes mellitus type 2 secondary to steroid use for MM since 2018- controlled   -last CbE1e-4.2(3/19). On ISS.       · GIST tumor s/p resection in 2003      · Revlimid related neuropathy.  -On Neurontin 300 mg daily.     · Chronic anemia from GI bleed   -Colonoscopy 8/18 showed hemorrhoids   -On ferrous sulfate supplementation.     · Hyperlipidemia  On  fenofibric acid 135 mg daily and simvastatin 40 mg daily. Abnormal TSH and T4  F/u Thyroid studies in 4-6 weeks as O/P        Diet-Carb control, low Na diet with fluid restriction 1500 ml  DVT prophylaxis- already on xarelto   PT/OT following   following for discharge planning- home with HC. Tanvi Kaiser MD      Department of Internal Medicine  [facility]         6/8/2019, 8:31 AM    Attending Physician Statement  I have discussed the care of Erin Hummel., including pertinent history and exam findings with the resident. I have reviewed the key elements of all parts of the encounter with the resident. I have seen and examined the patient with the resident and the key elements of all parts of the encounter have been performed by me.         Trace John MD  Attending and Faculty Internal Medicine  [address] [place] [address]  Internal Medicine [address] [place] [address], [facility]   6/8/19

## 2019-06-09 ENCOUNTER — APPOINTMENT (OUTPATIENT)
Dept: GENERAL RADIOLOGY | Age: 74
DRG: 291 | End: 2019-06-09
Payer: MEDICARE

## 2019-06-09 LAB
ANION GAP SERPL CALCULATED.3IONS-SCNC: 18 MMOL/L (ref 9–17)
BUN BLDV-MCNC: 43 MG/DL (ref 8–23)
BUN/CREAT BLD: ABNORMAL (ref 9–20)
CALCIUM SERPL-MCNC: 9.6 MG/DL (ref 8.6–10.4)
CHLORIDE BLD-SCNC: 100 MMOL/L (ref 98–107)
CO2: 22 MMOL/L (ref 20–31)
CREAT SERPL-MCNC: 2.31 MG/DL (ref 0.7–1.2)
GFR AFRICAN AMERICAN: 34 ML/MIN
GFR NON-AFRICAN AMERICAN: 28 ML/MIN
GFR SERPL CREATININE-BSD FRML MDRD: ABNORMAL ML/MIN/{1.73_M2}
GFR SERPL CREATININE-BSD FRML MDRD: ABNORMAL ML/MIN/{1.73_M2}
GLUCOSE BLD-MCNC: 105 MG/DL (ref 75–110)
GLUCOSE BLD-MCNC: 119 MG/DL (ref 75–110)
GLUCOSE BLD-MCNC: 154 MG/DL (ref 75–110)
GLUCOSE BLD-MCNC: 83 MG/DL (ref 70–99)
GLUCOSE BLD-MCNC: 98 MG/DL (ref 75–110)
MAGNESIUM: 2.2 MG/DL (ref 1.6–2.6)
POTASSIUM SERPL-SCNC: 5 MMOL/L (ref 3.7–5.3)
POTASSIUM SERPL-SCNC: 5.2 MMOL/L (ref 3.7–5.3)
SODIUM BLD-SCNC: 140 MMOL/L (ref 135–144)

## 2019-06-09 PROCEDURE — 2060000000 HC ICU INTERMEDIATE R&B

## 2019-06-09 PROCEDURE — 83735 ASSAY OF MAGNESIUM: CPT

## 2019-06-09 PROCEDURE — 6370000000 HC RX 637 (ALT 250 FOR IP): Performed by: INTERNAL MEDICINE

## 2019-06-09 PROCEDURE — 99233 SBSQ HOSP IP/OBS HIGH 50: CPT | Performed by: INTERNAL MEDICINE

## 2019-06-09 PROCEDURE — 51798 US URINE CAPACITY MEASURE: CPT

## 2019-06-09 PROCEDURE — 71045 X-RAY EXAM CHEST 1 VIEW: CPT

## 2019-06-09 PROCEDURE — 94762 N-INVAS EAR/PLS OXIMTRY CONT: CPT

## 2019-06-09 PROCEDURE — 6370000000 HC RX 637 (ALT 250 FOR IP): Performed by: STUDENT IN AN ORGANIZED HEALTH CARE EDUCATION/TRAINING PROGRAM

## 2019-06-09 PROCEDURE — 2700000000 HC OXYGEN THERAPY PER DAY

## 2019-06-09 PROCEDURE — 2580000003 HC RX 258: Performed by: STUDENT IN AN ORGANIZED HEALTH CARE EDUCATION/TRAINING PROGRAM

## 2019-06-09 PROCEDURE — 80048 BASIC METABOLIC PNL TOTAL CA: CPT

## 2019-06-09 PROCEDURE — 82947 ASSAY GLUCOSE BLOOD QUANT: CPT

## 2019-06-09 PROCEDURE — 84132 ASSAY OF SERUM POTASSIUM: CPT

## 2019-06-09 PROCEDURE — 36415 COLL VENOUS BLD VENIPUNCTURE: CPT

## 2019-06-09 PROCEDURE — 94640 AIRWAY INHALATION TREATMENT: CPT

## 2019-06-09 RX ADMIN — Medication 10 ML: at 22:01

## 2019-06-09 RX ADMIN — IPRATROPIUM BROMIDE AND ALBUTEROL SULFATE 1 AMPULE: .5; 3 SOLUTION RESPIRATORY (INHALATION) at 21:26

## 2019-06-09 RX ADMIN — GABAPENTIN 300 MG: 300 CAPSULE ORAL at 09:41

## 2019-06-09 RX ADMIN — FAMOTIDINE 20 MG: 20 TABLET, FILM COATED ORAL at 21:59

## 2019-06-09 RX ADMIN — INSULIN LISPRO 1 UNITS: 100 INJECTION, SOLUTION INTRAVENOUS; SUBCUTANEOUS at 17:04

## 2019-06-09 RX ADMIN — ASPIRIN 81 MG: 81 TABLET, CHEWABLE ORAL at 09:42

## 2019-06-09 RX ADMIN — DOCUSATE SODIUM 100 MG: 100 CAPSULE, LIQUID FILLED ORAL at 21:59

## 2019-06-09 RX ADMIN — IPRATROPIUM BROMIDE AND ALBUTEROL SULFATE 1 AMPULE: .5; 3 SOLUTION RESPIRATORY (INHALATION) at 12:35

## 2019-06-09 RX ADMIN — IPRATROPIUM BROMIDE AND ALBUTEROL SULFATE 1 AMPULE: .5; 3 SOLUTION RESPIRATORY (INHALATION) at 15:40

## 2019-06-09 RX ADMIN — Medication 10 ML: at 09:43

## 2019-06-09 RX ADMIN — DOCUSATE SODIUM 100 MG: 100 CAPSULE, LIQUID FILLED ORAL at 09:42

## 2019-06-09 RX ADMIN — METOPROLOL SUCCINATE 25 MG: 25 TABLET, FILM COATED, EXTENDED RELEASE ORAL at 09:41

## 2019-06-09 RX ADMIN — SENNOSIDES 8.6 MG: 8.6 TABLET, FILM COATED ORAL at 21:59

## 2019-06-09 RX ADMIN — TRAZODONE HYDROCHLORIDE 50 MG: 50 TABLET ORAL at 21:59

## 2019-06-09 RX ADMIN — AMIODARONE HYDROCHLORIDE 200 MG: 200 TABLET ORAL at 09:42

## 2019-06-09 RX ADMIN — RIVAROXABAN 20 MG: 20 TABLET, FILM COATED ORAL at 09:41

## 2019-06-09 RX ADMIN — FENOFIBRATE 135 MG: 54 TABLET ORAL at 09:41

## 2019-06-09 RX ADMIN — SIMVASTATIN 40 MG: 40 TABLET, FILM COATED ORAL at 22:00

## 2019-06-09 RX ADMIN — TRAMADOL HYDROCHLORIDE 50 MG: 50 TABLET, FILM COATED ORAL at 05:39

## 2019-06-09 RX ADMIN — IPRATROPIUM BROMIDE AND ALBUTEROL SULFATE 1 AMPULE: .5; 3 SOLUTION RESPIRATORY (INHALATION) at 08:41

## 2019-06-09 RX ADMIN — LATANOPROST 1 DROP: 50 SOLUTION OPHTHALMIC at 22:00

## 2019-06-09 RX ADMIN — FAMOTIDINE 20 MG: 20 TABLET, FILM COATED ORAL at 09:42

## 2019-06-09 RX ADMIN — TORSEMIDE 20 MG: 20 TABLET ORAL at 09:42

## 2019-06-09 RX ADMIN — PAROXETINE HYDROCHLORIDE HEMIHYDRATE 10 MG: 10 TABLET, FILM COATED ORAL at 09:41

## 2019-06-09 ASSESSMENT — PAIN SCALES - GENERAL
PAINLEVEL_OUTOF10: 0
PAINLEVEL_OUTOF10: 4
PAINLEVEL_OUTOF10: 8

## 2019-06-09 ASSESSMENT — PAIN DESCRIPTION - PAIN TYPE
TYPE: CHRONIC PAIN
TYPE: CHRONIC PAIN

## 2019-06-09 ASSESSMENT — PAIN DESCRIPTION - LOCATION: LOCATION: OTHER (COMMENT)

## 2019-06-09 NOTE — PROGRESS NOTES
per day   sodium chloride flush 0.9 % injection 10 mL PRN   ondansetron (ZOFRAN) injection 4 mg Q6H PRN   potassium chloride (KLOR-CON M) extended release tablet 40 mEq PRN   Or    potassium bicarb-citric acid (EFFER-K) effervescent tablet 40 mEq PRN   Or    potassium chloride 10 mEq/100 mL IVPB (Peripheral Line) PRN   magnesium sulfate 1 g in dextrose 5% 100 mL IVPB PRN   albuterol sulfate  (90 Base) MCG/ACT inhaler 1 puff Q6H PRN   amiodarone (CORDARONE) tablet 200 mg Daily   aspirin chewable tablet 81 mg Daily   famotidine (PEPCID) tablet 20 mg BID   docusate sodium (COLACE) capsule 100 mg BID   ferrous sulfate EC tablet 325 mg Every Other Day   latanoprost (XALATAN) 0.005 % ophthalmic solution 1 drop Nightly   fenofibrate (TRICOR) tablet 135 mg Daily   traZODone (DESYREL) tablet 50 mg Nightly   simvastatin (ZOCOR) tablet 40 mg Nightly   PARoxetine (PAXIL) tablet 10 mg Daily   rivaroxaban (XARELTO) tablet 20 mg Daily with breakfast   benzonatate (TESSALON) capsule 100 mg TID PRN   ipratropium-albuterol (DUONEB) nebulizer solution 1 ampule Q4H WA   albuterol (PROVENTIL) nebulizer solution 2.5 mg Q4H PRN   senna (SENOKOT) tablet 8.6 mg Nightly   insulin lispro (HUMALOG) injection vial 0-6 Units TID WC   insulin lispro (HUMALOG) injection vial 0-3 Units Nightly   glucose (GLUTOSE) 40 % oral gel 15 g PRN   dextrose 50 % IV solution PRN   glucagon (rDNA) injection 1 mg PRN   dextrose 5 % solution PRN       LABS      CBC: Recent Labs     06/08/19  0847   WBC 7.3   RBC 4.64   HGB 12.8*   HCT 39.1*   MCV 84.3   MCH 27.6   MCHC 32.7   RDW 20.1*      MPV 11.1      BMP: Recent Labs     06/07/19  0740 06/08/19  0625 06/08/19  0847 06/09/19  0651    136  --  140   K 4.4 3.6*  --  5.2    97*  --  100   CO2 26 27  --  22   BUN 27* 36* 37* 43*   CREATININE 1.14 1.90* 1.71* 2.31*   GLUCOSE 87 118*  --  83   CALCIUM 8.9 8.8  --  9.6      MAGNESIUM:   Recent Labs     06/07/19  0740 06/08/19  2939 06/09/19  0651   MG 1.9 1.8 2.2     IRON:    Lab Results   Component Value Date    IRON 49 06/08/2019     IRON SATURATION:  Lab Results   Component Value Date    LABIRON 10 06/08/2019     TIBC:    Lab Results   Component Value Date    TIBC 471 06/08/2019     FERRITIN:    Lab Results   Component Value Date    FERRITIN 99 06/08/2019     NAVARRO: No results found for: NAVARRO    SPEP: Lab Results   Component Value Date    PROT 6.6 05/21/2019    PROT 6.5 05/21/2019    ALBCAL 4.1 05/21/2019    ALBPCT 62 05/21/2019    LABALPH 0.3 05/21/2019    LABALPH 0.6 05/21/2019    A1PCT 4 05/21/2019    A2PCT 10 05/21/2019    LABBETA 0.9 05/21/2019    BETAPCT 14 05/21/2019    GAMGLOB 0.7 05/21/2019    GGPCT 10 05/21/2019    PATH ELECTRONICALLY SIGNED. Ryan Rios M.D. 05/21/2019    PATH ELECTRONICALLY SIGNED.  Ryan Rios M.D. 05/21/2019     C3:   Lab Results   Component Value Date    C3 152 05/21/2019     C4:   Lab Results   Component Value Date    C4 49 (H) 05/21/2019     URINE SODIUM:    Lab Results   Component Value Date    KAYLYNN <20 05/21/2019      URINE CREATININE:  Lab Results   Component Value Date    LABCREA 285.0 05/21/2019       URINALYSIS:  U/A: Lab Results   Component Value Date    NITRU POSITIVE 05/21/2019    COLORU DARK YELLOW 05/21/2019    PHUR 5.0 05/21/2019    WBCUA 10 TO 20 05/21/2019    RBCUA TOO NUMEROUS TO COUNT 05/21/2019    MUCUS NOT REPORTED 05/21/2019    TRICHOMONAS NOT REPORTED 05/21/2019    YEAST NOT REPORTED 05/21/2019    BACTERIA NOT REPORTED 05/21/2019    SPECGRAV 1.022 05/21/2019    LEUKOCYTESUR SMALL 05/21/2019    UROBILINOGEN Normal 05/21/2019    BILIRUBINUR MOD 05/21/2019    GLUCOSEU NEGATIVE 05/21/2019    KETUA TRACE 05/21/2019    AMORPHOUS NOT REPORTED 05/21/2019       RADIOLOGY      FINDINGS:   Incidentally noted mild perihepatic ascites as well as mild ascites superior   to the bladder.           Kidneys:       The right kidney measures 9.9 cm in length and the left kidney measures 10.1   cm in length.       Kidneys demonstrate normal cortical echogenicity and thickness.  No evidence   of hydronephrosis or intrarenal stones.           Bladder:       Unremarkable appearance of the bladder.  No post void residual.           Impression   Negative ultrasound of the kidneys and urinary bladder.  Incidentally noted   abdominopelvic ascites as described. ASSESSMENT      1. Acute kidney injury secondary to pre renal from diuresis/intermiitent hypotension  Along with urinary retention - evolving  2. CKD 3 secondary to CRS 2 with baseline creat 1.2-1. 4. Fluctuating based on volume status. Follows up with Dr. Mari Garcia  3. Status post CABG with ejection fraction 10-15% status post biventricular AICD in place - compensated  4. History of multiple myeloma diagnosed in 2014 and treated with chemotherapy.  Last dose of Revlimid August 2018.  In remission and under surveillance  5. Type 2 diabetes/steroid related since 2018  6. Essential hypertension  7. 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 COPD  9. Revlimid related neuropathy       PLAN      1. Torsemide 20mg started this morning  2. Change diet to low potassium  3. Repeat potassium at 2pm   4. CXR this morning  5. Straight cath - 230 cc drained  6. Del Cid         Please do not hesitate to call with questions. Electronically signed by TISH Covarrubias on 6/9/2019 at 10:20 AM     Attending Physician Statement  I have discussed the care of Cecy Castorena., including pertinent history and exam findings,  with the CNP. I have reviewed the key elements of all parts of the encounter with the CNP. I agree with the assessment, plan and orders as documented by the resident.     Tanya Bar MD, MRCP [facility] FACP   6/9/2019 1:17 PM    Nephrology [address] [place] [address]

## 2019-06-09 NOTE — PROGRESS NOTES
[facility]  Internal Medicine Residency Program  Inpatient Daily Progress Note  ______________________________________________________________________________    Patient: Aureliano Ruiz. YOB: 1945   MRN: [id number]    Acct: [de-identified]     Admit date: 6/4/2019  Today's date: 06/09/19  Number of days in the hospital: 5  Expected Discharge Date: (TBD)    Admitting Diagnosis: CHF exacerbation    Subjective:   Pt seen and Chart reviewed. He is hemodynamically stable with B.P 125/70, afebrile this morning. Breathing and Bilateral lower extremity edema has improved. His UOP is 750/24hrs with -70 as charted(improperly charted) in last 24 hours. Renal function worsened cr-2.31 today(1.1 on admission). Lasix were held yesterday and plan for P/O torsemide from today per nephrology. No other new acute issues currently.      Objective:   Vital Sign:  /70   Pulse 70   Temp 97.4 °F (36.3 °C) (Oral)   Resp 20   Ht 5' 9\" (1.753 m)   Wt 187 lb 8 oz (85 kg)   SpO2 100%   BMI 27.69 kg/m²       Physical Exam:  General appearance:   alert, well appearing, and in no distress  Mental Status: alert, oriented to person, place, and time  Neurologic:  alert, oriented, normal speech, no focal findings or movement disorder noted  Lungs: wheezes and rales improved, symmetric air entry  Heart[de-identified] normal rate, regular rhythm, normal S1, S2, no murmurs, rubs, clicks or gallops  Abdomen:  soft, nontender, nondistended, no masses or organomegaly  Extremities:B/L LE edema 1+ improved  Skin: normal coloration and turgor, no rashes, no suspicious skin lesions noted    Medications:  Scheduled Medications   torsemide  20 mg Oral Daily    metoprolol succinate  25 mg Oral Daily    sodium chloride flush  10 mL Intravenous 2 times per day    amiodarone  200 mg Oral Daily    aspirin  81 mg Oral Daily    famotidine  20 mg Oral BID    docusate sodium  100 mg Oral BID    ferrous sulfate  325 mg Oral Every Other Day    latanoprost  1 drop Both Eyes Nightly    fenofibrate  135 mg Oral Daily    traZODone  50 mg Oral Nightly    simvastatin  40 mg Oral Nightly    PARoxetine  10 mg Oral Daily    rivaroxaban  20 mg Oral Daily with breakfast    ipratropium-albuterol  1 ampule Inhalation Q4H WA    senna  1 tablet Oral Nightly    insulin lispro  0-6 Units Subcutaneous TID WC    insulin lispro  0-3 Units Subcutaneous Nightly       PRN Medications    traMADol 50 mg Q6H PRN   acetaminophen 650 mg Q4H PRN   sodium chloride flush 10 mL PRN   ondansetron 4 mg Q6H PRN   potassium chloride 40 mEq PRN   Or     potassium alternative oral replacement 40 mEq PRN   Or     potassium chloride 10 mEq PRN   magnesium sulfate 1 g PRN   albuterol sulfate HFA 1 puff Q6H PRN   benzonatate 100 mg TID PRN   albuterol 2.5 mg Q4H PRN   glucose 15 g PRN   dextrose 12.5 g PRN   glucagon (rDNA) 1 mg PRN   dextrose 100 mL/hr PRN       Diagnostic Labs and Imaging:  CBC:  Recent Labs     06/08/19  0847   WBC 7.3   HGB 12.8*        BMP:   Recent Labs     06/07/19  0740 06/08/19  0625 06/08/19  0847 06/09/19  0651    136  --  140   K 4.4 3.6*  --  5.2    97*  --  100   CO2 26 27  --  22   BUN 27* 36* 37* 43*   CREATININE 1.14 1.90* 1.71* 2.31*   GLUCOSE 87 118*  --  83     Hepatic:   Recent Labs     06/07/19  1239   AST 43*   ALT 19        ECHO (6/6/19)  Summary  Dilated left ventricle with severely reduced systolic function. Calculated ejection fraction via Arthur's Method is 12.4 %  Evidence of diastolic dysfunction. Left atrium is severely dilated. Normal right ventricle size with reduced systolic function. AICD lead seen in right atrium/ventricle. Mildly thickened mitral valve leaflets. Moderate mitral regurgitation, central jet. Moderate tricuspid regurgitation. Trivial pulmonic insufficiency. Small circumferential pericardial effusion. Pleural effusion is noted.     IMPRESSION  This is a

## 2019-06-09 NOTE — PLAN OF CARE
Resting with eyes closed. Del Cid cath intact and patent. Safety maintained. Poor appetite today. Lower extremities with 1+ edema. Denies pain; Continue plan of care.

## 2019-06-10 ENCOUNTER — APPOINTMENT (OUTPATIENT)
Dept: ULTRASOUND IMAGING | Age: 74
DRG: 291 | End: 2019-06-10
Payer: MEDICARE

## 2019-06-10 LAB
-: ABNORMAL
AMORPHOUS: ABNORMAL
ANION GAP SERPL CALCULATED.3IONS-SCNC: 14 MMOL/L (ref 9–17)
BACTERIA: ABNORMAL
BILIRUBIN URINE: ABNORMAL
BUN BLDV-MCNC: 52 MG/DL (ref 8–23)
BUN/CREAT BLD: ABNORMAL (ref 9–20)
CALCIUM SERPL-MCNC: 9 MG/DL (ref 8.6–10.4)
CASTS UA: ABNORMAL /LPF (ref 0–2)
CASTS UA: ABNORMAL /LPF (ref 0–2)
CHLORIDE BLD-SCNC: 97 MMOL/L (ref 98–107)
CO2: 25 MMOL/L (ref 20–31)
COLOR: ABNORMAL
COMMENT UA: ABNORMAL
CREAT SERPL-MCNC: 2.43 MG/DL (ref 0.7–1.2)
CRYSTALS, UA: ABNORMAL /HPF
EPITHELIAL CELLS UA: ABNORMAL /HPF (ref 0–5)
GFR AFRICAN AMERICAN: 32 ML/MIN
GFR NON-AFRICAN AMERICAN: 26 ML/MIN
GFR SERPL CREATININE-BSD FRML MDRD: ABNORMAL ML/MIN/{1.73_M2}
GFR SERPL CREATININE-BSD FRML MDRD: ABNORMAL ML/MIN/{1.73_M2}
GLUCOSE BLD-MCNC: 107 MG/DL (ref 70–99)
GLUCOSE BLD-MCNC: 108 MG/DL (ref 75–110)
GLUCOSE BLD-MCNC: 118 MG/DL (ref 75–110)
GLUCOSE BLD-MCNC: 144 MG/DL (ref 75–110)
GLUCOSE BLD-MCNC: 159 MG/DL (ref 75–110)
GLUCOSE URINE: NEGATIVE
KETONES, URINE: NEGATIVE
LEUKOCYTE ESTERASE, URINE: POSITIVE
MUCUS: ABNORMAL
NITRITE, URINE: POSITIVE
OTHER OBSERVATIONS UA: ABNORMAL
PH UA: 5 (ref 5–8)
POTASSIUM SERPL-SCNC: 4.5 MMOL/L (ref 3.7–5.3)
PROTEIN UA: ABNORMAL
RBC UA: ABNORMAL /HPF (ref 0–2)
RENAL EPITHELIAL, UA: ABNORMAL /HPF
SODIUM BLD-SCNC: 136 MMOL/L (ref 135–144)
SPECIFIC GRAVITY UA: 1.02 (ref 1–1.03)
TRICHOMONAS: ABNORMAL
TURBIDITY: ABNORMAL
URINE HGB: ABNORMAL
UROBILINOGEN, URINE: ABNORMAL
WBC UA: ABNORMAL /HPF (ref 0–5)
YEAST: ABNORMAL

## 2019-06-10 PROCEDURE — 81001 URINALYSIS AUTO W/SCOPE: CPT

## 2019-06-10 PROCEDURE — 82947 ASSAY GLUCOSE BLOOD QUANT: CPT

## 2019-06-10 PROCEDURE — 80048 BASIC METABOLIC PNL TOTAL CA: CPT

## 2019-06-10 PROCEDURE — 6370000000 HC RX 637 (ALT 250 FOR IP): Performed by: INTERNAL MEDICINE

## 2019-06-10 PROCEDURE — 76770 US EXAM ABDO BACK WALL COMP: CPT

## 2019-06-10 PROCEDURE — 6370000000 HC RX 637 (ALT 250 FOR IP): Performed by: STUDENT IN AN ORGANIZED HEALTH CARE EDUCATION/TRAINING PROGRAM

## 2019-06-10 PROCEDURE — 2060000000 HC ICU INTERMEDIATE R&B

## 2019-06-10 PROCEDURE — 2700000000 HC OXYGEN THERAPY PER DAY

## 2019-06-10 PROCEDURE — 2580000003 HC RX 258: Performed by: STUDENT IN AN ORGANIZED HEALTH CARE EDUCATION/TRAINING PROGRAM

## 2019-06-10 PROCEDURE — 99233 SBSQ HOSP IP/OBS HIGH 50: CPT | Performed by: INTERNAL MEDICINE

## 2019-06-10 PROCEDURE — 94640 AIRWAY INHALATION TREATMENT: CPT

## 2019-06-10 PROCEDURE — 87186 SC STD MICRODIL/AGAR DIL: CPT

## 2019-06-10 PROCEDURE — 94762 N-INVAS EAR/PLS OXIMTRY CONT: CPT

## 2019-06-10 PROCEDURE — 87077 CULTURE AEROBIC IDENTIFY: CPT

## 2019-06-10 PROCEDURE — 36415 COLL VENOUS BLD VENIPUNCTURE: CPT

## 2019-06-10 PROCEDURE — 87086 URINE CULTURE/COLONY COUNT: CPT

## 2019-06-10 RX ORDER — FAMOTIDINE 20 MG/1
20 TABLET, FILM COATED ORAL DAILY
Status: DISCONTINUED | OUTPATIENT
Start: 2019-06-11 | End: 2019-06-13 | Stop reason: HOSPADM

## 2019-06-10 RX ORDER — MIDODRINE HYDROCHLORIDE 5 MG/1
5 TABLET ORAL
Status: DISCONTINUED | OUTPATIENT
Start: 2019-06-10 | End: 2019-06-13

## 2019-06-10 RX ORDER — CEPHALEXIN 500 MG/1
500 CAPSULE ORAL 2 TIMES DAILY
Status: DISCONTINUED | OUTPATIENT
Start: 2019-06-10 | End: 2019-06-13 | Stop reason: HOSPADM

## 2019-06-10 RX ORDER — TAMSULOSIN HYDROCHLORIDE 0.4 MG/1
0.4 CAPSULE ORAL DAILY
Status: DISCONTINUED | OUTPATIENT
Start: 2019-06-10 | End: 2019-06-13 | Stop reason: HOSPADM

## 2019-06-10 RX ADMIN — INSULIN LISPRO 1 UNITS: 100 INJECTION, SOLUTION INTRAVENOUS; SUBCUTANEOUS at 11:47

## 2019-06-10 RX ADMIN — IPRATROPIUM BROMIDE AND ALBUTEROL SULFATE 1 AMPULE: .5; 3 SOLUTION RESPIRATORY (INHALATION) at 08:24

## 2019-06-10 RX ADMIN — TRAZODONE HYDROCHLORIDE 50 MG: 50 TABLET ORAL at 20:40

## 2019-06-10 RX ADMIN — PAROXETINE HYDROCHLORIDE HEMIHYDRATE 10 MG: 10 TABLET, FILM COATED ORAL at 09:26

## 2019-06-10 RX ADMIN — TORSEMIDE 20 MG: 20 TABLET ORAL at 09:26

## 2019-06-10 RX ADMIN — Medication 10 ML: at 09:29

## 2019-06-10 RX ADMIN — MIDODRINE HYDROCHLORIDE 5 MG: 5 TABLET ORAL at 11:41

## 2019-06-10 RX ADMIN — FERROUS SULFATE TAB EC 325 MG (65 MG FE EQUIVALENT) 325 MG: 325 (65 FE) TABLET DELAYED RESPONSE at 09:27

## 2019-06-10 RX ADMIN — LATANOPROST 1 DROP: 50 SOLUTION OPHTHALMIC at 20:46

## 2019-06-10 RX ADMIN — IPRATROPIUM BROMIDE AND ALBUTEROL SULFATE 1 AMPULE: .5; 3 SOLUTION RESPIRATORY (INHALATION) at 15:27

## 2019-06-10 RX ADMIN — CEPHALEXIN 500 MG: 500 CAPSULE ORAL at 20:40

## 2019-06-10 RX ADMIN — DOCUSATE SODIUM 100 MG: 100 CAPSULE, LIQUID FILLED ORAL at 20:40

## 2019-06-10 RX ADMIN — TAMSULOSIN HYDROCHLORIDE 0.4 MG: 0.4 CAPSULE ORAL at 09:59

## 2019-06-10 RX ADMIN — MIDODRINE HYDROCHLORIDE 5 MG: 5 TABLET ORAL at 17:05

## 2019-06-10 RX ADMIN — SENNOSIDES 8.6 MG: 8.6 TABLET, FILM COATED ORAL at 20:40

## 2019-06-10 RX ADMIN — FAMOTIDINE 20 MG: 20 TABLET, FILM COATED ORAL at 09:28

## 2019-06-10 RX ADMIN — RIVAROXABAN 20 MG: 20 TABLET, FILM COATED ORAL at 09:29

## 2019-06-10 RX ADMIN — INSULIN LISPRO 1 UNITS: 100 INJECTION, SOLUTION INTRAVENOUS; SUBCUTANEOUS at 17:05

## 2019-06-10 RX ADMIN — IPRATROPIUM BROMIDE AND ALBUTEROL SULFATE 1 AMPULE: .5; 3 SOLUTION RESPIRATORY (INHALATION) at 20:30

## 2019-06-10 RX ADMIN — Medication 10 ML: at 20:40

## 2019-06-10 RX ADMIN — DOCUSATE SODIUM 100 MG: 100 CAPSULE, LIQUID FILLED ORAL at 09:27

## 2019-06-10 RX ADMIN — AMIODARONE HYDROCHLORIDE 200 MG: 200 TABLET ORAL at 09:27

## 2019-06-10 RX ADMIN — ASPIRIN 81 MG: 81 TABLET, CHEWABLE ORAL at 09:27

## 2019-06-10 RX ADMIN — METOPROLOL SUCCINATE 25 MG: 25 TABLET, FILM COATED, EXTENDED RELEASE ORAL at 09:26

## 2019-06-10 RX ADMIN — SIMVASTATIN 40 MG: 40 TABLET, FILM COATED ORAL at 20:40

## 2019-06-10 RX ADMIN — CEPHALEXIN 500 MG: 500 CAPSULE ORAL at 09:27

## 2019-06-10 RX ADMIN — FENOFIBRATE 135 MG: 54 TABLET ORAL at 09:23

## 2019-06-10 ASSESSMENT — PAIN SCALES - GENERAL
PAINLEVEL_OUTOF10: 0
PAINLEVEL_OUTOF10: 3
PAINLEVEL_OUTOF10: 0

## 2019-06-10 NOTE — PROGRESS NOTES
[facility]  Internal Medicine Residency Program  Inpatient Daily Progress Note  ______________________________________________________________________________    Patient: Lenore Burgos. YOB: 1945   MRN: [id number]    Acct: [de-identified]     Admit date: 6/4/2019  Today's date: 06/10/19  Number of days in the hospital: 6  Expected Discharge Date: (TBD)    Admitting Diagnosis: CHF exacerbation                                            Subjective:   Pt seen and examined bedside. He is hemodynamically stable with B.P 115/70, afebrile this morning. Breathing and Bilateral lower extremity edema is same. Renal function worsened cr-2.4 today(1.1 on admission). Cr 1.1<1.4<1.9<2.3<2.4. Lasix and lisinopril held and started on P/O torsemide 20 mg. U/A positive for UTI. Del Cid in. Urine output is improving 1l/24hrs with net -870. Nephrology following. No other new acute issues currently.      Objective:   Vital Sign:  /79   Pulse 70   Temp 98.2 °F (36.8 °C) (Oral)   Resp 13   Ht 5' 9\" (1.753 m)   Wt 191 lb 1.6 oz (86.7 kg)   SpO2 99%   BMI 28.22 kg/m²       Physical Exam:  General appearance:   alert, well appearing, and in no distress  Mental Status: alert, oriented to person, place, and time  Neurologic:  alert, oriented, normal speech, no focal findings or movement disorder noted  Lungs: wheezes and rales improved, symmetric air entry  Heart[de-identified] normal rate, regular rhythm, normal S1, S2, no murmurs, rubs, clicks or gallops  Abdomen:  soft, nontender, nondistended, no masses or organomegaly  Extremities:B/L LE edema 1+ improved  Skin: normal coloration and turgor, no rashes, no suspicious skin lesions noted    Medications:  Scheduled Medications   torsemide  20 mg Oral Daily    metoprolol succinate  25 mg Oral Daily    sodium chloride flush  10 mL Intravenous 2 times per day    amiodarone  200 mg Oral Daily    aspirin  81 mg Oral Daily    famotidine 20 mg Oral BID    docusate sodium  100 mg Oral BID    ferrous sulfate  325 mg Oral Every Other Day    latanoprost  1 drop Both Eyes Nightly    fenofibrate  135 mg Oral Daily    traZODone  50 mg Oral Nightly    simvastatin  40 mg Oral Nightly    PARoxetine  10 mg Oral Daily    rivaroxaban  20 mg Oral Daily with breakfast    ipratropium-albuterol  1 ampule Inhalation Q4H WA    senna  1 tablet Oral Nightly    insulin lispro  0-6 Units Subcutaneous TID WC    insulin lispro  0-3 Units Subcutaneous Nightly       PRN Medications    traMADol 50 mg Q6H PRN   acetaminophen 650 mg Q4H PRN   sodium chloride flush 10 mL PRN   ondansetron 4 mg Q6H PRN   potassium chloride 40 mEq PRN   Or     potassium alternative oral replacement 40 mEq PRN   Or     potassium chloride 10 mEq PRN   magnesium sulfate 1 g PRN   albuterol sulfate HFA 1 puff Q6H PRN   benzonatate 100 mg TID PRN   albuterol 2.5 mg Q4H PRN   glucose 15 g PRN   dextrose 12.5 g PRN   glucagon (rDNA) 1 mg PRN   dextrose 100 mL/hr PRN       Diagnostic Labs and Imaging:  CBC:  Recent Labs     06/08/19  0847   WBC 7.3   HGB 12.8*        BMP:   Recent Labs     06/08/19  0625 06/08/19  0847 06/09/19  0651 06/09/19  1245 06/10/19  0543     --  140  --  136   K 3.6*  --  5.2 5.0 4.5   CL 97*  --  100  --  97*   CO2 27  --  22  --  25   BUN 36* 37* 43*  --  52*   CREATININE 1.90* 1.71* 2.31*  --  2.43*   GLUCOSE 118*  --  83  --  107*     Hepatic:   Recent Labs     06/07/19  1239   AST 43*   ALT 19        ECHO (6/6/19)  Summary  Dilated left ventricle with severely reduced systolic function. Calculated ejection fraction via Arthur's Method is 12.4 %  Evidence of diastolic dysfunction. Left atrium is severely dilated. Normal right ventricle size with reduced systolic function. AICD lead seen in right atrium/ventricle. Mildly thickened mitral valve leaflets. Moderate mitral regurgitation, central jet. Moderate tricuspid regurgitation.   Trivial pulmonic insufficiency. Small circumferential pericardial effusion. Pleural effusion is noted. IMPRESSION  This is a 72 y.o. male with PMH of PMH of ischemic cardiopathy s/p CABG and biventricular AICD placement, multiple myeloma s/p chemotherapy, GIST tumor resection in 2003, diabetes mellitus type 2, essential hypertension and COPD  presented with worsening shortness of breath and leg swelling of 4 day duration and found to have CHF exacerbation. Patient admitted to stepdown status.     ASSESSMENT/PLAN:  · Acute on chronic combined decompensated heart failure-resolving  Ischemic cardiomyopathy s/p CABG(EF-17% 8/18) s/p biventricular AICD placement on 12/18   -Echo showed QF-68.4% and diastolic dysfunction with moderate MR and TR.  -cardio recs:  Toprol XL 25 mg daily, amio 200 mg, hold lasix and lisinopril ( due to dry cough). Follow up as o/p regarding Biv-ICD.      · Paroxysmal atrial fibrillation  -Currently NSR in 70-80  -On amiodarone 200 mg daily, Toprol XL 25 daily and Xarelto 20 mg daily.    -Follow up with Dr. Miguel Haynes as O/P    · Hypomagnesemia and hypokalemia from diuretic use-resolved. Lasix on hold. On electrolyte replacement as needed. Daily BMP.       · Essential hypertension - controlled  B.p  In  normal range today. On Toprol Xl 25 daily     · COPD-stable on NC  -On room air at home. Currently on 1-2 l o2 via NC since admission  -Continued on with albuterol and DuoNeb. -Tessalon 100 3 times a day for cough. Qualified for home o2 eval in this admission.     · RANDALL/CKD stage III(baseline creatinine around 1.4-1.6) from cardiorenal syndrome . Renal function worsened cr-2.4 today(1.1 on admission). Cr 1.1<1.4<1.9<2.3<2.4. Lasix and lisinopril held and started on P/O torsemide 20 mg. Monitor k. U/A positive for UTI. Will start on keflex for 7 days. Del Cid in. Urine output is improving 1l/24hrs with net -870. Nephrology following.     · Multiple myeloma diagnosed in 2014 s/p chemotherapy.   Currently in remission stage. F/u with Dr Lizzie Soto.      · Diabetes mellitus type 2 secondary to steroid use for MM since 2018- controlled   -last VuX0k-3.2(3/19). On ISS. · GIST tumor s/p resection in 2003      · Revlimid related neuropathy.  -On Neurontin 300 mg daily. D/c for now due to nephrotoxicity     · Chronic anemia from GI bleed   -Colonoscopy 8/18 showed hemorrhoids   -On ferrous sulfate supplementation.     · Hyperlipidemia  On  fenofibric acid 135 mg daily and simvastatin 40 mg daily. Abnormal TSH and T4  F/u Thyroid studies in 4-6 weeks as O/P        Diet- low Na and low k diet with fluid restriction 1500 ml  DVT prophylaxis- already on xarelto   PT/OT following   following for discharge planning- home with HC. Kacie Patiño MD      Department of Internal Medicine  [address] [place] [address]         6/10/2019, 7:20 AM    Attending Physician Statement  I have discussed the care of Jared Benites., including pertinent history and exam findings with the resident. I have reviewed the key elements of all parts of the encounter with the resident. I have seen and examined the patient with the resident and the key elements of all parts of the encounter have been performed by me.         Florina Maldonado MD  Attending and Faculty Internal Medicine  [address] [place] [address]  Internal Medicine [address] [facility]   6/10/19

## 2019-06-10 NOTE — PROGRESS NOTES
Renal Progress Note    Patient :  Jaison Polo.; 72 y.o. MRN# [id number]  Location:  3027/3027-01  Attending:  Tawanna Fuller MD  Admit Date:  6/4/2019   Hospital Day: 6      Subjective: Following for RANDALL/CKD III with baseline 1.2-1.4, admitted with CHF. Patient seen and examined. Patient mentions that he feels so-so, does have positive lower extremity edema, but no shortness of breath, no chest pain, no abdominal pain reported. Patient required Del Cid catheter placement due to urinary retention. Patient made about 1 L urine last 24 hours. Renal function increased to 2.43 mg/DL  UTI positive now started on Keflex today. Urine cultures pending. Vitals stable. Outpatient Medications:     Medications Prior to Admission: traMADol (ULTRAM) 50 MG tablet, Take 50 mg by mouth daily.   Methylnaltrexone Bromide (RELISTOR) 150 MG TABS, Take 150 mg by mouth daily  amiodarone (PACERONE) 100 MG tablet, Take 1 tablet by mouth daily (Patient taking differently: Take 200 mg by mouth daily )  PARoxetine (PAXIL) 10 MG tablet, Take 1 tablet by mouth daily  docusate sodium (COLACE, DULCOLAX) 100 MG CAPS, Take 100 mg by mouth 2 times daily  Multiple Vitamins-Minerals (THERAPEUTIC MULTIVITAMIN-MINERALS) tablet, Take 1 tablet by mouth daily (with breakfast)  potassium chloride (MICRO-K) 10 MEQ extended release capsule, Take 10 mEq by mouth 2 times daily   latanoprost (XALATAN) 0.005 % ophthalmic solution, Place 1 drop into both eyes nightly  dextran 70-hypromellose (TEARS NATURALE) 0.1-0.3 % SOLN opthalmic solution, Place 1 drop into both eyes as needed  fenofibric acid (FIBRICOR) 135 MG CPDR capsule, Take 1 capsule by mouth daily  traZODone (DESYREL) 50 MG tablet, Take 1 tablet by mouth nightly  dexamethasone (DECADRON) 4 MG tablet, Take 40 mg by mouth once a week HOLD  lenalidomide (REVLIMID) 10 MG chemo capsule, Take 10 mg by mouth daily HOLD  ferrous sulfate 325 (65 Fe) MG tablet, Take 325 mg by mouth every other day  furosemide (LASIX) 20 MG tablet, Take 1 tablet by mouth 2 times daily (Patient taking differently: Take 20 mg by mouth 2 times daily )  albuterol sulfate  (90 Base) MCG/ACT inhaler, Inhale 1 puff into the lungs every 6 hours as needed for Wheezing  gabapentin (NEURONTIN) 100 MG capsule, Take 100 mg by mouth 3 times daily.    aspirin 81 MG chewable tablet, Take 1 tablet by mouth daily  famotidine (PEPCID) 20 MG tablet, Take 1 tablet by mouth 2 times daily (Patient taking differently: Take 20 mg by mouth daily )  simvastatin (ZOCOR) 40 MG tablet, Take 40 mg by mouth nightly  rivaroxaban (XARELTO) 20 MG TABS tablet, Take 20 mg by mouth daily (with breakfast)   glipiZIDE (GLUCOTROL) 5 MG tablet, Take 5 mg by mouth every morning (before breakfast)    Current Medications:     Scheduled Meds:    cephALEXin  500 mg Oral BID    tamsulosin  0.4 mg Oral Daily    midodrine  5 mg Oral TID     [START ON 6/11/2019] famotidine  20 mg Oral Daily    [START ON 6/11/2019] rivaroxaban  15 mg Oral Daily with breakfast    torsemide  20 mg Oral Daily    metoprolol succinate  25 mg Oral Daily    sodium chloride flush  10 mL Intravenous 2 times per day    amiodarone  200 mg Oral Daily    aspirin  81 mg Oral Daily    docusate sodium  100 mg Oral BID    ferrous sulfate  325 mg Oral Every Other Day    latanoprost  1 drop Both Eyes Nightly    fenofibrate  135 mg Oral Daily    traZODone  50 mg Oral Nightly    simvastatin  40 mg Oral Nightly    PARoxetine  10 mg Oral Daily    ipratropium-albuterol  1 ampule Inhalation Q4H WA    senna  1 tablet Oral Nightly    insulin lispro  0-6 Units Subcutaneous TID     insulin lispro  0-3 Units Subcutaneous Nightly     Continuous Infusions:    dextrose       PRN Meds:  traMADol, acetaminophen, sodium chloride flush, ondansetron, potassium chloride **OR** potassium alternative oral replacement **OR** potassium chloride, magnesium sulfate, albuterol sulfate HFA, benzonatate, LABALPH 0.6 05/21/2019    A1PCT 4 05/21/2019    A2PCT 10 05/21/2019    LABBETA 0.9 05/21/2019    BETAPCT 14 05/21/2019    GAMGLOB 0.7 05/21/2019    GGPCT 10 05/21/2019    PATH ELECTRONICALLY SIGNED. Soila Campoverde M.D. 05/21/2019    PATH ELECTRONICALLY SIGNED. Soila Campoverde M.D. 05/21/2019     C3:     Lab Results   Component Value Date    C3 152 05/21/2019     C4:     Lab Results   Component Value Date    C4 49 05/21/2019     Urinalysis/Chemistries:      Lab Results   Component Value Date    NITRU POSITIVE 06/10/2019    COLORU BROWN 06/10/2019    PHUR 5.0 06/10/2019    WBCUA 20 TO 50 06/10/2019    RBCUA TOO NUMEROUS TO COUNT 06/10/2019    MUCUS NOT REPORTED 06/10/2019    TRICHOMONAS NOT REPORTED 06/10/2019    YEAST NOT REPORTED 06/10/2019    BACTERIA MODERATE 06/10/2019    SPECGRAV 1.025 06/10/2019    LEUKOCYTESUR POSITIVE 06/10/2019    UROBILINOGEN ELEVATED 06/10/2019    BILIRUBINUR SMALL 06/10/2019    GLUCOSEU NEGATIVE 06/10/2019    KETUA NEGATIVE 06/10/2019    AMORPHOUS NOT REPORTED 06/10/2019     Urine Sodium:     Lab Results   Component Value Date    KAYLYNN <20 05/21/2019      Urine Creatinine:     Lab Results   Component Value Date    LABCREA 285.0 05/21/2019       Radiology:     Reviewed. Assessment:       1. Acute kidney injury secondary to pre renal from diuresis/intermiitent hypotension, can have ATN secondary to urinary tract infection and also likely further complicated by urinary retention - evolving  2. CKD 3 secondary to CRS 2 with baseline creat 1.2-1.4 mg/dl. Fluctuating based on volume status. Follows up with Dr. Kip Schmitz  3. Status post CABG with ejection fraction 10-15% status post biventricular AICD placement on 12/18- compensated  4. History of multiple myeloma diagnosed in 2014 and treated with chemotherapy.  Last dose of Revlimid August 2018.  In remission and under surveillance  5. Type 2 diabetes/steroid related since 2018  6. Essential hypertension  7.  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 COPD  9. Revlimid related neuropathy       Plan:   1. Agree with antibiotics for treatment of UTI, follow-up urine cultures and tailor antibiotics accordingly. 2.  Continue torsemide 20 mg daily. 3.  Monitor strict I's and O's and renal function. 4.  Continue Del Cid catheter placement for now and will consult urology for retention. 5.  BMP in a.m.  6.  Will follow. Nutrition   Please ensure that patient is on a renal diet/TF. Avoid nephrotoxic drugs/contrast exposure. We will continue to follow along with you. Sam Woodward MD  [facility]     This note is created with the assistance of a speech-recognition program. While intending to generate a document that actually reflects the content of the visit, no guarantees can be provided that every mistake has been identified and corrected by editing.

## 2019-06-10 NOTE — CONSULTS
[facility] COLONOSCOPY FLX DX W/COLLJ SPEC WHEN PFRMD N/A 1/14/2018    COLONOSCOPY performed by Andrew Mustafa MD at [facility] ESOPHAGOGASTRODUODENOSCOPY TRANSORAL DIAGNOSTIC N/A 1/12/2018    EGD DIAGNOSTIC ONLY [facility] [address]        Medications:    Scheduled Meds:   cephALEXin  500 mg Oral BID    tamsulosin  0.4 mg Oral Daily    midodrine  5 mg Oral TID WC    [START ON 6/11/2019] famotidine  20 mg Oral Daily    [START ON 6/11/2019] rivaroxaban  15 mg Oral Daily with breakfast    torsemide  20 mg Oral Daily    metoprolol succinate  25 mg Oral Daily    sodium chloride flush  10 mL Intravenous 2 times per day    amiodarone  200 mg Oral Daily    aspirin  81 mg Oral Daily    docusate sodium  100 mg Oral BID    ferrous sulfate  325 mg Oral Every Other Day    latanoprost  1 drop Both Eyes Nightly    fenofibrate  135 mg Oral Daily    traZODone  50 mg Oral Nightly    simvastatin  40 mg Oral Nightly    PARoxetine  10 mg Oral Daily    ipratropium-albuterol  1 ampule Inhalation Q4H WA    senna  1 tablet Oral Nightly    insulin lispro  0-6 Units Subcutaneous TID WC    insulin lispro  0-3 Units Subcutaneous Nightly     Continuous Infusions:   dextrose       PRN Meds:.traMADol, acetaminophen, sodium chloride flush, ondansetron, potassium chloride **OR** potassium alternative oral replacement **OR** potassium chloride, magnesium sulfate, albuterol sulfate HFA, benzonatate, albuterol, glucose, dextrose, glucagon (rDNA), dextrose    Allergies:  Lisinopril and Nuts [peanut-containing drug products]    Social History:    Social History     Socioeconomic History    Marital status:      Spouse name: Not on file    Number of children: Not on file    Years of education: Not on file    Highest education level: Not on file   Occupational History    Occupation: Retired   Social Needs    Financial resource strain: Not on file    Food insecurity:     Worry: Not Oral 70 20 98 % --   06/10/19 1104 101/60 97.5 °F (36.4 °C) Oral 70 19 100 % --   06/10/19 0955 110/69 98.2 °F (36.8 °C) Oral 70 18 -- --   06/10/19 0641 106/79 98.2 °F (36.8 °C) Oral 70 -- 99 % --   06/10/19 0438 -- -- -- -- -- -- 191 lb 1.6 oz (86.7 kg)   06/10/19 0352 98/77 97.3 °F (36.3 °C) Oral 70 13 100 % --   06/09/19 2354 100/86 97.7 °F (36.5 °C) Oral 70 13 100 % --   06/09/19 2128 -- -- -- -- 17 98 % --   06/09/19 1913 (!) 88/57 97.6 °F (36.4 °C) Oral 72 11 97 % --       Constitutional: Patient in no acute distress; Neuro: alert and oriented to person place and time. Psych: Mood and affect normal.  Skin: Normal  Lungs: Respiratory effort normal  Cardiovascular:  RRR. Normal peripheral pulses  Abdomen: Soft, non-tender, non-distended  No CVA tenderness  Bladder non-tender and not distended. Lower extremities no edema or calf tenderness bilaterally    :  Penis normal and uncircumcised  Urethral meatus normal  Scrotal exam normal  Testicles normal bilaterally  Prostate exam refused    LABS:    Recent Labs     06/08/19  0847   WBC 7.3   HGB 12.8*   HCT 39.1*   MCV 84.3        Recent Labs     06/08/19  0625 06/08/19  0847 06/09/19  0651 06/09/19  1245 06/10/19  0543     --  140  --  136   K 3.6*  --  5.2 5.0 4.5   CL 97*  --  100  --  97*   CO2 27  --  22  --  25   BUN 36* 37* 43*  --  52*   CREATININE 1.90* 1.71* 2.31*  --  2.43*     No results found for: PSA    Additional Lab/culture results:    Urinalysis:   Recent Labs     06/10/19  0349   COLORU BROWN   PHUR 5.0   WBCUA 20 TO 50   RBCUA TOO NUMEROUS TO COUNT   MUCUS NOT REPORTED   TRICHOMONAS NOT REPORTED   YEAST NOT REPORTED   BACTERIA MODERATE*   SPECGRAV 1.025   LEUKOCYTESUR POSITIVE*   UROBILINOGEN ELEVATED*   BILIRUBINUR SMALL*        -----------------------------------------------------------------  Imaging Results:  Imaging was independently reviewed and confirmed with report. Assessment and Plan     Impression:       The

## 2019-06-11 ENCOUNTER — TELEPHONE (OUTPATIENT)
Dept: GASTROENTEROLOGY | Age: 74
End: 2019-06-11

## 2019-06-11 LAB
ANION GAP SERPL CALCULATED.3IONS-SCNC: 16 MMOL/L (ref 9–17)
BUN BLDV-MCNC: 56 MG/DL (ref 8–23)
BUN/CREAT BLD: ABNORMAL (ref 9–20)
CALCIUM SERPL-MCNC: 8.9 MG/DL (ref 8.6–10.4)
CHLORIDE BLD-SCNC: 93 MMOL/L (ref 98–107)
CO2: 25 MMOL/L (ref 20–31)
CREAT SERPL-MCNC: 2.29 MG/DL (ref 0.7–1.2)
GFR AFRICAN AMERICAN: 34 ML/MIN
GFR NON-AFRICAN AMERICAN: 28 ML/MIN
GFR SERPL CREATININE-BSD FRML MDRD: ABNORMAL ML/MIN/{1.73_M2}
GFR SERPL CREATININE-BSD FRML MDRD: ABNORMAL ML/MIN/{1.73_M2}
GLUCOSE BLD-MCNC: 144 MG/DL (ref 75–110)
GLUCOSE BLD-MCNC: 197 MG/DL (ref 75–110)
GLUCOSE BLD-MCNC: 214 MG/DL (ref 75–110)
GLUCOSE BLD-MCNC: 97 MG/DL (ref 70–99)
POTASSIUM SERPL-SCNC: 4.6 MMOL/L (ref 3.7–5.3)
SODIUM BLD-SCNC: 134 MMOL/L (ref 135–144)

## 2019-06-11 PROCEDURE — 6370000000 HC RX 637 (ALT 250 FOR IP): Performed by: STUDENT IN AN ORGANIZED HEALTH CARE EDUCATION/TRAINING PROGRAM

## 2019-06-11 PROCEDURE — 80048 BASIC METABOLIC PNL TOTAL CA: CPT

## 2019-06-11 PROCEDURE — 99233 SBSQ HOSP IP/OBS HIGH 50: CPT | Performed by: INTERNAL MEDICINE

## 2019-06-11 PROCEDURE — 2060000000 HC ICU INTERMEDIATE R&B

## 2019-06-11 PROCEDURE — 36415 COLL VENOUS BLD VENIPUNCTURE: CPT

## 2019-06-11 PROCEDURE — 6370000000 HC RX 637 (ALT 250 FOR IP): Performed by: INTERNAL MEDICINE

## 2019-06-11 PROCEDURE — 2580000003 HC RX 258: Performed by: STUDENT IN AN ORGANIZED HEALTH CARE EDUCATION/TRAINING PROGRAM

## 2019-06-11 PROCEDURE — 2700000000 HC OXYGEN THERAPY PER DAY

## 2019-06-11 PROCEDURE — 82947 ASSAY GLUCOSE BLOOD QUANT: CPT

## 2019-06-11 PROCEDURE — 6360000002 HC RX W HCPCS: Performed by: STUDENT IN AN ORGANIZED HEALTH CARE EDUCATION/TRAINING PROGRAM

## 2019-06-11 PROCEDURE — 94640 AIRWAY INHALATION TREATMENT: CPT

## 2019-06-11 PROCEDURE — 94760 N-INVAS EAR/PLS OXIMETRY 1: CPT

## 2019-06-11 RX ORDER — POLYETHYLENE GLYCOL 3350 17 G/17G
17 POWDER, FOR SOLUTION ORAL DAILY
Status: DISCONTINUED | OUTPATIENT
Start: 2019-06-11 | End: 2019-06-13 | Stop reason: HOSPADM

## 2019-06-11 RX ADMIN — MAGNESIUM HYDROXIDE 30 ML: 400 SUSPENSION ORAL at 15:47

## 2019-06-11 RX ADMIN — CEPHALEXIN 500 MG: 500 CAPSULE ORAL at 08:22

## 2019-06-11 RX ADMIN — FAMOTIDINE 20 MG: 20 TABLET, FILM COATED ORAL at 11:22

## 2019-06-11 RX ADMIN — IPRATROPIUM BROMIDE AND ALBUTEROL SULFATE 1 AMPULE: .5; 3 SOLUTION RESPIRATORY (INHALATION) at 16:07

## 2019-06-11 RX ADMIN — INSULIN LISPRO 1 UNITS: 100 INJECTION, SOLUTION INTRAVENOUS; SUBCUTANEOUS at 17:04

## 2019-06-11 RX ADMIN — FENOFIBRATE 135 MG: 54 TABLET ORAL at 08:23

## 2019-06-11 RX ADMIN — CEPHALEXIN 500 MG: 500 CAPSULE ORAL at 20:44

## 2019-06-11 RX ADMIN — IPRATROPIUM BROMIDE AND ALBUTEROL SULFATE 1 AMPULE: .5; 3 SOLUTION RESPIRATORY (INHALATION) at 08:06

## 2019-06-11 RX ADMIN — DOCUSATE SODIUM 100 MG: 100 CAPSULE, LIQUID FILLED ORAL at 08:20

## 2019-06-11 RX ADMIN — SENNOSIDES 8.6 MG: 8.6 TABLET, FILM COATED ORAL at 20:44

## 2019-06-11 RX ADMIN — MIDODRINE HYDROCHLORIDE 5 MG: 5 TABLET ORAL at 08:21

## 2019-06-11 RX ADMIN — ASPIRIN 81 MG: 81 TABLET, CHEWABLE ORAL at 08:21

## 2019-06-11 RX ADMIN — TORSEMIDE 20 MG: 20 TABLET ORAL at 08:21

## 2019-06-11 RX ADMIN — INSULIN LISPRO 1 UNITS: 100 INJECTION, SOLUTION INTRAVENOUS; SUBCUTANEOUS at 11:29

## 2019-06-11 RX ADMIN — TRAZODONE HYDROCHLORIDE 50 MG: 50 TABLET ORAL at 20:44

## 2019-06-11 RX ADMIN — AMIODARONE HYDROCHLORIDE 200 MG: 200 TABLET ORAL at 08:20

## 2019-06-11 RX ADMIN — PAROXETINE HYDROCHLORIDE HEMIHYDRATE 10 MG: 10 TABLET, FILM COATED ORAL at 08:21

## 2019-06-11 RX ADMIN — SIMVASTATIN 40 MG: 40 TABLET, FILM COATED ORAL at 20:44

## 2019-06-11 RX ADMIN — MIDODRINE HYDROCHLORIDE 5 MG: 5 TABLET ORAL at 17:04

## 2019-06-11 RX ADMIN — POLYETHYLENE GLYCOL 3350 17 G: 17 POWDER, FOR SOLUTION ORAL at 22:52

## 2019-06-11 RX ADMIN — ALBUTEROL SULFATE 2.5 MG: 2.5 SOLUTION RESPIRATORY (INHALATION) at 04:07

## 2019-06-11 RX ADMIN — RIVAROXABAN 15 MG: 15 TABLET, FILM COATED ORAL at 08:20

## 2019-06-11 RX ADMIN — INSULIN LISPRO 1 UNITS: 100 INJECTION, SOLUTION INTRAVENOUS; SUBCUTANEOUS at 20:44

## 2019-06-11 RX ADMIN — METOPROLOL SUCCINATE 25 MG: 25 TABLET, FILM COATED, EXTENDED RELEASE ORAL at 08:22

## 2019-06-11 RX ADMIN — IPRATROPIUM BROMIDE AND ALBUTEROL SULFATE 1 AMPULE: .5; 3 SOLUTION RESPIRATORY (INHALATION) at 12:31

## 2019-06-11 RX ADMIN — DOCUSATE SODIUM 100 MG: 100 CAPSULE, LIQUID FILLED ORAL at 20:44

## 2019-06-11 RX ADMIN — Medication 10 ML: at 08:21

## 2019-06-11 RX ADMIN — LATANOPROST 1 DROP: 50 SOLUTION OPHTHALMIC at 20:44

## 2019-06-11 RX ADMIN — MIDODRINE HYDROCHLORIDE 5 MG: 5 TABLET ORAL at 11:21

## 2019-06-11 RX ADMIN — TAMSULOSIN HYDROCHLORIDE 0.4 MG: 0.4 CAPSULE ORAL at 08:23

## 2019-06-11 RX ADMIN — IPRATROPIUM BROMIDE AND ALBUTEROL SULFATE 1 AMPULE: .5; 3 SOLUTION RESPIRATORY (INHALATION) at 20:40

## 2019-06-11 ASSESSMENT — PAIN SCALES - GENERAL
PAINLEVEL_OUTOF10: 0
PAINLEVEL_OUTOF10: 0

## 2019-06-11 NOTE — PROGRESS NOTES
Medications   cephALEXin  500 mg Oral BID    tamsulosin  0.4 mg Oral Daily    midodrine  5 mg Oral TID WC    famotidine  20 mg Oral Daily    rivaroxaban  15 mg Oral Daily with breakfast    torsemide  20 mg Oral Daily    metoprolol succinate  25 mg Oral Daily    sodium chloride flush  10 mL Intravenous 2 times per day    amiodarone  200 mg Oral Daily    aspirin  81 mg Oral Daily    docusate sodium  100 mg Oral BID    ferrous sulfate  325 mg Oral Every Other Day    latanoprost  1 drop Both Eyes Nightly    fenofibrate  135 mg Oral Daily    traZODone  50 mg Oral Nightly    simvastatin  40 mg Oral Nightly    PARoxetine  10 mg Oral Daily    ipratropium-albuterol  1 ampule Inhalation Q4H WA    senna  1 tablet Oral Nightly    insulin lispro  0-6 Units Subcutaneous TID WC    insulin lispro  0-3 Units Subcutaneous Nightly       PRN Medications    magnesium hydroxide 30 mL TID PRN   traMADol 50 mg Q6H PRN   acetaminophen 650 mg Q4H PRN   sodium chloride flush 10 mL PRN   ondansetron 4 mg Q6H PRN   potassium chloride 40 mEq PRN   Or     potassium alternative oral replacement 40 mEq PRN   Or     potassium chloride 10 mEq PRN   magnesium sulfate 1 g PRN   albuterol sulfate HFA 1 puff Q6H PRN   benzonatate 100 mg TID PRN   albuterol 2.5 mg Q4H PRN   glucose 15 g PRN   dextrose 12.5 g PRN   glucagon (rDNA) 1 mg PRN   dextrose 100 mL/hr PRN       Diagnostic Labs and Imaging:  CBC:  No results for input(s): WBC, HGB, PLT in the last 72 hours. BMP:   Recent Labs     06/09/19  0651 06/09/19  1245 06/10/19  0543 06/11/19  0542     --  136 134*   K 5.2 5.0 4.5 4.6     --  97* 93*   CO2 22  --  25 25   BUN 43*  --  52* 56*   CREATININE 2.31*  --  2.43* 2.29*   GLUCOSE 83  --  107* 97     Hepatic:   No results for input(s): AST, ALT, ALB, BILITOT, ALKPHOS in the last 72 hours. ECHO (6/6/19)  Summary  Dilated left ventricle with severely reduced systolic function.   Calculated ejection fraction via Arthur's Method is 12.4 %  Evidence of diastolic dysfunction. Left atrium is severely dilated. Normal right ventricle size with reduced systolic function. AICD lead seen in right atrium/ventricle. Mildly thickened mitral valve leaflets. Moderate mitral regurgitation, central jet. Moderate tricuspid regurgitation. Trivial pulmonic insufficiency. Small circumferential pericardial effusion. Pleural effusion is noted. IMPRESSION  This is a 72 y.o. male with PMH of PMH of ischemic cardiopathy s/p CABG and biventricular AICD placement, multiple myeloma s/p chemotherapy, GIST tumor resection in 2003, diabetes mellitus type 2, essential hypertension and COPD  presented with worsening shortness of breath and leg swelling of 4 day duration and found to have CHF exacerbation. Patient admitted to stepdown status.     ASSESSMENT/PLAN:  · Acute on chronic combined decompensated heart failure-resolving  Ischemic cardiomyopathy s/p CABG(EF-17% 8/18) s/p biventricular AICD placement on 12/18   -Echo showed GO-09.3% and diastolic dysfunction with moderate MR and TR.  -cardio recs:  Toprol XL 25 mg daily, amio 200 mg, hold lasix and lisinopril ( due to dry cough). Follow up as o/p regarding Biv-ICD.      · Paroxysmal atrial fibrillation  -Currently NSR in 70-80  -On amiodarone 200 mg daily, Toprol XL 25 daily and Hhogefu85 mg daily.    -Follow up with Dr. Beau Cheek as O/P    · Hypomagnesemia and hypokalemia from diuretic use-resolved. Lasix on hold. On electrolyte replacement as needed. Daily BMP.       · Essential hypertension - controlled  B.p  In  normal range today. On Toprol Xl 25 daily     · COPD-stable on NC  -On room air at home. Currently on 1-2 l o2 via NC since admission  -Continued on with albuterol and DuoNeb. -Tessalon 100 3 times a day for cough.  Qualified for home o2 eval in this admission.     · RANDALL/CKD stage III(baseline creatinine around 1.4-1.6) from cardiorenal syndrome  Renal function improving cr-2.29 today(1.1 on admission). Cr 1.1<1.4<1.9<2.3<2.4<2.29. Lasix and lisinopril held and continued on torsemide 20 mg. Del Cid in. Urine output 900ml/24hrs with net -870. Nephrology following.     ·   Gross hematuria and urinary retention   Urology was consulted yesterday for concerns of gross hematuria and urinary retention. Urology recommended outpatient cystoscopy and to continue Flomax for now. Still gross hematuria present. · Multiple myeloma diagnosed in 2014 s/p chemotherapy. Currently in remission stage. F/u with Dr Fabi Villa.      · Diabetes mellitus type 2 secondary to steroid use for MM since 2018- controlled   -last PbC8t-0.2(3/19). On ISS. · GIST tumor s/p resection in 2003      · Revlimid related neuropathy.  -On Neurontin 300 mg daily. D/c for now due to nephrotoxicity     · Chronic anemia from GI bleed   -Colonoscopy 8/18 showed hemorrhoids   -On ferrous sulfate supplementation.     · Hyperlipidemia  On  fenofibric acid 135 mg daily and simvastatin 40 mg daily. Abnormal TSH and T4  F/u Thyroid studies in 4-6 weeks as O/P        Diet- low Na and low k diet with fluid restriction 1500 ml  DVT prophylaxis- already on xarelto   PT/OT following   following for discharge planning- home with HC. Shahrzad Martin MD      Department of Internal Medicine  [facility]         6/11/2019, 12:53 PM       Attending Physician Statement  I have discussed the care of Sabiha Cuellar, including pertinent history and exam findings with the resident. I have reviewed the key elements of all parts of the encounter with the resident. I have seen and examined the patient with the resident and the key elements of all parts of the encounter have been performed by me.         Alexia Owusu MD  Attending and Faculty Internal Medicine  [facility]  Internal Medicine [email or web address]   6/11/19

## 2019-06-11 NOTE — PLAN OF CARE
Urology     - Will void trial when Cr improves and Flomax has been in system for at least 48 hours  - Irrigate catheter PRN for non draining minor with 60 cc of normal saline through the main port of the catheter  - Ok to continue anticoagulation from urologic standpoint  - Follow up urine culture; Ok to continue Keflex empirically and treat for at least 7 days. - Will need outpatient gross hematuria workup including cystoscopy as outpatient  - Follow up with Dr Graciela Seo in 2 weeks. -  If he  fails void trial [place]. will need an indwelling catheter or a reliable care-giver (or the patient) to perform intermittent catheterization every 6 hours. Urology will sign off at this time. Please call with questions.     Karin Valdovinos MD  6/11/19 4:26 PM

## 2019-06-11 NOTE — PROGRESS NOTES
Nutrition Assessment    Type and Reason for Visit: Initial(LOS Day 7)    Nutrition Recommendations: Continue 2 gm Na, Low K diet w/ 1500 mL FR. Recommend ensure enlive supplements BID. Recommend appetite stimulant per pt's wife request.    Nutrition Assessment:  Pt nutritionally compromised aeb variable po intake. Pt was sleeping during time of visit. Writer spoke to pt's wife regarding pt's diet hx. Pt's wife stated that pt has had variable po intake from % of his meals. UBW is 170-188 lbs, per EMR wt has flux from 161-190 lbs over 10 mo. Will add supplements to compliment po intake and monitor. Malnutrition Assessment:  · Malnutrition Status: At risk for malnutrition  · Context: Acute illness or injury  · Findings of the 6 clinical characteristics of malnutrition (Minimum of 2 out of 6 clinical characteristics is required to make the diagnosis of moderate or severe Protein Calorie Malnutrition based on AND/ASPEN Guidelines):  1. Energy Intake-Unable to assess,      2. Weight Loss-Unable to assess,    3. Fat Loss-No significant subcutaneous fat loss,    4. Muscle Loss-No significant muscle mass loss,    5. Fluid Accumulation-Mild fluid accumulation, Extremities  6.  Strength-Not measured    Nutrition Risk Level:  Moderate    Nutrient Needs:  · Estimated Daily Total Kcal: 1.2 ~>1900 kcals/d   · Estimated Daily Protein (g): 1.2-1.3 gm/kg ~>88-95 gms/d     Nutrition Diagnosis:   · Problem: Inadequate oral intake  · Etiology: related to Insufficient energy/nutrient consumption(medical condition, decreased appetite)     Signs and symptoms:  as evidenced by Patient report of(variable po intake from % of meals, Need for ONS)    Objective Information:  · Wound Type: Multiple, Skin Tears  · Current Nutrition Therapies:  · Oral Diet Orders: Fluid Restriction, Low Potassium, 2gm Sodium   · Oral Diet intake: 26-50%, 51-75%, %  · Oral Nutrition Supplement (ONS) Orders: None  · Anthropometric Measures:  · Ht: 5' 9\" (175.3 cm)   · Current Body Wt: 183 lb (83 kg)  · Admission Body Wt: 189 lb (85.7 kg)  · Usual Body Wt: (170-188 lbs per pt's wife)  · % Weight Change:  ,  160-190 lbs over 10 mo per EMR   · Ideal Body Wt: 160 lb (72.6 kg), % Ideal Body 118% (adm/ideal)  · BMI Classification: BMI 25.0 - 29.9 Overweight    Nutrition Interventions:   Continue current diet, Start ONS  Continued Inpatient Monitoring, Education Not Indicated    Nutrition Evaluation:   · Evaluation: Goals set   · Goals:    Pt to consume >75% of meals/supplements   · Monitoring: Nutrition Progression, Meal Intake, Supplement Intake, Diet Tolerance, Weight, Pertinent Labs, Skin Integrity, I&O      Electronically signed by Patrick Feldman RD, LD on 6/11/19 at 10:30 AM    Contact Number: [phone]

## 2019-06-11 NOTE — PLAN OF CARE
Pt slept most of this shift. Urine output 1300cc. Up x2 attempt for Bm, MOM adm. Eating more of meals today. Continue present  POC.

## 2019-06-11 NOTE — TELEPHONE ENCOUNTER
Nichole ramon to cancel Dash's EGD with Dr Ty Diaz on 06/19/19 at [facility] due to being hospitalized, [facility] notified.

## 2019-06-11 NOTE — PROGRESS NOTES
Renal Progress Note    Patient :  Cecy Castorena.; 72 y.o. MRN# [id number]  Location:  3027/3027-01  Attending:  Keyla Simeon MD  Admit Date:  6/4/2019   Hospital Day: 7      Subjective: Following for RANDALL/CKD III with baseline 1.2-1.4, admitted with CHF. Patient seen and examined. Patient mentions that he feels ok, does have positive lower extremity edema-improving, no shortness of breath, no chest pain, no abdominal pain reported. Patient required Del Cid catheter placement due to urinary retention. Patient made about 900cc of urine last 24 hours. Renal function improved to 2.29mg/DL  UTI positive now started on Keflex. Urine cultures letter fermenting gram-negative rods 6/10/2019. Vitals stable. Outpatient Medications:     Medications Prior to Admission: traMADol (ULTRAM) 50 MG tablet, Take 50 mg by mouth daily.   Methylnaltrexone Bromide (RELISTOR) 150 MG TABS, Take 150 mg by mouth daily  amiodarone (PACERONE) 100 MG tablet, Take 1 tablet by mouth daily (Patient taking differently: Take 200 mg by mouth daily )  PARoxetine (PAXIL) 10 MG tablet, Take 1 tablet by mouth daily  docusate sodium (COLACE, DULCOLAX) 100 MG CAPS, Take 100 mg by mouth 2 times daily  Multiple Vitamins-Minerals (THERAPEUTIC MULTIVITAMIN-MINERALS) tablet, Take 1 tablet by mouth daily (with breakfast)  potassium chloride (MICRO-K) 10 MEQ extended release capsule, Take 10 mEq by mouth 2 times daily   latanoprost (XALATAN) 0.005 % ophthalmic solution, Place 1 drop into both eyes nightly  dextran 70-hypromellose (TEARS NATURALE) 0.1-0.3 % SOLN opthalmic solution, Place 1 drop into both eyes as needed  fenofibric acid (FIBRICOR) 135 MG CPDR capsule, Take 1 capsule by mouth daily  traZODone (DESYREL) 50 MG tablet, Take 1 tablet by mouth nightly  dexamethasone (DECADRON) 4 MG tablet, Take 40 mg by mouth once a week HOLD  lenalidomide (REVLIMID) 10 MG chemo capsule, Take 10 mg by mouth daily HOLD  ferrous sulfate 325 (65 Fe) MG tablet, Take 325 mg by mouth every other day  furosemide (LASIX) 20 MG tablet, Take 1 tablet by mouth 2 times daily (Patient taking differently: Take 20 mg by mouth 2 times daily )  albuterol sulfate  (90 Base) MCG/ACT inhaler, Inhale 1 puff into the lungs every 6 hours as needed for Wheezing  gabapentin (NEURONTIN) 100 MG capsule, Take 100 mg by mouth 3 times daily.    aspirin 81 MG chewable tablet, Take 1 tablet by mouth daily  famotidine (PEPCID) 20 MG tablet, Take 1 tablet by mouth 2 times daily (Patient taking differently: Take 20 mg by mouth daily )  simvastatin (ZOCOR) 40 MG tablet, Take 40 mg by mouth nightly  rivaroxaban (XARELTO) 20 MG TABS tablet, Take 20 mg by mouth daily (with breakfast)   glipiZIDE (GLUCOTROL) 5 MG tablet, Take 5 mg by mouth every morning (before breakfast)    Current Medications:     Scheduled Meds:    cephALEXin  500 mg Oral BID    tamsulosin  0.4 mg Oral Daily    midodrine  5 mg Oral TID     famotidine  20 mg Oral Daily    rivaroxaban  15 mg Oral Daily with breakfast    torsemide  20 mg Oral Daily    metoprolol succinate  25 mg Oral Daily    sodium chloride flush  10 mL Intravenous 2 times per day    amiodarone  200 mg Oral Daily    aspirin  81 mg Oral Daily    docusate sodium  100 mg Oral BID    ferrous sulfate  325 mg Oral Every Other Day    latanoprost  1 drop Both Eyes Nightly    fenofibrate  135 mg Oral Daily    traZODone  50 mg Oral Nightly    simvastatin  40 mg Oral Nightly    PARoxetine  10 mg Oral Daily    ipratropium-albuterol  1 ampule Inhalation Q4H WA    senna  1 tablet Oral Nightly    insulin lispro  0-6 Units Subcutaneous TID     insulin lispro  0-3 Units Subcutaneous Nightly     Continuous Infusions:    dextrose       PRN Meds:  magnesium hydroxide, traMADol, acetaminophen, sodium chloride flush, ondansetron, potassium chloride **OR** potassium alternative oral replacement **OR** potassium chloride, magnesium sulfate, albuterol sulfate HFA, benzonatate, albuterol, glucose, dextrose, glucagon (rDNA), dextrose    Input/Output:       I/O last 3 completed shifts: In: 90 [P.O.:90]  Out: 1500 [Urine:1500]. Patient Vitals for the past 96 hrs (Last 3 readings):   Weight   19 0400 183 lb 3.2 oz (83.1 kg)   06/10/19 0438 191 lb 1.6 oz (86.7 kg)       Vital Signs:   Temperature:  Temp: 97.6 °F (36.4 °C)  TMax:   Temp (24hrs), Av.6 °F (36.4 °C), Min:97.2 °F (36.2 °C), Max:97.8 °F (36.6 °C)    Respirations:  Resp: 18  Pulse:   Pulse: 70  BP:    BP: 97/64  BP Range: Systolic (72VIH), MJW:90 , Min:90 , HCY:546       Diastolic (27LWI), QCF:35, Min:64, Max:78      Physical Examination:     General:  AAO x 3, speaking in full sentences, no accessory muscle use. HEENT: Atraumatic, normocephalic, no throat congestion, moist mucosa. Eyes:   Pupils equal, round and reactive to light, EOMI. Neck:   Supple  Chest:   Bilateral vesicular breath sounds, no rales or wheezes. Cardiac:  S1 S2 RR, no murmurs, gallops or rubs. Abdomen: Soft, non-tender, no masses or organomegaly, BS audible. :   No suprapubic or flank tenderness, positive Del Cid catheter. Neuro:  AAO x 3, No FND. SKIN:  No rashes, good skin turgor. Extremities:  +ve 2+ edema. Labs:       No results for input(s): WBC, RBC, HGB, HCT, MCV, MCH, MCHC, RDW, PLT, MPV in the last 72 hours.    BMP:   Recent Labs     19  0651 19  1245 06/10/19  0543 19  0542     --  136 134*   K 5.2 5.0 4.5 4.6     --  97* 93*   CO2 22  --  25 25   BUN 43*  --  52* 56*   CREATININE 2.31*  --  2.43* 2.29*   GLUCOSE 83  --  107* 97   CALCIUM 9.6  --  9.0 8.9      Magnesium:    Recent Labs     19  0651   MG 2.2     SPEP:  Lab Results   Component Value Date    PROT 6.6 2019    PROT 6.5 2019    ALBCAL 4.1 2019    ALBPCT 62 2019    LABALPH 0.3 2019    LABALPH 0.6 2019    A1PCT 4 2019    A2PCT 10 2019    LABBETA 0.9 2019    BETAPCT 14 05/21/2019    GAMGLOB 0.7 05/21/2019    GGPCT 10 05/21/2019    PATH ELECTRONICALLY SIGNED. Fatuma Howard M.D. 05/21/2019    PATH ELECTRONICALLY SIGNED. Fatuma Howard M.D. 05/21/2019     C3:     Lab Results   Component Value Date    C3 152 05/21/2019     C4:     Lab Results   Component Value Date    C4 49 05/21/2019     Urinalysis/Chemistries:      Lab Results   Component Value Date    NITRU POSITIVE 06/10/2019    COLORU BROWN 06/10/2019    PHUR 5.0 06/10/2019    WBCUA 20 TO 50 06/10/2019    RBCUA TOO NUMEROUS TO COUNT 06/10/2019    MUCUS NOT REPORTED 06/10/2019    TRICHOMONAS NOT REPORTED 06/10/2019    YEAST NOT REPORTED 06/10/2019    BACTERIA MODERATE 06/10/2019    SPECGRAV 1.025 06/10/2019    LEUKOCYTESUR POSITIVE 06/10/2019    UROBILINOGEN ELEVATED 06/10/2019    BILIRUBINUR SMALL 06/10/2019    GLUCOSEU NEGATIVE 06/10/2019    KETUA NEGATIVE 06/10/2019    AMORPHOUS NOT REPORTED 06/10/2019     Urine Sodium:     Lab Results   Component Value Date    KAYLYNN <20 05/21/2019      Urine Creatinine:     Lab Results   Component Value Date    LABCREA 285.0 05/21/2019       Radiology:     Reviewed. Assessment:       1. Acute kidney injury secondary to pre renal from diuresis/intermiitent hypotension, can have ATN secondary to urinary tract infection and also likely further complicated by urinary retention -improving  2. CKD 3 secondary to CRS 2 with baseline creat 1.2-1.4 mg/dl. Fluctuating based on volume status. Follows up with Dr. Quinn Burgess  3. Status post CABG with ejection fraction 10-15% status post biventricular AICD placement on 12/18- compensated  4. History of multiple myeloma diagnosed in 2014 and treated with chemotherapy.  Last dose of Revlimid August 2018.  In remission and under surveillance  5. Type 2 diabetes/steroid related since 2018  6. Essential hypertension  7. 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 COPD  9. Revlimid related neuropathy  10.   Hematuria likely secondary to Del Cid trauma versus infection. Urology on board. Plan:   1. Agree with antibiotics for treatment of UTI, follow-up urine cultures and tailor antibiotics accordingly. 2.  Continue torsemide 20 mg daily. 3.  Monitor strict I's and O's and renal function. 4.  Continue Del Cid catheter placement for now and follow-up urology recommendations. 5.  BMP in a.m.  6.  Will follow. Nutrition   Please ensure that patient is on a renal diet/TF. Avoid nephrotoxic drugs/contrast exposure. We will continue to follow along with you. Sam Niño MD  [facility]     This note is created with the assistance of a speech-recognition program. While intending to generate a document that actually reflects the content of the visit, no guarantees can be provided that every mistake has been identified and corrected by editing.

## 2019-06-11 NOTE — DISCHARGE INSTR - COC
Continuity of Care Form    Patient Name: Angela Mak    :  1945  MRN:  [id number]    Admit date:  2019  Discharge date:  ***    Code Status Order: Full Code   Advance Directives:   Advance Care Flowsheet Documentation     Date/Time Healthcare Directive Type of Healthcare Directive Copy in [address] Agent's Name Healthcare Agent's Phone Number    19 [phone]  Other (Comment) Stated have a living will but dont know where it is.  --  --  --  --  --          Admitting Physician:  Francisco Samano MD  PCP: Amelie Petty MD    Discharging Nurse: [facility] Unit/Room#: 3027/3027-01  Discharging Unit Phone Number: ***    Emergency Contact:   Extended Emergency Contact Information  Primary Emergency Contact: Elise Dey  Address: [address] [place] [address] Dennis Cochrantz [address] [place] of [address]  Phone: [phone]  Work Phone: [phone]  Mobile Phone: [phone]  Relation: Spouse  Secondary Emergency Contact: Noris Barragan  of [address]  Phone: [phone]  Work Phone: [phone]  Mobile Phone: [phone]  Relation: Brother/Sister    Past Surgical History:  Past Surgical History:   Procedure Laterality Date    ABDOMEN SURGERY  2002    STOMAL TUMOR    CARDIAC CATHETERIZATION  2017    right and left heart cath with Dr. Salvador Ng  2018    DR [address] [address]. Bilateral     HERNIA REPAIR  1967    MS CABG, ARTERY-VEIN, SINGLE N/A 2018    CABG CORONARY ARTERY BYPASS ON  PUMP x 2, DEV AGUILAR, ORESTES (CSI# [id number]) performed by Gabriela Logan MD at [address] FLX DX W/TRAMAINE [place] 1976 PFRMD N/A 2018    COLONOSCOPY performed by Nikhil Garcia MD at [address] ESOPHAGOGASTRODUODENOSCOPY TRANSORAL DIAGNOSTIC N/A 2018    EGD DIAGNOSTIC ONLY performed by Donaldo Hong MD at [place] [address] Right Immunization History: There is no immunization history on file for this patient. Active Problems:  Patient Active Problem List   Diagnosis Code    Acute on chronic systolic CHF (congestive heart failure) ([facility]) I50.23    Multiple vessel coronary artery disease I25.10    Multiple myeloma in remission ([facility] C90.01    Chronic obstructive pulmonary disease (HCC) J44.9    Low hemoglobin D64.9    Other drug-induced pancytopenia ([facility] [address] D61.811    S/P ICD (internal cardiac defibrillator) procedure Z95.810    Ischemic cardiomyopathy I25.5    IPMN (intraductal papillary mucinous neoplasm) D49.0    History of coronary artery bypass graft Z95.1    CKD (chronic kidney disease), stage III ([facility]) N18.3    CHF (congestive heart failure), NYHA class I, acute on chronic, combined ([facility] [address] I50.43    Acute on chronic congestive heart failure ([facility]) I50.9       Isolation/Infection:   Isolation          No Isolation            Nurse Assessment:  Last Vital Signs: /65   Pulse 71   Temp 97.7 °F (36.5 °C) (Oral)   Resp 16   Ht 5' 9\" (1.753 m)   Wt 183 lb 3.2 oz (83.1 kg)   SpO2 (!) 83%   BMI 27.05 kg/m²     Last documented pain score (0-10 scale): Pain Level: 0  Last Weight:   Wt Readings from Last 1 Encounters:   06/11/19 183 lb 3.2 oz (83.1 kg)     Mental Status:  {IP PT MENTAL STATUS:36901}    IV Access:  { SULY IV ACCESS:917593263}    Nursing Mobility/ADLs:  Walking   {[facility] DME JRKW:157240572}  Transfer  {[facility] DME TTGX:806364428}  Bathing  {[facility] DME PCPT:390424199}  Dressing  {[facility] DME FGRE:199235002}  Toileting  {[facility] DME WIXC:854005638}  Feeding  {[facility] DME DPLL:551841393}  Med Admin  {[facility] DME DSDL:233412295}  Med Delivery   { SULY MED Delivery:783730913}    Wound Care Documentation and Therapy:  Incision 08/16/18 Chest Mid (Active)   Number of days: 299       Incision 08/16/18 Leg Right (Active)   Number of days: 299       Wound 06/04/19 Leg Right; Anterior; Lower (Active)   Wound Skin Tear 6/11/2019  4:00 AM Dressing Status Other (Comment) 2019  4:00 AM   Dressing Changed Changed/New 2019  4:00 PM   Wound Cleansed Rinsed/Irrigated with saline 2019  8:00 AM   Dressing Change Due 06/10/19 2019  4:00 AM   Drainage Amount None 2019 11:30 AM   Odor None 2019  4:00 PM   Priya-wound Assessment Intact 2019  4:00 PM   Number of days: 6       Wound 19 Leg Anterior;Left;Lower (Active)   Wound Skin Tear 2019  4:00 AM   Dressing Status Other (Comment) 2019  4:00 AM   Dressing/Treatment Dry Dressing 2019  4:00 AM   Wound Cleansed Rinsed/Irrigated with saline 2019  8:00 AM   Drainage Amount None 2019  4:00 AM   Odor None 2019  4:00 AM   Number of days: 6        Elimination:  Continence:   · Bowel: {YES / PY:69089}  · Bladder: {YES / N}  Urinary Catheter: {Urinary Catheter:265287105}   Colostomy/Ileostomy/Ileal Conduit: {YES / WX:13302}       Date of Last BM: ***    Intake/Output Summary (Last 24 hours) at 2019 1527  Last data filed at 2019 0000  Gross per 24 hour   Intake 90 ml   Output 900 ml   Net -810 ml     I/O last 3 completed shifts:   In: 80 [P.O.:90]  Out: 900 [Urine:900]    Safety Concerns:     [address] Safety Concerns:922362103}    Impairments/Disabilities:      [address] Impairments/Disabilities:583043116}    Nutrition Therapy:  Current Nutrition Therapy:   [address] [employer] Diet List:325337634}    Routes of Feeding: {CHP DME Other Feedings:400071092}  Liquids: {Slp liquid thickness:37381}  Daily Fluid Restriction: {CHP DME Yes amt example:135502121}  Last Modified Barium Swallow with Video (Video Swallowing Test): {Done Not Done LYRU:314025225}    Treatments at the Time of Hospital Discharge:   Respiratory Treatments: ***  Oxygen Therapy:  {Therapy; copd oxygen:99490}  Ventilator:    {MH CC Vent WQEI:810903693}    Rehab Therapies: {THERAPEUTIC INTERVENTION:7551990700}  Weight Bearing Status/Restrictions: { CC Weight Bearin}  Other Medical

## 2019-06-11 NOTE — PROGRESS NOTES
Minnie Oviedo, Antoni Dick, Teri Beckham, Jovon Sunshine  Urology Progress Note     Subjective:     No acute urologic events overnight. Denies chest pain, shortness of breath, nausea, vomiting, fevers, chills. not ambulating, no flatus, no bowel movements. Tolerating minor. No need for hand irrigation overnight.        Current Diet: DIET LOW SODIUM 2 GM; 1500 ml; Low Potassium     Patient Vitals for the past 24 hrs:   BP Temp Temp src Pulse Resp SpO2 Height Weight   06/11/19 0407 92/70 97.2 °F (36.2 °C) Oral -- 20 96 % -- --   06/11/19 0400 -- -- -- 71 -- 96 % 5' 9\" (1.753 m) 183 lb 3.2 oz (83.1 kg)   06/11/19 0000 92/70 97.3 °F (36.3 °C) Axillary 71 22 97 % -- --   06/10/19 2200 -- 97.7 °F (36.5 °C) Oral 71 19 100 % -- --   06/10/19 2047 90/70 -- -- 70 17 99 % -- --   06/10/19 2030 -- -- -- -- 17 100 % -- --   06/10/19 2000 -- 97.8 °F (36.6 °C) Oral 71 23 98 % -- --   06/10/19 1617 103/88 98.2 °F (36.8 °C) Oral 70 20 98 % -- --   06/10/19 1104 101/60 97.5 °F (36.4 °C) Oral 70 19 100 % -- --   06/10/19 0955 110/69 98.2 °F (36.8 °C) Oral 70 18 -- -- --   06/10/19 0641 106/79 98.2 °F (36.8 °C) Oral 70 -- 99 % -- --       Intake/Output Summary (Last 24 hours) at 6/11/2019 0629  Last data filed at 6/11/2019 0000  Gross per 24 hour   Intake 320 ml   Output 1100 ml   Net -780 ml       Recent Labs     06/08/19  0847   WBC 7.3   HGB 12.8*   HCT 39.1*   MCV 84.3        Recent Labs     06/08/19  0847 06/09/19  0651 06/09/19  1245 06/10/19  0543   NA  --  140  --  136   K  --  5.2 5.0 4.5   CL  --  100  --  97*   CO2  --  22  --  25   BUN 37* 43*  --  52*   CREATININE 1.71* 2.31*  --  2.43*       Recent Labs     06/10/19  0349   COLORU BROWN   PHUR 5.0   WBCUA 20 TO 50   RBCUA TOO NUMEROUS TO COUNT   MUCUS NOT REPORTED   TRICHOMONAS NOT REPORTED   YEAST NOT REPORTED   BACTERIA MODERATE*   SPECGRAV 1.025   LEUKOCYTESUR POSITIVE*   UROBILINOGEN ELEVATED*   BILIRUBINUR SMALL*       Additional Lab/culture results: none    Physical Exam:     NAD  AOx3  RRR  Respirations nonlabored, symmetric chest rise bilaterally  Abdomen Soft, NT, ND  Urine clear lita in minor  No calf ttp bilaterally  EPC cuffs on and functioning billaterally    Interval Imaging Findings: JOSUE 6/10/19: unremarkable kidneys    Impression:  72 y.o. male  admitted with CHF exacerbation      Problem List  - Urinary retention, Minor placed for 300 cc  - Gross hematuria (developed after Minor placement)  - RANDALL on CKD (Pre-renal, nephrology on board)     Plan:     - Will void trial when Cr improves and Flomax has been in system for at least 48 hours  - Irrigate catheter PRN for non draining minor with 60 cc of normal saline through the main port of the catheter  - Ok to continue anticoagulation from urologic standpoint  - Follow up urine culture; Ok to continue Keflex empirically  - Will need outpatient gross hematuria workup including cystoscopy as outpatient           Effie Goldmann  PGY-6, Urology    6:29 AM 6/11/2019      Attending Physician Statement  I have discussed the care of the patient, including pertinent history and exam findings, with the resident. I have seen and examined the patient and the key elements of all parts of the encounter have been performed by me. I have reviewed all laboratory findings and imaging reports/films. I agree with the assessment, plan and orders as documented by the resident.   (GC Modifier)

## 2019-06-12 LAB
ANION GAP SERPL CALCULATED.3IONS-SCNC: 13 MMOL/L (ref 9–17)
BUN BLDV-MCNC: 58 MG/DL (ref 8–23)
BUN/CREAT BLD: ABNORMAL (ref 9–20)
CALCIUM SERPL-MCNC: 9.5 MG/DL (ref 8.6–10.4)
CHLORIDE BLD-SCNC: 97 MMOL/L (ref 98–107)
CO2: 30 MMOL/L (ref 20–31)
CREAT SERPL-MCNC: 2.01 MG/DL (ref 0.7–1.2)
CULTURE: ABNORMAL
CULTURE: ABNORMAL
GFR AFRICAN AMERICAN: 40 ML/MIN
GFR NON-AFRICAN AMERICAN: 33 ML/MIN
GFR SERPL CREATININE-BSD FRML MDRD: ABNORMAL ML/MIN/{1.73_M2}
GFR SERPL CREATININE-BSD FRML MDRD: ABNORMAL ML/MIN/{1.73_M2}
GLUCOSE BLD-MCNC: 115 MG/DL (ref 70–99)
GLUCOSE BLD-MCNC: 127 MG/DL (ref 75–110)
GLUCOSE BLD-MCNC: 196 MG/DL (ref 75–110)
GLUCOSE BLD-MCNC: 201 MG/DL (ref 75–110)
GLUCOSE BLD-MCNC: 201 MG/DL (ref 75–110)
Lab: ABNORMAL
POTASSIUM SERPL-SCNC: 4.3 MMOL/L (ref 3.7–5.3)
SODIUM BLD-SCNC: 140 MMOL/L (ref 135–144)
SPECIMEN DESCRIPTION: ABNORMAL

## 2019-06-12 PROCEDURE — 6370000000 HC RX 637 (ALT 250 FOR IP): Performed by: STUDENT IN AN ORGANIZED HEALTH CARE EDUCATION/TRAINING PROGRAM

## 2019-06-12 PROCEDURE — 6370000000 HC RX 637 (ALT 250 FOR IP): Performed by: INTERNAL MEDICINE

## 2019-06-12 PROCEDURE — 82947 ASSAY GLUCOSE BLOOD QUANT: CPT

## 2019-06-12 PROCEDURE — 36415 COLL VENOUS BLD VENIPUNCTURE: CPT

## 2019-06-12 PROCEDURE — 99233 SBSQ HOSP IP/OBS HIGH 50: CPT | Performed by: INTERNAL MEDICINE

## 2019-06-12 PROCEDURE — 2700000000 HC OXYGEN THERAPY PER DAY

## 2019-06-12 PROCEDURE — 2060000000 HC ICU INTERMEDIATE R&B

## 2019-06-12 PROCEDURE — 2580000003 HC RX 258: Performed by: STUDENT IN AN ORGANIZED HEALTH CARE EDUCATION/TRAINING PROGRAM

## 2019-06-12 PROCEDURE — 80048 BASIC METABOLIC PNL TOTAL CA: CPT

## 2019-06-12 PROCEDURE — 94760 N-INVAS EAR/PLS OXIMETRY 1: CPT

## 2019-06-12 PROCEDURE — 51798 US URINE CAPACITY MEASURE: CPT

## 2019-06-12 PROCEDURE — 94640 AIRWAY INHALATION TREATMENT: CPT

## 2019-06-12 RX ADMIN — IPRATROPIUM BROMIDE AND ALBUTEROL SULFATE 1 AMPULE: .5; 3 SOLUTION RESPIRATORY (INHALATION) at 15:12

## 2019-06-12 RX ADMIN — LATANOPROST 1 DROP: 50 SOLUTION OPHTHALMIC at 21:05

## 2019-06-12 RX ADMIN — INSULIN LISPRO 1 UNITS: 100 INJECTION, SOLUTION INTRAVENOUS; SUBCUTANEOUS at 11:52

## 2019-06-12 RX ADMIN — Medication 10 ML: at 20:58

## 2019-06-12 RX ADMIN — FENOFIBRATE 135 MG: 54 TABLET ORAL at 07:48

## 2019-06-12 RX ADMIN — MIDODRINE HYDROCHLORIDE 5 MG: 5 TABLET ORAL at 18:03

## 2019-06-12 RX ADMIN — CEPHALEXIN 500 MG: 500 CAPSULE ORAL at 20:58

## 2019-06-12 RX ADMIN — AMIODARONE HYDROCHLORIDE 200 MG: 200 TABLET ORAL at 07:47

## 2019-06-12 RX ADMIN — TORSEMIDE 20 MG: 20 TABLET ORAL at 07:48

## 2019-06-12 RX ADMIN — MIDODRINE HYDROCHLORIDE 5 MG: 5 TABLET ORAL at 11:49

## 2019-06-12 RX ADMIN — Medication 10 ML: at 07:48

## 2019-06-12 RX ADMIN — METOPROLOL SUCCINATE 25 MG: 25 TABLET, FILM COATED, EXTENDED RELEASE ORAL at 07:48

## 2019-06-12 RX ADMIN — INSULIN LISPRO 2 UNITS: 100 INJECTION, SOLUTION INTRAVENOUS; SUBCUTANEOUS at 16:55

## 2019-06-12 RX ADMIN — FAMOTIDINE 20 MG: 20 TABLET, FILM COATED ORAL at 07:49

## 2019-06-12 RX ADMIN — SIMVASTATIN 40 MG: 40 TABLET, FILM COATED ORAL at 20:58

## 2019-06-12 RX ADMIN — CEPHALEXIN 500 MG: 500 CAPSULE ORAL at 07:47

## 2019-06-12 RX ADMIN — POLYETHYLENE GLYCOL 3350 17 G: 17 POWDER, FOR SOLUTION ORAL at 07:49

## 2019-06-12 RX ADMIN — TRAZODONE HYDROCHLORIDE 50 MG: 50 TABLET ORAL at 20:58

## 2019-06-12 RX ADMIN — ASPIRIN 81 MG: 81 TABLET, CHEWABLE ORAL at 07:47

## 2019-06-12 RX ADMIN — INSULIN LISPRO 1 UNITS: 100 INJECTION, SOLUTION INTRAVENOUS; SUBCUTANEOUS at 20:58

## 2019-06-12 RX ADMIN — MIDODRINE HYDROCHLORIDE 5 MG: 5 TABLET ORAL at 07:47

## 2019-06-12 RX ADMIN — RIVAROXABAN 15 MG: 15 TABLET, FILM COATED ORAL at 07:47

## 2019-06-12 RX ADMIN — IPRATROPIUM BROMIDE AND ALBUTEROL SULFATE 1 AMPULE: .5; 3 SOLUTION RESPIRATORY (INHALATION) at 11:09

## 2019-06-12 RX ADMIN — PAROXETINE HYDROCHLORIDE HEMIHYDRATE 10 MG: 10 TABLET, FILM COATED ORAL at 07:48

## 2019-06-12 RX ADMIN — FERROUS SULFATE TAB EC 325 MG (65 MG FE EQUIVALENT) 325 MG: 325 (65 FE) TABLET DELAYED RESPONSE at 07:47

## 2019-06-12 RX ADMIN — IPRATROPIUM BROMIDE AND ALBUTEROL SULFATE 1 AMPULE: .5; 3 SOLUTION RESPIRATORY (INHALATION) at 20:34

## 2019-06-12 RX ADMIN — IPRATROPIUM BROMIDE AND ALBUTEROL SULFATE 1 AMPULE: .5; 3 SOLUTION RESPIRATORY (INHALATION) at 07:33

## 2019-06-12 RX ADMIN — TAMSULOSIN HYDROCHLORIDE 0.4 MG: 0.4 CAPSULE ORAL at 07:47

## 2019-06-12 ASSESSMENT — PAIN SCALES - GENERAL
PAINLEVEL_OUTOF10: 0
PAINLEVEL_OUTOF10: 0

## 2019-06-12 NOTE — PROGRESS NOTES
Del Cid removed after deflating bulb of 10cc. Pt sat up in chair x 5 hours today. Off O2 since 0900 this AM, sats 92%-99%. Refused to have saline lock changed, lines flushes no pain or edema at insertion site. Instructed if he is not D/C'd tomorrow site will have to be changed.

## 2019-06-12 NOTE — PROGRESS NOTES
HOLD  ferrous sulfate 325 (65 Fe) MG tablet, Take 325 mg by mouth every other day  furosemide (LASIX) 20 MG tablet, Take 1 tablet by mouth 2 times daily (Patient taking differently: Take 20 mg by mouth 2 times daily )  albuterol sulfate  (90 Base) MCG/ACT inhaler, Inhale 1 puff into the lungs every 6 hours as needed for Wheezing  gabapentin (NEURONTIN) 100 MG capsule, Take 100 mg by mouth 3 times daily.    aspirin 81 MG chewable tablet, Take 1 tablet by mouth daily  famotidine (PEPCID) 20 MG tablet, Take 1 tablet by mouth 2 times daily (Patient taking differently: Take 20 mg by mouth daily )  simvastatin (ZOCOR) 40 MG tablet, Take 40 mg by mouth nightly  rivaroxaban (XARELTO) 20 MG TABS tablet, Take 20 mg by mouth daily (with breakfast)   glipiZIDE (GLUCOTROL) 5 MG tablet, Take 5 mg by mouth every morning (before breakfast)    Current Medications:     Scheduled Meds:    polyethylene glycol  17 g Oral Daily    cephALEXin  500 mg Oral BID    tamsulosin  0.4 mg Oral Daily    midodrine  5 mg Oral TID     famotidine  20 mg Oral Daily    rivaroxaban  15 mg Oral Daily with breakfast    torsemide  20 mg Oral Daily    metoprolol succinate  25 mg Oral Daily    sodium chloride flush  10 mL Intravenous 2 times per day    amiodarone  200 mg Oral Daily    aspirin  81 mg Oral Daily    ferrous sulfate  325 mg Oral Every Other Day    latanoprost  1 drop Both Eyes Nightly    fenofibrate  135 mg Oral Daily    traZODone  50 mg Oral Nightly    simvastatin  40 mg Oral Nightly    PARoxetine  10 mg Oral Daily    ipratropium-albuterol  1 ampule Inhalation Q4H WA    senna  1 tablet Oral Nightly    insulin lispro  0-6 Units Subcutaneous TID     insulin lispro  0-3 Units Subcutaneous Nightly     Continuous Infusions:    dextrose       PRN Meds:  magnesium hydroxide, traMADol, acetaminophen, sodium chloride flush, ondansetron, potassium chloride **OR** potassium alternative oral replacement **OR** potassium chloride, magnesium sulfate, albuterol sulfate HFA, benzonatate, albuterol, glucose, dextrose, glucagon (rDNA), dextrose    Input/Output:       I/O last 3 completed shifts: In: 740 [P.O.:740]  Out: 1600 [Urine:1300; Drains:300]. Patient Vitals for the past 96 hrs (Last 3 readings):   Weight   19 0400 183 lb 3.2 oz (83.1 kg)   06/10/19 0438 191 lb 1.6 oz (86.7 kg)       Vital Signs:   Temperature:  Temp: 98.4 °F (36.9 °C)  TMax:   Temp (24hrs), Av °F (36.7 °C), Min:97.6 °F (36.4 °C), Max:98.6 °F (37 °C)    Respirations:  Resp: 20  Pulse:   Pulse: 70  BP:    BP: 90/62  BP Range: Systolic (57PMS), WTL:159 , Min:90 , ZWI:871       Diastolic (66NOM), JOSUE:88, Min:62, Max:78      Physical Examination:     General:  AAO x 3, speaking in full sentences, no accessory muscle use. HEENT: Atraumatic, normocephalic, no throat congestion, moist mucosa. Eyes:   Pupils equal, round and reactive to light, EOMI. Neck:   Supple  Chest:   Bilateral vesicular breath sounds, no rales or wheezes. Cardiac:  S1 S2 RR, no murmurs, gallops or rubs. Abdomen: Soft, non-tender, no masses or organomegaly, BS audible. :   No suprapubic or flank tenderness, positive Del Cid catheter. Neuro:  AAO x 3, No FND. SKIN:  No rashes, good skin turgor. Extremities:  +ve 2+ edema. Labs:       No results for input(s): WBC, RBC, HGB, HCT, MCV, MCH, MCHC, RDW, PLT, MPV in the last 72 hours.    BMP:   Recent Labs     06/10/19  0543 19  0542 19  0631    134* 140   K 4.5 4.6 4.3   CL 97* 93* 97*   CO2 25 25 30   BUN 52* 56* 58*   CREATININE 2.43* 2.29* 2.01*   GLUCOSE 107* 97 115*   CALCIUM 9.0 8.9 9.5      SPEP:  Lab Results   Component Value Date    PROT 6.6 2019    PROT 6.5 2019    ALBCAL 4.1 2019    ALBPCT 62 2019    LABALPH 0.3 2019    LABALPH 0.6 2019    A1PCT 4 2019    A2PCT 10 2019    LABBETA 0.9 2019    BETAPCT 14 2019    GAMGLOB 0.7 2019    GGPCT 10 05/21/2019    PATH ELECTRONICALLY SIGNED. Jamey Dawkins M.D. 05/21/2019    PATH ELECTRONICALLY SIGNED. Jamey Dawkins M.D. 05/21/2019     C3:     Lab Results   Component Value Date    C3 152 05/21/2019     C4:     Lab Results   Component Value Date    C4 49 05/21/2019     Urinalysis/Chemistries:      Lab Results   Component Value Date    NITRU POSITIVE 06/10/2019    COLORU BROWN 06/10/2019    PHUR 5.0 06/10/2019    WBCUA 20 TO 50 06/10/2019    RBCUA TOO NUMEROUS TO COUNT 06/10/2019    MUCUS NOT REPORTED 06/10/2019    TRICHOMONAS NOT REPORTED 06/10/2019    YEAST NOT REPORTED 06/10/2019    BACTERIA MODERATE 06/10/2019    SPECGRAV 1.025 06/10/2019    LEUKOCYTESUR POSITIVE 06/10/2019    UROBILINOGEN ELEVATED 06/10/2019    BILIRUBINUR SMALL 06/10/2019    GLUCOSEU NEGATIVE 06/10/2019    KETUA NEGATIVE 06/10/2019    AMORPHOUS NOT REPORTED 06/10/2019     Urine Sodium:     Lab Results   Component Value Date    KAYLYNN <20 05/21/2019      Urine Creatinine:     Lab Results   Component Value Date    LABCREA 285.0 05/21/2019       Radiology:     Reviewed. Assessment:       1. Acute kidney injury secondary to pre renal from diuresis/intermiitent hypotension, can have ATN secondary to urinary tract infection and also likely further complicated by urinary retention -improving  2. CKD 3 secondary to CRS 2 with baseline creat 1.2-1.4 mg/dl. Fluctuating based on volume status. Follows up with Dr. Maxx Voss  3. Status post CABG with ejection fraction 10-15% status post biventricular AICD placement on 12/18- compensated  4. History of multiple myeloma diagnosed in 2014 and treated with chemotherapy.  Last dose of Revlimid August 2018.  In remission and under surveillance  5. Type 2 diabetes/steroid related since 2018  6. Essential hypertension  7. 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 COPD  9. Revlimid related neuropathy  10. Hematuria likely secondary to Del Cid trauma versus infection.   Urology on

## 2019-06-12 NOTE — PROGRESS NOTES
glycol  17 g Oral Daily    cephALEXin  500 mg Oral BID    tamsulosin  0.4 mg Oral Daily    midodrine  5 mg Oral TID WC    famotidine  20 mg Oral Daily    rivaroxaban  15 mg Oral Daily with breakfast    torsemide  20 mg Oral Daily    metoprolol succinate  25 mg Oral Daily    sodium chloride flush  10 mL Intravenous 2 times per day    amiodarone  200 mg Oral Daily    aspirin  81 mg Oral Daily    ferrous sulfate  325 mg Oral Every Other Day    latanoprost  1 drop Both Eyes Nightly    fenofibrate  135 mg Oral Daily    traZODone  50 mg Oral Nightly    simvastatin  40 mg Oral Nightly    PARoxetine  10 mg Oral Daily    ipratropium-albuterol  1 ampule Inhalation Q4H WA    senna  1 tablet Oral Nightly    insulin lispro  0-6 Units Subcutaneous TID WC    insulin lispro  0-3 Units Subcutaneous Nightly       PRN Medications    magnesium hydroxide 30 mL TID PRN   traMADol 50 mg Q6H PRN   acetaminophen 650 mg Q4H PRN   sodium chloride flush 10 mL PRN   ondansetron 4 mg Q6H PRN   potassium chloride 40 mEq PRN   Or     potassium alternative oral replacement 40 mEq PRN   Or     potassium chloride 10 mEq PRN   magnesium sulfate 1 g PRN   albuterol sulfate HFA 1 puff Q6H PRN   benzonatate 100 mg TID PRN   albuterol 2.5 mg Q4H PRN   glucose 15 g PRN   dextrose 12.5 g PRN   glucagon (rDNA) 1 mg PRN   dextrose 100 mL/hr PRN       Diagnostic Labs and Imaging:  CBC:  No results for input(s): WBC, HGB, PLT in the last 72 hours. BMP:   Recent Labs     06/10/19  0543 06/11/19  0542 06/12/19  0631    134* 140   K 4.5 4.6 4.3   CL 97* 93* 97*   CO2 25 25 30   BUN 52* 56* 58*   CREATININE 2.43* 2.29* 2.01*   GLUCOSE 107* 97 115*     Hepatic:   No results for input(s): AST, ALT, ALB, BILITOT, ALKPHOS in the last 72 hours. ECHO (6/6/19)  Summary  Dilated left ventricle with severely reduced systolic function. Calculated ejection fraction via Arthur's Method is 12.4 %  Evidence of diastolic dysfunction.   Left atrium is severely dilated. Normal right ventricle size with reduced systolic function. AICD lead seen in right atrium/ventricle. Mildly thickened mitral valve leaflets. Moderate mitral regurgitation, central jet. Moderate tricuspid regurgitation. Trivial pulmonic insufficiency. Small circumferential pericardial effusion. Pleural effusion is noted. IMPRESSION  This is a 72 y.o. male with PMH of PMH of ischemic cardiopathy s/p CABG and biventricular AICD placement, multiple myeloma s/p chemotherapy, GIST tumor resection in 2003, diabetes mellitus type 2, essential hypertension and COPD  presented with worsening shortness of breath and leg swelling of 4 day duration and found to have CHF exacerbation. Patient admitted to stepdown status.     ASSESSMENT/PLAN:  · Acute on chronic combined decompensated heart failure-resolving  Ischemic cardiomyopathy s/p CABG(EF-17% 8/18) s/p biventricular AICD placement on 12/18   -Echo showed HD-32.0% and diastolic dysfunction with moderate MR and TR.  -cardio recs:  Toprol XL 25 mg daily, amio 200 mg, hold lasix and lisinopril ( due to dry cough). Follow up as o/p regarding Biv-ICD.      · Paroxysmal atrial fibrillation  -Currently NSR in 70-80  -On amiodarone 200 mg daily, Toprol XL 25 daily and Qxprbvd78 mg daily.    -Follow up with Dr. Kennedy Morelos as O/P    · Hypomagnesemia and hypokalemia from diuretic use-resolved. Lasix on hold. On electrolyte replacement as needed. Daily BMP.       · Essential hypertension - controlled  B.p  In  normal range today. On Toprol Xl 25 daily     · COPD-stable on NC  -On room air at home. Currently on 1-2 l o2 via NC since admission  -Continued on with albuterol and DuoNeb. -Tessalon 100 3 times a day for cough. Qualified for home o2 eval in this admission.     · RANDALL/CKD stage III(baseline creatinine around 1.4-1.6) from cardiorenal syndrome  Renal function improving cr-2.1 today(1.1 on admission). Cr 1.1<1.4<1.9<2.3<2.4<2.29<2. 1. Urine output have been performed by me.         Elisha Gutierrez MD  Attending and Faculty Internal Medicine  [id number]   Internal Medicine [address] [place] [address], [facility].   6/12/19

## 2019-06-12 NOTE — CARE COORDINATION
Transition planning:  Plan to return home, will need new home o2 eval prior to discharge.  Encouraged home care, he declines

## 2019-06-12 NOTE — PLAN OF CARE
Problem: Falls - Risk of:  Goal: Will remain free from falls  Description  Will remain free from falls  6/12/2019 0615 by Nnamdi Pelletier RN  Outcome: Ongoing  Note:   No falls this shift patient alert and oriented x4, bed in lowest position and bed wheels locked bed alarm on and call light in reach. Problem: Falls - Risk of:  Goal: Will remain free from falls  Description  Will remain free from falls  6/12/2019 0500 by Nnamdi Pelletier RN  Outcome: Ongoing  Note:   No falls this shift. Call light in reach, bed in lowest position, bed wheels locked and bed alarm on.  Patient ambulates with one assist. Will continue to monitor     Problem: Falls - Risk of:  Goal: Absence of physical injury  Description  Absence of physical injury  6/12/2019 0615 by Nnamdi Pelletier RN  Outcome: Ongoing     Problem: Falls - Risk of:  Goal: Absence of physical injury  Description  Absence of physical injury  6/12/2019 0500 by Nnamdi Pelletier RN  Outcome: Ongoing     Problem: Cardiac:  Goal: Ability to maintain vital signs within normal range will improve  Description  Ability to maintain vital signs within normal range will improve  6/12/2019 0615 by Nnamdi Pelletier RN  Outcome: Ongoing     Problem: Cardiac:  Goal: Cardiovascular alteration will improve  Description  Cardiovascular alteration will improve  6/12/2019 0615 by Nnamdi Pelletier RN  Outcome: Ongoing     Problem: Cardiac:  Goal: Hemodynamic stability will improve  Description  Hemodynamic stability will improve  6/12/2019 0615 by Nnamdi Pelletier RN  Outcome: Ongoing     Problem: Cardiac:  Goal: Ability to maintain an adequate cardiac output will improve  Description  Ability to maintain an adequate cardiac output will improve  6/12/2019 0615 by nNamdi Pelletier RN  Outcome: Ongoing

## 2019-06-13 ENCOUNTER — APPOINTMENT (OUTPATIENT)
Dept: GENERAL RADIOLOGY | Age: 74
DRG: 291 | End: 2019-06-13
Payer: MEDICARE

## 2019-06-13 VITALS
TEMPERATURE: 97.7 F | OXYGEN SATURATION: 98 % | SYSTOLIC BLOOD PRESSURE: 97 MMHG | DIASTOLIC BLOOD PRESSURE: 69 MMHG | HEART RATE: 71 BPM | WEIGHT: 183.2 LBS | RESPIRATION RATE: 19 BRPM | HEIGHT: 69 IN | BODY MASS INDEX: 27.13 KG/M2

## 2019-06-13 LAB
ANION GAP SERPL CALCULATED.3IONS-SCNC: 13 MMOL/L (ref 9–17)
BUN BLDV-MCNC: 53 MG/DL (ref 8–23)
BUN/CREAT BLD: ABNORMAL (ref 9–20)
CALCIUM SERPL-MCNC: 9.2 MG/DL (ref 8.6–10.4)
CHLORIDE BLD-SCNC: 97 MMOL/L (ref 98–107)
CO2: 30 MMOL/L (ref 20–31)
CREAT SERPL-MCNC: 1.51 MG/DL (ref 0.7–1.2)
GFR AFRICAN AMERICAN: 55 ML/MIN
GFR NON-AFRICAN AMERICAN: 46 ML/MIN
GFR SERPL CREATININE-BSD FRML MDRD: ABNORMAL ML/MIN/{1.73_M2}
GFR SERPL CREATININE-BSD FRML MDRD: ABNORMAL ML/MIN/{1.73_M2}
GLUCOSE BLD-MCNC: 124 MG/DL (ref 70–99)
GLUCOSE BLD-MCNC: 149 MG/DL (ref 75–110)
POTASSIUM SERPL-SCNC: 4.2 MMOL/L (ref 3.7–5.3)
SODIUM BLD-SCNC: 140 MMOL/L (ref 135–144)

## 2019-06-13 PROCEDURE — 99233 SBSQ HOSP IP/OBS HIGH 50: CPT | Performed by: INTERNAL MEDICINE

## 2019-06-13 PROCEDURE — 94761 N-INVAS EAR/PLS OXIMETRY MLT: CPT

## 2019-06-13 PROCEDURE — 94640 AIRWAY INHALATION TREATMENT: CPT

## 2019-06-13 PROCEDURE — 6370000000 HC RX 637 (ALT 250 FOR IP): Performed by: STUDENT IN AN ORGANIZED HEALTH CARE EDUCATION/TRAINING PROGRAM

## 2019-06-13 PROCEDURE — 94618 PULMONARY STRESS TESTING: CPT

## 2019-06-13 PROCEDURE — 36415 COLL VENOUS BLD VENIPUNCTURE: CPT

## 2019-06-13 PROCEDURE — 71045 X-RAY EXAM CHEST 1 VIEW: CPT

## 2019-06-13 PROCEDURE — 6370000000 HC RX 637 (ALT 250 FOR IP): Performed by: INTERNAL MEDICINE

## 2019-06-13 PROCEDURE — 80048 BASIC METABOLIC PNL TOTAL CA: CPT

## 2019-06-13 PROCEDURE — 82947 ASSAY GLUCOSE BLOOD QUANT: CPT

## 2019-06-13 RX ORDER — MIDODRINE HYDROCHLORIDE 10 MG/1
10 TABLET ORAL
Qty: 90 TABLET | Refills: 3 | Status: SHIPPED | OUTPATIENT
Start: 2019-06-13

## 2019-06-13 RX ORDER — POLYETHYLENE GLYCOL 3350 17 G/17G
17 POWDER, FOR SOLUTION ORAL DAILY
Qty: 527 G | Refills: 1 | Status: SHIPPED | OUTPATIENT
Start: 2019-06-13 | End: 2019-07-13

## 2019-06-13 RX ORDER — TORSEMIDE 20 MG/1
20 TABLET ORAL DAILY
Qty: 30 TABLET | Refills: 3 | Status: SHIPPED | OUTPATIENT
Start: 2019-06-13 | End: 2019-07-12

## 2019-06-13 RX ORDER — CEPHALEXIN 500 MG/1
500 CAPSULE ORAL 2 TIMES DAILY
Qty: 6 CAPSULE | Refills: 0 | Status: SHIPPED | OUTPATIENT
Start: 2019-06-13 | End: 2019-06-16

## 2019-06-13 RX ORDER — BENZONATATE 100 MG/1
100 CAPSULE ORAL 3 TIMES DAILY PRN
Qty: 10 CAPSULE | Refills: 0 | Status: SHIPPED | OUTPATIENT
Start: 2019-06-13 | End: 2019-06-20

## 2019-06-13 RX ORDER — MIDODRINE HYDROCHLORIDE 5 MG/1
10 TABLET ORAL
Status: DISCONTINUED | OUTPATIENT
Start: 2019-06-13 | End: 2019-06-13 | Stop reason: HOSPADM

## 2019-06-13 RX ORDER — AMIODARONE HYDROCHLORIDE 200 MG/1
200 TABLET ORAL DAILY
Qty: 30 TABLET | Refills: 3 | Status: SHIPPED | OUTPATIENT
Start: 2019-06-13

## 2019-06-13 RX ORDER — IPRATROPIUM BROMIDE AND ALBUTEROL SULFATE 2.5; .5 MG/3ML; MG/3ML
3 SOLUTION RESPIRATORY (INHALATION)
Qty: 360 ML | Refills: 0 | Status: SHIPPED | OUTPATIENT
Start: 2019-06-13

## 2019-06-13 RX ORDER — SENNA PLUS 8.6 MG/1
1 TABLET ORAL NIGHTLY
Qty: 30 TABLET | Refills: 0 | Status: SHIPPED | OUTPATIENT
Start: 2019-06-13 | End: 2019-07-13

## 2019-06-13 RX ORDER — ASPIRIN 81 MG/1
81 TABLET, CHEWABLE ORAL DAILY
Qty: 30 TABLET | Refills: 3 | Status: SHIPPED | OUTPATIENT
Start: 2019-06-13 | End: 2019-09-12

## 2019-06-13 RX ORDER — LANOLIN ALCOHOL/MO/W.PET/CERES
325 CREAM (GRAM) TOPICAL EVERY OTHER DAY
Qty: 90 TABLET | Refills: 3 | Status: SHIPPED | OUTPATIENT
Start: 2019-06-14

## 2019-06-13 RX ORDER — METOPROLOL SUCCINATE 25 MG/1
25 TABLET, EXTENDED RELEASE ORAL DAILY
Qty: 30 TABLET | Refills: 3 | Status: SHIPPED | OUTPATIENT
Start: 2019-06-13

## 2019-06-13 RX ORDER — SIMVASTATIN 40 MG
40 TABLET ORAL NIGHTLY
Qty: 30 TABLET | Refills: 3 | Status: SHIPPED | OUTPATIENT
Start: 2019-06-13

## 2019-06-13 RX ORDER — TAMSULOSIN HYDROCHLORIDE 0.4 MG/1
0.4 CAPSULE ORAL DAILY
Qty: 30 CAPSULE | Refills: 3 | Status: SHIPPED | OUTPATIENT
Start: 2019-06-13 | End: 2020-04-20 | Stop reason: SDUPTHER

## 2019-06-13 RX ORDER — FENOFIBRATE 54 MG/1
135 TABLET ORAL DAILY
Qty: 30 TABLET | Refills: 3 | Status: SHIPPED | OUTPATIENT
Start: 2019-06-13 | End: 2021-10-08 | Stop reason: ALTCHOICE

## 2019-06-13 RX ORDER — LATANOPROST 50 UG/ML
1 SOLUTION/ DROPS OPHTHALMIC NIGHTLY
Qty: 1 BOTTLE | Refills: 3 | Status: SHIPPED | OUTPATIENT
Start: 2019-06-13 | End: 2020-08-07 | Stop reason: ALTCHOICE

## 2019-06-13 RX ORDER — TRAZODONE HYDROCHLORIDE 50 MG/1
50 TABLET ORAL NIGHTLY
Qty: 20 TABLET | Refills: 0 | Status: SHIPPED | OUTPATIENT
Start: 2019-06-13 | End: 2019-06-14 | Stop reason: SDUPTHER

## 2019-06-13 RX ADMIN — MIDODRINE HYDROCHLORIDE 10 MG: 5 TABLET ORAL at 10:44

## 2019-06-13 RX ADMIN — TRAMADOL HYDROCHLORIDE 50 MG: 50 TABLET, FILM COATED ORAL at 15:32

## 2019-06-13 RX ADMIN — PAROXETINE HYDROCHLORIDE HEMIHYDRATE 10 MG: 10 TABLET, FILM COATED ORAL at 10:41

## 2019-06-13 RX ADMIN — FAMOTIDINE 20 MG: 20 TABLET, FILM COATED ORAL at 10:41

## 2019-06-13 RX ADMIN — IPRATROPIUM BROMIDE AND ALBUTEROL SULFATE 1 AMPULE: .5; 3 SOLUTION RESPIRATORY (INHALATION) at 12:17

## 2019-06-13 RX ADMIN — TORSEMIDE 20 MG: 20 TABLET ORAL at 10:43

## 2019-06-13 RX ADMIN — METOPROLOL SUCCINATE 25 MG: 25 TABLET, FILM COATED, EXTENDED RELEASE ORAL at 10:41

## 2019-06-13 RX ADMIN — FENOFIBRATE 135 MG: 54 TABLET ORAL at 10:42

## 2019-06-13 RX ADMIN — ASPIRIN 81 MG: 81 TABLET, CHEWABLE ORAL at 10:41

## 2019-06-13 RX ADMIN — RIVAROXABAN 15 MG: 15 TABLET, FILM COATED ORAL at 10:41

## 2019-06-13 RX ADMIN — TAMSULOSIN HYDROCHLORIDE 0.4 MG: 0.4 CAPSULE ORAL at 10:40

## 2019-06-13 RX ADMIN — MIDODRINE HYDROCHLORIDE 5 MG: 5 TABLET ORAL at 15:35

## 2019-06-13 RX ADMIN — CEPHALEXIN 500 MG: 500 CAPSULE ORAL at 10:41

## 2019-06-13 RX ADMIN — POLYETHYLENE GLYCOL 3350 17 G: 17 POWDER, FOR SOLUTION ORAL at 10:41

## 2019-06-13 RX ADMIN — AMIODARONE HYDROCHLORIDE 200 MG: 200 TABLET ORAL at 10:41

## 2019-06-13 ASSESSMENT — PAIN SCALES - GENERAL: PAINLEVEL_OUTOF10: 6

## 2019-06-13 NOTE — PLAN OF CARE
Problem: Falls - Risk of:  Goal: Will remain free from falls  Description  Will remain free from falls  6/12/2019 0615 by Jess Mcclure RN  Outcome: Ongoing  Note:   No falls this shift patient alert and oriented x4, bed in lowest position and bed wheels locked bed alarm on and call light in reach. Problem: Falls - Risk of:  Goal: Will remain free from falls  Description  Will remain free from falls  6/12/2019 0500 by Jess Mcclure RN  Outcome: Ongoing  Note:   No falls this shift. Call light in reach, bed in lowest position, bed wheels locked and bed alarm on.  Patient ambulates with one assist. Will continue to monitor     Problem: Falls - Risk of:  Goal: Absence of physical injury  Description  Absence of physical injury  6/12/2019 0615 by Jess Mcclure RN  Outcome: Ongoing     Problem: Falls - Risk of:  Goal: Absence of physical injury  Description  Absence of physical injury  6/12/2019 0500 by Jess Mcclure RN  Outcome: Ongoing     Problem: Cardiac:  Goal: Ability to maintain vital signs within normal range will improve  Description  Ability to maintain vital signs within normal range will improve  6/12/2019 0615 by Jess Mcclure RN  Outcome: Ongoing     Problem: Cardiac:  Goal: Cardiovascular alteration will improve  Description  Cardiovascular alteration will improve  6/12/2019 0615 by Jess Mcclure RN  Outcome: Ongoing     Problem: Cardiac:  Goal: Hemodynamic stability will improve  Description  Hemodynamic stability will improve  6/12/2019 0615 by Jess Mcclure RN  Outcome: Ongoing     Problem: Cardiac:  Goal: Ability to maintain an adequate cardiac output will improve  Description  Ability to maintain an adequate cardiac output will improve  6/12/2019 0615 by Jess Mcclure RN  Outcome: Ongoing

## 2019-06-13 NOTE — PROGRESS NOTES
10 mg by mouth daily HOLD  [DISCONTINUED] ferrous sulfate 325 (65 Fe) MG tablet, Take 325 mg by mouth every other day  [DISCONTINUED] furosemide (LASIX) 20 MG tablet, Take 1 tablet by mouth 2 times daily (Patient taking differently: Take 20 mg by mouth 2 times daily )  albuterol sulfate  (90 Base) MCG/ACT inhaler, Inhale 1 puff into the lungs every 6 hours as needed for Wheezing  [DISCONTINUED] gabapentin (NEURONTIN) 100 MG capsule, Take 100 mg by mouth 3 times daily. famotidine (PEPCID) 20 MG tablet, Take 1 tablet by mouth 2 times daily (Patient taking differently: Take 20 mg by mouth daily )  [DISCONTINUED] aspirin 81 MG chewable tablet, Take 1 tablet by mouth daily  [DISCONTINUED] simvastatin (ZOCOR) 40 MG tablet, Take 40 mg by mouth nightly  [DISCONTINUED] rivaroxaban (XARELTO) 20 MG TABS tablet, Take 20 mg by mouth daily (with breakfast)   [DISCONTINUED] glipiZIDE (GLUCOTROL) 5 MG tablet, Take 5 mg by mouth every morning (before breakfast)    Current Medications:     Scheduled Meds:    midodrine  10 mg Oral TID     polyethylene glycol  17 g Oral Daily    cephALEXin  500 mg Oral BID    tamsulosin  0.4 mg Oral Daily    famotidine  20 mg Oral Daily    rivaroxaban  15 mg Oral Daily with breakfast    torsemide  20 mg Oral Daily    metoprolol succinate  25 mg Oral Daily    sodium chloride flush  10 mL Intravenous 2 times per day    amiodarone  200 mg Oral Daily    aspirin  81 mg Oral Daily    ferrous sulfate  325 mg Oral Every Other Day    latanoprost  1 drop Both Eyes Nightly    fenofibrate  135 mg Oral Daily    traZODone  50 mg Oral Nightly    simvastatin  40 mg Oral Nightly    PARoxetine  10 mg Oral Daily    ipratropium-albuterol  1 ampule Inhalation Q4H WA    senna  1 tablet Oral Nightly    insulin lispro  0-6 Units Subcutaneous TID     insulin lispro  0-3 Units Subcutaneous Nightly       Input/Output:       I/O last 3 completed shifts:   In: 480 [P.O.:480]  Out: 1320 [Urine:470;

## 2019-06-13 NOTE — PROGRESS NOTES
Medications   polyethylene glycol  17 g Oral Daily    cephALEXin  500 mg Oral BID    tamsulosin  0.4 mg Oral Daily    midodrine  5 mg Oral TID WC    famotidine  20 mg Oral Daily    rivaroxaban  15 mg Oral Daily with breakfast    torsemide  20 mg Oral Daily    metoprolol succinate  25 mg Oral Daily    sodium chloride flush  10 mL Intravenous 2 times per day    amiodarone  200 mg Oral Daily    aspirin  81 mg Oral Daily    ferrous sulfate  325 mg Oral Every Other Day    latanoprost  1 drop Both Eyes Nightly    fenofibrate  135 mg Oral Daily    traZODone  50 mg Oral Nightly    simvastatin  40 mg Oral Nightly    PARoxetine  10 mg Oral Daily    ipratropium-albuterol  1 ampule Inhalation Q4H WA    senna  1 tablet Oral Nightly    insulin lispro  0-6 Units Subcutaneous TID WC    insulin lispro  0-3 Units Subcutaneous Nightly       PRN Medications    magnesium hydroxide 30 mL TID PRN   traMADol 50 mg Q6H PRN   acetaminophen 650 mg Q4H PRN   sodium chloride flush 10 mL PRN   ondansetron 4 mg Q6H PRN   potassium chloride 40 mEq PRN   Or     potassium alternative oral replacement 40 mEq PRN   Or     potassium chloride 10 mEq PRN   magnesium sulfate 1 g PRN   albuterol sulfate HFA 1 puff Q6H PRN   benzonatate 100 mg TID PRN   albuterol 2.5 mg Q4H PRN   glucose 15 g PRN   dextrose 12.5 g PRN   glucagon (rDNA) 1 mg PRN   dextrose 100 mL/hr PRN       Diagnostic Labs and Imaging:  CBC:  No results for input(s): WBC, HGB, PLT in the last 72 hours. BMP:   Recent Labs     06/11/19  0542 06/12/19  0631 06/13/19  0606   * 140 140   K 4.6 4.3 4.2   CL 93* 97* 97*   CO2 25 30 30   BUN 56* 58* 53*   CREATININE 2.29* 2.01* 1.51*   GLUCOSE 97 115* 124*     Hepatic:   No results for input(s): AST, ALT, ALB, BILITOT, ALKPHOS in the last 72 hours. ECHO (6/6/19)  Summary  Dilated left ventricle with severely reduced systolic function.   Calculated ejection fraction via Arthur's Method is 12.4 %  Evidence of diastolic dysfunction. Left atrium is severely dilated. Normal right ventricle size with reduced systolic function. AICD lead seen in right atrium/ventricle. Mildly thickened mitral valve leaflets. Moderate mitral regurgitation, central jet. Moderate tricuspid regurgitation. Trivial pulmonic insufficiency. Small circumferential pericardial effusion. Pleural effusion is noted. IMPRESSION  This is a 72 y.o. male with PMH of PMH of ischemic cardiopathy s/p CABG and biventricular AICD placement, multiple myeloma s/p chemotherapy, GIST tumor resection in 2003, diabetes mellitus type 2, essential hypertension and COPD  presented with worsening shortness of breath and leg swelling of 4 day duration and found to have CHF exacerbation. Patient admitted to stepdown status.     ASSESSMENT/PLAN:  · Acute on chronic combined decompensated heart failure-resolving  Ischemic cardiomyopathy s/p CABG(EF-17% 8/18) s/p biventricular AICD placement on 12/18   -Echo showed NB-12.4% and diastolic dysfunction with moderate MR and TR.  -cardio recs:  Toprol XL 25 mg daily, amio 200 mg, hold lasix and lisinopril ( due to dry cough). Follow up as o/p regarding Biv-ICD.      · Paroxysmal atrial fibrillation  -Currently NSR in 70-80  -On amiodarone 200 mg daily, Toprol XL 25 daily and [id number] mg daily.    -Follow up with Dr. Miguel Haynes as O/P    · Hypomagnesemia and hypokalemia from diuretic use-resolved. Lasix on hold. On electrolyte replacement as needed. Daily BMP.       · Essential hypertension - controlled  B.p  In  normal range today. On Toprol Xl 25 daily     · COPD-stable on NC  -On room air at home. Currently on 1-2 l o2 via NC since admission  -Continued on with albuterol and DuoNeb. -Tessalon 100 3 times a day for cough. Qualified for home o2 eval in this admission.     · RANDALL/CKD stage III(baseline creatinine around 1.4-1.6) from cardiorenal syndrome. Renal function improving cr-1.5 today(1.1 on admission).  Cr 1.1<1.4<1.9<2.3<2.4<2.29<2.1<1.5 . Urine output 1.3l/24hrs with net -ve 840. His Del Cid catheter was pulled out yesterday and was given voiding trial.  He did complain of intermittent straining to urinate and was retaining less than 300 mL as per RN. No hematuria noticed. Nephrology following. Lasix and lisinopril held and continued on torsemide 20 mg daily. ·   Gross hematuria and urinary retention-resolved  Had voiding trial yesterday. He did complain of intermittent straining to urinate and was retaining less than 300 mL as per RN. No hematuria noticed. Urology signed off and will follow o/p for cystoscopy and further hematuria work up. ·      UTI     C/S showed E.coli and K.pneumonia. On keflex 500 BID. Will continue for 10 days. · Multiple myeloma diagnosed in 2014 s/p chemotherapy. Currently in remission stage. F/u with Dr Ye Estrella.      · Diabetes mellitus type 2 secondary to steroid use for MM since 2018- controlled   -last SeU5l-4.2(3/19). On ISS. · GIST tumor s/p resection in 2003      · Revlimid related neuropathy.  -On Neurontin 300 mg daily. D/c for now due to nephrotoxicity     · Chronic anemia from GI bleed   -Colonoscopy 8/18 showed hemorrhoids   -On ferrous sulfate supplementation.     · Hyperlipidemia  On  fenofibric acid 135 mg daily and simvastatin 40 mg daily. Abnormal TSH and T4  F/u Thyroid studies in 4-6 weeks as O/P        Diet- low Na and low k diet with fluid restriction 1500 ml  DVT prophylaxis- already on xarelto   PT/OT following   following for discharge planning- home with HC. Yuli Preciado MD      Department of Internal Medicine  [facility]         6/13/2019, 7:27 AM    Attending Physician Statement  I have discussed the care of Deon Mcintosh., including pertinent history and exam findings with the resident. I have reviewed the key elements of all parts of the encounter with the resident.  I have seen and examined

## 2019-06-14 ENCOUNTER — CARE COORDINATION (OUTPATIENT)
Dept: CASE MANAGEMENT | Age: 74
End: 2019-06-14

## 2019-06-14 RX ORDER — TRAMADOL HYDROCHLORIDE 50 MG/1
50 TABLET ORAL EVERY EVENING
Status: ON HOLD | COMMUNITY
End: 2019-09-21 | Stop reason: HOSPADM

## 2019-06-14 RX ORDER — GABAPENTIN 100 MG/1
100 CAPSULE ORAL 2 TIMES DAILY
COMMUNITY
End: 2019-08-07

## 2019-06-14 RX ORDER — M-VIT,TX,IRON,MINS/CALC/FOLIC 27MG-0.4MG
1 TABLET ORAL DAILY
COMMUNITY
End: 2019-09-18 | Stop reason: ALTCHOICE

## 2019-06-14 NOTE — DISCHARGE SUMMARY
[address] [place] [address]     Department of Internal Medicine - Staff Internal Medicine Service    INPATIENT DISCHARGE SUMMARY        Patient Identification:  Haim Rand is a 72 y.o. male. :  1945  MRN: [id number]     Acct: [de-identified]   Admit Date:  2019  Discharge date and time: 2019  4:34 PM   Attending Provider: Nadine att. providers found                                     Admission Diagnoses:   CHF (congestive heart failure), NYHA class I, acute on chronic, combined ([address] [I50.43]  CHF (congestive heart failure), NYHA class I, acute on chronic, combined ([address] [I50.43]    Discharge Diagnoses:   Principal Problem:    CHF (congestive heart failure), NYHA class I, acute on chronic, combined ([address]  Active Problems:    Multiple vessel coronary artery disease    Multiple myeloma in remission ([address]    Chronic obstructive pulmonary disease ([address]    S/P ICD (internal cardiac defibrillator) procedure    CKD (chronic kidney disease), stage III ([address]    Acute on chronic congestive heart failure ([address]  Resolved Problems:    * No resolved hospital problems. *       Consults:   Nephrology,     Brief Inpatient course:   72 y.o. male presented with PMH of ischemic cardiopathy s/p CABG and biventricular AICD placement, multiple myeloma s/p chemotherapy, GIST tumor resection in , diabetes mellitus type 2, essential hypertension and COPD, paroxysmal atrial fibrillation  presented with worsening shortness of breath, orthopnea and leg swelling of 4 days duration and found to have CHF exacerbation following viral illness. He also has significant past medical history of CABG done in  biventricular AICD placement for ischemic cardiomyopathy. Last ECHO in  showed EF-17%. Follows up with Dr Mar Haq and recent visit was in . He also has paroxysmal atrial fibrillation for which he is on Xarelto 20 mg daily and amiodarone 200 mg daily.   He was taken off metoprolol and ACE inhibitor as he felt dizzy with these medications. He also has history of GIST resection and was seen by Dr Ty Diaz  recently forow appetite and constipation. Last colonoscopy 1/18 showed hemorrhoids. His MRI showed intraductal papillary mucinous neoplasm for which he planned for repeat MRI and MRCP. He also had esophageal dysphagia for which he is scheduled for EGD to rule out esophagitis, stricture, H pylori infection). He also has underlying multiple myeloma(f/u with Dr Amber Avila) for which he underwent chemotherapy and  currently in remission phase. He was recently seen by nephrology(Dr SMITH [place]) for worsening renal function and found to have RANDALL/CK D stage III secondary to cardiorenal syndrome. In this admission his labs showed proBNP 5671,TSH/T4-9.79/1. 81. CXR showed changes suggestive of CHF and mild pulmonary edema. He was started on Will continue On IV Lasix 40 mg  BTs and resumed on amiodarone 200 mg daily, Remerol to 20 mg daily, aspirin 81, Zocor 40 nightly and Neurontin 300 daily. Cardiology was consulted initially for CHF exacerbation likely due to viral illness and adjusted diuresis to 40 twice daily and restarted patient on metoprolol XL 25 daily   An outpatient follow-up for CRT. Nephrology was also consulted as he developed RANDALL sec to diuresis. Lasix, lisinopril were held and patient was started on torsemide 20 daily and also started on ProAmatine 5 inially and increase to 10 TID. Del Cid's was placed. Urology was also consulted as Patient developed gross hematuria urine analysis also came out to be positive for UTI. Renal function improved over time and came to baseline. Hematuria resolved and Del Cid's was pulled out and urology recommended outpatient cystoscopy on upper urinary tract imaging for further work-up. Patient was treated with Keflex for UTI. He also qualified for home oxygen evaluation and was sent home on oxygen.   He was later discharged home in stable condition with outpatient follow-up with nephrology, urology and cardiology to resume Lasix and lisinopril as appropriate.         Procedures:  None    Any Hospital Acquired Infections: none    Discharge Functional Status:  stable    Disposition: home    Patient Instructions:   Discharge Medication List as of 6/13/2019  3:24 PM      START taking these medications    Details   ipratropium-albuterol (DUONEB) 0.5-2.5 (3) MG/3ML SOLN nebulizer solution Inhale 3 mLs into the lungs every 4 hours (while awake), Disp-360 mL, R-0Normal      cephALEXin (KEFLEX) 500 MG capsule Take 1 capsule by mouth 2 times daily for 3 days, Disp-6 capsule, R-0Normal      benzonatate (TESSALON) 100 MG capsule Take 1 capsule by mouth 3 times daily as needed for Cough, Disp-10 capsule, R-0Normal      fenofibrate (TRICOR) 54 MG tablet Take 2.5 tablets by mouth daily [facility] pharmacy: Please dispense generic fenofibrate unless prescriber denote, Disp-30 tablet, R-3Normal      metoprolol succinate (TOPROL XL) 25 MG extended release tablet Take 1 tablet by mouth daily, Disp-30 tablet, R-3Normal      torsemide (DEMADEX) 20 MG tablet Take 1 tablet by mouth daily, Disp-30 tablet, R-3Normal      ferrous sulfate 325 (65 Fe) MG EC tablet Take 1 tablet by mouth every other day, Disp-90 tablet, R-3Normal      polyethylene glycol (GLYCOLAX) packet Take 17 g by mouth daily, Disp-527 g, R-1Normal      senna (SENOKOT) 8.6 MG tablet Take 1 tablet by mouth nightly, Disp-30 tablet, R-0Normal      tamsulosin (FLOMAX) 0.4 MG capsule Take 1 capsule by mouth daily, Disp-30 capsule, R-3Normal      midodrine (PROAMATINE) 10 MG tablet Take 1 tablet by mouth 3 times daily (with meals), Disp-90 tablet, R-3Normal         CONTINUE these medications which have CHANGED    Details   amiodarone (CORDARONE) 200 MG tablet Take 1 tablet by mouth daily, Disp-30 tablet, R-3Normal      aspirin 81 MG chewable tablet Take 1 tablet by mouth daily, Disp-30 tablet, R-3Normal      simvastatin (ZOCOR) 40 MG tablet Take 1 tablet by mouth nightly, Disp-30 tablet, R-3Normal      !! latanoprost (XALATAN) 0.005 % ophthalmic solution Place 1 drop into both eyes nightly, Disp-1 Bottle, R-3Normal      rivaroxaban (XARELTO) 15 MG TABS tablet Take 1 tablet by mouth daily (with breakfast), Disp-42 tablet, R-0Normal      !! traZODone (DESYREL) 50 MG tablet Take 1 tablet by mouth nightly, Disp-20 tablet, R-0Normal       !! - Potential duplicate medications found. Please discuss with provider. CONTINUE these medications which have NOT CHANGED    Details   PARoxetine (PAXIL) 10 MG tablet Take 1 tablet by mouth daily, Disp-30 tablet, R-3DC to SNF      docusate sodium (COLACE, DULCOLAX) 100 MG CAPS Take 100 mg by mouth 2 times dailyDC to SNF      potassium chloride (MICRO-K) 10 MEQ extended release capsule Take 10 mEq by mouth 2 times daily Historical Med      !! latanoprost (XALATAN) 0.005 % ophthalmic solution Place 1 drop into both eyes nightlyHistorical Med      !! traZODone (DESYREL) 50 MG tablet Take 1 tablet by mouth nightlyHistorical Med      albuterol sulfate  (90 Base) MCG/ACT inhaler Inhale 1 puff into the lungs every 6 hours as needed for WheezingHistorical Med      famotidine (PEPCID) 20 MG tablet Take 1 tablet by mouth 2 times daily, Disp-60 tablet, R-3Normal       !! - Potential duplicate medications found. Please discuss with provider.       STOP taking these medications       traMADol (ULTRAM) 50 MG tablet Comments:   Reason for Stopping:         Methylnaltrexone Bromide (RELISTOR) 150 MG TABS Comments:   Reason for Stopping:         Multiple Vitamins-Minerals (THERAPEUTIC MULTIVITAMIN-MINERALS) tablet Comments:   Reason for Stopping:         dextran 70-hypromellose (TEARS NATURALE) 0.1-0.3 % SOLN opthalmic solution Comments:   Reason for Stopping:         fenofibric acid (FIBRICOR) 135 MG CPDR capsule Comments:   Reason for Stopping:         dexamethasone (DECADRON) 4 MG tablet Comments:   Reason for Stopping:         lenalidomide

## 2019-06-14 NOTE — CARE COORDINATION
for 90 days.   Handoff to Austin Coronado RN for 90 day outreach      Non-face-to-face services provided:  Scheduled appointment with PCP-appointment made  Scheduled appointment with Specialist-appointment made  Obtained and reviewed discharge summary and/or continuity of care documents  Assessment and support for treatment adherence and medication management-reviewed    Care Transitions 24 Hour Call    Do you have any ongoing symptoms?:  No  Do you have a copy of your discharge instructions?:  Yes  Do you have all of your prescriptions and are they filled?:  No  Have you been contacted by a [facility] Pharmacist?:  No  Have you scheduled your follow up appointment?:  Yes  How are you going to get to your appointment?:  Car - family or friend to transport  Were you discharged with any Home Care or Post Acute Services:  Yes  Post Acute Services:  Home Health (Comment: Home to Stay [address])  Do you feel like you have everything you need to keep you well at home?:  Yes  Care Transitions Interventions         Follow Up  Future Appointments   Date Time Provider Kelby Olvera   6/21/2019  9:00 AM [facility] RM 1 [facility]   7/12/2019 10:10 AM Violeta Bojorquez MD [facility] None   9/18/2019  1:45 PM Sammie Martins MD gi sultana Nick RN

## 2019-06-17 ENCOUNTER — HOSPITAL ENCOUNTER (OUTPATIENT)
Age: 74
Setting detail: SPECIMEN
Discharge: HOME | End: 2019-06-17
Payer: MEDICARE

## 2019-06-17 DIAGNOSIS — N18.30 CKD (CHRONIC KIDNEY DISEASE), STAGE III (HCC): Chronic | ICD-10-CM

## 2019-06-17 LAB
ANION GAP SERPL CALCULATED.3IONS-SCNC: 17 MMOL/L (ref 9–17)
BUN BLDV-MCNC: 44 MG/DL (ref 8–23)
BUN/CREAT BLD: ABNORMAL (ref 9–20)
CALCIUM SERPL-MCNC: 9.6 MG/DL (ref 8.6–10.4)
CHLORIDE BLD-SCNC: 98 MMOL/L (ref 98–107)
CO2: 31 MMOL/L (ref 20–31)
CREAT SERPL-MCNC: 1.5 MG/DL (ref 0.7–1.2)
GFR AFRICAN AMERICAN: 56 ML/MIN
GFR NON-AFRICAN AMERICAN: 46 ML/MIN
GFR SERPL CREATININE-BSD FRML MDRD: ABNORMAL ML/MIN/{1.73_M2}
GFR SERPL CREATININE-BSD FRML MDRD: ABNORMAL ML/MIN/{1.73_M2}
GLUCOSE BLD-MCNC: 157 MG/DL (ref 70–99)
POTASSIUM SERPL-SCNC: 3.7 MMOL/L (ref 3.7–5.3)
SODIUM BLD-SCNC: 146 MMOL/L (ref 135–144)

## 2019-06-17 PROCEDURE — 36415 COLL VENOUS BLD VENIPUNCTURE: CPT

## 2019-06-17 PROCEDURE — 80048 BASIC METABOLIC PNL TOTAL CA: CPT

## 2019-06-21 ENCOUNTER — HOSPITAL ENCOUNTER (OUTPATIENT)
Dept: OTHER | Age: 74
Discharge: HOME | End: 2019-06-21
Payer: MEDICARE

## 2019-06-21 ENCOUNTER — CARE COORDINATION (OUTPATIENT)
Dept: CASE MANAGEMENT | Age: 74
End: 2019-06-21

## 2019-06-21 VITALS
OXYGEN SATURATION: 99 % | SYSTOLIC BLOOD PRESSURE: 105 MMHG | HEART RATE: 88 BPM | WEIGHT: 186.8 LBS | DIASTOLIC BLOOD PRESSURE: 72 MMHG | RESPIRATION RATE: 20 BRPM | BODY MASS INDEX: 27.59 KG/M2

## 2019-06-21 DIAGNOSIS — N18.30 CKD (CHRONIC KIDNEY DISEASE), STAGE III (HCC): Chronic | ICD-10-CM

## 2019-06-21 DIAGNOSIS — I50.22 CHRONIC SYSTOLIC CONGESTIVE HEART FAILURE (HCC): Primary | ICD-10-CM

## 2019-06-21 PROCEDURE — 99215 OFFICE O/P EST HI 40 MIN: CPT

## 2019-06-21 PROCEDURE — 99205 OFFICE O/P NEW HI 60 MIN: CPT

## 2019-06-21 ASSESSMENT — ENCOUNTER SYMPTOMS
SHORTNESS OF BREATH: 1
BLOOD IN STOOL: 0
COUGH: 0
EYE DISCHARGE: 0
ABDOMINAL PAIN: 0

## 2019-06-21 ASSESSMENT — PAIN DESCRIPTION - PAIN TYPE: TYPE: CHRONIC PAIN

## 2019-06-21 ASSESSMENT — PAIN DESCRIPTION - ORIENTATION: ORIENTATION: RIGHT;LEFT

## 2019-06-21 ASSESSMENT — PAIN SCALES - GENERAL: PAINLEVEL_OUTOF10: 8

## 2019-06-21 ASSESSMENT — PAIN DESCRIPTION - LOCATION: LOCATION: FOOT

## 2019-06-21 ASSESSMENT — PAIN DESCRIPTION - FREQUENCY: FREQUENCY: CONTINUOUS

## 2019-06-21 NOTE — PROGRESS NOTES
2018    DR Asa Perez   [facility] CATARACT REMOVAL WITH IMPLANT Bilateral     HERNIA REPAIR  1967    HI CABG, ARTERY-VEIN, SINGLE N/A 2018    CABG CORONARY ARTERY BYPASS ON  PUMP x 2, DVE AGUILAR, ORESTES (CSI# [id number]) performed by Yue Mata MD at [address] FLX DX W/COLLJ [address] PFRMD N/A 2018    COLONOSCOPY performed by Rola Landon MD at [address] ESOPHAGOGASTRODUODENOSCOPY TRANSORAL DIAGNOSTIC N/A 2018    EGD DIAGNOSTIC ONLY performed by Good Oconnell MD at [address] Right        Family History   Problem Relation Age of Onset    Diabetes Mother     Stroke Father     Other Sister         PE    Stroke Brother        Social History     Tobacco Use    Smoking status: Former Smoker     Packs/day: 0.50     Years: 30.00     Pack years: 15.00     Types: Cigarettes     Last attempt to quit: 8/15/2018     Years since quittin.8    Smokeless tobacco: Never Used    Tobacco comment: a little less than a pack a day   Substance Use Topics    Alcohol use: Yes     Comment: rarely      Current Outpatient Medications   Medication Sig Dispense Refill    rivaroxaban (XARELTO) 20 MG TABS tablet Take 20 mg by mouth      traMADol (ULTRAM) 50 MG tablet Take 50 mg by mouth every evening.       dextran 70-hypromellose (TEARS NATURALE) 0.1-0.3 % SOLN opthalmic solution Place 1 drop into both eyes every 4 hours as needed      Multiple Vitamins-Minerals (THERAPEUTIC MULTIVITAMIN-MINERALS) tablet Take 1 tablet by mouth daily      amiodarone (CORDARONE) 200 MG tablet Take 1 tablet by mouth daily 30 tablet 3    aspirin 81 MG chewable tablet Take 1 tablet by mouth daily 30 tablet 3    fenofibrate (TRICOR) 54 MG tablet Take 2.5 tablets by mouth daily s DAWto pharmacy: Please dispense generic fenofibrate unless prescriber denote 30 tablet 3    simvastatin (ZOCOR) 40 MG tablet Take 1 tablet by mouth nightly 30 tablet 3    metoprolol succinate (TOPROL XL) 25 MG extended release tablet Take 1 tablet by mouth daily 30 tablet 3    torsemide (DEMADEX) 20 MG tablet Take 1 tablet by mouth daily 30 tablet 3    ferrous sulfate 325 (65 Fe) MG EC tablet Take 1 tablet by mouth every other day 90 tablet 3    polyethylene glycol (GLYCOLAX) packet Take 17 g by mouth daily 527 g 1    senna (SENOKOT) 8.6 MG tablet Take 1 tablet by mouth nightly 30 tablet 0    tamsulosin (FLOMAX) 0.4 MG capsule Take 1 capsule by mouth daily 30 capsule 3    midodrine (PROAMATINE) 10 MG tablet Take 1 tablet by mouth 3 times daily (with meals) 90 tablet 3    docusate sodium (COLACE, DULCOLAX) 100 MG CAPS Take 100 mg by mouth 2 times daily      potassium chloride (MICRO-K) 10 MEQ extended release capsule Take 10 mEq by mouth 2 times daily       traZODone (DESYREL) 50 MG tablet Take 1 tablet by mouth nightly      albuterol sulfate  (90 Base) MCG/ACT inhaler Inhale 1 puff into the lungs every 6 hours as needed for Wheezing      famotidine (PEPCID) 20 MG tablet Take 1 tablet by mouth 2 times daily (Patient taking differently: Take 20 mg by mouth daily ) 60 tablet 3    gabapentin (NEURONTIN) 100 MG capsule Take 100 mg by mouth 2 times daily.  ipratropium-albuterol (DUONEB) 0.5-2.5 (3) MG/3ML SOLN nebulizer solution Inhale 3 mLs into the lungs every 4 hours (while awake) 360 mL 0    latanoprost (XALATAN) 0.005 % ophthalmic solution Place 1 drop into both eyes nightly 1 Bottle 3    PARoxetine (PAXIL) 10 MG tablet Take 1 tablet by mouth daily 30 tablet 3    latanoprost (XALATAN) 0.005 % ophthalmic solution Place 1 drop into both eyes nightly       No current facility-administered medications for this encounter. Allergies   Allergen Reactions    Lisinopril      Dry cough    Nuts [Peanut-Containing Drug Products] Other (See Comments)     abd pain         Subjective:      Review of Systems   Constitutional: Positive for fatigue. Negative for activity change, chills and fever.    Eyes: breath sounds normal. He has no wheezes. He has no rales. Abdominal: Soft. Bowel sounds are normal.   Musculoskeletal: Normal range of motion. He exhibits edema (2+ pitting b/l LE). Neurological: He is alert and oriented to person, place, and time. Skin: Skin is warm and dry. Psychiatric: He has a normal mood and affect. Nursing note and vitals reviewed. /72   Pulse 88   Resp 20   Wt 186 lb 12.8 oz (84.7 kg)   SpO2 99%   BMI 27.59 kg/m²   O2 Device: None (Room air)       Lower Extremity Measurements in cm. R Calf Circumference (cm): 38 cm  L Calf Circumference (cm): 39 cm  R Ankle Circumference (cm): 22.5 cm  L Ankle Circumference (cm): 24 cm    CBC:   Lab Results   Component Value Date    WBC 7.3 06/08/2019    RBC 4.64 06/08/2019    HGB 12.8 06/08/2019    HCT 39.1 06/08/2019    MCV 84.3 06/08/2019    MCH 27.6 06/08/2019    MCHC 32.7 06/08/2019    RDW 20.1 06/08/2019     06/08/2019    MPV 11.1 06/08/2019     CMP:    Lab Results   Component Value Date     06/17/2019    K 3.7 06/17/2019    CL 98 06/17/2019    CO2 31 06/17/2019    BUN 44 06/17/2019    CREATININE 1.50 06/17/2019    GFRAA 56 06/17/2019    LABGLOM 46 06/17/2019    GLUCOSE 157 06/17/2019    PROT 6.6 05/21/2019    PROT 6.5 05/21/2019    LABALBU 3.7 05/21/2019    CALCIUM 9.6 06/17/2019    BILITOT 2.40 05/21/2019    ALKPHOS 120 05/21/2019    AST 43 06/07/2019    ALT 19 06/07/2019     Lab Results   Component Value Date    LABA1C 5.2 03/19/2019           :Assessment      1. Chronic systolic congestive heart failure ([address]    2. CKD (chronic kidney disease), stage III ([address]        :Plan      1. Chronic systolic congestive heart failure ([address]    2. CKD (chronic kidney disease), stage III ([facility])        optivol fluid index shows increasing fluid index since admit. Will increase diuretic torsemide 20 mg  to bid dosing. pt has f/u appt with cardio next week. Also f/u in chf clinic 3 w.      On BB, not on ace I d/t cough in [facility].    Recommended:  Leg elevation. Explained in detail. Bmp ordered to monitor renal chem with increased dose for CKD pt. Will have labs done before seeing CARDIO on tues 6/25 for eval on bid dosing. Pt to see Nephro on 7/12 dr Kimball Course. Education completed. Reviewed all educational  material in her new pt education folder. Educational materials  given including multiple pages on Low Sodium food choices, how to read a food label, a Weight and Heart Failure Zone log, and illustrated Signs and Symptoms of Heart Failure   Patient and wife verbalize understanding. Orders Placed This Encounter   Procedures    Basic Metabolic Panel     Standing Status:   Future     Standing Expiration Date:   6/23/2020     No orders of the defined types were placed in this encounter. Patientgiven verbal and/or written educational instructions. Follow up as directed. I have reviewed and agree with the nursing documentation. Verbally reviewed medication list with patient; patient verbalized understanding. Discussed 2000mg/day sodium restricted diet; patient verbalized understanding. Moderate daily exercise encouraged as tolerated. Discussed rest breaks as needed; patient verbalized understanding. Patient instructed to weigh self at the same time of each day, using same clothes and same scale; reinforced teaching to monitor for 3-5 lb weight increase over 1-2 days, and to notify the CHF clinic at [phone] or physician office if weight change noted. Patient verbalized understanding. Risks of smoking discussed with the patient if applicable; patient strongly discouraged to smoke. Patient verbalized understanding. Signs and symptoms of CHF discussed with patient, such as feeling more tired than normal, feeling short of breath, coughing that increases when you lie down, sudden weight gain, swelling of your feet, legs or belly.   Patient verbalized understanding to notify the CHF clinic at [phone] or physician office if these symptoms occur. Compliance with plan of care and further disease process causes discussed with patient, patient encouraged to keep all follow up appointments. Patient verbalized understanding.       Electronically signedby CASSY Morales CNP on 6/21/2019 at 10:26 AM

## 2019-06-21 NOTE — CARE COORDINATION
[facility] Transitions Bundled Payments for Care Improvement (BPCI) Follow Up Call  Qualifying Diagnosis of Congestive Heart Failure    6/21/2019  Patient Name:  Jared Benites. YOB: 1945  Discharge Date:  6/13/19  RARS:  Readmission Risk Score: 31    PCP:  Ren Joya MD     Assessment:        Short of Breath: sometimes    Fatigue: sometimes   Wheezing: denies   Swelling feet, ankles: yes, \"working with doctors to get it straight\"   Chest Pain: denies   Daily weight: yes, stable    Appetite: fair, eats like a bird, 2000 calorie diet   Sleep Pattern not sleeping good, \"hungry\"   Nausea, Sweating: denies   Heart palpitations: denies   Dizziness: denies  [address]: denies need   Medication Needs/Questions: \"I have too many pills\".  Transportation Need: denies need    Live Alone: denies need   Ability to Prepare Meals, Bathe: OK   Follow Up Appointment Scheduled: completed, attends CHF clinic.  Do you feel like you have everything you need to stay well at home? yes   Teaching:  Call physician's office with any needs, concerns. Agreeable to continued communication per [facility] program 90 day follow up.   Follow Up Concerns: edema, difficulty sleeping, anxiety, dyspnea   Future Appointments   Date Time Provider Kelby Olvera   7/11/2019 10:15 AM ST CHF CLINIC  [facility]   7/12/2019 10:10 AM García Houston MD AFL Neph [place] None   9/18/2019  1:45 PM MD david [address] Transitions will continue to follow per [facility] [address] RN, CTC

## 2019-06-21 NOTE — PROGRESS NOTES
Preventing Falls: After Your Visit  Your Care Instructions  If you are a patient with heart problems you may be at risk for falling because of unsteadiness, dizziness, diabetes, and shortness of breathe. Some of your medicines also may add to your risk. By making your home safer, you can lower your risk of falling. You can make your home safer with a few simple measures. Follow-up care is a key part of your treatment and safety. Be sure to make and go to all appointments, and call your doctor if you are having problems. It's also a good idea to know your test results and keep a list of the medicines you take. How can you care for yourself at home? Taking care of yourself  · Keep your blood sugar at a target level (which you set with your doctor). · Exercise regularly, as approved by your Doctor, to improve your strength, muscle tone, and balance. Walk if you can. Swimming may be a good choice if you cannot walk easily. · Have your vision checked as often as your doctor recommends. It is usually once a year or more often if you have eye problems. · Know the side effects of the medicines you take. Ask your doctor or pharmacist whether the medicines you take can affect your balance. Sleeping pills or sedatives can affect your balance. · Limit the amount of alcohol you drink. Alcohol can impair your balance and other senses. ·   Preventing falls at home  · Remove raised doorway thresholds, throw rugs, and clutter. Repair loose carpet or raised areas in the floor. · Move furniture and electrical cords to keep them out of walking paths. · Use nonskid floor wax, and wipe up spills right away, especially on ceramic tile floors. · If you use a walker or cane, put rubber tips on it. If you use crutches, clean the bottoms of them regularly with an abrasive pad, such as steel wool. · Keep your house well lit, especially [place] Longs, and outside walkways.  Use night-lights in areas such as hallways and bathrooms. Add extra light switches or use remote switches (such as switches that go on or off when you clap your hands) to make it easier to turn lights on if you have to get up during the night. · Install sturdy handrails on stairways. Put grab bars near your shower, bathtub, and toilet. · Store household items on low shelves so that you do not have to climb or reach high. Or use a reaching device that you can get at a medical supply store. If you have to climb for something, use a step stool with handrails, or ask someone to get it for you. · Keep a cordless phone and a flashlight with new batteries by your bed. If possible, put a phone in each of the main rooms of your house, or carry a cell phone in case you fall and cannot reach a phone. Or you can wear a device around your neck or wrist. You push a button that sends a signal for help. · Wear low-heeled shoes that fit well and give your feet good support. Use footwear with nonskid soles. Check the heels and soles of your shoes for wear. Repair or replace worn heels or soles. · Do not wear socks without shoes on wood floors. · Walk on the grass when the sidewalks are slippery. If you live in an area that gets snow and ice in the winter, sprinkle salt on slippery steps and sidewalks. Where can you learn more? Go to https://[employer]. org and sign in to your [employer] account. Enter T392 in the [facility] box to learn more about Diabetes and Preventing Falls: After Your Visit.     If you do not have an account, please click on the Sign Up Now link. © 0495-7562 Healthwise, Incorporated. Care instructions adapted under license by [facility].  This care instruction is for use with your licensed healthcare professional. If you have questions about a medical condition or this instruction, always ask your healthcare professional. [address] any warranty or liability for your use of this information. Content Version: 00.2.584273;  Last Revised: August 6, 2013

## 2019-07-03 ENCOUNTER — TELEPHONE (OUTPATIENT)
Dept: OTHER | Age: 74
End: 2019-07-03

## 2019-07-05 ENCOUNTER — HOSPITAL ENCOUNTER (OUTPATIENT)
Age: 74
Setting detail: SPECIMEN
Discharge: HOME | End: 2019-07-05
Payer: MEDICARE

## 2019-07-05 DIAGNOSIS — I50.22 CHRONIC SYSTOLIC CONGESTIVE HEART FAILURE (HCC): ICD-10-CM

## 2019-07-05 LAB
ANION GAP SERPL CALCULATED.3IONS-SCNC: 15 MMOL/L (ref 9–17)
BUN BLDV-MCNC: 21 MG/DL (ref 8–23)
BUN/CREAT BLD: ABNORMAL (ref 9–20)
CALCIUM SERPL-MCNC: 9.5 MG/DL (ref 8.6–10.4)
CHLORIDE BLD-SCNC: 99 MMOL/L (ref 98–107)
CO2: 27 MMOL/L (ref 20–31)
CREAT SERPL-MCNC: 1.02 MG/DL (ref 0.7–1.2)
GFR AFRICAN AMERICAN: >60 ML/MIN
GFR NON-AFRICAN AMERICAN: >60 ML/MIN
GFR SERPL CREATININE-BSD FRML MDRD: ABNORMAL ML/MIN/{1.73_M2}
GFR SERPL CREATININE-BSD FRML MDRD: ABNORMAL ML/MIN/{1.73_M2}
GLUCOSE BLD-MCNC: 176 MG/DL (ref 70–99)
POTASSIUM SERPL-SCNC: 3.6 MMOL/L (ref 3.7–5.3)
SODIUM BLD-SCNC: 141 MMOL/L (ref 135–144)

## 2019-07-05 PROCEDURE — 36415 COLL VENOUS BLD VENIPUNCTURE: CPT

## 2019-07-05 PROCEDURE — 80048 BASIC METABOLIC PNL TOTAL CA: CPT

## 2019-07-09 ENCOUNTER — TELEPHONE (OUTPATIENT)
Dept: OTHER | Age: 74
End: 2019-07-09

## 2019-07-11 ENCOUNTER — HOSPITAL ENCOUNTER (OUTPATIENT)
Dept: OTHER | Age: 74
Discharge: HOME | End: 2019-07-11
Payer: MEDICARE

## 2019-07-11 ENCOUNTER — HOSPITAL ENCOUNTER (OUTPATIENT)
Age: 74
Discharge: HOME | End: 2019-07-11
Payer: MEDICARE

## 2019-07-11 ENCOUNTER — CARE COORDINATION (OUTPATIENT)
Dept: CASE MANAGEMENT | Age: 74
End: 2019-07-11

## 2019-07-11 VITALS
WEIGHT: 168.6 LBS | BODY MASS INDEX: 24.9 KG/M2 | SYSTOLIC BLOOD PRESSURE: 108 MMHG | HEART RATE: 80 BPM | RESPIRATION RATE: 20 BRPM | DIASTOLIC BLOOD PRESSURE: 68 MMHG | OXYGEN SATURATION: 96 %

## 2019-07-11 DIAGNOSIS — I50.22 CHRONIC SYSTOLIC CONGESTIVE HEART FAILURE (HCC): Primary | ICD-10-CM

## 2019-07-11 LAB
ANION GAP SERPL CALCULATED.3IONS-SCNC: 13 MMOL/L (ref 9–17)
BUN BLDV-MCNC: 33 MG/DL (ref 8–23)
BUN/CREAT BLD: ABNORMAL (ref 9–20)
CALCIUM SERPL-MCNC: 9.4 MG/DL (ref 8.6–10.4)
CHLORIDE BLD-SCNC: 103 MMOL/L (ref 98–107)
CO2: 29 MMOL/L (ref 20–31)
CREAT SERPL-MCNC: 1.07 MG/DL (ref 0.7–1.2)
GFR AFRICAN AMERICAN: >60 ML/MIN
GFR NON-AFRICAN AMERICAN: >60 ML/MIN
GFR SERPL CREATININE-BSD FRML MDRD: ABNORMAL ML/MIN/{1.73_M2}
GFR SERPL CREATININE-BSD FRML MDRD: ABNORMAL ML/MIN/{1.73_M2}
GLUCOSE BLD-MCNC: 164 MG/DL (ref 70–99)
POTASSIUM SERPL-SCNC: 4 MMOL/L (ref 3.7–5.3)
SODIUM BLD-SCNC: 145 MMOL/L (ref 135–144)

## 2019-07-11 PROCEDURE — 80048 BASIC METABOLIC PNL TOTAL CA: CPT

## 2019-07-11 PROCEDURE — 99212 OFFICE O/P EST SF 10 MIN: CPT

## 2019-07-11 NOTE — CARE COORDINATION
[facility] Transitions Bundled Payments for Care Improvement (BPCI) Follow Up Call  Qualifying Diagnosis of Congestive Heart Failure    7/11/2019  Patient Name:  Mk Taylor. YOB: 1945  Discharge Date:  6/13/19  RARS:  Readmission Risk Score: 31    PCP:  Dewayne Horne MD  Message left. Stated that I represent the [address] [place] [address] Team and will call again later.   Follow Up Concerns: edema, dyspnea, anxiety, sleep   Future Appointments   Date Time Provider [place]   7/12/2019 10:10 AM Roderick Dugan MD AFL Neph [facility] None   9/18/2019  1:45 PM Griffin Omalley MD [facility] [address] Transitions will continue to follow per [facility] , RN, CTC

## 2019-07-11 NOTE — PROGRESS NOTES
Date:  2019  Time:  1:27 PM    CHF Clinic at [facility]    Office: [phone] Fax: [phone]    Re:  Kurt Sanchez. Patient : 1945    Vital Signs: /68   Pulse 80   Resp 20   Wt 168 lb 9.6 oz (76.5 kg)   SpO2 96%   BMI 24.90 kg/m²                       O2 Device: None (Room air)                           Recent Labs     19  1118   *   K 4.0      CO2 29   BUN 33*   CREATININE 1.07   GLUCOSE 164*        Respiratory:         Breath Sounds  Right Upper Lobe: Clear  Right Middle Lobe: Clear  Right Lower Lobe: Clear, Diminished  Left Upper Lobe: Clear  Left Lower Lobe: Clear, Diminished    Cough/Sputum  Cough: Non-productive  Frequency: Infrequent         Peripheral Vascular  RLE Edema: Trace  LLE Edema: +1, Pitting, Trace      Complaints: Feeling much better. Wt down 18 lbs in 2 1/2 weeks. Swelling to lower extremities decreased    Physician Orders Orders per Selwyn Tamayo: 1)  Recheck BMP today    Comment : Pt here for reassessment. At last visit here on 19 pt was seen by Selwyn Tamayo CNP at the [address] [place] [address] for his first visit. He had c/o increased bloating 2 + pitting to lower extremities and had increased fluid on his optivol fluid index reading. Maggie Terrell CNP ordered pt to  Increase his  Torsemide from 20 mg once daily to 40 mg po once daily. Labs were drawn on  19 and potassium was slightly low at 3.6 pt wasn't sure if was taking KCL po. He was instructed to take the KCL po 10 meq po BID or 20 meq once daily. Pt returns today for reassessment. His weight is down 18 lbs. He states he feels so much better. The lower extremity swelling is decreased from last visit \" I can walk so much better now\". His cardiosight optivol fluid index however, continues to show increased fluid levels. He states he may have been drinking excessive fluids \" because the water pill makes me so thirsty\".   Pt has an appointment with nephrology,

## 2019-07-16 ENCOUNTER — TELEPHONE (OUTPATIENT)
Dept: GASTROENTEROLOGY | Age: 74
End: 2019-07-16

## 2019-07-16 NOTE — TELEPHONE ENCOUNTER
Writer spoke to pt's wife, Ling Ramirez, over the phone in regards to rescheduling cancelled 6/14/19 EGD proc. Wife states pt has an appt w/ his cardiologist, Dr. Sharmila Waite on 7/26/19 in regards to maybe changing his defibrillator to a different indwelling defibrillator. She will call us back after dr's appt and let us know if they can reschedule. Pt's prev defibrillator was placed 12/2018.

## 2019-07-22 ENCOUNTER — HOSPITAL ENCOUNTER (OUTPATIENT)
Age: 74
Setting detail: SPECIMEN
Discharge: HOME | End: 2019-07-22
Payer: MEDICARE

## 2019-07-22 DIAGNOSIS — I38 VALVULAR HEART DISEASE: ICD-10-CM

## 2019-07-22 LAB
ANION GAP SERPL CALCULATED.3IONS-SCNC: 12 MMOL/L (ref 9–17)
BUN BLDV-MCNC: 33 MG/DL (ref 8–23)
BUN/CREAT BLD: ABNORMAL (ref 9–20)
CALCIUM SERPL-MCNC: 10 MG/DL (ref 8.6–10.4)
CHLORIDE BLD-SCNC: 99 MMOL/L (ref 98–107)
CO2: 28 MMOL/L (ref 20–31)
CREAT SERPL-MCNC: 1.24 MG/DL (ref 0.7–1.2)
GFR AFRICAN AMERICAN: >60 ML/MIN
GFR NON-AFRICAN AMERICAN: 57 ML/MIN
GFR SERPL CREATININE-BSD FRML MDRD: ABNORMAL ML/MIN/{1.73_M2}
GFR SERPL CREATININE-BSD FRML MDRD: ABNORMAL ML/MIN/{1.73_M2}
GLUCOSE BLD-MCNC: 91 MG/DL (ref 70–99)
POTASSIUM SERPL-SCNC: 4.1 MMOL/L (ref 3.7–5.3)
SODIUM BLD-SCNC: 139 MMOL/L (ref 135–144)

## 2019-07-22 PROCEDURE — 36415 COLL VENOUS BLD VENIPUNCTURE: CPT

## 2019-07-22 PROCEDURE — 80048 BASIC METABOLIC PNL TOTAL CA: CPT

## 2019-07-24 ENCOUNTER — CARE COORDINATION (OUTPATIENT)
Dept: CASE MANAGEMENT | Age: 74
End: 2019-07-24

## 2019-08-07 ENCOUNTER — HOSPITAL ENCOUNTER (OUTPATIENT)
Dept: OTHER | Age: 74
Discharge: HOME | End: 2019-08-07
Payer: MEDICARE

## 2019-08-07 ENCOUNTER — CARE COORDINATION (OUTPATIENT)
Dept: CASE MANAGEMENT | Age: 74
End: 2019-08-07

## 2019-08-07 VITALS
HEART RATE: 77 BPM | RESPIRATION RATE: 18 BRPM | WEIGHT: 168.2 LBS | OXYGEN SATURATION: 99 % | BODY MASS INDEX: 24.84 KG/M2 | SYSTOLIC BLOOD PRESSURE: 104 MMHG | DIASTOLIC BLOOD PRESSURE: 61 MMHG

## 2019-08-07 PROCEDURE — 99212 OFFICE O/P EST SF 10 MIN: CPT

## 2019-08-07 RX ORDER — PREGABALIN 75 MG/1
75 CAPSULE ORAL 2 TIMES DAILY
COMMUNITY
End: 2019-09-12 | Stop reason: ALTCHOICE

## 2019-08-07 RX ORDER — AMOXICILLIN 250 MG
1 CAPSULE ORAL DAILY
COMMUNITY
End: 2019-09-18 | Stop reason: ALTCHOICE

## 2019-08-07 NOTE — PROGRESS NOTES
Congestive Heart Failure Education completed and charted. CHF booklet given. Patient was receptive to education. Discussed 2000 mg sodium restricted daily in diet. Patient instructed to limit fluid intake to  1.5 to 2 liters per day. Patient instructed to weigh self at the same time of each day each morning, reinforced teaching to monitor for 3-5 lb weight increase over 1-2 days notify physician if change noted. Signs and symptoms of CHF discussed with patient, such as feeling more tired than normal, feeling short of breath, coughing that increases when you lie down, sudden weight gain, swelling of your feet, legs or belly. Patient verbalized understanding to notify physician office if these symptoms occur. Date:  2019  Time:  11:04 AM    CHF Clinic at [address] [place] [address]    Office: [phone] Fax: [phone]    Re:  Eileen Davila. Patient : 1945    Vital Signs: /61   Pulse 77   Resp 18   Wt 168 lb 3.2 oz (76.3 kg)   SpO2 99%   BMI 24.84 kg/m²                       O2 Device: None (Room air)                           No results for input(s): CBC, HGB, HCT, WBC, PLATELET, NA, K, CL, CO2, BUN, CREATININE, GLUCOSE, BNP, INR in the last 72 hours. Respiratory:   Lungs clear no cough noted at this time, no edema noted either leg. Rt. Calf 34.5,rt. Clydell Slack Calf 33.5, lt. Ankle 24                             Complaints:  None today  Physician Ordersnone  Comment : no c/o today, doing well with low sodium diet, weight stable,medtronic device monitored.   Electronically signed by Qamra Zacarias RN on 2019 at 11:04 AM

## 2019-08-08 ENCOUNTER — HOSPITAL ENCOUNTER (OUTPATIENT)
Age: 74
Setting detail: SPECIMEN
Discharge: HOME | End: 2019-08-08
Payer: MEDICARE

## 2019-08-09 LAB
CASE NUMBER:: NORMAL
SPECIMEN DESCRIPTION: NORMAL
SURGICAL PATHOLOGY REPORT: NORMAL

## 2019-08-28 ENCOUNTER — CARE COORDINATION (OUTPATIENT)
Dept: CASE MANAGEMENT | Age: 74
End: 2019-08-28

## 2019-09-12 ENCOUNTER — HOSPITAL ENCOUNTER (OUTPATIENT)
Dept: OTHER | Age: 74
Discharge: HOME | End: 2019-09-12
Payer: MEDICARE

## 2019-09-12 VITALS
SYSTOLIC BLOOD PRESSURE: 103 MMHG | WEIGHT: 178.8 LBS | OXYGEN SATURATION: 97 % | DIASTOLIC BLOOD PRESSURE: 60 MMHG | BODY MASS INDEX: 26.4 KG/M2 | RESPIRATION RATE: 18 BRPM | HEART RATE: 74 BPM

## 2019-09-12 DIAGNOSIS — I50.22 CHRONIC SYSTOLIC CONGESTIVE HEART FAILURE (HCC): Primary | Chronic | ICD-10-CM

## 2019-09-12 PROCEDURE — 99214 OFFICE O/P EST MOD 30 MIN: CPT | Performed by: NURSE PRACTITIONER

## 2019-09-12 RX ORDER — FENOFIBRIC ACID 105 MG/1
105 TABLET ORAL DAILY
COMMUNITY
End: 2019-09-18 | Stop reason: ALTCHOICE

## 2019-09-12 RX ORDER — GABAPENTIN 400 MG/1
400 CAPSULE ORAL DAILY
COMMUNITY
End: 2020-08-07 | Stop reason: ALTCHOICE

## 2019-09-12 ASSESSMENT — ENCOUNTER SYMPTOMS
SHORTNESS OF BREATH: 1
WHEEZING: 0
ABDOMINAL PAIN: 0
BLOOD IN STOOL: 0
COUGH: 1
EYE DISCHARGE: 0

## 2019-09-12 NOTE — PROGRESS NOTES
tablet Take 1 tablet by mouth 3 times daily (with meals) 90 tablet 3    PARoxetine (PAXIL) 10 MG tablet Take 1 tablet by mouth daily 30 tablet 3    docusate sodium (COLACE, DULCOLAX) 100 MG CAPS Take 100 mg by mouth 2 times daily      potassium chloride (MICRO-K) 10 MEQ extended release capsule Take 10 mEq by mouth 2 times daily       latanoprost (XALATAN) 0.005 % ophthalmic solution Place 1 drop into both eyes nightly Using once a week      traZODone (DESYREL) 50 MG tablet Take 1 tablet by mouth nightly      famotidine (PEPCID) 20 MG tablet Take 1 tablet by mouth 2 times daily (Patient taking differently: Take 20 mg by mouth daily ) 60 tablet 3    benzonatate (TESSALON) 100 MG capsule Take 100 mg by mouth 3 times daily as needed for Cough      Multiple Vitamins-Minerals (THERAPEUTIC MULTIVITAMIN-MINERALS) tablet Take 1 tablet by mouth daily      ipratropium-albuterol (DUONEB) 0.5-2.5 (3) MG/3ML SOLN nebulizer solution Inhale 3 mLs into the lungs every 4 hours (while awake) 360 mL 0    fenofibrate (TRICOR) 54 MG tablet Take 2.5 tablets by mouth daily s [facility] pharmacy: Please dispense generic fenofibrate unless prescriber denote 30 tablet 3    latanoprost (XALATAN) 0.005 % ophthalmic solution Place 1 drop into both eyes nightly 1 Bottle 3    albuterol sulfate  (90 Base) MCG/ACT inhaler Inhale 1 puff into the lungs every 6 hours as needed for Wheezing       No current facility-administered medications for this encounter. Allergies   Allergen Reactions    Lisinopril      Dry cough    Nuts [Peanut-Containing Drug Products] Other (See Comments)     abd pain         Subjective:      Review of Systems   Constitutional: Negative for activity change, chills, fatigue and fever. Eyes: Negative for discharge and visual disturbance. Respiratory: Positive for cough and shortness of breath. Negative for wheezing. Cardiovascular: Negative for chest pain and leg swelling.    Gastrointestinal: Negative for abdominal pain and blood in stool. Endocrine: Negative for cold intolerance and heat intolerance. Genitourinary: Negative for dysuria and flank pain. Musculoskeletal: Negative for joint swelling and myalgias. Skin: Negative for pallor and rash. Neurological: Negative for dizziness and headaches. Psychiatric/Behavioral: Negative for hallucinations and suicidal ideas. Objective:     Physical Exam   Constitutional: He is oriented to person, place, and time. He appears well-developed and well-nourished. HENT:   Head: Normocephalic and atraumatic. Eyes: Conjunctivae and EOM are normal. No scleral icterus. Neck: Neck supple. Cardiovascular: Normal rate, regular rhythm, normal heart sounds and intact distal pulses. Pulmonary/Chest: Effort normal. He has no wheezes. He has no rales. snorous wheeze in L base   Abdominal: Soft. Bowel sounds are normal.   Musculoskeletal: Normal range of motion. He exhibits no edema (wearing comp hose, no edema). Neurological: He is alert and oriented to person, place, and time. Skin: Skin is warm and dry. Psychiatric: He has a normal mood and affect. Nursing note and vitals reviewed. /60   Pulse 74   Resp 18   Wt 178 lb 12.8 oz (81.1 kg)   SpO2 97%   BMI 26.40 kg/m²   O2 Device: None (Room air)       Lower Extremity Measurements in cm.    R Calf Circumference (cm): 35 cm  L Calf Circumference (cm): 32.5 cm  R Ankle Circumference (cm): 21 cm  L Ankle Circumference (cm): 21.5 cm    CBC:   Lab Results   Component Value Date    WBC 7.3 06/08/2019    RBC 4.64 06/08/2019    HGB 12.8 06/08/2019    HCT 39.1 06/08/2019    MCV 84.3 06/08/2019    MCH 27.6 06/08/2019    MCHC 32.7 06/08/2019    RDW 20.1 06/08/2019     06/08/2019    MPV 11.1 06/08/2019     CMP:    Lab Results   Component Value Date     07/22/2019    K 4.1 07/22/2019    CL 99 07/22/2019    CO2 28 07/22/2019    BUN 33 07/22/2019    CREATININE 1.24 07/22/2019    GFRAA >60 07/22/2019    LABGLOM 57 07/22/2019    GLUCOSE 91 07/22/2019    PROT 6.6 05/21/2019    PROT 6.5 05/21/2019    LABALBU 3.7 05/21/2019    CALCIUM 10.0 07/22/2019    BILITOT 2.40 05/21/2019    ALKPHOS 120 05/21/2019    AST 43 06/07/2019    ALT 19 06/07/2019     Lab Results   Component Value Date    LABA1C 5.2 03/19/2019           :Assessment      1. Chronic systolic congestive heart failure ([facility] [address]        :Plan      1. Chronic systolic congestive heart failure (HCC)        Last Cr 1.2  [facility] 9[facility] is 1.7  optivol showing increase in fluid levels. Pt is euvolemic on exam.   Does not take extra diuretic prn. His wt gain was intentional , had dropped down to 160 lbs and was weak at this wt. Is monitoring his Na+  Is frustrated when suggesting 2 L fluid restriction, recommend lemon for incrased salivation to diminished his dry mouth     Minimal change in leg measurements from last appt ( ankle measurements not compared)     RTC 1 mo      No orders of the defined types were placed in this encounter. No orders of the defined types were placed in this encounter. Patientgiven verbal and/or written educational instructions. Follow up as directed. I have reviewed and agree with the nursing documentation. Verbally reviewed medication list with patient; patient verbalized understanding. Discussed 2000mg/day sodium restricted diet; patient verbalized understanding. Moderate daily exercise encouraged as tolerated. Discussed rest breaks as needed; patient verbalized understanding. Patient instructed to weigh self at the same time of each day, using same clothes and same scale; reinforced teaching to monitor for 3-5 lb weight increase over 1-2 days, and to notify the CHF clinic at [phone] or physician office if weight change noted. Patient verbalized understanding. Risks of smoking discussed with the patient if applicable; patient strongly discouraged to smoke.   Patient verbalized

## 2019-09-16 ENCOUNTER — CARE COORDINATION (OUTPATIENT)
Dept: CASE MANAGEMENT | Age: 74
End: 2019-09-16

## 2019-09-18 ENCOUNTER — OFFICE VISIT (OUTPATIENT)
Dept: GASTROENTEROLOGY | Age: 74
End: 2019-09-18
Payer: MEDICARE

## 2019-09-18 VITALS
BODY MASS INDEX: 26.29 KG/M2 | HEART RATE: 77 BPM | DIASTOLIC BLOOD PRESSURE: 68 MMHG | SYSTOLIC BLOOD PRESSURE: 103 MMHG | WEIGHT: 178 LBS

## 2019-09-18 DIAGNOSIS — R79.89 ELEVATED LFTS: ICD-10-CM

## 2019-09-18 DIAGNOSIS — K21.9 GASTROESOPHAGEAL REFLUX DISEASE, ESOPHAGITIS PRESENCE NOT SPECIFIED: ICD-10-CM

## 2019-09-18 DIAGNOSIS — D49.0 IPMN (INTRADUCTAL PAPILLARY MUCINOUS NEOPLASM): Primary | ICD-10-CM

## 2019-09-18 DIAGNOSIS — C90.01 MULTIPLE MYELOMA IN REMISSION (HCC): ICD-10-CM

## 2019-09-18 PROCEDURE — G8598 ASA/ANTIPLAT THER USED: HCPCS | Performed by: INTERNAL MEDICINE

## 2019-09-18 PROCEDURE — G8419 CALC BMI OUT NRM PARAM NOF/U: HCPCS | Performed by: INTERNAL MEDICINE

## 2019-09-18 PROCEDURE — 99214 OFFICE O/P EST MOD 30 MIN: CPT | Performed by: INTERNAL MEDICINE

## 2019-09-18 PROCEDURE — G8427 DOCREV CUR MEDS BY ELIG CLIN: HCPCS | Performed by: INTERNAL MEDICINE

## 2019-09-18 PROCEDURE — 1123F ACP DISCUSS/DSCN MKR DOCD: CPT | Performed by: INTERNAL MEDICINE

## 2019-09-18 PROCEDURE — 1036F TOBACCO NON-USER: CPT | Performed by: INTERNAL MEDICINE

## 2019-09-18 PROCEDURE — 4040F PNEUMOC VAC/ADMIN/RCVD: CPT | Performed by: INTERNAL MEDICINE

## 2019-09-18 PROCEDURE — 3017F COLORECTAL CA SCREEN DOC REV: CPT | Performed by: INTERNAL MEDICINE

## 2019-09-18 RX ORDER — SENNA PLUS 8.6 MG/1
1 TABLET ORAL NIGHTLY
COMMUNITY

## 2019-09-18 RX ORDER — POLYETHYLENE GLYCOL 3350 17 G/17G
17 POWDER, FOR SOLUTION ORAL DAILY
COMMUNITY
End: 2019-12-04 | Stop reason: ALTCHOICE

## 2019-09-18 ASSESSMENT — ENCOUNTER SYMPTOMS
ANAL BLEEDING: 0
ABDOMINAL DISTENTION: 0
VOMITING: 0
RECTAL PAIN: 0
COUGH: 1
BLOOD IN STOOL: 0
CONSTIPATION: 1
NAUSEA: 0
WHEEZING: 0
TROUBLE SWALLOWING: 0
DIARRHEA: 0
CHOKING: 0
ABDOMINAL PAIN: 0

## 2019-09-18 NOTE — PROGRESS NOTES
180 tablet, Rfl: 3    rivaroxaban (XARELTO) 20 MG TABS tablet, Take 20 mg by mouth daily (with breakfast) LAST DOSE 09/15/2019, Disp: , Rfl:     traMADol (ULTRAM) 50 MG tablet, Take 50 mg by mouth every evening., Disp: , Rfl:     dextran 70-hypromellose (TEARS NATURALE) 0.1-0.3 % SOLN opthalmic solution, Place 1 drop into both eyes every 4 hours as needed, Disp: , Rfl:     amiodarone (CORDARONE) 200 MG tablet, Take 1 tablet by mouth daily, Disp: 30 tablet, Rfl: 3    ipratropium-albuterol (DUONEB) 0.5-2.5 (3) MG/3ML SOLN nebulizer solution, Inhale 3 mLs into the lungs every 4 hours (while awake) (Patient taking differently: Inhale 3 mLs into the lungs every 4 hours (while awake) TAKES AS NEEDED), Disp: 360 mL, Rfl: 0    fenofibrate (TRICOR) 54 MG tablet, Take 2.5 tablets by mouth daily [facility] pharmacy: Please dispense generic fenofibrate unless prescriber denote, Disp: 30 tablet, Rfl: 3    simvastatin (ZOCOR) 40 MG tablet, Take 1 tablet by mouth nightly, Disp: 30 tablet, Rfl: 3    metoprolol succinate (TOPROL XL) 25 MG extended release tablet, Take 1 tablet by mouth daily, Disp: 30 tablet, Rfl: 3    ferrous sulfate 325 (65 Fe) MG EC tablet, Take 1 tablet by mouth every other day, Disp: 90 tablet, Rfl: 3    tamsulosin (FLOMAX) 0.4 MG capsule, Take 1 capsule by mouth daily, Disp: 30 capsule, Rfl: 3    latanoprost (XALATAN) 0.005 % ophthalmic solution, Place 1 drop into both eyes nightly, Disp: 1 Bottle, Rfl: 3    midodrine (PROAMATINE) 10 MG tablet, Take 1 tablet by mouth 3 times daily (with meals), Disp: 90 tablet, Rfl: 3    PARoxetine (PAXIL) 10 MG tablet, Take 1 tablet by mouth daily (Patient taking differently: Take 20 mg by mouth daily ), Disp: 30 tablet, Rfl: 3    docusate sodium (COLACE, DULCOLAX) 100 MG CAPS, Take 100 mg by mouth 2 times daily, Disp: , Rfl:     potassium chloride (MICRO-K) 10 MEQ extended release capsule, Take 10 mEq by mouth 2 times daily , Disp: , Rfl:     traZODone (DESYREL) Comment: LAST USE OVER 2 YRS AGO    Sexual activity: Not on file   Lifestyle    Physical activity:     Days per week: Not on file     Minutes per session: Not on file    Stress: Not on file   Relationships    Social connections:     Talks on phone: Not on file     Gets together: Not on file     Attends Gnosticist service: Not on file     Active member of club or organization: Not on file     Attends meetings of clubs or organizations: Not on file     Relationship status: Not on file    Intimate partner violence:     Fear of current or ex partner: Not on file     Emotionally abused: Not on file     Physically abused: Not on file     Forced sexual activity: Not on file   Other Topics Concern    Not on file   Social History Narrative    Not on file       REVIEW OF SYSTEMS: A 12-point review of systemswas obtained and pertinent positives and negatives were enumerated above in the history of present illness. All other reviewed systems / symptoms were negative. Review of Systems   Constitutional: Negative for appetite change, fatigue and unexpected weight change. HENT: Negative for trouble swallowing. Respiratory: Positive for cough. Negative for choking and wheezing. Cardiovascular: Negative for chest pain, palpitations and leg swelling. Gastrointestinal: Positive for constipation (takes colace and senna). Negative for abdominal distention, abdominal pain, anal bleeding, blood in stool, diarrhea, nausea, rectal pain and vomiting. Genitourinary: Positive for hematuria. Negative for difficulty urinating. Allergic/Immunologic: Negative for environmental allergies and food allergies. Neurological: Negative for dizziness, weakness, light-headedness, numbness and headaches. Hematological: Does not bruise/bleed easily. Psychiatric/Behavioral: Negative for sleep disturbance. The patient is not nervous/anxious.             LABORATORY DATA: Reviewed  Lab Results   Component Value Date    WBC 7.3 06/08/2019    HGB 12.8 (L) 06/08/2019    HCT 39.1 (L) 06/08/2019    MCV 84.3 06/08/2019     06/08/2019     07/22/2019    K 4.1 07/22/2019    CL 99 07/22/2019    CO2 28 07/22/2019    BUN 33 (H) 07/22/2019    CREATININE 1.24 (H) 07/22/2019    LABALBU 3.7 05/21/2019    BILITOT 2.40 (H) 05/21/2019    ALKPHOS 120 05/21/2019    AST 43 (H) 06/07/2019    ALT 19 06/07/2019    INR 1.4 08/26/2018         Lab Results   Component Value Date    RBC 4.64 06/08/2019    HGB 12.8 (L) 06/08/2019    MCV 84.3 06/08/2019    MCH 27.6 06/08/2019    MCHC 32.7 06/08/2019    RDW 20.1 (H) 06/08/2019    MPV 11.1 06/08/2019    BASOPCT 0 06/08/2019    LYMPHSABS 1.61 06/08/2019    MONOSABS 1.10 (H) 06/08/2019    NEUTROABS 4.52 06/08/2019    EOSABS 0.00 06/08/2019    BASOSABS 0.00 06/08/2019         DIAGNOSTIC TESTING:     No results found. PHYSICAL EXAMINATION: Vital signs reviewed per the nursing documentation. /68   Pulse 77   Wt 178 lb (80.7 kg)   BMI 26.29 kg/m²   Body mass index is 26.29 kg/m². Physical Exam   Constitutional: He is oriented to person, place, and time. He appears well-developed and well-nourished. No distress. HENT:   Head: Normocephalic and atraumatic. Mouth/Throat: Oropharynx is clear and moist.   Eyes: Pupils are equal, round, and reactive to light. No scleral icterus. Neck: Normal range of motion. Neck supple. No JVD present. No tracheal deviation present. No thyromegaly present. Cardiovascular: Normal rate, regular rhythm, normal heart sounds and intact distal pulses. No murmur heard. Pulmonary/Chest: Effort normal and breath sounds normal. No respiratory distress. He has no wheezes. Abdominal: Soft. Bowel sounds are normal. He exhibits no distension and no mass. There is no tenderness. There is no rebound and no guarding. Musculoskeletal: Normal range of motion. He exhibits no edema or tenderness. Neurological: He is alert and oriented to person, place, and time.  He has

## 2019-09-20 ENCOUNTER — ANESTHESIA (OUTPATIENT)
Dept: OPERATING ROOM | Age: 74
End: 2019-09-20
Payer: MEDICARE

## 2019-09-20 ENCOUNTER — HOSPITAL ENCOUNTER (OUTPATIENT)
Age: 74
Setting detail: OBSERVATION
Discharge: HOME | End: 2019-09-21
Attending: UROLOGY | Admitting: UROLOGY
Payer: MEDICARE

## 2019-09-20 ENCOUNTER — ANESTHESIA EVENT (OUTPATIENT)
Dept: OPERATING ROOM | Age: 74
End: 2019-09-20
Payer: MEDICARE

## 2019-09-20 VITALS — SYSTOLIC BLOOD PRESSURE: 108 MMHG | OXYGEN SATURATION: 100 % | TEMPERATURE: 96.5 F | DIASTOLIC BLOOD PRESSURE: 75 MMHG

## 2019-09-20 DIAGNOSIS — I25.10 MULTIPLE VESSEL CORONARY ARTERY DISEASE: Chronic | ICD-10-CM

## 2019-09-20 DIAGNOSIS — D49.4 BLADDER TUMOR: Primary | ICD-10-CM

## 2019-09-20 DIAGNOSIS — R29.898 WEAKNESS OF BOTH LOWER EXTREMITIES: ICD-10-CM

## 2019-09-20 LAB
ANION GAP SERPL CALCULATED.3IONS-SCNC: 13 MMOL/L (ref 9–17)
BUN BLDV-MCNC: 37 MG/DL (ref 8–23)
BUN/CREAT BLD: ABNORMAL (ref 9–20)
CALCIUM SERPL-MCNC: 9.2 MG/DL (ref 8.6–10.4)
CHLORIDE BLD-SCNC: 102 MMOL/L (ref 98–107)
CO2: 28 MMOL/L (ref 20–31)
CREAT SERPL-MCNC: 1.31 MG/DL (ref 0.7–1.2)
GFR AFRICAN AMERICAN: >60 ML/MIN
GFR NON-AFRICAN AMERICAN: 54 ML/MIN
GFR SERPL CREATININE-BSD FRML MDRD: ABNORMAL ML/MIN/{1.73_M2}
GFR SERPL CREATININE-BSD FRML MDRD: ABNORMAL ML/MIN/{1.73_M2}
GLUCOSE BLD-MCNC: 83 MG/DL (ref 74–100)
GLUCOSE BLD-MCNC: 88 MG/DL (ref 75–110)
GLUCOSE BLD-MCNC: 99 MG/DL (ref 70–99)
HCT VFR BLD CALC: 36.4 % (ref 40.7–50.3)
HEMOGLOBIN: 10.7 G/DL (ref 13–17)
MCH RBC QN AUTO: 28.1 PG (ref 25.2–33.5)
MCHC RBC AUTO-ENTMCNC: 29.4 G/DL (ref 28.4–34.8)
MCV RBC AUTO: 95.5 FL (ref 82.6–102.9)
NRBC AUTOMATED: 0 PER 100 WBC
PDW BLD-RTO: 14 % (ref 11.8–14.4)
PLATELET # BLD: 220 K/UL (ref 138–453)
PMV BLD AUTO: 10.8 FL (ref 8.1–13.5)
POC POTASSIUM: 3.7 MMOL/L (ref 3.5–4.5)
POTASSIUM SERPL-SCNC: 3.7 MMOL/L (ref 3.7–5.3)
RBC # BLD: 3.81 M/UL (ref 4.21–5.77)
SODIUM BLD-SCNC: 143 MMOL/L (ref 135–144)
WBC # BLD: 7.2 K/UL (ref 3.5–11.3)

## 2019-09-20 PROCEDURE — 6360000002 HC RX W HCPCS: Performed by: NURSE ANESTHETIST, CERTIFIED REGISTERED

## 2019-09-20 PROCEDURE — 7100000000 HC PACU RECOVERY - FIRST 15 MIN: Performed by: UROLOGY

## 2019-09-20 PROCEDURE — 88307 TISSUE EXAM BY PATHOLOGIST: CPT

## 2019-09-20 PROCEDURE — 82947 ASSAY GLUCOSE BLOOD QUANT: CPT

## 2019-09-20 PROCEDURE — 2500000003 HC RX 250 WO HCPCS: Performed by: NURSE ANESTHETIST, CERTIFIED REGISTERED

## 2019-09-20 PROCEDURE — 2580000003 HC RX 258: Performed by: UROLOGY

## 2019-09-20 PROCEDURE — 2720000010 HC SURG SUPPLY STERILE: Performed by: UROLOGY

## 2019-09-20 PROCEDURE — 6360000002 HC RX W HCPCS: Performed by: ANESTHESIOLOGY

## 2019-09-20 PROCEDURE — 3700000001 HC ADD 15 MINUTES (ANESTHESIA): Performed by: UROLOGY

## 2019-09-20 PROCEDURE — 3600000004 HC SURGERY LEVEL 4 BASE: Performed by: UROLOGY

## 2019-09-20 PROCEDURE — 3600000014 HC SURGERY LEVEL 4 ADDTL 15MIN: Performed by: UROLOGY

## 2019-09-20 PROCEDURE — 6370000000 HC RX 637 (ALT 250 FOR IP): Performed by: STUDENT IN AN ORGANIZED HEALTH CARE EDUCATION/TRAINING PROGRAM

## 2019-09-20 PROCEDURE — 2709999900 HC NON-CHARGEABLE SUPPLY: Performed by: UROLOGY

## 2019-09-20 PROCEDURE — 2580000003 HC RX 258: Performed by: ANESTHESIOLOGY

## 2019-09-20 PROCEDURE — 85027 COMPLETE CBC AUTOMATED: CPT

## 2019-09-20 PROCEDURE — 3700000000 HC ANESTHESIA ATTENDED CARE: Performed by: UROLOGY

## 2019-09-20 PROCEDURE — 80048 BASIC METABOLIC PNL TOTAL CA: CPT

## 2019-09-20 PROCEDURE — 7100000001 HC PACU RECOVERY - ADDTL 15 MIN: Performed by: UROLOGY

## 2019-09-20 PROCEDURE — 84132 ASSAY OF SERUM POTASSIUM: CPT

## 2019-09-20 PROCEDURE — G0378 HOSPITAL OBSERVATION PER HR: HCPCS

## 2019-09-20 RX ORDER — CEFAZOLIN SODIUM 1 G/3ML
INJECTION, POWDER, FOR SOLUTION INTRAMUSCULAR; INTRAVENOUS PRN
Status: DISCONTINUED | OUTPATIENT
Start: 2019-09-20 | End: 2019-09-20 | Stop reason: SDUPTHER

## 2019-09-20 RX ORDER — MIDAZOLAM HYDROCHLORIDE 1 MG/ML
INJECTION INTRAMUSCULAR; INTRAVENOUS PRN
Status: DISCONTINUED | OUTPATIENT
Start: 2019-09-20 | End: 2019-09-20 | Stop reason: SDUPTHER

## 2019-09-20 RX ORDER — ONDANSETRON 2 MG/ML
4 INJECTION INTRAMUSCULAR; INTRAVENOUS EVERY 6 HOURS PRN
Status: DISCONTINUED | OUTPATIENT
Start: 2019-09-20 | End: 2019-09-21 | Stop reason: HOSPADM

## 2019-09-20 RX ORDER — LIDOCAINE HYDROCHLORIDE 10 MG/ML
INJECTION, SOLUTION EPIDURAL; INFILTRATION; INTRACAUDAL; PERINEURAL PRN
Status: DISCONTINUED | OUTPATIENT
Start: 2019-09-20 | End: 2019-09-20 | Stop reason: SDUPTHER

## 2019-09-20 RX ORDER — FENOFIBRATE 54 MG/1
135 TABLET ORAL DAILY
Status: DISCONTINUED | OUTPATIENT
Start: 2019-09-20 | End: 2019-09-21 | Stop reason: HOSPADM

## 2019-09-20 RX ORDER — FUROSEMIDE 10 MG/ML
10 INJECTION INTRAMUSCULAR; INTRAVENOUS ONCE
Status: COMPLETED | OUTPATIENT
Start: 2019-09-20 | End: 2019-09-20

## 2019-09-20 RX ORDER — SODIUM CHLORIDE 0.9 % (FLUSH) 0.9 %
10 SYRINGE (ML) INJECTION EVERY 12 HOURS SCHEDULED
Status: DISCONTINUED | OUTPATIENT
Start: 2019-09-20 | End: 2019-09-21 | Stop reason: HOSPADM

## 2019-09-20 RX ORDER — TAMSULOSIN HYDROCHLORIDE 0.4 MG/1
0.4 CAPSULE ORAL DAILY
Status: DISCONTINUED | OUTPATIENT
Start: 2019-09-20 | End: 2019-09-21 | Stop reason: HOSPADM

## 2019-09-20 RX ORDER — DOCUSATE SODIUM 100 MG/1
100 CAPSULE, LIQUID FILLED ORAL 2 TIMES DAILY
Status: DISCONTINUED | OUTPATIENT
Start: 2019-09-20 | End: 2019-09-21 | Stop reason: HOSPADM

## 2019-09-20 RX ORDER — ALBUTEROL SULFATE 90 UG/1
1 AEROSOL, METERED RESPIRATORY (INHALATION) EVERY 6 HOURS PRN
Status: DISCONTINUED | OUTPATIENT
Start: 2019-09-20 | End: 2019-09-21 | Stop reason: HOSPADM

## 2019-09-20 RX ORDER — ROCURONIUM BROMIDE 10 MG/ML
INJECTION, SOLUTION INTRAVENOUS PRN
Status: DISCONTINUED | OUTPATIENT
Start: 2019-09-20 | End: 2019-09-20 | Stop reason: SDUPTHER

## 2019-09-20 RX ORDER — ACETAMINOPHEN 325 MG/1
650 TABLET ORAL EVERY 6 HOURS
Status: DISCONTINUED | OUTPATIENT
Start: 2019-09-20 | End: 2019-09-21 | Stop reason: HOSPADM

## 2019-09-20 RX ORDER — PHENYLEPHRINE HCL IN 0.9% NACL 0.5 MG/5ML
SYRINGE (ML) INTRAVENOUS PRN
Status: DISCONTINUED | OUTPATIENT
Start: 2019-09-20 | End: 2019-09-20 | Stop reason: SDUPTHER

## 2019-09-20 RX ORDER — FENTANYL CITRATE 50 UG/ML
INJECTION, SOLUTION INTRAMUSCULAR; INTRAVENOUS PRN
Status: DISCONTINUED | OUTPATIENT
Start: 2019-09-20 | End: 2019-09-20 | Stop reason: SDUPTHER

## 2019-09-20 RX ORDER — TORSEMIDE 20 MG/1
40 TABLET ORAL DAILY
Status: DISCONTINUED | OUTPATIENT
Start: 2019-09-20 | End: 2019-09-21 | Stop reason: HOSPADM

## 2019-09-20 RX ORDER — IPRATROPIUM BROMIDE AND ALBUTEROL SULFATE 2.5; .5 MG/3ML; MG/3ML
3 SOLUTION RESPIRATORY (INHALATION)
Status: DISCONTINUED | OUTPATIENT
Start: 2019-09-20 | End: 2019-09-21 | Stop reason: HOSPADM

## 2019-09-20 RX ORDER — POLYETHYLENE GLYCOL 3350 17 G/17G
17 POWDER, FOR SOLUTION ORAL DAILY
Status: DISCONTINUED | OUTPATIENT
Start: 2019-09-20 | End: 2019-09-21 | Stop reason: HOSPADM

## 2019-09-20 RX ORDER — GABAPENTIN 400 MG/1
400 CAPSULE ORAL 2 TIMES DAILY
Status: DISCONTINUED | OUTPATIENT
Start: 2019-09-20 | End: 2019-09-21 | Stop reason: HOSPADM

## 2019-09-20 RX ORDER — LANOLIN ALCOHOL/MO/W.PET/CERES
325 CREAM (GRAM) TOPICAL EVERY OTHER DAY
Status: DISCONTINUED | OUTPATIENT
Start: 2019-09-20 | End: 2019-09-21 | Stop reason: HOSPADM

## 2019-09-20 RX ORDER — TRAZODONE HYDROCHLORIDE 50 MG/1
50 TABLET ORAL NIGHTLY
Status: DISCONTINUED | OUTPATIENT
Start: 2019-09-20 | End: 2019-09-21 | Stop reason: HOSPADM

## 2019-09-20 RX ORDER — ONDANSETRON 2 MG/ML
INJECTION INTRAMUSCULAR; INTRAVENOUS PRN
Status: DISCONTINUED | OUTPATIENT
Start: 2019-09-20 | End: 2019-09-20 | Stop reason: SDUPTHER

## 2019-09-20 RX ORDER — TRAMADOL HYDROCHLORIDE 50 MG/1
50 TABLET ORAL EVERY EVENING
Status: DISCONTINUED | OUTPATIENT
Start: 2019-09-20 | End: 2019-09-21 | Stop reason: HOSPADM

## 2019-09-20 RX ORDER — FAMOTIDINE 20 MG/1
20 TABLET, FILM COATED ORAL 2 TIMES DAILY
Status: DISCONTINUED | OUTPATIENT
Start: 2019-09-20 | End: 2019-09-21 | Stop reason: HOSPADM

## 2019-09-20 RX ORDER — SENNA PLUS 8.6 MG/1
1 TABLET ORAL NIGHTLY
Status: DISCONTINUED | OUTPATIENT
Start: 2019-09-20 | End: 2019-09-21 | Stop reason: HOSPADM

## 2019-09-20 RX ORDER — LATANOPROST 50 UG/ML
1 SOLUTION/ DROPS OPHTHALMIC NIGHTLY
Status: DISCONTINUED | OUTPATIENT
Start: 2019-09-20 | End: 2019-09-21 | Stop reason: HOSPADM

## 2019-09-20 RX ORDER — MIDODRINE HYDROCHLORIDE 5 MG/1
10 TABLET ORAL
Status: DISCONTINUED | OUTPATIENT
Start: 2019-09-20 | End: 2019-09-21 | Stop reason: HOSPADM

## 2019-09-20 RX ORDER — METOPROLOL SUCCINATE 25 MG/1
25 TABLET, EXTENDED RELEASE ORAL DAILY
Status: DISCONTINUED | OUTPATIENT
Start: 2019-09-20 | End: 2019-09-21 | Stop reason: HOSPADM

## 2019-09-20 RX ORDER — PAROXETINE 10 MG/1
10 TABLET, FILM COATED ORAL DAILY
Status: DISCONTINUED | OUTPATIENT
Start: 2019-09-20 | End: 2019-09-21 | Stop reason: HOSPADM

## 2019-09-20 RX ORDER — MAGNESIUM HYDROXIDE 1200 MG/15ML
LIQUID ORAL CONTINUOUS PRN
Status: COMPLETED | OUTPATIENT
Start: 2019-09-20 | End: 2019-09-20

## 2019-09-20 RX ORDER — SODIUM CHLORIDE, SODIUM LACTATE, POTASSIUM CHLORIDE, CALCIUM CHLORIDE 600; 310; 30; 20 MG/100ML; MG/100ML; MG/100ML; MG/100ML
INJECTION, SOLUTION INTRAVENOUS CONTINUOUS
Status: DISCONTINUED | OUTPATIENT
Start: 2019-09-20 | End: 2019-09-20

## 2019-09-20 RX ORDER — SODIUM CHLORIDE 0.9 % (FLUSH) 0.9 %
10 SYRINGE (ML) INJECTION PRN
Status: DISCONTINUED | OUTPATIENT
Start: 2019-09-20 | End: 2019-09-21 | Stop reason: HOSPADM

## 2019-09-20 RX ORDER — POTASSIUM CHLORIDE 750 MG/1
10 CAPSULE, EXTENDED RELEASE ORAL 2 TIMES DAILY
Status: DISCONTINUED | OUTPATIENT
Start: 2019-09-20 | End: 2019-09-21 | Stop reason: HOSPADM

## 2019-09-20 RX ORDER — SIMVASTATIN 40 MG
40 TABLET ORAL NIGHTLY
Status: DISCONTINUED | OUTPATIENT
Start: 2019-09-20 | End: 2019-09-21 | Stop reason: HOSPADM

## 2019-09-20 RX ORDER — AMIODARONE HYDROCHLORIDE 200 MG/1
200 TABLET ORAL DAILY
Status: DISCONTINUED | OUTPATIENT
Start: 2019-09-20 | End: 2019-09-21 | Stop reason: HOSPADM

## 2019-09-20 RX ADMIN — ROCURONIUM BROMIDE 50 MG: 10 INJECTION INTRAVENOUS at 12:43

## 2019-09-20 RX ADMIN — SUGAMMADEX 200 MG: 100 INJECTION, SOLUTION INTRAVENOUS at 13:35

## 2019-09-20 RX ADMIN — ROCURONIUM BROMIDE 10 MG: 10 INJECTION INTRAVENOUS at 13:24

## 2019-09-20 RX ADMIN — SUGAMMADEX 100 MG: 100 INJECTION, SOLUTION INTRAVENOUS at 13:47

## 2019-09-20 RX ADMIN — FENTANYL CITRATE 50 MCG: 50 INJECTION INTRAMUSCULAR; INTRAVENOUS at 12:41

## 2019-09-20 RX ADMIN — LIDOCAINE HYDROCHLORIDE 40 MG: 10 INJECTION, SOLUTION EPIDURAL; INFILTRATION; INTRACAUDAL; PERINEURAL at 12:41

## 2019-09-20 RX ADMIN — MIDAZOLAM HYDROCHLORIDE 2 MG: 1 INJECTION, SOLUTION INTRAMUSCULAR; INTRAVENOUS at 12:41

## 2019-09-20 RX ADMIN — CEFAZOLIN 2000 MG: 1 INJECTION, POWDER, FOR SOLUTION INTRAMUSCULAR; INTRAVENOUS at 12:56

## 2019-09-20 RX ADMIN — LIDOCAINE HYDROCHLORIDE 10 MG: 10 INJECTION, SOLUTION EPIDURAL; INFILTRATION; INTRACAUDAL; PERINEURAL at 12:39

## 2019-09-20 RX ADMIN — ROCURONIUM BROMIDE 10 MG: 10 INJECTION INTRAVENOUS at 13:28

## 2019-09-20 RX ADMIN — ONDANSETRON 4 MG: 2 INJECTION, SOLUTION INTRAMUSCULAR; INTRAVENOUS at 13:48

## 2019-09-20 RX ADMIN — FUROSEMIDE 10 MG: 10 INJECTION, SOLUTION INTRAMUSCULAR; INTRAVENOUS at 16:43

## 2019-09-20 RX ADMIN — MIDAZOLAM HYDROCHLORIDE 1 MG: 1 INJECTION, SOLUTION INTRAMUSCULAR; INTRAVENOUS at 12:44

## 2019-09-20 RX ADMIN — TRAZODONE HYDROCHLORIDE 50 MG: 50 TABLET ORAL at 21:13

## 2019-09-20 RX ADMIN — Medication 50 MCG: at 13:02

## 2019-09-20 RX ADMIN — SODIUM CHLORIDE, POTASSIUM CHLORIDE, SODIUM LACTATE AND CALCIUM CHLORIDE: 600; 310; 30; 20 INJECTION, SOLUTION INTRAVENOUS at 11:37

## 2019-09-20 RX ADMIN — SUGAMMADEX 100 MG: 100 INJECTION, SOLUTION INTRAVENOUS at 13:49

## 2019-09-20 RX ADMIN — GABAPENTIN 400 MG: 400 CAPSULE ORAL at 21:13

## 2019-09-20 RX ADMIN — FENTANYL CITRATE 50 MCG: 50 INJECTION INTRAMUSCULAR; INTRAVENOUS at 12:37

## 2019-09-20 RX ADMIN — Medication 100 MCG: at 13:15

## 2019-09-20 RX ADMIN — ACETAMINOPHEN 650 MG: 325 TABLET ORAL at 21:13

## 2019-09-20 RX ADMIN — Medication 50 MCG: at 13:00

## 2019-09-20 ASSESSMENT — PULMONARY FUNCTION TESTS
PIF_VALUE: 22
PIF_VALUE: 1
PIF_VALUE: 22
PIF_VALUE: 23
PIF_VALUE: 22
PIF_VALUE: 24
PIF_VALUE: 24
PIF_VALUE: 23
PIF_VALUE: 6
PIF_VALUE: 23
PIF_VALUE: 6
PIF_VALUE: 24
PIF_VALUE: 23
PIF_VALUE: 0
PIF_VALUE: 0
PIF_VALUE: 24
PIF_VALUE: 24
PIF_VALUE: 25
PIF_VALUE: 2
PIF_VALUE: 22
PIF_VALUE: 2
PIF_VALUE: 5
PIF_VALUE: 24
PIF_VALUE: 23
PIF_VALUE: 20
PIF_VALUE: 22
PIF_VALUE: 0
PIF_VALUE: 22
PIF_VALUE: 0
PIF_VALUE: 23
PIF_VALUE: 22
PIF_VALUE: 1
PIF_VALUE: 26
PIF_VALUE: 22
PIF_VALUE: 23
PIF_VALUE: 24
PIF_VALUE: 20
PIF_VALUE: 1
PIF_VALUE: 23
PIF_VALUE: 22
PIF_VALUE: 6
PIF_VALUE: 22
PIF_VALUE: 2
PIF_VALUE: 4
PIF_VALUE: 22
PIF_VALUE: 23
PIF_VALUE: 22
PIF_VALUE: 2
PIF_VALUE: 6
PIF_VALUE: 15
PIF_VALUE: 3
PIF_VALUE: 21
PIF_VALUE: 24
PIF_VALUE: 2
PIF_VALUE: 22
PIF_VALUE: 0
PIF_VALUE: 25
PIF_VALUE: 23
PIF_VALUE: 0
PIF_VALUE: 1
PIF_VALUE: 24
PIF_VALUE: 22
PIF_VALUE: 22
PIF_VALUE: 0
PIF_VALUE: 15
PIF_VALUE: 0
PIF_VALUE: 22
PIF_VALUE: 24
PIF_VALUE: 23
PIF_VALUE: 17
PIF_VALUE: 22
PIF_VALUE: 0
PIF_VALUE: 23
PIF_VALUE: 22
PIF_VALUE: 7
PIF_VALUE: 5
PIF_VALUE: 23
PIF_VALUE: 24
PIF_VALUE: 22
PIF_VALUE: 5
PIF_VALUE: 23
PIF_VALUE: 8
PIF_VALUE: 7
PIF_VALUE: 24
PIF_VALUE: 23
PIF_VALUE: 21

## 2019-09-20 ASSESSMENT — PAIN SCALES - GENERAL
PAINLEVEL_OUTOF10: 0
PAINLEVEL_OUTOF10: 4
PAINLEVEL_OUTOF10: 0

## 2019-09-20 ASSESSMENT — PAIN - FUNCTIONAL ASSESSMENT: PAIN_FUNCTIONAL_ASSESSMENT: 0-10

## 2019-09-20 ASSESSMENT — ENCOUNTER SYMPTOMS: SHORTNESS OF BREATH: 0

## 2019-09-20 NOTE — H&P
6/13/19  Yes Altaf Mcmillan MD   fenofibrate (TRICOR) 54 MG tablet Take 2.5 tablets by mouth daily [facility]: Please dispense generic fenofibrate unless prescriber denote 6/13/19  Yes Altaf Mcmillan MD   simvastatin (ZOCOR) 40 MG tablet Take 1 tablet by mouth nightly 6/13/19  Yes Altaf Mcmillan MD   metoprolol succinate (TOPROL XL) 25 MG extended release tablet Take 1 tablet by mouth daily 6/13/19  Yes Altaf Mcmillan MD   ferrous sulfate 325 (65 Fe) MG EC tablet Take 1 tablet by mouth every other day 6/14/19  Yes Altaf Mcmillan MD   tamsulosin (FLOMAX) 0.4 MG capsule Take 1 capsule by mouth daily 6/13/19  Yes Altaf Mcmillan MD   latanoprost (XALATAN) 0.005 % ophthalmic solution Place 1 drop into both eyes nightly 6/13/19  Yes Altaf Mcmillan MD   midodrine (PROAMATINE) 10 MG tablet Take 1 tablet by mouth 3 times daily (with meals) 6/13/19  Yes Altaf Mcmillan MD   PARoxetine (PAXIL) 10 MG tablet Take 1 tablet by mouth daily  Patient taking differently: Take 20 mg by mouth daily  8/26/18  Yes BI Cuevas   docusate sodium (COLACE, DULCOLAX) 100 MG CAPS Take 100 mg by mouth 2 times daily 8/26/18  Yes BI Cuevas   potassium chloride (MICRO-K) 10 MEQ extended release capsule Take 10 mEq by mouth 2 times daily    Yes Historical Provider, MD   traZODone (DESYREL) 50 MG tablet Take 1 tablet by mouth nightly 2/27/18  Yes Historical Provider, MD   albuterol sulfate  (90 Base) MCG/ACT inhaler Inhale 1 puff into the lungs every 6 hours as needed for Wheezing   Yes Historical Provider, MD   famotidine (PEPCID) 20 MG tablet Take 1 tablet by mouth 2 times daily  Patient taking differently: Take 20 mg by mouth daily  12/17/17  Yes Kem Thurston MD       Allergies:  Lisinopril and Nuts [peanut-containing drug products]    Social History:    Social History     Socioeconomic History    Marital status:      Spouse name: Not on file    Number of children: Not on file    Years of education: Not on file   [facility] Highest education level: Not on file   Occupational History    Occupation: Retired   Social Needs    Financial resource strain: Not on file    Food insecurity:     Worry: Not on file     Inability: Not on file   Multigig needs:     Medical: Not on file     Non-medical: Not on file   Tobacco Use    Smoking status: Former Smoker     Packs/day: 0.50     Years: 30.00     Pack years: 15.00     Types: Cigarettes     Last attempt to quit: 8/15/2018     Years since quittin.0    Smokeless tobacco: Never Used    Tobacco comment: a little less than a pack a day   Substance and Sexual Activity    Alcohol use: Not Currently     Comment: rarely    Drug use: Not Currently     Types: Marijuana     Comment: last use 4months ago    Sexual activity: Not on file   Lifestyle    Physical activity:     Days per week: Not on file     Minutes per session: Not on file    Stress: Not on file   Relationships    Social connections:     Talks on phone: Not on file     Gets together: Not on file     Attends Jewish service: Not on file     Active member of club or organization: Not on file     Attends meetings of clubs or organizations: Not on file     Relationship status: Not on file    Intimate partner violence:     Fear of current or ex partner: Not on file     Emotionally abused: Not on file     Physically abused: Not on file     Forced sexual activity: Not on file   Other Topics Concern    Not on file   Social History Narrative    Not on file       Family History:    Family History   Problem Relation Age of Onset    Diabetes Mother     High Blood Pressure Mother     Stroke Father     High Blood Pressure Father     Other Sister         PE    Asthma Sister     Stroke Brother     Asthma Brother     Diabetes Brother     Heart Disease Paternal Grandfather     Prostate Cancer Paternal Grandfather     Asthma Sister     Asthma Sister     Asthma Brother     Asthma Brother     Asthma Sister        REVIEW OF SYSTEMS:  Constitutional: negative  Eyes: negative  Respiratory: negative  Cardiovascular: negative  Gastrointestinal: negative  Genitourinary: no acute issues  Musculoskeletal: negative  Skin: negative   Neurological: negative  Hematological/Lymphatic: negative  Psychological: negative    PHYSICAL EXAM:    Patient Vitals for the past 24 hrs:   BP Temp Temp src Pulse Resp SpO2 Height Weight   09/20/19 1104 116/88 97 °F (36.1 °C) Temporal 72 14 99 % 5' 9\" (1.753 m) 173 lb (78.5 kg)     Constitutional: Patient in NAD  Neuro: Alert and oriented to person, place, and time  Psych: Mood and affect normal  Skin: Clean, dry, intact   Lungs: Respiratory effort normal, CTA  Cardiovascular:  Normal peripheral pulses; no murmur. Normal rhythm  Abdomen: Soft, non-tender, non-distended, no hepatosplenomegaly or hernia, CVA tenderness none  Bladder: Non-tender and non-disdended   : Non-tender, skin intact, no lesions       LABS:   No results for input(s): WBC, HGB, HCT, MCV, PLT in the last 72 hours. No results for input(s): NA, K, CL, CO2, PHOS, BUN, CREATININE in the last 72 hours. Invalid input(s): CA  No results found for: PSA      Urinalysis: No results for input(s): COLORU, PHUR, LABCAST, WBCUA, RBCUA, MUCUS, TRICHOMONAS, YEAST, BACTERIA, CLARITYU, SPECGRAV, LEUKOCYTESUR, UROBILINOGEN, Alejandra Janna in the last 72 hours.     Invalid input(s): NITRATE, GLUCOSEUKETONESUAMORPHOUS     -----------------------------------------------------------------    ASSESSMENT AND PLAN:    Impression:    Bladder mass     Patient Active Problem List   Diagnosis    Chronic systolic congestive heart failure (HCC)    Multiple vessel coronary artery disease    [facility]    Chronic obstructive pulmonary disease (HCC)    Low hemoglobin    Other [facility]    S/P ICD (internal cardiac defibrillator) procedure    Ischemic cardiomyopathy    IPMN (intraductal papillary mucinous neoplasm)

## 2019-09-20 NOTE — ANESTHESIA PRE PROCEDURE
Department of Anesthesiology  Preprocedure Note       Name:  Dave Madsen Age:  69 y.o.  :  1945                                          MRN:  [id number]         Date:  2019      Surgeon: Paris Litten):  Gil Nino MD    Procedure: CYSTOSCOPY, TUR BLADDER TUMOR WITH GYRUS (N/A )    Medications prior to admission:   Prior to Admission medications    Medication Sig Start Date End Date Taking? Authorizing Provider   senna (SENOKOT) 8.6 MG tablet Take 1 tablet by mouth nightly   Yes Historical Provider, MD   aspirin 81 MG tablet Take 81 mg by mouth daily LAST DOSE 2019   Yes Historical Provider, MD   polyethylene glycol (MIRALAX) PACK packet Take 17 g by mouth daily   Yes Historical Provider, MD   gabapentin (NEURONTIN) 400 MG capsule Take 400 mg by mouth 2 times daily. Indications: [facility]   Yes Historical Provider, MD   torsemide (DEMADEX) 20 MG tablet Take 2 tablets by mouth daily 7/12/19 10/10/19 Yes Iesha Rodas MD   rivaroxaban (XARELTO) 20 MG TABS tablet Take 20 mg by mouth daily (with breakfast) LAST DOSE 09/15/2019   Yes Historical Provider, MD   traMADol (ULTRAM) 50 MG tablet Take 50 mg by mouth every evening.    Yes Historical Provider, MD   dextran 70-hypromellose (TEARS NATURALE) 0.1-0.3 % SOLN opthalmic solution Place 1 drop into both eyes every 4 hours as needed   Yes Historical Provider, MD   amiodarone (CORDARONE) 200 MG tablet Take 1 tablet by mouth daily 19  Yes Altaf Mcmillan MD   ipratropium-albuterol (DUONEB) 0.5-2.5 (3) MG/3ML SOLN nebulizer solution Inhale 3 mLs into the lungs every 4 hours (while awake)  Patient taking differently: Inhale 3 mLs into the lungs every 4 hours (while awake) TAKES AS NEEDED 19  Yes Altaf Mcmillan MD   fenofibrate (TRICOR) 54 MG tablet Take 2.5 tablets by mouth daily s [facility] pharmacy: Please dispense generic fenofibrate unless prescriber denote 19  Yes Altaf Mcmillan MD   simvastatin (ZOCOR) 40 MG tablet Take 1 tablet by mouth nightly 6/13/19  Yes David Topete MD   metoprolol succinate (TOPROL XL) 25 MG extended release tablet Take 1 tablet by mouth daily 6/13/19  Yes David Topete MD   ferrous sulfate 325 (65 Fe) MG EC tablet Take 1 tablet by mouth every other day 6/14/19  Yes David Topete MD   tamsulosin (FLOMAX) 0.4 MG capsule Take 1 capsule by mouth daily 6/13/19  Yes David Topete MD   latanoprost (XALATAN) 0.005 % ophthalmic solution Place 1 drop into both eyes nightly 6/13/19  Yes David Topete MD   midodrine (PROAMATINE) 10 MG tablet Take 1 tablet by mouth 3 times daily (with meals) 6/13/19  Yes David Topete MD   PARoxetine (PAXIL) 10 MG tablet Take 1 tablet by mouth daily  Patient taking differently: Take 20 mg by mouth daily  8/26/18  Yes BI Matthew   docusate sodium (COLACE, DULCOLAX) 100 MG CAPS Take 100 mg by mouth 2 times daily 8/26/18  Yes BI Matthew   potassium chloride (MICRO-K) 10 MEQ extended release capsule Take 10 mEq by mouth 2 times daily    Yes Historical Provider, MD   traZODone (DESYREL) 50 MG tablet Take 1 tablet by mouth nightly 2/27/18  Yes Historical Provider, MD   albuterol sulfate  (90 Base) MCG/ACT inhaler Inhale 1 puff into the lungs every 6 hours as needed for Wheezing   Yes Historical Provider, MD   famotidine (PEPCID) 20 MG tablet Take 1 tablet by mouth 2 times daily  Patient taking differently: Take 20 mg by mouth daily  12/17/17  Yes Lety Driver MD       Current medications:    No current facility-administered medications for this encounter. Allergies:     Allergies   Allergen Reactions    Lisinopril Other (See Comments)     Dry cough    Nuts [Peanut-Containing Drug Products] Other (See Comments)     PEANUTS--abd pain CAN EAT PEANUT BUTTER JUST NOT RAW PEANUTS         Problem List:    Patient Active Problem List   Diagnosis Code    Chronic systolic congestive heart failure (HCC) I50.22    Multiple vessel coronary artery disease I25.10    Multiple myeloma in CYSTOSCOPY  2019    HERNIA REPAIR Right 1965    INGUINAL    NC CABG, ARTERY-VEIN, SINGLE N/A 2018    CABG CORONARY ARTERY BYPASS ON  PUMP x 2, DEV AGUILAR ORESTES ([facility]# [id number]) performed by Gustavo Louis MD at [address] FLX DX W/COLLAZALEA [place]  PFRMD N/A 2018    COLONOSCOPY performed by Urszula Del Cid MD at [address] ESOPHAGOGASTRODUODENOSCOPY TRANSORAL DIAGNOSTIC N/A 2018    EGD DIAGNOSTIC ONLY performed by Pérez Mcallister MD at [facility] [address] Right 2018       Social History:    Social History     Tobacco Use    Smoking status: Former Smoker     Packs/day: 0.50     Years: 30.00     Pack years: 15.00     Types: Cigarettes     Last attempt to quit: 8/15/2018     Years since quittin.0    Smokeless tobacco: Never Used    Tobacco comment: a little less than a pack a day   Substance Use Topics    Alcohol use: Not Currently     Comment: rarely                                Counseling given: Not Answered  Comment: a little less than a pack a day      Vital Signs (Current):   Vitals:    19 1044 19 1104   BP:  116/88   Pulse:  72   Resp:  14   Temp:  97 °F (36.1 °C)   TempSrc:  Temporal   SpO2:  99%   Weight: 173 lb (78.5 kg) 173 lb (78.5 kg)   Height: 5' 9\" (1.753 m) 5' 9\" (1.753 m)                                              BP Readings from Last 3 Encounters:   19 116/88   19 103/68   19 103/60       NPO Status: Time of last liquid consumption: 0100                        Time of last solid consumption: 2200                        Date of last liquid consumption: 19                        Date of last solid food consumption: 19    BMI:   Wt Readings from Last 3 Encounters:   19 173 lb (78.5 kg)   19 178 lb (80.7 kg)   19 178 lb 12.8 oz (81.1 kg)     Body mass index is 25.55 kg/m².     CBC:   Lab Results   Component Value Date    WBC 7.3 2019    RBC 4.64 2019    HGB 12.8

## 2019-09-21 ENCOUNTER — APPOINTMENT (OUTPATIENT)
Dept: GENERAL RADIOLOGY | Age: 74
End: 2019-09-21
Attending: UROLOGY
Payer: MEDICARE

## 2019-09-21 VITALS
RESPIRATION RATE: 18 BRPM | WEIGHT: 173 LBS | BODY MASS INDEX: 25.62 KG/M2 | HEIGHT: 69 IN | OXYGEN SATURATION: 96 % | DIASTOLIC BLOOD PRESSURE: 86 MMHG | TEMPERATURE: 97.7 F | SYSTOLIC BLOOD PRESSURE: 113 MMHG | HEART RATE: 69 BPM

## 2019-09-21 LAB
ANION GAP SERPL CALCULATED.3IONS-SCNC: 14 MMOL/L (ref 9–17)
BUN BLDV-MCNC: 38 MG/DL (ref 8–23)
BUN/CREAT BLD: ABNORMAL (ref 9–20)
CALCIUM SERPL-MCNC: 9 MG/DL (ref 8.6–10.4)
CHLORIDE BLD-SCNC: 102 MMOL/L (ref 98–107)
CO2: 27 MMOL/L (ref 20–31)
CREAT SERPL-MCNC: 1.42 MG/DL (ref 0.7–1.2)
GFR AFRICAN AMERICAN: 59 ML/MIN
GFR NON-AFRICAN AMERICAN: 49 ML/MIN
GFR SERPL CREATININE-BSD FRML MDRD: ABNORMAL ML/MIN/{1.73_M2}
GFR SERPL CREATININE-BSD FRML MDRD: ABNORMAL ML/MIN/{1.73_M2}
GLUCOSE BLD-MCNC: 91 MG/DL (ref 70–99)
HCT VFR BLD CALC: 38 % (ref 40.7–50.3)
HEMOGLOBIN: 10.5 G/DL (ref 13–17)
MCH RBC QN AUTO: 28.7 PG (ref 25.2–33.5)
MCHC RBC AUTO-ENTMCNC: 27.6 G/DL (ref 28.4–34.8)
MCV RBC AUTO: 103.8 FL (ref 82.6–102.9)
NRBC AUTOMATED: 0 PER 100 WBC
PDW BLD-RTO: 14.4 % (ref 11.8–14.4)
PLATELET # BLD: 183 K/UL (ref 138–453)
PMV BLD AUTO: 10.7 FL (ref 8.1–13.5)
POTASSIUM SERPL-SCNC: 4.1 MMOL/L (ref 3.7–5.3)
RBC # BLD: 3.66 M/UL (ref 4.21–5.77)
SODIUM BLD-SCNC: 143 MMOL/L (ref 135–144)
WBC # BLD: 9.6 K/UL (ref 3.5–11.3)

## 2019-09-21 PROCEDURE — 6370000000 HC RX 637 (ALT 250 FOR IP): Performed by: STUDENT IN AN ORGANIZED HEALTH CARE EDUCATION/TRAINING PROGRAM

## 2019-09-21 PROCEDURE — G0378 HOSPITAL OBSERVATION PER HR: HCPCS

## 2019-09-21 PROCEDURE — 94640 AIRWAY INHALATION TREATMENT: CPT

## 2019-09-21 PROCEDURE — 85027 COMPLETE CBC AUTOMATED: CPT

## 2019-09-21 PROCEDURE — 97161 PT EVAL LOW COMPLEX 20 MIN: CPT

## 2019-09-21 PROCEDURE — G0008 ADMIN INFLUENZA VIRUS VAC: HCPCS | Performed by: UROLOGY

## 2019-09-21 PROCEDURE — 97166 OT EVAL MOD COMPLEX 45 MIN: CPT

## 2019-09-21 PROCEDURE — 80048 BASIC METABOLIC PNL TOTAL CA: CPT

## 2019-09-21 PROCEDURE — 6360000002 HC RX W HCPCS: Performed by: UROLOGY

## 2019-09-21 PROCEDURE — 2580000003 HC RX 258: Performed by: STUDENT IN AN ORGANIZED HEALTH CARE EDUCATION/TRAINING PROGRAM

## 2019-09-21 PROCEDURE — 90686 IIV4 VACC NO PRSV 0.5 ML IM: CPT | Performed by: UROLOGY

## 2019-09-21 PROCEDURE — 73562 X-RAY EXAM OF KNEE 3: CPT

## 2019-09-21 PROCEDURE — 97116 GAIT TRAINING THERAPY: CPT

## 2019-09-21 PROCEDURE — 36415 COLL VENOUS BLD VENIPUNCTURE: CPT

## 2019-09-21 RX ORDER — OXYBUTYNIN CHLORIDE 5 MG/1
5 TABLET, EXTENDED RELEASE ORAL NIGHTLY
Status: DISCONTINUED | OUTPATIENT
Start: 2019-09-21 | End: 2019-09-21 | Stop reason: HOSPADM

## 2019-09-21 RX ORDER — ATROPA BELLADONNA AND OPIUM 16.2; 6 MG/1; MG/1
60 SUPPOSITORY RECTAL EVERY 8 HOURS PRN
Status: DISCONTINUED | OUTPATIENT
Start: 2019-09-21 | End: 2019-09-21 | Stop reason: HOSPADM

## 2019-09-21 RX ORDER — TRAMADOL HYDROCHLORIDE 50 MG/1
50 TABLET ORAL EVERY 6 HOURS PRN
Qty: 12 TABLET | Refills: 0 | Status: SHIPPED | OUTPATIENT
Start: 2019-09-21 | End: 2019-09-24

## 2019-09-21 RX ORDER — OXYBUTYNIN CHLORIDE 5 MG/1
5 TABLET, EXTENDED RELEASE ORAL NIGHTLY
Qty: 7 TABLET | Refills: 0 | Status: SHIPPED | OUTPATIENT
Start: 2019-09-22 | End: 2019-11-06

## 2019-09-21 RX ORDER — CEPHALEXIN 500 MG/1
500 CAPSULE ORAL 2 TIMES DAILY
Qty: 6 CAPSULE | Refills: 0 | Status: SHIPPED | OUTPATIENT
Start: 2019-09-21 | End: 2019-09-24

## 2019-09-21 RX ORDER — CEPHALEXIN 500 MG/1
500 CAPSULE ORAL 2 TIMES DAILY
Qty: 6 CAPSULE | Refills: 0 | Status: SHIPPED | OUTPATIENT
Start: 2019-09-21 | End: 2019-09-21 | Stop reason: SDUPTHER

## 2019-09-21 RX ADMIN — OXYBUTYNIN CHLORIDE 5 MG: 5 TABLET, EXTENDED RELEASE ORAL at 06:25

## 2019-09-21 RX ADMIN — ACETAMINOPHEN 650 MG: 325 TABLET ORAL at 08:40

## 2019-09-21 RX ADMIN — FAMOTIDINE 20 MG: 20 TABLET, FILM COATED ORAL at 14:02

## 2019-09-21 RX ADMIN — GABAPENTIN 400 MG: 400 CAPSULE ORAL at 14:04

## 2019-09-21 RX ADMIN — AMIODARONE HYDROCHLORIDE 200 MG: 200 TABLET ORAL at 14:05

## 2019-09-21 RX ADMIN — MIDODRINE HYDROCHLORIDE 10 MG: 5 TABLET ORAL at 14:03

## 2019-09-21 RX ADMIN — Medication 10 ML: at 08:43

## 2019-09-21 RX ADMIN — PAROXETINE 10 MG: 10 TABLET, FILM COATED ORAL at 14:05

## 2019-09-21 RX ADMIN — INFLUENZA A VIRUS A/BRISBANE/02/2018 IVR-190 (H1N1) ANTIGEN (PROPIOLACTONE INACTIVATED), INFLUENZA A VIRUS A/KANSAS/14/2017 X-327 (H3N2) ANTIGEN (PROPIOLACTONE INACTIVATED), INFLUENZA B VIRUS B/MARYLAND/15/2016 ANTIGEN (PROPIOLACTONE INACTIVATED), INFLUENZA B VIRUS B/PHUKET/3073/2013 BVR-1B ANTIGEN (PROPIOLACTONE INACTIVATED) 0.5 ML: 15; 15; 15; 15 INJECTION, SUSPENSION INTRAMUSCULAR at 14:32

## 2019-09-21 RX ADMIN — ACETAMINOPHEN 650 MG: 325 TABLET ORAL at 03:58

## 2019-09-21 RX ADMIN — TAMSULOSIN HYDROCHLORIDE 0.4 MG: 0.4 CAPSULE ORAL at 14:04

## 2019-09-21 RX ADMIN — ATROPA BELLADONNA AND OPIUM 60 MG: 16.2; 6 SUPPOSITORY RECTAL at 09:36

## 2019-09-21 RX ADMIN — IPRATROPIUM BROMIDE AND ALBUTEROL SULFATE 3 ML: .5; 3 SOLUTION RESPIRATORY (INHALATION) at 09:51

## 2019-09-21 RX ADMIN — POTASSIUM CHLORIDE 10 MEQ: 750 CAPSULE, EXTENDED RELEASE ORAL at 14:01

## 2019-09-21 RX ADMIN — TORSEMIDE 40 MG: 20 TABLET ORAL at 14:01

## 2019-09-21 RX ADMIN — FENOFIBRATE 135 MG: 54 TABLET ORAL at 14:04

## 2019-09-21 RX ADMIN — METOPROLOL SUCCINATE 25 MG: 25 TABLET, EXTENDED RELEASE ORAL at 14:03

## 2019-09-21 RX ADMIN — IPRATROPIUM BROMIDE AND ALBUTEROL SULFATE 3 ML: .5; 3 SOLUTION RESPIRATORY (INHALATION) at 14:45

## 2019-09-21 ASSESSMENT — PAIN SCALES - GENERAL
PAINLEVEL_OUTOF10: 3
PAINLEVEL_OUTOF10: 3
PAINLEVEL_OUTOF10: 2

## 2019-09-21 NOTE — DISCHARGE SUMMARY
DISCHARGE SUMMARY NOTE:      Patient Identification  PATIENT: Martinez Villanueva. is a 69 y.o. male. MRN: [id number]  :  1945  Admit Date:  2019  Discharge date:   19                                  Disposition: home  Discharged Condition:  good  Discharge Diagnoses:   Patient Active Problem List   Diagnosis    Chronic systolic congestive heart failure (HCC)    Multiple vessel coronary artery disease    [facility]    Chronic obstructive pulmonary disease (HCC)    Low hemoglobin    Other drug-induced pancytopenia ([facility]    S/P ICD (internal cardiac defibrillator) procedure    Ischemic cardiomyopathy    IPMN (intraductal papillary mucinous neoplasm)    History of coronary artery bypass graft    CKD (chronic kidney disease), stage III ([facility]    Valvular heart disease    Therapeutic opioid induced constipation    Bladder tumor       Consults: cardiology    Surgery: CYSTOSCOPY, TUR BLADDER TUMOR WITH GYRUS     Patient Instructions: Activity: no lifting more than 10 lbs, no driving under the influence of pain medications, no driving until minor catheter is removed and no tub baths or submerging of wounds under water  Diet: As tolerated  Patient told to follow up with Lincoln Zuluaga MD in 1 week(s). Discharge Medications:    Zay Lynch.    Home Medication Instructions C:[id number]    Printed on:19 1003   Medication Information                      albuterol sulfate  (90 Base) MCG/ACT inhaler  Inhale 1 puff into the lungs every 6 hours as needed for Wheezing             amiodarone (CORDARONE) 200 MG tablet  Take 1 tablet by mouth daily             aspirin 81 MG tablet  Take 81 mg by mouth daily LAST DOSE 2019             cephALEXin (KEFLEX) 500 MG capsule  Take 1 capsule by mouth 2 times daily for 3 days             dextran 70-hypromellose (TEARS NATURALE) 0.1-0.3 % SOLN opthalmic solution  Place 1 drop into both eyes every 4 hours as needed by mouth nightly                 Hospital course: Tea Schumacher is an 69 y.o. that underwent a  Transurethral Urethral Resection of bladder tumor on 9/20/2019. He was desaturating requiring nasal cannula oxygen in the postoperative care unit with oxygen down to 80s percent. He does have a history of coronary artery disease and ischemic cardiomyopathy with known ejection fraction of 15% and congestive heart failure. Therefore he was monitored overnight and cardiology was consulted. They were okay with discharge. He was weaned off oxygen. He did have bladder spasms for which he was given a B&O suppository as well as Ditropan. The patients hemoglobin was stable at 10.5 from 10.7. He did have an episode a weakness when trying to stand up from the toilet and did fall to his knee without hitting his head or any loss of consciousness. This was his right knee which had a superficial skin tear that was wash with soap and water and bandaged appropriately. Physical therapy approved him for discharge and and knee x-ray was obtained which was without acute abnormality. We did offer the patient rehab which he adamantly declined despite recommendations from physician and physical therapy. He voided understanding or risks. The patient had pain controlled with PO meds and tolerated diet . The patient was afebrile for 24 hrs before discharge. The patient agrees to discharge, understands discharge instructions, and agrees to follow up. He will take Keflex for 3 days. He can restart Xarelto and Aspirin on 9/23/2019.        Nona Solares  10:03 AM 9/21/2019

## 2019-09-21 NOTE — CONSULTS
Corrinne Fryer, MD   midodrine (PROAMATINE) 10 MG tablet Take 1 tablet by mouth 3 times daily (with meals) 6/13/19  Yes Corrinne Fryer, MD   PARoxetine (PAXIL) 10 MG tablet Take 1 tablet by mouth daily  Patient taking differently: Take 20 mg by mouth daily  8/26/18  Yes BI Vaughn   docusate sodium (COLACE, DULCOLAX) 100 MG CAPS Take 100 mg by mouth 2 times daily 8/26/18  Yes BI Vaughn   potassium chloride (MICRO-K) 10 MEQ extended release capsule Take 10 mEq by mouth 2 times daily    Yes Historical Provider, MD   traZODone (DESYREL) 50 MG tablet Take 1 tablet by mouth nightly 2/27/18  Yes Historical Provider, MD   albuterol sulfate  (90 Base) MCG/ACT inhaler Inhale 1 puff into the lungs every 6 hours as needed for Wheezing   Yes Historical Provider, MD   famotidine (PEPCID) 20 MG tablet Take 1 tablet by mouth 2 times daily  Patient taking differently: Take 20 mg by mouth daily  12/17/17  Yes Ramy Mcdaniels MD       Current Medications: Scheduled Meds:   oxybutynin  5 mg Oral Nightly    ceFAZolin  2 g Intravenous Once    famotidine  20 mg Oral BID    traZODone  50 mg Oral Nightly    potassium chloride  10 mEq Oral BID    PARoxetine  10 mg Oral Daily    docusate sodium  100 mg Oral BID    amiodarone  200 mg Oral Daily    ipratropium-albuterol  3 mL Inhalation Q4H WA    fenofibrate  135 mg Oral Daily    simvastatin  40 mg Oral Nightly    metoprolol succinate  25 mg Oral Daily    ferrous sulfate  325 mg Oral Every Other Day    tamsulosin  0.4 mg Oral Daily    latanoprost  1 drop Both Eyes Nightly    midodrine  10 mg Oral TID WC    traMADol  50 mg Oral QPM    torsemide  40 mg Oral Daily    gabapentin  400 mg Oral BID    senna  1 tablet Oral Nightly    polyethylene glycol  17 g Oral Daily    sodium chloride flush  10 mL Intravenous 2 times per day    acetaminophen  650 mg Oral Q6H    influenza virus vaccine  0.5 mL Intramuscular Once     Continuous Infusions:  PRN Meds:.albuterol

## 2019-09-21 NOTE — PLAN OF CARE
Problem: Falls - Risk of:  Goal: Will remain free from falls  Description  Will remain free from falls  Outcome: Ongoing  Pt remained free from falls this shift. Pt was educated on the use of the call light and the importance of non skid footwear. Pt remains positioned in bed with call light within reach and side rails up x2.    Goal: Absence of physical injury  Description  Absence of physical injury  Outcome: Ongoing     Problem: Pain:  Goal: Pain level will decrease  Description  Pain level will decrease  Outcome: Ongoing  Goal: Control of acute pain  Description  Control of acute pain  Outcome: Ongoing  Goal: Control of chronic pain  Description  Control of chronic pain  Outcome: Ongoing

## 2019-09-21 NOTE — ANESTHESIA POSTPROCEDURE EVALUATION
Department of Anesthesiology  Postprocedure Note    Patient: Lincoln Garg MRN: [id number]  YOB: 1945  Date of evaluation: 9/20/2019  Time:  9:31 PM     Procedure Summary     Date:  09/20/19 Room / Location:  [facility] OR  / [facility] OR    Anesthesia Start:  1225 Anesthesia Stop:  1401    Procedure:  CYSTOSCOPY, TUR BLADDER TUMOR WITH GYRUS (N/A ) Diagnosis:  (BLADDER TUMOR)    Surgeon:  Rakan Eason MD Responsible Provider:  Conrad Chapman MD    Anesthesia Type:  general ASA Status:  4          Anesthesia Type: general    Иван Phase I: Иван Score: 9    Иван Phase II:      Last vitals: Reviewed and per EMR flowsheets.        Anesthesia Post Evaluation    Patient location during evaluation: PACU  Patient participation: complete - patient participated  Level of consciousness: awake and alert  Pain score: 0  Airway patency: patent  Nausea & Vomiting: no nausea and no vomiting  Complications: no  Cardiovascular status: hemodynamically stable  Respiratory status: room air  Hydration status: euvolemic

## 2019-09-21 NOTE — PLAN OF CARE
Urology     Patient was weak getting up from the toilet did fall to his knees did not hit his head. He did scrape his right knee with superficial skin tear. On exam he does not appear to have significant swelling or musculoskeletal deformity of his right knee. We will ask physical therapy to evaluate as he does have weakness with ambulation. Right knee x-ray to evaluate for any fracture. Will postpone discharge until evaluation complete.     Becca Breaux MD  9/21/19 11:06 AM

## 2019-09-21 NOTE — PROGRESS NOTES
Cataract removal with implant (Left, 04/2019); Cardiac catheterization (12/13/2017); Cardiac defibrillator placement (12/17/2018); and bladder tumor excision (09/20/2019). Restrictions  Restrictions/Precautions  Restrictions/Precautions: General Precautions, Up as Tolerated  Required Braces or Orthoses?: No  Position Activity Restriction  Other position/activity restrictions: urology requested stat while on floor for discharge home  Vision/Hearing  Vision: Within Functional Limits  Hearing: Within functional limits     Subjective  General  Chart Reviewed: Yes  Patient assessed for rehabilitation services?: Yes  Family / Caregiver Present: No  Follows Commands: Within Functional Limits  Pain Screening  Patient Currently in Pain: No  Vital Signs  Patient Currently in Pain: No       Orientation  Orientation  Overall Orientation Status: Within Functional Limits  Social/Functional History  Social/Functional History  Lives With: Spouse  Type of Home: House  Home Layout: One level  Home Access: Level entry  Bathroom Shower/Tub: Tub/Shower unit  Bathroom Equipment: Shower chair  Home Equipment: Rolling walker  ADL Assistance: Independent  Homemaking Assistance: Independent  Homemaking Responsibilities: Yes(Shared with spouse)  Ambulation Assistance: Independent  Transfer Assistance: Independent  Active : Yes  Mode of Transportation: Car  Occupation: Retired  Additional Comments: Pt reports he sleeps in his \"man cave\", level entry from garage. Plans to remain on this floor for recovery with bathroom on this level. Kitchen and others bedrooms on main floor of home up flight of stairs. Cognition   Cognition  Overall Cognitive Status: Exceptions  Arousal/Alertness: Appropriate responses to stimuli  Following Commands:  Follows all commands without difficulty  Attention Span: Appears intact  Memory: Appears intact  Safety Judgement: Decreased awareness of need for safety  Insights: Decreased awareness of deficits    Objective          AROM RLE (degrees)  RLE AROM: WFL  AROM LLE (degrees)  LLE AROM : WFL  AROM RUE (degrees)  RUE AROM : WFL  AROM LUE (degrees)  LUE AROM : WFL  Strength RLE  Strength RLE: Exception  R Hip Flexion: 3+/5  R Knee Flexion: 3+/5  R Knee Extension: 4-/5  R Ankle Dorsiflexion: 4-/5  R Ankle Plantar flexion: 4-/5  Strength LLE  Strength LLE: Exception  L Hip Flexion: 4-/5  L Knee Flexion: 4-/5  L Knee Extension: 4-/5  L Ankle Dorsiflexion: 4-/5  L Ankle Plantar Flexion: 4-/5  Strength RUE  Comment: see OT assessment  Strength LUE  Comment: see OT assessment     Sensation  Overall Sensation Status: WFL  Bed mobility  Rolling to Left: Contact guard assistance  Supine to Sit: Contact guard assistance  Scooting: Contact guard assistance  Comment: use of bedrails for mobility, CGA for safety  Transfers  Sit to Stand: Contact guard assistance  Stand to sit: Contact guard assistance  Comment: Pt mildly impulsive  Ambulation  Ambulation?: Yes  More Ambulation?: No  Ambulation 1  Surface: level tile  Device: Rolling Walker  Assistance: Contact guard assistance  Quality of Gait: forward posture  Gait Deviations: Slow Concepción  Distance: 200 ft  Comments: Repeated cues to stay inside of rw for support and safety, limited follow through noted  Stairs/Curb  Stairs?: No     Balance  Posture: Good  Sitting - Static: Good  Sitting - Dynamic: Good;-  Standing - Static: Fair;+  Standing - Dynamic: Fair  Comments: standing balance with B UE support    Pt educated throughout ambulation and upon return to room of importance of rw and UE support for safety. Pt with episode of weakness early this date. Pt resistive to idea of using rw in  Home, feels it is not necessary. Trial ambulation of 8 ft without device, pt leaning forward and reaching for walls/support with Yajaira for balance.   RN and MD made aware of pt needs    Plan   Plan  Times per week: 5-6x/day  Current Treatment Recommendations: Strengthening,

## 2019-09-21 NOTE — PROGRESS NOTES
Occupational Therapy   Occupational Therapy Initial Assessment  Date: 2019   Patient Name: Sheila Duncan. MRN: [id number]     : 1945    Date of Service: 2019    Discharge Recommendations:    Further therapy recommended at discharge. Assessment   Performance deficits / Impairments: Decreased functional mobility ; Decreased strength;Decreased ADL status; Decreased safe awareness;Decreased endurance;Decreased high-level IADLs  Prognosis: Good  Decision Making: Medium Complexity  Patient Education: pt ed on POC, purpose of eval, importance of movement, safety during functional transfers/functional mobility. good return   REQUIRES OT FOLLOW UP: Yes  Activity Tolerance  Activity Tolerance: Patient Tolerated treatment well  Safety Devices  Safety Devices in place: Yes  Type of devices: Gait belt;Patient at risk for falls;Call light within reach; Left in bed  Restraints  Initially in place: No        Patient Diagnosis(es): The encounter diagnosis was Bladder tumor. has a past medical history of Anticoagulated, Arthritis, CAD (coronary artery disease), Cardiomyopathy ([address] CHF (congestive heart failure) ([address] Chronic kidney disease, COPD (chronic obstructive pulmonary disease) ([address] Diabetes mellitus ([address] Difficult intravenous access, DVT of leg (deep venous thrombosis) ([address] Former tobacco use, History of blood transfusion, History of blood transfusion, History of blood transfusion, Hx of blood clots, Hyperlipidemia, Hypertension, Multiple myeloma ([address] Neuropathy, and Wears dentures. has a past surgical history that includes hernia repair (Right, ); Abdomen surgery (); pr colonoscopy flx dx w/collj spec when pfrmd (N/A, 2018); pr esophagogastroduodenoscopy transoral diagnostic (N/A, 2018); Cataract removal with implant (Right, ); Tear duct surgery (Right, 2018); pr cabg, artery-vein, single (N/A, 2018); Cystoscopy (2019);  Cataract removal with implant

## 2019-09-24 LAB — SURGICAL PATHOLOGY REPORT: NORMAL

## 2019-10-01 ENCOUNTER — HOSPITAL ENCOUNTER (OUTPATIENT)
Dept: CT IMAGING | Facility: CLINIC | Age: 74
Discharge: HOME | End: 2019-10-03
Payer: MEDICARE

## 2019-10-01 DIAGNOSIS — C67.9 MALIGNANT NEOPLASM OF BLADDER WALL (HCC): ICD-10-CM

## 2019-10-01 PROCEDURE — 6360000004 HC RX CONTRAST MEDICATION: Performed by: UROLOGY

## 2019-10-01 PROCEDURE — 74176 CT ABD & PELVIS W/O CONTRAST: CPT

## 2019-10-01 RX ADMIN — IOHEXOL 50 ML: 240 INJECTION, SOLUTION INTRATHECAL; INTRAVASCULAR; INTRAVENOUS; ORAL at 10:37

## 2019-10-09 ENCOUNTER — HOSPITAL ENCOUNTER (OUTPATIENT)
Dept: OTHER | Age: 74
Discharge: HOME | End: 2019-10-09
Payer: MEDICARE

## 2019-10-09 VITALS
DIASTOLIC BLOOD PRESSURE: 64 MMHG | BODY MASS INDEX: 25.78 KG/M2 | SYSTOLIC BLOOD PRESSURE: 105 MMHG | RESPIRATION RATE: 18 BRPM | WEIGHT: 174.6 LBS | HEART RATE: 78 BPM | OXYGEN SATURATION: 97 %

## 2019-10-09 PROCEDURE — 99212 OFFICE O/P EST SF 10 MIN: CPT | Performed by: NURSE PRACTITIONER

## 2019-11-05 ENCOUNTER — HOSPITAL ENCOUNTER (OUTPATIENT)
Age: 74
Discharge: HOME | End: 2019-11-05
Payer: MEDICARE

## 2019-11-05 DIAGNOSIS — I38 VALVULAR HEART DISEASE: ICD-10-CM

## 2019-11-05 LAB
ANION GAP SERPL CALCULATED.3IONS-SCNC: 15 MMOL/L (ref 9–17)
BUN BLDV-MCNC: 32 MG/DL (ref 8–23)
BUN/CREAT BLD: ABNORMAL (ref 9–20)
CALCIUM SERPL-MCNC: 9.9 MG/DL (ref 8.6–10.4)
CHLORIDE BLD-SCNC: 100 MMOL/L (ref 98–107)
CO2: 30 MMOL/L (ref 20–31)
CREAT SERPL-MCNC: 1.51 MG/DL (ref 0.7–1.2)
CREATININE URINE: 98.1 MG/DL (ref 39–259)
GFR AFRICAN AMERICAN: 55 ML/MIN
GFR NON-AFRICAN AMERICAN: 46 ML/MIN
GFR SERPL CREATININE-BSD FRML MDRD: ABNORMAL ML/MIN/{1.73_M2}
GFR SERPL CREATININE-BSD FRML MDRD: ABNORMAL ML/MIN/{1.73_M2}
GLUCOSE BLD-MCNC: 168 MG/DL (ref 70–99)
POTASSIUM SERPL-SCNC: 3.5 MMOL/L (ref 3.7–5.3)
SODIUM BLD-SCNC: 145 MMOL/L (ref 135–144)
TOTAL PROTEIN, URINE: 10 MG/DL

## 2019-11-05 PROCEDURE — 84156 ASSAY OF PROTEIN URINE: CPT

## 2019-11-05 PROCEDURE — 36415 COLL VENOUS BLD VENIPUNCTURE: CPT

## 2019-11-05 PROCEDURE — 82570 ASSAY OF URINE CREATININE: CPT

## 2019-11-05 PROCEDURE — 80048 BASIC METABOLIC PNL TOTAL CA: CPT

## 2019-11-06 ENCOUNTER — HOSPITAL ENCOUNTER (OUTPATIENT)
Dept: OTHER | Age: 74
Discharge: HOME | End: 2019-11-06
Payer: MEDICARE

## 2019-11-06 VITALS
OXYGEN SATURATION: 98 % | DIASTOLIC BLOOD PRESSURE: 71 MMHG | HEART RATE: 78 BPM | WEIGHT: 178 LBS | BODY MASS INDEX: 26.29 KG/M2 | RESPIRATION RATE: 18 BRPM | SYSTOLIC BLOOD PRESSURE: 112 MMHG

## 2019-11-06 PROCEDURE — 99212 OFFICE O/P EST SF 10 MIN: CPT | Performed by: NURSE PRACTITIONER

## 2019-11-20 ENCOUNTER — TELEPHONE (OUTPATIENT)
Dept: RADIATION ONCOLOGY | Facility: MEDICAL CENTER | Age: 74
End: 2019-11-20

## 2019-11-20 ENCOUNTER — HOSPITAL ENCOUNTER (OUTPATIENT)
Dept: RADIATION ONCOLOGY | Facility: MEDICAL CENTER | Age: 74
Discharge: HOME | End: 2019-11-20
Payer: MEDICARE

## 2019-11-20 VITALS
DIASTOLIC BLOOD PRESSURE: 64 MMHG | SYSTOLIC BLOOD PRESSURE: 106 MMHG | WEIGHT: 181.25 LBS | BODY MASS INDEX: 26.84 KG/M2 | HEIGHT: 69 IN | OXYGEN SATURATION: 97 % | RESPIRATION RATE: 16 BRPM | HEART RATE: 78 BPM | TEMPERATURE: 98.6 F

## 2019-11-20 DIAGNOSIS — D49.4 BLADDER TUMOR: Primary | ICD-10-CM

## 2019-11-20 DIAGNOSIS — C90.01 MULTIPLE MYELOMA IN REMISSION (HCC): Chronic | ICD-10-CM

## 2019-11-20 PROCEDURE — 99213 OFFICE O/P EST LOW 20 MIN: CPT | Performed by: RADIOLOGY

## 2019-11-20 RX ORDER — TRAMADOL HYDROCHLORIDE 50 MG/1
50 TABLET ORAL 2 TIMES DAILY
COMMUNITY

## 2019-11-20 ASSESSMENT — ENCOUNTER SYMPTOMS
SORE THROAT: 0
WHEEZING: 0
BLOOD IN STOOL: 1
TROUBLE SWALLOWING: 0
NAUSEA: 0
CHEST TIGHTNESS: 0
SHORTNESS OF BREATH: 0
COUGH: 1
VOMITING: 0
BACK PAIN: 0
DIARRHEA: 0
CONSTIPATION: 0
RECTAL PAIN: 1

## 2019-12-04 ENCOUNTER — HOSPITAL ENCOUNTER (OUTPATIENT)
Age: 74
Discharge: HOME | End: 2019-12-04
Payer: MEDICARE

## 2019-12-04 DIAGNOSIS — N18.30 CKD (CHRONIC KIDNEY DISEASE), STAGE III (HCC): Chronic | ICD-10-CM

## 2019-12-04 LAB
ABSOLUTE EOS #: 0.16 K/UL (ref 0–0.44)
ABSOLUTE IMMATURE GRANULOCYTE: 0.04 K/UL (ref 0–0.3)
ABSOLUTE LYMPH #: 2.37 K/UL (ref 1.1–3.7)
ABSOLUTE MONO #: 0.89 K/UL (ref 0.1–1.2)
ANION GAP SERPL CALCULATED.3IONS-SCNC: 12 MMOL/L (ref 9–17)
BASOPHILS # BLD: 1 % (ref 0–2)
BASOPHILS ABSOLUTE: 0.05 K/UL (ref 0–0.2)
BUN BLDV-MCNC: 45 MG/DL (ref 8–23)
BUN/CREAT BLD: ABNORMAL (ref 9–20)
CALCIUM IONIZED: 1.3 MMOL/L (ref 1.13–1.33)
CALCIUM SERPL-MCNC: 9.8 MG/DL (ref 8.6–10.4)
CHLORIDE BLD-SCNC: 100 MMOL/L (ref 98–107)
CO2: 31 MMOL/L (ref 20–31)
CREAT SERPL-MCNC: 1.79 MG/DL (ref 0.7–1.2)
DIFFERENTIAL TYPE: ABNORMAL
EOSINOPHILS RELATIVE PERCENT: 2 % (ref 1–4)
GFR AFRICAN AMERICAN: 45 ML/MIN
GFR NON-AFRICAN AMERICAN: 38 ML/MIN
GFR SERPL CREATININE-BSD FRML MDRD: ABNORMAL ML/MIN/{1.73_M2}
GFR SERPL CREATININE-BSD FRML MDRD: ABNORMAL ML/MIN/{1.73_M2}
GLUCOSE BLD-MCNC: 151 MG/DL (ref 70–99)
HCT VFR BLD CALC: 40.8 % (ref 40.7–50.3)
HEMOGLOBIN: 12.3 G/DL (ref 13–17)
IMMATURE GRANULOCYTES: 1 %
LYMPHOCYTES # BLD: 35 % (ref 24–43)
MCH RBC QN AUTO: 27.6 PG (ref 25.2–33.5)
MCHC RBC AUTO-ENTMCNC: 30.1 G/DL (ref 28.4–34.8)
MCV RBC AUTO: 91.7 FL (ref 82.6–102.9)
MONOCYTES # BLD: 13 % (ref 3–12)
NRBC AUTOMATED: 0 PER 100 WBC
PDW BLD-RTO: 17.3 % (ref 11.8–14.4)
PHOSPHORUS: 3.1 MG/DL (ref 2.5–4.5)
PLATELET # BLD: 273 K/UL (ref 138–453)
PLATELET ESTIMATE: ABNORMAL
PMV BLD AUTO: 11.3 FL (ref 8.1–13.5)
POTASSIUM SERPL-SCNC: 3.7 MMOL/L (ref 3.7–5.3)
PTH INTACT: 72.7 PG/ML (ref 15–65)
RBC # BLD: 4.45 M/UL (ref 4.21–5.77)
RBC # BLD: ABNORMAL 10*6/UL
SEG NEUTROPHILS: 48 % (ref 36–65)
SEGMENTED NEUTROPHILS ABSOLUTE COUNT: 3.24 K/UL (ref 1.5–8.1)
SODIUM BLD-SCNC: 143 MMOL/L (ref 135–144)
VITAMIN D 25-HYDROXY: 36.6 NG/ML (ref 30–100)
WBC # BLD: 6.8 K/UL (ref 3.5–11.3)
WBC # BLD: ABNORMAL 10*3/UL

## 2019-12-04 PROCEDURE — 80048 BASIC METABOLIC PNL TOTAL CA: CPT

## 2019-12-04 PROCEDURE — 83970 ASSAY OF PARATHORMONE: CPT

## 2019-12-04 PROCEDURE — 82330 ASSAY OF CALCIUM: CPT

## 2019-12-04 PROCEDURE — 84100 ASSAY OF PHOSPHORUS: CPT

## 2019-12-04 PROCEDURE — 82306 VITAMIN D 25 HYDROXY: CPT

## 2019-12-04 PROCEDURE — 36415 COLL VENOUS BLD VENIPUNCTURE: CPT

## 2019-12-04 PROCEDURE — 85025 COMPLETE CBC W/AUTO DIFF WBC: CPT

## 2019-12-04 RX ORDER — PAROXETINE HYDROCHLORIDE 20 MG/1
20 TABLET, FILM COATED ORAL DAILY
COMMUNITY
Start: 2019-11-21

## 2019-12-06 ENCOUNTER — ANESTHESIA EVENT (OUTPATIENT)
Dept: OPERATING ROOM | Age: 74
End: 2019-12-06
Payer: MEDICARE

## 2019-12-06 ENCOUNTER — ANESTHESIA (OUTPATIENT)
Dept: OPERATING ROOM | Age: 74
End: 2019-12-06
Payer: MEDICARE

## 2019-12-06 ENCOUNTER — HOSPITAL ENCOUNTER (OUTPATIENT)
Age: 74
Setting detail: OBSERVATION
Discharge: HOME HEALTH | End: 2019-12-07
Attending: UROLOGY | Admitting: UROLOGY
Payer: MEDICARE

## 2019-12-06 VITALS — DIASTOLIC BLOOD PRESSURE: 90 MMHG | SYSTOLIC BLOOD PRESSURE: 113 MMHG | TEMPERATURE: 94.3 F | OXYGEN SATURATION: 99 %

## 2019-12-06 LAB
ANION GAP SERPL CALCULATED.3IONS-SCNC: 12 MMOL/L (ref 9–17)
BUN BLDV-MCNC: 41 MG/DL (ref 8–23)
BUN/CREAT BLD: ABNORMAL (ref 9–20)
CALCIUM SERPL-MCNC: 10.3 MG/DL (ref 8.6–10.4)
CHLORIDE BLD-SCNC: 101 MMOL/L (ref 98–107)
CO2: 30 MMOL/L (ref 20–31)
CREAT SERPL-MCNC: 1.55 MG/DL (ref 0.7–1.2)
GFR AFRICAN AMERICAN: 54 ML/MIN
GFR NON-AFRICAN AMERICAN: 44 ML/MIN
GFR SERPL CREATININE-BSD FRML MDRD: ABNORMAL ML/MIN/{1.73_M2}
GFR SERPL CREATININE-BSD FRML MDRD: ABNORMAL ML/MIN/{1.73_M2}
GLUCOSE BLD-MCNC: 106 MG/DL (ref 74–100)
GLUCOSE BLD-MCNC: 128 MG/DL (ref 70–99)
HCT VFR BLD CALC: 40.7 % (ref 40.7–50.3)
HEMOGLOBIN: 12.2 G/DL (ref 13–17)
MCH RBC QN AUTO: 27.5 PG (ref 25.2–33.5)
MCHC RBC AUTO-ENTMCNC: 30 G/DL (ref 28.4–34.8)
MCV RBC AUTO: 91.9 FL (ref 82.6–102.9)
NRBC AUTOMATED: 0 PER 100 WBC
PDW BLD-RTO: 17.2 % (ref 11.8–14.4)
PLATELET # BLD: 242 K/UL (ref 138–453)
PMV BLD AUTO: 10.7 FL (ref 8.1–13.5)
POC POTASSIUM: 3.9 MMOL/L (ref 3.5–4.5)
POC SODIUM: 141 MMOL/L (ref 138–146)
POTASSIUM SERPL-SCNC: 4.1 MMOL/L (ref 3.7–5.3)
RBC # BLD: 4.43 M/UL (ref 4.21–5.77)
SODIUM BLD-SCNC: 143 MMOL/L (ref 135–144)
WBC # BLD: 6 K/UL (ref 3.5–11.3)

## 2019-12-06 PROCEDURE — 3600000014 HC SURGERY LEVEL 4 ADDTL 15MIN: Performed by: UROLOGY

## 2019-12-06 PROCEDURE — 2580000003 HC RX 258: Performed by: ANESTHESIOLOGY

## 2019-12-06 PROCEDURE — 6360000002 HC RX W HCPCS: Performed by: STUDENT IN AN ORGANIZED HEALTH CARE EDUCATION/TRAINING PROGRAM

## 2019-12-06 PROCEDURE — 3600000004 HC SURGERY LEVEL 4 BASE: Performed by: UROLOGY

## 2019-12-06 PROCEDURE — 87086 URINE CULTURE/COLONY COUNT: CPT

## 2019-12-06 PROCEDURE — 84295 ASSAY OF SERUM SODIUM: CPT

## 2019-12-06 PROCEDURE — 6360000002 HC RX W HCPCS: Performed by: NURSE ANESTHETIST, CERTIFIED REGISTERED

## 2019-12-06 PROCEDURE — 2709999900 HC NON-CHARGEABLE SUPPLY: Performed by: UROLOGY

## 2019-12-06 PROCEDURE — 2500000003 HC RX 250 WO HCPCS: Performed by: NURSE ANESTHETIST, CERTIFIED REGISTERED

## 2019-12-06 PROCEDURE — 82947 ASSAY GLUCOSE BLOOD QUANT: CPT

## 2019-12-06 PROCEDURE — G0378 HOSPITAL OBSERVATION PER HR: HCPCS

## 2019-12-06 PROCEDURE — 2580000003 HC RX 258: Performed by: STUDENT IN AN ORGANIZED HEALTH CARE EDUCATION/TRAINING PROGRAM

## 2019-12-06 PROCEDURE — 3700000000 HC ANESTHESIA ATTENDED CARE: Performed by: UROLOGY

## 2019-12-06 PROCEDURE — 3700000001 HC ADD 15 MINUTES (ANESTHESIA): Performed by: UROLOGY

## 2019-12-06 PROCEDURE — 7100000001 HC PACU RECOVERY - ADDTL 15 MIN: Performed by: UROLOGY

## 2019-12-06 PROCEDURE — 84132 ASSAY OF SERUM POTASSIUM: CPT

## 2019-12-06 PROCEDURE — 6360000002 HC RX W HCPCS: Performed by: PHYSICIAN ASSISTANT

## 2019-12-06 PROCEDURE — 6370000000 HC RX 637 (ALT 250 FOR IP): Performed by: STUDENT IN AN ORGANIZED HEALTH CARE EDUCATION/TRAINING PROGRAM

## 2019-12-06 PROCEDURE — 93005 ELECTROCARDIOGRAM TRACING: CPT | Performed by: ANESTHESIOLOGY

## 2019-12-06 PROCEDURE — 85027 COMPLETE CBC AUTOMATED: CPT

## 2019-12-06 PROCEDURE — 80048 BASIC METABOLIC PNL TOTAL CA: CPT

## 2019-12-06 PROCEDURE — 2580000003 HC RX 258: Performed by: UROLOGY

## 2019-12-06 PROCEDURE — 6360000002 HC RX W HCPCS: Performed by: ANESTHESIOLOGY

## 2019-12-06 PROCEDURE — 7100000000 HC PACU RECOVERY - FIRST 15 MIN: Performed by: UROLOGY

## 2019-12-06 PROCEDURE — 2720000010 HC SURG SUPPLY STERILE: Performed by: UROLOGY

## 2019-12-06 PROCEDURE — 88307 TISSUE EXAM BY PATHOLOGIST: CPT

## 2019-12-06 RX ORDER — FENTANYL CITRATE 50 UG/ML
25 INJECTION, SOLUTION INTRAMUSCULAR; INTRAVENOUS EVERY 5 MIN PRN
Status: DISCONTINUED | OUTPATIENT
Start: 2019-12-06 | End: 2019-12-06 | Stop reason: HOSPADM

## 2019-12-06 RX ORDER — SODIUM CHLORIDE 0.9 % (FLUSH) 0.9 %
10 SYRINGE (ML) INJECTION PRN
Status: DISCONTINUED | OUTPATIENT
Start: 2019-12-06 | End: 2019-12-07 | Stop reason: HOSPADM

## 2019-12-06 RX ORDER — IPRATROPIUM BROMIDE AND ALBUTEROL SULFATE 2.5; .5 MG/3ML; MG/3ML
3 SOLUTION RESPIRATORY (INHALATION) EVERY 4 HOURS PRN
Status: DISCONTINUED | OUTPATIENT
Start: 2019-12-06 | End: 2019-12-07 | Stop reason: HOSPADM

## 2019-12-06 RX ORDER — AMIODARONE HYDROCHLORIDE 200 MG/1
200 TABLET ORAL DAILY
Status: DISCONTINUED | OUTPATIENT
Start: 2019-12-06 | End: 2019-12-07 | Stop reason: HOSPADM

## 2019-12-06 RX ORDER — ACETAMINOPHEN 325 MG/1
650 TABLET ORAL EVERY 4 HOURS PRN
Status: DISCONTINUED | OUTPATIENT
Start: 2019-12-06 | End: 2019-12-07 | Stop reason: HOSPADM

## 2019-12-06 RX ORDER — SODIUM CHLORIDE 0.9 % (FLUSH) 0.9 %
10 SYRINGE (ML) INJECTION EVERY 12 HOURS SCHEDULED
Status: DISCONTINUED | OUTPATIENT
Start: 2019-12-06 | End: 2019-12-07 | Stop reason: HOSPADM

## 2019-12-06 RX ORDER — MAGNESIUM HYDROXIDE 1200 MG/15ML
LIQUID ORAL
Status: DISPENSED
Start: 2019-12-06 | End: 2019-12-07

## 2019-12-06 RX ORDER — DEXAMETHASONE SODIUM PHOSPHATE 10 MG/ML
INJECTION INTRAMUSCULAR; INTRAVENOUS PRN
Status: DISCONTINUED | OUTPATIENT
Start: 2019-12-06 | End: 2019-12-06 | Stop reason: SDUPTHER

## 2019-12-06 RX ORDER — MAGNESIUM HYDROXIDE 1200 MG/15ML
LIQUID ORAL PRN
Status: DISCONTINUED | OUTPATIENT
Start: 2019-12-06 | End: 2019-12-06 | Stop reason: ALTCHOICE

## 2019-12-06 RX ORDER — ONDANSETRON 2 MG/ML
4 INJECTION INTRAMUSCULAR; INTRAVENOUS
Status: DISCONTINUED | OUTPATIENT
Start: 2019-12-06 | End: 2019-12-06 | Stop reason: HOSPADM

## 2019-12-06 RX ORDER — PROPOFOL 10 MG/ML
INJECTION, EMULSION INTRAVENOUS PRN
Status: DISCONTINUED | OUTPATIENT
Start: 2019-12-06 | End: 2019-12-06 | Stop reason: SDUPTHER

## 2019-12-06 RX ORDER — TRAZODONE HYDROCHLORIDE 50 MG/1
50 TABLET ORAL NIGHTLY PRN
Status: DISCONTINUED | OUTPATIENT
Start: 2019-12-06 | End: 2019-12-07 | Stop reason: HOSPADM

## 2019-12-06 RX ORDER — PAROXETINE HYDROCHLORIDE 20 MG/1
20 TABLET, FILM COATED ORAL DAILY
Status: DISCONTINUED | OUTPATIENT
Start: 2019-12-06 | End: 2019-12-07 | Stop reason: HOSPADM

## 2019-12-06 RX ORDER — FENTANYL CITRATE 50 UG/ML
INJECTION, SOLUTION INTRAMUSCULAR; INTRAVENOUS PRN
Status: DISCONTINUED | OUTPATIENT
Start: 2019-12-06 | End: 2019-12-06 | Stop reason: SDUPTHER

## 2019-12-06 RX ORDER — FAMOTIDINE 20 MG/1
20 TABLET, FILM COATED ORAL DAILY
Status: DISCONTINUED | OUTPATIENT
Start: 2019-12-06 | End: 2019-12-07 | Stop reason: HOSPADM

## 2019-12-06 RX ORDER — SODIUM CHLORIDE 9 MG/ML
INJECTION, SOLUTION INTRAVENOUS CONTINUOUS
Status: DISCONTINUED | OUTPATIENT
Start: 2019-12-06 | End: 2019-12-07

## 2019-12-06 RX ORDER — METOPROLOL SUCCINATE 25 MG/1
25 TABLET, EXTENDED RELEASE ORAL DAILY
Status: DISCONTINUED | OUTPATIENT
Start: 2019-12-06 | End: 2019-12-07 | Stop reason: HOSPADM

## 2019-12-06 RX ORDER — LIDOCAINE HYDROCHLORIDE 10 MG/ML
INJECTION, SOLUTION EPIDURAL; INFILTRATION; INTRACAUDAL; PERINEURAL PRN
Status: DISCONTINUED | OUTPATIENT
Start: 2019-12-06 | End: 2019-12-06 | Stop reason: SDUPTHER

## 2019-12-06 RX ORDER — GABAPENTIN 400 MG/1
400 CAPSULE ORAL DAILY
Status: DISCONTINUED | OUTPATIENT
Start: 2019-12-06 | End: 2019-12-07 | Stop reason: HOSPADM

## 2019-12-06 RX ORDER — FENTANYL CITRATE 50 UG/ML
50 INJECTION, SOLUTION INTRAMUSCULAR; INTRAVENOUS EVERY 5 MIN PRN
Status: DISCONTINUED | OUTPATIENT
Start: 2019-12-06 | End: 2019-12-06 | Stop reason: HOSPADM

## 2019-12-06 RX ORDER — SODIUM CHLORIDE, SODIUM LACTATE, POTASSIUM CHLORIDE, CALCIUM CHLORIDE 600; 310; 30; 20 MG/100ML; MG/100ML; MG/100ML; MG/100ML
INJECTION, SOLUTION INTRAVENOUS CONTINUOUS
Status: DISCONTINUED | OUTPATIENT
Start: 2019-12-06 | End: 2019-12-06

## 2019-12-06 RX ORDER — TORSEMIDE 20 MG/1
40 TABLET ORAL DAILY
Status: DISCONTINUED | OUTPATIENT
Start: 2019-12-07 | End: 2019-12-07 | Stop reason: HOSPADM

## 2019-12-06 RX ORDER — ONDANSETRON 2 MG/ML
4 INJECTION INTRAMUSCULAR; INTRAVENOUS EVERY 6 HOURS PRN
Status: DISCONTINUED | OUTPATIENT
Start: 2019-12-06 | End: 2019-12-07 | Stop reason: HOSPADM

## 2019-12-06 RX ORDER — SENNA PLUS 8.6 MG/1
1 TABLET ORAL NIGHTLY
Status: DISCONTINUED | OUTPATIENT
Start: 2019-12-06 | End: 2019-12-07 | Stop reason: HOSPADM

## 2019-12-06 RX ORDER — GLYCOPYRROLATE 1 MG/5 ML
SYRINGE (ML) INTRAVENOUS PRN
Status: DISCONTINUED | OUTPATIENT
Start: 2019-12-06 | End: 2019-12-06 | Stop reason: SDUPTHER

## 2019-12-06 RX ORDER — ROCURONIUM BROMIDE 10 MG/ML
INJECTION, SOLUTION INTRAVENOUS PRN
Status: DISCONTINUED | OUTPATIENT
Start: 2019-12-06 | End: 2019-12-06 | Stop reason: SDUPTHER

## 2019-12-06 RX ORDER — HYDROCODONE BITARTRATE AND ACETAMINOPHEN 5; 325 MG/1; MG/1
2 TABLET ORAL EVERY 6 HOURS PRN
Status: DISCONTINUED | OUTPATIENT
Start: 2019-12-06 | End: 2019-12-07 | Stop reason: HOSPADM

## 2019-12-06 RX ORDER — MIDODRINE HYDROCHLORIDE 5 MG/1
10 TABLET ORAL
Status: DISCONTINUED | OUTPATIENT
Start: 2019-12-06 | End: 2019-12-07 | Stop reason: HOSPADM

## 2019-12-06 RX ORDER — ONDANSETRON 2 MG/ML
INJECTION INTRAMUSCULAR; INTRAVENOUS PRN
Status: DISCONTINUED | OUTPATIENT
Start: 2019-12-06 | End: 2019-12-06 | Stop reason: SDUPTHER

## 2019-12-06 RX ORDER — ALBUTEROL SULFATE 90 UG/1
1 AEROSOL, METERED RESPIRATORY (INHALATION) EVERY 6 HOURS PRN
Status: DISCONTINUED | OUTPATIENT
Start: 2019-12-06 | End: 2019-12-07 | Stop reason: HOSPADM

## 2019-12-06 RX ORDER — DOCUSATE SODIUM 100 MG/1
100 CAPSULE, LIQUID FILLED ORAL 2 TIMES DAILY
Status: DISCONTINUED | OUTPATIENT
Start: 2019-12-06 | End: 2019-12-07 | Stop reason: HOSPADM

## 2019-12-06 RX ORDER — MAGNESIUM HYDROXIDE 1200 MG/15ML
LIQUID ORAL CONTINUOUS PRN
Status: COMPLETED | OUTPATIENT
Start: 2019-12-06 | End: 2019-12-06

## 2019-12-06 RX ORDER — POLYVINYL ALCOHOL 14 MG/ML
1 SOLUTION/ DROPS OPHTHALMIC EVERY 4 HOURS PRN
Status: DISCONTINUED | OUTPATIENT
Start: 2019-12-06 | End: 2019-12-07 | Stop reason: HOSPADM

## 2019-12-06 RX ORDER — MIDAZOLAM HYDROCHLORIDE 1 MG/ML
1 INJECTION INTRAMUSCULAR; INTRAVENOUS EVERY 10 MIN PRN
Status: DISCONTINUED | OUTPATIENT
Start: 2019-12-06 | End: 2019-12-06 | Stop reason: HOSPADM

## 2019-12-06 RX ORDER — MEPERIDINE HYDROCHLORIDE 50 MG/ML
12.5 INJECTION INTRAMUSCULAR; INTRAVENOUS; SUBCUTANEOUS EVERY 5 MIN PRN
Status: DISCONTINUED | OUTPATIENT
Start: 2019-12-06 | End: 2019-12-06 | Stop reason: HOSPADM

## 2019-12-06 RX ORDER — NEOSTIGMINE METHYLSULFATE 5 MG/5 ML
SYRINGE (ML) INTRAVENOUS PRN
Status: DISCONTINUED | OUTPATIENT
Start: 2019-12-06 | End: 2019-12-06 | Stop reason: SDUPTHER

## 2019-12-06 RX ORDER — HYDROCODONE BITARTRATE AND ACETAMINOPHEN 5; 325 MG/1; MG/1
1 TABLET ORAL EVERY 6 HOURS PRN
Status: DISCONTINUED | OUTPATIENT
Start: 2019-12-06 | End: 2019-12-07 | Stop reason: HOSPADM

## 2019-12-06 RX ADMIN — PHENYLEPHRINE HYDROCHLORIDE 100 MCG: 10 INJECTION INTRAVENOUS at 10:11

## 2019-12-06 RX ADMIN — Medication 3 MG: at 10:56

## 2019-12-06 RX ADMIN — SODIUM CHLORIDE: 9 INJECTION, SOLUTION INTRAVENOUS at 18:47

## 2019-12-06 RX ADMIN — FENTANYL CITRATE 50 MCG: 50 INJECTION, SOLUTION INTRAMUSCULAR; INTRAVENOUS at 11:40

## 2019-12-06 RX ADMIN — CEFAZOLIN 1 G: 330 INJECTION, POWDER, FOR SOLUTION INTRAMUSCULAR; INTRAVENOUS at 18:47

## 2019-12-06 RX ADMIN — PHENYLEPHRINE HYDROCHLORIDE 200 MCG: 10 INJECTION INTRAVENOUS at 10:21

## 2019-12-06 RX ADMIN — SODIUM CHLORIDE: 9 INJECTION, SOLUTION INTRAVENOUS at 18:43

## 2019-12-06 RX ADMIN — DOCUSATE SODIUM 100 MG: 100 CAPSULE, LIQUID FILLED ORAL at 20:21

## 2019-12-06 RX ADMIN — LIDOCAINE HYDROCHLORIDE 50 MG: 10 INJECTION, SOLUTION EPIDURAL; INFILTRATION; INTRACAUDAL; PERINEURAL at 10:01

## 2019-12-06 RX ADMIN — FAMOTIDINE 20 MG: 20 TABLET, FILM COATED ORAL at 18:49

## 2019-12-06 RX ADMIN — ROCURONIUM BROMIDE 5 MG: 10 INJECTION INTRAVENOUS at 10:45

## 2019-12-06 RX ADMIN — PHENYLEPHRINE HYDROCHLORIDE 100 MCG: 10 INJECTION INTRAVENOUS at 10:08

## 2019-12-06 RX ADMIN — Medication 0.2 MG: at 10:10

## 2019-12-06 RX ADMIN — HYDROCODONE BITARTRATE AND ACETAMINOPHEN 1 TABLET: 5; 325 TABLET ORAL at 18:47

## 2019-12-06 RX ADMIN — ONDANSETRON 4 MG: 2 INJECTION, SOLUTION INTRAMUSCULAR; INTRAVENOUS at 10:16

## 2019-12-06 RX ADMIN — ROCURONIUM BROMIDE 25 MG: 10 INJECTION INTRAVENOUS at 10:31

## 2019-12-06 RX ADMIN — MIDODRINE HYDROCHLORIDE 10 MG: 5 TABLET ORAL at 18:50

## 2019-12-06 RX ADMIN — FENTANYL CITRATE 25 MCG: 50 INJECTION INTRAMUSCULAR; INTRAVENOUS at 10:26

## 2019-12-06 RX ADMIN — PAROXETINE HYDROCHLORIDE 20 MG: 20 TABLET, FILM COATED ORAL at 18:49

## 2019-12-06 RX ADMIN — Medication 2 G: at 10:08

## 2019-12-06 RX ADMIN — FENTANYL CITRATE 25 MCG: 50 INJECTION INTRAMUSCULAR; INTRAVENOUS at 10:06

## 2019-12-06 RX ADMIN — DEXAMETHASONE SODIUM PHOSPHATE 10 MG: 10 INJECTION INTRAMUSCULAR; INTRAVENOUS at 10:05

## 2019-12-06 RX ADMIN — PHENYLEPHRINE HYDROCHLORIDE 100 MCG: 10 INJECTION INTRAVENOUS at 10:15

## 2019-12-06 RX ADMIN — PROPOFOL 120 MG: 10 INJECTION, EMULSION INTRAVENOUS at 10:01

## 2019-12-06 RX ADMIN — SODIUM CHLORIDE, POTASSIUM CHLORIDE, SODIUM LACTATE AND CALCIUM CHLORIDE: 600; 310; 30; 20 INJECTION, SOLUTION INTRAVENOUS at 09:42

## 2019-12-06 RX ADMIN — PHENYLEPHRINE HYDROCHLORIDE 200 MCG: 10 INJECTION INTRAVENOUS at 10:39

## 2019-12-06 RX ADMIN — Medication 0.6 MG: at 10:56

## 2019-12-06 ASSESSMENT — PAIN SCALES - GENERAL
PAINLEVEL_OUTOF10: 1
PAINLEVEL_OUTOF10: 0
PAINLEVEL_OUTOF10: 0
PAINLEVEL_OUTOF10: 1
PAINLEVEL_OUTOF10: 8
PAINLEVEL_OUTOF10: 7

## 2019-12-06 ASSESSMENT — PULMONARY FUNCTION TESTS
PIF_VALUE: 18
PIF_VALUE: 4
PIF_VALUE: 1
PIF_VALUE: 19
PIF_VALUE: 18
PIF_VALUE: 19
PIF_VALUE: 15
PIF_VALUE: 18
PIF_VALUE: 18
PIF_VALUE: 15
PIF_VALUE: 18
PIF_VALUE: 17
PIF_VALUE: 16
PIF_VALUE: 1
PIF_VALUE: 15
PIF_VALUE: 18
PIF_VALUE: 16
PIF_VALUE: 18
PIF_VALUE: 15
PIF_VALUE: 15
PIF_VALUE: 1
PIF_VALUE: 18
PIF_VALUE: 15
PIF_VALUE: 16
PIF_VALUE: 16
PIF_VALUE: 18
PIF_VALUE: 22
PIF_VALUE: 18
PIF_VALUE: 18
PIF_VALUE: 15
PIF_VALUE: 4
PIF_VALUE: 18
PIF_VALUE: 1
PIF_VALUE: 18
PIF_VALUE: 15
PIF_VALUE: 16
PIF_VALUE: 16
PIF_VALUE: 15
PIF_VALUE: 15
PIF_VALUE: 16
PIF_VALUE: 16
PIF_VALUE: 22
PIF_VALUE: 15
PIF_VALUE: 14
PIF_VALUE: 19
PIF_VALUE: 18
PIF_VALUE: 16
PIF_VALUE: 16
PIF_VALUE: 2
PIF_VALUE: 22
PIF_VALUE: 15
PIF_VALUE: 18
PIF_VALUE: 18
PIF_VALUE: 16
PIF_VALUE: 22
PIF_VALUE: 1
PIF_VALUE: 18
PIF_VALUE: 15
PIF_VALUE: 18
PIF_VALUE: 15
PIF_VALUE: 16
PIF_VALUE: 29
PIF_VALUE: 15
PIF_VALUE: 19
PIF_VALUE: 14
PIF_VALUE: 16
PIF_VALUE: 14
PIF_VALUE: 15
PIF_VALUE: 16
PIF_VALUE: 18

## 2019-12-06 ASSESSMENT — PAIN DESCRIPTION - PAIN TYPE: TYPE: SURGICAL PAIN

## 2019-12-06 ASSESSMENT — ENCOUNTER SYMPTOMS: SHORTNESS OF BREATH: 0

## 2019-12-06 ASSESSMENT — PAIN DESCRIPTION - LOCATION: LOCATION: PENIS

## 2019-12-06 ASSESSMENT — PAIN - FUNCTIONAL ASSESSMENT: PAIN_FUNCTIONAL_ASSESSMENT: 0-10

## 2019-12-07 VITALS
DIASTOLIC BLOOD PRESSURE: 71 MMHG | BODY MASS INDEX: 26.22 KG/M2 | SYSTOLIC BLOOD PRESSURE: 116 MMHG | TEMPERATURE: 98.5 F | RESPIRATION RATE: 18 BRPM | HEIGHT: 69 IN | HEART RATE: 69 BPM | WEIGHT: 177 LBS | OXYGEN SATURATION: 97 %

## 2019-12-07 LAB
ANION GAP SERPL CALCULATED.3IONS-SCNC: 13 MMOL/L (ref 9–17)
BUN BLDV-MCNC: 37 MG/DL (ref 8–23)
BUN/CREAT BLD: ABNORMAL (ref 9–20)
CALCIUM SERPL-MCNC: 9.5 MG/DL (ref 8.6–10.4)
CHLORIDE BLD-SCNC: 101 MMOL/L (ref 98–107)
CO2: 24 MMOL/L (ref 20–31)
CREAT SERPL-MCNC: 1.32 MG/DL (ref 0.7–1.2)
CULTURE: NO GROWTH
EKG ATRIAL RATE: 72 BPM
EKG P AXIS: 58 DEGREES
EKG P-R INTERVAL: 312 MS
EKG Q-T INTERVAL: 470 MS
EKG QRS DURATION: 162 MS
EKG QTC CALCULATION (BAZETT): 514 MS
EKG R AXIS: -62 DEGREES
EKG T AXIS: 102 DEGREES
EKG VENTRICULAR RATE: 72 BPM
GFR AFRICAN AMERICAN: >60 ML/MIN
GFR NON-AFRICAN AMERICAN: 53 ML/MIN
GFR SERPL CREATININE-BSD FRML MDRD: ABNORMAL ML/MIN/{1.73_M2}
GFR SERPL CREATININE-BSD FRML MDRD: ABNORMAL ML/MIN/{1.73_M2}
GLUCOSE BLD-MCNC: 139 MG/DL (ref 70–99)
HCT VFR BLD CALC: 39.2 % (ref 40.7–50.3)
HEMOGLOBIN: 11.6 G/DL (ref 13–17)
Lab: NORMAL
MCH RBC QN AUTO: 27.2 PG (ref 25.2–33.5)
MCHC RBC AUTO-ENTMCNC: 29.6 G/DL (ref 28.4–34.8)
MCV RBC AUTO: 92 FL (ref 82.6–102.9)
NRBC AUTOMATED: 0 PER 100 WBC
PDW BLD-RTO: 17 % (ref 11.8–14.4)
PLATELET # BLD: 232 K/UL (ref 138–453)
PMV BLD AUTO: 10.7 FL (ref 8.1–13.5)
POTASSIUM SERPL-SCNC: 4.7 MMOL/L (ref 3.7–5.3)
RBC # BLD: 4.26 M/UL (ref 4.21–5.77)
SODIUM BLD-SCNC: 138 MMOL/L (ref 135–144)
SPECIMEN DESCRIPTION: NORMAL
WBC # BLD: 9 K/UL (ref 3.5–11.3)

## 2019-12-07 PROCEDURE — G0378 HOSPITAL OBSERVATION PER HR: HCPCS

## 2019-12-07 PROCEDURE — 6360000002 HC RX W HCPCS: Performed by: STUDENT IN AN ORGANIZED HEALTH CARE EDUCATION/TRAINING PROGRAM

## 2019-12-07 PROCEDURE — 2580000003 HC RX 258: Performed by: STUDENT IN AN ORGANIZED HEALTH CARE EDUCATION/TRAINING PROGRAM

## 2019-12-07 PROCEDURE — 6370000000 HC RX 637 (ALT 250 FOR IP): Performed by: STUDENT IN AN ORGANIZED HEALTH CARE EDUCATION/TRAINING PROGRAM

## 2019-12-07 PROCEDURE — 80048 BASIC METABOLIC PNL TOTAL CA: CPT

## 2019-12-07 PROCEDURE — 85027 COMPLETE CBC AUTOMATED: CPT

## 2019-12-07 PROCEDURE — 36415 COLL VENOUS BLD VENIPUNCTURE: CPT

## 2019-12-07 RX ADMIN — PAROXETINE HYDROCHLORIDE 20 MG: 20 TABLET, FILM COATED ORAL at 09:33

## 2019-12-07 RX ADMIN — GABAPENTIN 400 MG: 400 CAPSULE ORAL at 09:31

## 2019-12-07 RX ADMIN — DOCUSATE SODIUM 100 MG: 100 CAPSULE, LIQUID FILLED ORAL at 09:32

## 2019-12-07 RX ADMIN — CEFAZOLIN 1 G: 330 INJECTION, POWDER, FOR SOLUTION INTRAMUSCULAR; INTRAVENOUS at 01:35

## 2019-12-07 RX ADMIN — FAMOTIDINE 20 MG: 20 TABLET, FILM COATED ORAL at 09:33

## 2019-12-07 RX ADMIN — TRAZODONE HYDROCHLORIDE 50 MG: 50 TABLET ORAL at 00:19

## 2019-12-07 RX ADMIN — Medication 10 ML: at 09:34

## 2019-12-07 RX ADMIN — MIDODRINE HYDROCHLORIDE 10 MG: 5 TABLET ORAL at 12:12

## 2019-12-07 RX ADMIN — METOPROLOL SUCCINATE 25 MG: 25 TABLET, EXTENDED RELEASE ORAL at 09:33

## 2019-12-07 RX ADMIN — CEFAZOLIN 1 G: 330 INJECTION, POWDER, FOR SOLUTION INTRAMUSCULAR; INTRAVENOUS at 09:42

## 2019-12-07 RX ADMIN — HYDROCODONE BITARTRATE AND ACETAMINOPHEN 1 TABLET: 5; 325 TABLET ORAL at 01:35

## 2019-12-07 RX ADMIN — AMIODARONE HYDROCHLORIDE 200 MG: 200 TABLET ORAL at 09:31

## 2019-12-07 RX ADMIN — TORSEMIDE 40 MG: 20 TABLET ORAL at 09:33

## 2019-12-07 RX ADMIN — MIDODRINE HYDROCHLORIDE 10 MG: 5 TABLET ORAL at 09:31

## 2019-12-07 ASSESSMENT — PAIN SCALES - GENERAL
PAINLEVEL_OUTOF10: 2
PAINLEVEL_OUTOF10: 0
PAINLEVEL_OUTOF10: 5

## 2019-12-09 LAB — SURGICAL PATHOLOGY REPORT: NORMAL

## 2019-12-10 ENCOUNTER — TELEPHONE (OUTPATIENT)
Dept: GASTROENTEROLOGY | Age: 74
End: 2019-12-10

## 2019-12-11 ENCOUNTER — HOSPITAL ENCOUNTER (OUTPATIENT)
Dept: OTHER | Age: 74
Discharge: HOME | End: 2019-12-11
Payer: MEDICARE

## 2019-12-11 VITALS
OXYGEN SATURATION: 98 % | BODY MASS INDEX: 25.93 KG/M2 | WEIGHT: 175.6 LBS | HEART RATE: 78 BPM | SYSTOLIC BLOOD PRESSURE: 99 MMHG | DIASTOLIC BLOOD PRESSURE: 54 MMHG

## 2019-12-11 DIAGNOSIS — I50.22 CHRONIC SYSTOLIC CONGESTIVE HEART FAILURE (HCC): Primary | Chronic | ICD-10-CM

## 2019-12-11 PROCEDURE — 99213 OFFICE O/P EST LOW 20 MIN: CPT | Performed by: NURSE PRACTITIONER

## 2019-12-11 PROCEDURE — 99213 OFFICE O/P EST LOW 20 MIN: CPT

## 2019-12-11 ASSESSMENT — ENCOUNTER SYMPTOMS
SHORTNESS OF BREATH: 0
ABDOMINAL PAIN: 0
EYE DISCHARGE: 0
BLOOD IN STOOL: 0
COUGH: 0

## 2019-12-11 NOTE — FLOWSHEET NOTE
Patient discharged to home, accompanied by family with all belongings. Discharge instructions given, patient verbalized understanding. Patient left unit via wheelchair. 49 year old female sent in by urgent care for abnormal ekg, patient states she had chest pain radiating to left arm and left side of neck starting today. denies any sob or diaphoresis. patient at bedside anxious states her  has been sick and been under a lot of stress. patient states she sees a cardiologist for b/l LE edema +2 edema as per patient this is her baseline. patient states echo results came back normal and schedule for a stress test in January.

## 2019-12-19 ENCOUNTER — TELEPHONE (OUTPATIENT)
Dept: RADIATION ONCOLOGY | Facility: MEDICAL CENTER | Age: 74
End: 2019-12-19

## 2020-01-02 ENCOUNTER — TELEPHONE (OUTPATIENT)
Dept: GASTROENTEROLOGY | Age: 75
End: 2020-01-02

## 2020-01-06 ENCOUNTER — HOSPITAL ENCOUNTER (OUTPATIENT)
Age: 75
Setting detail: SPECIMEN
Discharge: HOME | End: 2020-01-06
Payer: MEDICARE

## 2020-01-06 LAB
ALBUMIN SERPL-MCNC: 3.9 G/DL (ref 3.5–5.2)
ALBUMIN/GLOBULIN RATIO: 1.1 (ref 1–2.5)
ALP BLD-CCNC: 99 U/L (ref 40–129)
ALT SERPL-CCNC: 14 U/L (ref 5–41)
AMYLASE: 115 U/L (ref 28–100)
AST SERPL-CCNC: 25 U/L
BILIRUB SERPL-MCNC: 0.93 MG/DL (ref 0.3–1.2)
BILIRUBIN DIRECT: 0.62 MG/DL
BILIRUBIN, INDIRECT: 0.31 MG/DL (ref 0–1)
GLOBULIN: ABNORMAL G/DL (ref 1.5–3.8)
LIPASE: 30 U/L (ref 13–60)
TOTAL PROTEIN: 7.3 G/DL (ref 6.4–8.3)

## 2020-01-08 ENCOUNTER — OFFICE VISIT (OUTPATIENT)
Dept: GASTROENTEROLOGY | Age: 75
End: 2020-01-08
Payer: MEDICARE

## 2020-01-08 VITALS
HEART RATE: 84 BPM | DIASTOLIC BLOOD PRESSURE: 65 MMHG | BODY MASS INDEX: 25.25 KG/M2 | SYSTOLIC BLOOD PRESSURE: 99 MMHG | WEIGHT: 171 LBS

## 2020-01-08 PROCEDURE — 1123F ACP DISCUSS/DSCN MKR DOCD: CPT | Performed by: INTERNAL MEDICINE

## 2020-01-08 PROCEDURE — G8427 DOCREV CUR MEDS BY ELIG CLIN: HCPCS | Performed by: INTERNAL MEDICINE

## 2020-01-08 PROCEDURE — G8417 CALC BMI ABV UP PARAM F/U: HCPCS | Performed by: INTERNAL MEDICINE

## 2020-01-08 PROCEDURE — 4040F PNEUMOC VAC/ADMIN/RCVD: CPT | Performed by: INTERNAL MEDICINE

## 2020-01-08 PROCEDURE — 99214 OFFICE O/P EST MOD 30 MIN: CPT | Performed by: INTERNAL MEDICINE

## 2020-01-08 PROCEDURE — 1036F TOBACCO NON-USER: CPT | Performed by: INTERNAL MEDICINE

## 2020-01-08 PROCEDURE — G8482 FLU IMMUNIZE ORDER/ADMIN: HCPCS | Performed by: INTERNAL MEDICINE

## 2020-01-08 PROCEDURE — 3017F COLORECTAL CA SCREEN DOC REV: CPT | Performed by: INTERNAL MEDICINE

## 2020-01-08 RX ORDER — MEGESTROL ACETATE 20 MG/1
20 TABLET ORAL DAILY
Qty: 30 TABLET | Refills: 3 | Status: SHIPPED | OUTPATIENT
Start: 2020-01-08 | End: 2020-04-20

## 2020-01-08 ASSESSMENT — ENCOUNTER SYMPTOMS
ABDOMINAL PAIN: 0
VOMITING: 0
ANAL BLEEDING: 1
COUGH: 1
TROUBLE SWALLOWING: 0
CONSTIPATION: 0
BLOOD IN STOOL: 0
ABDOMINAL DISTENTION: 0
NAUSEA: 0
RECTAL PAIN: 0
DIARRHEA: 0
CHOKING: 0
WHEEZING: 0

## 2020-01-08 NOTE — PROGRESS NOTES
GI CLINIC FOLLOW UP    INTERVAL HISTORY:   No referring provider defined for this encounter. Chief Complaint   Patient presents with    Follow-up     Patient is here today to f/u on labs. HISTORY OF PRESENT ILLNESS: Marie Griffith is a 69 y.o. male , referred for evaluation of IPMN , MM, GERD, elevated LFTs , anemia   Patient is here for follow-up  He did not get his TURP yet  Follow with Dr. Kurt Paniagua oncology for his multiple myeloma   . plaese refer to previous visit note , we only ordered labs because of the patient's situation and requests . amylase slightly elevated    LFTs and Lipase normal   Denied any new issue except his wife very concerned about his appetite although we looked at his weight is not losing any weight but she said he is not eating sometimes he does not eat until 9 PM and even at that time will eat just peanut butter and Jell-O   Was constipated but on Senna and colace , doing well  On Pepcid controlling his GI symptom he does not have any nausea and no abdominal pain no black stool or blood in the stool no fever no chills       Past Medical,Family, and Social History reviewed and does contribute to the patient presentingcondition. Patient's PMH/PSH,SH,PSYCH Hx, MEDs, ALLERGIES, and ROS were all reviewed and updated in the appropriate sections. PAST MEDICAL HISTORY:  Past Medical History:   Diagnosis Date    AICD (automatic cardioverter/defibrillator) present 12/2018    MEDTYRONIC INSERTED BY DR [employer]    Anesthesia complication     POTENTIAL ONLY. PATIENT HAS A 1500 ML FLUID RESTRICTION. WIFE CONCERNED PATIENT RECEIVED TOO MUCH IV FLUIDS DURING PATIENTS LAST SURGERY.     Anticoagulated     XARELTO    Arthritis     all over    Bladder cancer [facility]) 2019    CAD (coronary artery disease) 2018    OPEN HEART CABG X 2 BYPASS DR Hira Bagley CARDIOLOGIST    Cardiomyopathy ([facility] 2018    CH[facility]est[facility] failure) [facility] 06/2019    FLUID RESTRICTION 1500ML/24 HRS    Chronic kidney disease     STAGE 3    Constipation     COPD (chronic obstructive pulmonary disease) ([address] 2009    INHALER USE AS NEEDED    Diabetes mellitus ([address] 2015    R/T MYELOMA MEDS DEXAMETHASONE.  NOT ON THOSE MEDS SO NO LONGER ON RX. A1C IS GOOD.  Difficult intravenous access     DVT of leg (deep venous thrombosis) ([address] 05/2017    RIGHT-TAKES BLOOD THINNER    Former tobacco use     QUIT 08/2018    GERD (gastroesophageal reflux disease)     Hematuria     History of blood transfusion 01/2018    AMEMIA TRANFUSED 2 UNITS NO REACTIONS    History of blood transfusion 02/2018    TRANFUSED 1 UNIT    History of blood transfusion 08/2018    OPEN HEART    Hx of blood clots 05/2017    RIGHT LEG DVT ON BLOOD THINNER    Hyperlipidemia 2009    ON RX    Hypertension 2004    ON RX    Multiple myeloma ([address] 2014    Neuropathy 2017    LEGS     Wears dentures     FULL UPPER, PARTIAL LOWER       Past Surgical History:   Procedure Laterality Date    ABDOMEN SURGERY  2003    STROMAL TUMOR    BLADDER TUMOR EXCISION  09/20/2019     CYSTOSCOPY, TUR BLADDER TUMOR WITH GYRUS     CARDIAC CATHETERIZATION  12/13/2017    right and left heart cath with Dr. Arian Concepcion  12/17/2018     [facility]  08/16/2018    CABG WITH BYPASS X2    CATARACT REMOVAL WITH IMPLANT Right 2018    CATARACT REMOVAL WITH IMPLANT Left 04/2019    COLONOSCOPY  01/14/2018    CYSTOSCOPY  08/08/2019    CYSTOSCOPY N/A 9/20/2019    CYSTOSCOPY, TUR BLADDER TUMOR WITH GYRUS performed by Griffin Adams MD at [address]  12/06/2019     CYSTOSCOPY, TUR BLADDER TUMOR GYRUS     CYSTOSCOPY N/A 12/6/2019    CYSTOSCOPY, TUR BLADDER TUMOR GYRUS performed by Griffin Adams MD at [address] Right 2018    TEAR DUCT SURGERY.     EYE SURGERY Right 2018    CATARACT WITH IOL PLACED    EYE SURGERY Left 04/2019    CATARACT WITH IOL PLACED    HERNIA REPAIR Right 1967    INGUINAL    dispense generic fenofibrate unless prescriber denote (Patient taking differently: Take 135 mg by mouth Daily with lunch s [facility] pharmacy: Please dispense generic fenofibrate unless prescriber denote), Disp: 30 tablet, Rfl: 3    simvastatin (ZOCOR) 40 MG tablet, Take 1 tablet by mouth nightly, Disp: 30 tablet, Rfl: 3    metoprolol succinate (TOPROL XL) 25 MG extended release tablet, Take 1 tablet by mouth daily, Disp: 30 tablet, Rfl: 3    ferrous sulfate 325 (65 Fe) MG EC tablet, Take 1 tablet by mouth every other day, Disp: 90 tablet, Rfl: 3    tamsulosin (FLOMAX) 0.4 MG capsule, Take 1 capsule by mouth daily, Disp: 30 capsule, Rfl: 3    latanoprost (XALATAN) 0.005 % ophthalmic solution, Place 1 drop into both eyes nightly (Patient taking differently: Place 1 drop into both eyes nightly as needed Indications: does not use it daily ), Disp: 1 Bottle, Rfl: 3    midodrine (PROAMATINE) 10 MG tablet, Take 1 tablet by mouth 3 times daily (with meals), Disp: 90 tablet, Rfl: 3    docusate sodium (COLACE, DULCOLAX) 100 MG CAPS, Take 100 mg by mouth 2 times daily (Patient taking differently: Take 100 mg by mouth 2 times daily Indications: pt states he takes once daily ), Disp: , Rfl:     potassium chloride (MICRO-K) 10 MEQ extended release capsule, Take 10 mEq by mouth 2 times daily , Disp: , Rfl:     traZODone (DESYREL) 50 MG tablet, Take 1 tablet by mouth nightly as needed , Disp: , Rfl:     albuterol sulfate  (90 Base) MCG/ACT inhaler, Inhale 1 puff into the lungs every 6 hours as needed for Wheezing, Disp: , Rfl:     famotidine (PEPCID) 20 MG tablet, Take 1 tablet by mouth 2 times daily, Disp: 60 tablet, Rfl: 3    torsemide (DEMADEX) 20 MG tablet, Take 2 tablets by mouth daily, Disp: 180 tablet, Rfl: 3    ALLERGIES:   Allergies   Allergen Reactions    Lisinopril Other (See Comments)     Dry cough    Nuts [Peanut-Containing Drug Products] Other (See Comments)     PEANUTS--abd pain CAN EAT PEANUT file     Emotionally abused: Not on file     Physically abused: Not on file     Forced sexual activity: Not on file   Other Topics Concern    Not on file   Social History Narrative    Not on file       REVIEW OF SYSTEMS: A 12-point review of systemswas obtained and pertinent positives and negatives were enumerated above in the history of present illness. All other reviewed systems / symptoms were negative. Review of Systems   Constitutional: Positive for appetite change (decrease), fatigue and unexpected weight change (decrease). HENT: Negative for trouble swallowing. Respiratory: Positive for cough. Negative for choking and wheezing. Cardiovascular: Negative for chest pain, palpitations and leg swelling. Gastrointestinal: Positive for anal bleeding. Negative for abdominal distention, abdominal pain, blood in stool, constipation, diarrhea, nausea, rectal pain and vomiting. Genitourinary: Negative for difficulty urinating. Allergic/Immunologic: Negative for environmental allergies and food allergies. Neurological: Negative for dizziness, weakness, light-headedness, numbness and headaches. Hematological: Bruises/bleeds easily. Psychiatric/Behavioral: Negative for sleep disturbance. The patient is not nervous/anxious.             LABORATORY DATA: Reviewed  Lab Results   Component Value Date    WBC 9.0 12/07/2019    HGB 11.6 (L) 12/07/2019    HCT 39.2 (L) 12/07/2019    MCV 92.0 12/07/2019     12/07/2019     12/07/2019    K 4.7 12/07/2019     12/07/2019    CO2 24 12/07/2019    BUN 37 (H) 12/07/2019    CREATININE 1.32 (H) 12/07/2019    LABALBU 3.9 01/06/2020    BILITOT 0.93 01/06/2020    ALKPHOS 99 01/06/2020    AST 25 01/06/2020    ALT 14 01/06/2020    INR 1.4 08/26/2018         Lab Results   Component Value Date    RBC 4.26 12/07/2019    HGB 11.6 (L) 12/07/2019    MCV 92.0 12/07/2019    MCH 27.2 12/07/2019    MCHC 29.6 12/07/2019    RDW 17.0 (H) 12/07/2019    MPV 10.7 (intraductal papillary mucinous neoplasm)     2. Multiple myeloma in remission ([address]     3. Gastroesophageal reflux disease, esophagitis presence not specified     4. Elevated LFTs     5. Esophageal dysphagia     6. Other constipation       Had a recent CAT scan for his IPMN, I cannot do MRI because of his pacemaker, patient does not want to have a lot of testing. He did not show up for his EGD. He just wants symptomatic treatment and he went to take care of the other issues including his multiple myeloma and possible prostate cancer which need TURP  For which we will continue on symptomatic treatment  And added Megace to enhance his appetite  His liver enzymes are normal now  He denied any dysphagia anymore  And his constipation is controlled  And his reflux is controlled  Diet/life style/natural hx /complication of the dx were all explained in details   Past medical, past surgical, social history, psychiatric history, medications or allergies, all reviewed and  updated        Thank you for allowing me to participate in the care of Mr. Dey. For any further questions please do not hesitate to contact me. I have reviewed and agree with the ROS entered by the MA/LPN. Note is dictated utilizing voice recognition software. Unfortunately this leads to occasional typographical errors. Please contact our office if you have any questions.       Georgina Lozano MD  [facility]  O: #[phone]

## 2020-01-10 ENCOUNTER — HOSPITAL ENCOUNTER (OUTPATIENT)
Dept: RADIATION ONCOLOGY | Facility: MEDICAL CENTER | Age: 75
Discharge: HOME | End: 2020-01-10
Payer: MEDICARE

## 2020-01-10 VITALS
SYSTOLIC BLOOD PRESSURE: 102 MMHG | RESPIRATION RATE: 16 BRPM | OXYGEN SATURATION: 99 % | DIASTOLIC BLOOD PRESSURE: 63 MMHG | WEIGHT: 171.5 LBS | BODY MASS INDEX: 25.33 KG/M2 | HEART RATE: 78 BPM | TEMPERATURE: 98.1 F

## 2020-01-10 PROCEDURE — 99212 OFFICE O/P EST SF 10 MIN: CPT | Performed by: RADIOLOGY

## 2020-01-10 ASSESSMENT — ENCOUNTER SYMPTOMS
VOMITING: 0
ABDOMINAL PAIN: 0
TROUBLE SWALLOWING: 0
SHORTNESS OF BREATH: 1
CONSTIPATION: 1
BLOOD IN STOOL: 1
SORE THROAT: 0
BACK PAIN: 1
WHEEZING: 1
DIARRHEA: 0
NAUSEA: 0
COUGH: 1

## 2020-01-10 NOTE — PROGRESS NOTES
[facility] Oncology      Date of Service: 1/10/2020     Location: [facility] Radiation Oncology,   [address], [place], [address] [facility]   [address]    Patient ID:   Valentine Morelos. : 1945   MRN: [id number]    DIAGNOSIS:  Cancer Staging  Bladder tumor  Staging form: Urinary Bladder, AJCC 8th Edition  - Clinical: Stage II (cT2, cN0, cM0) - Signed by Luz Thorne MD on 10/10/2019      Patient Active Problem List   Diagnosis    Chronic systolic congestive heart failure (HCC)    Multiple vessel coronary artery disease    Multiple myeloma in remission ([address]    Chronic obstructive pulmonary disease (HCC)    Low hemoglobin    Other drug-induced pancytopenia ([address]    S/P ICD (internal cardiac defibrillator) procedure    Ischemic cardiomyopathy    IPMN (intraductal papillary mucinous neoplasm)    History of coronary artery bypass graft    CKD (chronic kidney disease), stage III ([address]    Valvular heart disease    Therapeutic opioid induced constipation    Bladder tumor    Bladder cancer ([address]         INTERVAL HISTORY:   The patient is a 32-year-old gentleman with multiple medical problems and severe cardiac disease who has a history of muscle invasive bladder cancer. I initially saw the patient consultation on 2019. At that time, he had opted to forego treatment of his muscle invasive bladder cancer in favor of cystoscopy and surveillance. Since that visit, he has had another cystoscopy performed on 2019. It helped his bleeding under control, but it also demonstrated high-grade urothelial cancer with muscle invasion. He continues to have hematuria when straining and at this point is interested in some type of treatment. He is not interested in surgery or chemotherapy, and does not want a full course of radiation therapy. He is interested in palliative options.     At this time, the patient is feeling fairly well. Other than the hematuria, his symptoms are otherwise normal for him. He is now on appetite stimulant which is helping. He continues to be seen at the congestive heart failure clinic, by his nephrologist, and will be seeing his neurologist this month as well. MEDICATIONS:    Current Outpatient Medications:     megestrol (MEGACE) 20 MG tablet, Take 1 tablet by mouth daily, Disp: 30 tablet, Rfl: 3    aspirin 81 MG tablet, Take 1 tablet by mouth daily HOLD for 5 days after surgery, Disp: 30 tablet, Rfl: 3    rivaroxaban (XARELTO) 20 MG TABS tablet, Take 1 tablet by mouth daily (with breakfast) HOLD for 3 days after surgery. OK to resume if urine clear, Disp: 30 tablet, Rfl: 0    PARoxetine (PAXIL) 20 MG tablet, Take 20 mg by mouth daily, Disp: , Rfl:     traMADol (ULTRAM) 50 MG tablet, Take 50 mg by mouth 2 times daily. Indications: pt takes it once daily, Disp: , Rfl:     senna (SENOKOT) 8.6 MG tablet, Take 1 tablet by mouth nightly, Disp: , Rfl:     gabapentin (NEURONTIN) 400 MG capsule, Take 400 mg by mouth daily.  Indications: per [facility] , Disp: , Rfl:     dextran 70-hypromellose (TEARS NATURALE) 0.1-0.3 % SOLN opthalmic solution, Place 1 drop into both eyes every 4 hours as needed, Disp: , Rfl:     amiodarone (CORDARONE) 200 MG tablet, Take 1 tablet by mouth daily, Disp: 30 tablet, Rfl: 3    ipratropium-albuterol (DUONEB) 0.5-2.5 (3) MG/3ML SOLN nebulizer solution, Inhale 3 mLs into the lungs every 4 hours (while awake) (Patient taking differently: Inhale 3 mLs into the lungs every 4 hours (while awake) TAKES AS NEEDED), Disp: 360 mL, Rfl: 0    fenofibrate (TRICOR) 54 MG tablet, Take 2.5 tablets by mouth daily [facility] pharmacy: Please dispense generic fenofibrate unless prescriber denote (Patient taking differently: Take 135 mg by mouth Daily with lunch [facility] pharmacy: Please dispense generic fenofibrate unless prescriber denote), Disp: 30 tablet, Rfl: 3    simvastatin (ZOCOR) 40 MG tablet, Take 1 tablet by mouth nightly, Disp: 30 tablet, Rfl: 3    metoprolol succinate (TOPROL XL) 25 MG extended release tablet, Take 1 tablet by mouth daily, Disp: 30 tablet, Rfl: 3    ferrous sulfate 325 (65 Fe) MG EC tablet, Take 1 tablet by mouth every other day, Disp: 90 tablet, Rfl: 3    tamsulosin (FLOMAX) 0.4 MG capsule, Take 1 capsule by mouth daily, Disp: 30 capsule, Rfl: 3    latanoprost (XALATAN) 0.005 % ophthalmic solution, Place 1 drop into both eyes nightly (Patient taking differently: Place 1 drop into both eyes nightly as needed Indications: does not use it daily ), Disp: 1 Bottle, Rfl: 3    midodrine (PROAMATINE) 10 MG tablet, Take 1 tablet by mouth 3 times daily (with meals), Disp: 90 tablet, Rfl: 3    docusate sodium (COLACE, DULCOLAX) 100 MG CAPS, Take 100 mg by mouth 2 times daily (Patient taking differently: Take 100 mg by mouth 2 times daily Indications: pt states he takes once daily ), Disp: , Rfl:     potassium chloride (MICRO-K) 10 MEQ extended release capsule, Take 10 mEq by mouth 2 times daily , Disp: , Rfl:     traZODone (DESYREL) 50 MG tablet, Take 1 tablet by mouth nightly as needed , Disp: , Rfl:     albuterol sulfate  (90 Base) MCG/ACT inhaler, Inhale 1 puff into the lungs every 6 hours as needed for Wheezing, Disp: , Rfl:     famotidine (PEPCID) 20 MG tablet, Take 1 tablet by mouth 2 times daily, Disp: 60 tablet, Rfl: 3    torsemide (DEMADEX) 20 MG tablet, Take 2 tablets by mouth daily, Disp: 180 tablet, Rfl: 3    ALLERGIES:  Allergies   Allergen Reactions    Lisinopril Other (See Comments)     Dry cough    Nuts [Peanut-Containing Drug Products] Other (See Comments)     PEANUTS--abd pain CAN EAT PEANUT BUTTER JUST NOT RAW PEANUTS         IMMUNIZATIONS:  Immunization History   Administered Date(s) Administered    Influenza Vaccine, unspecified formulation 10/01/2012, 09/27/2013, 10/03/2014    Influenza, High Dose (Fluzone 65 yrs and older) 10/10/2016, and is in accordance with NCCN guidelines. Explained the details of radiation therapy to the patient and his wife. I explained the potential short-term side effects and potential long-term complications. All of their questions were answered and informed consent was obtained. I scheduled him for simulation and we will begin his treatment shortly afterwards. Thank you once again for allowing me to participate in the care of this patient. Please do not hesitate to call me with any questions or concerns. Sincerely,        Linette Maldonado M.D. Medications Prescribed:   New Prescriptions    No medications on file       Orders: No orders of the defined types were placed in this encounter.

## 2020-01-15 ENCOUNTER — HOSPITAL ENCOUNTER (OUTPATIENT)
Dept: RADIATION ONCOLOGY | Facility: MEDICAL CENTER | Age: 75
Discharge: HOME | End: 2020-01-15
Attending: RADIOLOGY
Payer: MEDICARE

## 2020-01-15 PROCEDURE — 77290 THER RAD SIMULAJ FIELD CPLX: CPT | Performed by: RADIOLOGY

## 2020-01-15 PROCEDURE — 77334 RADIATION TREATMENT AID(S): CPT | Performed by: RADIOLOGY

## 2020-01-15 PROCEDURE — 77470 SPECIAL RADIATION TREATMENT: CPT | Performed by: RADIOLOGY

## 2020-01-15 NOTE — PROGRESS NOTES
Pt here today for teach and simulation scan. Radiation and You information provided along with additional education regarding radiation therapy and what to expect. Pt verbalizes understanding and all questions answered to the best of my knowledge. Samples of aquaphor and community resource information provided. Pt escorted to CT room without any difficulty. Radiation Therapy Education    · A Simulation Scan is like having a CT scan. Your physician will use the images obtained to develop your radiation treatment. · May take 30 minutes to 1 hour to complete  · Marking will be applied to your skin to help insure accurate positioning for your future treatments. · A mold or a mask may also be needed to help aid in your positioning    · Schedule: You will have treatments Monday - Friday  · Treatments should take about 15 minutes from the time you enter the treatment room  · You will have the same appointment time each day  · You will see your doctor and nurse one day weekly while you are undergoing treatments. · Every effort will be make to start your treatment at the scheduled time. However, there may be occasional delays due to emergency patients, technical problems, or other difficulties. · Side Effects: Radiation is a local treatment so any side effects you may experience will be in your treatment area only. · If you experience side effects they will typically occur after the 2nd or 3rd week of treatments. · Many patients do not experience severe side effects and are able to continue working during their treatments.  If you feel you treatments are interfering with your job, please notify your nurse        Skin Care During Radiation Treatments          Start applying moisturizers to the skin two times a day when you start your radiation        treatments  · Suggested moisturizers include: Aquaphor, Eucerin, Exclair, Borion gel (may be purchased at [facility]) or Mauro Persons (to order call 4-303-237-605-599-6396 or go to www.[employer])  · Do not apply anything to your skin in your treatment area that is not recommended by your radiation nurse and/or doctor  Good general hygiene/bathing should be performed daily  · Use moisturizing soaps that do not contain perfume or fragrances. Recommended soaps include Dove or Dial  · Use warm water, not hot water when bathing  · After bathing, pat the skin dry rather than rubbing it, especially at the treatment site  · Do not shave the treatment area. If you must shave, such as a beard, use an electric shaver. Don't use pre/after shave creams on treatment area. Avoid friction on and around your treatment area  · Do not use tape, bandages, or medicated patches in the treatment area  · Avoid tight fitting clothing  · No massaging or scratching    Avoid extremes of temperature on the treatment area such as heating pads, ice packs, hot tubs, or saunas    If the area being treated is exposed to the sun, apply sunscreen routinely to the treatment site whenever you are outdoors. A sunscreen with a minimum of SFF30 should be used. Since the area being treated will always be more sensitive than the rest of your skin, continue to protect the area from sun exposure after your treatment ends    If your armpit is in the treatment area, do not use antiperspirants. An aluminum-free, non-metallic deodorant may be used. Tell your nurse or doctor if you have any of the following symptoms:  · Blistered, open, swollen or tender areas of the skin in and around the treatment area  · Pain or itching that is not helped by prescribed medications or recommended ointments      Questions and Answers about Radiation Therapy  1. What is radiation therapy? Radiation therapy (also called radiotherapy) is a cancer treatment that uses high  doses of radiation to kill cancer cells and shrink tumors.  At low doses, radiation  is used as an x-ray to see inside your body and take pictures, such as once a day, or in smaller doses twice a day for several weeks. Spreading out the radiation dose allows normal cells to recover while cancer cells die. · Aimimng radiation at a precise part of your body. Some types of radiation therapy allow your doctor to aim high doses of radiaiton at your cancer while reducing radiation to nearby healthy tissue. These techniques use a computer to deliver precise radiation doses to a cancer tumor or to specific areas within the tumor. [facility]             Pelvis Side Effects  · Diarrhea  · Fatigue  · Hair loss  · Nausea and vomiting  · Sexual and fertility changes  · Skin changes  · Urinary and bladder changes    Ways to Manage Diarrhea   When you have diarrhea:  · Drink 8 to 12 cups of clear liquid per day. Severe diarrhea can cause your to become dehydrated, a problem that can become serious. This problem is caused by the loss of too much water from the body. Making sure you drink enough can help prevent it. If you drink liquids that are high in sugar (such as fruit juice, sweet iced tea, Adeel-Aid, or Hi-C) ask your nurse or dietitian if you should mix them with extra water. · Eat small meals and snacks. Many people find that they eat better if they eat 5 to 6 small means and snacks each day, rather than 3 large meals  · Eat foods that are high in salts such as sodium and potassium. Your body can lose these salts when you have diarrhea, and it is important to replace them. Foods that are high in sodium or potassium include bananas, oranges, peach and apricot nectar, and boiled or mashed potatoes. · Eat low-fiber foods. Foods that are high in fiber can make diarrhea worse. Low-fiber foods include bananas, white rice, white toast, and plan or vanilla yogurt. · Take care of your rectal area. Instead of toilet paper, use a baby wipe or squirt water from a spray bottle to clean yourself after bowel movements.  Also, ask your nurse about taking sitz baths, which is a warm-water bath taken in a sitting position that covers only the hips and buttocks. Be sure to tell your doctor or nurse if you rectal area gets sore. · Avoid:  · Beer, wine, and other types of alcohol  · Milk and dairy foods, such as ice cream, sour cream, and cheese  · Spicy foods, such as hot sauce, salsa, chili, and pascual dishes  · Foods or dinks with caffeine, such as regular coffee, black tea, soda and chocolate  · Foods or drinks that cause gas, such as cooked dried beans, cabbage, broccoli, soy milk, and other soy products   · Foods that are high in fiber, such as raw fruits and vegetables, cooked dried beans, and whole wheat breads and cereals  · Fried or greasy foods  · Food from [employer]  · Talk with your doctor or nurse. Tell him or her if you are having diarrhea. He or she will suggest ways to manage it. He or she may also suggest taking medicine, such as imodium. Fatigue  How long it lasts  When you will first feel fatigue depends on a few factors, such as your age, health, how active you are, and how you felt before radiation therapy started. Fatigue can last from 6 weeks to a year after your last radiation therapy session. Some people may always feel fatigue and not have as much energy as they did before radiation therapy. Ways to Manage Fatigue  · Try to sleep at least 8 hours each night. This may be more sleep than you needed before radiation therapy. One way to sleep better at night is to be active during the day. Another way is to relax right before going to bed. Do calming activities before bedtime, such as reading, working on a jigsaw puzzle, or listening to music. · Plan time to rest. Take short naps or rest breaks between activities. · Try not to do too much. With fatigue, you ay not have enough energy to do all the things you want to do. Stay active, but choose the activities that are most important to you. Try to let go of things that don't matter as much now.  For example, you might go to work but not do housework. You might watch your children's sprots events but not cook dinner. · Exercise. Research shows that most people feel better when they get some exercise each day. Go for a short walk, ride a bike, or do yoga. Talk with your doctor or nurse about types of exercise you can do while having radiation therapy. · Relax. Mediatation, prayer, gentle yoga, guided imagery, and visualization are ways you can learn to relax and decrease stress. For relaxation exercises:  · Visit Learning to Relax on the National Cancer [place]'s website at : www.cancer.gov/about-cancer/copingfeelings/relaxation  · See Facing Forward: Life After Cancer Treatment at: www.cancer.gov/publications/patient-education/facing-forward  · Eat and drink well. It can be easier to eat if you have 5 or 6 small meals each day, rather than 3 large ones. Keep foods around that are easy to fix, such as canned soups, frozen meals, yogurt, and cottage cheese. Drink plenty of fluids each day-about 8 cups of water or juice. · Plan a work schedule that is right for you. Fatigue may affect the amount of energy you have for your job. You may feel well enough to work your full schedule, or you may need to work less-maybe just a few hours a day or a few days each week. You may want to talk with your boss about ways to work from home so you do not need to commute. If possible, you may want to think about going on medical leave while you have radiation therapy. Ways to Manage Nausea and Vomiting    · Plan when to eat and drink. Some people feel better when they eat before getting radiation therapy and others do not. Learn the best time for you to each and drink. Try a light snack, such as crackers and apple juice 1 to 2 hours before radiation therapy. Or, you might feel better if you have treatment on an empty stomach, which means not eating 2 or 3 hours before treatment. · Eat small meals and snacks.  Many people treatment area. Use creams that your doctor or nurse suggests. Ozzy / Ford Jones / Miaderm   · Apply recommended lotion to treatment area 2 times a day. This should begin when you start radiation treatments. · Do not put anything on your skin that is very hot or cold. Do not use heating pads, ice packs or other hot or cold items on the treatment area  · Be gentle when you shower or take a bath. You can take a lukewarm shower every day. If you prefer to take a lukewarm bath, so do only every other day and don't soak  For too long. Whether you take a shower or bath, make sure to use a mild soap. Dry yourself with a soft towel by patting, not rubbing, your skin. Be careful not to wash off the ink markings that you need for radiation therapy. · Use only those lotions and skin products that your doctor or nurse suggests. If you are using a prescribed cream for a skin problem or acne, tell your doctor or nurse before you begin radiation treatment. Check with your doctor or nurse before using any of the following skin products:  · Bubble bath     · Cornstarch  · Cream  · Deodorant  · Hair removers  · Makeup  · Oil   · Ointment  · Perfume  · Powder  · Soap   · Sunscreen  · Cool, humid places. Your skin may feel much better when you are in a cool, humid places. You can make rooms more humid by putting a bowl of water on the radiator or using a humidifier. If you use a humidifier, be sure to follow the directions about cleaning it to prevent bacteria. · Soft fabrics. Wear clothes and use bed sheets that are made of very soft fabrics. · Do not wear clothes in your treatment area that are tight and do not breathe, such as girdles, body shapers, and pantyhose  · Protect your skin from the sun every day. The sun can burn you even on cloudy days or when you are outside for just a few minutes. Do not go to the beach or sunbathe. Wear a broad-brimmed hat, long-sleeved shirt, and long pants when you are outside.  Talk with urine  · Bladder spasms, which are like painful muscle cramps    Ways to Manage Urinary and Bladder Changes  · Drink lots of fluids. Drink 6 to 8 cups of fluids each day, enough so that your urine is clear to light yellow in color  · Avoid coffee, black tea, alcohol, spices, and all tobacco products  · Talk with your doctor or nurse if you think you have urinary or bladder problems. You may need to provide a urine sample to check whether you have an infection  · Talk with your doctor or nurse if you have incontinence. He or she may refer you to a physical therapist who will assess your problem. The therapist can give you exercises to improve bladder control. · Medicine. Your doctor may prescribe antibiotics if your problems are caused by an infection.  Other medicines can help you urinate, reduce burning or pain, and ease bladder spasm  [employer]

## 2020-01-16 ENCOUNTER — TELEPHONE (OUTPATIENT)
Dept: ONCOLOGY | Age: 75
End: 2020-01-16

## 2020-01-20 ENCOUNTER — HOSPITAL ENCOUNTER (OUTPATIENT)
Dept: RADIATION ONCOLOGY | Facility: MEDICAL CENTER | Age: 75
Discharge: HOME | End: 2020-01-20
Attending: RADIOLOGY
Payer: MEDICARE

## 2020-01-20 PROCEDURE — 77334 RADIATION TREATMENT AID(S): CPT | Performed by: RADIOLOGY

## 2020-01-20 PROCEDURE — 77295 3-D RADIOTHERAPY PLAN: CPT | Performed by: RADIOLOGY

## 2020-01-20 PROCEDURE — 77300 RADIATION THERAPY DOSE PLAN: CPT | Performed by: RADIOLOGY

## 2020-01-23 ENCOUNTER — TELEPHONE (OUTPATIENT)
Dept: RADIATION ONCOLOGY | Facility: MEDICAL CENTER | Age: 75
End: 2020-01-23

## 2020-01-23 NOTE — TELEPHONE ENCOUNTER
1-22-20 called Dr. René Medina office and they had our defibullator paperwork with the doctor in his basket to sign. I informed them it was for radiation and we cant start treatment without the approval.    1-23-20 Called Dr. Jeanmarie Wilhelm office and the doctor is not there today, but will be in tomorrow. I will continue to follow.

## 2020-01-24 ENCOUNTER — TELEPHONE (OUTPATIENT)
Dept: RADIATION ONCOLOGY | Facility: MEDICAL CENTER | Age: 75
End: 2020-01-24

## 2020-01-27 ENCOUNTER — TELEPHONE (OUTPATIENT)
Dept: RADIATION ONCOLOGY | Facility: MEDICAL CENTER | Age: 75
End: 2020-01-27

## 2020-01-27 NOTE — TELEPHONE ENCOUNTER
Faxed over 3rd request to Dr. Stewart May office and called and they said the doctor is not there today and will take care of the request tomorrow when he is in office. I informed the office of the urgency and how many days we have been waiting for something back. Will continue to follow.

## 2020-01-28 ENCOUNTER — TELEPHONE (OUTPATIENT)
Dept: RADIATION ONCOLOGY | Facility: MEDICAL CENTER | Age: 75
End: 2020-01-28

## 2020-01-30 ENCOUNTER — HOSPITAL ENCOUNTER (OUTPATIENT)
Dept: RADIATION ONCOLOGY | Facility: MEDICAL CENTER | Age: 75
Discharge: HOME | End: 2020-01-30
Attending: RADIOLOGY
Payer: MEDICARE

## 2020-01-30 ENCOUNTER — APPOINTMENT (OUTPATIENT)
Dept: RADIATION ONCOLOGY | Facility: MEDICAL CENTER | Age: 75
End: 2020-01-30
Attending: RADIOLOGY
Payer: MEDICARE

## 2020-01-30 PROCEDURE — 77412 RADIATION TX DELIVERY LVL 3: CPT

## 2020-01-30 PROCEDURE — 77280 THER RAD SIMULAJ FIELD SMPL: CPT

## 2020-01-31 ENCOUNTER — HOSPITAL ENCOUNTER (OUTPATIENT)
Dept: RADIATION ONCOLOGY | Facility: MEDICAL CENTER | Age: 75
Discharge: HOME | End: 2020-01-31
Attending: RADIOLOGY
Payer: MEDICARE

## 2020-01-31 PROCEDURE — 77387 GUIDANCE FOR RADJ TX DLVR: CPT

## 2020-01-31 PROCEDURE — 77412 RADIATION TX DELIVERY LVL 3: CPT

## 2020-02-03 ENCOUNTER — HOSPITAL ENCOUNTER (OUTPATIENT)
Dept: RADIATION ONCOLOGY | Facility: MEDICAL CENTER | Age: 75
Discharge: HOME | End: 2020-02-03
Attending: RADIOLOGY
Payer: MEDICARE

## 2020-02-03 PROCEDURE — 77387 GUIDANCE FOR RADJ TX DLVR: CPT | Performed by: RADIOLOGY

## 2020-02-03 PROCEDURE — 77412 RADIATION TX DELIVERY LVL 3: CPT | Performed by: RADIOLOGY

## 2020-02-04 ENCOUNTER — HOSPITAL ENCOUNTER (OUTPATIENT)
Dept: RADIATION ONCOLOGY | Facility: MEDICAL CENTER | Age: 75
Discharge: HOME | End: 2020-02-04
Attending: RADIOLOGY
Payer: MEDICARE

## 2020-02-04 VITALS
BODY MASS INDEX: 26.19 KG/M2 | SYSTOLIC BLOOD PRESSURE: 114 MMHG | WEIGHT: 177.38 LBS | TEMPERATURE: 98.4 F | OXYGEN SATURATION: 99 % | HEART RATE: 71 BPM | DIASTOLIC BLOOD PRESSURE: 62 MMHG | RESPIRATION RATE: 16 BRPM

## 2020-02-04 PROCEDURE — 77412 RADIATION TX DELIVERY LVL 3: CPT

## 2020-02-04 PROCEDURE — 77387 GUIDANCE FOR RADJ TX DLVR: CPT

## 2020-02-04 PROCEDURE — 77370 RADIATION PHYSICS CONSULT: CPT | Performed by: RADIOLOGY

## 2020-02-04 NOTE — PROGRESS NOTES
Mango Prakash Radford  2/4/2020  Wt Readings from Last 3 Encounters:   02/04/20 177 lb 6 oz (80.5 kg)   01/10/20 171 lb 8 oz (77.8 kg)   01/08/20 171 lb (77.6 kg)     Body mass index is 26.19 kg/m². Pt here for OTV. Denies pain. Patient unable to hold urine, but that is not new. Patient goes every 2 hrs at night. No dysuria. Dr. Megha Chen to eval.    Treatment Area:bladder        Comfort Alteration  Fatigue: Moderate      Nutritional Alteration  Anorexia: No   Nausea: No   Vomiting: No       Elimination Alterations  Constipation: no  Diarrhea:  Had some after first treatment and then went away yesterday    Skin Alteration   Sensation:none    Radiation Dermatitis:  Intact [x]     Erythema  []     Discoloration  []     Rash []     Dry desquamation  []     Moist desquamation []       Emotional  Coping: effective      Injury, potential bleeding or infection: none    Lab Results   Component Value Date    WBC 9.0 12/07/2019     12/07/2019         /62   Pulse 71   Temp 98.4 °F (36.9 °C) (Oral)   Resp 16   Wt 177 lb 6 oz (80.5 kg)   SpO2 99%   BMI 26.19 kg/m²   Patient Currently in Pain: No                 Assessment/Plan: Patient was seen today for weekly visit.       Shelley Nunn

## 2020-02-04 NOTE — PROGRESS NOTES
[facility] Radiation Oncology  On Treatment Visit (OTV) Note    Diagnosis: Cancer Staging  Bladder tumor  Staging form: Urinary Bladder, AJCC 8th Edition  - Clinical: Stage II (cT2, cN0, cM0) - Signed by Milton Lomeli MD on 10/10/2019      Dose Accrued: 1000/3750 cGy    S: She is tolerating treatment well. He notes a little bit of loose stools over the last few days. Denies abdominal cramping, hematuria, pain or discomfort. O: /62   Pulse 71   Temp 98.4 °F (36.9 °C) (Oral)   Resp 16   Wt 177 lb 6 oz (80.5 kg)   SpO2 99%   BMI 26.19 kg/m² . Well-appearing, in no apparent distress, alert and fully oriented, answers questions appropriately. No signs of acute radiation-induced toxicity on physical exam.    IGRT: Set-up images acquired to ensure daily treatment accuracy and precision were reviewed by the physician. A&P: Continue radiotherapy as planned. Reiterated anticipated side effects in upcoming weeks. Recommend stopping Senna.      Electronically signed by Cristin Samuels MD on 2/4/2020 at 9:47 AM

## 2020-02-05 ENCOUNTER — HOSPITAL ENCOUNTER (OUTPATIENT)
Dept: RADIATION ONCOLOGY | Facility: MEDICAL CENTER | Age: 75
Discharge: HOME | End: 2020-02-05
Attending: RADIOLOGY
Payer: MEDICARE

## 2020-02-05 PROCEDURE — 77387 GUIDANCE FOR RADJ TX DLVR: CPT | Performed by: RADIOLOGY

## 2020-02-05 PROCEDURE — 77336 RADIATION PHYSICS CONSULT: CPT | Performed by: RADIOLOGY

## 2020-02-05 PROCEDURE — 77412 RADIATION TX DELIVERY LVL 3: CPT | Performed by: RADIOLOGY

## 2020-02-06 ENCOUNTER — HOSPITAL ENCOUNTER (OUTPATIENT)
Dept: RADIATION ONCOLOGY | Facility: MEDICAL CENTER | Age: 75
Discharge: HOME | End: 2020-02-06
Attending: RADIOLOGY
Payer: MEDICARE

## 2020-02-06 PROCEDURE — 77387 GUIDANCE FOR RADJ TX DLVR: CPT

## 2020-02-06 PROCEDURE — 77412 RADIATION TX DELIVERY LVL 3: CPT

## 2020-02-06 PROCEDURE — 77417 THER RADIOLOGY PORT IMAGE(S): CPT

## 2020-02-07 ENCOUNTER — HOSPITAL ENCOUNTER (OUTPATIENT)
Dept: RADIATION ONCOLOGY | Facility: MEDICAL CENTER | Age: 75
Discharge: HOME | End: 2020-02-07
Attending: RADIOLOGY
Payer: MEDICARE

## 2020-02-07 PROCEDURE — 77412 RADIATION TX DELIVERY LVL 3: CPT | Performed by: RADIOLOGY

## 2020-02-07 PROCEDURE — 77387 GUIDANCE FOR RADJ TX DLVR: CPT | Performed by: RADIOLOGY

## 2020-02-10 ENCOUNTER — HOSPITAL ENCOUNTER (OUTPATIENT)
Dept: RADIATION ONCOLOGY | Facility: MEDICAL CENTER | Age: 75
Discharge: HOME | End: 2020-02-10
Attending: RADIOLOGY
Payer: MEDICARE

## 2020-02-10 PROCEDURE — 77412 RADIATION TX DELIVERY LVL 3: CPT | Performed by: RADIOLOGY

## 2020-02-10 PROCEDURE — 77387 GUIDANCE FOR RADJ TX DLVR: CPT | Performed by: RADIOLOGY

## 2020-02-11 ENCOUNTER — HOSPITAL ENCOUNTER (OUTPATIENT)
Dept: RADIATION ONCOLOGY | Facility: MEDICAL CENTER | Age: 75
Discharge: HOME | End: 2020-02-11
Attending: RADIOLOGY
Payer: MEDICARE

## 2020-02-11 VITALS
HEART RATE: 84 BPM | OXYGEN SATURATION: 99 % | TEMPERATURE: 98.4 F | RESPIRATION RATE: 16 BRPM | DIASTOLIC BLOOD PRESSURE: 74 MMHG | SYSTOLIC BLOOD PRESSURE: 130 MMHG | BODY MASS INDEX: 25.99 KG/M2 | WEIGHT: 176 LBS

## 2020-02-11 PROCEDURE — 77387 GUIDANCE FOR RADJ TX DLVR: CPT

## 2020-02-11 PROCEDURE — 77412 RADIATION TX DELIVERY LVL 3: CPT

## 2020-02-11 PROCEDURE — 77336 RADIATION PHYSICS CONSULT: CPT | Performed by: RADIOLOGY

## 2020-02-11 ASSESSMENT — PAIN DESCRIPTION - ONSET: ONSET: ON-GOING

## 2020-02-11 ASSESSMENT — PAIN DESCRIPTION - PAIN TYPE: TYPE: CHRONIC PAIN

## 2020-02-11 ASSESSMENT — PAIN DESCRIPTION - LOCATION: LOCATION: FOOT

## 2020-02-11 ASSESSMENT — PAIN DESCRIPTION - ORIENTATION: ORIENTATION: LEFT;RIGHT

## 2020-02-11 ASSESSMENT — PAIN SCALES - GENERAL: PAINLEVEL_OUTOF10: 6

## 2020-02-11 ASSESSMENT — PAIN DESCRIPTION - FREQUENCY: FREQUENCY: INTERMITTENT

## 2020-02-11 NOTE — PROGRESS NOTES
[facility] Radiation Oncology  On Treatment Visit (OTV) Note     Diagnosis: Cancer Staging  Bladder tumor  Staging form: Urinary Bladder, AJCC 8th Edition  - Clinical: Stage II (cT2, cN0, cM0) - Signed by Niurka Ascencio MD on 10/10/2019     Dose Accrued: 2250/3750 cGy     S: Patient is tolerating treatment well. Diarrhea x 6 per day. Has not stopped Senna as instructed last week. Had one self-remitting episode of lower abdominal cramping over the weekend, no issues since. Denies hematuria, dysuria, pain or discomfort.     O: /74   Pulse 84   Temp 98.4 °F (36.9 °C) (Oral)   Resp 16   Wt 176 lb (79.8 kg)   SpO2 99%   BMI 25.99 kg/m²   Well-appearing, in no apparent distress, alert and fully oriented, answers questions appropriately. No signs of acute radiation-induced toxicity on physical exam.     IGRT: Set-up images acquired to ensure daily treatment accuracy and precision were reviewed by the physician.     A&P: Continue radiotherapy as planned. Reiterated my recommendation to stop taking Senna, and to have some Imodium on hand. Patient apprehensive about being constipated because under orders by his cardiologist not to strain.     Electronically signed by Jazlyn Johnston MD on 2/11/2020 at 2:05 PM

## 2020-02-12 ENCOUNTER — HOSPITAL ENCOUNTER (OUTPATIENT)
Dept: RADIATION ONCOLOGY | Facility: MEDICAL CENTER | Age: 75
Discharge: HOME | End: 2020-02-12
Attending: RADIOLOGY
Payer: MEDICARE

## 2020-02-12 PROCEDURE — 77387 GUIDANCE FOR RADJ TX DLVR: CPT

## 2020-02-12 PROCEDURE — 77412 RADIATION TX DELIVERY LVL 3: CPT

## 2020-02-13 ENCOUNTER — HOSPITAL ENCOUNTER (OUTPATIENT)
Dept: RADIATION ONCOLOGY | Facility: MEDICAL CENTER | Age: 75
Discharge: HOME | End: 2020-02-13
Attending: RADIOLOGY
Payer: MEDICARE

## 2020-02-13 PROCEDURE — 77417 THER RADIOLOGY PORT IMAGE(S): CPT

## 2020-02-13 PROCEDURE — 77412 RADIATION TX DELIVERY LVL 3: CPT

## 2020-02-14 ENCOUNTER — HOSPITAL ENCOUNTER (OUTPATIENT)
Dept: RADIATION ONCOLOGY | Facility: MEDICAL CENTER | Age: 75
Discharge: HOME | End: 2020-02-14
Attending: RADIOLOGY
Payer: MEDICARE

## 2020-02-14 PROCEDURE — 77387 GUIDANCE FOR RADJ TX DLVR: CPT

## 2020-02-14 PROCEDURE — 77412 RADIATION TX DELIVERY LVL 3: CPT

## 2020-02-17 ENCOUNTER — HOSPITAL ENCOUNTER (OUTPATIENT)
Dept: RADIATION ONCOLOGY | Facility: MEDICAL CENTER | Age: 75
Discharge: HOME | End: 2020-02-17
Attending: RADIOLOGY
Payer: MEDICARE

## 2020-02-17 PROCEDURE — 77412 RADIATION TX DELIVERY LVL 3: CPT

## 2020-02-17 PROCEDURE — 77387 GUIDANCE FOR RADJ TX DLVR: CPT

## 2020-02-18 ENCOUNTER — HOSPITAL ENCOUNTER (OUTPATIENT)
Dept: RADIATION ONCOLOGY | Facility: MEDICAL CENTER | Age: 75
Discharge: HOME | End: 2020-02-18
Attending: RADIOLOGY
Payer: MEDICARE

## 2020-02-18 VITALS
WEIGHT: 175.38 LBS | RESPIRATION RATE: 16 BRPM | SYSTOLIC BLOOD PRESSURE: 116 MMHG | BODY MASS INDEX: 25.9 KG/M2 | HEART RATE: 87 BPM | OXYGEN SATURATION: 98 % | TEMPERATURE: 98.1 F | DIASTOLIC BLOOD PRESSURE: 72 MMHG

## 2020-02-18 PROCEDURE — 77412 RADIATION TX DELIVERY LVL 3: CPT

## 2020-02-18 PROCEDURE — 77336 RADIATION PHYSICS CONSULT: CPT

## 2020-02-18 PROCEDURE — 77387 GUIDANCE FOR RADJ TX DLVR: CPT

## 2020-02-18 NOTE — PROGRESS NOTES
Patient: Karey Jimenes. : 1945  Date of Service: 2020    [address] [place] [address] Oncology  On Treatment Visit (OTV) Note     Diagnosis: Cancer Staging  Bladder tumor  Staging form: Urinary Bladder, AJCC 8th Edition  - Clinical: Stage II (cT2, cN0, cM0) - Signed by Tavon Booth MD on 10/10/2019     Dose Accrued: 3500/3750 cGy     S: Patient is tolerating treatment well, looking forward to finishing course tomorrow. He stopped Senna, stools not as loose, but still has 4-5 small volume BMs per day. Patient states it's not too bothersome. Had one self-remitting episode of lower abdominal cramping over the weekend, no issues since. Denies hematuria, dysuria, pain or discomfort. Energy level and appetite are normal and stable.     O: /72   Pulse 87   Temp 98.1 °F (36.7 °C) (Oral)   Resp 16   Wt 175 lb 6 oz (79.5 kg)   SpO2 98%   BMI 25.90 kg/m²   Well-appearing, in no apparent distress, alert and fully oriented, answers questions appropriately.  No signs of acute radiation-induced toxicity on physical exam.     IGRT: Set-up images acquired to ensure daily treatment accuracy and precision were reviewed by the physician.     A&P: Continue radiotherapy as planned. He will complete the course tomorrow. Return to clinic in 3 months, or sooner if clinically warranted. He is scheduled for initial post-treatment cystoscopy in March by Dr. Timothy Junior. He knows to call with any questions or concerns.     Electronically signed by Murray Grover MD on 2020 at 11:40 AM

## 2020-02-19 ENCOUNTER — HOSPITAL ENCOUNTER (OUTPATIENT)
Dept: RADIATION ONCOLOGY | Facility: MEDICAL CENTER | Age: 75
Discharge: HOME | End: 2020-02-19
Attending: RADIOLOGY
Payer: MEDICARE

## 2020-02-19 PROCEDURE — 77387 GUIDANCE FOR RADJ TX DLVR: CPT | Performed by: RADIOLOGY

## 2020-02-19 PROCEDURE — 77412 RADIATION TX DELIVERY LVL 3: CPT | Performed by: RADIOLOGY

## 2020-02-28 ENCOUNTER — HOSPITAL ENCOUNTER (OUTPATIENT)
Dept: OTHER | Age: 75
Discharge: HOME | End: 2020-02-28
Payer: MEDICARE

## 2020-02-28 VITALS
RESPIRATION RATE: 16 BRPM | OXYGEN SATURATION: 100 % | SYSTOLIC BLOOD PRESSURE: 110 MMHG | DIASTOLIC BLOOD PRESSURE: 72 MMHG | BODY MASS INDEX: 25.52 KG/M2 | HEART RATE: 80 BPM | WEIGHT: 172.8 LBS

## 2020-02-28 PROCEDURE — 99212 OFFICE O/P EST SF 10 MIN: CPT

## 2020-02-28 PROCEDURE — 99213 OFFICE O/P EST LOW 20 MIN: CPT | Performed by: NURSE PRACTITIONER

## 2020-02-28 ASSESSMENT — ENCOUNTER SYMPTOMS
EYE DISCHARGE: 0
COUGH: 0
SHORTNESS OF BREATH: 0
BLOOD IN STOOL: 0
ABDOMINAL PAIN: 0

## 2020-02-28 NOTE — PROGRESS NOTES
 Heart Disease Paternal Grandfather     Prostate Cancer Paternal Grandfather     Asthma Sister     Asthma Sister     Asthma Brother     Asthma Brother     Asthma Sister        Social History     Tobacco Use    Smoking status: Former Smoker     Packs/day: 0.50     Years: 30.00     Pack years: 15.00     Types: Cigarettes     Last attempt to quit: 8/15/2018     Years since quittin.5    Smokeless tobacco: Never Used    Tobacco comment: a little less than a pack a day   Substance Use Topics    Alcohol use: Not Currently     Comment: rarely      Current Outpatient Medications   Medication Sig Dispense Refill    megestrol (MEGACE) 20 MG tablet Take 1 tablet by mouth daily 30 tablet 3    aspirin 81 MG tablet Take 1 tablet by mouth daily HOLD for 5 days after surgery 30 tablet 3    rivaroxaban (XARELTO) 20 MG TABS tablet Take 1 tablet by mouth daily (with breakfast) HOLD for 3 days after surgery. OK to resume if urine clear 30 tablet 0    PARoxetine (PAXIL) 20 MG tablet Take 20 mg by mouth daily      traMADol (ULTRAM) 50 MG tablet Take 50 mg by mouth 2 times daily.  Indications: pt takes it once daily      senna (SENOKOT) 8.6 MG tablet Take 1 tablet by mouth nightly      torsemide (DEMADEX) 20 MG tablet Take 2 tablets by mouth daily 180 tablet 3    dextran 70-hypromellose (TEARS NATURALE) 0.1-0.3 % SOLN opthalmic solution Place 1 drop into both eyes every 4 hours as needed      amiodarone (CORDARONE) 200 MG tablet Take 1 tablet by mouth daily 30 tablet 3    ipratropium-albuterol (DUONEB) 0.5-2.5 (3) MG/3ML SOLN nebulizer solution Inhale 3 mLs into the lungs every 4 hours (while awake) (Patient taking differently: Inhale 3 mLs into the lungs every 4 hours (while awake) TAKES AS NEEDED) 360 mL 0    simvastatin (ZOCOR) 40 MG tablet Take 1 tablet by mouth nightly 30 tablet 3    metoprolol succinate (TOPROL XL) 25 MG extended release tablet Take 1 tablet by mouth daily 30 tablet 3    ferrous sulfate 325 (65 Fe) MG EC tablet Take 1 tablet by mouth every other day 90 tablet 3    tamsulosin (FLOMAX) 0.4 MG capsule Take 1 capsule by mouth daily 30 capsule 3    midodrine (PROAMATINE) 10 MG tablet Take 1 tablet by mouth 3 times daily (with meals) 90 tablet 3    docusate sodium (COLACE, DULCOLAX) 100 MG CAPS Take 100 mg by mouth 2 times daily (Patient taking differently: Take 100 mg by mouth 2 times daily Indications: pt states he takes once daily )      potassium chloride (MICRO-K) 10 MEQ extended release capsule Take 10 mEq by mouth 2 times daily       traZODone (DESYREL) 50 MG tablet Take 1 tablet by mouth nightly as needed       albuterol sulfate  (90 Base) MCG/ACT inhaler Inhale 1 puff into the lungs every 6 hours as needed for Wheezing      famotidine (PEPCID) 20 MG tablet Take 1 tablet by mouth 2 times daily 60 tablet 3    gabapentin (NEURONTIN) 400 MG capsule Take 400 mg by mouth daily. Indications: [facility]       fenofibrate (TRICOR) 54 MG tablet Take 2.5 tablets by mouth daily [facility] pharmacy: Please dispense generic fenofibrate unless prescriber denote (Patient taking differently: Take 135 mg by mouth Daily with lunch s [facility] pharmacy: Please dispense generic fenofibrate unless prescriber denote) 30 tablet 3    latanoprost (XALATAN) 0.005 % ophthalmic solution Place 1 drop into both eyes nightly (Patient taking differently: Place 1 drop into both eyes nightly as needed Indications: does not use it daily ) 1 Bottle 3     No current facility-administered medications for this encounter. Allergies   Allergen Reactions    Lisinopril Other (See Comments)     Dry cough    Nuts [Peanut-Containing Drug Products] Other (See Comments)     PEANUTS--abd pain CAN EAT PEANUT BUTTER JUST NOT RAW PEANUTS           Subjective:      Review of Systems   Constitutional: Positive for fatigue. Negative for activity change, chills and fever. Eyes: Negative for discharge and visual disturbance. documentation. Verbally reviewed medication list with patient; patient verbalized understanding. Discussed 2000mg/day sodium restricted diet; patient verbalized understanding. Moderate daily exercise encouraged as tolerated. Discussed rest breaks as needed; patient verbalized understanding. Patient instructed to weigh self at the same time of each day, using same clothes and same scale; reinforced teaching to monitor for 3-5 lb weight increase over 1-2 days, and to notify the CHF clinic at [phone] or physician office if weight change noted. Patient verbalized understanding. Risks of smoking discussed with the patient if applicable; patient strongly discouraged to smoke. Patient verbalized understanding. Signs and symptoms of CHF discussed with patient, such as feeling more tired than normal, feeling short of breath, coughing that increases when you lie down, sudden weight gain, swelling of your feet, legs or belly. Patient verbalized understanding to notify the CHF clinic at [phone] or physician office if these symptoms occur. Compliance with plan of care and further disease process causes discussed with patient, patient encouraged to keep all follow up appointments. Patient verbalized understanding.       Electronically signedby CASSY Woods CNP on 2/28/2020 at 10:43 AM

## 2020-03-03 ENCOUNTER — HOSPITAL ENCOUNTER (OUTPATIENT)
Age: 75
Setting detail: SPECIMEN
Discharge: HOME | End: 2020-03-03
Payer: MEDICARE

## 2020-03-03 LAB
ALT SERPL-CCNC: 16 U/L (ref 5–41)
ANION GAP SERPL CALCULATED.3IONS-SCNC: 19 MMOL/L (ref 9–17)
AST SERPL-CCNC: 24 U/L
BUN BLDV-MCNC: 40 MG/DL (ref 8–23)
BUN/CREAT BLD: ABNORMAL (ref 9–20)
CALCIUM SERPL-MCNC: 10.9 MG/DL (ref 8.6–10.4)
CHLORIDE BLD-SCNC: 101 MMOL/L (ref 98–107)
CO2: 23 MMOL/L (ref 20–31)
CREAT SERPL-MCNC: 1.48 MG/DL (ref 0.7–1.2)
CREATININE URINE: 86.2 MG/DL (ref 39–259)
GFR AFRICAN AMERICAN: 57 ML/MIN
GFR NON-AFRICAN AMERICAN: 47 ML/MIN
GFR SERPL CREATININE-BSD FRML MDRD: ABNORMAL ML/MIN/{1.73_M2}
GFR SERPL CREATININE-BSD FRML MDRD: ABNORMAL ML/MIN/{1.73_M2}
GLUCOSE BLD-MCNC: 156 MG/DL (ref 70–99)
POTASSIUM SERPL-SCNC: 4.2 MMOL/L (ref 3.7–5.3)
SODIUM BLD-SCNC: 143 MMOL/L (ref 135–144)
T3 FREE: 2.13 PG/ML (ref 2.02–4.43)
THYROXINE, FREE: 1.53 NG/DL (ref 0.93–1.7)
TOTAL PROTEIN, URINE: 6 MG/DL
TSH SERPL DL<=0.05 MIU/L-ACNC: 2.27 MIU/L (ref 0.3–5)

## 2020-03-20 ENCOUNTER — TELEPHONE (OUTPATIENT)
Dept: RADIATION ONCOLOGY | Facility: MEDICAL CENTER | Age: 75
End: 2020-03-20

## 2020-04-20 RX ORDER — MEGESTROL ACETATE 20 MG/1
TABLET ORAL
Qty: 30 TABLET | Refills: 2 | Status: SHIPPED | OUTPATIENT
Start: 2020-04-20 | End: 2020-07-16

## 2020-04-20 RX ORDER — TAMSULOSIN HYDROCHLORIDE 0.4 MG/1
0.4 CAPSULE ORAL DAILY
Qty: 90 CAPSULE | Refills: 3 | Status: SHIPPED | OUTPATIENT
Start: 2020-04-20 | End: 2022-10-18

## 2020-05-05 ENCOUNTER — HOSPITAL ENCOUNTER (OUTPATIENT)
Dept: PREADMISSION TESTING | Age: 75
Setting detail: SPECIMEN
Discharge: HOME | End: 2020-05-09
Payer: MEDICARE

## 2020-05-05 ENCOUNTER — HOSPITAL ENCOUNTER (OUTPATIENT)
Dept: PREADMISSION TESTING | Age: 75
Setting detail: OUTPATIENT SURGERY
Discharge: HOME | End: 2020-05-09
Payer: MEDICARE

## 2020-05-05 VITALS
RESPIRATION RATE: 20 BRPM | WEIGHT: 179 LBS | HEART RATE: 87 BPM | OXYGEN SATURATION: 100 % | SYSTOLIC BLOOD PRESSURE: 116 MMHG | TEMPERATURE: 97.9 F | HEIGHT: 69 IN | BODY MASS INDEX: 26.51 KG/M2 | DIASTOLIC BLOOD PRESSURE: 68 MMHG

## 2020-05-05 LAB
ANION GAP SERPL CALCULATED.3IONS-SCNC: 12 MMOL/L (ref 9–17)
BUN BLDV-MCNC: 36 MG/DL (ref 8–23)
CHLORIDE BLD-SCNC: 101 MMOL/L (ref 98–107)
CO2: 25 MMOL/L (ref 20–31)
CREAT SERPL-MCNC: 1.68 MG/DL (ref 0.7–1.2)
GFR AFRICAN AMERICAN: 49 ML/MIN
GFR NON-AFRICAN AMERICAN: 40 ML/MIN
GFR SERPL CREATININE-BSD FRML MDRD: ABNORMAL ML/MIN/{1.73_M2}
GFR SERPL CREATININE-BSD FRML MDRD: ABNORMAL ML/MIN/{1.73_M2}
GLUCOSE BLD-MCNC: 140 MG/DL (ref 70–99)
HCT VFR BLD CALC: 40.1 % (ref 40.7–50.3)
HEMOGLOBIN: 12.3 G/DL (ref 13–17)
POTASSIUM SERPL-SCNC: 3.9 MMOL/L (ref 3.7–5.3)
SODIUM BLD-SCNC: 138 MMOL/L (ref 135–144)

## 2020-05-05 PROCEDURE — 85018 HEMOGLOBIN: CPT

## 2020-05-05 PROCEDURE — 80051 ELECTROLYTE PANEL: CPT

## 2020-05-05 PROCEDURE — U0004 COV-19 TEST NON-CDC HGH THRU: HCPCS

## 2020-05-05 PROCEDURE — 93005 ELECTROCARDIOGRAM TRACING: CPT | Performed by: UROLOGY

## 2020-05-05 PROCEDURE — 87086 URINE CULTURE/COLONY COUNT: CPT

## 2020-05-05 PROCEDURE — 87186 SC STD MICRODIL/AGAR DIL: CPT

## 2020-05-05 PROCEDURE — 36415 COLL VENOUS BLD VENIPUNCTURE: CPT

## 2020-05-05 PROCEDURE — 84520 ASSAY OF UREA NITROGEN: CPT

## 2020-05-05 PROCEDURE — 82947 ASSAY GLUCOSE BLOOD QUANT: CPT

## 2020-05-05 PROCEDURE — 82565 ASSAY OF CREATININE: CPT

## 2020-05-05 PROCEDURE — 87088 URINE BACTERIA CULTURE: CPT

## 2020-05-05 PROCEDURE — 85014 HEMATOCRIT: CPT

## 2020-05-05 RX ORDER — SODIUM CHLORIDE, SODIUM LACTATE, POTASSIUM CHLORIDE, CALCIUM CHLORIDE 600; 310; 30; 20 MG/100ML; MG/100ML; MG/100ML; MG/100ML
1000 INJECTION, SOLUTION INTRAVENOUS CONTINUOUS
Status: CANCELLED | OUTPATIENT
Start: 2020-05-05

## 2020-05-05 NOTE — H&P
Spouse name: Not on file    Number of children: Not on file    Years of education: Not on file    Highest education level: Not on file   Occupational History    Occupation: Retired   Social Needs    Financial resource strain: Not on file    Food insecurity     Worry: Not on file     Inability: Not on file   [employer] needs     Medical: Not on file     Non-medical: Not on file   Tobacco Use    Smoking status: Former Smoker     Packs/day: 0.50     Years: 30.00     Pack years: 15.00     Types: Cigarettes     Last attempt to quit: 8/15/2018     Years since quittin.7    Smokeless tobacco: Never Used    Tobacco comment: a little less than a pack a day   Substance and Sexual Activity    Alcohol use: Not Currently     Comment: rarely    Drug use: Not Currently     Types: Marijuana     Comment: last use 2018    Sexual activity: Not on file   Lifestyle    Physical activity     Days per week: Not on file     Minutes per session: Not on file    Stress: Not on file   Relationships    Social connections     Talks on phone: Not on file     Gets together: Not on file     Attends Advent service: Not on file     Active member of club or organization: Not on file     Attends meetings of clubs or organizations: Not on file     Relationship status: Not on file    Intimate partner violence     Fear of current or ex partner: Not on file     Emotionally abused: Not on file     Physically abused: Not on file     Forced sexual activity: Not on file   Other Topics Concern    Not on file   Social History Narrative    Not on file         : [employer]          Hobbies:   Great grandfather     OBJECTIVE:   VITALS:  vitals were not taken for this visit. CONSTITUTIONAL: Alert and oriented times 3, no acute distress and cooperative to examination. friendly and pleasant     SKIN: rash No    HEENT: Head is normocephalic, atraumatic.  EOMI, PERRLA    Oral air way :slightly narrow Yes    NECK: neck supple, no lymphadenopathy noted, trachea midline and straight       2+ carotid, no bruit    LUNGS: Chest expands equally bilaterally upon respiration, no accessory muscle used. Ausculation reveals no adventitious breath sounds. CARDIOVASCULAR: \"Heart sounds are normal.  Regular rate and rhythm without murmur, s/p OHS     ABDOMEN: Bowel sounds are present in all four quadrants  ,     GENATALIA:Deferred. NEUROLOGIC: \"CN II-XII are grossly intact. EXTREMITIES: Pitting edema:  No,  Varicose veins: No     Dorsal pedal/posterior tibial pulses palpable: Yes         Strength:   Not tested      Patient Active Problem List   Diagnosis    Chronic systolic congestive heart failure (HCC)    Multiple vessel coronary artery disease    Multiple myeloma in remission ([facility] [address]    Chronic obstructive pulmonary disease (HCC)    Low hemoglobin    Other drug-induced pancytopenia (HCC)    S/P ICD (internal cardiac defibrillator) procedure    Ischemic cardiomyopathy    IPMN (intraductal papillary mucinous neoplasm)    History of coronary artery bypass graft    CKD (chronic kidney disease), stage III ([address]    Valvular heart disease    Therapeutic opioid induced constipation    Bladder tumor    Bladder cancer ([facility] [address]               IMPRESSIONS:   1.   Bladder tumor   2. does not have any pertinent problems on file.     Asa Prater [facility]  Electronically signed 5/5/2020 at 2:03 PM       Scheduled for:  Cysto TUR-B tumor ,[place]

## 2020-05-06 LAB
CULTURE: ABNORMAL
EKG ATRIAL RATE: 79 BPM
EKG P AXIS: -9 DEGREES
EKG P-R INTERVAL: 256 MS
EKG Q-T INTERVAL: 454 MS
EKG QRS DURATION: 158 MS
EKG QTC CALCULATION (BAZETT): 520 MS
EKG R AXIS: 130 DEGREES
EKG T AXIS: -12 DEGREES
EKG VENTRICULAR RATE: 79 BPM
Lab: ABNORMAL
SPECIMEN DESCRIPTION: ABNORMAL

## 2020-05-06 PROCEDURE — 93010 ELECTROCARDIOGRAM REPORT: CPT | Performed by: INTERNAL MEDICINE

## 2020-05-07 ENCOUNTER — ANESTHESIA (OUTPATIENT)
Dept: OPERATING ROOM | Age: 75
End: 2020-05-07
Payer: MEDICARE

## 2020-05-07 ENCOUNTER — HOSPITAL ENCOUNTER (OUTPATIENT)
Age: 75
Setting detail: OUTPATIENT SURGERY
Discharge: HOME | End: 2020-05-07
Attending: UROLOGY | Admitting: UROLOGY
Payer: MEDICARE

## 2020-05-07 ENCOUNTER — ANESTHESIA EVENT (OUTPATIENT)
Dept: OPERATING ROOM | Age: 75
End: 2020-05-07
Payer: MEDICARE

## 2020-05-07 VITALS
WEIGHT: 179 LBS | HEART RATE: 70 BPM | SYSTOLIC BLOOD PRESSURE: 128 MMHG | HEIGHT: 69 IN | TEMPERATURE: 97 F | RESPIRATION RATE: 17 BRPM | DIASTOLIC BLOOD PRESSURE: 71 MMHG | OXYGEN SATURATION: 98 % | BODY MASS INDEX: 26.51 KG/M2

## 2020-05-07 VITALS — OXYGEN SATURATION: 100 % | DIASTOLIC BLOOD PRESSURE: 79 MMHG | SYSTOLIC BLOOD PRESSURE: 90 MMHG | TEMPERATURE: 97.2 F

## 2020-05-07 LAB
GLUCOSE BLD-MCNC: 96 MG/DL (ref 74–100)
POC POTASSIUM: 4.1 MMOL/L (ref 3.5–4.5)
SARS-COV-2, PCR: NOT DETECTED
SARS-COV-2, RAPID: NORMAL
SARS-COV-2: NORMAL
SOURCE: NORMAL

## 2020-05-07 PROCEDURE — 2580000003 HC RX 258: Performed by: UROLOGY

## 2020-05-07 PROCEDURE — 2580000003 HC RX 258: Performed by: ANESTHESIOLOGY

## 2020-05-07 PROCEDURE — 2500000003 HC RX 250 WO HCPCS: Performed by: NURSE ANESTHETIST, CERTIFIED REGISTERED

## 2020-05-07 PROCEDURE — 6360000002 HC RX W HCPCS: Performed by: NURSE ANESTHETIST, CERTIFIED REGISTERED

## 2020-05-07 PROCEDURE — 3700000001 HC ADD 15 MINUTES (ANESTHESIA): Performed by: UROLOGY

## 2020-05-07 PROCEDURE — 88307 TISSUE EXAM BY PATHOLOGIST: CPT

## 2020-05-07 PROCEDURE — 2720000010 HC SURG SUPPLY STERILE: Performed by: UROLOGY

## 2020-05-07 PROCEDURE — 7100000010 HC PHASE II RECOVERY - FIRST 15 MIN: Performed by: UROLOGY

## 2020-05-07 PROCEDURE — 7100000011 HC PHASE II RECOVERY - ADDTL 15 MIN: Performed by: UROLOGY

## 2020-05-07 PROCEDURE — 82947 ASSAY GLUCOSE BLOOD QUANT: CPT

## 2020-05-07 PROCEDURE — 3700000000 HC ANESTHESIA ATTENDED CARE: Performed by: UROLOGY

## 2020-05-07 PROCEDURE — 2709999900 HC NON-CHARGEABLE SUPPLY: Performed by: UROLOGY

## 2020-05-07 PROCEDURE — 2580000003 HC RX 258: Performed by: NURSE ANESTHETIST, CERTIFIED REGISTERED

## 2020-05-07 PROCEDURE — 3600000004 HC SURGERY LEVEL 4 BASE: Performed by: UROLOGY

## 2020-05-07 PROCEDURE — 7100000000 HC PACU RECOVERY - FIRST 15 MIN: Performed by: UROLOGY

## 2020-05-07 PROCEDURE — 7100000001 HC PACU RECOVERY - ADDTL 15 MIN: Performed by: UROLOGY

## 2020-05-07 PROCEDURE — 84132 ASSAY OF SERUM POTASSIUM: CPT

## 2020-05-07 PROCEDURE — 6360000002 HC RX W HCPCS: Performed by: STUDENT IN AN ORGANIZED HEALTH CARE EDUCATION/TRAINING PROGRAM

## 2020-05-07 PROCEDURE — 3600000014 HC SURGERY LEVEL 4 ADDTL 15MIN: Performed by: UROLOGY

## 2020-05-07 RX ORDER — MAGNESIUM HYDROXIDE 1200 MG/15ML
LIQUID ORAL CONTINUOUS PRN
Status: COMPLETED | OUTPATIENT
Start: 2020-05-07 | End: 2020-05-07

## 2020-05-07 RX ORDER — SODIUM CHLORIDE, SODIUM LACTATE, POTASSIUM CHLORIDE, CALCIUM CHLORIDE 600; 310; 30; 20 MG/100ML; MG/100ML; MG/100ML; MG/100ML
INJECTION, SOLUTION INTRAVENOUS CONTINUOUS PRN
Status: DISCONTINUED | OUTPATIENT
Start: 2020-05-07 | End: 2020-05-07 | Stop reason: SDUPTHER

## 2020-05-07 RX ORDER — LIDOCAINE HYDROCHLORIDE 10 MG/ML
INJECTION, SOLUTION EPIDURAL; INFILTRATION; INTRACAUDAL; PERINEURAL PRN
Status: DISCONTINUED | OUTPATIENT
Start: 2020-05-07 | End: 2020-05-07 | Stop reason: SDUPTHER

## 2020-05-07 RX ORDER — PHENAZOPYRIDINE HYDROCHLORIDE 200 MG/1
200 TABLET, FILM COATED ORAL 3 TIMES DAILY PRN
Qty: 15 TABLET | Refills: 0 | Status: SHIPPED | OUTPATIENT
Start: 2020-05-07 | End: 2020-05-12

## 2020-05-07 RX ORDER — FENTANYL CITRATE 50 UG/ML
INJECTION, SOLUTION INTRAMUSCULAR; INTRAVENOUS PRN
Status: DISCONTINUED | OUTPATIENT
Start: 2020-05-07 | End: 2020-05-07 | Stop reason: SDUPTHER

## 2020-05-07 RX ORDER — SODIUM CHLORIDE, SODIUM LACTATE, POTASSIUM CHLORIDE, CALCIUM CHLORIDE 600; 310; 30; 20 MG/100ML; MG/100ML; MG/100ML; MG/100ML
1000 INJECTION, SOLUTION INTRAVENOUS CONTINUOUS
Status: DISCONTINUED | OUTPATIENT
Start: 2020-05-07 | End: 2020-05-07 | Stop reason: HOSPADM

## 2020-05-07 RX ORDER — ONDANSETRON 2 MG/ML
INJECTION INTRAMUSCULAR; INTRAVENOUS PRN
Status: DISCONTINUED | OUTPATIENT
Start: 2020-05-07 | End: 2020-05-07 | Stop reason: SDUPTHER

## 2020-05-07 RX ORDER — PROPOFOL 10 MG/ML
INJECTION, EMULSION INTRAVENOUS PRN
Status: DISCONTINUED | OUTPATIENT
Start: 2020-05-07 | End: 2020-05-07 | Stop reason: SDUPTHER

## 2020-05-07 RX ORDER — PHENYLEPHRINE HYDROCHLORIDE 10 MG/ML
INJECTION INTRAVENOUS PRN
Status: DISCONTINUED | OUTPATIENT
Start: 2020-05-07 | End: 2020-05-07 | Stop reason: SDUPTHER

## 2020-05-07 RX ORDER — ETOMIDATE 2 MG/ML
INJECTION INTRAVENOUS PRN
Status: DISCONTINUED | OUTPATIENT
Start: 2020-05-07 | End: 2020-05-07 | Stop reason: SDUPTHER

## 2020-05-07 RX ADMIN — SODIUM CHLORIDE, POTASSIUM CHLORIDE, SODIUM LACTATE AND CALCIUM CHLORIDE: 600; 310; 30; 20 INJECTION, SOLUTION INTRAVENOUS at 10:27

## 2020-05-07 RX ADMIN — ETOMIDATE 15 MG: 2 INJECTION, SOLUTION INTRAVENOUS at 10:39

## 2020-05-07 RX ADMIN — PROPOFOL 50 MG: 10 INJECTION, EMULSION INTRAVENOUS at 10:53

## 2020-05-07 RX ADMIN — PROPOFOL 50 MG: 10 INJECTION, EMULSION INTRAVENOUS at 10:39

## 2020-05-07 RX ADMIN — FENTANYL CITRATE 25 MCG: 50 INJECTION INTRAMUSCULAR; INTRAVENOUS at 11:04

## 2020-05-07 RX ADMIN — SODIUM CHLORIDE, POTASSIUM CHLORIDE, SODIUM LACTATE AND CALCIUM CHLORIDE 1000 ML: 600; 310; 30; 20 INJECTION, SOLUTION INTRAVENOUS at 09:17

## 2020-05-07 RX ADMIN — PHENYLEPHRINE HYDROCHLORIDE 50 MCG: 10 INJECTION INTRAVENOUS at 10:55

## 2020-05-07 RX ADMIN — ONDANSETRON 4 MG: 2 INJECTION, SOLUTION INTRAMUSCULAR; INTRAVENOUS at 11:00

## 2020-05-07 RX ADMIN — LIDOCAINE HYDROCHLORIDE 50 MG: 10 INJECTION, SOLUTION EPIDURAL; INFILTRATION; INTRACAUDAL; PERINEURAL at 10:39

## 2020-05-07 RX ADMIN — PHENYLEPHRINE HYDROCHLORIDE 100 MCG: 10 INJECTION INTRAVENOUS at 10:59

## 2020-05-07 RX ADMIN — CEFAZOLIN 2 G: 10 INJECTION, POWDER, FOR SOLUTION INTRAVENOUS at 10:44

## 2020-05-07 RX ADMIN — FENTANYL CITRATE 50 MCG: 50 INJECTION INTRAMUSCULAR; INTRAVENOUS at 10:53

## 2020-05-07 RX ADMIN — PHENYLEPHRINE HYDROCHLORIDE 150 MCG: 10 INJECTION INTRAVENOUS at 11:05

## 2020-05-07 RX ADMIN — FENTANYL CITRATE 25 MCG: 50 INJECTION INTRAMUSCULAR; INTRAVENOUS at 10:55

## 2020-05-07 ASSESSMENT — PULMONARY FUNCTION TESTS
PIF_VALUE: 5
PIF_VALUE: 3
PIF_VALUE: 1
PIF_VALUE: 16
PIF_VALUE: 4
PIF_VALUE: 2
PIF_VALUE: 16
PIF_VALUE: 1
PIF_VALUE: 1
PIF_VALUE: 16
PIF_VALUE: 16
PIF_VALUE: 3
PIF_VALUE: 3
PIF_VALUE: 4
PIF_VALUE: 1
PIF_VALUE: 4
PIF_VALUE: 2
PIF_VALUE: 0
PIF_VALUE: 16
PIF_VALUE: 4
PIF_VALUE: 14
PIF_VALUE: 4
PIF_VALUE: 4
PIF_VALUE: 3
PIF_VALUE: 0
PIF_VALUE: 14
PIF_VALUE: 0
PIF_VALUE: 15
PIF_VALUE: 3
PIF_VALUE: 0
PIF_VALUE: 3
PIF_VALUE: 3
PIF_VALUE: 4
PIF_VALUE: 1
PIF_VALUE: 14
PIF_VALUE: 0
PIF_VALUE: 3
PIF_VALUE: 16
PIF_VALUE: 3
PIF_VALUE: 3
PIF_VALUE: 4
PIF_VALUE: 0
PIF_VALUE: 0
PIF_VALUE: 16
PIF_VALUE: 16
PIF_VALUE: 4

## 2020-05-07 ASSESSMENT — PAIN - FUNCTIONAL ASSESSMENT: PAIN_FUNCTIONAL_ASSESSMENT: 0-10

## 2020-05-07 ASSESSMENT — COPD QUESTIONNAIRES: CAT_SEVERITY: MODERATE

## 2020-05-07 ASSESSMENT — PAIN SCALES - WONG BAKER: WONGBAKER_NUMERICALRESPONSE: 0

## 2020-05-07 NOTE — H&P
with implant (Left, 04/2019); bladder tumor excision (09/20/2019); Cystoscopy (N/A, 9/20/2019); Colonoscopy (01/14/2018); eye surgery (Right, 2018); eye surgery (Right, 2018); eye surgery (Left, 04/2019); Cardiac catheterization (12/13/2017); Cardiac defibrillator placement (12/17/2018); Cardiac surgery (08/16/2018); pacemaker placement (12/17/2018); Cystoscopy (12/06/2019); and Cystoscopy (N/A, 12/6/2019).       Medications   Scheduled Meds:  Current Outpatient Rx          Current Outpatient Rx   Medication Sig Dispense Refill    megestrol (MEGACE) 20 MG tablet TAKE ONE TABLET BY MOUTH DAILY 30 tablet 2    tamsulosin (FLOMAX) 0.4 MG capsule Take 1 capsule by mouth daily 90 capsule 3    potassium chloride (MICRO-K) 10 MEQ extended release capsule Take 1 capsule by mouth 2 times daily 60 capsule 3    aspirin 81 MG tablet Take 1 tablet by mouth daily HOLD for 5 days after surgery 30 tablet 3    rivaroxaban (XARELTO) 20 MG TABS tablet Take 1 tablet by mouth daily (with breakfast) HOLD for 3 days after surgery. OK to resume if urine clear 30 tablet 0    PARoxetine (PAXIL) 20 MG tablet Take 20 mg by mouth daily        traMADol (ULTRAM) 50 MG tablet Take 50 mg by mouth 2 times daily. Indications: pt takes it once daily        senna (SENOKOT) 8.6 MG tablet Take 1 tablet by mouth nightly        gabapentin (NEURONTIN) 400 MG capsule Take 400 mg by mouth daily.  Indications: per [facility]         torsemide (DEMADEX) 20 MG tablet Take 2 tablets by mouth daily 180 tablet 3    dextran 70-hypromellose (TEARS NATURALE) 0.1-0.3 % SOLN opthalmic solution Place 1 drop into both eyes every 4 hours as needed        amiodarone (CORDARONE) 200 MG tablet Take 1 tablet by mouth daily 30 tablet 3    ipratropium-albuterol (DUONEB) 0.5-2.5 (3) MG/3ML SOLN nebulizer solution Inhale 3 mLs into the lungs every 4 hours (while awake) (Patient taking differently: Inhale 3 mLs into the lungs every 4 hours (while awake) TAKES AS       Father       Sister [place]     Brother [address] [facility] [facility] Other    MGM       MGF       PGM       PGF       Sister [address]     Sister [address] [address]     Brother [address] Brother [employer]    Sister [address] [facility]            Social History  Social History               Socioeconomic History    Marital status:        Spouse name: Not on file    Number of children: Not on file    Years of education: Not on file    Highest education level: Not on file   Occupational History    Occupation: Retired   Social Needs    Financial resource strain: Not on file    Food insecurity       Worry: Not on file       Inability: Not on file   [employer] needs       Medical: Not on file       Non-medical: Not on file   Tobacco Use    Smoking status: Former Smoker       Packs/day: 0.50       Years: 30.00       Pack years: 15.00       Types: Cigarettes       Last attempt to quit: 8/15/2018       Years since quittin.7    Smokeless tobacco: Never Used    Tobacco comment: a little less than a pack a day   Substance and Sexual Activity    Alcohol use: Not Currently       Comment: rarely    Drug use: Not Currently       Types: Marijuana       Comment: last use  ago    Sexual activity: Not on file   Lifestyle    Physical activity       Days per week: Not on file       Minutes per session: Not on file    Stress: Not on file   Relationships    Social connections       Talks on phone: Not on file       Gets together: Not on file       Attends Episcopal service: Not on file       Active member of club or organization: Not on file       Attends meetings of clubs or organizations: Not on file       Relationship status: Not on file    Intimate partner violence       Fear of current or ex partner: Not on file       Emotionally abused: Not on file       Physically abused: Not on file       Forced sexual activity: Not on file   Other Topics

## 2020-05-07 NOTE — ANESTHESIA PRE PROCEDURE
Department of Anesthesiology  Preprocedure Note       Name:  Hector Trejo. Age:  69 y.o.  :  1945                                          MRN:  [id number]         Date:  2020      Surgeon: Jessica Zhang):  Laron Bond MD    Procedure: CYSTOSCOPY, TURB TUMOR, GYRUS (N/A )    Medications prior to admission:   Prior to Admission medications    Medication Sig Start Date End Date Taking? Authorizing Provider   megestrol (MEGACE) 20 MG tablet TAKE ONE TABLET BY MOUTH DAILY 20  Yes Carly Villanueva MD   tamsulosin (FLOMAX) 0.4 MG capsule Take 1 capsule by mouth daily 20 Yes Laron Bond MD   potassium chloride (MICRO-K) 10 MEQ extended release capsule Take 1 capsule by mouth 2 times daily 4/10/20  Yes Demarco Licea MD   PARoxetine (PAXIL) 20 MG tablet Take 20 mg by mouth daily 19  Yes Historical Provider, MD   traMADol (ULTRAM) 50 MG tablet Take 50 mg by mouth 2 times daily.  Indications: pt takes it once daily   Yes Historical Provider, MD   senna (SENOKOT) 8.6 MG tablet Take 1 tablet by mouth nightly   Yes Historical Provider, MD   torsemide (DEMADEX) 20 MG tablet Take 2 tablets by mouth daily 19 Yes Demarco Licea MD   amiodarone (CORDARONE) 200 MG tablet Take 1 tablet by mouth daily 19  Yes Michael Light MD   fenofibrate (TRICOR) 54 MG tablet Take 2.5 tablets by mouth daily [facility] pharmacy: Please dispense generic fenofibrate unless prescriber denote  Patient taking differently: Take 135 mg by mouth Daily with lunch [facility] pharmacy: Please dispense generic fenofibrate unless prescriber denote 19  Yes Michael Light MD   simvastatin (ZOCOR) 40 MG tablet Take 1 tablet by mouth nightly 19  Yes Michael Light MD   metoprolol succinate (TOPROL XL) 25 MG extended release tablet Take 1 tablet by mouth daily 19  Yes Michael Light MD   ferrous sulfate 325 (65 Fe) MG EC tablet Take 1 tablet by mouth every other day 19  Yes Michael Light MD   midodrine 2018    AICD PLACED    ID CABG, ARTERY-VEIN, SINGLE N/A 2018    CABG CORONARY ARTERY BYPASS ON  PUMP x 2, SWAN LAUREN, ORESTES (CSI# [id number]) performed by Marylee Severe, MD [facility] [address] [facility] FL[facility] DX W/TRAMAINE [place] 1976 PFRMD N/A 2018    COLONOSCOPY performed by Mirtha Leal MD at [address] [place] [address] ESOPHAGOGASTRODUODENOSCOPY TRANSORAL DIAGNOSTIC N/A 2018    EGD DIAGNOSTIC ONLY performed by Avelino Grajeda MD at [place] [address] Right 2018       Social History:    Social History     Tobacco Use    Smoking status: Former Smoker     Packs/day: 0.50     Years: 30.00     Pack years: 15.00     Types: Cigarettes     Last attempt to quit: 8/15/2018     Years since quittin.7    Smokeless tobacco: Never Used    Tobacco comment: a little less than a pack a day   Substance Use Topics    Alcohol use: Not Currently     Comment: rarely                                Counseling given: Not Answered  Comment: a little less than a pack a day      Vital Signs (Current):   Vitals:    20 0847   BP: 112/73   Pulse: 79   Resp: 18   Temp: 97.9 °F (36.6 °C)   TempSrc: Temporal   SpO2: 98%   Weight: 179 lb (81.2 kg)   Height: 5' 9\" (1.753 m)                                              BP Readings from Last 3 Encounters:   20 112/73   20 116/68   20 110/72       NPO Status: Time of last liquid consumption: (sips this AM with RX from Dr. Jeanne Uribe know name of RX)                        Time of last solid consumption:                         Date of last liquid consumption: 20                        Date of last solid food consumption: 20    BMI:   Wt Readings from Last 3 Encounters:   20 179 lb (81.2 kg)   20 179 lb (81.2 kg)   20 172 lb 12.8 oz (78.4 kg)     Body mass index is 26.43 kg/m².     CBC:   Lab Results   Component Value Date    WBC 9.0 2019    RBC 4.26 2019    HGB 12.3 2020    HCT 40.1 2020 MCV 92.0 12/07/2019    RDW 17.0 12/07/2019     12/07/2019       CMP:   Lab Results   Component Value Date     05/05/2020    K 3.9 05/05/2020     05/05/2020    CO2 25 05/05/2020    BUN 36 05/05/2020    CREATININE 1.68 05/05/2020    GFRAA 49 05/05/2020    LABGLOM 40 05/05/2020    GLUCOSE 140 05/05/2020    PROT 7.3 01/06/2020    CALCIUM 10.9 03/03/2020    BILITOT 0.93 01/06/2020    ALKPHOS 99 01/06/2020    AST 24 03/03/2020    ALT 16 03/03/2020       POC Tests:   Recent Labs     05/07/20  0914   POCGLU 96   POCK 4.1       Coags:   Lab Results   Component Value Date    PROTIME 14.2 08/26/2018    INR 1.4 08/26/2018    APTT 31.6 08/18/2018       HCG (If Applicable): No results found for: PREGTESTUR, PREGSERUM, HCG, HCGQUANT     ABGs:   Lab Results   Component Value Date    PHART 7.449 08/09/2018    PO2ART 71.4 08/09/2018    PMG2HKG 40.3 08/09/2018    FYM1ZBW 27.5 08/09/2018    Z8BPCFPP 97.1 08/09/2018        Type & Screen (If Applicable):  No results found for: LABABO, [address]    Anesthesia Evaluation  Patient summary reviewed and Nursing notes reviewed no history of anesthetic complications:   Airway: Mallampati: II  TM distance: >3 FB   Neck ROM: full  Mouth opening: > = 3 FB Dental:    (+) upper dentures, lower dentures and partials      Pulmonary:normal exam    (+) COPD: moderate,                             Cardiovascular:    (+) hypertension:, pacemaker: AICD, past MI: > 6 months and no interval change, CAD: non-obstructive, CABG/stent: no interval change, CHF: systolic and diastolic, hyperlipidemia    (-) PND and  MANNING    ECG reviewed  Rhythm: regular  Rate: normal  Echocardiogram reviewed         Beta Blocker:  Dose within 24 Hrs         Neuro/Psych:   Negative Neuro/Psych ROS              GI/Hepatic/Renal:   (+) GERD:,           Endo/Other:    (+) DiabetesType II DM, , malignancy/cancer.                  Abdominal:           Vascular:                                        Anesthesia

## 2020-05-11 LAB — SURGICAL PATHOLOGY REPORT: NORMAL

## 2020-05-27 ENCOUNTER — TELEPHONE (OUTPATIENT)
Dept: ONCOLOGY | Age: 75
End: 2020-05-27

## 2020-06-04 ENCOUNTER — HOSPITAL ENCOUNTER (OUTPATIENT)
Dept: OTHER | Age: 75
Discharge: HOME | End: 2020-06-04
Payer: MEDICARE

## 2020-06-04 VITALS
RESPIRATION RATE: 20 BRPM | WEIGHT: 179.6 LBS | BODY MASS INDEX: 26.52 KG/M2 | HEART RATE: 89 BPM | OXYGEN SATURATION: 100 % | SYSTOLIC BLOOD PRESSURE: 110 MMHG | DIASTOLIC BLOOD PRESSURE: 70 MMHG

## 2020-06-04 PROCEDURE — 99212 OFFICE O/P EST SF 10 MIN: CPT

## 2020-06-26 ENCOUNTER — HOSPITAL ENCOUNTER (OUTPATIENT)
Dept: RADIATION ONCOLOGY | Facility: MEDICAL CENTER | Age: 75
Discharge: HOME | End: 2020-06-26
Attending: RADIOLOGY
Payer: MEDICARE

## 2020-06-26 VITALS
RESPIRATION RATE: 18 BRPM | WEIGHT: 181 LBS | BODY MASS INDEX: 26.73 KG/M2 | OXYGEN SATURATION: 99 % | TEMPERATURE: 97.2 F | HEART RATE: 88 BPM | SYSTOLIC BLOOD PRESSURE: 116 MMHG | DIASTOLIC BLOOD PRESSURE: 68 MMHG

## 2020-06-26 PROCEDURE — 99213 OFFICE O/P EST LOW 20 MIN: CPT | Performed by: RADIOLOGY

## 2020-06-26 PROCEDURE — 99212 OFFICE O/P EST SF 10 MIN: CPT | Performed by: RADIOLOGY

## 2020-06-26 NOTE — PROGRESS NOTES
tablet, Rfl: 3    simvastatin (ZOCOR) 40 MG tablet, Take 1 tablet by mouth nightly, Disp: 30 tablet, Rfl: 3    metoprolol succinate (TOPROL XL) 25 MG extended release tablet, Take 1 tablet by mouth daily, Disp: 30 tablet, Rfl: 3    ferrous sulfate 325 (65 Fe) MG EC tablet, Take 1 tablet by mouth every other day, Disp: 90 tablet, Rfl: 3    midodrine (PROAMATINE) 10 MG tablet, Take 1 tablet by mouth 3 times daily (with meals), Disp: 90 tablet, Rfl: 3    docusate sodium (COLACE, DULCOLAX) 100 MG CAPS, Take 100 mg by mouth 2 times daily (Patient taking differently: Take 100 mg by mouth 2 times daily Indications: pt states he takes once daily ), Disp: , Rfl:     traZODone (DESYREL) 50 MG tablet, Take 1 tablet by mouth nightly as needed , Disp: , Rfl:     albuterol sulfate  (90 Base) MCG/ACT inhaler, Inhale 1 puff into the lungs every 6 hours as needed for Wheezing, Disp: , Rfl:     famotidine (PEPCID) 20 MG tablet, Take 1 tablet by mouth 2 times daily, Disp: 60 tablet, Rfl: 3    dextran 70-hypromellose (TEARS NATURALE) 0.1-0.3 % SOLN opthalmic solution, Place 1 drop into both eyes every 4 hours as needed, Disp: , Rfl:     ipratropium-albuterol (DUONEB) 0.5-2.5 (3) MG/3ML SOLN nebulizer solution, Inhale 3 mLs into the lungs every 4 hours (while awake) (Patient taking differently: Inhale 3 mLs into the lungs every 4 hours (while awake) TAKES AS NEEDED), Disp: 360 mL, Rfl: 0    latanoprost (XALATAN) 0.005 % ophthalmic solution, Place 1 drop into both eyes nightly (Patient taking differently: Place 1 drop into both eyes nightly as needed Indications: does not use it daily ), Disp: 1 Bottle, Rfl: 3    Immunizations:    Influenza status:    [x]   Current   []   Patient declined    Pneumococcal status:  [x]   Current  []   Patient declined    Smoking Status:    [] Smoker - PPD:  Smoking cessation education: Provided []   Declined []    [x] Nonsmoker - Quit [facility]:7634               [] Never a smoker
normal, gait normal, muscle power in extremities normal.  PSYCH:  Appropriate affect for the clinical situation. Insight and judgment not impaired. Labs: 5/5/2020 hematocrit and hemoglobin slightly decreased at 40 and 12.3, respectively. Same-day basic metabolic profile revealed elevated BUN and creatinine (36 and 1.68, respectively), decreased GFR 49, and hyperglycemia 140. RADS: No recent studies. PATHOLOGY: 5/7/2020 bladder tumor transurethral resection consistent with urothelial carcinoma, invasive, high-grade, invading into lamina propria. No muscularis propria identified, no lymphovascular invasion. ASSESSMENT: Partial response. PLAN: Overall, patient is doing well. He seems to have derived a meaningful partial response to palliative radiotherapy. Continue BCG instillations as planned; he has four more remaining, then will have repeat cystoscopy. RTC in 6 months, or sooner if needed. Patient knows to call with any questions or concerns in the interim. >50% of time allotted to education, answering questions, and coordinating care.     Electronically signed by Betty Rico MD on 6/26/2020 at 8:34 AM    Patient Care Team:  Duane Lingo, MD as PCP - Nina Helton MD (Hematology)  Miladys Lynch MD as Consulting Physician (Gastroenterology)  Emmett Wan MD as Consulting Physician (Nephrology)  Adriane Villa MD as Surgeon (Radiation Oncology)

## 2020-07-16 ENCOUNTER — HOSPITAL ENCOUNTER (OUTPATIENT)
Dept: OTHER | Age: 75
Discharge: HOME | End: 2020-07-16
Payer: MEDICARE

## 2020-07-16 VITALS
DIASTOLIC BLOOD PRESSURE: 60 MMHG | HEART RATE: 94 BPM | TEMPERATURE: 98.3 F | OXYGEN SATURATION: 94 % | BODY MASS INDEX: 26.66 KG/M2 | HEIGHT: 69 IN | RESPIRATION RATE: 18 BRPM | WEIGHT: 180 LBS | SYSTOLIC BLOOD PRESSURE: 104 MMHG

## 2020-07-16 PROCEDURE — 99212 OFFICE O/P EST SF 10 MIN: CPT

## 2020-07-16 RX ORDER — MEGESTROL ACETATE 20 MG/1
TABLET ORAL
Qty: 30 TABLET | Refills: 2 | Status: SHIPPED | OUTPATIENT
Start: 2020-07-16 | End: 2020-10-14

## 2020-07-16 ASSESSMENT — PAIN SCALES - GENERAL: PAINLEVEL_OUTOF10: 0

## 2020-07-16 NOTE — PROGRESS NOTES
Date:  2020  Time:  10:39 AM    CHF Clinic at [facility]    Office: [phone] Fax: [phone]    Re:  Amrit Raya. Patient : 1945    Vital Signs: /60   Pulse 94   Temp 98.3 °F (36.8 °C)   Resp 18   Ht 5' 9\" (1.753 m)   Wt 180 lb (81.6 kg)   SpO2 94%   BMI 26.58 kg/m²                                                   No results for input(s): CBC, HGB, HCT, WBC, PLATELET, NA, K, CL, CO2, BUN, CREATININE, GLUCOSE, BNP, INR in the last 72 hours. Respiratory:    Assessment  Charting Type: Reassessment    Breath Sounds  Right Upper Lobe: Clear  Right Middle Lobe: Clear  Right Lower Lobe: Clear  Left Upper Lobe: Clear  Left Lower Lobe: Clear    Cough/Sputum  Cough: None                Complaints: Patient currently finishing up bladder cancer treatments. Last tx tomorrow    Physician Orders No new orders     Comment : patient seen in  weight 179 today 180. Denies any SOB or chest pain. Has no pedal edema. Reviewed in detail low sodium diet and fluid restriction of 1500 to 2000ml per day. Also reviewed medications in detail Demadex 40mg PO daily takes meds as perscribed.  Will follow up in one month 2020/    Electronically signed by Juan Diego Ceron RN on 2020 at 10:39 AM

## 2020-08-10 ENCOUNTER — HOSPITAL ENCOUNTER (OUTPATIENT)
Age: 75
Setting detail: SPECIMEN
Discharge: HOME | End: 2020-08-10
Payer: MEDICARE

## 2020-08-10 LAB
ALT SERPL-CCNC: 19 U/L (ref 5–41)
ANION GAP SERPL CALCULATED.3IONS-SCNC: 14 MMOL/L (ref 9–17)
AST SERPL-CCNC: 55 U/L
BUN BLDV-MCNC: 36 MG/DL (ref 8–23)
BUN/CREAT BLD: ABNORMAL (ref 9–20)
CALCIUM IONIZED: 1.32 MMOL/L (ref 1.13–1.33)
CALCIUM SERPL-MCNC: 10.1 MG/DL (ref 8.6–10.4)
CHLORIDE BLD-SCNC: 104 MMOL/L (ref 98–107)
CHOLESTEROL/HDL RATIO: 3.6
CHOLESTEROL: 144 MG/DL
CO2: 25 MMOL/L (ref 20–31)
CREAT SERPL-MCNC: 1.8 MG/DL (ref 0.7–1.2)
CREATININE URINE: 143.5 MG/DL (ref 39–259)
GFR AFRICAN AMERICAN: 45 ML/MIN
GFR NON-AFRICAN AMERICAN: 37 ML/MIN
GFR SERPL CREATININE-BSD FRML MDRD: ABNORMAL ML/MIN/{1.73_M2}
GFR SERPL CREATININE-BSD FRML MDRD: ABNORMAL ML/MIN/{1.73_M2}
GLUCOSE BLD-MCNC: 111 MG/DL (ref 70–99)
HDLC SERPL-MCNC: 40 MG/DL
LDL CHOLESTEROL: 82 MG/DL (ref 0–130)
PHOSPHORUS: 3.6 MG/DL (ref 2.5–4.5)
POTASSIUM SERPL-SCNC: 4.8 MMOL/L (ref 3.7–5.3)
PTH INTACT: 42.18 PG/ML (ref 15–65)
SODIUM BLD-SCNC: 143 MMOL/L (ref 135–144)
T3 FREE: 2.22 PG/ML (ref 2.02–4.43)
THYROXINE, FREE: 1.44 NG/DL (ref 0.93–1.7)
TOTAL PROTEIN, URINE: 10 MG/DL
TRIGL SERPL-MCNC: 111 MG/DL
TSH SERPL DL<=0.05 MIU/L-ACNC: 2.27 MIU/L (ref 0.3–5)
VITAMIN D 25-HYDROXY: 35.8 NG/ML (ref 30–100)
VLDLC SERPL CALC-MCNC: ABNORMAL MG/DL (ref 1–30)

## 2020-08-13 ENCOUNTER — HOSPITAL ENCOUNTER (OUTPATIENT)
Dept: OTHER | Age: 75
Discharge: HOME | End: 2020-08-13
Payer: MEDICARE

## 2020-08-13 VITALS
TEMPERATURE: 98 F | DIASTOLIC BLOOD PRESSURE: 60 MMHG | BODY MASS INDEX: 26.11 KG/M2 | OXYGEN SATURATION: 100 % | HEART RATE: 99 BPM | RESPIRATION RATE: 16 BRPM | SYSTOLIC BLOOD PRESSURE: 104 MMHG | WEIGHT: 182 LBS

## 2020-08-13 PROCEDURE — 99212 OFFICE O/P EST SF 10 MIN: CPT

## 2020-08-13 ASSESSMENT — PAIN SCALES - GENERAL: PAINLEVEL_OUTOF10: 0

## 2020-08-19 ENCOUNTER — OFFICE VISIT (OUTPATIENT)
Dept: GASTROENTEROLOGY | Age: 75
End: 2020-08-19
Payer: MEDICARE

## 2020-08-19 VITALS — BODY MASS INDEX: 26.05 KG/M2 | WEIGHT: 182 LBS | TEMPERATURE: 97.5 F | RESPIRATION RATE: 15 BRPM | HEIGHT: 70 IN

## 2020-08-19 PROCEDURE — 1123F ACP DISCUSS/DSCN MKR DOCD: CPT | Performed by: INTERNAL MEDICINE

## 2020-08-19 PROCEDURE — 4040F PNEUMOC VAC/ADMIN/RCVD: CPT | Performed by: INTERNAL MEDICINE

## 2020-08-19 PROCEDURE — 3017F COLORECTAL CA SCREEN DOC REV: CPT | Performed by: INTERNAL MEDICINE

## 2020-08-19 PROCEDURE — G8427 DOCREV CUR MEDS BY ELIG CLIN: HCPCS | Performed by: INTERNAL MEDICINE

## 2020-08-19 PROCEDURE — 99214 OFFICE O/P EST MOD 30 MIN: CPT | Performed by: INTERNAL MEDICINE

## 2020-08-19 PROCEDURE — 1036F TOBACCO NON-USER: CPT | Performed by: INTERNAL MEDICINE

## 2020-08-19 PROCEDURE — G8417 CALC BMI ABV UP PARAM F/U: HCPCS | Performed by: INTERNAL MEDICINE

## 2020-08-19 RX ORDER — FENOFIBRATE 134 MG/1
134 CAPSULE ORAL
COMMUNITY

## 2020-08-19 ASSESSMENT — ENCOUNTER SYMPTOMS
DIARRHEA: 0
CHOKING: 0
RECTAL PAIN: 0
WHEEZING: 0
TROUBLE SWALLOWING: 0
ANAL BLEEDING: 1
BLOOD IN STOOL: 0
ABDOMINAL DISTENTION: 0
ABDOMINAL PAIN: 0
COUGH: 0
CONSTIPATION: 1
NAUSEA: 0
VOMITING: 0

## 2020-08-19 NOTE — PROGRESS NOTES
GI CLINIC FOLLOW UP    INTERVAL HISTORY:   No referring provider defined for this encounter. Chief Complaint   Patient presents with    Follow-up     Patient is here for 6 month f/u           HISTORY OF PRESENT ILLNESS: Crystal Zee is a 70 y.o. male , referred for evaluation of* IPMN , MM, GERD, elevated LFTs , anemia , M M     Patient is here for follow-up  Dealing at this time with prostate cancer, multiple myeloma  Doing okay he said  Denied any abdominal pain weight loss nausea vomiting fever chills or any other symptoms  Could not do an MRI on him because of pacemaker    His labs are been normal in the past including liver enzymes amylase lipase      Past Medical,Family, and Social History reviewed and does contribute to the patient presentingcondition. Patient's PMH/PSH,SH,PSYCH Hx, MEDs, ALLERGIES, and ROS were all reviewed and updated in the appropriate sections. PAST MEDICAL HISTORY:  Past Medical History:   Diagnosis Date    AICD (automatic cardioverter/defibrillator) present 12/2018    MEDTYRONIC INSERTED BY DR Jose Armando Olivier    Anesthesia complication     POTENTIAL ONLY. PATIENT HAS A 1500 ML FLUID RESTRICTION. WIFE CONCERNED PATIENT RECEIVED TOO MUCH IV FLUIDS DURING PATIENTS LAST SURGERY.  Anticoagulated     XARELTO    Arthritis     all over    Bladder cancer ([address] 2019    CAD (coronary artery disease) 2018    OPEN HEART CABG X 2 BYPASS DR Roosvelt Brittle CARDIOLOGIST    Cardiomyopathy ([facility] 2018    CHF (congestive heart failure) ([address] 06/2019    FLUID RESTRICTION 1500ML/24 HRS    Chronic kidney disease     STAGE 3    Constipation     COPD (chronic obstructive pulmonary disease) ([address] 2009    INHALER USE AS NEEDED    Diabetes mellitus ([address] 2015    R/T MYELOMA MEDS DEXAMETHASONE.  NOT ON THOSE MEDS SO NO LONGER ON RX. A1C IS GOOD.     Difficult intravenous access     DVT of leg (deep venous thrombosis) ([address] 05/2017    RIGHT-TAKES BLOOD THINNER ordered per dr. Salgado Her provided by the [place]    Former tobacco use     QUIT 08/2018    GERD (gastroesophageal reflux disease)     Hematuria     History of blood transfusion 01/2018    AMEMIA TRANFUSED 2 UNITS NO REACTIONS    History of blood transfusion 02/2018    TRANFUSED 1 UNIT    History of blood transfusion 08/2018    OPEN HEART    Hx of blood clots 05/2017    RIGHT LEG DVT ON BLOOD THINNER    Hyperlipidemia 2009    ON RX    Hypertension 2004    ON RX DR Jayesh MUNOZ    Multiple myeloma ([address] 2014    Neuropathy 2017    LEGS     Wears dentures     FULL UPPER, PARTIAL LOWER    Wellness examination     DR. RIVERA-PCP LAST SEEN MARCH 2020       Past Surgical History:   Procedure Laterality Date    ABDOMEN SURGERY  2003    STROMAL TUMOR    BLADDER TUMOR EXCISION  05/07/2020    CYSTOSCOPY, TURB TUMOR,    CARDIAC CATHETERIZATION  12/13/2017    right and left heart cath with Dr. Urszula Saavedra  12/17/2018    DR Dennise Cruz WITH IMPLANT Right 2018    CATARACT REMOVAL WITH IMPLANT Left 04/2019    COLONOSCOPY  01/14/2018    CYSTOSCOPY  08/08/2019    CYSTOSCOPY N/A 9/20/2019    CYSTOSCOPY, TUR BLADDER TUMOR WITH GYRUS performed by Karsten Walker MD at [facility] N/A 12/6/2019    CYSTOSCOPY, TUR BLADDER TUMOR GYRUS performed by Karsten Walker MD at [facility] 5/7/2020    CYSTOSCOPY, TURB TUMOR, GYRUS performed by Karsten Walker MD at [facility] Right 2018    TEAR DUCT SURGERY.     EYE SURGERY Right 2018    CATARACT WITH IOL PLACED    EYE SURGERY Left 04/2019    CATARACT WITH IOL PLACED    HERNIA REPAIR Right 1967    INGUINAL    PACEMAKER PLACEMENT  12/17/2018    AICD PLACED    MT CABG, ARTERY-VEIN, SINGLE N/A 8/16/2018    CABG CORONARY ARTERY BYPASS ON  PUMP x 2, SWAN LAUREN, ORESTES (CSI# [id number]) performed by Aidan Quiñones MD at [address] FLX DX W/COLLJ [address] PFRMD N/A 1/14/2018    COLONOSCOPY performed by Nithya Snider MD XL) 25 MG extended release tablet, Take 1 tablet by mouth daily, Disp: 30 tablet, Rfl: 3    ferrous sulfate 325 (65 Fe) MG EC tablet, Take 1 tablet by mouth every other day, Disp: 90 tablet, Rfl: 3    midodrine (PROAMATINE) 10 MG tablet, Take 1 tablet by mouth 3 times daily (with meals), Disp: 90 tablet, Rfl: 3    docusate sodium (COLACE, DULCOLAX) 100 MG CAPS, Take 100 mg by mouth 2 times daily (Patient taking differently: Take 100 mg by mouth 2 times daily Indications: pt states he takes once daily ), Disp: , Rfl:     traZODone (DESYREL) 50 MG tablet, Take 1 tablet by mouth nightly as needed , Disp: , Rfl:     albuterol sulfate  (90 Base) MCG/ACT inhaler, Inhale 1 puff into the lungs every 6 hours as needed for Wheezing, Disp: , Rfl:     famotidine (PEPCID) 20 MG tablet, Take 1 tablet by mouth 2 times daily, Disp: 60 tablet, Rfl: 3    tamsulosin (FLOMAX) 0.4 MG capsule, Take 1 capsule by mouth daily, Disp: 90 capsule, Rfl: 3    fenofibrate (TRICOR) 54 MG tablet, Take 2.5 tablets by mouth daily s [facility] pharmacy: Please dispense generic fenofibrate unless prescriber denote (Patient not taking: Reported on 8/19/2020), Disp: 30 tablet, Rfl: 3    ALLERGIES:   Allergies   Allergen Reactions    Lisinopril Other (See Comments)     Dry cough    Nuts [Peanut-Containing Drug Products] Other (See Comments)     PEANUTS--abd pain CAN EAT PEANUT BUTTER JUST NOT RAW PEANUTS         FAMILY HISTORY:       Problem Relation Age of Onset    Diabetes Mother     High Blood Pressure Mother     Stroke Father     High Blood Pressure Father     Other Sister         PE    Asthma Sister     Stroke Brother     Asthma Brother     Diabetes Brother     Heart Disease Paternal Grandfather     Prostate Cancer Paternal Grandfather     Asthma Sister     Asthma Sister     Asthma Brother     Asthma Brother     Asthma Sister          SOCIAL HISTORY:   Social History     Socioeconomic History    Marital status:  Spouse name: Not on file    Number of children: Not on file    Years of education: Not on file    Highest education level: Not on file   Occupational History    Occupation: Retired   Social Needs    Financial resource strain: Not on file    Food insecurity     Worry: Not on file     Inability: Not on file   [employer] needs     Medical: Not on file     Non-medical: Not on file   Tobacco Use    Smoking status: Former Smoker     Packs/day: 0.50     Years: 30.00     Pack years: 15.00     Types: Cigarettes     Last attempt to quit: 8/15/2018     Years since quittin.0    Smokeless tobacco: Never Used    Tobacco comment: a little less than a pack a day   Substance and Sexual Activity    Alcohol use: Not Currently     Comment: rarely    Drug use: Not Currently     Types: Marijuana     Comment: last use  ago    Sexual activity: Not on file   Lifestyle    Physical activity     Days per week: Not on file     Minutes per session: Not on file    Stress: Not on file   Relationships    Social connections     Talks on phone: Not on file     Gets together: Not on file     Attends [employer] service: Not on file     Active member of club or organization: Not on file     Attends meetings of clubs or organizations: Not on file     Relationship status: Not on file    Intimate partner violence     Fear of current or ex partner: Not on file     Emotionally abused: Not on file     Physically abused: Not on file     Forced sexual activity: Not on file   Other Topics Concern    Not on file   Social History Narrative    Not on file       REVIEW OF SYSTEMS: A 12-point review of systemswas obtained and pertinent positives and negatives were enumerated above in the history of present illness. All other reviewed systems / symptoms were negative. Review of Systems   Constitutional: Positive for appetite change (MEGACE) and fatigue. Negative for unexpected weight change. HENT: Negative for trouble swallowing. Respiratory: Negative for cough, choking and wheezing. Cardiovascular: Negative for chest pain, palpitations and leg swelling. Gastrointestinal: Positive for anal bleeding and constipation. Negative for abdominal distention, abdominal pain, blood in stool, diarrhea, nausea, rectal pain and vomiting. Genitourinary: Negative for difficulty urinating. Allergic/Immunologic: Negative for environmental allergies and food allergies. Neurological: Negative for dizziness, weakness, light-headedness, numbness and headaches. Hematological: Bruises/bleeds easily. Psychiatric/Behavioral: Negative for sleep disturbance. The patient is not nervous/anxious. LABORATORY DATA: Reviewed  Lab Results   Component Value Date    WBC 9.0 12/07/2019    HGB 12.3 (L) 05/05/2020    HCT 40.1 (L) 05/05/2020    MCV 92.0 12/07/2019     12/07/2019     08/10/2020    K 4.8 08/10/2020     08/10/2020    CO2 25 08/10/2020    BUN 36 (H) 08/10/2020    CREATININE 1.80 (H) 08/10/2020    LABALBU 3.9 01/06/2020    BILITOT 0.93 01/06/2020    ALKPHOS 99 01/06/2020    AST 55 (H) 08/10/2020    ALT 19 08/10/2020    INR 1.4 08/26/2018         Lab Results   Component Value Date    RBC 4.26 12/07/2019    HGB 12.3 (L) 05/05/2020    MCV 92.0 12/07/2019    MCH 27.2 12/07/2019    MCHC 29.6 12/07/2019    RDW 17.0 (H) 12/07/2019    MPV 10.7 12/07/2019    BASOPCT 1 12/04/2019    LYMPHSABS 2.37 12/04/2019    MONOSABS 0.89 12/04/2019    NEUTROABS 3.24 12/04/2019    EOSABS 0.16 12/04/2019    BASOSABS 0.05 12/04/2019         DIAGNOSTIC TESTING:     No results found. PHYSICAL EXAMINATION: Vital signs reviewed per the nursing documentation. Temp 97.5 °F (36.4 °C)   Resp 15   Ht 5' 10\" (1.778 m)   Wt 182 lb (82.6 kg)   BMI 26.11 kg/m²   Body mass index is 26.11 kg/m². Physical Exam  Vitals signs and nursing note reviewed. Constitutional:       General: He is not in acute distress. Appearance: He is well-developed. He is not diaphoretic. HENT:      Head: Normocephalic and atraumatic. Eyes:      General: No scleral icterus. Pupils: Pupils are equal, round, and reactive to light. Neck:      Musculoskeletal: Normal range of motion and neck supple. Thyroid: No thyromegaly. Vascular: No JVD. Trachea: No tracheal deviation. Cardiovascular:      Rate and Rhythm: Normal rate and regular rhythm. Heart sounds: Normal heart sounds. No murmur. Pulmonary:      Effort: Pulmonary effort is normal. No respiratory distress. Breath sounds: Normal breath sounds. No wheezing. Abdominal:      General: Bowel sounds are normal. There is no distension. Palpations: Abdomen is soft. There is no mass. Tenderness: There is no abdominal tenderness. There is no guarding or rebound. Musculoskeletal: Normal range of motion. General: No tenderness. Skin:     General: Skin is warm. Coloration: Skin is not pale. Findings: No erythema or rash. Comments: He is not diaphoretic   Neurological:      Mental Status: He is alert and oriented to person, place, and time. Deep Tendon Reflexes: Reflexes are normal and symmetric. Psychiatric:         Behavior: Behavior normal.         Thought Content: Thought content normal.         Judgment: Judgment normal.           IMPRESSION: Mr. Columba Hemphill is a 70 y.o. male with      Diagnosis Orders   1. IPMN (intraductal papillary mucinous neoplasm)  CT ABDOMEN PELVIS W IV CONTRAST    Lipase    Amylase    Hepatic Function Panel   2. Multiple myeloma in remission ([address]     3. Gastroesophageal reflux disease, esophagitis presence not specified     4. Elevated LFTs     5. Esophageal dysphagia     6. Other constipation     Follow on the IPMN with CAT scan and labs again  Treat his constipation with  Continue Senna  Colace   Nothing for GERD , controled with no medication.   I told him to let me know if he get any worse  Patient is following with the oncologist at this time and his visit with his 2 other cancers he said        Diet/life style/natural hx /complication of the dx were all explained in details   Past medical, past surgical, social history, psychiatric history, medications or allergies, all reviewed and  updated    Thank you for allowing me to participate in the care of Mr. Dey. For any further questions please do not hesitate to contact me. I have reviewed and agree with the ROS entered by the MA/RN. Note is dictated utilizing voice recognition software. Unfortunately this leads to occasional typographical errors. Please contact our office if you have any questions.       Sukumar Mcclure MD  [facility]  O: #[phone]

## 2020-08-26 ENCOUNTER — TELEPHONE (OUTPATIENT)
Dept: GASTROENTEROLOGY | Age: 75
End: 2020-08-26

## 2020-08-26 NOTE — TELEPHONE ENCOUNTER
Writer left msg on pt's vm informing pt of lab & CT scan orders from [address] [place] [address] on 8/19/20. All orders will be mailed to pt's home address. 6 month OV recall letter placed in pt's chart also.

## 2020-09-03 ENCOUNTER — HOSPITAL ENCOUNTER (OUTPATIENT)
Dept: OTHER | Age: 75
Discharge: HOME | End: 2020-09-03
Payer: MEDICARE

## 2020-09-03 VITALS
BODY MASS INDEX: 26.4 KG/M2 | HEART RATE: 96 BPM | DIASTOLIC BLOOD PRESSURE: 60 MMHG | OXYGEN SATURATION: 98 % | SYSTOLIC BLOOD PRESSURE: 120 MMHG | RESPIRATION RATE: 20 BRPM | WEIGHT: 184 LBS | TEMPERATURE: 98.3 F

## 2020-09-03 PROCEDURE — 99212 OFFICE O/P EST SF 10 MIN: CPT

## 2020-09-03 ASSESSMENT — PAIN SCALES - GENERAL: PAINLEVEL_OUTOF10: 0

## 2020-09-03 NOTE — PROGRESS NOTES
Date:  9/3/2020  Time:  11:08 AM    CHF Clinic at [facility]    Office: [phone] Fax: [phone]    Re:  Anel Cleaning. Patient : 1945    Vital Signs: /60   Pulse 96   Temp 98.3 °F (36.8 °C)   Resp 20   Wt 184 lb (83.5 kg)   SpO2 98%   BMI 26.40 kg/m²                                                   No results for input(s): CBC, HGB, HCT, WBC, PLATELET, NA, K, CL, CO2, BUN, CREATININE, GLUCOSE, BNP, INR in the last 72 hours. Respiratory:    Assessment  Charting Type: Reassessment    Breath Sounds  Right Upper Lobe: Clear  Right Middle Lobe: Clear  Right Lower Lobe: Clear  Left Upper Lobe: Clear  Left Lower Lobe: Clear    Cough/Sputum  Cough: None         Peripheral Vascular  RLE Edema: None  LLE Edema: None      Complaints: No new compliants    Physician Orders No new orders    Comment : Weight last visit 182 up 184 states been eating more ,Denies SOB only if walks to much, denies chest pain, Measurements with calfs and ankles are lower than last visit. No pedal edema noted, Reviewed in detail low sodium diet and fluid restriction of 1500 to 2000ml per day. Also reviewed medications in detaol continues on Demadex 40mgPO q day, HAs follow up with urologist to see if bladder cancer is gone,Optivol readings look good today some fluctuation noted . Will see in 1 moth 10/2/2020.     Electronically signed by Beto Arauz RN on 9/3/2020 at 11:08 AM

## 2020-09-14 ENCOUNTER — HOSPITAL ENCOUNTER (OUTPATIENT)
Dept: PREADMISSION TESTING | Age: 75
Setting detail: SPECIMEN
Discharge: HOME | End: 2020-09-18
Payer: MEDICARE

## 2020-09-14 PROCEDURE — U0003 INFECTIOUS AGENT DETECTION BY NUCLEIC ACID (DNA OR RNA); SEVERE ACUTE RESPIRATORY SYNDROME CORONAVIRUS 2 (SARS-COV-2) (CORONAVIRUS DISEASE [COVID-19]), AMPLIFIED PROBE TECHNIQUE, MAKING USE OF HIGH THROUGHPUT TECHNOLOGIES AS DESCRIBED BY CMS-2020-01-R: HCPCS

## 2020-09-15 LAB — SARS-COV-2, NAA: NOT DETECTED

## 2020-09-16 ENCOUNTER — TELEPHONE (OUTPATIENT)
Dept: PRIMARY CARE CLINIC | Age: 75
End: 2020-09-16

## 2020-09-18 ENCOUNTER — ANESTHESIA (OUTPATIENT)
Dept: OPERATING ROOM | Age: 75
End: 2020-09-18
Payer: MEDICARE

## 2020-09-18 ENCOUNTER — HOSPITAL ENCOUNTER (OUTPATIENT)
Age: 75
Setting detail: OUTPATIENT SURGERY
Discharge: HOME | End: 2020-09-18
Attending: UROLOGY | Admitting: UROLOGY
Payer: MEDICARE

## 2020-09-18 ENCOUNTER — ANESTHESIA EVENT (OUTPATIENT)
Dept: OPERATING ROOM | Age: 75
End: 2020-09-18
Payer: MEDICARE

## 2020-09-18 VITALS
HEIGHT: 70 IN | WEIGHT: 179.9 LBS | HEART RATE: 74 BPM | RESPIRATION RATE: 16 BRPM | BODY MASS INDEX: 25.75 KG/M2 | OXYGEN SATURATION: 100 % | SYSTOLIC BLOOD PRESSURE: 115 MMHG | TEMPERATURE: 97.2 F | DIASTOLIC BLOOD PRESSURE: 72 MMHG

## 2020-09-18 VITALS — DIASTOLIC BLOOD PRESSURE: 73 MMHG | OXYGEN SATURATION: 100 % | SYSTOLIC BLOOD PRESSURE: 110 MMHG

## 2020-09-18 LAB
GFR NON-AFRICAN AMERICAN: 35 ML/MIN
GFR SERPL CREATININE-BSD FRML MDRD: 43 ML/MIN
GFR SERPL CREATININE-BSD FRML MDRD: ABNORMAL ML/MIN/{1.73_M2}
GLUCOSE BLD-MCNC: 100 MG/DL (ref 75–110)
GLUCOSE BLD-MCNC: 123 MG/DL (ref 74–100)
POC CHLORIDE: 102 MMOL/L (ref 98–107)
POC CREATININE: 1.88 MG/DL (ref 0.51–1.19)
POC HEMATOCRIT: 38 % (ref 41–53)
POC HEMOGLOBIN: 12.8 G/DL (ref 13.5–17.5)
POC IONIZED CALCIUM: 1.32 MMOL/L (ref 1.15–1.33)
POC POTASSIUM: 3.4 MMOL/L (ref 3.5–4.5)
POC SODIUM: 143 MMOL/L (ref 138–146)

## 2020-09-18 PROCEDURE — 93005 ELECTROCARDIOGRAM TRACING: CPT | Performed by: ANESTHESIOLOGY

## 2020-09-18 PROCEDURE — 85014 HEMATOCRIT: CPT

## 2020-09-18 PROCEDURE — 2709999900 HC NON-CHARGEABLE SUPPLY: Performed by: UROLOGY

## 2020-09-18 PROCEDURE — 82565 ASSAY OF CREATININE: CPT

## 2020-09-18 PROCEDURE — 6360000002 HC RX W HCPCS: Performed by: NURSE ANESTHETIST, CERTIFIED REGISTERED

## 2020-09-18 PROCEDURE — 7100000041 HC SPAR PHASE II RECOVERY - ADDTL 15 MIN: Performed by: UROLOGY

## 2020-09-18 PROCEDURE — 3600000002 HC SURGERY LEVEL 2 BASE: Performed by: UROLOGY

## 2020-09-18 PROCEDURE — 82330 ASSAY OF CALCIUM: CPT

## 2020-09-18 PROCEDURE — 2500000003 HC RX 250 WO HCPCS: Performed by: UROLOGY

## 2020-09-18 PROCEDURE — 2580000003 HC RX 258: Performed by: NURSE ANESTHETIST, CERTIFIED REGISTERED

## 2020-09-18 PROCEDURE — 82947 ASSAY GLUCOSE BLOOD QUANT: CPT

## 2020-09-18 PROCEDURE — 84295 ASSAY OF SERUM SODIUM: CPT

## 2020-09-18 PROCEDURE — 3700000001 HC ADD 15 MINUTES (ANESTHESIA): Performed by: UROLOGY

## 2020-09-18 PROCEDURE — 3600000012 HC SURGERY LEVEL 2 ADDTL 15MIN: Performed by: UROLOGY

## 2020-09-18 PROCEDURE — 84132 ASSAY OF SERUM POTASSIUM: CPT

## 2020-09-18 PROCEDURE — 3700000000 HC ANESTHESIA ATTENDED CARE: Performed by: UROLOGY

## 2020-09-18 PROCEDURE — 82435 ASSAY OF BLOOD CHLORIDE: CPT

## 2020-09-18 PROCEDURE — 7100000040 HC SPAR PHASE II RECOVERY - FIRST 15 MIN: Performed by: UROLOGY

## 2020-09-18 RX ORDER — FENTANYL CITRATE 50 UG/ML
25 INJECTION, SOLUTION INTRAMUSCULAR; INTRAVENOUS EVERY 5 MIN PRN
Status: DISCONTINUED | OUTPATIENT
Start: 2020-09-18 | End: 2020-09-18 | Stop reason: HOSPADM

## 2020-09-18 RX ORDER — SODIUM CHLORIDE, SODIUM LACTATE, POTASSIUM CHLORIDE, CALCIUM CHLORIDE 600; 310; 30; 20 MG/100ML; MG/100ML; MG/100ML; MG/100ML
INJECTION, SOLUTION INTRAVENOUS CONTINUOUS PRN
Status: DISCONTINUED | OUTPATIENT
Start: 2020-09-18 | End: 2020-09-18 | Stop reason: SDUPTHER

## 2020-09-18 RX ORDER — WATER 1000 ML/1000ML
INJECTION, SOLUTION INTRAVENOUS PRN
Status: DISCONTINUED | OUTPATIENT
Start: 2020-09-18 | End: 2020-09-18 | Stop reason: ALTCHOICE

## 2020-09-18 RX ORDER — FENTANYL CITRATE 50 UG/ML
50 INJECTION, SOLUTION INTRAMUSCULAR; INTRAVENOUS EVERY 5 MIN PRN
Status: DISCONTINUED | OUTPATIENT
Start: 2020-09-18 | End: 2020-09-18 | Stop reason: HOSPADM

## 2020-09-18 RX ORDER — HYDRALAZINE HYDROCHLORIDE 20 MG/ML
5 INJECTION INTRAMUSCULAR; INTRAVENOUS EVERY 10 MIN PRN
Status: DISCONTINUED | OUTPATIENT
Start: 2020-09-18 | End: 2020-09-18 | Stop reason: HOSPADM

## 2020-09-18 RX ORDER — 0.9 % SODIUM CHLORIDE 0.9 %
500 INTRAVENOUS SOLUTION INTRAVENOUS
Status: DISCONTINUED | OUTPATIENT
Start: 2020-09-18 | End: 2020-09-18 | Stop reason: HOSPADM

## 2020-09-18 RX ORDER — DIPHENHYDRAMINE HYDROCHLORIDE 50 MG/ML
12.5 INJECTION INTRAMUSCULAR; INTRAVENOUS
Status: DISCONTINUED | OUTPATIENT
Start: 2020-09-18 | End: 2020-09-18 | Stop reason: HOSPADM

## 2020-09-18 RX ORDER — FENTANYL CITRATE 50 UG/ML
INJECTION, SOLUTION INTRAMUSCULAR; INTRAVENOUS PRN
Status: DISCONTINUED | OUTPATIENT
Start: 2020-09-18 | End: 2020-09-18 | Stop reason: SDUPTHER

## 2020-09-18 RX ORDER — ONDANSETRON 2 MG/ML
4 INJECTION INTRAMUSCULAR; INTRAVENOUS
Status: DISCONTINUED | OUTPATIENT
Start: 2020-09-18 | End: 2020-09-18 | Stop reason: HOSPADM

## 2020-09-18 RX ORDER — LABETALOL HYDROCHLORIDE 5 MG/ML
5 INJECTION, SOLUTION INTRAVENOUS EVERY 10 MIN PRN
Status: DISCONTINUED | OUTPATIENT
Start: 2020-09-18 | End: 2020-09-18 | Stop reason: HOSPADM

## 2020-09-18 RX ORDER — PROMETHAZINE HYDROCHLORIDE 25 MG/ML
6.25 INJECTION, SOLUTION INTRAMUSCULAR; INTRAVENOUS
Status: DISCONTINUED | OUTPATIENT
Start: 2020-09-18 | End: 2020-09-18 | Stop reason: HOSPADM

## 2020-09-18 RX ORDER — CEFAZOLIN SODIUM 1 G/3ML
INJECTION, POWDER, FOR SOLUTION INTRAMUSCULAR; INTRAVENOUS PRN
Status: DISCONTINUED | OUTPATIENT
Start: 2020-09-18 | End: 2020-09-18 | Stop reason: SDUPTHER

## 2020-09-18 RX ORDER — OXYCODONE HYDROCHLORIDE AND ACETAMINOPHEN 5; 325 MG/1; MG/1
1 TABLET ORAL EVERY 4 HOURS PRN
Status: DISCONTINUED | OUTPATIENT
Start: 2020-09-18 | End: 2020-09-18 | Stop reason: HOSPADM

## 2020-09-18 RX ORDER — MIDAZOLAM HYDROCHLORIDE 1 MG/ML
INJECTION INTRAMUSCULAR; INTRAVENOUS PRN
Status: DISCONTINUED | OUTPATIENT
Start: 2020-09-18 | End: 2020-09-18 | Stop reason: SDUPTHER

## 2020-09-18 RX ADMIN — CEFAZOLIN 2000 MG: 1 INJECTION, POWDER, FOR SOLUTION INTRAMUSCULAR; INTRAVENOUS at 09:42

## 2020-09-18 RX ADMIN — SODIUM CHLORIDE, POTASSIUM CHLORIDE, SODIUM LACTATE AND CALCIUM CHLORIDE: 600; 310; 30; 20 INJECTION, SOLUTION INTRAVENOUS at 08:25

## 2020-09-18 RX ADMIN — FENTANYL CITRATE 50 MCG: 50 INJECTION INTRAMUSCULAR; INTRAVENOUS at 09:45

## 2020-09-18 RX ADMIN — MIDAZOLAM HYDROCHLORIDE 1 MG: 1 INJECTION, SOLUTION INTRAMUSCULAR; INTRAVENOUS at 09:33

## 2020-09-18 RX ADMIN — MIDAZOLAM HYDROCHLORIDE 1 MG: 1 INJECTION, SOLUTION INTRAMUSCULAR; INTRAVENOUS at 09:45

## 2020-09-18 RX ADMIN — FENTANYL CITRATE 25 MCG: 50 INJECTION INTRAMUSCULAR; INTRAVENOUS at 09:36

## 2020-09-18 RX ADMIN — FENTANYL CITRATE 25 MCG: 50 INJECTION INTRAMUSCULAR; INTRAVENOUS at 09:33

## 2020-09-18 ASSESSMENT — PULMONARY FUNCTION TESTS
PIF_VALUE: 1
PIF_VALUE: 0
PIF_VALUE: 1
PIF_VALUE: 0
PIF_VALUE: 1
PIF_VALUE: 0
PIF_VALUE: 1
PIF_VALUE: 0
PIF_VALUE: 1
PIF_VALUE: 1
PIF_VALUE: 0
PIF_VALUE: 0
PIF_VALUE: 1

## 2020-09-18 ASSESSMENT — PAIN SCALES - GENERAL
PAINLEVEL_OUTOF10: 0

## 2020-09-18 ASSESSMENT — PAIN - FUNCTIONAL ASSESSMENT: PAIN_FUNCTIONAL_ASSESSMENT: 0-10

## 2020-09-18 ASSESSMENT — PAIN DESCRIPTION - DESCRIPTORS: DESCRIPTORS: ACHING;BURNING

## 2020-09-18 NOTE — ANESTHESIA PRE PROCEDURE
Inhale 3 mLs into the lungs every 4 hours (while awake)  Patient taking differently: Inhale 3 mLs into the lungs every 4 hours (while awake) TAKES AS NEEDED 6/13/19   Michelle Mane MD   fenofibrate (TRICOR) 54 MG tablet Take 2.5 tablets by mouth daily [facility]: Please dispense generic fenofibrate unless prescriber denote 6/13/19   Michelle Mane MD   simvastatin (ZOCOR) 40 MG tablet Take 1 tablet by mouth nightly 6/13/19   Michelle Mane MD   metoprolol succinate (TOPROL XL) 25 MG extended release tablet Take 1 tablet by mouth daily 6/13/19   Michelle Mane MD   ferrous sulfate 325 (65 Fe) MG EC tablet Take 1 tablet by mouth every other day 6/14/19   Michelle Mane MD   midodrine (PROAMATINE) 10 MG tablet Take 1 tablet by mouth 3 times daily (with meals) 6/13/19   Michelle Mane MD   docusate sodium (COLACE, DULCOLAX) 100 MG CAPS Take 100 mg by mouth 2 times daily  Patient taking differently: Take 100 mg by mouth 2 times daily Indications: pt states he takes once daily  8/26/18   Linwood Riedel, PA   traZODone (DESYREL) 50 MG tablet Take 1 tablet by mouth nightly as needed  2/27/18   Historical Provider, MD   albuterol sulfate  (90 Base) MCG/ACT inhaler Inhale 1 puff into the lungs every 6 hours as needed for Wheezing    Historical Provider, MD   famotidine (PEPCID) 20 MG tablet Take 1 tablet by mouth 2 times daily 12/17/17   Yoly Mota MD       Current medications:    No current facility-administered medications for this encounter. Allergies:     Allergies   Allergen Reactions    Lisinopril Other (See Comments)     Dry cough    Nuts [Peanut-Containing Drug Products] Other (See Comments)     PEANUTS--abd pain CAN EAT PEANUT BUTTER JUST NOT RAW PEANUTS         Problem List:    Patient Active Problem List   Diagnosis Code    Chronic systolic congestive heart failure (HCC) I50.22    Multiple vessel coronary artery disease I25.10    Multiple myeloma in remission ([facility] C90.01    Chronic obstructive pulmonary disease (HCC) J44.9    Low hemoglobin D64.9    Other drug-induced pancytopenia (HCC) D61.811    S/P ICD (internal cardiac defibrillator) procedure Z95.810    Ischemic cardiomyopathy I25.5    IPMN (intraductal papillary mucinous neoplasm) D49.0    History of coronary artery bypass graft Z95.1    CKD (chronic kidney disease), stage III (HCC) N18.3    Valvular heart disease I38    Therapeutic opioid induced constipation K59.03, T40.2X5A    Bladder tumor D49.4    Bladder cancer (HCC) C67.9       Past Medical History:        Diagnosis Date    AICD (automatic cardioverter/defibrillator) present 12/2018    MEDTYRONIC INSERTED BY DR Cody Delgado    Anesthesia complication     POTENTIAL ONLY. PATIENT HAS A 1500 ML FLUID RESTRICTION. WIFE CONCERNED PATIENT RECEIVED TOO MUCH IV FLUIDS DURING PATIENTS LAST SURGERY.  Anticoagulated     XARELTO    Arthritis     all over    Bladder cancer ([address] 2019    CAD (coronary artery disease) 2018    OPEN HEART CABG X 2 BYPASS DR Deyanira Arzola CARDIOLOGIST    Cardiomyopathy ([facility] 2018    CHF (congestive heart failure) ([address] 06/2019    FLUID RESTRICTION 1500ML/24 HRS    Chronic kidney disease     STAGE 3    Constipation     COPD (chronic obstructive pulmonary disease) ([address] 2009    INHALER USE AS NEEDED    Diabetes mellitus ([address] 2015    R/T MYELOMA MEDS DEXAMETHASONE.  NOT ON THOSE MEDS SO NO LONGER ON RX. A1C IS GOOD.     Difficult intravenous access     DVT of leg (deep venous thrombosis) ([address] 05/2017    RIGHT-TAKES BLOOD THINNER ordered per dr. Saunders Figures provided by the [facility] Former tobacco use     QUIT 08/2018    GERD (gastroesophageal reflux disease)     Hematuria     History of blood transfusion 01/2018    AMEMIA TRANFUSED 2 UNITS NO REACTIONS    History of blood transfusion 02/2018    TRANFUSED 1 UNIT    History of blood transfusion 08/2018    OPEN HEART    Hx of blood clots 05/2017    RIGHT LEG DVT ON BLOOD THINNER    Hyperlipidemia 2009    ON RX    Hypertension 2004    ON RX DR Janice Jacobo ALEXANDER    Multiple myeloma ([address] 2014    Neuropathy 2017    LEGS     Wears dentures     FULL UPPER, PARTIAL LOWER    Wellness examination     DR. RIVERA-PCP LAST SEEN MARCH 2020       Past Surgical History:        Procedure Laterality Date    ABDOMEN SURGERY  2003    STROMAL TUMOR    BLADDER TUMOR EXCISION  05/07/2020    CYSTOSCOPY, TURB TUMOR,    CARDIAC CATHETERIZATION  12/13/2017    right and left heart cath with Dr. Iram Clayton  12/17/2018    DR Dennise Schmitz Marv Antoniobrian WITH IMPLANT Right 2018    CATARACT REMOVAL WITH IMPLANT Left 04/2019    COLONOSCOPY  01/14/2018    CYSTOSCOPY  08/08/2019    CYSTOSCOPY N/A 9/20/2019    CYSTOSCOPY, TUR BLADDER TUMOR WITH GYRUS performed by Romina Figueredo MD at [place] [address] N/A 12/6/2019    CYSTOSCOPY, TUR BLADDER TUMOR GYRUS performed by Romina Figueredo MD at [address] [facility] 5/7/2020    CYSTOSCOPY, TURB TUMOR, GYRUS performed by Romina Figueredo MD at [address] Right 2018    TEAR DUCT SURGERY.     EYE SURGERY Right 2018    CATARACT WITH IOL PLACED    EYE SURGERY Left 04/2019    CATARACT WITH IOL PLACED    HERNIA REPAIR Right 1967    INGUINAL    PACEMAKER PLACEMENT  12/17/2018    AICD PLACED    [place] CABG, ARTERY-VEIN, SINGLE N/A 8/16/2018    CABG CORONARY ARTERY BYPASS ON  PUMP x 2, SWAN LAUREN, ORESTES (CSI# [id number]) performed by Pablo Julio MD at [address] FLX DX W/COLLJ [address] PFRMD N/A 1/14/2018    COLONOSCOPY performed by Makayla Martin MD at [address] ESOPHAGOGASTRODUODENOSCOPY TRANSORAL DIAGNOSTIC N/A 1/12/2018    EGD DIAGNOSTIC ONLY performed by Dony Mercedes MD at [facility] [address] Right 04/2018    TURP  may 7th 2020       Social History:    Social History     Tobacco Use    Smoking status: Former Smoker     Packs/day: 0.50     Years: 30.00     Pack years: 15.00     Types: Cigarettes     Last attempt to quit: 8/15/2018     Years since quittin.0    Smokeless tobacco: Never Used    Tobacco comment: a little less than a pack a day   Substance Use Topics    Alcohol use: Not Currently     Comment: rarely                                Counseling given: Not Answered  Comment: a little less than a pack a day      Vital Signs (Current):   Vitals:    20 0751   Weight: 179 lb 14.3 oz (81.6 kg)   Height: 5' 10\" (1.778 m)                                              BP Readings from Last 3 Encounters:   20 120/60   20 104/60   20 116/70       NPO Status:                                                                                 BMI:   Wt Readings from Last 3 Encounters:   20 179 lb 14.3 oz (81.6 kg)   20 184 lb (83.5 kg)   20 182 lb (82.6 kg)     Body mass index is 25.81 kg/m². CBC:   Lab Results   Component Value Date    WBC 9.0 2019    RBC 4.26 2019    HGB 12.3 2020    HCT 40.1 2020    MCV 92.0 2019    RDW 17.0 2019     2019       CMP:   Lab Results   Component Value Date     08/10/2020    K 4.8 08/10/2020     08/10/2020    CO2 25 08/10/2020    BUN 36 08/10/2020    CREATININE 1.80 08/10/2020    GFRAA 45 08/10/2020    LABGLOM 37 08/10/2020    GLUCOSE 111 08/10/2020    PROT 7.3 2020    CALCIUM 10.1 08/10/2020    BILITOT 0.93 2020    ALKPHOS 99 2020    AST 55 08/10/2020    ALT 19 08/10/2020       POC Tests: No results for input(s): POCGLU, POCNA, POCK, POCCL, POCBUN, POCHEMO, POCHCT in the last 72 hours.     Coags:   Lab Results   Component Value Date    PROTIME 14.2 2018    INR 1.4 2018    APTT 31.6 2018       HCG (If Applicable): No results found for: PREGTESTUR, PREGSERUM, HCG, HCGQUANT     ABGs:   Lab Results   Component Value Date    PHART 7.449 2018    PO2ART 71.4 2018    YKQ6OYS 40.3 2018    NJU5DWI 27.5 2018    N2XILGIM 97.1 2018 Type & Screen (If Applicable):  No results found for: LABABO, LABRH    Drug/Infectious Status (If Applicable):  No results found for: HIV, HEPCAB    COVID-19 Screening (If Applicable):   Lab Results   Component Value Date    COVID19 Not Detected 09/14/2020    COVID19 Not Detected 05/05/2020         Anesthesia Evaluation  Patient summary reviewed no history of anesthetic complications:   Airway: Mallampati: II        Dental:    (+) upper dentures and lower dentures      Pulmonary:normal exam  breath sounds clear to auscultation  (+) COPD:                             Cardiovascular:    (+) hypertension:, pacemaker:, CAD:, CABG/stent:, dysrhythmias: atrial fibrillation, CHF:,         Rhythm: regular  Rate: normal                 ROS comment: Hx CABG 2018 and PEA and resp arrest on POD 2     Neuro/Psych:               GI/Hepatic/Renal:   (+) GERD:, renal disease: CRI,           Endo/Other:    (+) DiabetesType II DM, , .                 Abdominal:           Vascular:   + DVT, . Anesthesia Plan      MAC     ASA 4       Induction: intravenous. Anesthetic plan and risks discussed with patient. Plan discussed with CRNA. severe ischemic CMP LVEF 10-15%  Pacer checked last month  ekg SR 1stAVB,     CONCLUSIONS echo 2019     Summary  Dilated left ventricle with severely reduced systolic function. Calculated ejection fraction via Arthur's Method is 12.4 %  Evidence of diastolic dysfunction. Left atrium is severely dilated. Normal right ventricle size with reduced systolic function. AICD lead seen in right atrium/ventricle. Mildly thickened mitral valve leaflets. Moderate mitral regurgitation, central jet. Moderate tricuspid regurgitation. Trivial pulmonic insufficiency. Small circumferential pericardial effusion. Pleural effusion is noted.     Rakan Saenz MD   9/18/2020

## 2020-09-18 NOTE — H&P
1/12/2018    [facility]STIC [facility] Right 04/2018    TURP  may 7th 2020       Medications Prior to Admission:    Prior to Admission medications    Medication Sig Start Date End Date Taking? Authorizing Provider   fenofibrate micronized (LOFIBRA) 134 MG capsule Take 134 mg by mouth every morning (before breakfast)    Historical Provider, MD   aspirin 81 MG EC tablet Take 81 mg by mouth daily    Historical Provider, MD   pregabalin (LYRICA) 75 MG capsule Take 75 mg by mouth 2 times daily. Historical Provider, MD   torsemide (DEMADEX) 20 MG tablet Take 2 tablets by mouth daily 8/7/20 11/5/20  Cameron Hatfield MD   megestrol (MEGACE) 20 MG tablet TAKE ONE TABLET BY MOUTH DAILY 7/16/20   Carly Villanueva MD   tamsulosin (FLOMAX) 0.4 MG capsule Take 1 capsule by mouth daily 4/20/20 9/3/20  Lena Martin MD   potassium chloride (MICRO-K) 10 MEQ extended release capsule Take 1 capsule by mouth 2 times daily 4/10/20   Flores Wetzel MD   rivaroxaban (XARELTO) 20 MG TABS tablet Take 1 tablet by mouth daily (with breakfast) HOLD for 3 days after surgery. OK to resume if urine clear 12/6/19   Thad Coates MD   PARoxetine (PAXIL) 20 MG tablet Take 20 mg by mouth daily 11/21/19   Historical Provider, MD   traMADol (ULTRAM) 50 MG tablet Take 50 mg by mouth 2 times daily.  Indications: pt takes it once daily    Historical Provider, MD   senna (SENOKOT) 8.6 MG tablet Take 1 tablet by mouth nightly    Historical Provider, MD   dextran 70-hypromellose (TEARS NATURALE) 0.1-0.3 % SOLN opthalmic solution Place 1 drop into both eyes every 4 hours as needed    Historical Provider, MD   amiodarone (CORDARONE) 200 MG tablet Take 1 tablet by mouth daily 6/13/19   Jennifer Hoyos MD   ipratropium-albuterol (DUONEB) 0.5-2.5 (3) MG/3ML SOLN nebulizer solution Inhale 3 mLs into the lungs every 4 hours (while awake)  Patient taking differently: Inhale 3 mLs into the lungs every 4 hours (while awake) TAKES AS NEEDED 6/13/19   Sonia Edgar MD   fenofibrate (TRICOR) 54 MG tablet Take 2.5 tablets by mouth daily [facility]: Please dispense generic fenofibrate unless prescriber denote 6/13/19   Sonia Edgar MD   simvastatin (ZOCOR) 40 MG tablet Take 1 tablet by mouth nightly 6/13/19   Sonia Edgar MD   metoprolol succinate (TOPROL XL) 25 MG extended release tablet Take 1 tablet by mouth daily 6/13/19   Sonia Edgar MD   ferrous sulfate 325 (65 Fe) MG EC tablet Take 1 tablet by mouth every other day 6/14/19   Sonia Edgar MD   midodrine (PROAMATINE) 10 MG tablet Take 1 tablet by mouth 3 times daily (with meals) 6/13/19   Sonia Edgar MD   docusate sodium (COLACE, DULCOLAX) 100 MG CAPS Take 100 mg by mouth 2 times daily  Patient taking differently: Take 100 mg by mouth 2 times daily Indications: pt states he takes once daily  8/26/18   BI Lanier   traZODone (DESYREL) 50 MG tablet Take 1 tablet by mouth nightly as needed  2/27/18   Historical Provider, MD   albuterol sulfate  (90 Base) MCG/ACT inhaler Inhale 1 puff into the lungs every 6 hours as needed for Wheezing    Historical Provider, MD   famotidine (PEPCID) 20 MG tablet Take 1 tablet by mouth 2 times daily 12/17/17   Mariela Todd MD       Allergies:  Lisinopril and Nuts [peanut-containing drug products]    Social History:    Social History     Socioeconomic History    Marital status:      Spouse name: Not on file    Number of children: Not on file    Years of education: Not on file    Highest education level: Not on file   Occupational History    Occupation: Retired   Social Needs    Financial resource strain: Not on file    Food insecurity     Worry: Not on file     Inability: Not on file   [employer] needs     Medical: Not on file     Non-medical: Not on file   Tobacco Use    Smoking status: Former Smoker     Packs/day: 0.50     Years: 30.00     Pack years: 15.00     Types: Cigarettes     Last attempt to quit: 8/15/2018     Years since quittin.0    Smokeless tobacco: Never Used    Tobacco comment: a little less than a pack a day   Substance and Sexual Activity    Alcohol use: Not Currently     Comment: rarely    Drug use: Not Currently     Types: Marijuana     Comment: last use  ago    Sexual activity: Not on file   Lifestyle    Physical activity     Days per week: Not on file     Minutes per session: Not on file    Stress: Not on file   Relationships    Social connections     Talks on phone: Not on file     Gets together: Not on file     Attends Buddhism service: Not on file     Active member of club or organization: Not on file     Attends meetings of clubs or organizations: Not on file     Relationship status: Not on file    Intimate partner violence     Fear of current or ex partner: Not on file     Emotionally abused: Not on file     Physically abused: Not on file     Forced sexual activity: Not on file   Other Topics Concern    Not on file   Social History Narrative    Not on file       Family History:    Family History   Problem Relation Age of Onset    Diabetes Mother     High Blood Pressure Mother     Stroke Father     High Blood Pressure Father     Other Sister         PE    Asthma Sister     Stroke Brother     Asthma Brother     Diabetes Brother     Heart Disease Paternal Grandfather     Prostate Cancer Paternal Grandfather     Asthma Sister     Asthma Sister     Asthma Brother     Asthma Brother     Asthma Sister        REVIEW OF SYSTEMS:  Constitutional: negative  Eyes: negative  Respiratory: negative  Cardiovascular: negative  Gastrointestinal: negative  Genitourinary: no acute issues  Musculoskeletal: negative  Skin: negative   Neurological: negative  Hematological/Lymphatic: negative  Psychological: negative    PHYSICAL EXAM:    No data found.   Constitutional: Patient in NAD  Neuro: Alert and oriented to person, place, and time  Psych: Mood and affect normal  Skin: Clean, dry, intact   Lungs: Respiratory effort normal, CTA  Cardiovascular:  Normal peripheral pulses; no murmur. Normal rhythm  Abdomen: Soft, non-tender, non-distended, no hepatosplenomegaly or hernia, CVA tenderness none  Bladder: Non-tender and non-disdended   : Non-tender, skin intact, no lesions       LABS:   No results for input(s): WBC, HGB, HCT, MCV, PLT in the last 72 hours. No results for input(s): NA, K, CL, CO2, PHOS, BUN, CREATININE in the last 72 hours. Invalid input(s): CA  No results found for: PSA      Urinalysis: No results for input(s): COLORU, PHUR, LABCAST, WBCUA, RBCUA, MUCUS, TRICHOMONAS, YEAST, BACTERIA, CLARITYU, SPECGRAV, LEUKOCYTESUR, UROBILINOGEN, Juan David Reichmann in the last 72 hours. Invalid input(s): NITRATE, GLUCOSEUKETONESUAMORPHOUS     -----------------------------------------------------------------    ASSESSMENT AND PLAN:    Impression:    Bladder Cancer    Patient Active Problem List   Diagnosis    Chronic systolic congestive heart failure (HCC)    Multiple vessel coronary artery disease    [facility]    Chronic obstructive pulmonary disease (HCC)    Low hemoglobin    Other drug-induced pancytopenia ([facility]    S/P ICD (internal cardiac defibrillator) procedure    Ischemic cardiomyopathy    IPMN (intraductal papillary mucinous neoplasm)    History of coronary artery bypass graft    CKD (chronic kidney disease), stage III ([facility]    Valvular heart disease    Therapeutic opioid induced constipation    Bladder tumor    Bladder cancer ([facility]       Plan: Cystoscopy in OR today. Consent obtained    The patient was counseled at length about the risks of rony Covid-19 during their perioperative period and any recovery window from their procedure. The patient was made aware that rony Covid-19  may worsen their prognosis for recovering from their procedure  and lend to a higher morbidity and/or mortality risk. All material risks, benefits, and reasonable alternatives including postponing the procedure were discussed. The patient does wish to proceed with the procedure at this time.         Samantha Payne PA-C  7:46 AM 9/18/2020

## 2020-09-18 NOTE — ANESTHESIA POSTPROCEDURE EVALUATION
Department of Anesthesiology  Postprocedure Note    Patient: Lorna Kasper MRN: [id number]  YOB: 1945  Date of evaluation: 9/18/2020  Time:  11:06 AM     Procedure Summary     Date:  09/18/20 Room / Location:  [address]    Anesthesia Start:  2766 Anesthesia Stop:  1001    Procedure:  CYSTOSCOPY (N/A ) Diagnosis:  (BLADDER CANCER)    Surgeon:  Shakir Bennett MD Responsible Provider:  Hipolito Parada MD    Anesthesia Type:  MAC ASA Status:  4          Anesthesia Type: MAC    Иван Phase I:      Иван Phase II: Иван Score: 8    Last vitals: Reviewed and per EMR flowsheets.        Anesthesia Post Evaluation    Patient location during evaluation: PACU  Patient participation: complete - patient participated  Level of consciousness: awake  Pain score: 1  Airway patency: patent  Nausea & Vomiting: no nausea and no vomiting  Complications: no  Cardiovascular status: blood pressure returned to baseline and hemodynamically stable  Respiratory status: acceptable  Hydration status: euvolemic

## 2020-09-19 LAB
EKG ATRIAL RATE: 78 BPM
EKG P AXIS: 58 DEGREES
EKG P-R INTERVAL: 228 MS
EKG Q-T INTERVAL: 460 MS
EKG QRS DURATION: 164 MS
EKG QTC CALCULATION (BAZETT): 524 MS
EKG R AXIS: -60 DEGREES
EKG T AXIS: 58 DEGREES
EKG VENTRICULAR RATE: 78 BPM

## 2020-09-22 NOTE — OP NOTE
Patient:  Dallas Dave. MRN: [id number]  YOB: 1945    FACILITY: [facility]    DATE:9/18/2020    SURGEON: OMAR Leopold Cho, MD     ASSISTANT:none    PREOPERATIVE DIAGNOSIS: BLADDER CANCER    POSTOPERATIVE DIAGNOSIS: bladder cancer    PROCEDURE PERFORMED: CYSTOSCOPY    ANESTHESIA: Monitor Anesthesia Care    ESTIMATED BLOOD LOSS: none    COMPLICATIONS: None immediate    DRAINS: none    SPECIMENS: none    INDICATIONS FOR PROCEDURE:  The patient is a 70 y.o. male who presents today with BLADDER CANCER here for CYSTOSCOPY. After risks, benefits and alternatives of the procedure were discussed with the patient, the patient elected to proceed. Details: The patient was brought back to the operating room. He was laid in the supine position. His genitals were prepped and draped in usual sterile surgical fashion. 2 % lidocaine jelly was placed per the urethra for pain control. A proper time out was performed. Flexible cystoscope was assembled and passed per the urethra. The anterior urethra was normal without any lesions; the posterior urethra and prostate were unremarkable. The bladder was inspected thoroughly and systematically,  which did not show any lesions or tumors, stone, fistulous tracts, diverticula. Both ureteral orifices were normal with clear efflux of urine. The patient tolerated the procedure well. The scope was removed intact and without any issues. This concluded the case. He was taken to recovery period and discharged home in stable condition. Plan  He will follow up as scheduled.

## 2020-10-02 ENCOUNTER — HOSPITAL ENCOUNTER (OUTPATIENT)
Dept: OTHER | Age: 75
Discharge: HOME | End: 2020-10-02
Payer: MEDICARE

## 2020-10-02 VITALS
SYSTOLIC BLOOD PRESSURE: 110 MMHG | RESPIRATION RATE: 18 BRPM | BODY MASS INDEX: 26.46 KG/M2 | OXYGEN SATURATION: 100 % | TEMPERATURE: 96.8 F | HEART RATE: 93 BPM | DIASTOLIC BLOOD PRESSURE: 74 MMHG | WEIGHT: 184.4 LBS

## 2020-10-02 PROCEDURE — 99212 OFFICE O/P EST SF 10 MIN: CPT

## 2020-10-02 ASSESSMENT — PAIN SCALES - GENERAL: PAINLEVEL_OUTOF10: 0

## 2020-10-02 NOTE — PROGRESS NOTES
Date:  10/2/2020  Time:  11:26 AM    [facility] Clinic at [address] [place] [address]    Office: [phone] Fax: [phone]    Re:  Jayant Palmer. Patient : 1945    Vital Signs: /74   Pulse 93   Temp 96.8 °F (36 °C)   Resp 18   Wt 184 lb 6.4 oz (83.6 kg)   SpO2 100%   BMI 26.46 kg/m²                       O2 Device: None (Room air)                           No results for input(s): CBC, HGB, HCT, WBC, PLATELET, NA, K, CL, CO2, BUN, CREATININE, GLUCOSE, BNP, INR in the last 72 hours. Respiratory:    Assessment  Charting Type: Reassessment    Breath Sounds  Right Upper Lobe: Clear  Right Middle Lobe: Clear  Right Lower Lobe: Clear  Left Upper Lobe: Clear  Left Lower Lobe: Clear    Cough/Sputum  Cough: Dry  Frequency: Occasional         Peripheral Vascular  Peripheral Vascular (WDL): Within Defined Limits  Edema: None      Complaints: none    Comment : Patient remains stable. Reviewed 2000 mg diet and 2 liter fluid restriction. Compliant with medications. Optivol fluid indexes remain WNL. Lung sounds are clear and patient denies SOB. No edema noted.  Next appointment set for 10/30/20    Electronically signed by Naomie Arteaga RN on 10/2/2020 at 11:26 AM

## 2020-10-14 RX ORDER — MEGESTROL ACETATE 20 MG/1
TABLET ORAL
Qty: 30 TABLET | Refills: 2 | Status: SHIPPED | OUTPATIENT
Start: 2020-10-14 | End: 2021-03-18 | Stop reason: SDUPTHER

## 2020-10-30 ENCOUNTER — HOSPITAL ENCOUNTER (OUTPATIENT)
Dept: OTHER | Age: 75
Discharge: HOME | End: 2020-10-30
Payer: MEDICARE

## 2020-10-30 VITALS
OXYGEN SATURATION: 99 % | SYSTOLIC BLOOD PRESSURE: 120 MMHG | DIASTOLIC BLOOD PRESSURE: 68 MMHG | RESPIRATION RATE: 20 BRPM | BODY MASS INDEX: 26.2 KG/M2 | HEART RATE: 102 BPM | WEIGHT: 182.6 LBS

## 2020-10-30 PROCEDURE — 99212 OFFICE O/P EST SF 10 MIN: CPT

## 2020-12-02 ENCOUNTER — HOSPITAL ENCOUNTER (OUTPATIENT)
Dept: OTHER | Age: 75
Discharge: HOME | End: 2020-12-02
Payer: MEDICARE

## 2020-12-02 VITALS
RESPIRATION RATE: 20 BRPM | DIASTOLIC BLOOD PRESSURE: 70 MMHG | WEIGHT: 187.2 LBS | SYSTOLIC BLOOD PRESSURE: 110 MMHG | HEART RATE: 90 BPM | OXYGEN SATURATION: 100 % | BODY MASS INDEX: 26.86 KG/M2

## 2020-12-02 PROCEDURE — 99212 OFFICE O/P EST SF 10 MIN: CPT

## 2020-12-02 NOTE — PROGRESS NOTES
Date:  2020  Time:  9:59 AM    [facility] Clinic at [address] [facility]    Office: [phone] Fax: [phone]    Re:  Jessica Faria. Patient : 1945    Vital Signs: /70   Pulse 90   Resp 20   Wt 187 lb 3.2 oz (84.9 kg)   SpO2 100%   BMI 26.86 kg/m²                       O2 Device: None (Room air)                           No results for input(s): CBC, HGB, HCT, WBC, PLATELET, NA, K, CL, CO2, BUN, CREATININE, GLUCOSE, BNP, INR in the last 72 hours. Respiratory:    Assessment  Charting Type: Reassessment    Breath Sounds  Right Upper Lobe: Clear  Right Middle Lobe: Clear  Right Lower Lobe: Bilateral, Diminished  Left Upper Lobe: Clear  Left Lower Lobe: Clear, Diminished    Cough/Sputum  Cough: Dry  Frequency: Infrequent       Peripheral Vascular  RLE Edema: None  LLE Edema: None    Complaints: Slight increase in SOB    Comment : Patient here ambulatory for routine visit. Weight increased 5 lbs in one month. Patient states he may have been overeating at Thanksgiving and fluids have been increased. Discussed low salt diet and fluid limits. States compliance with meds. His demadex is 20 mg 2x day. OptiVol fluid index with a slight increase. He has some labs that need to be drawn from various doctors that he will have drawn today. Seeing nephrology this month. He states he has not been doing his breathing treatments as often as he used to. Encouraged to do those. Next visit here 5 weeks 2020.     Electronically signed by Sera Farley RN on 2020 at 9:59 AM

## 2020-12-07 ENCOUNTER — HOSPITAL ENCOUNTER (OUTPATIENT)
Age: 75
Setting detail: SPECIMEN
Discharge: HOME | End: 2020-12-07
Payer: MEDICARE

## 2020-12-07 ENCOUNTER — HOSPITAL ENCOUNTER (OUTPATIENT)
Dept: GENERAL RADIOLOGY | Facility: CLINIC | Age: 75
Discharge: HOME | End: 2020-12-09
Payer: MEDICARE

## 2020-12-07 ENCOUNTER — APPOINTMENT (OUTPATIENT)
Dept: GENERAL RADIOLOGY | Age: 75
End: 2020-12-07
Payer: MEDICARE

## 2020-12-07 ENCOUNTER — HOSPITAL ENCOUNTER (OUTPATIENT)
Facility: CLINIC | Age: 75
Discharge: HOME | End: 2020-12-09
Payer: MEDICARE

## 2020-12-07 ENCOUNTER — HOSPITAL ENCOUNTER (OUTPATIENT)
Dept: CT IMAGING | Facility: CLINIC | Age: 75
Discharge: HOME | End: 2020-12-09
Payer: MEDICARE

## 2020-12-07 LAB
ALBUMIN SERPL-MCNC: 4.6 G/DL (ref 3.5–5.2)
ALBUMIN SERPL-MCNC: 4.6 G/DL (ref 3.5–5.2)
ALBUMIN/GLOBULIN RATIO: 1.3 (ref 1–2.5)
ALBUMIN/GLOBULIN RATIO: 1.4 (ref 1–2.5)
ALP BLD-CCNC: 64 U/L (ref 40–129)
ALP BLD-CCNC: 64 U/L (ref 40–129)
ALT SERPL-CCNC: 15 U/L (ref 5–41)
ALT SERPL-CCNC: 16 U/L (ref 5–41)
AMYLASE: 97 U/L (ref 28–100)
ANION GAP SERPL CALCULATED.3IONS-SCNC: 14 MMOL/L (ref 9–17)
ANION GAP SERPL CALCULATED.3IONS-SCNC: 15 MMOL/L (ref 9–17)
AST SERPL-CCNC: 22 U/L
AST SERPL-CCNC: 22 U/L
BILIRUB SERPL-MCNC: 0.69 MG/DL (ref 0.3–1.2)
BILIRUB SERPL-MCNC: 0.69 MG/DL (ref 0.3–1.2)
BILIRUBIN DIRECT: 0.31 MG/DL
BILIRUBIN, INDIRECT: 0.38 MG/DL (ref 0–1)
BUN BLDV-MCNC: 26 MG/DL (ref 8–23)
BUN BLDV-MCNC: 26 MG/DL (ref 8–23)
BUN/CREAT BLD: ABNORMAL (ref 9–20)
BUN/CREAT BLD: ABNORMAL (ref 9–20)
CALCIUM SERPL-MCNC: 10.4 MG/DL (ref 8.6–10.4)
CALCIUM SERPL-MCNC: 10.4 MG/DL (ref 8.6–10.4)
CHLORIDE BLD-SCNC: 95 MMOL/L (ref 98–107)
CHLORIDE BLD-SCNC: 95 MMOL/L (ref 98–107)
CHOLESTEROL/HDL RATIO: 3.3
CHOLESTEROL: 140 MG/DL
CO2: 25 MMOL/L (ref 20–31)
CO2: 26 MMOL/L (ref 20–31)
CREAT SERPL-MCNC: 1.65 MG/DL (ref 0.7–1.2)
CREAT SERPL-MCNC: 1.69 MG/DL (ref 0.7–1.2)
ESTIMATED AVERAGE GLUCOSE: 117 MG/DL
GFR AFRICAN AMERICAN: 48 ML/MIN
GFR AFRICAN AMERICAN: 50 ML/MIN
GFR NON-AFRICAN AMERICAN: 40 ML/MIN
GFR NON-AFRICAN AMERICAN: 41 ML/MIN
GFR SERPL CREATININE-BSD FRML MDRD: ABNORMAL ML/MIN/{1.73_M2}
GLOBULIN: ABNORMAL G/DL (ref 1.5–3.8)
GLUCOSE BLD-MCNC: 111 MG/DL (ref 70–99)
GLUCOSE BLD-MCNC: 114 MG/DL (ref 70–99)
HBA1C MFR BLD: 5.7 % (ref 4–6)
HDLC SERPL-MCNC: 43 MG/DL
LDL CHOLESTEROL: 74 MG/DL (ref 0–130)
LIPASE: 19 U/L (ref 13–60)
POC CREATININE: 1.7 MG/DL (ref 0.6–1.4)
POTASSIUM SERPL-SCNC: 4 MMOL/L (ref 3.7–5.3)
POTASSIUM SERPL-SCNC: 4.1 MMOL/L (ref 3.7–5.3)
SODIUM BLD-SCNC: 135 MMOL/L (ref 135–144)
SODIUM BLD-SCNC: 135 MMOL/L (ref 135–144)
TOTAL PROTEIN: 8 G/DL (ref 6.4–8.3)
TOTAL PROTEIN: 8.1 G/DL (ref 6.4–8.3)
TRIGL SERPL-MCNC: 115 MG/DL
VLDLC SERPL CALC-MCNC: NORMAL MG/DL (ref 1–30)

## 2020-12-07 PROCEDURE — 74177 CT ABD & PELVIS W/CONTRAST: CPT

## 2020-12-07 PROCEDURE — 71046 X-RAY EXAM CHEST 2 VIEWS: CPT

## 2020-12-07 PROCEDURE — 6360000004 HC RX CONTRAST MEDICATION: Performed by: INTERNAL MEDICINE

## 2020-12-07 PROCEDURE — 2580000003 HC RX 258: Performed by: INTERNAL MEDICINE

## 2020-12-07 PROCEDURE — 82565 ASSAY OF CREATININE: CPT

## 2020-12-07 RX ORDER — SODIUM CHLORIDE 0.9 % (FLUSH) 0.9 %
10 SYRINGE (ML) INJECTION PRN
Status: DISCONTINUED | OUTPATIENT
Start: 2020-12-07 | End: 2020-12-10 | Stop reason: HOSPADM

## 2020-12-07 RX ORDER — 0.9 % SODIUM CHLORIDE 0.9 %
80 INTRAVENOUS SOLUTION INTRAVENOUS ONCE
Status: COMPLETED | OUTPATIENT
Start: 2020-12-07 | End: 2020-12-07

## 2020-12-07 RX ADMIN — Medication 10 ML: at 09:54

## 2020-12-07 RX ADMIN — IOVERSOL 100 ML: 741 INJECTION INTRA-ARTERIAL; INTRAVENOUS at 09:54

## 2020-12-07 RX ADMIN — SODIUM CHLORIDE 80 ML: 9 INJECTION, SOLUTION INTRAVENOUS at 09:54

## 2020-12-07 RX ADMIN — IOHEXOL 50 ML: 240 INJECTION, SOLUTION INTRATHECAL; INTRAVASCULAR; INTRAVENOUS; ORAL at 09:54

## 2020-12-08 LAB
CREAT SERPL-MCNC: NORMAL MG/DL (ref 0.7–1.2)
GFR AFRICAN AMERICAN: NORMAL ML/MIN
GFR NON-AFRICAN AMERICAN: NORMAL ML/MIN
GFR SERPL CREATININE-BSD FRML MDRD: NORMAL ML/MIN/{1.73_M2}
GFR SERPL CREATININE-BSD FRML MDRD: NORMAL ML/MIN/{1.73_M2}

## 2020-12-31 ENCOUNTER — HOSPITAL ENCOUNTER (OUTPATIENT)
Dept: RADIATION ONCOLOGY | Facility: MEDICAL CENTER | Age: 75
Discharge: HOME | End: 2020-12-31
Attending: RADIOLOGY
Payer: MEDICARE

## 2020-12-31 VITALS
HEART RATE: 97 BPM | TEMPERATURE: 98.1 F | SYSTOLIC BLOOD PRESSURE: 135 MMHG | WEIGHT: 184.2 LBS | RESPIRATION RATE: 16 BRPM | OXYGEN SATURATION: 97 % | DIASTOLIC BLOOD PRESSURE: 79 MMHG | BODY MASS INDEX: 28.01 KG/M2

## 2020-12-31 PROCEDURE — 99213 OFFICE O/P EST LOW 20 MIN: CPT | Performed by: RADIOLOGY

## 2020-12-31 PROCEDURE — 99212 OFFICE O/P EST SF 10 MIN: CPT | Performed by: RADIOLOGY

## 2020-12-31 NOTE — PROGRESS NOTES
Emergency Department TeleTRIAGE Encounter Note      CHIEF COMPLAINT    Chief Complaint   Patient presents with    Back Pain     Onset two days ago  while shopping - bent down and back went out.        VITAL SIGNS   Initial Vitals [06/12/20 1358]   BP Pulse Resp Temp SpO2   138/64 88 18 99 °F (37.2 °C) 95 %      MAP       --            ALLERGIES    Review of patient's allergies indicates:  No Known Allergies    PROVIDER TRIAGE NOTE  Patient with no significant past medical history presents to the ED for evaluation of back pain.  Patient reports lower thoracic back pain that started 2 days ago while he was shopping.  Patient states he bent down to  a pack of strep and he felt his back lock up.  He took 1 muscle relaxer with no relief.  He denies radiation into his legs.  He denies bowel or bladder incontinence.  He denies chest pain or abdominal pain.      ORDERS  Labs Reviewed - No data to display    ED Orders (720h ago, onward)    None            Virtual Visit Note: The provider triage portion of this emergency department evaluation and documentation was performed via [employer], a HIPAA-compliant telemedicine application, in concert with a tele-presenter in the room. A face to face patient evaluation with one of my colleagues will occur once the patient is placed in an emergency department room.      DISCLAIMER: This note was prepared with [employer] voice recognition transcription software. Garbled syntax, mangled pronouns, and other bizarre constructions may be attributed to that software system.   [facility] Oncology  Follow-Up note    Date of Service: 2020    Location: [facility]      Patient ID:   Vivian Del Rio. : 1945   MRN: [id number]    DIAGNOSIS:   Cancer Staging  Bladder tumor  Staging form: Urinary Bladder, AJCC 8th Edition  - Clinical: Stage II (cT2, cN0, cM0) - Signed by Akira Verma MD on 10/10/2019      Chief Complaint: \" No complaint doing well    INTERVAL HISTORY:     Imaging studies including pelvic CT showed no evidence of metastatic disease. Cystoscopy was unremarkable. Vivian Yan returns in a previously scheduled follow-up visit. Patient was last seen in our clinic . I closely reviewed the medical, surgical, social and family history as per the detailed physician notes from our department. Interim changes as noted above. MEDICATIONS:    Current Outpatient Medications:     megestrol (MEGACE) 20 MG tablet, TAKE ONE TABLET BY MOUTH DAILY, Disp: 30 tablet, Rfl: 2    fenofibrate micronized (LOFIBRA) 134 MG capsule, Take 134 mg by mouth every morning (before breakfast), Disp: , Rfl:     aspirin 81 MG EC tablet, Take 81 mg by mouth daily, Disp: , Rfl:     pregabalin (LYRICA) 75 MG capsule, Take 75 mg by mouth 2 times daily. , Disp: , Rfl:     torsemide (DEMADEX) 20 MG tablet, Take 2 tablets by mouth daily, Disp: 180 tablet, Rfl: 3    tamsulosin (FLOMAX) 0.4 MG capsule, Take 1 capsule by mouth daily, Disp: 90 capsule, Rfl: 3    potassium chloride (MICRO-K) 10 MEQ extended release capsule, Take 1 capsule by mouth 2 times daily, Disp: 60 capsule, Rfl: 3    rivaroxaban (XARELTO) 20 MG TABS tablet, Take 1 tablet by mouth daily (with breakfast) HOLD for 3 days after surgery. OK to resume if urine clear, Disp: 30 tablet, Rfl: 0    PARoxetine (PAXIL) 20 MG tablet, Take 20 mg by mouth daily, Disp: , Rfl:     traMADol (ULTRAM) 50 MG tablet, Take 50 mg by mouth 2 times daily.  Indications: pt takes it once daily, Disp: , Rfl:     senna (SENOKOT) 8.6 MG tablet, Take 1 tablet by mouth nightly, Disp: , Rfl:     dextran 70-hypromellose (TEARS NATURALE) 0.1-0.3 % SOLN opthalmic solution, Place 1 drop into both eyes every 4 hours as needed, Disp: , Rfl:     amiodarone (CORDARONE) 200 MG tablet, Take 1 tablet by mouth daily, Disp: 30 tablet, Rfl: 3    ipratropium-albuterol (DUONEB) 0.5-2.5 (3) MG/3ML SOLN nebulizer solution, Inhale 3 mLs into the lungs every 4 hours (while awake) (Patient taking differently: Inhale 3 mLs into the lungs every 4 hours (while awake) TAKES AS NEEDED), Disp: 360 mL, Rfl: 0    fenofibrate (TRICOR) 54 MG tablet, Take 2.5 tablets by mouth daily s [facility] pharmacy: Please dispense generic fenofibrate unless prescriber denote, Disp: 30 tablet, Rfl: 3    simvastatin (ZOCOR) 40 MG tablet, Take 1 tablet by mouth nightly, Disp: 30 tablet, Rfl: 3    metoprolol succinate (TOPROL XL) 25 MG extended release tablet, Take 1 tablet by mouth daily, Disp: 30 tablet, Rfl: 3    ferrous sulfate 325 (65 Fe) MG EC tablet, Take 1 tablet by mouth every other day, Disp: 90 tablet, Rfl: 3    docusate sodium (COLACE, DULCOLAX) 100 MG CAPS, Take 100 mg by mouth 2 times daily (Patient taking differently: Take 100 mg by mouth 2 times daily Indications: pt states he takes once daily ), Disp: , Rfl:     traZODone (DESYREL) 50 MG tablet, Take 1 tablet by mouth nightly as needed , Disp: , Rfl:     albuterol sulfate  (90 Base) MCG/ACT inhaler, Inhale 1 puff into the lungs every 6 hours as needed for Wheezing, Disp: , Rfl:     famotidine (PEPCID) 20 MG tablet, Take 1 tablet by mouth 2 times daily, Disp: 60 tablet, Rfl: 3    midodrine (PROAMATINE) 10 MG tablet, Take 1 tablet by mouth 3 times daily (with meals) (Patient taking differently: Take 10 mg by mouth 2 times daily ), Disp: 90 tablet, Rfl: 3    REVIEW OF SYSTEMS: I reviewed the complete 14-Point ROS with the patient. Pertinent ones noted in the HPI.       PHYSICAL EXAMINATION:  /79   Pulse 97

## 2020-12-31 NOTE — PROGRESS NOTES
Gerardo Goetz.  12/31/2020  8:05 AM    Pt here for follow up visit. Patient had cystoscopy at [facility]. V's in Sept and one with Dr. Caitlin Olvera in the office in Dec 2020. Wants to follow up every 3 months. TURP in May 2020. Indwelling BCG treatments in June and July. Patient having to urinate once at night. Dr. Madelon Osler to al.  Vitals:    12/31/20 0800   BP: 135/79   Pulse: 97   Resp: 16   Temp: 98.1 °F (36.7 °C)   SpO2: 97%    :  Patient Currently in Pain: No             Wt Readings from Last 1 Encounters:   12/31/20 184 lb 3.2 oz (83.6 kg)                Current Outpatient Medications:     megestrol (MEGACE) 20 MG tablet, TAKE ONE TABLET BY MOUTH DAILY, Disp: 30 tablet, Rfl: 2    fenofibrate micronized (LOFIBRA) 134 MG capsule, Take 134 mg by mouth every morning (before breakfast), Disp: , Rfl:     aspirin 81 MG EC tablet, Take 81 mg by mouth daily, Disp: , Rfl:     pregabalin (LYRICA) 75 MG capsule, Take 75 mg by mouth 2 times daily. , Disp: , Rfl:     torsemide (DEMADEX) 20 MG tablet, Take 2 tablets by mouth daily, Disp: 180 tablet, Rfl: 3    tamsulosin (FLOMAX) 0.4 MG capsule, Take 1 capsule by mouth daily, Disp: 90 capsule, Rfl: 3    potassium chloride (MICRO-K) 10 MEQ extended release capsule, Take 1 capsule by mouth 2 times daily, Disp: 60 capsule, Rfl: 3    rivaroxaban (XARELTO) 20 MG TABS tablet, Take 1 tablet by mouth daily (with breakfast) HOLD for 3 days after surgery. OK to resume if urine clear, Disp: 30 tablet, Rfl: 0    PARoxetine (PAXIL) 20 MG tablet, Take 20 mg by mouth daily, Disp: , Rfl:     traMADol (ULTRAM) 50 MG tablet, Take 50 mg by mouth 2 times daily.  Indications: pt takes it once daily, Disp: , Rfl:     senna (SENOKOT) 8.6 MG tablet, Take 1 tablet by mouth nightly, Disp: , Rfl:     dextran 70-hypromellose (TEARS NATURALE) 0.1-0.3 % SOLN opthalmic solution, Place 1 drop into both eyes every 4 hours as needed, Disp: , Rfl:     amiodarone (CORDARONE) 200 MG tablet, Take 1 tablet by mouth daily, Disp: 30 tablet, Rfl: 3    ipratropium-albuterol (DUONEB) 0.5-2.5 (3) MG/3ML SOLN nebulizer solution, Inhale 3 mLs into the lungs every 4 hours (while awake) (Patient taking differently: Inhale 3 mLs into the lungs every 4 hours (while awake) TAKES AS NEEDED), Disp: 360 mL, Rfl: 0    fenofibrate (TRICOR) 54 MG tablet, Take 2.5 tablets by mouth daily [facility] pharmacy: Please dispense generic fenofibrate unless prescriber denote, Disp: 30 tablet, Rfl: 3    simvastatin (ZOCOR) 40 MG tablet, Take 1 tablet by mouth nightly, Disp: 30 tablet, Rfl: 3    metoprolol succinate (TOPROL XL) 25 MG extended release tablet, Take 1 tablet by mouth daily, Disp: 30 tablet, Rfl: 3    ferrous sulfate 325 (65 Fe) MG EC tablet, Take 1 tablet by mouth every other day, Disp: 90 tablet, Rfl: 3    docusate sodium (COLACE, DULCOLAX) 100 MG CAPS, Take 100 mg by mouth 2 times daily (Patient taking differently: Take 100 mg by mouth 2 times daily Indications: pt states he takes once daily ), Disp: , Rfl:     traZODone (DESYREL) 50 MG tablet, Take 1 tablet by mouth nightly as needed , Disp: , Rfl:     albuterol sulfate  (90 Base) MCG/ACT inhaler, Inhale 1 puff into the lungs every 6 hours as needed for Wheezing, Disp: , Rfl:     famotidine (PEPCID) 20 MG tablet, Take 1 tablet by mouth 2 times daily, Disp: 60 tablet, Rfl: 3    midodrine (PROAMATINE) 10 MG tablet, Take 1 tablet by mouth 3 times daily (with meals) (Patient taking differently: Take 10 mg by mouth 2 times daily ), Disp: 90 tablet, Rfl: 3    Immunizations:    Influenza status:    [x]   Current   []   Patient declined    Pneumococcal status:  [x]   Current  []   Patient declined    Smoking Status:    [] Smoker - PPD:  Smoking cessation education: Provided []   Declined []    [x] Nonsmoker - Quit Date:  2018             [] Never a smoker           Cancer Screening:  Colonoscopy [] Current [] Not current   [] Not current, but scheduled   [x] NA  Mammogram [] Current [] Not current   [] Not current, but scheduled   [x] NA  Prostate [] Current [] Not current   [] Not current, but scheduled   [x] NA  PAP/Pelvic [] Current [] Not current   [] Not current, but scheduled   [x] NA  Skin  [] Current  [] Not current   [] Not current, but scheduled   [x] NA    Hormone:  Lupron []   Last dose given:           Next dose due:   Eligard []   Last dose given:           Next dose due:   Aromatase Inhibitors []   Medication name:   N/A:  [x]              *BREAST Patient only:    Lymphedema Eval:   [] left arm      [] right arm  Location:     Measurement (cm)    Upper Bicep :    Lower Bicep :         FALLS RISK SCREEN  Instructions:  Assess the patient and enter the appropriate indicators that are present for fall risk identification. Total the numbers entered and assign a fall risk score from Table 2.  Reassess patient at a minimum every 12 weeks or with status change. Assessment   Date  12/31/2020     1. Mental Ability: confusion/cognitively impaired 0     2. Elimination Issues: incontinence, frequency 0       3. Ambulatory: use of assistive devices (walker, cane, off-loading devices),        attached to equipment (IV pole, oxygen) 0     4. Sensory Limitations: dizziness, vertigo, impaired vision 0     5. Age less than 65        0     6. Age 72 or greater 1     7. Medication: diuretics, strong analgesics, hypnotics, sedatives,        antihypertensive agents 3   8. Falls:  recent history of falls within the last 3 months (not to include slipping or        tripping) 0   TOTAL 4    If score of 4 or greater was education given? Yes and education provided. TABLE 2   Risk Score Risk Level Plan of Care   0-3 Little or  No Risk 1. Provide assistance as indicated for ambulation activities  2. Reorient confused/cognitively impaired patient  3. Chair/bed in low position, stretcher/bed with siderails up except when performing patient care activities  5.   Educate patient/family/caregiver on falls prevention  6.  Reassess in 12 weeks or with any noted change in patient condition which places them at a risk for a fall   4-6 Moderate Risk 1. Provide assistance as indicated for ambulation activities  2. Reorient confused/cognitively impaired patient  3. Chair/bed in low position, stretcher/bed with siderails up except when performing patient care activities  4. Educate patient/family/caregiver on falls prevention     7 or   Higher High Risk 1. Place patient in easily observable treatment room  2. Patient attended at all times by family member or staff  3. Provide assistance as indicated for ambulation activities  4. Reorient confused/cognitively impaired patient  5. Chair/bed in low position, stretcher/bed with siderails up except when performing patient care activities  6.   Educate patient/family/caregiver on falls prevention         PLAN: Patient is seen today in follow up        Kira Johnston

## 2021-01-05 ENCOUNTER — HOSPITAL ENCOUNTER (OUTPATIENT)
Dept: OTHER | Age: 76
Discharge: HOME | End: 2021-01-05
Payer: MEDICARE

## 2021-01-05 VITALS
WEIGHT: 184.6 LBS | RESPIRATION RATE: 20 BRPM | OXYGEN SATURATION: 98 % | DIASTOLIC BLOOD PRESSURE: 74 MMHG | BODY MASS INDEX: 28.07 KG/M2 | SYSTOLIC BLOOD PRESSURE: 112 MMHG | HEART RATE: 87 BPM

## 2021-01-05 PROCEDURE — 99212 OFFICE O/P EST SF 10 MIN: CPT

## 2021-02-10 ENCOUNTER — HOSPITAL ENCOUNTER (OUTPATIENT)
Dept: OTHER | Age: 76
Discharge: HOME | End: 2021-02-10
Payer: MEDICARE

## 2021-02-10 VITALS
SYSTOLIC BLOOD PRESSURE: 110 MMHG | RESPIRATION RATE: 20 BRPM | DIASTOLIC BLOOD PRESSURE: 60 MMHG | WEIGHT: 185 LBS | OXYGEN SATURATION: 98 % | BODY MASS INDEX: 28.13 KG/M2 | HEART RATE: 94 BPM

## 2021-02-10 PROCEDURE — 99212 OFFICE O/P EST SF 10 MIN: CPT

## 2021-02-10 NOTE — PROGRESS NOTES
Date:  2/10/2021  Time:  11:43 AM    CHF Clinic at [facility]    Office: [phone] Fax: [phone]    Re:  Reynoldteresa Sandoval. Patient : 1945    Vital Signs: /60   Pulse 94   Resp 20   Wt 185 lb (83.9 kg)   SpO2 98%   BMI 28.13 kg/m²                       O2 Device: None (Room air)                           No results for input(s): CBC, HGB, HCT, WBC, PLATELET, NA, K, CL, CO2, BUN, CREATININE, GLUCOSE, BNP, INR in the last 72 hours. Respiratory:    Assessment  Charting Type: Admission    Breath Sounds  Right Upper Lobe: Clear  Right Middle Lobe: Expiratory Wheezes  Right Lower Lobe: Diminished  Left Upper Lobe: Clear  Left Lower Lobe: Diminished    Cough/Sputum  Cough: Productive  Frequency: Occasional  Sputum Color: Clear         Peripheral Vascular  RLE Edema: None  LLE Edema: None      Complaints:  Has occasional cough    Physician Orders none    Comment : Doing well has some wheezing noted upon assessment encouraged to use inhalers today when he gets home, no other complaints offered, following low salt diet and fluid restrictions, is compliant with his medications. Will follow with the clinic in 5 weeks.      Electronically signed by Sundra Schlatter, RN on 2/10/2021 at 11:43 AM

## 2021-02-10 NOTE — PROGRESS NOTES
Verbally reviewed medication list with patient; patient verbalized understanding. Discussed 2000mg/day sodium restricted diet; patient verbalized understanding. Moderate daily exercise encouraged as tolerated. Discussed rest breaks as needed; patient verbalized understanding. Patient instructed to weigh self at the same time of each day, using same clothes and same scale; reinforced teaching to monitor for 3-5 lb weight increase over 1-2 days, and to notify the CHF clinic at [phone] or physician office if weight change noted. Patient verbalized understanding. Risks of smoking discussed with the patient if applicable; patient strongly discouraged to smoke. Patient verbalized understanding. Signs and symptoms of CHF discussed with patient, such as feeling more tired than normal, feeling short of breath, coughing that increases when you lie down, sudden weight gain, swelling of your feet, legs or belly. Patient verbalized understanding to notify the CHF clinic at [phone] or physician office if these symptoms occur. Compliance with plan of care and further disease process causes discussed with patient, patient encouraged to keep all follow up appointments. Patient verbalized understanding.

## 2021-03-04 ENCOUNTER — HOSPITAL ENCOUNTER (OUTPATIENT)
Age: 76
Setting detail: SPECIMEN
Discharge: HOME | End: 2021-03-04
Payer: MEDICARE

## 2021-03-04 LAB
ALT SERPL-CCNC: 11 U/L (ref 5–41)
AST SERPL-CCNC: 20 U/L
T3 FREE: 2.21 PG/ML (ref 2.02–4.43)
THYROXINE, FREE: 1.57 NG/DL (ref 0.93–1.7)
TSH SERPL DL<=0.05 MIU/L-ACNC: 2.93 MIU/L (ref 0.3–5)

## 2021-03-17 ENCOUNTER — HOSPITAL ENCOUNTER (OUTPATIENT)
Dept: OTHER | Age: 76
Discharge: HOME | End: 2021-03-17
Payer: MEDICARE

## 2021-03-17 VITALS
WEIGHT: 185 LBS | BODY MASS INDEX: 28.13 KG/M2 | DIASTOLIC BLOOD PRESSURE: 78 MMHG | OXYGEN SATURATION: 99 % | RESPIRATION RATE: 20 BRPM | SYSTOLIC BLOOD PRESSURE: 110 MMHG | HEART RATE: 71 BPM

## 2021-03-17 PROCEDURE — 99212 OFFICE O/P EST SF 10 MIN: CPT

## 2021-03-17 NOTE — PROGRESS NOTES
Date:  3/17/2021  Time:  11:52 AM    CHF Clinic at [facility]    Office: [phone] Fax: [phone]    Re:  Radha Neumann. Patient : 1945    Vital Signs: /78   Pulse 71   Resp 20   Wt 185 lb (83.9 kg)   SpO2 99%   BMI 28.13 kg/m²                       O2 Device: None (Room air)                           No results for input(s): CBC, HGB, HCT, WBC, PLATELET, NA, K, CL, CO2, BUN, CREATININE, GLUCOSE, BNP, INR in the last 72 hours. Respiratory:    Assessment  Charting Type: Reassessment    Breath Sounds  Right Upper Lobe: Clear  Right Middle Lobe: Clear  Right Lower Lobe: Diminished  Left Upper Lobe: Clear  Left Lower Lobe: Diminished    Cough/Sputum  Cough: Productive  Frequency: Frequent  Sputum Color: Clear         Peripheral Vascular  RLE Edema: +1, Pitting  LLE Edema: None      Complaints:leg swelling,SOB increased      Comment : Recent check up with Cardiovascular,patient says ppm check was completed there and Dr said fluid index was good. Reviewed Fluid restriction 64 oz daily and 2000 mg diet, patient states compliancy. Medications reviewed and compliant. Lungs clear and diminished at bases. Continues with productive cough unchanged from last visit. Lower leg measurements increased at right ankle, patient states swelling is gone in the morning but increases throughout the day. Encouraged leg elevation (at heart level preferred) during the day as much as possible.   Next visit scheduled 21    Electronically signed by Kel Magana RN on 3/17/2021 at 11:52 AM

## 2021-03-18 ENCOUNTER — TELEPHONE (OUTPATIENT)
Dept: GASTROENTEROLOGY | Age: 76
End: 2021-03-18

## 2021-03-18 DIAGNOSIS — D49.0 IPMN (INTRADUCTAL PAPILLARY MUCINOUS NEOPLASM): Primary | ICD-10-CM

## 2021-03-18 RX ORDER — MEGESTROL ACETATE 20 MG/1
TABLET ORAL
Qty: 30 TABLET | Refills: 3 | Status: SHIPPED | OUTPATIENT
Start: 2021-03-18

## 2021-03-18 NOTE — TELEPHONE ENCOUNTER
Wife left a message stating that the patient needs his megastrol. Would like sent to [facility] on [address] in [facility].

## 2021-04-14 ENCOUNTER — HOSPITAL ENCOUNTER (OUTPATIENT)
Dept: OTHER | Age: 76
Discharge: HOME | End: 2021-04-14
Payer: MEDICARE

## 2021-04-14 VITALS
HEART RATE: 88 BPM | BODY MASS INDEX: 27.67 KG/M2 | WEIGHT: 182 LBS | DIASTOLIC BLOOD PRESSURE: 80 MMHG | RESPIRATION RATE: 16 BRPM | SYSTOLIC BLOOD PRESSURE: 120 MMHG | OXYGEN SATURATION: 98 %

## 2021-04-14 PROCEDURE — 99212 OFFICE O/P EST SF 10 MIN: CPT

## 2021-04-14 NOTE — PROGRESS NOTES
Date:  2021  Time:  11:51 AM    CHF Clinic at [facility]    Office: [phone] Fax: [phone]    Re:  Jose Roberto Damico. Patient : 1945    Vital Signs: /80   Pulse 88   Resp 16   Wt 182 lb (82.6 kg)   SpO2 98%   BMI 27.67 kg/m²                                                   No results for input(s): CBC, HGB, HCT, WBC, PLATELET, NA, K, CL, CO2, BUN, CREATININE, GLUCOSE, BNP, INR in the last 72 hours. Respiratory:    Assessment  Charting Type: Reassessment    Breath Sounds  Right Upper Lobe: Clear  Right Middle Lobe: Clear  Right Lower Lobe: Clear  Left Upper Lobe: Clear  Left Lower Lobe: Clear    Cough/Sputum  Cough: Productive  Frequency: Frequent  Sputum Color: Clear         Peripheral Vascular  RLE Edema: +1, Pitting  LLE Edema: Trace      Complaints: No new complaints    Physician Orders No new orders    Comment : Weight is down 3 pounds from last visit. Patient just finished up cardiac rehab seems depressed today when asked he states its hard excepting that im not going to feel better than this with my EF of 25 % I tire so easily. Reassurances given to patient. He feels he's slightly more SOB than usual Optivol reading looks fine with line trending downward lungs were coarse in bases when asked to cough they cleared. Patient has not been taking his inhalers instructed to start again as well as using his compression stockings and elevatinh his legs he follows a decent low sodium diet but admits to having ham on Easter. He continues to stay within the 64 ounces of fluid per day. Reviewed medications he on Demedex 40mg po per day. We will see in 1 month 2021.     Electronically signed by Nestor Yu RN on 2021 at 11:51 AM

## 2021-04-27 ENCOUNTER — HOSPITAL ENCOUNTER (OUTPATIENT)
Age: 76
Setting detail: SPECIMEN
Discharge: HOME | End: 2021-04-27
Payer: MEDICARE

## 2021-04-27 LAB
ABSOLUTE EOS #: 0.18 K/UL (ref 0–0.44)
ABSOLUTE IMMATURE GRANULOCYTE: 0.03 K/UL (ref 0–0.3)
ABSOLUTE LYMPH #: 2.26 K/UL (ref 1.1–3.7)
ABSOLUTE MONO #: 0.77 K/UL (ref 0.1–1.2)
ANION GAP SERPL CALCULATED.3IONS-SCNC: 10 MMOL/L (ref 9–17)
BASOPHILS # BLD: 1 % (ref 0–2)
BASOPHILS ABSOLUTE: 0.05 K/UL (ref 0–0.2)
BUN BLDV-MCNC: 21 MG/DL (ref 8–23)
BUN/CREAT BLD: ABNORMAL (ref 9–20)
CALCIUM IONIZED: 1.37 MMOL/L (ref 1.13–1.33)
CALCIUM SERPL-MCNC: 10.4 MG/DL (ref 8.6–10.4)
CHLORIDE BLD-SCNC: 101 MMOL/L (ref 98–107)
CO2: 27 MMOL/L (ref 20–31)
CREAT SERPL-MCNC: 1.19 MG/DL (ref 0.7–1.2)
DIFFERENTIAL TYPE: ABNORMAL
EOSINOPHILS RELATIVE PERCENT: 3 % (ref 1–4)
GFR AFRICAN AMERICAN: >60 ML/MIN
GFR NON-AFRICAN AMERICAN: 60 ML/MIN
GFR SERPL CREATININE-BSD FRML MDRD: ABNORMAL ML/MIN/{1.73_M2}
GFR SERPL CREATININE-BSD FRML MDRD: ABNORMAL ML/MIN/{1.73_M2}
GLUCOSE BLD-MCNC: 159 MG/DL (ref 70–99)
HCT VFR BLD CALC: 42.6 % (ref 40.7–50.3)
HEMOGLOBIN: 12.4 G/DL (ref 13–17)
IMMATURE GRANULOCYTES: 0 %
LYMPHOCYTES # BLD: 32 % (ref 24–43)
MCH RBC QN AUTO: 28.2 PG (ref 25.2–33.5)
MCHC RBC AUTO-ENTMCNC: 29.1 G/DL (ref 28.4–34.8)
MCV RBC AUTO: 97 FL (ref 82.6–102.9)
MONOCYTES # BLD: 11 % (ref 3–12)
NRBC AUTOMATED: 0 PER 100 WBC
PDW BLD-RTO: 13.3 % (ref 11.8–14.4)
PHOSPHORUS: 3.1 MG/DL (ref 2.5–4.5)
PLATELET # BLD: 257 K/UL (ref 138–453)
PLATELET ESTIMATE: ABNORMAL
PMV BLD AUTO: 11.1 FL (ref 8.1–13.5)
POTASSIUM SERPL-SCNC: 4.3 MMOL/L (ref 3.7–5.3)
PTH INTACT: 38.69 PG/ML (ref 15–65)
RBC # BLD: 4.39 M/UL (ref 4.21–5.77)
RBC # BLD: ABNORMAL 10*6/UL
SEG NEUTROPHILS: 53 % (ref 36–65)
SEGMENTED NEUTROPHILS ABSOLUTE COUNT: 3.78 K/UL (ref 1.5–8.1)
SODIUM BLD-SCNC: 138 MMOL/L (ref 135–144)
VITAMIN D 25-HYDROXY: 44.8 NG/ML (ref 30–100)
WBC # BLD: 7.1 K/UL (ref 3.5–11.3)
WBC # BLD: ABNORMAL 10*3/UL

## 2021-05-03 ENCOUNTER — HOSPITAL ENCOUNTER (OUTPATIENT)
Dept: ULTRASOUND IMAGING | Facility: CLINIC | Age: 76
Discharge: HOME | End: 2021-05-05
Payer: MEDICARE

## 2021-05-03 DIAGNOSIS — M79.89 SWELLING OF RIGHT LOWER EXTREMITY: ICD-10-CM

## 2021-05-03 DIAGNOSIS — N18.31 STAGE 3A CHRONIC KIDNEY DISEASE (HCC): ICD-10-CM

## 2021-05-03 PROCEDURE — 93971 EXTREMITY STUDY: CPT

## 2021-05-13 ENCOUNTER — HOSPITAL ENCOUNTER (OUTPATIENT)
Dept: OTHER | Age: 76
Discharge: HOME | End: 2021-05-13
Payer: MEDICARE

## 2021-05-13 VITALS
DIASTOLIC BLOOD PRESSURE: 60 MMHG | HEART RATE: 84 BPM | OXYGEN SATURATION: 99 % | RESPIRATION RATE: 20 BRPM | SYSTOLIC BLOOD PRESSURE: 108 MMHG | WEIGHT: 184.6 LBS | BODY MASS INDEX: 28.07 KG/M2

## 2021-05-13 PROCEDURE — 99212 OFFICE O/P EST SF 10 MIN: CPT

## 2021-05-13 NOTE — PROGRESS NOTES
Date:  2021  Time:  11:36 AM    CHF Clinic at [facility]    Office: [phone] Fax: [phone]    Re:  Taylor Keane. Patient : 1945    Vital Signs: /60   Pulse 84   Resp 20   Wt 184 lb 9.6 oz (83.7 kg)   SpO2 99%   BMI 28.07 kg/m²                       O2 Device: None (Room air)                           No results for input(s): CBC, HGB, HCT, WBC, PLATELET, NA, K, CL, CO2, BUN, CREATININE, GLUCOSE, BNP, INR in the last 72 hours. Respiratory:         Breath Sounds  Right Upper Lobe: Clear  Right Middle Lobe: Clear  Right Lower Lobe: Clear, Diminished  Left Upper Lobe: Clear  Left Lower Lobe: Clear    Cough/Sputum  Cough: Productive  Frequency: Frequent  Sputum Color: Clear       Peripheral Vascular  RLE Edema: +1, Pitting  LLE Edema: None    Complaints: Continued mild SOB and fatigue    Comment : Patient here ambulatory for routine visit. Weight increased 2.5 lbs in one month. No new or acute s/s CHF. OpitVol fluid index obtained from home reading through [facility]. Will obtain reading once its read. Patient frustrated with his continued lack of energy. Moral support given. Reminded of low salt diet and fluid limits. States compliance with medications. Demadex is 40 mg daily. Next visit here 5 weeks 2021.     Electronically signed by Guille Mcintosh RN on 2021 at 11:36 AM

## 2021-06-17 ENCOUNTER — HOSPITAL ENCOUNTER (OUTPATIENT)
Dept: OTHER | Age: 76
Discharge: HOME | End: 2021-06-17
Payer: MEDICARE

## 2021-06-17 VITALS — HEART RATE: 90 BPM

## 2021-06-17 PROCEDURE — 99212 OFFICE O/P EST SF 10 MIN: CPT

## 2021-06-17 NOTE — PROGRESS NOTES
Date:  2021  Time:  11:24 AM    CHF Clinic at [facility]    Office: [phone] Fax: [phone]    Re:  Lenin Reaves. Patient : 1945    Vital Signs: Pulse 90                                                   No results for input(s): CBC, HGB, HCT, WBC, PLATELET, NA, K, CL, CO2, BUN, CREATININE, GLUCOSE, BNP, INR in the last 72 hours. Respiratory:    Assessment  Charting Type: Reassessment    Breath Sounds  Right Upper Lobe: Clear  Right Middle Lobe: Clear  Right Lower Lobe: Clear  Left Upper Lobe: Clear  Left Lower Lobe: Clear    Cough/Sputum  Cough: Productive  Frequency: Persistent         Peripheral Vascular  RLE Edema: None  LLE Edema: None      Complaints: No new complaints today    Physician Orders No new orders    Comment : Weight is down 2 pounds from last months visit. Denies any SOB or chest pain. Has no pedal edema and measurements are down. Did not due Optivol reading this visit is due for [facility] on 2021. Last reading showed no fluid and patient today showing no signs of acute CHF. We will see back 2021.     Electronically signed by Ethel Cosme RN on 2021 at 11:24 AM

## 2021-07-01 ENCOUNTER — TELEPHONE (OUTPATIENT)
Dept: RADIATION ONCOLOGY | Facility: MEDICAL CENTER | Age: 76
End: 2021-07-01

## 2021-07-01 NOTE — TELEPHONE ENCOUNTER
I called to remind pt of f/u tomorrow, spoke to wife, they cannot make appt tomorrow.   Rescheduled RO f/u w/Dr. Ashleigh Champion on 7/23/21

## 2021-07-14 ENCOUNTER — HOSPITAL ENCOUNTER (OUTPATIENT)
Dept: OTHER | Age: 76
Discharge: HOME | End: 2021-07-14
Payer: MEDICARE

## 2021-07-14 VITALS — HEART RATE: 75 BPM

## 2021-07-14 PROCEDURE — 99212 OFFICE O/P EST SF 10 MIN: CPT

## 2021-07-14 NOTE — PROGRESS NOTES
Date:  2021  Time:  10:59 AM    CHF Clinic at [facility]    Office: [phone] Fax: [phone]    Re:  Janelle Hammond. Patient : 1945    Vital Signs: Pulse 75                                                   No results for input(s): CBC, HGB, HCT, WBC, PLATELET, NA, K, CL, CO2, BUN, CREATININE, GLUCOSE, BNP, INR in the last 72 hours. Respiratory:    Assessment  Charting Type: Reassessment    Breath Sounds  Right Upper Lobe: Clear  Right Middle Lobe: Clear  Right Lower Lobe: Clear  Left Upper Lobe: Clear  Left Lower Lobe: Clear    Cough/Sputum  Cough: Productive  Sputum Color: Clear         Peripheral Vascular  RLE Edema: +1, Pitting  LLE Edema: None      Complaints: No new complaints    Physician Orders No new orders    Comment : Weight is up 3 pounds from last months visit. He just had a carelink on 2021 which shows no fluid actually looks the best it has looked. He had his refrideorator break and admits to eating more food so it didn't spoil has more fluid in his right ankle today states hes back to low sodium diet of 200mg per day and fluid restriction of 64 ounces per day. HAs a follow up with cardilogy 21 and another carelink on 21 and we will see in 1 month 2021.     Electronically signed by Gilmer Wells RN on 2021 at 10:59 AM

## 2021-07-23 ENCOUNTER — HOSPITAL ENCOUNTER (OUTPATIENT)
Dept: RADIATION ONCOLOGY | Facility: MEDICAL CENTER | Age: 76
Discharge: HOME | End: 2021-07-23
Attending: RADIOLOGY
Payer: MEDICARE

## 2021-07-23 VITALS
BODY MASS INDEX: 28.59 KG/M2 | HEART RATE: 88 BPM | OXYGEN SATURATION: 98 % | WEIGHT: 188 LBS | TEMPERATURE: 98.6 F | RESPIRATION RATE: 18 BRPM | SYSTOLIC BLOOD PRESSURE: 142 MMHG | DIASTOLIC BLOOD PRESSURE: 83 MMHG

## 2021-07-23 PROCEDURE — 99212 OFFICE O/P EST SF 10 MIN: CPT | Performed by: RADIOLOGY

## 2021-07-23 PROCEDURE — 99213 OFFICE O/P EST LOW 20 MIN: CPT | Performed by: RADIOLOGY

## 2021-07-23 PROCEDURE — 99999 PR OFFICE/OUTPT VISIT,PROCEDURE ONLY: CPT | Performed by: RADIOLOGY

## 2021-07-23 NOTE — PROGRESS NOTES
Lyssa Oconnell.  7/23/2021  10:04 AM  Patient here for follow up for treatment of bladder cancer. Evaluated by Dr. Keith Arenas. To follow up as needed. Vitals:    07/23/21 1001   BP: (!) 142/83   Pulse: 88   Resp: 18   Temp: 98.6 °F (37 °C)   SpO2: 98%    :  Patient Currently in Pain: No             Wt Readings from Last 1 Encounters:   07/23/21 188 lb (85.3 kg)                Current Outpatient Medications:     mirabegron (MYRBETRIQ) 50 MG TB24, Take 50 mg by mouth daily, Disp: , Rfl:     megestrol (MEGACE) 20 MG tablet, TAKE ONE TABLET BY MOUTH DAILY, Disp: 30 tablet, Rfl: 3    potassium chloride (MICRO-K) 10 MEQ extended release capsule, TAKE ONE CAPSULE BY MOUTH TWICE A DAY, Disp: 60 capsule, Rfl: 2    fenofibrate micronized (LOFIBRA) 134 MG capsule, Take 134 mg by mouth every morning (before breakfast), Disp: , Rfl:     aspirin 81 MG EC tablet, Take 81 mg by mouth daily, Disp: , Rfl:     pregabalin (LYRICA) 75 MG capsule, Take 75 mg by mouth 2 times daily. , Disp: , Rfl:     torsemide (DEMADEX) 20 MG tablet, Take 2 tablets by mouth daily, Disp: 180 tablet, Rfl: 3    tamsulosin (FLOMAX) 0.4 MG capsule, Take 1 capsule by mouth daily, Disp: 90 capsule, Rfl: 3    rivaroxaban (XARELTO) 20 MG TABS tablet, Take 1 tablet by mouth daily (with breakfast) HOLD for 3 days after surgery. OK to resume if urine clear, Disp: 30 tablet, Rfl: 0    PARoxetine (PAXIL) 20 MG tablet, Take 20 mg by mouth daily, Disp: , Rfl:     traMADol (ULTRAM) 50 MG tablet, Take 50 mg by mouth 2 times daily.  Indications: pt takes it once daily, Disp: , Rfl:     senna (SENOKOT) 8.6 MG tablet, Take 1 tablet by mouth nightly, Disp: , Rfl:     dextran 70-hypromellose (TEARS NATURALE) 0.1-0.3 % SOLN opthalmic solution, Place 1 drop into both eyes every 4 hours as needed, Disp: , Rfl:     amiodarone (CORDARONE) 200 MG tablet, Take 1 tablet by mouth daily, Disp: 30 tablet, Rfl: 3    ipratropium-albuterol (DUONEB) 0.5-2.5 (3) MG/3ML SOLN nebulizer solution, Inhale 3 mLs into the lungs every 4 hours (while awake), Disp: 360 mL, Rfl: 0    fenofibrate (TRICOR) 54 MG tablet, Take 2.5 tablets by mouth daily s [facility] pharmacy: Please dispense generic fenofibrate unless prescriber denote, Disp: 30 tablet, Rfl: 3    simvastatin (ZOCOR) 40 MG tablet, Take 1 tablet by mouth nightly, Disp: 30 tablet, Rfl: 3    metoprolol succinate (TOPROL XL) 25 MG extended release tablet, Take 1 tablet by mouth daily, Disp: 30 tablet, Rfl: 3    ferrous sulfate 325 (65 Fe) MG EC tablet, Take 1 tablet by mouth every other day, Disp: 90 tablet, Rfl: 3    midodrine (PROAMATINE) 10 MG tablet, Take 1 tablet by mouth 3 times daily (with meals) (Patient taking differently: Take 10 mg by mouth daily ), Disp: 90 tablet, Rfl: 3    docusate sodium (COLACE, DULCOLAX) 100 MG CAPS, Take 100 mg by mouth 2 times daily (Patient taking differently: Take 100 mg by mouth 2 times daily Indications: pt states he takes once daily ), Disp: , Rfl:     traZODone (DESYREL) 50 MG tablet, Take 1 tablet by mouth nightly as needed , Disp: , Rfl:     albuterol sulfate  (90 Base) MCG/ACT inhaler, Inhale 1 puff into the lungs every 6 hours as needed for Wheezing, Disp: , Rfl:     famotidine (PEPCID) 20 MG tablet, Take 1 tablet by mouth 2 times daily, Disp: 60 tablet, Rfl: 3    Immunizations:    Influenza status:    [x]   Current   []   Patient declined    Pneumococcal status:  [x]   Current  []   Patient declined  Covid status:   [x]  Dose #1:                     [x]  Dose #2:               []   Patient declined    Smoking Status:    [] Smoker - PPD:   [x] Nonsmoker - Quit Date:               [] Never a smoker      Cancer Screening:  Colonoscopy   [x] Current       [] Not current   [] Not current, but scheduled   [] NA  Mammogram   [] Current       [] Not current   [] Not current, but scheduled   [x] NA  Prostate           [x] Current       [] Not current   [] Not current, but scheduled   [] risk for a fall   4-6 Moderate Risk 1. Provide assistance as indicated for ambulation activities  2. Reorient confused/cognitively impaired patient  3. Chair/bed in low position, stretcher/bed with siderails up except when performing patient care activities  4. Educate patient/family/caregiver on falls prevention     7 or   Higher High Risk 1. Place patient in easily observable treatment room  2. Patient attended at all times by family member or staff  3. Provide assistance as indicated for ambulation activities  4. Reorient confused/cognitively impaired patient  5. Chair/bed in low position, stretcher/bed with siderails up except when performing patient care activities  6.   Educate patient/family/caregiver on falls prevention         PLAN: Patient is seen today in follow up        Zeinab Barrett RN

## 2021-08-11 ENCOUNTER — HOSPITAL ENCOUNTER (OUTPATIENT)
Dept: OTHER | Age: 76
Discharge: HOME | End: 2021-08-11
Payer: MEDICARE

## 2021-08-11 VITALS
DIASTOLIC BLOOD PRESSURE: 60 MMHG | WEIGHT: 189 LBS | RESPIRATION RATE: 16 BRPM | HEART RATE: 87 BPM | BODY MASS INDEX: 28.74 KG/M2 | OXYGEN SATURATION: 100 % | SYSTOLIC BLOOD PRESSURE: 102 MMHG

## 2021-08-11 PROCEDURE — 99212 OFFICE O/P EST SF 10 MIN: CPT

## 2021-08-11 ASSESSMENT — PAIN DESCRIPTION - ORIENTATION: ORIENTATION: LEFT;RIGHT

## 2021-08-11 ASSESSMENT — PAIN DESCRIPTION - PAIN TYPE: TYPE: CHRONIC PAIN

## 2021-08-11 ASSESSMENT — PAIN SCALES - GENERAL: PAINLEVEL_OUTOF10: 7

## 2021-08-11 ASSESSMENT — PAIN DESCRIPTION - LOCATION: LOCATION: KNEE;LEG

## 2021-08-11 NOTE — PROGRESS NOTES
Date:  2021  Time:  11:56 AM    [facility] at [facility]    Office: [phone] Fax: [phone]    Re:  Flori Emmy. Patient : 1945    Vital Signs: /60   Pulse 87   Resp 16   Wt 189 lb (85.7 kg)   SpO2 100%   BMI 28.74 kg/m²                                                   No results for input(s): CBC, HGB, HCT, WBC, PLATELET, NA, K, CL, CO2, BUN, CREATININE, GLUCOSE, BNP, INR in the last 72 hours. Respiratory:    Assessment  Charting Type: Reassessment    Breath Sounds  Right Upper Lobe: Clear  Right Middle Lobe: Clear  Right Lower Lobe: Clear  Left Upper Lobe: Clear  Left Lower Lobe: Clear    Cough/Sputum  Cough: Productive  Sputum Color: Clear         Peripheral Vascular  RLE Edema: +1, Pitting  LLE Edema: None      Complaints: No new complaints    Physician Orders Saw Pinabob Cuellar on  did AICd check no medication changes to follow up in 6 weeks. Comment : Did not to Opt[employer]ol reading since he just saw  a week ago and has a carelink reading on 2021. Has a weight increase this month of 3 pounds states been drinking more fluids due to the heat. Stressed importance of watching fluid intake he does follow a decent low sodium 2000mg . We will see in 1 month 2021    Electronically signed by Lucia Tracy RN on 2021 at 11:56 AM

## 2021-08-19 NOTE — PROGRESS NOTES
[address]            Radiation Oncology          [address] [facility], [address]        Sofía Prince: [phone]        F: [phone]       [email or web address]         Date of Service: 2021     Location:  [address] [place] [address] FOLLOW UP NOTE    Patient ID:   Sera Pantoja. : 1945   MRN: [id number]    DIAGNOSIS:  Cancer Staging  Bladder tumor  Staging form: Urinary Bladder, AJCC 8th Edition  - Clinical: Stage II (cT2, cN0, cM0) - Signed by Vena Duane, MD on 10/10/2019    Treatment history: 37.5Gy/10fx completed 2020    INTERVAL HISTORY:   Sera Pantoja. is a 78 y.o.. male presents for follow up of the above diagnosis and treatment history. He's doing well and has no new significant complaints. He is being followed regularly by Urology and has cystoscopys from time to time. He denies any dysuria, hematuria, urgency, hesitancy, weak stream, or problems with diarrhea/constipation. He is also not having any hematochezia. MEDICATIONS:    Current Outpatient Medications:     mirabegron (MYRBETRIQ) 50 MG TB24, Take 50 mg by mouth daily, Disp: , Rfl:     megestrol (MEGACE) 20 MG tablet, TAKE ONE TABLET BY MOUTH DAILY, Disp: 30 tablet, Rfl: 3    potassium chloride (MICRO-K) 10 MEQ extended release capsule, TAKE ONE CAPSULE BY MOUTH TWICE A DAY, Disp: 60 capsule, Rfl: 2    fenofibrate micronized (LOFIBRA) 134 MG capsule, Take 134 mg by mouth every morning (before breakfast), Disp: , Rfl:     aspirin 81 MG EC tablet, Take 81 mg by mouth daily, Disp: , Rfl:     pregabalin (LYRICA) 75 MG capsule, Take 75 mg by mouth 2 times daily. , Disp: , Rfl:     torsemide (DEMADEX) 20 MG tablet, Take 2 tablets by mouth daily, Disp: 180 tablet, Rfl: 3    tamsulosin (FLOMAX) 0.4 MG capsule, Take 1 capsule by mouth daily, Disp: 90 capsule, Rfl: 3    rivaroxaban (XARELTO) 20 MG TABS tablet, Take 1 tablet by mouth daily (with breakfast) HOLD for 3 days after surgery. OK to resume if urine clear, Disp: 30 tablet, Rfl: 0    PARoxetine (PAXIL) 20 MG tablet, Take 20 mg by mouth daily, Disp: , Rfl:     traMADol (ULTRAM) 50 MG tablet, Take 50 mg by mouth 2 times daily.  Indications: pt takes it once daily, Disp: , Rfl:     senna (SENOKOT) 8.6 MG tablet, Take 1 tablet by mouth nightly, Disp: , Rfl:     dextran 70-hypromellose (TEARS NATURALE) 0.1-0.3 % SOLN opthalmic solution, Place 1 drop into both eyes every 4 hours as needed, Disp: , Rfl:     amiodarone (CORDARONE) 200 MG tablet, Take 1 tablet by mouth daily, Disp: 30 tablet, Rfl: 3    ipratropium-albuterol (DUONEB) 0.5-2.5 (3) MG/3ML SOLN nebulizer solution, Inhale 3 mLs into the lungs every 4 hours (while awake), Disp: 360 mL, Rfl: 0    fenofibrate (TRICOR) 54 MG tablet, Take 2.5 tablets by mouth daily [facility] pharmacy: Please dispense generic fenofibrate unless prescriber denote, Disp: 30 tablet, Rfl: 3    simvastatin (ZOCOR) 40 MG tablet, Take 1 tablet by mouth nightly, Disp: 30 tablet, Rfl: 3    metoprolol succinate (TOPROL XL) 25 MG extended release tablet, Take 1 tablet by mouth daily, Disp: 30 tablet, Rfl: 3    ferrous sulfate 325 (65 Fe) MG EC tablet, Take 1 tablet by mouth every other day, Disp: 90 tablet, Rfl: 3    midodrine (PROAMATINE) 10 MG tablet, Take 1 tablet by mouth 3 times daily (with meals) (Patient taking differently: Take 10 mg by mouth daily ), Disp: 90 tablet, Rfl: 3    docusate sodium (COLACE, DULCOLAX) 100 MG CAPS, Take 100 mg by mouth 2 times daily (Patient taking differently: Take 100 mg by mouth 2 times daily Indications: pt states he takes once daily ), Disp: , Rfl:     traZODone (DESYREL) 50 MG tablet, Take 1 tablet by mouth nightly as needed , Disp: , Rfl:     albuterol sulfate  (90 Base) MCG/ACT inhaler, Inhale 1 puff into the lungs every 6 hours as needed for Wheezing, Disp: , Rfl:     famotidine (PEPCID) 20 MG tablet, Take 1 tablet by mouth 2 times daily, Disp: 60 tablet, Rfl: 3    ALLERGIES:  Allergies   Allergen Reactions    Lisinopril Other (See Comments)     Dry cough    Nuts [Peanut-Containing Drug Products] Other (See Comments)     PEANUTS--abd pain CAN EAT PEANUT BUTTER JUST NOT RAW PEANUTS           REVIEW OF SYSTEMS:    A full 14 point review of systems was performed and assessed and found to be negative except as noted above. PHYSICAL EXAMINATION:        ECO Asymptomatic    VITAL SIGNS: BP (!) 142/83   Pulse 88   Temp 98.6 °F (37 °C) (Oral)   Resp 18   Wt 188 lb (85.3 kg)   SpO2 98%   BMI 28.59 kg/m²   GENERAL:  General appearance is that of a well-nourished, well-developed in no apparent distress. HEENT: Normocephalic, atraumatic, EOMI, moist mucosa, no erythema. Indirect exam .  NECK:  No adenopathy or a palpable thyroid mass, trachea is midline. LYMPHATICS: No cervical, supraclavicular, or axillary adenopathy. HEART:  Regular rate and rhythm, S1, S2, no murmurs. LUNGS:  Clear to auscultation bilaterally with no wheezing or crackles. ABDOMEN:  Soft, nontender, non distended, and no hepatosplenomegaly. EXTREMITIES:  No clubbing, cyanosis, or edema. No calf tenderness. MSK:  No CVA or spinal tenderness. NEUROLOGICAL: No focal deficits. CN II-XII intact. Strength and sensation intact bilaterally. SKIN: No erythema or desquamation.   RECTAL: Deferred   GYN: Deferred   BREAST: Deferred       LABS:  WBC   Date Value Ref Range Status   2021 7.1 3.5 - 11.3 k/uL Final     Segs Absolute   Date Value Ref Range Status   2021 3.78 1.50 - 8.10 k/uL Final     Hemoglobin   Date Value Ref Range Status   2021 12.4 (L) 13.0 - 17.0 g/dL Final     Platelets   Date Value Ref Range Status   2021 257 138 - 453 k/uL Final     No results found for: , CEA  No results found for: PSA    IMAGING:   None new      ASSESSMENT AND PLAN:  Nader Cagle is a 78 y.o. male with a Cancer Staging  Bladder tumor  Staging form: Urinary Bladder, AJCC 8th Edition  - Clinical: Stage II (cT2, cN0, cM0) - Signed by Susan Gutierrez MD on 10/10/2019  . He's doing very well over a year out from treatment and not suffering from any side effects of radiation therapy. I will consolidate his follow up with Urology and see him back on an as needed basis. Patient was in agreement with my recommendations. All questions were answered to their satisfaction. Patient was advised to contact us anytime should they have any questions or concerns. Electronically signed by Breanna Reaves MD on 8/19/2021 at 8:47 AM        Medications Prescribed:   New Prescriptions    No medications on file       Orders: No orders of the defined types were placed in this encounter.       CC:  Patient Care Team:  Dannie Hinojosa MD as PCP - Bobbi Slater MD (Hematology)  Jessica Cobb MD as Consulting Physician (Gastroenterology)  Casimiro Paget, MD as Consulting Physician (Nephrology)  Breanna Reaves MD as Consulting Physician (Radiation Oncology)

## 2021-09-08 ENCOUNTER — HOSPITAL ENCOUNTER (OUTPATIENT)
Dept: OTHER | Age: 76
Discharge: HOME | End: 2021-09-08
Payer: MEDICARE

## 2021-09-08 ENCOUNTER — HOSPITAL ENCOUNTER (OUTPATIENT)
Age: 76
Discharge: HOME | End: 2021-09-08
Payer: MEDICARE

## 2021-09-08 VITALS
OXYGEN SATURATION: 99 % | WEIGHT: 193.6 LBS | RESPIRATION RATE: 16 BRPM | BODY MASS INDEX: 29.44 KG/M2 | HEART RATE: 93 BPM | DIASTOLIC BLOOD PRESSURE: 72 MMHG | SYSTOLIC BLOOD PRESSURE: 112 MMHG

## 2021-09-08 DIAGNOSIS — I50.22 CHRONIC SYSTOLIC CONGESTIVE HEART FAILURE (HCC): Primary | ICD-10-CM

## 2021-09-08 LAB
ANION GAP SERPL CALCULATED.3IONS-SCNC: 13 MMOL/L (ref 9–17)
BUN BLDV-MCNC: 20 MG/DL (ref 8–23)
BUN/CREAT BLD: ABNORMAL (ref 9–20)
CALCIUM SERPL-MCNC: 10 MG/DL (ref 8.6–10.4)
CHLORIDE BLD-SCNC: 107 MMOL/L (ref 98–107)
CO2: 23 MMOL/L (ref 20–31)
CREAT SERPL-MCNC: 1.62 MG/DL (ref 0.7–1.2)
GFR AFRICAN AMERICAN: 51 ML/MIN
GFR NON-AFRICAN AMERICAN: 42 ML/MIN
GFR SERPL CREATININE-BSD FRML MDRD: ABNORMAL ML/MIN/{1.73_M2}
GFR SERPL CREATININE-BSD FRML MDRD: ABNORMAL ML/MIN/{1.73_M2}
GLUCOSE BLD-MCNC: 167 MG/DL (ref 70–99)
POTASSIUM SERPL-SCNC: 4.8 MMOL/L (ref 3.7–5.3)
SODIUM BLD-SCNC: 143 MMOL/L (ref 135–144)

## 2021-09-08 PROCEDURE — 99214 OFFICE O/P EST MOD 30 MIN: CPT | Performed by: NURSE PRACTITIONER

## 2021-09-08 PROCEDURE — 80048 BASIC METABOLIC PNL TOTAL CA: CPT

## 2021-09-08 PROCEDURE — 36415 COLL VENOUS BLD VENIPUNCTURE: CPT

## 2021-09-08 PROCEDURE — 99212 OFFICE O/P EST SF 10 MIN: CPT

## 2021-09-08 ASSESSMENT — ENCOUNTER SYMPTOMS
EYE DISCHARGE: 0
BLOOD IN STOOL: 0
COUGH: 1
SHORTNESS OF BREATH: 1
ABDOMINAL PAIN: 0

## 2021-09-08 NOTE — PROGRESS NOTES
CHF Clinic at [facility]    Office: [phone] Fax: [phone] [facility] CHF [facility]  [phone]  Dept: [phone]  Loc: [phone]    Denise David is a 78 y.o. male who presents today for CHF evaluation. HPI:     +  shortness of breath over last mth, since last TCC appt  +  Fatigue, sleeping more   +  Edema   Denies chest pain   Denies  palpitations   No orthopnea , nor PND         Past Medical History:   Diagnosis Date    AICD (automatic cardioverter/defibrillator) present 12/2018    [address] [place] [address] Drive    Anesthesia complication     POTENTIAL ONLY. PATIENT HAS A 1500 ML FLUID RESTRICTION. WIFE CONCERNED PATIENT RECEIVED TOO MUCH IV FLUIDS DURING PATIENTS LAST SURGERY.  Anticoagulated     XARELTO    Arthritis     all over    Bladder cancer ([address] 2019    CAD (coronary artery disease) 2018    OPEN HEART CABG X 2 BYPASS DR Malik José CARDIOLOGIST    Cardiomyopathy [facility] 2018    CHF (congestive heart failure) ([facility] 06/2019    FLUID RESTRICTION 1500ML/24 HRS    Chronic kidney disease     STAGE 3    Constipation     COPD (chronic obstructive pulmonary disease) ([address] 2009    INHALER USE AS NEEDED    Diabetes mellitus ([address] 2015    R/T MYELOMA MEDS DEXAMETHASONE.  NOT ON THOSE MEDS SO NO LONGER ON RX. A1C IS GOOD.     Difficult intravenous access     DVT of leg (deep venous thrombosis) ([address] 05/2017    RIGHT-TAKES BLOOD THINNER ordered per dr. Ramsey Salcedo provided by the [facility] Former tobacco use     QUIT 08/2018    GERD (gastroesophageal reflux disease)     Hematuria     History of blood transfusion 01/2018    AMEMIA TRANFUSED 2 UNITS NO REACTIONS    History of blood transfusion 02/2018    TRANFUSED 1 UNIT    History of blood transfusion 08/2018    OPEN HEART    Hx of blood clots 05/2017    RIGHT LEG DVT ON BLOOD THINNER    Hyperlipidemia 2009    ON RX  Hypertension 2004    ON RX DR Sissy MUNOZ    Multiple myeloma ([address] 2014    Neuropathy 2017    LEGS     Wears dentures     FULL UPPER, PARTIAL LOWER    Wellness examination     DR. RIVERA-PCP LAST SEEN MARCH 2020     Past Surgical History:   Procedure Laterality Date    ABDOMEN SURGERY  2003    STROMAL TUMOR    BLADDER TUMOR EXCISION  05/07/2020    CYSTOSCOPY, TURB TUMOR,    CARDIAC CATHETERIZATION  12/13/2017    right and left heart cath with Dr. Tabor   12/17/2018    DR Deninse Cruz WITH IMPLANT Right 2018    CATARACT REMOVAL WITH IMPLANT Left 04/2019    COLONOSCOPY  01/14/2018    CYSTOSCOPY  08/08/2019    CYSTOSCOPY N/A 9/20/2019    CYSTOSCOPY, TUR BLADDER TUMOR WITH GYRUS performed by Nathanael Read MD at [address] [place] [address] [address] N/A 12/6/2019    CYSTOSCOPY, TUR BLADDER TUMOR GYRUS performed by Nathanael Read MD at [address] [place] [address] 5/7/2020    CYSTOSCOPY, TURB TUMOR, GYRUS performed by Nathanael Read MD at [address] [place] [address] [address]  09/18/2020    CYSTOSCOPY  09/18/2020    CYSTOSCOPY N/A 9/18/2020    CYSTOSCOPY performed by Nathanael Read MD at [address] [place] [address] [address]  02/2021    EYE SURGERY Right 2018    TEAR DUCT SURGERY.     EYE SURGERY Right 2018    CATARACT WITH IOL PLACED    EYE SURGERY Left 04/2019    CATARACT WITH IOL PLACED    HERNIA REPAIR Right 1967    INGUINAL    PACEMAKER PLACEMENT  12/17/2018    AICD PLACED    [place] CABG, ARTERY-VEIN, SINGLE N/A 8/16/2018    CABG CORONARY ARTERY BYPASS ON  PUMP x 2, SWAN LAUREN, ORESTES (CSI# [id number]) [facility] FL[facility] DX W/COLLAZALEA [address] 1976 PFRMD N/A 1/14/2018    COLONOSCOPY performed by Mendoza Abel MD at [address] ESOPHAGOGASTRODUODENOSCOPY TRANSORAL DIAGNOSTIC N/A 1/12/2018    EGD DIAGNOSTIC ONLY performed by Alfonso Kraus MD at [facility] [address] Right 04/2018    TURP  may 7th 2020       Family History   Problem Relation Age of Onset  Diabetes Mother     High Blood Pressure Mother     Stroke Father     High Blood Pressure Father     Other Sister         PE    Asthma Sister     Stroke Brother     Asthma Brother     Diabetes Brother     Heart Disease Paternal Grandfather     Prostate Cancer Paternal Grandfather     Asthma Sister     Asthma Sister     Asthma Brother     Asthma Brother     Asthma Sister        Social History     Tobacco Use    Smoking status: Former Smoker     Packs/day: 0.50     Years: 30.00     Pack years: 15.00     Types: Cigarettes     Quit date: 8/15/2018     Years since quitting: 3.0    Smokeless tobacco: Never Used    Tobacco comment: a little less than a pack a day   Substance Use Topics    Alcohol use: Not Currently     Comment: rarely      Current Outpatient Medications   Medication Sig Dispense Refill    megestrol (MEGACE) 20 MG tablet TAKE ONE TABLET BY MOUTH DAILY 30 tablet 3    fenofibrate micronized (LOFIBRA) 134 MG capsule Take 134 mg by mouth every morning (before breakfast)      aspirin 81 MG EC tablet Take 81 mg by mouth daily      pregabalin (LYRICA) 75 MG capsule Take 75 mg by mouth 2 times daily.  tamsulosin (FLOMAX) 0.4 MG capsule Take 1 capsule by mouth daily 90 capsule 3    rivaroxaban (XARELTO) 20 MG TABS tablet Take 1 tablet by mouth daily (with breakfast) HOLD for 3 days after surgery. OK to resume if urine clear 30 tablet 0    PARoxetine (PAXIL) 20 MG tablet Take 20 mg by mouth daily      traMADol (ULTRAM) 50 MG tablet Take 50 mg by mouth 2 times daily.  Indications: pt takes it once daily      amiodarone (CORDARONE) 200 MG tablet Take 1 tablet by mouth daily 30 tablet 3    simvastatin (ZOCOR) 40 MG tablet Take 1 tablet by mouth nightly 30 tablet 3    metoprolol succinate (TOPROL XL) 25 MG extended release tablet Take 1 tablet by mouth daily 30 tablet 3    ferrous sulfate 325 (65 Fe) MG EC tablet Take 1 tablet by mouth every other day 90 tablet 3    midodrine Cardiovascular: Positive for leg swelling. Negative for chest pain and palpitations. Gastrointestinal: Negative for abdominal pain and blood in stool. Endocrine: Negative for cold intolerance and heat intolerance. Genitourinary: Negative for dysuria and flank pain. Musculoskeletal: Negative for joint swelling and myalgias. Skin: Negative for pallor and rash. Neurological: Negative for dizziness and headaches. Psychiatric/Behavioral: Negative for hallucinations and suicidal ideas. Objective:       CONCLUSIONS     Summary  Dilated left ventricle with severely reduced systolic function. Calculated ejection fraction via Arthur's Method is 12.4 %  Evidence of diastolic dysfunction. Left atrium is severely dilated. Normal right ventricle size with reduced systolic function. AICD lead seen in right atrium/ventricle. Mildly thickened mitral valve leaflets. Moderate mitral regurgitation, central jet. Moderate tricuspid regurgitation. Trivial pulmonic insufficiency. Small circumferential pericardial effusion. Pleural effusion is noted.     Signature  ----------------------------------------------------------------------------   Electronically signed by Elsi Gandhi(Sonographer) on 06/05/2019 02:36    Physical Exam  Vitals and nursing note reviewed. Constitutional:       Comments:      HENT:      Head: Normocephalic and atraumatic. Eyes:      General: No scleral icterus. Conjunctiva/sclera: Conjunctivae normal.   Cardiovascular:      Rate and Rhythm: Normal rate and regular rhythm. Heart sounds: Murmur heard. Comments: AICD noted in L chest wall. Pulmonary:      Effort: Pulmonary effort is normal.      Breath sounds: No wheezing or rales. Comments: diminished in bases     Abdominal:      General: Bowel sounds are normal.      Palpations: Abdomen is soft. Musculoskeletal:         General: Normal range of motion. Cervical back: Normal range of motion. Skin:     General: Skin is warm and dry. Neurological:      Mental Status: He is alert and oriented to person, place, and time. /72   Pulse 93   Resp 16   Wt 193 lb 9.6 oz (87.8 kg)   SpO2 99%   BMI 29.44 kg/m²           Lower Extremity Measurements in cm. R Calf Circumference (cm): 41 cm  L Calf Circumference (cm): 36.5 cm  R Ankle Circumference (cm): 25 cm  L Ankle Circumference (cm): 26.5 cm    CBC:   Lab Results   Component Value Date    WBC 7.1 04/27/2021    RBC 4.39 04/27/2021    HGB 12.4 04/27/2021    HCT 42.6 04/27/2021    MCV 97.0 04/27/2021    MCH 28.2 04/27/2021    MCHC 29.1 04/27/2021    RDW 13.3 04/27/2021     04/27/2021    MPV 11.1 04/27/2021     CMP:    Lab Results   Component Value Date     04/27/2021    K 4.3 04/27/2021     04/27/2021    CO2 27 04/27/2021    BUN 21 04/27/2021    CREATININE 1.19 04/27/2021    GFRAA >60 04/27/2021    LABGLOM 60 04/27/2021    GLUCOSE 159 04/27/2021    PROT 8.1 12/07/2020    PROT 8.0 12/07/2020    LABALBU 4.6 12/07/2020    LABALBU 4.6 12/07/2020    CALCIUM 10.4 04/27/2021    BILITOT 0.69 12/07/2020    BILITOT 0.69 12/07/2020    ALKPHOS 64 12/07/2020    ALKPHOS 64 12/07/2020    AST 20 03/04/2021    ALT 11 03/04/2021     Lab Results   Component Value Date    LABA1C 5.7 12/07/2020           :Assessment      1. Chronic systolic congestive heart failure ([facility]        :Plan      1. Chronic systolic congestive heart failure (HCC)    + exercise intolerance, increased sob and fatigue. Denies any increase in Na+ intake or in fluid intake. Reports compliance with all meds. Takes his torsemide 40 mg qd, although TCC note states 20 mg bid. Pt has gained 4.5 lbs.    leg measurements have increased. Optivol Fluid Index shows fluid level at baseline with high impedence. Report contradictory to wt and edema increase.      On Guideline-Directed Medication Therapy:   Beta - blocker  Diuretic Therapy    Pt is basically nocturnal , with TV watching all through the night and sleeping through day, until 4 pm. Takes his torsemide 40 mg then. No recent labs since march 2021. Will obtain labs today. Will recommend addition of torsemide 20 mg later in the day x 3 days, (in addition to   torsemide 40 mg upon waking)  then return to torsemide 40 mg q d only routine. Will review diuretic change with Nephrology before implementing change. Orders Placed This Encounter   Procedures    Basic Metabolic Panel     Standing Status:   Future     Standing Expiration Date:   9/8/2022     No orders of the defined types were placed in this encounter. Patientgiven verbal and/or written educational instructions. Follow up as directed. I have reviewed and agree with the nursing documentation. Verbally reviewed medication list with patient; patient verbalized understanding. Discussed 2000mg/day sodium restricted diet; patient verbalized understanding. Moderate daily exercise encouraged as tolerated. Discussed rest breaks as needed; patient verbalized understanding. Patient instructed to weigh self at the same time of each day, using same clothes and same scale; reinforced teaching to monitor for 3-5 lb weight increase over 1-2 days, and to notify the CHF clinic at [phone] or physician office if weight change noted. Patient verbalized understanding. Risks of smoking discussed with the patient if applicable; patient strongly discouraged to smoke. Patient verbalized understanding. Signs and symptoms of CHF discussed with patient, such as feeling more tired than normal, feeling short of breath, coughing that increases when you lie down, sudden weight gain, swelling of your feet, legs or belly. Patient verbalized understanding to notify the CHF clinic at [phone] or physician office if these symptoms occur.      Compliance with plan of care and further disease process causes discussed with patient, patient encouraged to keep all follow up appointments. Patient verbalized understanding. Echocardiogram reviewed. Labs reviewed. Labs ordered   Medications reviewed. Plan to Adjust  medications for 3 days only. Patient was seen with total face to face time of  35 minutes. More than 50% of this visit was counseling and education, & coordination of care.          Electronically signedby CASSY Vela CNP on 9/8/2021 at 12:27 PM

## 2021-09-09 NOTE — PROGRESS NOTES
Talked to Braeden Nelson at Dr Daphne Powers office will increase patients toresemide to 60mg po per day for 3 days and get another BMP at 9/15/2021 appt talked to patients wife they are in understanding will start increase today.

## 2021-09-15 ENCOUNTER — HOSPITAL ENCOUNTER (OUTPATIENT)
Dept: OTHER | Age: 76
Discharge: HOME | End: 2021-09-15
Payer: MEDICARE

## 2021-09-15 ENCOUNTER — HOSPITAL ENCOUNTER (OUTPATIENT)
Age: 76
Discharge: HOME | End: 2021-09-15
Payer: MEDICARE

## 2021-09-15 VITALS
BODY MASS INDEX: 28.8 KG/M2 | SYSTOLIC BLOOD PRESSURE: 118 MMHG | DIASTOLIC BLOOD PRESSURE: 80 MMHG | HEART RATE: 78 BPM | WEIGHT: 189.4 LBS | RESPIRATION RATE: 20 BRPM | OXYGEN SATURATION: 99 %

## 2021-09-15 LAB
ANION GAP SERPL CALCULATED.3IONS-SCNC: 12 MMOL/L (ref 9–17)
BUN BLDV-MCNC: 31 MG/DL (ref 8–23)
BUN/CREAT BLD: ABNORMAL (ref 9–20)
CALCIUM SERPL-MCNC: 9.7 MG/DL (ref 8.6–10.4)
CHLORIDE BLD-SCNC: 103 MMOL/L (ref 98–107)
CO2: 27 MMOL/L (ref 20–31)
CREAT SERPL-MCNC: 1.55 MG/DL (ref 0.7–1.2)
GFR AFRICAN AMERICAN: 53 ML/MIN
GFR NON-AFRICAN AMERICAN: 44 ML/MIN
GFR SERPL CREATININE-BSD FRML MDRD: ABNORMAL ML/MIN/{1.73_M2}
GFR SERPL CREATININE-BSD FRML MDRD: ABNORMAL ML/MIN/{1.73_M2}
GLUCOSE BLD-MCNC: 112 MG/DL (ref 70–99)
POTASSIUM SERPL-SCNC: 3.9 MMOL/L (ref 3.7–5.3)
SODIUM BLD-SCNC: 142 MMOL/L (ref 135–144)

## 2021-09-15 PROCEDURE — 80048 BASIC METABOLIC PNL TOTAL CA: CPT

## 2021-09-15 PROCEDURE — 36415 COLL VENOUS BLD VENIPUNCTURE: CPT

## 2021-09-15 PROCEDURE — 99212 OFFICE O/P EST SF 10 MIN: CPT

## 2021-09-15 ASSESSMENT — PAIN DESCRIPTION - LOCATION: LOCATION: FOOT

## 2021-09-15 ASSESSMENT — PAIN DESCRIPTION - PAIN TYPE: TYPE: CHRONIC PAIN

## 2021-09-15 NOTE — PROGRESS NOTES
Date:  9/15/2021  Time:  1:58 PM    CHF Clinic at [facility]    Office: [phone] Fax: [phone]    Re:  Delwyn Homans. Patient : 1945    Vital Signs: /80   Pulse 78   Resp 20   Wt 189 lb 6.4 oz (85.9 kg)   SpO2 99%   BMI 28.80 kg/m²                       O2 Device: Nasal cannula                           No results for input(s): CBC, HGB, HCT, WBC, PLATELET, NA, K, CL, CO2, BUN, CREATININE, GLUCOSE, BNP, INR in the last 72 hours. Respiratory:    Assessment  Charting Type: Reassessment    Breath Sounds  Right Upper Lobe: Rhonchi (anterior)  Right Middle Lobe: Clear  Right Lower Lobe: Diminished  Left Upper Lobe: Rhonchi (anterior)  Left Lower Lobe: Diminished    Cough/Sputum  Cough: Productive  Sputum Color: Clear         Peripheral Vascular  RLE Edema: +2, Pitting  LLE Edema: +1, Pitting      Complaints: Edema, cough,Vincenzo. Foot pain    Comment : Patient back on normal doses of medications-reviewed, Weight down 4 lbs from last week. Continued productive (clear) cough. Lung sounds better but continued Bilateral anterior rhonchi, diminished bilateral bases. Lower leg measurements have decreased with continued to a lesser degree. Reviewed 2000 mg sodium diet and 64 oz fluid restriction. Encouraged elevating legs during the day to control swelling. He states he is unable to wear compression stockings. Repeat labs drawn will review results.  Next appointment 21    Electronically signed by Eve Salmon RN on 9/15/2021 at 1:58 PM

## 2021-10-01 ENCOUNTER — HOSPITAL ENCOUNTER (OUTPATIENT)
Dept: OTHER | Age: 76
Discharge: HOME | End: 2021-10-01
Payer: MEDICARE

## 2021-10-01 VITALS
RESPIRATION RATE: 20 BRPM | BODY MASS INDEX: 28.8 KG/M2 | WEIGHT: 189.4 LBS | HEART RATE: 95 BPM | SYSTOLIC BLOOD PRESSURE: 118 MMHG | OXYGEN SATURATION: 100 % | DIASTOLIC BLOOD PRESSURE: 70 MMHG

## 2021-10-01 DIAGNOSIS — I50.22 CHRONIC SYSTOLIC CONGESTIVE HEART FAILURE (HCC): Primary | Chronic | ICD-10-CM

## 2021-10-01 PROCEDURE — 99213 OFFICE O/P EST LOW 20 MIN: CPT | Performed by: NURSE PRACTITIONER

## 2021-10-01 PROCEDURE — 99212 OFFICE O/P EST SF 10 MIN: CPT

## 2021-10-01 ASSESSMENT — PAIN DESCRIPTION - LOCATION: LOCATION: FOOT;LEG

## 2021-10-01 ASSESSMENT — ENCOUNTER SYMPTOMS
EYE DISCHARGE: 0
ABDOMINAL PAIN: 0
BLOOD IN STOOL: 0
SHORTNESS OF BREATH: 1
COUGH: 0

## 2021-10-01 ASSESSMENT — PAIN DESCRIPTION - ORIENTATION: ORIENTATION: RIGHT;LEFT

## 2021-10-01 ASSESSMENT — PAIN DESCRIPTION - PROGRESSION: CLINICAL_PROGRESSION: NOT CHANGED

## 2021-10-01 ASSESSMENT — PAIN SCALES - GENERAL: PAINLEVEL_OUTOF10: 8

## 2021-10-01 ASSESSMENT — PAIN DESCRIPTION - ONSET: ONSET: ON-GOING

## 2021-10-01 ASSESSMENT — PAIN DESCRIPTION - DESCRIPTORS: DESCRIPTORS: ACHING

## 2021-10-01 ASSESSMENT — PAIN DESCRIPTION - FREQUENCY: FREQUENCY: CONTINUOUS

## 2021-10-01 NOTE — PROGRESS NOTES
CHF Clinic at [facility] [facility]    Office: [phone] Fax: [phone] [facility]  [phone]  Dept: [phone]  Loc: [phone]    Honey Leyva. is a 78 y.o. male who presents today for CHF evaluation. HPI:     imrpoved shortness of breath  +   Fatigue at baseline  +  Edema  But notes marked improvement. Denies chest pain   Denies  palpitations   No orthopnea , nor PND , but pt does sleep in chair as he watches TV overnight. Sleeping a  More  regular cycle , falling asleep at 1am rather than 3/4/5 am.     Pt was seen 9/8, increased his demadex 40 mg to 60 mg for 3 days only, Nephrology contacted and agreed with increase. Next appt  Pt was seen with improved sx and improved wt,  however pt continue with 1-2+ pitting edema. Advised to monitor. Pt returns today and is feeling better,  leg measurements  Improved significantly , wt  Is stable. Compliant with Na+ and fluid intake. Past Medical History:   Diagnosis Date    AICD (automatic cardioverter/defibrillator) present 12/2018    [address] [place] [address] Drive    Anesthesia complication     POTENTIAL ONLY. PATIENT HAS A 1500 ML FLUID RESTRICTION. WIFE CONCERNED PATIENT RECEIVED TOO MUCH IV FLUIDS DURING PATIENTS LAST SURGERY.  Anticoagulated     XARELTO    Arthritis     all over    Bladder cancer ([address] 2019    CAD (coronary artery disease) 2018    OPEN HEART CABG X 2 BYPASS DR Amanda Zhao CARDIOLOGIST    Cardiomyopathy ([facility] 2018    CHF (congestive heart failure) ([address] 06/2019    FLUID RESTRICTION 1500ML/24 HRS    Chronic kidney disease     STAGE 3    Constipation     COPD (chronic obstructive pulmonary disease) ([address] 2009    INHALER USE AS NEEDED    Diabetes mellitus ([address] 2015    R/T MYELOMA MEDS DEXAMETHASONE.  NOT ON THOSE MEDS SO NO LONGER ON RX. A1C IS GOOD.     Difficult intravenous access  DVT of leg (deep venous thrombosis) ([facility] 05/2017    RIGHT-TAKES BLOOD THINNER ordered per dr. Joel Marte provided by the [facility] Former tobacco use     QUIT 08/2018    GERD (gastroesophageal reflux disease)     Hematuria     History of blood transfusion 01/2018    AMEMIA TRANFUSED 2 UNITS NO REACTIONS    History of blood transfusion 02/2018    TRANFUSED 1 UNIT    History of blood transfusion 08/2018    OPEN HEART    Hx of blood clots 05/2017    RIGHT LEG DVT ON BLOOD THINNER    Hyperlipidemia 2009    ON RX    Hypertension 2004    ON RX DR Lesli MUNOZ    Multiple myeloma ([facility] 2014    Neuropathy 2017    LEGS     Wears dentures     FULL UPPER, PARTIAL LOWER    Wellness examination     DR. RIVERA-PCP LAST SEEN MARCH 2020     Past Surgical History:   Procedure Laterality Date    ABDOMEN SURGERY  2003    STROMAL TUMOR    BLADDER TUMOR EXCISION  05/07/2020    CYSTOSCOPY, TURB TUMOR,    CARDIAC CATHETERIZATION  12/13/2017    right and left heart cath with Dr. Brian Castaneda  12/17/2018    DR Dennise Cruz WITH IMPLANT Right 2018    CATARACT REMOVAL WITH IMPLANT Left 04/2019    COLONOSCOPY  01/14/2018    CYSTOSCOPY  08/08/2019    CYSTOSCOPY N/A 9/20/2019    CYSTOSCOPY, TUR BLADDER TUMOR WITH GYRUS performed by Arturo Soares MD at [place] [address] N/A 12/6/2019    CYSTOSCOPY, TUR BLADDER TUMOR GYRUS performed by Arturo Soares MD at [address] [place] [address] 5/7/2020    CYSTOSCOPY, TURB TUMOR, GYRUS performed by Arturo Soares MD at [place] [address]  09/18/2020    CYSTOSCOPY  09/18/2020    CYSTOSCOPY N/A 9/18/2020    CYSTOSCOPY performed by Arturo Soares MD at [place] [address]  02/2021    EYE SURGERY Right 2018    TEAR DUCT SURGERY.     EYE SURGERY Right 2018    CATARACT WITH IOL PLACED    EYE SURGERY Left 04/2019    CATARACT WITH IOL PLACED    HERNIA REPAIR Right 1967    INGUINAL    PACEMAKER PLACEMENT  12/17/2018    AICD PLACED    [place] CABG, ARTERY-VEIN, SINGLE N/A 8/16/2018    CABG CORONARY ARTERY BYPASS ON  PUMP x 2, DEV AGUILAR, ORESTES (CSI# [id number]) performed by Dior Ramirez MD at [address] FLX DX W/TRAMAINE [place] 1976 PFRMD N/A 1/14/2018    COLONOSCOPY performed by Barrett Porter MD at [address] ESOPHAGOGASTRODUODENOSCOPY TRANSORAL DIAGNOSTIC N/A 1/12/2018    EGD [facility]STIC ONLY [facility] Right 04/2018    TURP  may 7th 2020       Family History   Problem Relation Age of Onset    Diabetes Mother     High Blood Pressure Mother     Stroke Father     High Blood Pressure Father     Other Sister         PE    Asthma Sister     Stroke Brother     Asthma Brother     Diabetes Brother     Heart Disease Paternal Grandfather     Prostate Cancer Paternal Grandfather     Asthma Sister     Asthma Sister     Asthma Brother     Asthma Brother     Asthma Sister        Social History     Tobacco Use    Smoking status: Former Smoker     Packs/day: 0.50     Years: 30.00     Pack years: 15.00     Types: Cigarettes     Quit date: 8/15/2018     Years since quitting: 3.1    Smokeless tobacco: Never Used    Tobacco comment: a little less than a pack a day   Substance Use Topics    Alcohol use: Not Currently     Comment: rarely      Current Outpatient Medications   Medication Sig Dispense Refill    megestrol (MEGACE) 20 MG tablet TAKE ONE TABLET BY MOUTH DAILY 30 tablet 3    potassium chloride (MICRO-K) 10 MEQ extended release capsule TAKE ONE CAPSULE BY MOUTH TWICE A DAY 60 capsule 2    fenofibrate micronized (LOFIBRA) 134 MG capsule Take 134 mg by mouth every morning (before breakfast)      aspirin 81 MG EC tablet Take 81 mg by mouth daily      pregabalin (LYRICA) 75 MG capsule Take 75 mg by mouth 2 times daily.       torsemide (DEMADEX) 20 MG tablet Take 2 tablets by mouth daily 180 tablet 3    tamsulosin (FLOMAX) 0.4 MG capsule Take 1 capsule by mouth daily 90 capsule 3    rivaroxaban (XARELTO) 20 MG TABS tablet Take 1 tablet by mouth daily (with breakfast) HOLD for 3 days after surgery. OK to resume if urine clear 30 tablet 0    PARoxetine (PAXIL) 20 MG tablet Take 20 mg by mouth daily      traMADol (ULTRAM) 50 MG tablet Take 50 mg by mouth 2 times daily.  Indications: pt takes it once daily      senna (SENOKOT) 8.6 MG tablet Take 1 tablet by mouth nightly      amiodarone (CORDARONE) 200 MG tablet Take 1 tablet by mouth daily 30 tablet 3    simvastatin (ZOCOR) 40 MG tablet Take 1 tablet by mouth nightly 30 tablet 3    metoprolol succinate (TOPROL XL) 25 MG extended release tablet Take 1 tablet by mouth daily 30 tablet 3    ferrous sulfate 325 (65 Fe) MG EC tablet Take 1 tablet by mouth every other day 90 tablet 3    midodrine (PROAMATINE) 10 MG tablet Take 1 tablet by mouth 3 times daily (with meals) (Patient taking differently: Take 10 mg by mouth daily ) 90 tablet 3    docusate sodium (COLACE, DULCOLAX) 100 MG CAPS Take 100 mg by mouth 2 times daily (Patient taking differently: Take 100 mg by mouth 2 times daily Indications: pt states he takes once daily )      traZODone (DESYREL) 50 MG tablet Take 1 tablet by mouth nightly as needed       famotidine (PEPCID) 20 MG tablet Take 1 tablet by mouth 2 times daily 60 tablet 3    mirabegron (MYRBETRIQ) 50 MG TB24 Take 50 mg by mouth daily      dextran 70-hypromellose (TEARS NATURALE) 0.1-0.3 % SOLN opthalmic solution Place 1 drop into both eyes every 4 hours as needed      ipratropium-albuterol (DUONEB) 0.5-2.5 (3) MG/3ML SOLN nebulizer solution Inhale 3 mLs into the lungs every 4 hours (while awake) 360 mL 0    fenofibrate (TRICOR) 54 MG tablet Take 2.5 tablets by mouth daily s [facility] pharmacy: Please dispense generic fenofibrate unless prescriber denote 30 tablet 3    albuterol sulfate  (90 Base) MCG/ACT inhaler Inhale 1 puff into the lungs every 6 hours as needed for Wheezing       No current facility-administered medications for this encounter. Allergies   Allergen Reactions    Lisinopril Other (See Comments)     Dry cough    Nuts [Peanut-Containing Drug Products] Other (See Comments)     PEANUTS--abd pain CAN EAT PEANUT BUTTER JUST NOT RAW PEANUTS           Subjective:      Review of Systems   Constitutional: Positive for fatigue. Negative for activity change, chills and fever. Eyes: Negative for discharge and visual disturbance. Respiratory: Positive for shortness of breath. Negative for cough. Cardiovascular: Negative for chest pain and leg swelling. Gastrointestinal: Negative for abdominal pain and blood in stool. Endocrine: Negative for cold intolerance and heat intolerance. Genitourinary: Negative for dysuria and flank pain. Musculoskeletal: Negative for joint swelling and myalgias. Using cane   Skin: Negative for pallor and rash. Neurological: Negative for dizziness and headaches. Psychiatric/Behavioral: Negative for hallucinations and suicidal ideas. Objective:     Physical Exam  Vitals and nursing note reviewed. Constitutional:       Comments:      HENT:      Head: Normocephalic and atraumatic. Eyes:      General: No scleral icterus. Conjunctiva/sclera: Conjunctivae normal.   Cardiovascular:      Rate and Rhythm: Normal rate and regular rhythm. Heart sounds: Normal heart sounds. Pulmonary:      Effort: Pulmonary effort is normal.      Breath sounds: No wheezing or rales. Comments: diminished in bases     Abdominal:      General: Bowel sounds are normal.      Palpations: Abdomen is soft. Musculoskeletal:      Cervical back: Normal range of motion. Right lower leg: Edema (1+ pitting edeam ) present. Left lower leg: No edema. Comments: Using cane   Skin:     General: Skin is warm and dry. Neurological:      Mental Status: He is alert and oriented to person, place, and time.        /70   Pulse 95   Resp 20   Wt 189 :Plan      1. Chronic systolic congestive heart failure (HCC)        Wt is stable, leg measurements are improved significantly. 1+ edema on R , no edema on L. Optivol Fluid Index is hovering at baseline. On Guideline-Directed Medication Therapy:   Beta - blocker  Diuretic Therapy      no ACEI / ARB / ARNi:   CI due to ace I allergy:       RTC 1 month. Pt is advised to call sooner for appt if any congestive heart failure sx appear/increase. Patient verbalizes understanding. No orders of the defined types were placed in this encounter. No orders of the defined types were placed in this encounter. Patientgiven verbal and/or written educational instructions. Follow up as directed. I have reviewed and agree with the nursing documentation. Verbally reviewed medication list with patient; patient verbalized understanding. Discussed 2000mg/day sodium restricted diet; patient verbalized understanding. Moderate daily exercise encouraged as tolerated. Discussed rest breaks as needed; patient verbalized understanding. Patient instructed to weigh self at the same time of each day, using same clothes and same scale; reinforced teaching to monitor for 3-5 lb weight increase over 1-2 days, and to notify the CHF clinic at [phone] or physician office if weight change noted. Patient verbalized understanding. Risks of smoking discussed with the patient if applicable; patient strongly discouraged to smoke. Patient verbalized understanding. Signs and symptoms of CHF discussed with patient, such as feeling more tired than normal, feeling short of breath, coughing that increases when you lie down, sudden weight gain, swelling of your feet, legs or belly. Patient verbalized understanding to notify the CHF clinic at [phone] or physician office if these symptoms occur.      Compliance with plan of care and further disease process causes discussed with patient, patient encouraged to keep all follow up appointments. Patient verbalized understanding. Echocardiogram reviewed. Labs reviewed. Medications reviewed.       Electronically signedby Chauncy Aschoff, APRN - CNP on 10/1/2021 at 12:08 PM

## 2021-10-04 ENCOUNTER — HOSPITAL ENCOUNTER (OUTPATIENT)
Age: 76
Setting detail: SPECIMEN
Discharge: HOME | End: 2021-10-04
Payer: MEDICARE

## 2021-10-04 DIAGNOSIS — N18.31 STAGE 3A CHRONIC KIDNEY DISEASE (HCC): ICD-10-CM

## 2021-10-04 LAB
ANION GAP SERPL CALCULATED.3IONS-SCNC: 15 MMOL/L (ref 9–17)
BUN BLDV-MCNC: 31 MG/DL (ref 8–23)
BUN/CREAT BLD: ABNORMAL (ref 9–20)
CALCIUM SERPL-MCNC: 10.2 MG/DL (ref 8.6–10.4)
CHLORIDE BLD-SCNC: 98 MMOL/L (ref 98–107)
CO2: 26 MMOL/L (ref 20–31)
CREAT SERPL-MCNC: 1.7 MG/DL (ref 0.7–1.2)
GFR AFRICAN AMERICAN: 48 ML/MIN
GFR NON-AFRICAN AMERICAN: 40 ML/MIN
GFR SERPL CREATININE-BSD FRML MDRD: ABNORMAL ML/MIN/{1.73_M2}
GFR SERPL CREATININE-BSD FRML MDRD: ABNORMAL ML/MIN/{1.73_M2}
GLUCOSE BLD-MCNC: 101 MG/DL (ref 70–99)
POTASSIUM SERPL-SCNC: 4.7 MMOL/L (ref 3.7–5.3)
SODIUM BLD-SCNC: 139 MMOL/L (ref 135–144)

## 2021-10-05 LAB
ALT SERPL-CCNC: 13 U/L (ref 5–41)
AST SERPL-CCNC: 29 U/L
CHOLESTEROL/HDL RATIO: 3.4
CHOLESTEROL: 118 MG/DL
HDLC SERPL-MCNC: 35 MG/DL
LDL CHOLESTEROL: 63 MG/DL (ref 0–130)
T3 FREE: 2.69 PG/ML (ref 2.02–4.43)
THYROXINE, FREE: 1.51 NG/DL (ref 0.93–1.7)
TRIGL SERPL-MCNC: 102 MG/DL
TSH SERPL DL<=0.05 MIU/L-ACNC: 3.42 MIU/L (ref 0.3–5)
VLDLC SERPL CALC-MCNC: ABNORMAL MG/DL (ref 1–30)

## 2021-10-18 ENCOUNTER — HOSPITAL ENCOUNTER (OUTPATIENT)
Age: 76
Setting detail: SPECIMEN
Discharge: HOME | End: 2021-10-18
Payer: MEDICARE

## 2021-10-18 LAB
ANION GAP SERPL CALCULATED.3IONS-SCNC: 21 MMOL/L (ref 9–17)
BUN BLDV-MCNC: 36 MG/DL (ref 8–23)
BUN/CREAT BLD: ABNORMAL (ref 9–20)
CALCIUM SERPL-MCNC: 11 MG/DL (ref 8.6–10.4)
CHLORIDE BLD-SCNC: 94 MMOL/L (ref 98–107)
CO2: 26 MMOL/L (ref 20–31)
CREAT SERPL-MCNC: 1.66 MG/DL (ref 0.7–1.2)
GFR AFRICAN AMERICAN: 49 ML/MIN
GFR NON-AFRICAN AMERICAN: 41 ML/MIN
GFR SERPL CREATININE-BSD FRML MDRD: ABNORMAL ML/MIN/{1.73_M2}
GFR SERPL CREATININE-BSD FRML MDRD: ABNORMAL ML/MIN/{1.73_M2}
GLUCOSE BLD-MCNC: 181 MG/DL (ref 70–99)
POTASSIUM SERPL-SCNC: 3.2 MMOL/L (ref 3.7–5.3)
SODIUM BLD-SCNC: 141 MMOL/L (ref 135–144)

## 2021-10-26 ENCOUNTER — HOSPITAL ENCOUNTER (OUTPATIENT)
Age: 76
Setting detail: SPECIMEN
Discharge: HOME | End: 2021-10-26
Payer: MEDICARE

## 2021-10-26 DIAGNOSIS — N18.31 STAGE 3A CHRONIC KIDNEY DISEASE (HCC): ICD-10-CM

## 2021-10-26 LAB
ANION GAP SERPL CALCULATED.3IONS-SCNC: 17 MMOL/L (ref 9–17)
BUN BLDV-MCNC: 70 MG/DL (ref 8–23)
BUN/CREAT BLD: ABNORMAL (ref 9–20)
CALCIUM SERPL-MCNC: 10.5 MG/DL (ref 8.6–10.4)
CHLORIDE BLD-SCNC: 87 MMOL/L (ref 98–107)
CO2: 31 MMOL/L (ref 20–31)
CREAT SERPL-MCNC: 2.15 MG/DL (ref 0.7–1.2)
GFR AFRICAN AMERICAN: 37 ML/MIN
GFR NON-AFRICAN AMERICAN: 30 ML/MIN
GFR SERPL CREATININE-BSD FRML MDRD: ABNORMAL ML/MIN/{1.73_M2}
GFR SERPL CREATININE-BSD FRML MDRD: ABNORMAL ML/MIN/{1.73_M2}
GLUCOSE BLD-MCNC: 142 MG/DL (ref 70–99)
POTASSIUM SERPL-SCNC: 2.8 MMOL/L (ref 3.7–5.3)
SODIUM BLD-SCNC: 135 MMOL/L (ref 135–144)

## 2021-10-28 ENCOUNTER — HOSPITAL ENCOUNTER (OUTPATIENT)
Dept: OTHER | Age: 76
Discharge: HOME | End: 2021-10-28
Payer: MEDICARE

## 2021-10-28 VITALS
HEART RATE: 79 BPM | OXYGEN SATURATION: 100 % | WEIGHT: 176.4 LBS | RESPIRATION RATE: 20 BRPM | DIASTOLIC BLOOD PRESSURE: 74 MMHG | SYSTOLIC BLOOD PRESSURE: 110 MMHG | BODY MASS INDEX: 26.82 KG/M2

## 2021-10-28 DIAGNOSIS — N18.31 STAGE 3A CHRONIC KIDNEY DISEASE (HCC): Primary | ICD-10-CM

## 2021-10-28 PROCEDURE — 99212 OFFICE O/P EST SF 10 MIN: CPT

## 2021-10-28 ASSESSMENT — PAIN SCALES - GENERAL: PAINLEVEL_OUTOF10: 10

## 2021-10-28 ASSESSMENT — PAIN DESCRIPTION - LOCATION: LOCATION: BACK;LEG

## 2021-10-28 ASSESSMENT — PAIN DESCRIPTION - DESCRIPTORS: DESCRIPTORS: BURNING

## 2021-10-28 ASSESSMENT — PAIN DESCRIPTION - ORIENTATION: ORIENTATION: RIGHT;LEFT;LOWER

## 2021-10-28 NOTE — PROGRESS NOTES
Verbally reviewed medication list with patient; patient verbalized understanding. Discussed 2000mg/day sodium restricted diet; patient verbalized understanding. Moderate daily exercise encouraged as tolerated. Discussed rest breaks as needed; patient verbalized understanding. Patient instructed to weigh self at the same time of each day, using same clothes and same scale; reinforced teaching to monitor for 3-5 lb weight increase over 1-2 days, and to notify the CHF clinic at [phone] or physician office if weight change noted. Patient verbalized understanding. Risks of smoking discussed with the patient if applicable; patient strongly discouraged to smoke. Patient verbalized understanding. Signs and symptoms of CHF discussed with patient, such as feeling more tired than normal, feeling short of breath, coughing that increases when you lie down, sudden weight gain, swelling of your feet, legs or belly. Patient verbalized understanding to notify the CHF clinic at [phone] or physician office if these symptoms occur. Compliance with plan of care and further disease process causes discussed with patient, patient encouraged to keep all follow up appointments. Patient verbalized understanding.
10/28/2021 at 12:26 PM

## 2021-10-29 ENCOUNTER — HOSPITAL ENCOUNTER (OUTPATIENT)
Age: 76
Setting detail: SPECIMEN
Discharge: HOME | End: 2021-10-29
Payer: MEDICARE

## 2021-10-29 DIAGNOSIS — N18.31 STAGE 3A CHRONIC KIDNEY DISEASE (HCC): ICD-10-CM

## 2021-10-29 LAB
ANION GAP SERPL CALCULATED.3IONS-SCNC: 20 MMOL/L (ref 9–17)
BUN BLDV-MCNC: 68 MG/DL (ref 8–23)
BUN/CREAT BLD: ABNORMAL (ref 9–20)
CALCIUM IONIZED: 1.23 MMOL/L (ref 1.13–1.33)
CALCIUM SERPL-MCNC: 10.4 MG/DL (ref 8.6–10.4)
CHLORIDE BLD-SCNC: 85 MMOL/L (ref 98–107)
CO2: 28 MMOL/L (ref 20–31)
CREAT SERPL-MCNC: 2.06 MG/DL (ref 0.7–1.2)
CREATININE URINE: 126.8 MG/DL (ref 39–259)
GFR AFRICAN AMERICAN: 38 ML/MIN
GFR NON-AFRICAN AMERICAN: 32 ML/MIN
GFR SERPL CREATININE-BSD FRML MDRD: ABNORMAL ML/MIN/{1.73_M2}
GFR SERPL CREATININE-BSD FRML MDRD: ABNORMAL ML/MIN/{1.73_M2}
GLUCOSE BLD-MCNC: 269 MG/DL (ref 70–99)
PHOSPHORUS: 3.1 MG/DL (ref 2.5–4.5)
POTASSIUM SERPL-SCNC: 2.8 MMOL/L (ref 3.7–5.3)
PTH INTACT: 71.39 PG/ML (ref 15–65)
SODIUM BLD-SCNC: 133 MMOL/L (ref 135–144)
TOTAL PROTEIN, URINE: 17 MG/DL
VITAMIN D 25-HYDROXY: 46 NG/ML (ref 30–100)

## 2021-10-30 DIAGNOSIS — N18.31 STAGE 3A CHRONIC KIDNEY DISEASE (HCC): Primary | ICD-10-CM

## 2021-11-01 ENCOUNTER — HOSPITAL ENCOUNTER (OUTPATIENT)
Age: 76
Setting detail: SPECIMEN
Discharge: HOME | End: 2021-11-01
Payer: MEDICARE

## 2021-11-01 DIAGNOSIS — N18.31 STAGE 3A CHRONIC KIDNEY DISEASE (HCC): ICD-10-CM

## 2021-11-01 LAB
ANION GAP SERPL CALCULATED.3IONS-SCNC: 16 MMOL/L (ref 9–17)
BUN BLDV-MCNC: 64 MG/DL (ref 8–23)
BUN/CREAT BLD: ABNORMAL (ref 9–20)
CALCIUM SERPL-MCNC: 11.3 MG/DL (ref 8.6–10.4)
CHLORIDE BLD-SCNC: 88 MMOL/L (ref 98–107)
CO2: 33 MMOL/L (ref 20–31)
CREAT SERPL-MCNC: 2.12 MG/DL (ref 0.7–1.2)
GFR AFRICAN AMERICAN: 37 ML/MIN
GFR NON-AFRICAN AMERICAN: 31 ML/MIN
GFR SERPL CREATININE-BSD FRML MDRD: ABNORMAL ML/MIN/{1.73_M2}
GFR SERPL CREATININE-BSD FRML MDRD: ABNORMAL ML/MIN/{1.73_M2}
GLUCOSE BLD-MCNC: 263 MG/DL (ref 70–99)
POTASSIUM SERPL-SCNC: 3.1 MMOL/L (ref 3.7–5.3)
SODIUM BLD-SCNC: 137 MMOL/L (ref 135–144)

## 2021-11-03 ENCOUNTER — HOSPITAL ENCOUNTER (OUTPATIENT)
Age: 76
Setting detail: SPECIMEN
Discharge: HOME | End: 2021-11-03
Payer: MEDICARE

## 2021-11-03 DIAGNOSIS — N18.30 STAGE 3 CHRONIC KIDNEY DISEASE, UNSPECIFIED WHETHER STAGE 3A OR 3B CKD (HCC): ICD-10-CM

## 2021-11-03 LAB
ANION GAP SERPL CALCULATED.3IONS-SCNC: 17 MMOL/L (ref 9–17)
BUN BLDV-MCNC: 49 MG/DL (ref 8–23)
BUN/CREAT BLD: ABNORMAL (ref 9–20)
CALCIUM SERPL-MCNC: 10.6 MG/DL (ref 8.6–10.4)
CHLORIDE BLD-SCNC: 91 MMOL/L (ref 98–107)
CO2: 30 MMOL/L (ref 20–31)
CREAT SERPL-MCNC: 1.62 MG/DL (ref 0.7–1.2)
GFR AFRICAN AMERICAN: 51 ML/MIN
GFR NON-AFRICAN AMERICAN: 42 ML/MIN
GFR SERPL CREATININE-BSD FRML MDRD: ABNORMAL ML/MIN/{1.73_M2}
GFR SERPL CREATININE-BSD FRML MDRD: ABNORMAL ML/MIN/{1.73_M2}
GLUCOSE BLD-MCNC: 178 MG/DL (ref 70–99)
POTASSIUM SERPL-SCNC: 3.6 MMOL/L (ref 3.7–5.3)
SODIUM BLD-SCNC: 138 MMOL/L (ref 135–144)

## 2021-11-08 ENCOUNTER — HOSPITAL ENCOUNTER (OUTPATIENT)
Age: 76
Setting detail: SPECIMEN
Discharge: HOME | End: 2021-11-08
Payer: MEDICARE

## 2021-11-08 DIAGNOSIS — I50.9 CHRONIC CONGESTIVE HEART FAILURE, UNSPECIFIED HEART FAILURE TYPE (HCC): ICD-10-CM

## 2021-11-08 LAB
ANION GAP SERPL CALCULATED.3IONS-SCNC: 19 MMOL/L (ref 9–17)
BUN BLDV-MCNC: 29 MG/DL (ref 8–23)
BUN/CREAT BLD: ABNORMAL (ref 9–20)
CALCIUM SERPL-MCNC: 9.9 MG/DL (ref 8.6–10.4)
CHLORIDE BLD-SCNC: 95 MMOL/L (ref 98–107)
CO2: 24 MMOL/L (ref 20–31)
CREAT SERPL-MCNC: 1.47 MG/DL (ref 0.7–1.2)
GFR AFRICAN AMERICAN: 57 ML/MIN
GFR NON-AFRICAN AMERICAN: 47 ML/MIN
GFR SERPL CREATININE-BSD FRML MDRD: ABNORMAL ML/MIN/{1.73_M2}
GFR SERPL CREATININE-BSD FRML MDRD: ABNORMAL ML/MIN/{1.73_M2}
GLUCOSE BLD-MCNC: 191 MG/DL (ref 70–99)
POTASSIUM SERPL-SCNC: 4 MMOL/L (ref 3.7–5.3)
SODIUM BLD-SCNC: 138 MMOL/L (ref 135–144)

## 2021-11-15 ENCOUNTER — HOSPITAL ENCOUNTER (OUTPATIENT)
Dept: OTHER | Age: 76
Discharge: HOME | End: 2021-11-15
Payer: MEDICARE

## 2021-11-15 VITALS
SYSTOLIC BLOOD PRESSURE: 110 MMHG | HEART RATE: 100 BPM | DIASTOLIC BLOOD PRESSURE: 70 MMHG | RESPIRATION RATE: 16 BRPM | BODY MASS INDEX: 28.28 KG/M2 | WEIGHT: 186 LBS | OXYGEN SATURATION: 100 %

## 2021-11-15 DIAGNOSIS — N18.31 STAGE 3A CHRONIC KIDNEY DISEASE (HCC): Chronic | ICD-10-CM

## 2021-11-15 DIAGNOSIS — K59.00 CONSTIPATION, UNSPECIFIED CONSTIPATION TYPE: ICD-10-CM

## 2021-11-15 DIAGNOSIS — I50.22 CHRONIC SYSTOLIC CONGESTIVE HEART FAILURE (HCC): Primary | Chronic | ICD-10-CM

## 2021-11-15 PROCEDURE — 99212 OFFICE O/P EST SF 10 MIN: CPT

## 2021-11-15 PROCEDURE — 99214 OFFICE O/P EST MOD 30 MIN: CPT | Performed by: NURSE PRACTITIONER

## 2021-11-15 ASSESSMENT — ENCOUNTER SYMPTOMS
ABDOMINAL PAIN: 1
ABDOMINAL DISTENTION: 1
COUGH: 0
EYE DISCHARGE: 0
BLOOD IN STOOL: 0
SHORTNESS OF BREATH: 1

## 2021-11-15 NOTE — PROGRESS NOTES
CHF Clinic at [facility]    Office: [phone] Fax: [phone] [facility]  [phone]  Dept: [phone]  Loc: [phone]    Manuel Dahl is a 78 y.o. male who presents today for CHF evaluation. HPI:     Denies shortness of breath  +   fatigue  + Edema IN LEGS, feels swollen in the belly/ pants are tight. Denies chest pain   Denies  palpitations   No orthopnea but prefers to sleep in chair d/t back pain. He lost 13 lbs. K+ was low. Pt's metolazone was stopped. And KCl increased from 20 to 40 meq/d. He took 40 meq for 3 d only then returned to 20 meq. His K+ now wnl per last lab check. He says he has less dizziness now that metolazone was stopped. He felt dried out. He c/o today of bloating, poor BM habits, + constipation. Taking 2 stool softeners but nothing else. Did not reach out to pcp re: this issue. Has back pain , Neuro ordered tramadol qd for pain. Eating less b/c he feels full. Denies any increased Na+. Feeling bloated thus does not think he is drinking a full 64 oz per day. Today his wt is up 10 lbs. Now BM in several day and that was 'not much\". Constipation is affecting his appetite. Past Medical History:   Diagnosis Date    AICD (automatic cardioverter/defibrillator) present 12/2018    [address] [place] [address]    Anesthesia complication     POTENTIAL ONLY. PATIENT HAS A 1500 ML FLUID RESTRICTION. WIFE CONCERNED PATIENT RECEIVED TOO MUCH IV FLUIDS DURING PATIENTS LAST SURGERY.     Anticoagulated     XARELTO    Arthritis     all over    Bladder cancer [facility]) 2019    CAD (coronary artery disease) 2018    OPEN HEART CABG X 2 BYPASS DR Marck Marie CARDIOLOGIST    Cardiomyopathy ([facility] 2018    [facility]    FLUID RESTRICTION 1500ML/24 HRS    Chronic kidney disease     STAGE 3    2018    TEAR DUCT SURGERY.     EYE SURGERY Right 2018    CATARACT WITH IOL PLACED    EYE SURGERY Left 04/2019    CATARACT WITH IOL PLACED    HERNIA REPAIR Right 1967    INGUINAL    PACEMAKER PLACEMENT  12/17/2018    AICD PLACED    [place] CABG, ARTERY-VEIN, SINGLE N/A 8/16/2018    CABG CORONARY ARTERY BYPASS ON  PUMP x 2, SWAN LAUREN, ORESTES (CSI# [id number]) performed by Donya Cunningham MD at [address] FLX DX W/COLLJ [address] PFRMD N/A 1/14/2018    COLONOSCOPY performed by Anna Lenz MD at [address] ESOPHAGOGASTRODUODENOSCOPY TRANSORAL DIAGNOSTIC N/A 1/12/2018    EGD DIAGNOSTIC ONLY performed by Pa Harvey MD at [place] [address] Right 04/2018    TURP  may 7th 2020       Family History   Problem Relation Age of Onset    Diabetes Mother     High Blood Pressure Mother     Stroke Father     High Blood Pressure Father     Other Sister         PE    Asthma Sister     Stroke Brother     Asthma Brother     Diabetes Brother     Heart Disease Paternal Grandfather     Prostate Cancer Paternal Grandfather     Asthma Sister     Asthma Sister     Asthma Brother     Asthma Brother     Asthma Sister        Social History     Tobacco Use    Smoking status: Former Smoker     Packs/day: 0.50     Years: 30.00     Pack years: 15.00     Types: Cigarettes     Quit date: 8/15/2018     Years since quitting: 3.2    Smokeless tobacco: Never Used    Tobacco comment: a little less than a pack a day   Substance Use Topics    Alcohol use: Not Currently     Comment: rarely      Current Outpatient Medications   Medication Sig Dispense Refill    potassium chloride (KLOR-CON M) 20 MEQ extended release tablet Take 2 tablets by mouth daily 60 tablet 0    megestrol (MEGACE) 20 MG tablet TAKE ONE TABLET BY MOUTH DAILY 30 tablet 3    fenofibrate micronized (LOFIBRA) 134 MG capsule Take 134 mg by mouth every morning (before breakfast)      aspirin 81 MG EC tablet Take 81 mg by mouth daily      pregabalin (LYRICA) 75 MG capsule Take 75 mg by mouth 2 times daily.  torsemide (DEMADEX) 20 MG tablet Take 2 tablets by mouth daily 180 tablet 3    tamsulosin (FLOMAX) 0.4 MG capsule Take 1 capsule by mouth daily 90 capsule 3    rivaroxaban (XARELTO) 20 MG TABS tablet Take 1 tablet by mouth daily (with breakfast) HOLD for 3 days after surgery. OK to resume if urine clear 30 tablet 0    PARoxetine (PAXIL) 20 MG tablet Take 20 mg by mouth daily      traMADol (ULTRAM) 50 MG tablet Take 50 mg by mouth 2 times daily.  Indications: pt takes it once daily      amiodarone (CORDARONE) 200 MG tablet Take 1 tablet by mouth daily 30 tablet 3    simvastatin (ZOCOR) 40 MG tablet Take 1 tablet by mouth nightly 30 tablet 3    metoprolol succinate (TOPROL XL) 25 MG extended release tablet Take 1 tablet by mouth daily 30 tablet 3    ferrous sulfate 325 (65 Fe) MG EC tablet Take 1 tablet by mouth every other day 90 tablet 3    midodrine (PROAMATINE) 10 MG tablet Take 1 tablet by mouth 3 times daily (with meals) (Patient taking differently: Take 10 mg by mouth daily ) 90 tablet 3    docusate sodium (COLACE, DULCOLAX) 100 MG CAPS Take 100 mg by mouth 2 times daily (Patient taking differently: Take 100 mg by mouth 2 times daily Indications: pt states he takes once daily )      traZODone (DESYREL) 50 MG tablet Take 1 tablet by mouth nightly as needed       famotidine (PEPCID) 20 MG tablet Take 1 tablet by mouth 2 times daily 60 tablet 3    senna (SENOKOT) 8.6 MG tablet Take 1 tablet by mouth nightly      dextran 70-hypromellose (TEARS NATURALE) 0.1-0.3 % SOLN opthalmic solution Place 1 drop into both eyes every 4 hours as needed      ipratropium-albuterol (DUONEB) 0.5-2.5 (3) MG/3ML SOLN nebulizer solution Inhale 3 mLs into the lungs every 4 hours (while awake) 360 mL 0    albuterol sulfate  (90 Base) MCG/ACT inhaler Inhale 1 puff into the lungs every 6 hours as needed for Wheezing       No current facility-administered medications for this encounter. Allergies   Allergen Reactions    Lisinopril Other (See Comments)     Dry cough    Nuts [Peanut-Containing Drug Products] Other (See Comments)     PEANUTS--abd pain CAN EAT PEANUT BUTTER JUST NOT RAW PEANUTS           Subjective:      Review of Systems   Constitutional: Negative for activity change, chills, fatigue and fever. Eyes: Negative for discharge and visual disturbance. Respiratory: Positive for shortness of breath. Negative for cough. Cardiovascular: Negative for chest pain and leg swelling. Gastrointestinal: Positive for abdominal distention and abdominal pain (rare). Negative for blood in stool. Endocrine: Negative for cold intolerance and heat intolerance. Genitourinary: Negative for dysuria and flank pain. Musculoskeletal: Negative for joint swelling and myalgias. Skin: Negative for pallor and rash. Neurological: Negative for dizziness and headaches. Psychiatric/Behavioral: Negative for hallucinations and suicidal ideas. Objective:     Physical Exam  Vitals and nursing note reviewed. Constitutional:       Comments:      HENT:      Head: Normocephalic and atraumatic. Eyes:      General: No scleral icterus. Conjunctiva/sclera: Conjunctivae normal.   Cardiovascular:      Rate and Rhythm: Normal rate and regular rhythm. Heart sounds: Normal heart sounds. Pulmonary:      Effort: Pulmonary effort is normal.      Breath sounds: Normal breath sounds. No wheezing or rales. Abdominal:      General: Bowel sounds are normal.      Comments: Mod firm abdomen   Musculoskeletal:         General: Normal range of motion. Cervical back: Normal range of motion. Right lower leg: Edema present. Left lower leg: Edema present. Skin:     General: Skin is warm and dry. Neurological:      Mental Status: He is alert and oriented to person, place, and time.        /70   Pulse 100   Resp 16   Wt 186 lb (84.4 kg)   SpO2 100%   BMI 28.28 kg/m²           Lower Extremity Measurements in cm. R Calf Circumference (cm): 35 cm  L Calf Circumference (cm): 34 cm  R Ankle Circumference (cm): 23 cm  L Ankle Circumference (cm): 24 cm    Echo    Dilated left ventricle with severely reduced systolic function. Calculated ejection fraction via Arthur's Method is 12.4 %  Evidence of diastolic dysfunction. Left atrium is severely dilated. Normal right ventricle size with reduced systolic function. AICD lead seen in right atrium/ventricle. Mildly thickened mitral valve leaflets. Moderate mitral regurgitation, central jet. Moderate tricuspid regurgitation. Trivial pulmonic insufficiency. Small circumferential pericardial effusion. Pleural effusion is noted. Signature  ----------------------------------------------------------------------------   Electronically signed by [facility]. Elsi Patino(Sonographer) on 06/05/2019 02:36        CBC:   Lab Results   Component Value Date    WBC 7.1 04/27/2021    RBC 4.39 04/27/2021    HGB 12.4 04/27/2021    HCT 42.6 04/27/2021    MCV 97.0 04/27/2021    MCH 28.2 04/27/2021    MCHC 29.1 04/27/2021    RDW 13.3 04/27/2021     04/27/2021    MPV 11.1 04/27/2021     CMP:    Lab Results   Component Value Date     11/08/2021    K 4.0 11/08/2021    CL 95 11/08/2021    CO2 24 11/08/2021    BUN 29 11/08/2021    CREATININE 1.47 11/08/2021    GFRAA 57 11/08/2021    LABGLOM 47 11/08/2021    GLUCOSE 191 11/08/2021    PROT 8.1 12/07/2020    PROT 8.0 12/07/2020    LABALBU 4.6 12/07/2020    LABALBU 4.6 12/07/2020    CALCIUM 9.9 11/08/2021    BILITOT 0.69 12/07/2020    BILITOT 0.69 12/07/2020    ALKPHOS 64 12/07/2020    ALKPHOS 64 12/07/2020    AST 29 10/04/2021    ALT 13 10/04/2021     Lab Results   Component Value Date    LABA1C 5.7 12/07/2020           :Assessment      1. Chronic systolic congestive heart failure (HCC)    2. Stage 3a chronic kidney disease ([facility] [address]    3.  Constipation, unspecified constipation type        :Plan      1. Chronic systolic congestive heart failure (HCC)    2. Stage 3a chronic kidney disease ([facility]    3. Constipation, unspecified constipation type        Wt is up 10 lbs. Last appt wt was down 13 lbs. Last appt , pts metolazone was d/c'd. Pt was feeling dizzy and dried out ; taking med qod. Today recommend restarting metolazone in setting of wt gain and  leg measurement increases; + fatigue; + increased edema pt has noticed. Recommend gentle use of metolazone:  2x/week only. Also Increasing KCl to accomodate return to metolazone use. Nephrology contacted and they are in agreement for resuming metolazone 2x/week, and want KCl at 40 meq x 2 weeks, with a bmp in one week. Pt updated, med ordered. Will see pt back in 1 week. Pt OV 11/22 at 1130. Wt gain may also be affected by pt's constipation. No BM in several days and last BMs, he has not been 'passing much\". + bloating/Feeling uncomfortable. Pt advised to  Contact pcp today to discuss more aggressive measure other than stool softeners. These have been ineffective up to this point in aiding this problem. Orders Placed This Encounter   Procedures    Basic Metabolic Panel     Standing Status:   Future     Standing Expiration Date:   11/17/2022     Orders Placed This Encounter   Medications    potassium chloride (KLOR-CON M) 20 MEQ extended release tablet     Sig: Take 2 tablets by mouth daily     Dispense:  60 tablet     Refill:  0       Patientgiven verbal and/or written educational instructions. Follow up as directed. I have reviewed and agree with the nursing documentation. Verbally reviewed medication list with patient; patient verbalized understanding. Discussed 2000mg/day sodium restricted diet; patient verbalized understanding. Moderate daily exercise encouraged as tolerated. Discussed rest breaks as needed; patient verbalized understanding.      Patient instructed to weigh self at the same time of each day, using same clothes and same scale; reinforced teaching to monitor for 3-5 lb weight increase over 1-2 days, and to notify the CHF clinic at [phone] or physician office if weight change noted. Patient verbalized understanding. Risks of smoking discussed with the patient if applicable; patient strongly discouraged to smoke. Patient verbalized understanding. Signs and symptoms of CHF discussed with patient, such as feeling more tired than normal, feeling short of breath, coughing that increases when you lie down, sudden weight gain, swelling of your feet, legs or belly. Patient verbalized understanding to notify the CHF clinic at [phone] or physician office if these symptoms occur. Compliance with plan of care and further disease process causes discussed with patient, patient encouraged to keep all follow up appointments. Patient verbalized understanding. Labs reviewed. Medications reviewed. Echocardiogram reviewed. Medications ordered   Plan of care coordinated with speciality service.      Electronically signedby CASSY Denson CNP on 11/17/2021 at 3:31 PM

## 2021-11-17 RX ORDER — POTASSIUM CHLORIDE 20 MEQ/1
40 TABLET, EXTENDED RELEASE ORAL DAILY
Qty: 60 TABLET | Refills: 0 | Status: SHIPPED | OUTPATIENT
Start: 2021-11-17 | End: 2021-11-29 | Stop reason: SDUPTHER

## 2021-11-22 ENCOUNTER — HOSPITAL ENCOUNTER (OUTPATIENT)
Dept: OTHER | Age: 76
Discharge: HOME | End: 2021-11-22
Payer: MEDICARE

## 2021-11-22 VITALS
SYSTOLIC BLOOD PRESSURE: 122 MMHG | BODY MASS INDEX: 27.67 KG/M2 | DIASTOLIC BLOOD PRESSURE: 60 MMHG | RESPIRATION RATE: 16 BRPM | OXYGEN SATURATION: 98 % | HEART RATE: 98 BPM | WEIGHT: 182 LBS

## 2021-11-22 DIAGNOSIS — I50.22 CHRONIC SYSTOLIC CONGESTIVE HEART FAILURE (HCC): Primary | Chronic | ICD-10-CM

## 2021-11-22 PROCEDURE — 99213 OFFICE O/P EST LOW 20 MIN: CPT | Performed by: NURSE PRACTITIONER

## 2021-11-22 PROCEDURE — 99212 OFFICE O/P EST SF 10 MIN: CPT

## 2021-11-22 RX ORDER — METOLAZONE 2.5 MG/1
2.5 TABLET ORAL
COMMUNITY
End: 2021-12-14 | Stop reason: SDUPTHER

## 2021-11-22 ASSESSMENT — ENCOUNTER SYMPTOMS
COUGH: 0
EYE DISCHARGE: 0
SHORTNESS OF BREATH: 1
BLOOD IN STOOL: 0
ABDOMINAL PAIN: 0

## 2021-11-22 NOTE — PROGRESS NOTES
CHF Clinic at [facility]    Office: [phone] Fax: [phone] [facility] CHF [facility]  [phone]  Dept: [phone]  Loc: [phone]    Latasha Roberson. is a 78 y.o. male who presents today for CHF evaluation. HPI:       Pt was transitioned to metolazone 2x/week, took only last thurs. He wants to start Mon/Thurs pattern of med. Has labs for Thursday per Nephrology  Taking KCl 40 meq qd   +  shortness of breath, overall better, says he feels drier  + mild   Fatigue, overall better , bouncing back better  Denies Edema , reports improvement   Denies chest pain   Denies  palpitations   No orthopnea , nor PND       Less pain in leg/feet now that swelling has decreased. Sees dr erickson for dizziness tomorrow. Pt no longer having constipation issues that he had. Did not contact his pcp re: this issue. Past Medical History:   Diagnosis Date    AICD (automatic cardioverter/defibrillator) present 12/2018    [address] [place] [address] Drive    Anesthesia complication     POTENTIAL ONLY. PATIENT HAS A 1500 ML FLUID RESTRICTION. WIFE CONCERNED PATIENT RECEIVED TOO MUCH IV FLUIDS DURING PATIENTS LAST SURGERY.  Anticoagulated     XARELTO    Arthritis     all over    Bladder cancer ([facility] 2019    CAD (coronary artery disease) 2018    OPEN HEART CABG X 2 BYPASS DR John Leno CARDIOLOGIST    Cardiomyopathy ([facility] 2018    CHF (congestive heart failure) ([facility] 06/2019    FLUID RESTRICTION 1500ML/24 HRS    Chronic kidney disease     STAGE 3    Constipation     COPD (chronic obstructive pulmonary disease) ([facility] 2009    INHALER USE AS NEEDED    Diabetes mellitus ([facility] 2015    R/T MYELOMA MEDS DEXAMETHASONE.  NOT ON THOSE MEDS SO NO LONGER ON RX. A1C IS GOOD.     Difficult intravenous access     DVT of leg (deep venous thrombosis) ([facility] 05/2017    RIGHT-TAKES BLOOD THINNER ordered per dr. Kash Villafana provided by the [facility] Former tobacco use     QUIT 08/2018    GERD (gastroesophageal reflux disease)     Hematuria     History of blood transfusion 01/2018    AMEMIA TRANFUSED 2 UNITS NO REACTIONS    History of blood transfusion 02/2018    TRANFUSED 1 UNIT    History of blood transfusion 08/2018    OPEN HEART    Hx of blood clots 05/2017    RIGHT LEG DVT ON BLOOD THINNER    Hyperlipidemia 2009    ON RX    Hypertension 2004    ON RX DR Alexandria MUNOZ    Multiple myeloma ([address] 2014    Neuropathy 2017    LEGS     Wears dentures     FULL UPPER, PARTIAL LOWER    Wellness examination     DR. RIVERA-PCP LAST SEEN MARCH 2020     Past Surgical History:   Procedure Laterality Date    ABDOMEN SURGERY  2003    STROMAL TUMOR    BLADDER TUMOR EXCISION  05/07/2020    CYSTOSCOPY, TURB TUMOR,    CARDIAC CATHETERIZATION  12/13/2017    right and left heart cath with Dr. Aidan Jim  12/17/2018    DR Dennise Cruz WITH IMPLANT Right 2018    CATARACT REMOVAL WITH IMPLANT Left 04/2019    COLONOSCOPY  01/14/2018    CYSTOSCOPY  08/08/2019    CYSTOSCOPY N/A 9/20/2019    CYSTOSCOPY, TUR BLADDER TUMOR WITH GYRUS [facility] [address] [facility] N/A 12/6/2019    CYSTOSCOPY, TUR BLADDER TUMOR GYRUS performed by Rupali Rose MD at [address] [facility]  5/7/2020    CYSTOSCOPY, TURB TUMOR, GYRUS performed by Rupali Rose MD at [address] [place]  09/18/2020    CYSTOSCOPY  09/18/2020    CYSTOSCOPY N/A 9/18/2020    CYST[facility]COPY [facility] [address] [facility]  02/2021    EYE SURGERY Right 2018    TEAR DUCT SURGERY.     EYE SURGERY Right 2018    CATARACT WITH IOL PLACED    EYE SURGERY Left 04/2019    CATARACT WITH IOL PLACED    HERNIA REPAIR Right 1967    INGUINAL    PACEMAKER PLACEMENT  12/17/2018    AICD PLACED    [facility] CABG, ARTERY-VEIN, SINGLE N/A 8/16/2018    CABG CORONARY ARTERY BYPASS ON  PUMP x 2, SWAN LAUREN, ORESTES (CSI# [id number]) performed by Candy Ahn MD at [address] FLX DX W/COLLJ Sydney 1976 PFRMD N/A 1/14/2018    COLONOSCOPY performed by Rosalva Beatty MD at [address] ESOPHAGOGASTRODUODENOSCOPY TRANSORAL DIAGNOSTIC N/A 1/12/2018    EGD DIAGNOSTIC ONLY performed by Rigo Coley MD at [address] Right 04/2018    TURP  may 7th 2020       Family History   Problem Relation Age of Onset    Diabetes Mother     High Blood Pressure Mother     Stroke Father     High Blood Pressure Father     Other Sister         PE    Asthma Sister     Stroke Brother     Asthma Brother     Diabetes Brother     Heart Disease Paternal Grandfather     Prostate Cancer Paternal Grandfather     Asthma Sister     Asthma Sister     Asthma Brother     Asthma Brother     Asthma Sister        Social History     Tobacco Use    Smoking status: Former Smoker     Packs/day: 0.50     Years: 30.00     Pack years: 15.00     Types: Cigarettes     Quit date: 8/15/2018     Years since quitting: 3.2    Smokeless tobacco: Never Used    Tobacco comment: a little less than a pack a day   Substance Use Topics    Alcohol use: Not Currently     Comment: rarely      Current Outpatient Medications   Medication Sig Dispense Refill    metOLazone (ZAROXOLYN) 2.5 MG tablet Take 2.5 mg by mouth Twice a Week Taking Monday and thursday      megestrol (MEGACE) 20 MG tablet TAKE ONE TABLET BY MOUTH DAILY 30 tablet 3    fenofibrate micronized (LOFIBRA) 134 MG capsule Take 134 mg by mouth every morning (before breakfast)      aspirin 81 MG EC tablet Take 81 mg by mouth daily      pregabalin (LYRICA) 75 MG capsule Take 75 mg by mouth 2 times daily.  torsemide (DEMADEX) 20 MG tablet Take 2 tablets by mouth daily 180 tablet 3    rivaroxaban (XARELTO) 20 MG TABS tablet Take 1 tablet by mouth daily (with breakfast) HOLD for 3 days after surgery.  OK to resume if urine clear 30 tablet 0    PARoxetine (PAXIL) 20 MG tablet Take 20 mg by mouth daily      traMADol (ULTRAM) 50 MG tablet Take 50 mg by mouth 2 times daily. Indications: pt takes it once daily      senna (SENOKOT) 8.6 MG tablet Take 1 tablet by mouth nightly      amiodarone (CORDARONE) 200 MG tablet Take 1 tablet by mouth daily 30 tablet 3    simvastatin (ZOCOR) 40 MG tablet Take 1 tablet by mouth nightly 30 tablet 3    metoprolol succinate (TOPROL XL) 25 MG extended release tablet Take 1 tablet by mouth daily 30 tablet 3    ferrous sulfate 325 (65 Fe) MG EC tablet Take 1 tablet by mouth every other day 90 tablet 3    midodrine (PROAMATINE) 10 MG tablet Take 1 tablet by mouth 3 times daily (with meals) (Patient taking differently: Take 10 mg by mouth daily ) 90 tablet 3    docusate sodium (COLACE, DULCOLAX) 100 MG CAPS Take 100 mg by mouth 2 times daily (Patient taking differently: Take 100 mg by mouth 2 times daily Indications: pt states he takes once daily )      traZODone (DESYREL) 50 MG tablet Take 1 tablet by mouth nightly as needed       albuterol sulfate  (90 Base) MCG/ACT inhaler Inhale 1 puff into the lungs every 6 hours as needed for Wheezing      famotidine (PEPCID) 20 MG tablet Take 1 tablet by mouth 2 times daily 60 tablet 3    potassium chloride (KLOR-CON M) 20 MEQ extended release tablet Take 2 tablets by mouth daily 60 tablet 0    tamsulosin (FLOMAX) 0.4 MG capsule Take 1 capsule by mouth daily 90 capsule 3    dextran 70-hypromellose (TEARS NATURALE) 0.1-0.3 % SOLN opthalmic solution Place 1 drop into both eyes every 4 hours as needed      ipratropium-albuterol (DUONEB) 0.5-2.5 (3) MG/3ML SOLN nebulizer solution Inhale 3 mLs into the lungs every 4 hours (while awake) 360 mL 0     No current facility-administered medications for this encounter.      Allergies   Allergen Reactions    Lisinopril Other (See Comments)     Dry cough    Nuts [Peanut-Containing Drug Products] Other (See Comments)     PEANUTS--abd pain CAN EAT PEANUT BUTTER JUST NOT RAW PEANUTS           Subjective:      Review of Systems   Constitutional: Positive for fatigue. Negative for activity change, chills and fever. Eyes: Negative for discharge and visual disturbance. Respiratory: Positive for shortness of breath. Negative for cough. Cardiovascular: Negative for chest pain and leg swelling. Gastrointestinal: Negative for abdominal pain and blood in stool. Endocrine: Negative for cold intolerance and heat intolerance. Genitourinary: Negative for dysuria and flank pain. Musculoskeletal: Positive for arthralgias (better). Negative for joint swelling and myalgias. Skin: Negative for pallor and rash. Neurological: Positive for dizziness (upon standing). Negative for light-headedness and headaches. Psychiatric/Behavioral: Negative for hallucinations and suicidal ideas. Objective:     Physical Exam  Vitals and nursing note reviewed. Constitutional:       Comments:      HENT:      Head: Normocephalic and atraumatic. Eyes:      General: No scleral icterus. Conjunctiva/sclera: Conjunctivae normal.   Cardiovascular:      Rate and Rhythm: Normal rate and regular rhythm. Heart sounds: Normal heart sounds. Comments: AICD noted in L chest wall   Pulmonary:      Effort: Pulmonary effort is normal.      Breath sounds: Normal breath sounds. No wheezing or rales. Abdominal:      General: Bowel sounds are normal.      Palpations: Abdomen is soft. Musculoskeletal:         General: Normal range of motion. Cervical back: Normal range of motion. Right lower leg: Edema (trace pitting ) present. Left lower leg: No edema. Skin:     General: Skin is warm and dry. Neurological:      Mental Status: He is alert and oriented to person, place, and time. /60   Pulse 98   Resp 16   Wt 182 lb (82.6 kg)   SpO2 98%   BMI 27.67 kg/m²           Lower Extremity Measurements in cm.    R Calf Circumference (cm): 34 cm  L Calf Circumference (cm): 33 cm  R Ankle Circumference (cm): 21 cm  L Ankle Circumference (cm): 20 cm    CBC:   Lab Results   Component Value Date    WBC 7.1 04/27/2021    RBC 4.39 04/27/2021    HGB 12.4 04/27/2021    HCT 42.6 04/27/2021    MCV 97.0 04/27/2021    MCH 28.2 04/27/2021    MCHC 29.1 04/27/2021    RDW 13.3 04/27/2021     04/27/2021    MPV 11.1 04/27/2021     CMP:    Lab Results   Component Value Date     11/08/2021    K 4.0 11/08/2021    CL 95 11/08/2021    CO2 24 11/08/2021    BUN 29 11/08/2021    CREATININE 1.47 11/08/2021    GFRAA 57 11/08/2021    LABGLOM 47 11/08/2021    GLUCOSE 191 11/08/2021    PROT 8.1 12/07/2020    PROT 8.0 12/07/2020    LABALBU 4.6 12/07/2020    LABALBU 4.6 12/07/2020    CALCIUM 9.9 11/08/2021    BILITOT 0.69 12/07/2020    BILITOT 0.69 12/07/2020    ALKPHOS 64 12/07/2020    ALKPHOS 64 12/07/2020    AST 29 10/04/2021    ALT 13 10/04/2021     Lab Results   Component Value Date    LABA1C 5.7 12/07/2020     Summary  Dilated left ventricle with severely reduced systolic function. Calculated ejection fraction via Arthur's Method is 12.4 %  Evidence of diastolic dysfunction. Left atrium is severely dilated. Normal right ventricle size with reduced systolic function. AICD lead seen in right atrium/ventricle. Mildly thickened mitral valve leaflets. Moderate mitral regurgitation, central jet. Moderate tricuspid regurgitation. Trivial pulmonic insufficiency. Small circumferential pericardial effusion. Pleural effusion is noted.     Signature  ----------------------------------------------------------------------------   Electronically signed by [facility]. Elsi Patino(Sonographer) on 06/05/2019 02:36    :Assessment      1. Chronic systolic congestive heart failure ([facility]        :Plan      1. Chronic systolic congestive heart failure ([facility] [address]        Pt restarted on metolazone, taking 2nd dose today ,will have labs on thurs.    If labs wnl, will continue with metolazone 2x/week and remain on KCl 40 meq      On Guideline-Directed Medication Therapy:     Beta - blocker  Diuretic Therapy      ACEI / ARB / ARNi:  CI d/t ACE allergy     RTC 2 weeks. No orders of the defined types were placed in this encounter. No orders of the defined types were placed in this encounter. Patientgiven verbal and/or written educational instructions. Follow up as directed. I have reviewed and agree with the nursing documentation. Verbally reviewed medication list with patient; patient verbalized understanding. Discussed 2000mg/day sodium restricted diet; patient verbalized understanding. Moderate daily exercise encouraged as tolerated. Discussed rest breaks as needed; patient verbalized understanding. Patient instructed to weigh self at the same time of each day, using same clothes and same scale; reinforced teaching to monitor for 3-5 lb weight increase over 1-2 days, and to notify the CHF clinic at [phone] or physician office if weight change noted. Patient verbalized understanding. Risks of smoking discussed with the patient if applicable; patient strongly discouraged to smoke. Patient verbalized understanding. Signs and symptoms of CHF discussed with patient, such as feeling more tired than normal, feeling short of breath, coughing that increases when you lie down, sudden weight gain, swelling of your feet, legs or belly. Patient verbalized understanding to notify the CHF clinic at [phone] or physician office if these symptoms occur. Compliance with plan of care and further disease process causes discussed with patient, patient encouraged to keep all follow up appointments. Patient verbalized understanding. Echocardiogram reviewed. Labs reviewed. Medications reviewed.       Electronically signedby CASSY Henning CNP on 11/22/2021 at 11:53 AM

## 2021-11-24 ENCOUNTER — HOSPITAL ENCOUNTER (OUTPATIENT)
Age: 76
Setting detail: SPECIMEN
Discharge: HOME | End: 2021-11-24

## 2021-11-24 DIAGNOSIS — N18.31 STAGE 3A CHRONIC KIDNEY DISEASE (HCC): Chronic | ICD-10-CM

## 2021-11-24 LAB
ANION GAP SERPL CALCULATED.3IONS-SCNC: 20 MMOL/L (ref 9–17)
BUN BLDV-MCNC: 30 MG/DL (ref 8–23)
BUN/CREAT BLD: ABNORMAL (ref 9–20)
CALCIUM SERPL-MCNC: 10.3 MG/DL (ref 8.6–10.4)
CHLORIDE BLD-SCNC: 96 MMOL/L (ref 98–107)
CO2: 26 MMOL/L (ref 20–31)
CREAT SERPL-MCNC: 1.34 MG/DL (ref 0.7–1.2)
GFR AFRICAN AMERICAN: >60 ML/MIN
GFR NON-AFRICAN AMERICAN: 52 ML/MIN
GFR SERPL CREATININE-BSD FRML MDRD: ABNORMAL ML/MIN/{1.73_M2}
GFR SERPL CREATININE-BSD FRML MDRD: ABNORMAL ML/MIN/{1.73_M2}
GLUCOSE BLD-MCNC: 115 MG/DL (ref 70–99)
POTASSIUM SERPL-SCNC: 3.6 MMOL/L (ref 3.7–5.3)
SODIUM BLD-SCNC: 142 MMOL/L (ref 135–144)

## 2021-11-29 RX ORDER — POTASSIUM CHLORIDE 750 MG/1
20 TABLET, EXTENDED RELEASE ORAL 2 TIMES DAILY
Qty: 120 TABLET | Refills: 1 | Status: SHIPPED
Start: 2021-11-29 | End: 2022-01-03 | Stop reason: DRUGHIGH

## 2021-12-06 ENCOUNTER — HOSPITAL ENCOUNTER (OUTPATIENT)
Dept: OTHER | Age: 76
Discharge: HOME | End: 2021-12-06
Payer: MEDICARE

## 2021-12-06 VITALS
DIASTOLIC BLOOD PRESSURE: 62 MMHG | OXYGEN SATURATION: 98 % | RESPIRATION RATE: 20 BRPM | BODY MASS INDEX: 26.97 KG/M2 | SYSTOLIC BLOOD PRESSURE: 102 MMHG | WEIGHT: 177.4 LBS | HEART RATE: 90 BPM

## 2021-12-06 DIAGNOSIS — I50.22 CHRONIC SYSTOLIC CONGESTIVE HEART FAILURE (HCC): Primary | Chronic | ICD-10-CM

## 2021-12-06 PROCEDURE — 99213 OFFICE O/P EST LOW 20 MIN: CPT | Performed by: NURSE PRACTITIONER

## 2021-12-06 PROCEDURE — 99212 OFFICE O/P EST SF 10 MIN: CPT

## 2021-12-06 ASSESSMENT — ENCOUNTER SYMPTOMS
ABDOMINAL PAIN: 0
EYE DISCHARGE: 0
SHORTNESS OF BREATH: 0
COUGH: 0
BLOOD IN STOOL: 0

## 2021-12-06 ASSESSMENT — PAIN DESCRIPTION - PROGRESSION: CLINICAL_PROGRESSION: NOT CHANGED

## 2021-12-06 ASSESSMENT — PAIN DESCRIPTION - ONSET: ONSET: ON-GOING

## 2021-12-06 ASSESSMENT — PAIN DESCRIPTION - DESCRIPTORS: DESCRIPTORS: BURNING

## 2021-12-06 ASSESSMENT — PAIN DESCRIPTION - LOCATION: LOCATION: FOOT

## 2021-12-06 ASSESSMENT — PAIN DESCRIPTION - ORIENTATION: ORIENTATION: RIGHT;LEFT

## 2021-12-06 ASSESSMENT — PAIN DESCRIPTION - PAIN TYPE: TYPE: CHRONIC PAIN

## 2021-12-06 ASSESSMENT — PAIN SCALES - GENERAL: PAINLEVEL_OUTOF10: 7

## 2021-12-06 NOTE — PROGRESS NOTES
CHF Clinic at [facility]    Office: [phone] Fax: [phone] [facility]  R[phone]  Dept: [phone]  Loc: [phone]    Wendi Rivers is a 78 y.o. male who presents today for CHF evaluation. HPI:     Denies shortness of breath  Denies  Fatigue  States he has some good days and some bad days : feels week , LH. Does take bp, 110/78. Denies Edema , improved   Denies chest pain   Denies  palpitations   + constipation continues , has talked with dr Sunil Baker. States he takes senna and colace, and he was advised by pcp to try an addtl med. Pt cannot remmever the name, has it at home. Past Medical History:   Diagnosis Date    AICD (automatic cardioverter/defibrillator) present 12/2018    [address] [place] [address] Drive    Anesthesia complication     POTENTIAL ONLY. PATIENT HAS A 1500 ML FLUID RESTRICTION. WIFE CONCERNED PATIENT RECEIVED TOO MUCH IV FLUIDS DURING PATIENTS LAST SURGERY.  Anticoagulated     XARELTO    Arthritis     all over    Bladder cancer ([facility] 2019    CAD (coronary artery disease) 2018    OPEN HEART CABG X 2 BYPASS DR Jeffrey Marquez CARDIOLOGIST    Cardiomyopathy ([facility] 2018    CHF (congestive heart failure) ([facility] 06/2019    FLUID RESTRICTION 1500ML/24 HRS    Chronic kidney disease     STAGE 3    Constipation     COPD (chronic obstructive pulmonary disease) ([facility] 2009    INHALER USE AS NEEDED    Diabetes mellitus ([facility] 2015    R/T MYELOMA MEDS DEXAMETHASONE.  NOT ON THOSE MEDS SO NO LONGER ON RX. A1C IS GOOD.     Difficult intravenous access     DVT of leg (deep venous thrombosis) ([address] 05/2017    RIGHT-TAKES BLOOD THINNER ordered per dr. Alona Del Cid provided by the [facility] Former tobacco use     QUIT 08/2018    GERD (gastroesophageal reflux disease)     Hematuria     History of blood transfusion 01/2018    AMEMIA TRANFUSED 2 UNITS NO REACTIONS    History of blood transfusion 02/2018    TRANFUSED 1 UNIT    History of blood transfusion 08/2018    OPEN HEART    Hx of blood clots 05/2017    RIGHT LEG DVT ON BLOOD THINNER    Hyperlipidemia 2009    ON RX    Hypertension 2004    ON RX DR Aly Garcia ALEXANDER    Multiple myeloma ([facility] 2014    Neuropathy 2017    LEGS     Wears dentures     FULL UPPER, PARTIAL LOWER    Wellness examination     DR. RIVERA-PCP LAST SEEN MARCH 2020     Past Surgical History:   Procedure Laterality Date    ABDOMEN SURGERY  2003    STROMAL TUMOR    BLADDER TUMOR EXCISION  05/07/2020    CYSTOSCOPY, TURB TUMOR,    CARDIAC CATHETERIZATION  12/13/2017    right and left heart cath with Dr. Jah Coronel  12/17/2018    DR Dennise Cruz WITH IMPLANT Right 2018    CATARACT REMOVAL WITH IMPLANT Left 04/2019    COLONOSCOPY  01/14/2018    CYSTOSCOPY  08/08/2019    CYSTOSCOPY N/A 9/20/2019    CYSTOSCOPY, TUR BLADDER TUMOR WITH GYRUS performed by Montserrat Vega MD at [address] [facility] N/A 12/6/2019    CYSTOSCOPY, TUR BLADDER TUMOR GYRUS performed by Montserrat Vega MD at [address] [place] [address] 5/7/2020    CYSTOSCOPY, TURB TUMOR, GYRUS performed by Montserrat Vega MD at [address] [place]  09/18/2020    CYSTOSCOPY  09/18/2020    CYSTOSCOPY N/A 9/18/2020    CYSTOSCOPY performed by Montserrat Vega MD at [address] [place]  02/2021    EYE SURGERY Right 2018    TEAR DUCT SURGERY.     EYE SURGERY Right 2018    CATARACT WITH IOL PLACED    EYE SURGERY Left 04/2019    CATARACT WITH IOL PLACED    HERNIA REPAIR Right 1967    INGUINAL    PACEMAKER PLACEMENT  12/17/2018    AICD PLACED    NM CABG, ARTERY-VEIN, SINGLE N/A 8/16/2018    CABG CORONARY ARTERY BYPASS ON  PUMP x 2, SWAN LAUREN, ORESTES (CSI# [id number]) performed by Perry Blackwell MD at [address] FLX DX W/TRAMAINE [place] 1976 PFRMD N/A 1/14/2018    COLONOSCOPY performed by Claudia Byrd MD at [address] ESOPHAGOGASTRODUODENOSCOPY TRANSORAL DIAGNOSTIC N/A 1/12/2018    EGD DIAGNOSTIC ONLY performed by Vada Fothergill, MD at [address] Right 04/2018    TUR  may 7th 2020       Family History   Problem Relation Age of Onset    Diabetes Mother     High Blood Pressure Mother     Stroke Father     High Blood Pressure Father     Other Sister         PE    Asthma Sister     Stroke Brother     Asthma Brother     Diabetes Brother     Heart Disease Paternal Grandfather     Prostate Cancer Paternal Grandfather     Asthma Sister     Asthma Sister     Asthma Brother     Asthma Brother     Asthma Sister        Social History     Tobacco Use    Smoking status: Former Smoker     Packs/day: 0.50     Years: 30.00     Pack years: 15.00     Types: Cigarettes     Quit date: 8/15/2018     Years since quitting: 3.3    Smokeless tobacco: Never Used    Tobacco comment: a little less than a pack a day   Substance Use Topics    Alcohol use: Not Currently     Comment: rarely      Current Outpatient Medications   Medication Sig Dispense Refill    metOLazone (ZAROXOLYN) 2.5 MG tablet Take 2.5 mg by mouth Twice a Week Taking Monday and thursday      fenofibrate micronized (LOFIBRA) 134 MG capsule Take 134 mg by mouth every morning (before breakfast)      aspirin 81 MG EC tablet Take 81 mg by mouth daily      pregabalin (LYRICA) 75 MG capsule Take 75 mg by mouth 2 times daily.  torsemide (DEMADEX) 20 MG tablet Take 2 tablets by mouth daily 180 tablet 3    tamsulosin (FLOMAX) 0.4 MG capsule Take 1 capsule by mouth daily 90 capsule 3    rivaroxaban (XARELTO) 20 MG TABS tablet Take 1 tablet by mouth daily (with breakfast) HOLD for 3 days after surgery. OK to resume if urine clear 30 tablet 0    PARoxetine (PAXIL) 20 MG tablet Take 20 mg by mouth daily      traMADol (ULTRAM) 50 MG tablet Take 50 mg by mouth 2 times daily.  Indications: pt takes it once daily      senna (SENOKOT) 8.6 MG tablet Take 1 tablet by mouth nightly      amiodarone (CORDARONE) 200 MG tablet Take 1 tablet by mouth daily 30 tablet 3    simvastatin (ZOCOR) 40 MG tablet Take 1 tablet by mouth nightly 30 tablet 3    metoprolol succinate (TOPROL XL) 25 MG extended release tablet Take 1 tablet by mouth daily 30 tablet 3    midodrine (PROAMATINE) 10 MG tablet Take 1 tablet by mouth 3 times daily (with meals) (Patient taking differently: Take 10 mg by mouth daily ) 90 tablet 3    docusate sodium (COLACE, DULCOLAX) 100 MG CAPS Take 100 mg by mouth 2 times daily (Patient taking differently: Take 100 mg by mouth 2 times daily Indications: pt states he takes once daily )      traZODone (DESYREL) 50 MG tablet Take 1 tablet by mouth nightly as needed       famotidine (PEPCID) 20 MG tablet Take 1 tablet by mouth 2 times daily 60 tablet 3    potassium chloride (KLOR-CON M) 10 MEQ extended release tablet Take 2 tablets by mouth 2 times daily 120 tablet 1    megestrol (MEGACE) 20 MG tablet TAKE ONE TABLET BY MOUTH DAILY 30 tablet 3    ipratropium-albuterol (DUONEB) 0.5-2.5 (3) MG/3ML SOLN nebulizer solution Inhale 3 mLs into the lungs every 4 hours (while awake) 360 mL 0    ferrous sulfate 325 (65 Fe) MG EC tablet Take 1 tablet by mouth every other day 90 tablet 3    albuterol sulfate  (90 Base) MCG/ACT inhaler Inhale 1 puff into the lungs every 6 hours as needed for Wheezing       No current facility-administered medications for this encounter. Allergies   Allergen Reactions    Lisinopril Other (See Comments)     Dry cough    Nuts [Peanut-Containing Drug Products] Other (See Comments)     PEANUTS--abd pain CAN EAT PEANUT BUTTER JUST NOT RAW PEANUTS           Subjective:      Review of Systems   Constitutional: Negative for activity change, chills, fatigue and fever. Eyes: Negative for discharge and visual disturbance. Respiratory: Negative for cough and shortness of breath.     Cardiovascular: Negative for chest pain and leg swelling. Gastrointestinal: Negative for abdominal pain and blood in stool. Endocrine: Negative for cold intolerance and heat intolerance. Genitourinary: Negative for dysuria and flank pain. Musculoskeletal: Negative for joint swelling and myalgias. Skin: Negative for pallor and rash. Neurological: Positive for light-headedness. Negative for dizziness and headaches. Psychiatric/Behavioral: Negative for hallucinations and suicidal ideas. Objective:     Physical Exam  /62   Pulse 90   Resp 20   Wt 177 lb 6.4 oz (80.5 kg)   SpO2 98%   BMI 26.97 kg/m²   O2 Device: None (Room air)       Lower Extremity Measurements in cm. R Calf Circumference (cm): 33 cm  L Calf Circumference (cm): 33 cm  R Ankle Circumference (cm): 20.5 cm  L Ankle Circumference (cm): 19 cm    CBC:   Lab Results   Component Value Date    WBC 7.1 04/27/2021    RBC 4.39 04/27/2021    HGB 12.4 04/27/2021    HCT 42.6 04/27/2021    MCV 97.0 04/27/2021    MCH 28.2 04/27/2021    MCHC 29.1 04/27/2021    RDW 13.3 04/27/2021     04/27/2021    MPV 11.1 04/27/2021     CMP:    Lab Results   Component Value Date     11/24/2021    K 3.6 11/24/2021    CL 96 11/24/2021    CO2 26 11/24/2021    BUN 30 11/24/2021    CREATININE 1.34 11/24/2021    GFRAA >60 11/24/2021    LABGLOM 52 11/24/2021    GLUCOSE 115 11/24/2021    PROT 8.1 12/07/2020    PROT 8.0 12/07/2020    LABALBU 4.6 12/07/2020    LABALBU 4.6 12/07/2020    CALCIUM 10.3 11/24/2021    BILITOT 0.69 12/07/2020    BILITOT 0.69 12/07/2020    ALKPHOS 64 12/07/2020    ALKPHOS 64 12/07/2020    AST 29 10/04/2021    ALT 13 10/04/2021     Lab Results   Component Value Date    LABA1C 5.7 12/07/2020     dilated left ventricle with severely reduced systolic function. Calculated ejection fraction via Arthur's Method is 12.4 %  Evidence of diastolic dysfunction. Left atrium is severely dilated. Normal right ventricle size with reduced systolic function.   AICD lead seen in right atrium/ventricle. Mildly thickened mitral valve leaflets. Moderate mitral regurgitation, central jet. Moderate tricuspid regurgitation. Trivial pulmonic insufficiency. Small circumferential pericardial effusion. Pleural effusion is noted.     Signature  ----------------------------------------------------------------------------        :Assessment      1. Chronic systolic congestive heart failure ([address]        :Plan      1. Chronic systolic congestive heart failure (HCC)      Pt now t  Wt down 4.6 lbs and leg measurements are stable or slightly reduced. Taking metolazone 2x/ week now, reduced from qod. Euvolemic on exam. However concern for renal chemistry  Due to Optivol Fluid Index - thoracic impedence, trending upward. This is shared with pt and desire for labs to be repeated shared with pt. Pt refuses. Pt states the earliest he will consider labs is Dec 20. Labs ordered for this day. Will see pt back in the office in one mth, with an understanding of labs to be completed in the interim. On Guideline-Directed Medication Therapy:     Beta - blocker  Diuretic Therapy    ACEI / ARB / ARNi: CI due to allergy      RTC 1 month. Orders Placed This Encounter   Procedures    Basic Metabolic Panel     Standing Status:   Future     Standing Expiration Date:   12/6/2022     No orders of the defined types were placed in this encounter. Patientgiven verbal and/or written educational instructions. Follow up as directed. I have reviewed and agree with the nursing documentation. Verbally reviewed medication list with patient; patient verbalized understanding. Discussed 2000mg/day sodium restricted diet; patient verbalized understanding. Moderate daily exercise encouraged as tolerated. Discussed rest breaks as needed; patient verbalized understanding.      Patient instructed to weigh self at the same time of each day, using same clothes and same scale; reinforced teaching to monitor for 3-5 lb weight increase over 1-2 days, and to notify the CHF clinic at [phone] or physician office if weight change noted. Patient verbalized understanding. Risks of smoking discussed with the patient if applicable; patient strongly discouraged to smoke. Patient verbalized understanding. Signs and symptoms of CHF discussed with patient, such as feeling more tired than normal, feeling short of breath, coughing that increases when you lie down, sudden weight gain, swelling of your feet, legs or belly. Patient verbalized understanding to notify the CHF clinic at [phone] or physician office if these symptoms occur. Compliance with plan of care and further disease process causes discussed with patient, patient encouraged to keep all follow up appointments. Patient verbalized understanding. Echocardiogram reviewed. Labs reviewed. Medications reviewed.       Electronically signedCASSY Sheldon CNP on 12/6/2021 at 12:18 PM

## 2021-12-14 ENCOUNTER — TELEPHONE (OUTPATIENT)
Dept: OTHER | Age: 76
End: 2021-12-14

## 2021-12-14 RX ORDER — METOLAZONE 2.5 MG/1
TABLET ORAL
Qty: 24 TABLET | Refills: 0 | Status: SHIPPED | OUTPATIENT
Start: 2021-12-14 | End: 2022-03-10 | Stop reason: DRUGHIGH

## 2021-12-20 ENCOUNTER — HOSPITAL ENCOUNTER (OUTPATIENT)
Facility: CLINIC | Age: 76
Discharge: HOME | End: 2021-12-22
Payer: MEDICARE

## 2021-12-20 ENCOUNTER — HOSPITAL ENCOUNTER (OUTPATIENT)
Age: 76
Setting detail: SPECIMEN
Discharge: HOME | End: 2021-12-20

## 2021-12-20 ENCOUNTER — HOSPITAL ENCOUNTER (OUTPATIENT)
Dept: GENERAL RADIOLOGY | Facility: CLINIC | Age: 76
Discharge: HOME | End: 2021-12-22
Payer: MEDICARE

## 2021-12-20 DIAGNOSIS — M25.551 RIGHT HIP PAIN: ICD-10-CM

## 2021-12-20 DIAGNOSIS — I50.22 CHRONIC SYSTOLIC CONGESTIVE HEART FAILURE (HCC): Chronic | ICD-10-CM

## 2021-12-20 LAB
ANION GAP SERPL CALCULATED.3IONS-SCNC: 14 MMOL/L (ref 9–17)
BUN BLDV-MCNC: 42 MG/DL (ref 8–23)
BUN/CREAT BLD: ABNORMAL (ref 9–20)
CALCIUM SERPL-MCNC: 11.2 MG/DL (ref 8.6–10.4)
CHLORIDE BLD-SCNC: 91 MMOL/L (ref 98–107)
CO2: 32 MMOL/L (ref 20–31)
CREAT SERPL-MCNC: 1.67 MG/DL (ref 0.7–1.2)
GFR AFRICAN AMERICAN: 49 ML/MIN
GFR NON-AFRICAN AMERICAN: 40 ML/MIN
GFR SERPL CREATININE-BSD FRML MDRD: ABNORMAL ML/MIN/{1.73_M2}
GFR SERPL CREATININE-BSD FRML MDRD: ABNORMAL ML/MIN/{1.73_M2}
GLUCOSE BLD-MCNC: 217 MG/DL (ref 70–99)
POTASSIUM SERPL-SCNC: 3.4 MMOL/L (ref 3.7–5.3)
SODIUM BLD-SCNC: 137 MMOL/L (ref 135–144)

## 2021-12-20 PROCEDURE — 73502 X-RAY EXAM HIP UNI 2-3 VIEWS: CPT

## 2021-12-22 NOTE — PROGRESS NOTES
Received call back from Yamileth Kaiser at Dr. Pee Johnson office regarding new orders from Dr. Sánchez Rodas. Patient is to increase KCL dose to 30 meq AM and 20 meq PM for a total of 50 meq KCL daily, up from patient's current dose of 20 meq twice daily. Called patient at home and spoke with wife, reviewed new orders for KCL and wife repeated back new dose of KCL. 30 meq AM and 20 meq PM. Reminded wife of patient's next CHF Clinic visit on 01/03/22 @ 1130 and she verbalized understanding.

## 2022-01-03 ENCOUNTER — HOSPITAL ENCOUNTER (OUTPATIENT)
Dept: OTHER | Age: 77
Discharge: HOME | End: 2022-01-03
Payer: MEDICARE

## 2022-01-03 ENCOUNTER — HOSPITAL ENCOUNTER (OUTPATIENT)
Age: 77
Discharge: HOME | End: 2022-01-03
Payer: MEDICARE

## 2022-01-03 ENCOUNTER — TELEPHONE (OUTPATIENT)
Dept: OTHER | Age: 77
End: 2022-01-03

## 2022-01-03 VITALS
SYSTOLIC BLOOD PRESSURE: 104 MMHG | WEIGHT: 182 LBS | BODY MASS INDEX: 27.67 KG/M2 | OXYGEN SATURATION: 96 % | HEART RATE: 58 BPM | DIASTOLIC BLOOD PRESSURE: 70 MMHG | RESPIRATION RATE: 20 BRPM

## 2022-01-03 DIAGNOSIS — I50.22 CHRONIC SYSTOLIC CONGESTIVE HEART FAILURE (HCC): Primary | Chronic | ICD-10-CM

## 2022-01-03 DIAGNOSIS — E87.6 HYPOKALEMIA: ICD-10-CM

## 2022-01-03 LAB
ANION GAP SERPL CALCULATED.3IONS-SCNC: 14 MMOL/L (ref 9–17)
BUN BLDV-MCNC: 32 MG/DL (ref 8–23)
BUN/CREAT BLD: ABNORMAL (ref 9–20)
CALCIUM SERPL-MCNC: 9.9 MG/DL (ref 8.6–10.4)
CHLORIDE BLD-SCNC: 94 MMOL/L (ref 98–107)
CO2: 28 MMOL/L (ref 20–31)
CREAT SERPL-MCNC: 1.62 MG/DL (ref 0.7–1.2)
GFR AFRICAN AMERICAN: 51 ML/MIN
GFR NON-AFRICAN AMERICAN: 42 ML/MIN
GFR SERPL CREATININE-BSD FRML MDRD: ABNORMAL ML/MIN/{1.73_M2}
GFR SERPL CREATININE-BSD FRML MDRD: ABNORMAL ML/MIN/{1.73_M2}
GLUCOSE BLD-MCNC: 206 MG/DL (ref 70–99)
POTASSIUM SERPL-SCNC: 3.2 MMOL/L (ref 3.7–5.3)
SODIUM BLD-SCNC: 136 MMOL/L (ref 135–144)

## 2022-01-03 PROCEDURE — 36415 COLL VENOUS BLD VENIPUNCTURE: CPT

## 2022-01-03 PROCEDURE — 80048 BASIC METABOLIC PNL TOTAL CA: CPT

## 2022-01-03 PROCEDURE — 99212 OFFICE O/P EST SF 10 MIN: CPT

## 2022-01-03 RX ORDER — POTASSIUM CHLORIDE 750 MG/1
TABLET, EXTENDED RELEASE ORAL
Qty: 150 TABLET | Refills: 1 | Status: SHIPPED | OUTPATIENT
Start: 2022-01-03 | End: 2022-04-19 | Stop reason: DRUGHIGH

## 2022-01-03 ASSESSMENT — PAIN DESCRIPTION - ORIENTATION: ORIENTATION: RIGHT;LEFT

## 2022-01-03 ASSESSMENT — PAIN SCALES - GENERAL: PAINLEVEL_OUTOF10: 8

## 2022-01-03 ASSESSMENT — PAIN DESCRIPTION - PAIN TYPE: TYPE: CHRONIC PAIN

## 2022-01-03 ASSESSMENT — PAIN DESCRIPTION - LOCATION: LOCATION: FOOT

## 2022-01-03 ASSESSMENT — PAIN DESCRIPTION - DESCRIPTORS: DESCRIPTORS: ACHING

## 2022-01-03 NOTE — TELEPHONE ENCOUNTER
Per Nephro order from last week, pt will take KCl 30 mg in am and 20 mq in pm. Pt to  CHF Clinic today, appt with Sallie Hogan RN, and reports needing more KCl tabs.  Order placed

## 2022-01-03 NOTE — PROGRESS NOTES
Pt's BMP level results today show Na+ 136 K+ level low at 3.2 , BUN 32, Creat 1.62. Pt had not taken any doses of potassium prior to this  Lab draw. Merry Fernandez CNP notified of results. Instructed to call Dr Genna Villavicencio office to see if Dr Bernardo Cavazos agreeable to have pt take 60 meq KCL po today then resume his usual dose of 50 meq KCL po daily. Meryle Ralph Will also  order labs BMP to be repeated in one week. Writer called and spoke with Sandy Silveira at Dr Genna Villavicencio office. Reviewed all of above with her, and  faxed today's CHF Clinic assessment to their office. Sandy Silveira states will  check with Dr Bernardo Cavazos and call  Pt/ CHF Clinic back to let us  know if above recommended  KCL orders and labs are OK with him. 3:10 pm: Received call back from [facility] at Dr Genna Villavicencio office. She states Dr Bernardo Cavazos  is agreeable to all of above orders from Merry Fernandez CNP. 3:15 pm : Writer called and notified pt's wife Nory Sagastume of all of above orders for pt. KCL supplement and to repeat labs  in one week.

## 2022-01-03 NOTE — PROGRESS NOTES
Date:  1/3/2022  Time:  12:44 PM    CHF Clinic at [facility]    Office: [phone] Fax: [phone]    Re:  Pablo Ganser. Patient : 1945    Vital Signs: /70   Pulse 58   Resp 20   Wt 182 lb (82.6 kg)   SpO2 96%   BMI 27.67 kg/m²                       O2 Device: None (Room air)                           No results for input(s): CBC, HGB, HCT, WBC, PLATELET, NA, K, CL, CO2, BUN, CREATININE, GLUCOSE, BNP, INR in the last 72 hours. Respiratory:         Breath Sounds  Right Upper Lobe: Clear  Right Middle Lobe: Clear  Right Lower Lobe: Clear  Left Upper Lobe: Clear  Left Lower Lobe: Clear    Cough/Sputum  Cough: Productive  Frequency: Occasional  Sputum Amount: Small  Sputum Color: Clear         Peripheral Vascular  RLE Edema: None  LLE Edema: None      Complaints: Wt is up 4.6 lbs in one month. Physician Orders  Per Park Garcia CNP : 1 ) recheck BMP today or in next couple of days  (recent hypokalemia)    Comment : Wt is up 4.6 lbs in one month. Pt admits to noncompliance with low sodium diet over the holidays and states he really doesn't measure his fluid intake and drinks \" a lot of fluid with all the medications \" that he needs to take every day. He denies increased shortness of breath. Cardiosight optivol fluid index reading shows some mild increased fluid levels but thoracic impedance returning closer to reference line today. Current diuretics: zaroxolyn 2.5 mg po on Monday and Thursday only, Demadex 40 mg po daily. His last potassium level on 21 was 3.4 mg/dl. Dr Merlyn Mares increased pt's KCL dose to 30 meq AM and 20 meq in PM . Pt states he's been taking the total dose of 50 meq every day but prefers to  takes the Potassium  \" all at once in the AM \". Pt needed a refill on his KCL dose and Park Garcia CNP notified of all of above. She ordered BMP repeated today and called in a month supply of his KCL dose  to his pharmacy.  Pt due for appt to see [facility] in February. Writer made f/u  for pt to see Dr Lynn Argueta in the [place] office on Friday Feb. 4th at 10:45 a.m. Pt did not want to return in 2 weeks for reassessment of wt and fluid levels  , prefers one month f/u. Next f/u here at [address] [facility] on 1/31/22 at 11:30 am. Instructed to call us for appt sooner if increased wt gain continues or increased S/S of CHF.      Electronically signed by Javier Obrien RN on 1/3/2022 at 12:44 PM

## 2022-01-10 ENCOUNTER — HOSPITAL ENCOUNTER (OUTPATIENT)
Age: 77
Setting detail: SPECIMEN
Discharge: HOME | End: 2022-01-10

## 2022-01-10 DIAGNOSIS — I50.22 CHRONIC SYSTOLIC CONGESTIVE HEART FAILURE (HCC): Chronic | ICD-10-CM

## 2022-01-10 LAB
ANION GAP SERPL CALCULATED.3IONS-SCNC: 13 MMOL/L (ref 9–17)
BUN BLDV-MCNC: 40 MG/DL (ref 8–23)
BUN/CREAT BLD: ABNORMAL (ref 9–20)
CALCIUM SERPL-MCNC: 10.8 MG/DL (ref 8.6–10.4)
CHLORIDE BLD-SCNC: 90 MMOL/L (ref 98–107)
CO2: 36 MMOL/L (ref 20–31)
CREAT SERPL-MCNC: 1.93 MG/DL (ref 0.7–1.2)
GFR AFRICAN AMERICAN: 41 ML/MIN
GFR NON-AFRICAN AMERICAN: 34 ML/MIN
GFR SERPL CREATININE-BSD FRML MDRD: ABNORMAL ML/MIN/{1.73_M2}
GFR SERPL CREATININE-BSD FRML MDRD: ABNORMAL ML/MIN/{1.73_M2}
GLUCOSE BLD-MCNC: 182 MG/DL (ref 70–99)
POTASSIUM SERPL-SCNC: 3.7 MMOL/L (ref 3.7–5.3)
SODIUM BLD-SCNC: 139 MMOL/L (ref 135–144)

## 2022-01-11 ENCOUNTER — TELEPHONE (OUTPATIENT)
Dept: OTHER | Age: 77
End: 2022-01-11

## 2022-01-11 NOTE — TELEPHONE ENCOUNTER
Pt's lab results BMP from 1/10/22 reviewed by Kit Bridges CNP. Instructed to notify Dr Carlo Medrano office of BMP results. Results show pt's  K+ level improved from 3.2 mg/dl  to 3.7 mg/dl with K+ supplements. He currently takes 50 meq of KCL daily. His BUN however increased  to 40 and Creatinine increased to 1.93. His diuretics were unchanged at that CHF visit on 1/3/22 and he continues to take  Metolazone 2.5 mg on Mondays and Thursdays and also   Demadex 40 mg once daily. Writer called and notified  Romulo Young at Dr Carlo Medrano office of BMP results. She checked pt's recent labs and stated there didn't seem to be a concern over the increase BUN/Creat from his last labs on 1/3/22 . She stated  pt's results of BUN/CREAT today \" are not a 25 % increase\" from most recent labs in October in November. She states she will have Dr Taco Do review these latest BMP results when he's in the office on Friday and will contact pt if any changes.

## 2022-01-17 ENCOUNTER — HOSPITAL ENCOUNTER (OUTPATIENT)
Age: 77
Setting detail: SPECIMEN
Discharge: HOME | End: 2022-01-17

## 2022-01-17 DIAGNOSIS — N18.30 STAGE 3 CHRONIC KIDNEY DISEASE, UNSPECIFIED WHETHER STAGE 3A OR 3B CKD (HCC): ICD-10-CM

## 2022-01-17 LAB
ANION GAP SERPL CALCULATED.3IONS-SCNC: 15 MMOL/L (ref 9–17)
BUN BLDV-MCNC: 41 MG/DL (ref 8–23)
BUN/CREAT BLD: ABNORMAL (ref 9–20)
CALCIUM SERPL-MCNC: 9.9 MG/DL (ref 8.6–10.4)
CHLORIDE BLD-SCNC: 91 MMOL/L (ref 98–107)
CO2: 33 MMOL/L (ref 20–31)
CREAT SERPL-MCNC: 2.15 MG/DL (ref 0.7–1.2)
GFR AFRICAN AMERICAN: 37 ML/MIN
GFR NON-AFRICAN AMERICAN: 30 ML/MIN
GFR SERPL CREATININE-BSD FRML MDRD: ABNORMAL ML/MIN/{1.73_M2}
GFR SERPL CREATININE-BSD FRML MDRD: ABNORMAL ML/MIN/{1.73_M2}
GLUCOSE BLD-MCNC: 178 MG/DL (ref 70–99)
POTASSIUM SERPL-SCNC: 3.5 MMOL/L (ref 3.7–5.3)
SODIUM BLD-SCNC: 139 MMOL/L (ref 135–144)

## 2022-01-25 ENCOUNTER — HOSPITAL ENCOUNTER (OUTPATIENT)
Age: 77
Setting detail: SPECIMEN
Discharge: HOME | End: 2022-01-25

## 2022-01-25 ENCOUNTER — HOSPITAL ENCOUNTER (OUTPATIENT)
Facility: CLINIC | Age: 77
Discharge: HOME | End: 2022-01-27
Payer: MEDICARE

## 2022-01-25 ENCOUNTER — HOSPITAL ENCOUNTER (OUTPATIENT)
Dept: GENERAL RADIOLOGY | Facility: CLINIC | Age: 77
Discharge: HOME | End: 2022-01-27
Payer: MEDICARE

## 2022-01-25 DIAGNOSIS — I48.0 EPISODIC ATRIAL FIBRILLATION (HCC): ICD-10-CM

## 2022-01-25 DIAGNOSIS — N18.30 STAGE 3 CHRONIC KIDNEY DISEASE, UNSPECIFIED WHETHER STAGE 3A OR 3B CKD (HCC): ICD-10-CM

## 2022-01-25 DIAGNOSIS — Z79.899 DRUG THERAPY: ICD-10-CM

## 2022-01-25 LAB
ANION GAP SERPL CALCULATED.3IONS-SCNC: 19 MMOL/L (ref 9–17)
BUN BLDV-MCNC: 51 MG/DL (ref 8–23)
BUN/CREAT BLD: ABNORMAL (ref 9–20)
CALCIUM SERPL-MCNC: 11.2 MG/DL (ref 8.6–10.4)
CHLORIDE BLD-SCNC: 87 MMOL/L (ref 98–107)
CO2: 32 MMOL/L (ref 20–31)
CREAT SERPL-MCNC: 2.52 MG/DL (ref 0.7–1.2)
GFR AFRICAN AMERICAN: 30 ML/MIN
GFR NON-AFRICAN AMERICAN: 25 ML/MIN
GFR SERPL CREATININE-BSD FRML MDRD: ABNORMAL ML/MIN/{1.73_M2}
GFR SERPL CREATININE-BSD FRML MDRD: ABNORMAL ML/MIN/{1.73_M2}
GLUCOSE BLD-MCNC: 177 MG/DL (ref 70–99)
POTASSIUM SERPL-SCNC: 2.9 MMOL/L (ref 3.7–5.3)
SODIUM BLD-SCNC: 138 MMOL/L (ref 135–144)

## 2022-01-25 PROCEDURE — 71046 X-RAY EXAM CHEST 2 VIEWS: CPT

## 2022-01-27 ENCOUNTER — HOSPITAL ENCOUNTER (OUTPATIENT)
Age: 77
Setting detail: SPECIMEN
Discharge: HOME | End: 2022-01-27

## 2022-01-27 DIAGNOSIS — N18.30 STAGE 3 CHRONIC KIDNEY DISEASE, UNSPECIFIED WHETHER STAGE 3A OR 3B CKD (HCC): ICD-10-CM

## 2022-01-27 LAB
ANION GAP SERPL CALCULATED.3IONS-SCNC: 14 MMOL/L (ref 9–17)
CHLORIDE BLD-SCNC: 88 MMOL/L (ref 98–107)
CO2: 35 MMOL/L (ref 20–31)
MAGNESIUM: 1.8 MG/DL (ref 1.6–2.6)
POTASSIUM SERPL-SCNC: 4 MMOL/L (ref 3.7–5.3)
SODIUM BLD-SCNC: 137 MMOL/L (ref 135–144)

## 2022-01-31 ENCOUNTER — HOSPITAL ENCOUNTER (OUTPATIENT)
Age: 77
Discharge: HOME | End: 2022-01-31
Payer: MEDICARE

## 2022-01-31 ENCOUNTER — HOSPITAL ENCOUNTER (OUTPATIENT)
Dept: OTHER | Age: 77
Discharge: HOME | End: 2022-01-31
Payer: MEDICARE

## 2022-01-31 VITALS
WEIGHT: 178 LBS | DIASTOLIC BLOOD PRESSURE: 82 MMHG | HEART RATE: 95 BPM | OXYGEN SATURATION: 100 % | BODY MASS INDEX: 27.06 KG/M2 | RESPIRATION RATE: 20 BRPM | SYSTOLIC BLOOD PRESSURE: 114 MMHG

## 2022-01-31 DIAGNOSIS — I50.22 CHRONIC SYSTOLIC CONGESTIVE HEART FAILURE (HCC): Chronic | ICD-10-CM

## 2022-01-31 DIAGNOSIS — I50.22 CHRONIC SYSTOLIC CONGESTIVE HEART FAILURE (HCC): Primary | Chronic | ICD-10-CM

## 2022-01-31 DIAGNOSIS — E87.6 HYPOKALEMIA: ICD-10-CM

## 2022-01-31 LAB
ANION GAP SERPL CALCULATED.3IONS-SCNC: 21 MMOL/L (ref 9–17)
BUN BLDV-MCNC: 54 MG/DL (ref 8–23)
BUN/CREAT BLD: ABNORMAL (ref 9–20)
CALCIUM SERPL-MCNC: 11.3 MG/DL (ref 8.6–10.4)
CHLORIDE BLD-SCNC: 91 MMOL/L (ref 98–107)
CO2: 27 MMOL/L (ref 20–31)
CREAT SERPL-MCNC: 2.18 MG/DL (ref 0.7–1.2)
GFR AFRICAN AMERICAN: 36 ML/MIN
GFR NON-AFRICAN AMERICAN: 30 ML/MIN
GFR SERPL CREATININE-BSD FRML MDRD: ABNORMAL ML/MIN/{1.73_M2}
GFR SERPL CREATININE-BSD FRML MDRD: ABNORMAL ML/MIN/{1.73_M2}
GLUCOSE BLD-MCNC: 136 MG/DL (ref 70–99)
POTASSIUM SERPL-SCNC: 3.4 MMOL/L (ref 3.7–5.3)
SODIUM BLD-SCNC: 139 MMOL/L (ref 135–144)

## 2022-01-31 PROCEDURE — 99213 OFFICE O/P EST LOW 20 MIN: CPT | Performed by: NURSE PRACTITIONER

## 2022-01-31 PROCEDURE — 80048 BASIC METABOLIC PNL TOTAL CA: CPT

## 2022-01-31 PROCEDURE — 99212 OFFICE O/P EST SF 10 MIN: CPT

## 2022-01-31 PROCEDURE — 36415 COLL VENOUS BLD VENIPUNCTURE: CPT

## 2022-01-31 ASSESSMENT — ENCOUNTER SYMPTOMS
SHORTNESS OF BREATH: 1
COUGH: 0
EYE DISCHARGE: 0
CONSTIPATION: 1
ABDOMINAL PAIN: 0
BLOOD IN STOOL: 0

## 2022-01-31 ASSESSMENT — PAIN DESCRIPTION - LOCATION: LOCATION: FOOT

## 2022-01-31 ASSESSMENT — PAIN DESCRIPTION - PAIN TYPE: TYPE: CHRONIC PAIN

## 2022-01-31 ASSESSMENT — PAIN DESCRIPTION - ORIENTATION: ORIENTATION: RIGHT;LEFT

## 2022-01-31 ASSESSMENT — PAIN SCALES - GENERAL: PAINLEVEL_OUTOF10: 6

## 2022-01-31 NOTE — PROGRESS NOTES
 Former tobacco use     QUIT 08/2018    GERD (gastroesophageal reflux disease)     Hematuria     History of blood transfusion 01/2018    AMEMIA TRANFUSED 2 UNITS NO REACTIONS    History of blood transfusion 02/2018    TRANFUSED 1 UNIT    History of blood transfusion 08/2018    OPEN HEART    Hx of blood clots 05/2017    RIGHT LEG DVT ON BLOOD THINNER    Hyperlipidemia 2009    ON RX    Hypertension 2004    ON RX DR Shira MUNOZ    Multiple myeloma ([address] 2014    Neuropathy 2017    LEGS     Wears dentures     FULL UPPER, PARTIAL LOWER    Wellness examination     DR. RIVERA-PCP LAST SEEN MARCH 2020     Past Surgical History:   Procedure Laterality Date    ABDOMEN SURGERY  2003    STROMAL TUMOR    BLADDER TUMOR EXCISION  05/07/2020    CYSTOSCOPY, TURB TUMOR,    CARDIAC CATHETERIZATION  12/13/2017    right and left heart cath with Dr. Debra Lawson  12/17/2018    DR Dennise Cruz WITH IMPLANT Right 2018    CATARACT REMOVAL WITH IMPLANT Left 04/2019    COLONOSCOPY  01/14/2018    CYSTOSCOPY  08/08/2019    CYSTOSCOPY N/A 9/20/2019    CYSTOSCOPY, TUR BLADDER TUMOR WITH GYRUS performed by Nav Granger MD at [address] [place] N/A 12/6/2019    CYSTOSCOPY, TUR BLADDER TUMOR GYRUS performed by Nav Granger MD at [address] [place] 5/7/2020    CYSTOSCOPY, TURB TUMOR, GYRUS performed by Nav Granger MD at [address] [place]  09/18/2020    CYSTOSCOPY  09/18/2020    CYSTOSCOPY N/A 9/18/2020    CYSTOSCOPY performed by Nav Granger MD at [address] [place]  02/2021    EYE SURGERY Right 2018    TEAR DUCT SURGERY.     EYE SURGERY Right 2018    CATARACT WITH IOL PLACED    EYE SURGERY Left 04/2019    CATARACT WITH IOL PLACED    HERNIA REPAIR Right 1967    INGUINAL    PACEMAKER PLACEMENT  12/17/2018    AICD PLACED    CA CABG, ARTERY-VEIN, SINGLE N/A 8/16/2018    CABG CORONARY ARTERY BYPASS ON  PUMP x 2, SWAN LAUREN, ORESTES (CSI# [id number]) performed by Felix Del Rio Wan Taylor MD at [address] FLX DX W/COLLJ SPEC WHEN PFRMD N/A 1/14/2018    COLONOSCOPY performed by Erlinda Cobos MD at [address] ESOPHAGOGASTRODUODENOSCOPY TRANSORAL DIAGNOSTIC N/A 1/12/2018    EGD DIAGNOSTIC ONLY performed by Arelis Leach MD at [address] Right 04/2018    TURP  may 7th 2020       Family History   Problem Relation Age of Onset    Diabetes Mother     High Blood Pressure Mother     Stroke Father     High Blood Pressure Father     Other Sister         PE    Asthma Sister     Stroke Brother     Asthma Brother     Diabetes Brother     Heart Disease Paternal Grandfather     Prostate Cancer Paternal Grandfather     Asthma Sister     Asthma Sister     Asthma Brother     Asthma Brother     Asthma Sister        Social History     Tobacco Use    Smoking status: Former Smoker     Packs/day: 0.50     Years: 30.00     Pack years: 15.00     Types: Cigarettes     Quit date: 8/15/2018     Years since quitting: 3.4    Smokeless tobacco: Never Used    Tobacco comment: a little less than a pack a day   Substance Use Topics    Alcohol use: Not Currently     Comment: rarely      Current Outpatient Medications   Medication Sig Dispense Refill    torsemide (DEMADEX) 20 MG tablet TAKE TWO TABLETS BY MOUTH DAILY 172 tablet 3    potassium chloride (KLOR-CON M) 10 MEQ extended release tablet Take three tabs po in am and take two tabs po in pm. 150 tablet 1    metOLazone (ZAROXOLYN) 2.5 MG tablet Take one tab po every Mon and thurs am. Take 1/2 hour prior to other diuretic medicine.  (Patient taking differently: Take 2.5 mg by mouth Take one tab po every Mon and thurs am. Take 1/2 hour prior to other diuretic medicine.) 24 tablet 0    megestrol (MEGACE) 20 MG tablet TAKE ONE TABLET BY MOUTH DAILY 30 tablet 3    fenofibrate micronized (LOFIBRA) 134 MG capsule Take 134 mg by mouth every morning (before breakfast)      aspirin 81 MG EC tablet Take 81 mg by mouth daily      pregabalin (LYRICA) 75 MG capsule Take 75 mg by mouth 2 times daily.  tamsulosin (FLOMAX) 0.4 MG capsule Take 1 capsule by mouth daily 90 capsule 3    rivaroxaban (XARELTO) 20 MG TABS tablet Take 1 tablet by mouth daily (with breakfast) HOLD for 3 days after surgery. OK to resume if urine clear 30 tablet 0    PARoxetine (PAXIL) 20 MG tablet Take 20 mg by mouth daily      traMADol (ULTRAM) 50 MG tablet Take 50 mg by mouth 2 times daily. Indications: pt takes it once daily      senna (SENOKOT) 8.6 MG tablet Take 1 tablet by mouth nightly      amiodarone (CORDARONE) 200 MG tablet Take 1 tablet by mouth daily 30 tablet 3    simvastatin (ZOCOR) 40 MG tablet Take 1 tablet by mouth nightly 30 tablet 3    metoprolol succinate (TOPROL XL) 25 MG extended release tablet Take 1 tablet by mouth daily 30 tablet 3    ferrous sulfate 325 (65 Fe) MG EC tablet Take 1 tablet by mouth every other day 90 tablet 3    midodrine (PROAMATINE) 10 MG tablet Take 1 tablet by mouth 3 times daily (with meals) (Patient taking differently: Take 10 mg by mouth daily ) 90 tablet 3    docusate sodium (COLACE, DULCOLAX) 100 MG CAPS Take 100 mg by mouth 2 times daily (Patient taking differently: Take 100 mg by mouth 2 times daily Indications: pt states he takes once daily )      traZODone (DESYREL) 50 MG tablet Take 1 tablet by mouth nightly as needed       famotidine (PEPCID) 20 MG tablet Take 1 tablet by mouth 2 times daily 60 tablet 3    ipratropium-albuterol (DUONEB) 0.5-2.5 (3) MG/3ML SOLN nebulizer solution Inhale 3 mLs into the lungs every 4 hours (while awake) 360 mL 0    albuterol sulfate  (90 Base) MCG/ACT inhaler Inhale 1 puff into the lungs every 6 hours as needed for Wheezing       No current facility-administered medications for this encounter.      Allergies   Allergen Reactions    Lisinopril Other (See Comments)     Dry cough    Nuts [Peanut-Containing Drug Products] Other (See Comments) PEANUTS--abd pain CAN EAT PEANUT BUTTER JUST NOT RAW PEANUTS           Subjective:      Review of Systems   Constitutional: Negative for activity change, chills, fatigue and fever. Eyes: Negative for discharge and visual disturbance. Respiratory: Positive for shortness of breath. Negative for cough. Cardiovascular: Negative for chest pain and leg swelling. Gastrointestinal: Positive for constipation (taking prn meds for this). Negative for abdominal pain and blood in stool. Endocrine: Negative for cold intolerance and heat intolerance. Genitourinary: Negative for dysuria and flank pain. Musculoskeletal: Negative for joint swelling and myalgias. Skin: Negative for pallor and rash. Neurological: Negative for dizziness and headaches. Psychiatric/Behavioral: Negative for hallucinations and suicidal ideas. Objective:     Physical Exam  Vitals and nursing note reviewed. Constitutional:       Comments:      HENT:      Head: Normocephalic and atraumatic. Eyes:      General: No scleral icterus. Conjunctiva/sclera: Conjunctivae normal.   Cardiovascular:      Rate and Rhythm: Normal rate and regular rhythm. Heart sounds: Normal heart sounds. Pulmonary:      Effort: Pulmonary effort is normal.      Breath sounds: Normal breath sounds. No wheezing or rales. Abdominal:      General: Bowel sounds are normal.      Palpations: Abdomen is soft. Musculoskeletal:         General: Normal range of motion. Cervical back: Normal range of motion. Skin:     General: Skin is warm and dry. Neurological:      Mental Status: He is alert and oriented to person, place, and time. /82   Pulse 95   Resp 20   Wt 178 lb (80.7 kg)   SpO2 100%   BMI 27.06 kg/m²   O2 Device: None (Room air)       Lower Extremity Measurements in cm.    R Calf Circumference (cm): 33 cm  L Calf Circumference (cm): 33 cm  R Ankle Circumference (cm): 21.5 cm  L Ankle Circumference (cm): 20 cm    CBC:   Lab Results   Component Value Date    WBC 7.1 04/27/2021    RBC 4.39 04/27/2021    HGB 12.4 04/27/2021    HCT 42.6 04/27/2021    MCV 97.0 04/27/2021    MCH 28.2 04/27/2021    MCHC 29.1 04/27/2021    RDW 13.3 04/27/2021     04/27/2021    MPV 11.1 04/27/2021     CMP:    Lab Results   Component Value Date     01/27/2022    K 4.0 01/27/2022    CL 88 01/27/2022    CO2 35 01/27/2022    BUN 51 01/25/2022    CREATININE 2.52 01/25/2022    GFRAA 30 01/25/2022    LABGLOM 25 01/25/2022    GLUCOSE 177 01/25/2022    PROT 8.1 12/07/2020    PROT 8.0 12/07/2020    LABALBU 4.6 12/07/2020    LABALBU 4.6 12/07/2020    CALCIUM 11.2 01/25/2022    BILITOT 0.69 12/07/2020    BILITOT 0.69 12/07/2020    ALKPHOS 64 12/07/2020    ALKPHOS 64 12/07/2020    AST 29 10/04/2021    ALT 13 10/04/2021     Lab Results   Component Value Date    LABA1C 5.7 12/07/2020           :Assessment      1. Chronic systolic congestive heart failure ([facility] [address]    2. Hypokalemia        :Plan      1. [facility]    2. Hypokalemia      Per dr Samantha Kan office his E-Lytes were checked on 1/29  and K+ normalized. Per Labs on 1/27  , Renal chem showed increase:  Bun/Cr 51/2.52, which is above his baseline. Will check labs today, pt has Nephrology appt on Fri feb 4. Concern for snow storm this week so pt will have checked today instead of day before appt. On Guideline-Directed Medication Therapy:   Beta - blocker  Diuretic Therapy    No ACEI / ARB / ARNi: d/t CKD  No Aldosterone Antagonist, d/t ckd. on demadex and metolazone, followed closely with labs. Discussed 2000mg/day sodium restricted diet; Reminded to keep fluid intake at 64 oz/ d.   patient verbalized understanding. Pt expressed frustration with his number of appts and frequency of labs. It is explained this is done is his best interest to monitor K+ and to monitor renal function. Pt refuses another f/u appt until 1 month time frame. Appt made.  [facility] will review his Nephrology provide appt on 2/4, and f/u with pt re: any labs that are needed after that or any labs reccommended by Nephrology. Patient verbalizes understanding. Pt taken to lab in wheelchair by Porsha Montgomery RN       Orders Placed This Encounter   Procedures    Basic Metabolic Panel     Standing Status:   Future     Standing Expiration Date:   1/31/2023     No orders of the defined types were placed in this encounter. Patientgiven verbal and/or written educational instructions. Follow up as directed. I have reviewed and agree with the nursing documentation. Verbally reviewed medication list with patient; patient verbalized understanding. Discussed 2000mg/day sodium restricted diet; patient verbalized understanding. Moderate daily exercise encouraged as tolerated. Discussed rest breaks as needed; patient verbalized understanding. Patient instructed to weigh self at the same time of each day, using same clothes and same scale; reinforced teaching to monitor for 3-5 lb weight increase over 1-2 days, and to notify the CHF clinic at [phone] or physician office if weight change noted. Patient verbalized understanding. Risks of smoking discussed with the patient if applicable; patient strongly discouraged to smoke. Patient verbalized understanding. Signs and symptoms of CHF discussed with patient, such as feeling more tired than normal, feeling short of breath, coughing that increases when you lie down, sudden weight gain, swelling of your feet, legs or belly. Patient verbalized understanding to notify the CHF clinic at [phone] or physician office if these symptoms occur. Compliance with plan of care and further disease process causes discussed with patient, patient encouraged to keep all follow up appointments. Patient verbalized understanding. Echocardiogram reviewed. Labs reviewed. Labs ordered   Medications reviewed.   Patient was seen with total face to face time of  25 minutes. More than 50% of this visit was counseling and education, & coordination of care.            Electronically signedby CASSY Robertson CNP on 1/31/2022 at 12:13 PM

## 2022-01-31 NOTE — PROGRESS NOTES
Phone call placed to Dr Mary Barbosa office to notify of potassium level. Lane Felix RN will place a note and notify Dr Mary Barbosa tomorrow. Patient currently on 70 meq of KCl. He is seeing Dr Mary Barbosa in office 2/4/2022.0    Results for Rachel Rendon (MRN [id number]) as of 1/31/2022 15:18   Ref.  Range 1/31/2022 12:39   Sodium Latest Ref Range: 135 - 144 mmol/L 139   Potassium Latest Ref Range: 3.7 - 5.3 mmol/L 3.4 (L)   Chloride Latest Ref Range: 98 - 107 mmol/L 91 (L)   CO2 Latest Ref Range: 20 - 31 mmol/L 27   BUN Latest Ref Range: 8 - 23 mg/dL 54 (H)   Creatinine Latest Ref Range: 0.70 - 1.20 mg/dL 2.18 (H)   Bun/Cre Ratio Latest Ref Range: 9 - 20  NOT REPORTED   Anion Gap Latest Ref Range: 9 - 17 mmol/L 21 (H)   GFR Non- Latest Ref Range: >60 mL/min 30 (L)   GFR  Latest Ref Range: >60 mL/min 36 (L)

## 2022-02-22 ENCOUNTER — HOSPITAL ENCOUNTER (OUTPATIENT)
Age: 77
Setting detail: SPECIMEN
Discharge: HOME | End: 2022-02-22

## 2022-02-22 DIAGNOSIS — N18.30 STAGE 3 CHRONIC KIDNEY DISEASE, UNSPECIFIED WHETHER STAGE 3A OR 3B CKD (HCC): ICD-10-CM

## 2022-02-22 LAB
ANION GAP SERPL CALCULATED.3IONS-SCNC: 18 MMOL/L (ref 9–17)
BUN BLDV-MCNC: 31 MG/DL (ref 8–23)
CALCIUM SERPL-MCNC: 10.4 MG/DL (ref 8.6–10.4)
CHLORIDE BLD-SCNC: 97 MMOL/L (ref 98–107)
CO2: 28 MMOL/L (ref 20–31)
CREAT SERPL-MCNC: 1.71 MG/DL (ref 0.7–1.2)
GFR AFRICAN AMERICAN: 48 ML/MIN
GFR NON-AFRICAN AMERICAN: 39 ML/MIN
GFR SERPL CREATININE-BSD FRML MDRD: ABNORMAL ML/MIN/{1.73_M2}
GLUCOSE BLD-MCNC: 111 MG/DL (ref 70–99)
POTASSIUM SERPL-SCNC: 3.9 MMOL/L (ref 3.7–5.3)
SODIUM BLD-SCNC: 143 MMOL/L (ref 135–144)

## 2022-02-28 ENCOUNTER — HOSPITAL ENCOUNTER (OUTPATIENT)
Dept: OTHER | Age: 77
Discharge: HOME | End: 2022-02-28
Payer: MEDICARE

## 2022-02-28 VITALS
DIASTOLIC BLOOD PRESSURE: 60 MMHG | HEART RATE: 96 BPM | SYSTOLIC BLOOD PRESSURE: 104 MMHG | RESPIRATION RATE: 20 BRPM | WEIGHT: 184.6 LBS | BODY MASS INDEX: 28.07 KG/M2 | OXYGEN SATURATION: 97 %

## 2022-02-28 DIAGNOSIS — E87.6 HYPOKALEMIA: ICD-10-CM

## 2022-02-28 DIAGNOSIS — I50.22 CHRONIC SYSTOLIC CONGESTIVE HEART FAILURE (HCC): Primary | Chronic | ICD-10-CM

## 2022-02-28 DIAGNOSIS — I25.5 ISCHEMIC CARDIOMYOPATHY: Chronic | ICD-10-CM

## 2022-02-28 PROBLEM — L98.499 CHRONIC SKIN ULCER (HCC): Status: ACTIVE | Noted: 2022-02-28

## 2022-02-28 PROCEDURE — 99213 OFFICE O/P EST LOW 20 MIN: CPT | Performed by: NURSE PRACTITIONER

## 2022-02-28 PROCEDURE — 99212 OFFICE O/P EST SF 10 MIN: CPT

## 2022-02-28 ASSESSMENT — ENCOUNTER SYMPTOMS
ABDOMINAL PAIN: 0
SHORTNESS OF BREATH: 1
EYE DISCHARGE: 0
BLOOD IN STOOL: 0
COUGH: 1

## 2022-02-28 ASSESSMENT — PAIN DESCRIPTION - PAIN TYPE: TYPE: CHRONIC PAIN

## 2022-02-28 ASSESSMENT — PAIN SCALES - GENERAL: PAINLEVEL_OUTOF10: 8

## 2022-02-28 ASSESSMENT — PAIN DESCRIPTION - DESCRIPTORS: DESCRIPTORS: DISCOMFORT

## 2022-02-28 NOTE — PROGRESS NOTES
CHF Clinic at [facility]    Office: [phone] Fax: [phone] [facility]  [phone]  Dept: [phone]  Loc: [phone]    Olga Damon is a 78 y.o. male who presents today for CHF evaluation. HPI:     Reports at baseline  + ocass  shortness of breath, associated with exertion  Has occas phlegm. Noticed phlegm is getting thicker. Denies discoloration  Using nebulizer. Denies  fatigue  Mild  Edema  T/o day   Denies chest pain   Denies  palpitations   No orthopnea , nor PND     Has chronic leg ulcers. Uses cream per his [place] phys, has been using for years. States leg now looks better than they did when skin was initially diagnosed. Frozen dinners usually 1x - 2x /week . Light salting tomatoes still. Eating canned corn and peas. Fluids usually within 60- 64 oz/d       Past Medical History:   Diagnosis Date    AICD (automatic cardioverter/defibrillator) present 12/2018    [address] [place] [address]    Anesthesia complication     POTENTIAL ONLY. PATIENT HAS A 1500 ML FLUID RESTRICTION. WIFE CONCERNED PATIENT RECEIVED TOO MUCH IV FLUIDS DURING PATIENTS LAST SURGERY.  Anticoagulated     XARELTO    Arthritis     all over    Bladder cancer ([address] 2019    CAD (coronary artery disease) 2018    OPEN HEART CABG X 2 BYPASS DR Augusto Todd CARDIOLOGIST    Cardiomyopathy ([address] 2018    CHF (congestive heart failure) ([address] 06/2019    FLUID RESTRICTION 1500ML/24 HRS    Chronic kidney disease     STAGE 3    Constipation     COPD (chronic obstructive pulmonary disease) ([address] 2009    INHALER USE AS NEEDED    Diabetes mellitus ([address] 2015    R/T MYELOMA MEDS DEXAMETHASONE.  NOT ON THOSE MEDS SO NO LONGER ON RX. A1C IS GOOD.     Difficult intravenous access     DVT of leg (deep venous thrombosis) ([address] 05/2017    RIGHT-TAKES BLOOD THINNER ordered per dr. Sam Carreon provided by the [place]    Former tobacco use     QUIT 08/2018    GERD (gastroesophageal reflux disease)     Hematuria     History of blood transfusion 01/2018    AMEMIA TRANFUSED 2 UNITS NO REACTIONS    History of blood transfusion 02/2018    TRANFUSED 1 UNIT    History of blood transfusion 08/2018    OPEN HEART    Hx of blood clots 05/2017    RIGHT LEG DVT ON BLOOD THINNER    Hyperlipidemia 2009    ON RX    Hypertension 2004    ON RX DR Patricia MUNOZ    Multiple myeloma ([address] 2014    Neuropathy 2017    LEGS     Wears dentures     FULL UPPER, PARTIAL LOWER    Wellness examination     DR. RIVERA-PCP LAST SEEN MARCH 2020     Past Surgical History:   Procedure Laterality Date    ABDOMEN SURGERY  2003    STROMAL TUMOR    BLADDER TUMOR EXCISION  05/07/2020    CYSTOSCOPY, TURB TUMOR,    CARDIAC CATHETERIZATION  12/13/2017    right and left heart cath with Dr. Michael Arndt  12/17/2018    DR Dennise Cruz WITH IMPLANT Right 2018    CATARACT REMOVAL WITH IMPLANT Left 04/2019    COLONOSCOPY  01/14/2018    CYSTOSCOPY  08/08/2019    CYSTOSCOPY N/A 9/20/2019    CYSTOSCOPY, TUR BLADDER TUMOR WITH GYRUS performed by Ermias Omalley MD at [place] [address] N/A 12/6/2019    CYSTOSCOPY, TUR BLADDER TUMOR GYRUS performed by Ermias Omalley MD at [address] [facility] 5/7/2020    CYSTOSCOPY, TURB TUMOR, GYRUS performed by Ermias Omalley MD at [place] [address]  09/18/2020    CYSTOSCOPY  09/18/2020    CYSTOSCOPY N/A 9/18/2020    CYSTOSCOPY performed by Ermias Omalley MD at [place] [address]  02/2021    EYE SURGERY Right 2018    TEAR DUCT SURGERY.     EYE SURGERY Right 2018    CATARACT WITH IOL PLACED    EYE SURGERY Left 04/2019    CATARACT WITH IOL PLACED    HERNIA REPAIR Right 1967    INGUINAL    PACEMAKER PLACEMENT  12/17/2018    AICD PLACED    DC CABG, ARTERY-VEIN, SINGLE N/A 8/16/2018    CABG CORONARY ARTERY BYPASS ON  PUMP x 2, SWAN LAUREN, ORESTES (CSI# [id number]) performed by Marck Littlejohn MD at [address] FLX DX W/COLLJ Sydney 1976 PFRMD N/A 1/14/2018    COLONOSCOPY performed by Jimi Alberts MD at [address] ESOPHAGOGASTRODUODENOSCOPY TRANSORAL DIAGNOSTIC N/A 1/12/2018    EGD DIAGNOSTIC ONLY performed by Lyric Morris MD at [address] Right 04/2018    TURP  may 7th 2020       Family History   Problem Relation Age of Onset    Diabetes Mother     High Blood Pressure Mother     Stroke Father     High Blood Pressure Father     Other Sister         PE    Asthma Sister     Stroke Brother     Asthma Brother     Diabetes Brother     Heart Disease Paternal Grandfather     Prostate Cancer Paternal Grandfather     Asthma Sister     Asthma Sister     Asthma Brother     Asthma Brother     Asthma Sister        Social History     Tobacco Use    Smoking status: Former Smoker     Packs/day: 0.50     Years: 30.00     Pack years: 15.00     Types: Cigarettes     Quit date: 8/15/2018     Years since quitting: 3.5    Smokeless tobacco: Never Used    Tobacco comment: a little less than a pack a day   Substance Use Topics    Alcohol use: Not Currently     Comment: rarely      Current Outpatient Medications   Medication Sig Dispense Refill    torsemide (DEMADEX) 20 MG tablet TAKE TWO TABLETS BY MOUTH DAILY 172 tablet 3    potassium chloride (KLOR-CON M) 10 MEQ extended release tablet Take three tabs po in am and take two tabs po in pm. 150 tablet 1    megestrol (MEGACE) 20 MG tablet TAKE ONE TABLET BY MOUTH DAILY 30 tablet 3    fenofibrate micronized (LOFIBRA) 134 MG capsule Take 134 mg by mouth every morning (before breakfast)      aspirin 81 MG EC tablet Take 81 mg by mouth daily      pregabalin (LYRICA) 75 MG capsule Take 75 mg by mouth 2 times daily.       tamsulosin (FLOMAX) 0.4 MG capsule Take 1 capsule by mouth daily 90 capsule 3    rivaroxaban (XARELTO) 20 MG TABS tablet Take 1 tablet by mouth daily (with breakfast) HOLD for 3 days after surgery. OK to resume if urine clear 30 tablet 0    PARoxetine (PAXIL) 20 MG tablet Take 20 mg by mouth daily      traMADol (ULTRAM) 50 MG tablet Take 50 mg by mouth 2 times daily. Indications: pt takes it once daily      amiodarone (CORDARONE) 200 MG tablet Take 1 tablet by mouth daily 30 tablet 3    simvastatin (ZOCOR) 40 MG tablet Take 1 tablet by mouth nightly 30 tablet 3    metoprolol succinate (TOPROL XL) 25 MG extended release tablet Take 1 tablet by mouth daily 30 tablet 3    midodrine (PROAMATINE) 10 MG tablet Take 1 tablet by mouth 3 times daily (with meals) (Patient taking differently: Take 10 mg by mouth daily ) 90 tablet 3    traZODone (DESYREL) 50 MG tablet Take 1 tablet by mouth nightly as needed       albuterol sulfate  (90 Base) MCG/ACT inhaler Inhale 1 puff into the lungs every 6 hours as needed for Wheezing      famotidine (PEPCID) 20 MG tablet Take 1 tablet by mouth 2 times daily 60 tablet 3    metOLazone (ZAROXOLYN) 2.5 MG tablet Take one tab po every Mon and thurs am. Take 1/2 hour prior to other diuretic medicine. (Patient taking differently: Take 2.5 mg by mouth Take one tab po every Mon and thurs am. Take 1/2 hour prior to other diuretic medicine.) 24 tablet 0    senna (SENOKOT) 8.6 MG tablet Take 1 tablet by mouth nightly      ipratropium-albuterol (DUONEB) 0.5-2.5 (3) MG/3ML SOLN nebulizer solution Inhale 3 mLs into the lungs every 4 hours (while awake) 360 mL 0    ferrous sulfate 325 (65 Fe) MG EC tablet Take 1 tablet by mouth every other day 90 tablet 3    docusate sodium (COLACE, DULCOLAX) 100 MG CAPS Take 100 mg by mouth 2 times daily (Patient taking differently: Take 100 mg by mouth 2 times daily Indications: pt states he takes once daily )       No current facility-administered medications for this encounter.      Allergies   Allergen Reactions    Lisinopril Other (See Comments)     Dry cough    Nuts [Peanut-Containing Drug Products] Other (See Comments)     PEANUTS--abd pain CAN EAT PEANUT BUTTER JUST NOT RAW PEANUTS           Subjective:      Review of Systems   Constitutional: Negative for activity change, chills, fatigue and fever. Eyes: Negative for discharge and visual disturbance. Respiratory: Positive for cough and shortness of breath. Cardiovascular: Negative for chest pain and leg swelling. Gastrointestinal: Negative for abdominal pain and blood in stool. Endocrine: Negative for cold intolerance and heat intolerance. Genitourinary: Negative for dysuria and flank pain. Musculoskeletal: Negative for joint swelling and myalgias. Skin: Negative for pallor and rash. Ulcers on R leg   Neurological: Positive for light-headedness (rare with excessive walking). Negative for dizziness and headaches. Psychiatric/Behavioral: Negative for hallucinations and suicidal ideas. Objective:     Physical Exam  Vitals and nursing note reviewed. Constitutional:       Comments:      HENT:      Head: Normocephalic and atraumatic. Eyes:      General: No scleral icterus. Conjunctiva/sclera: Conjunctivae normal.   Cardiovascular:      Rate and Rhythm: Normal rate and regular rhythm. Heart sounds: Normal heart sounds. Pulmonary:      Effort: Pulmonary effort is normal.      Breath sounds: Normal breath sounds. No wheezing or rales. Abdominal:      General: Bowel sounds are normal.      Palpations: Abdomen is soft. Musculoskeletal:         General: Normal range of motion. Cervical back: Normal range of motion. Right lower leg: Edema present. Left lower leg: Edema present. Comments: Trace b/l LE    Skin:     General: Skin is warm and dry. Comments: Chronic skin lesions of R shin area   Neurological:      Mental Status: He is alert and oriented to person, place, and time.        /60   Pulse 96   Resp 20   Wt 184 lb 9.6 oz (83.7 kg)   SpO2 97%   BMI 28.07 kg/m²   O2 Device: None (Room air)       Lower Extremity Measurements in cm. R Calf Circumference (cm): 34.5 cm  L Calf Circumference (cm): 33 cm  R Ankle Circumference (cm): 22 cm  L Ankle Circumference (cm): 20 cm    CBC:   Lab Results   Component Value Date    WBC 7.1 04/27/2021    RBC 4.39 04/27/2021    HGB 12.4 04/27/2021    HCT 42.6 04/27/2021    MCV 97.0 04/27/2021    MCH 28.2 04/27/2021    MCHC 29.1 04/27/2021    RDW 13.3 04/27/2021     04/27/2021    MPV 11.1 04/27/2021     CMP:    Lab Results   Component Value Date     02/22/2022    K 3.9 02/22/2022    CL 97 02/22/2022    CO2 28 02/22/2022    BUN 31 02/22/2022    CREATININE 1.71 02/22/2022    GFRAA 48 02/22/2022    LABGLOM 39 02/22/2022    GLUCOSE 111 02/22/2022    PROT 8.1 12/07/2020    PROT 8.0 12/07/2020    LABALBU 4.6 12/07/2020    LABALBU 4.6 12/07/2020    CALCIUM 10.4 02/22/2022    BILITOT 0.69 12/07/2020    BILITOT 0.69 12/07/2020    ALKPHOS 64 12/07/2020    ALKPHOS 64 12/07/2020    AST 29 10/04/2021    ALT 13 10/04/2021     Lab Results   Component Value Date    LABA1C 5.7 12/07/2020       Summary  Dilated left ventricle with severely reduced systolic function. Calculated ejection fraction via Arthur's Method is 12.4 %  Evidence of diastolic dysfunction. Left atrium is severely dilated. Normal right ventricle size with reduced systolic function. AICD lead seen in right atrium/ventricle. Mildly thickened mitral valve leaflets. Moderate mitral regurgitation, central jet. Moderate tricuspid regurgitation. Trivial pulmonic insufficiency. Small circumferential pericardial effusion. Pleural effusion is noted.     Signature  ----------------------------------------------------------------------------   Electronically signed by Elsi Long(Sonographer) on 06/05/2019 02:36  Will search [facility] chart for updated Echo       :Assessment      1. Chronic systolic congestive heart failure ([address]    2. Ischemic cardiomyopathy    3. Hypokalemia        :Plan      1. Chronic systolic congestive heart failure ([facility]    2. Ischemic cardiomyopathy    3. Hypokalemia           Wt up 6.6 lbs   leg measurements are up slightly   Optivol Fluid Index shows increase despite demadex 40 mg qd (  and KCl now 70 meq)       On Guideline-Directed Medication Therapy:   Beta - blocker  Diuretic Therapy      no   ACEI / ARB / ARNi: d/t CKD    Pt will see Nephrology in 4 days. CHF Clinic Will update Nephrology office : pt has increased fluid on Optivol Fluid Index , has wt increase. Trace edema on exam,   leg measurements are up. Lungs are clear. Pt declares will make dietary changes: stop with salt shaker, stop canned vegetables. No med changes today. RTC 1 month. No orders of the defined types were placed in this encounter. No orders of the defined types were placed in this encounter. Patientgiven verbal and/or written educational instructions. Follow up as directed. I have reviewed and agree with the nursing documentation. Verbally reviewed medication list with patient; patient verbalized understanding. Discussed 2000mg/day sodium restricted diet; patient verbalized understanding. Moderate daily exercise encouraged as tolerated. Discussed rest breaks as needed; patient verbalized understanding. Patient instructed to weigh self at the same time of each day, using same clothes and same scale; reinforced teaching to monitor for 3-5 lb weight increase over 1-2 days, and to notify the CHF clinic at [phone] or physician office if weight change noted. Patient verbalized understanding. Risks of smoking discussed with the patient if applicable; patient strongly discouraged to smoke. Patient verbalized understanding. Signs and symptoms of CHF discussed with patient, such as feeling more tired than normal, feeling short of breath, coughing that increases when you lie down, sudden weight gain, swelling of your feet, legs or belly.   Patient verbalized understanding to notify the CHF clinic at [phone] or physician office if these symptoms occur. Compliance with plan of care and further disease process causes discussed with patient, patient encouraged to keep all follow up appointments. Patient verbalized understanding. Echocardiogram reviewed. Labs reviewed. Medications reviewed.       Electronically signedby CASSY Kearns CNP on 2/28/2022 at 12:33 PM

## 2022-03-10 ENCOUNTER — TELEPHONE (OUTPATIENT)
Dept: CARDIOLOGY CLINIC | Age: 77
End: 2022-03-10

## 2022-03-10 RX ORDER — METOLAZONE 2.5 MG/1
TABLET ORAL
Qty: 24 TABLET | Refills: 1 | Status: SHIPPED | OUTPATIENT
Start: 2022-03-10 | End: 2022-09-12 | Stop reason: SDUPTHER

## 2022-03-10 NOTE — TELEPHONE ENCOUNTER
Writer received message pt needs metolazone refill. Pt saw Nephrology recently and no changes made to this Rx. Pt's KCl was reduced from 70 meq to 50 meq qd and aldactone 25 mg qd  was added.

## 2022-03-11 ENCOUNTER — HOSPITAL ENCOUNTER (OUTPATIENT)
Age: 77
Setting detail: SPECIMEN
Discharge: HOME | End: 2022-03-11

## 2022-03-11 DIAGNOSIS — N18.30 STAGE 3 CHRONIC KIDNEY DISEASE, UNSPECIFIED WHETHER STAGE 3A OR 3B CKD (HCC): ICD-10-CM

## 2022-03-11 LAB
ALBUMIN SERPL-MCNC: 4.4 G/DL (ref 3.5–5.2)
ALBUMIN/GLOBULIN RATIO: 1.9 (ref 1–2.5)
ALP BLD-CCNC: 58 U/L (ref 40–129)
ALT SERPL-CCNC: 12 U/L (ref 5–41)
ANION GAP SERPL CALCULATED.3IONS-SCNC: 16 MMOL/L (ref 9–17)
ANION GAP SERPL CALCULATED.3IONS-SCNC: 18 MMOL/L (ref 9–17)
AST SERPL-CCNC: 21 U/L
BILIRUB SERPL-MCNC: 0.52 MG/DL (ref 0.3–1.2)
BUN BLDV-MCNC: 36 MG/DL (ref 8–23)
BUN BLDV-MCNC: 36 MG/DL (ref 8–23)
CALCIUM SERPL-MCNC: 10.4 MG/DL (ref 8.6–10.4)
CALCIUM SERPL-MCNC: 10.5 MG/DL (ref 8.6–10.4)
CHLORIDE BLD-SCNC: 95 MMOL/L (ref 98–107)
CHLORIDE BLD-SCNC: 95 MMOL/L (ref 98–107)
CO2: 30 MMOL/L (ref 20–31)
CO2: 31 MMOL/L (ref 20–31)
CREAT SERPL-MCNC: 1.89 MG/DL (ref 0.7–1.2)
CREAT SERPL-MCNC: 1.91 MG/DL (ref 0.7–1.2)
GFR AFRICAN AMERICAN: 42 ML/MIN
GFR AFRICAN AMERICAN: 42 ML/MIN
GFR NON-AFRICAN AMERICAN: 35 ML/MIN
GFR NON-AFRICAN AMERICAN: 35 ML/MIN
GFR SERPL CREATININE-BSD FRML MDRD: ABNORMAL ML/MIN/{1.73_M2}
GFR SERPL CREATININE-BSD FRML MDRD: ABNORMAL ML/MIN/{1.73_M2}
GLUCOSE BLD-MCNC: 196 MG/DL (ref 70–99)
GLUCOSE BLD-MCNC: 197 MG/DL (ref 70–99)
POTASSIUM SERPL-SCNC: 4.3 MMOL/L (ref 3.7–5.3)
POTASSIUM SERPL-SCNC: 4.3 MMOL/L (ref 3.7–5.3)
PTH INTACT: 59.26 PG/ML (ref 15–65)
SODIUM BLD-SCNC: 142 MMOL/L (ref 135–144)
SODIUM BLD-SCNC: 143 MMOL/L (ref 135–144)
THYROXINE, FREE: 1.64 NG/DL (ref 0.93–1.7)
TOTAL PROTEIN: 6.7 G/DL (ref 6.4–8.3)
TSH SERPL DL<=0.05 MIU/L-ACNC: 0.87 MIU/L (ref 0.3–5)
VITAMIN D 25-HYDROXY: 33.6 NG/ML

## 2022-03-14 ENCOUNTER — HOSPITAL ENCOUNTER (OUTPATIENT)
Age: 77
Setting detail: SPECIMEN
Discharge: HOME | End: 2022-03-14

## 2022-03-15 LAB
CALCIUM URINE: 4.2 MG/DL
CALCIUM, URINE: 63 MG/24 H (ref 25–300)
CREATININE URINE: 63.4 MG/DL
CREATININE, 24H UR: 953 MG/24 H (ref 1040–2350)
HOURS COLLECTED: 24 H
HOURS COLLECTED: 24 H
VOLUME: 1503 ML
VOLUME: 1503 ML

## 2022-03-28 ENCOUNTER — HOSPITAL ENCOUNTER (OUTPATIENT)
Dept: OTHER | Age: 77
Discharge: HOME | End: 2022-03-28
Payer: MEDICARE

## 2022-03-28 VITALS
OXYGEN SATURATION: 99 % | HEART RATE: 70 BPM | WEIGHT: 183.4 LBS | SYSTOLIC BLOOD PRESSURE: 106 MMHG | BODY MASS INDEX: 27.89 KG/M2 | RESPIRATION RATE: 20 BRPM | DIASTOLIC BLOOD PRESSURE: 72 MMHG

## 2022-03-28 PROCEDURE — 99212 OFFICE O/P EST SF 10 MIN: CPT

## 2022-03-28 ASSESSMENT — PAIN DESCRIPTION - LOCATION: LOCATION: FOOT

## 2022-03-28 ASSESSMENT — PAIN DESCRIPTION - ORIENTATION: ORIENTATION: RIGHT;LEFT

## 2022-03-28 ASSESSMENT — PAIN DESCRIPTION - PAIN TYPE: TYPE: CHRONIC PAIN

## 2022-03-28 ASSESSMENT — PAIN SCALES - GENERAL: PAINLEVEL_OUTOF10: 4

## 2022-03-28 NOTE — PROGRESS NOTES
Date:  3/28/2022  Time:  11:50 AM    CHF Clinic at [facility]    Office: [phone] Fax: [phone]    Re:  Arelis Salinas. Patient : 1945    Vital Signs: /72   Pulse 70   Resp 20   Wt 183 lb 6.4 oz (83.2 kg)   SpO2 99%   BMI 27.89 kg/m²                       O2 Device: None (Room air)                           No results for input(s): CBC, HGB, HCT, WBC, PLATELET, NA, K, CL, CO2, BUN, CREATININE, GLUCOSE, BNP, INR in the last 72 hours. Respiratory:    Assessment  Charting Type: Reassessment    Breath Sounds  Right Upper Lobe: Clear  Right Middle Lobe: Clear  Right Lower Lobe: Clear,Diminished  Left Upper Lobe: Clear  Left Lower Lobe: Clear,Diminished    Cough/Sputum  Cough: Productive  Frequency: Infrequent  Sputum Amount: Small  Sputum Color: Clear       Peripheral Vascular  RLE Edema: None  LLE Edema: None    Complaints: Nothing new      Comment : Patient here ambulatory for routine visit. Weight down 0.5 lbs in one month. No new or acute s/s CHF. No cardiosight reading done today. Patient saw Dr Jazmín Colvin on 3/25/2022 who noted fluid index \"remains elevated but is returning to baseline average. \" Patient also saw Dr Shelley Villa recently. Labs were drawn and aldactone added, KCl decreased to 50 meq daily. Also BMP in one week. Discussed low salt diet and fluid limits. States compliance with medications. Takes Zaroxolyn 2.5 mg every Monday and Thursday. Demadex 40 mg daily and new aldactone. Next visit here 4 weeks 2022. No other needs or concerns voiced.      Electronically signed by Hilary Corley RN on 3/28/2022 at 11:50 AM

## 2022-04-19 ENCOUNTER — TELEPHONE (OUTPATIENT)
Dept: CARDIOLOGY | Age: 77
End: 2022-04-19

## 2022-04-19 RX ORDER — POTASSIUM CHLORIDE 750 MG/1
TABLET, EXTENDED RELEASE ORAL
Qty: 150 TABLET | Refills: 3 | Status: SHIPPED | OUTPATIENT
Start: 2022-04-19 | End: 2022-09-12 | Stop reason: SDUPTHER

## 2022-04-25 ENCOUNTER — HOSPITAL ENCOUNTER (OUTPATIENT)
Dept: OTHER | Age: 77
Discharge: HOME | End: 2022-04-25
Payer: MEDICARE

## 2022-04-25 VITALS
OXYGEN SATURATION: 96 % | HEART RATE: 84 BPM | DIASTOLIC BLOOD PRESSURE: 82 MMHG | BODY MASS INDEX: 27.83 KG/M2 | WEIGHT: 183 LBS | SYSTOLIC BLOOD PRESSURE: 112 MMHG | RESPIRATION RATE: 20 BRPM

## 2022-04-25 PROCEDURE — 99213 OFFICE O/P EST LOW 20 MIN: CPT

## 2022-04-25 ASSESSMENT — PAIN SCALES - GENERAL: PAINLEVEL_OUTOF10: 0

## 2022-04-25 NOTE — PROGRESS NOTES
Date:  2022  Time:  2:07 PM    CHF Clinic at [facility]    Office: [phone] Fax: [phone]    Re:  Lizzie Carpio. Patient : 1945    Vital Signs: /82   Pulse 84   Resp 20   Wt 183 lb (83 kg)   SpO2 96%   BMI 27.83 kg/m²                       O2 Device: None (Room air)                           No results for input(s): CBC, HGB, HCT, WBC, PLATELET, NA, K, CL, CO2, BUN, CREATININE, GLUCOSE, BNP, INR in the last 72 hours. Respiratory:    Assessment  Charting Type: Reassessment    Breath Sounds  Right Upper Lobe: Clear  Right Middle Lobe: Clear  Right Lower Lobe: Clear,Diminished  Left Upper Lobe: Clear  Left Lower Lobe: Clear,Diminished    Cough/Sputum  Cough: Productive  Frequency: Infrequent  Sputum Amount: Small  Sputum Color: Clear       Peripheral Vascular  RLE Edema: None  LLE Edema: None    Complaints: \"I am out of potassium\"    Comment : Patient here ambulatory for routine visit. Weight stable. No new or acute s/s CHF. OptiVol fluid index with a recent drastic decrease. Discussed low salt diet and fluid limits. States compliance with meds. Zaroxolyn is 2.5 mg Monday and Thursday only. Demadex 20 mg 2x day. Spoke with Yuli Tellez CNP. Potassium 10 meq 3 tablets in a.m and 2 tablets p.m. #150 with 3 refills phoned in to [facility] in [facility] douglas Medrano. Next visit here 6 weeks 2022.     Electronically signed by Stuart Justni RN on 2022 at 2:07 PM

## 2022-05-23 ENCOUNTER — HOSPITAL ENCOUNTER (OUTPATIENT)
Dept: OTHER | Age: 77
Discharge: HOME | End: 2022-05-23
Payer: MEDICARE

## 2022-05-23 VITALS
DIASTOLIC BLOOD PRESSURE: 78 MMHG | BODY MASS INDEX: 28.01 KG/M2 | HEART RATE: 83 BPM | SYSTOLIC BLOOD PRESSURE: 120 MMHG | OXYGEN SATURATION: 99 % | WEIGHT: 184.2 LBS | RESPIRATION RATE: 20 BRPM

## 2022-05-23 PROCEDURE — 99212 OFFICE O/P EST SF 10 MIN: CPT

## 2022-05-23 NOTE — PROGRESS NOTES
Date:  2022  Time:  12:29 PM    CHF Clinic at [address] [place] [address]    Office: [phone] Fax: [phone]    Re:  Ric Schwartz. Patient : 1945    Vital Signs: /78   Pulse 83   Resp 20   Wt 184 lb 3.2 oz (83.6 kg)   SpO2 99%   BMI 28.01 kg/m²                       O2 Device: None (Room air)                           No results for input(s): CBC, HGB, HCT, WBC, PLATELET, NA, K, CL, CO2, BUN, CREATININE, GLUCOSE, BNP, INR in the last 72 hours. Respiratory:         Breath Sounds  Right Upper Lobe: Clear  Right Middle Lobe: Clear  Right Lower Lobe: Clear  Left Upper Lobe: Clear  Left Lower Lobe: Clear              Peripheral Vascular  RLE Edema: None  LLE Edema: None      Complaints: no complaints      Comment : Wt is up 1.2 lbs in one month. Pt here for CHF reassessment. He denies increased shortness of breath, chest pain, dizziness or any c/o. No acute S/S of CHF noted today. Cardiosight optivol fluid index reading shows decreased fluid levels today. He continues to take his current diuretics Demadex 20 mg two tablets daily, Zaroxolyn 2.5 mg po on Monday and Thursday only and Aldactone 25 mg once daily. Pt states he has an appt coming up with Dr Bernabe Crouch and needs to get some labs done for that appointment. Writer printed out all future lab orders from Dr Bernabe Crouch for expected date of 22. Pt also has some labs ordered per Dr Madhavi Parham that he received in the mail. Pt states he will get all  labs done soon. Reminded pt of the importance of following a low sodium diet and fluid limits of 2 liters daily. Pt states \" that's hard to do\". No other issues or complaints. Next f/u here in one month 22 at 1030 a.m.     Electronically signed by Rosalia Nance RN on 2022 at 12:29 PM

## 2022-06-13 ENCOUNTER — HOSPITAL ENCOUNTER (OUTPATIENT)
Age: 77
Setting detail: SPECIMEN
Discharge: HOME | End: 2022-06-13

## 2022-06-13 DIAGNOSIS — N18.30 STAGE 3 CHRONIC KIDNEY DISEASE, UNSPECIFIED WHETHER STAGE 3A OR 3B CKD (HCC): ICD-10-CM

## 2022-06-13 LAB
ABSOLUTE EOS #: 0.07 K/UL (ref 0–0.44)
ABSOLUTE IMMATURE GRANULOCYTE: 0.06 K/UL (ref 0–0.3)
ABSOLUTE LYMPH #: 1.63 K/UL (ref 1.1–3.7)
ABSOLUTE MONO #: 0.88 K/UL (ref 0.1–1.2)
ALBUMIN SERPL-MCNC: 4.4 G/DL (ref 3.5–5.2)
ALBUMIN/GLOBULIN RATIO: 1.8 (ref 1–2.5)
ALP BLD-CCNC: 54 U/L (ref 40–129)
ALT SERPL-CCNC: 13 U/L (ref 5–41)
ALT SERPL-CCNC: 13 U/L (ref 5–41)
ANION GAP SERPL CALCULATED.3IONS-SCNC: 16 MMOL/L (ref 9–17)
ANION GAP SERPL CALCULATED.3IONS-SCNC: 18 MMOL/L (ref 9–17)
AST SERPL-CCNC: 20 U/L
AST SERPL-CCNC: 21 U/L
BASOPHILS # BLD: 1 % (ref 0–2)
BASOPHILS ABSOLUTE: 0.04 K/UL (ref 0–0.2)
BILIRUB SERPL-MCNC: 0.32 MG/DL (ref 0.3–1.2)
BUN BLDV-MCNC: 55 MG/DL (ref 8–23)
BUN BLDV-MCNC: 59 MG/DL (ref 8–23)
CALCIUM IONIZED: 1.4 MMOL/L (ref 1.13–1.33)
CALCIUM SERPL-MCNC: 10.5 MG/DL (ref 8.6–10.4)
CALCIUM SERPL-MCNC: 10.8 MG/DL (ref 8.6–10.4)
CHLORIDE BLD-SCNC: 96 MMOL/L (ref 98–107)
CHLORIDE BLD-SCNC: 97 MMOL/L (ref 98–107)
CO2: 25 MMOL/L (ref 20–31)
CO2: 26 MMOL/L (ref 20–31)
CREAT SERPL-MCNC: 2.23 MG/DL (ref 0.7–1.2)
CREAT SERPL-MCNC: 2.42 MG/DL (ref 0.7–1.2)
CREATININE URINE: 101.3 MG/DL (ref 39–259)
EOSINOPHILS RELATIVE PERCENT: 1 % (ref 1–4)
GFR AFRICAN AMERICAN: 32 ML/MIN
GFR AFRICAN AMERICAN: 35 ML/MIN
GFR NON-AFRICAN AMERICAN: 26 ML/MIN
GFR NON-AFRICAN AMERICAN: 29 ML/MIN
GFR SERPL CREATININE-BSD FRML MDRD: ABNORMAL ML/MIN/{1.73_M2}
GFR SERPL CREATININE-BSD FRML MDRD: ABNORMAL ML/MIN/{1.73_M2}
GLUCOSE BLD-MCNC: 137 MG/DL (ref 70–99)
GLUCOSE BLD-MCNC: 154 MG/DL (ref 70–99)
HCT VFR BLD CALC: 45 % (ref 40.7–50.3)
HEMOGLOBIN: 13.7 G/DL (ref 13–17)
IMMATURE GRANULOCYTES: 1 %
LYMPHOCYTES # BLD: 22 % (ref 24–43)
MCH RBC QN AUTO: 29.6 PG (ref 25.2–33.5)
MCHC RBC AUTO-ENTMCNC: 30.4 G/DL (ref 28.4–34.8)
MCV RBC AUTO: 97.2 FL (ref 82.6–102.9)
MONOCYTES # BLD: 12 % (ref 3–12)
NRBC AUTOMATED: 0 PER 100 WBC
PDW BLD-RTO: 12 % (ref 11.8–14.4)
PHOSPHORUS: 3.1 MG/DL (ref 2.5–4.5)
PLATELET # BLD: 288 K/UL (ref 138–453)
PMV BLD AUTO: 10.4 FL (ref 8.1–13.5)
POTASSIUM SERPL-SCNC: 4.2 MMOL/L (ref 3.7–5.3)
POTASSIUM SERPL-SCNC: 4.3 MMOL/L (ref 3.7–5.3)
PTH INTACT: 51.31 PG/ML (ref 15–65)
PTH INTACT: 51.39 PG/ML (ref 15–65)
RBC # BLD: 4.63 M/UL (ref 4.21–5.77)
SEG NEUTROPHILS: 63 % (ref 36–65)
SEGMENTED NEUTROPHILS ABSOLUTE COUNT: 4.67 K/UL (ref 1.5–8.1)
SODIUM BLD-SCNC: 138 MMOL/L (ref 135–144)
SODIUM BLD-SCNC: 140 MMOL/L (ref 135–144)
T3 FREE: 3.17 PG/ML (ref 2.02–4.43)
THYROXINE, FREE: 1.56 NG/DL (ref 0.93–1.7)
TOTAL PROTEIN, URINE: 5 MG/DL
TOTAL PROTEIN: 6.9 G/DL (ref 6.4–8.3)
TSH SERPL DL<=0.05 MIU/L-ACNC: 1.15 UIU/ML (ref 0.3–5)
VITAMIN D 25-HYDROXY: 26.7 NG/ML
VITAMIN D 25-HYDROXY: 28.5 NG/ML
WBC # BLD: 7.4 K/UL (ref 3.5–11.3)

## 2022-06-16 ENCOUNTER — HOSPITAL ENCOUNTER (OUTPATIENT)
Age: 77
Setting detail: SPECIMEN
Discharge: HOME | End: 2022-06-16

## 2022-06-16 DIAGNOSIS — N18.30 STAGE 3 CHRONIC KIDNEY DISEASE, UNSPECIFIED WHETHER STAGE 3A OR 3B CKD (HCC): ICD-10-CM

## 2022-06-16 LAB
ANION GAP SERPL CALCULATED.3IONS-SCNC: 14 MMOL/L (ref 9–17)
BUN BLDV-MCNC: 45 MG/DL (ref 8–23)
CALCIUM SERPL-MCNC: 10.5 MG/DL (ref 8.6–10.4)
CHLORIDE BLD-SCNC: 95 MMOL/L (ref 98–107)
CO2: 31 MMOL/L (ref 20–31)
CREAT SERPL-MCNC: 2.03 MG/DL (ref 0.7–1.2)
GFR AFRICAN AMERICAN: 39 ML/MIN
GFR NON-AFRICAN AMERICAN: 32 ML/MIN
GFR SERPL CREATININE-BSD FRML MDRD: ABNORMAL ML/MIN/{1.73_M2}
GLUCOSE BLD-MCNC: 108 MG/DL (ref 70–99)
POTASSIUM SERPL-SCNC: 3.6 MMOL/L (ref 3.7–5.3)
SODIUM BLD-SCNC: 140 MMOL/L (ref 135–144)

## 2022-06-20 ENCOUNTER — HOSPITAL ENCOUNTER (OUTPATIENT)
Age: 77
Setting detail: SPECIMEN
Discharge: HOME | End: 2022-06-20

## 2022-06-20 ENCOUNTER — HOSPITAL ENCOUNTER (OUTPATIENT)
Dept: OTHER | Age: 77
Discharge: HOME | End: 2022-06-20
Payer: MEDICARE

## 2022-06-20 VITALS
SYSTOLIC BLOOD PRESSURE: 108 MMHG | RESPIRATION RATE: 20 BRPM | DIASTOLIC BLOOD PRESSURE: 75 MMHG | WEIGHT: 179.8 LBS | OXYGEN SATURATION: 99 % | BODY MASS INDEX: 27.34 KG/M2 | HEART RATE: 77 BPM

## 2022-06-20 DIAGNOSIS — I50.22 CHRONIC SYSTOLIC CONGESTIVE HEART FAILURE (HCC): Primary | Chronic | ICD-10-CM

## 2022-06-20 DIAGNOSIS — N18.30 STAGE 3 CHRONIC KIDNEY DISEASE, UNSPECIFIED WHETHER STAGE 3A OR 3B CKD (HCC): ICD-10-CM

## 2022-06-20 LAB
ANION GAP SERPL CALCULATED.3IONS-SCNC: 15 MMOL/L (ref 9–17)
BUN BLDV-MCNC: 44 MG/DL (ref 8–23)
CALCIUM SERPL-MCNC: 10.8 MG/DL (ref 8.6–10.4)
CHLORIDE BLD-SCNC: 97 MMOL/L (ref 98–107)
CO2: 28 MMOL/L (ref 20–31)
CREAT SERPL-MCNC: 2.19 MG/DL (ref 0.7–1.2)
GFR AFRICAN AMERICAN: 36 ML/MIN
GFR NON-AFRICAN AMERICAN: 30 ML/MIN
GFR SERPL CREATININE-BSD FRML MDRD: ABNORMAL ML/MIN/{1.73_M2}
GLUCOSE BLD-MCNC: 105 MG/DL (ref 70–99)
POTASSIUM SERPL-SCNC: 4.1 MMOL/L (ref 3.7–5.3)
SODIUM BLD-SCNC: 140 MMOL/L (ref 135–144)

## 2022-06-20 PROCEDURE — 99213 OFFICE O/P EST LOW 20 MIN: CPT | Performed by: NURSE PRACTITIONER

## 2022-06-20 PROCEDURE — 99212 OFFICE O/P EST SF 10 MIN: CPT

## 2022-06-20 ASSESSMENT — ENCOUNTER SYMPTOMS
BLOOD IN STOOL: 0
EYE DISCHARGE: 0
COUGH: 0
SHORTNESS OF BREATH: 0
ABDOMINAL PAIN: 0

## 2022-06-20 NOTE — PROGRESS NOTES
CHF Clinic at [facility]    Office: [phone] Fax: [phone]  [phone]  Dept: [phone]  Loc: [phone]    Delwyn Homans. is a 78 y.o. male who presents today for CHF evaluation. HPI:     Denies shortness of breath  +   Fatigue, at baseline  Denies Edema , feels it has improved   Denies chest pain   Denies  palpitations   Pt reports compliance with fluids <= 2L . The patient reports compliance with low Na+ intake. Pt follows closely with Nephrology  Aldactone receently added. Nephrology has been ordering labs as renal chemistry is over pt's baseline. He plans to get labs today as well        Past Medical History:   Diagnosis Date    AICD (automatic cardioverter/defibrillator) present 12/2018    [address] [place] [address] Drive    Anesthesia complication     POTENTIAL ONLY. PATIENT HAS A 1500 ML FLUID RESTRICTION. WIFE CONCERNED PATIENT RECEIVED TOO MUCH IV FLUIDS DURING PATIENTS LAST SURGERY.  Anticoagulated     XARELTO    Arthritis     all over    Bladder cancer ([address] 2019    CAD (coronary artery disease) 2018    OPEN HEART CABG X 2 BYPASS DR Vito Santos CARDIOLOGIST    Cardiomyopathy ([facility] [address] 2018    CHF (congestive heart failure) ([facility] [address] 06/2019    FLUID RESTRICTION 1500ML/24 HRS    Chronic kidney disease     STAGE 3    Constipation     COPD (chronic obstructive pulmonary disease) ([address] 2009    INHALER USE AS NEEDED    Diabetes mellitus ([address] 2015    R/T MYELOMA MEDS DEXAMETHASONE.  NOT ON THOSE MEDS SO NO LONGER ON RX. A1C IS GOOD.     Difficult intravenous access     DVT of leg (deep venous thrombosis) ([address] 05/2017    RIGHT-TAKES BLOOD THINNER ordered per dr. Kriss Benz provided by the [facility] Former tobacco use     QUIT 08/2018    GERD (gastroesophageal reflux disease)     Hematuria     History of blood transfusion 01/2018    AMEMIA TRANFUSED 2 UNITS NO REACTIONS    History of blood transfusion 02/2018    TRANFUSED 1 UNIT    History of blood transfusion 08/2018    OPEN HEART    Hx of blood clots 05/2017    RIGHT LEG DVT ON BLOOD THINNER    Hyperlipidemia 2009    ON RX    Hypertension 2004    ON RX DR Navjot MUNOZ    Multiple myeloma ([facility] [address] 2014    Neuropathy 2017    LEGS     Wears dentures     FULL UPPER, PARTIAL LOWER    Wellness examination     DR. RIVERA-PCP LAST SEEN MARCH 2020     Past Surgical History:   Procedure Laterality Date    ABDOMEN SURGERY  2003    STROMAL TUMOR    BLADDER TUMOR EXCISION  05/07/2020    CYSTOSCOPY, TURB TUMOR,    CARDIAC CATHETERIZATION  12/13/2017    right and left heart cath with Dr. Mckeon Bone  12/17/2018    DR Dennise Cruz WITH IMPLANT Right 2018    CATARACT REMOVAL WITH IMPLANT Left 04/2019    COLONOSCOPY  01/14/2018    CYSTOSCOPY  08/08/2019    CYSTOSCOPY N/A 9/20/2019    CYSTOSCOPY, TUR BLADDER TUMOR WITH GYRUS performed by Santi Locke MD at [address] N/A 12/6/2019    CYSTOSCOPY, TUR BLADDER TUMOR GYRUS performed by Santi Locke MD at [facility] 5/7/2020    CYSTOSCOPY, TURB TUMOR, GYRUS performed by Santi Locke MD at [address] [place]  09/18/2020    CYSTOSCOPY  09/18/2020    CYSTOSCOPY N/A 9/18/2020    CYSTOSCOPY performed by Santi Locke MD at [address] [place]  02/2021    EYE SURGERY Right 2018    TEAR DUCT SURGERY.     EYE SURGERY Right 2018    CATARACT WITH IOL PLACED    EYE SURGERY Left 04/2019    CATARACT WITH IOL PLACED    HERNIA REPAIR Right 1967    INGUINAL    PACEMAKER PLACEMENT  12/17/2018    AICD PLACED    [place] CABG, ARTERY-VEIN, SINGLE N/A 8/16/2018    CABG CORONARY ARTERY BYPASS ON  PUMP x 2, SWAN LAUREN, ORESTES (CSI# [id number]) performed by Harvinder Lyon MD at [address] FLX DX W/TRAMAINE [place] 1976 PFRMD N/A 1/14/2018    COLONOSCOPY performed by Johnny Donovan MD at [facility] [address] ESOPHAGOGASTRODUODENOSCOPY TRANSORAL DIAGNOSTIC N/A 1/12/2018    EGD DIAGNOSTIC ONLY performed by Ivy Ang MD at [address] Right 04/2018    [facility]  may 7th 2020       Family History   Problem Relation Age of Onset    Diabetes Mother     High Blood Pressure Mother     Stroke Father     High Blood Pressure Father     Other Sister         PE    Asthma Sister     Stroke Brother     Asthma Brother     Diabetes Brother     Heart Disease Paternal Grandfather     Prostate Cancer Paternal Grandfather     Asthma Sister     Asthma Sister     Asthma Brother     Asthma Brother     Asthma Sister        Social History     Tobacco Use    Smoking status: Former Smoker     Packs/day: 0.50     Years: 30.00     Pack years: 15.00     Types: Cigarettes     Quit date: 8/15/2018     Years since quitting: 3.8    Smokeless tobacco: Never Used    Tobacco comment: a little less than a pack a day   Substance Use Topics    Alcohol use: Not Currently     Comment: rarely      Current Outpatient Medications   Medication Sig Dispense Refill    spironolactone (ALDACTONE) 25 MG tablet TAKE ONE TABLET BY MOUTH DAILY 30 tablet 0    potassium chloride (KLOR-CON M) 10 MEQ extended release tablet Take three tabs po in am and take two tabs po in pm. 150 tablet 3    metOLazone (ZAROXOLYN) 2.5 MG tablet Take one tab po every Mon and thurs am. Take 1/2 hour prior to other diuretic medicine. 24 tablet 1    torsemide (DEMADEX) 20 MG tablet TAKE TWO TABLETS BY MOUTH DAILY 172 tablet 3    megestrol (MEGACE) 20 MG tablet TAKE ONE TABLET BY MOUTH DAILY 30 tablet 3    fenofibrate micronized (LOFIBRA) 134 MG capsule Take 134 mg by mouth every morning (before breakfast)       aspirin 81 MG EC tablet Take 81 mg by mouth daily      pregabalin (LYRICA) 75 MG capsule Take 75 mg by mouth 2 times daily.       tamsulosin (FLOMAX) 0.4 MG capsule Take 1 capsule by mouth daily 90 capsule 3    rivaroxaban (XARELTO) 20 MG TABS tablet Take 1 tablet by mouth daily (with breakfast) HOLD for 3 days after surgery. OK to resume if urine clear 30 tablet 0    PARoxetine (PAXIL) 20 MG tablet Take 20 mg by mouth daily      traMADol (ULTRAM) 50 MG tablet Take 50 mg by mouth 2 times daily. Indications: pt takes it once daily      senna (SENOKOT) 8.6 MG tablet Take 1 tablet by mouth nightly      amiodarone (CORDARONE) 200 MG tablet Take 1 tablet by mouth daily 30 tablet 3    ipratropium-albuterol (DUONEB) 0.5-2.5 (3) MG/3ML SOLN nebulizer solution Inhale 3 mLs into the lungs every 4 hours (while awake) 360 mL 0    simvastatin (ZOCOR) 40 MG tablet Take 1 tablet by mouth nightly 30 tablet 3    metoprolol succinate (TOPROL XL) 25 MG extended release tablet Take 1 tablet by mouth daily 30 tablet 3    ferrous sulfate 325 (65 Fe) MG EC tablet Take 1 tablet by mouth every other day 90 tablet 3    midodrine (PROAMATINE) 10 MG tablet Take 1 tablet by mouth 3 times daily (with meals) (Patient taking differently: Take 10 mg by mouth daily ) 90 tablet 3    docusate sodium (COLACE, DULCOLAX) 100 MG CAPS Take 100 mg by mouth 2 times daily (Patient taking differently: Take 100 mg by mouth 2 times daily Indications: pt states he takes once daily )      traZODone (DESYREL) 50 MG tablet Take 1 tablet by mouth nightly as needed       albuterol sulfate  (90 Base) MCG/ACT inhaler Inhale 1 puff into the lungs every 6 hours as needed for Wheezing      famotidine (PEPCID) 20 MG tablet Take 1 tablet by mouth 2 times daily 60 tablet 3     No current facility-administered medications for this encounter. Allergies   Allergen Reactions    Lisinopril Other (See Comments)     Dry cough    Nuts [Peanut-Containing Drug Products] Other (See Comments)     PEANUTS--abd pain CAN EAT PEANUT BUTTER JUST NOT RAW PEANUTS           Subjective:      Review of Systems   Constitutional: Positive for fatigue. Negative for activity change, chills and fever.    Eyes: Negative for discharge and visual disturbance. Respiratory: Negative for cough and shortness of breath. Cardiovascular: Negative for chest pain and leg swelling. Gastrointestinal: Negative for abdominal pain and blood in stool. Endocrine: Negative for cold intolerance and heat intolerance. Genitourinary: Negative for dysuria and flank pain. Musculoskeletal: Positive for gait problem (using cane). Negative for joint swelling and myalgias. Skin: Positive for wound. Negative for pallor and rash. Wound on R calf getting better . Started as a blister   Neurological: Negative for dizziness and headaches. Psychiatric/Behavioral: Negative for hallucinations and suicidal ideas. Objective:     Physical Exam  Vitals and nursing note reviewed. Constitutional:       Comments:      HENT:      Head: Normocephalic and atraumatic. Eyes:      General: No scleral icterus. Conjunctiva/sclera: Conjunctivae normal.   Cardiovascular:      Rate and Rhythm: Normal rate and regular rhythm. Heart sounds: Normal heart sounds. Comments: AICD noted in L chest wall    Pulmonary:      Effort: Pulmonary effort is normal.      Breath sounds: Normal breath sounds. No wheezing or rales. Abdominal:      Palpations: Abdomen is soft. Musculoskeletal:      Cervical back: Normal range of motion. Right lower leg: No edema. Left lower leg: No edema. Comments: using cane    Skin:     General: Skin is warm and dry. Comments: Dried, scabbed wound on R shin. Neurological:      Mental Status: He is alert and oriented to person, place, and time. /75   Pulse 77   Resp 20   Wt 179 lb 12.8 oz (81.6 kg)   SpO2 99%   BMI 27.34 kg/m²   O2 Device: None (Room air)       Lower Extremity Measurements in cm.    R Calf Circumference (cm): 33 cm  L Calf Circumference (cm): 33.25 cm  R Ankle Circumference (cm): 21 cm  L Ankle Circumference (cm): 20 cm    CBC:   Lab Results   Component Value Date    WBC 7.4 06/13/2022    RBC 4.63 06/13/2022    HGB 13.7 06/13/2022    HCT 45.0 06/13/2022    MCV 97.2 06/13/2022    MCH 29.6 06/13/2022    MCHC 30.4 06/13/2022    RDW 12.0 06/13/2022     06/13/2022    MPV 10.4 06/13/2022     CMP:    Lab Results   Component Value Date     06/16/2022    K 3.6 06/16/2022    CL 95 06/16/2022    CO2 31 06/16/2022    BUN 45 06/16/2022    CREATININE 2.03 06/16/2022    GFRAA 39 06/16/2022    LABGLOM 32 06/16/2022    GLUCOSE 108 06/16/2022    PROT 6.9 06/13/2022    LABALBU 4.4 06/13/2022    CALCIUM 10.5 06/16/2022    BILITOT 0.32 06/13/2022    ALKPHOS 54 06/13/2022    AST 21 06/13/2022    ALT 13 06/13/2022     Lab Results   Component Value Date    LABA1C 5.7 12/07/2020     Summary  Dilated left ventricle with severely reduced systolic function. Calculated ejection fraction via Arthur's Method is 12.4 %  Evidence of diastolic dysfunction. Left atrium is severely dilated. Normal right ventricle size with reduced systolic function. AICD lead seen in right atrium/ventricle. Mildly thickened mitral valve leaflets. Moderate mitral regurgitation, central jet. Moderate tricuspid regurgitation. Trivial pulmonic insufficiency. Small circumferential pericardial effusion. Pleural effusion is noted.     Signature  ----------------------------------------------------------------------------   Electronically signed by [facility]. Elsi Patino(Sonographer) on 06/05/2019 02:36   PM      :Assessment      1. Chronic systolic congestive heart failure ([facility]        :Plan      1. Chronic systolic congestive heart failure (HCC)      Wt is down approx 4 lbs. Mild decrease in  leg measurements   Optivol Fluid Index reveals fluid levels close to baseline. Pt doing well. He has been having freq labs done per Nephrology.        On Guideline-Directed Medication Therapy:       Beta - blocker  Mineralocorticoid receptor antagonists (MRAs)  Diuretic      no ACEI / ARB / ARNi: , d/t CKD      Pt overall doing well. Pt does not have issues with compliance. He will have labs done per Nephrology request.     RTC 1 month. No orders of the defined types were placed in this encounter. No orders of the defined types were placed in this encounter. Patientgiven verbal and/or written educational instructions. Follow up as directed. I have reviewed and agree with the nursing documentation. Verbally reviewed medication list with patient; patient verbalized understanding. Discussed 2000mg/day sodium restricted diet; patient verbalized understanding. Moderate daily exercise encouraged as tolerated. Discussed rest breaks as needed; patient verbalized understanding. Patient instructed to weigh self at the same time of each day, using same clothes and same scale; reinforced teaching to monitor for 3-5 lb weight increase over 1-2 days, and to notify the CHF clinic at [phone] or physician office if weight change noted. Patient verbalized understanding. Risks of smoking discussed with the patient if applicable; patient strongly discouraged to smoke. Patient verbalized understanding. Signs and symptoms of CHF discussed with patient, such as feeling more tired than normal, feeling short of breath, coughing that increases when you lie down, sudden weight gain, swelling of your feet, legs or belly. Patient verbalized understanding to notify the CHF clinic at [phone] or physician office if these symptoms occur. Compliance with plan of care and further disease process causes discussed with patient, patient encouraged to keep all follow up appointments. Patient verbalized understanding. Echocardiogram reviewed. Labs reviewed. Medications reviewed.       Electronically signedby CASSY Macedo CNP on 6/20/2022 at 12:53 PM

## 2022-07-18 ENCOUNTER — HOSPITAL ENCOUNTER (OUTPATIENT)
Dept: OTHER | Age: 77
Discharge: HOME | End: 2022-07-18
Payer: MEDICARE

## 2022-07-18 VITALS
HEART RATE: 79 BPM | WEIGHT: 179.4 LBS | OXYGEN SATURATION: 99 % | SYSTOLIC BLOOD PRESSURE: 110 MMHG | DIASTOLIC BLOOD PRESSURE: 80 MMHG | BODY MASS INDEX: 27.28 KG/M2 | RESPIRATION RATE: 16 BRPM

## 2022-07-18 DIAGNOSIS — I50.22 CHRONIC SYSTOLIC CONGESTIVE HEART FAILURE (HCC): Primary | Chronic | ICD-10-CM

## 2022-07-18 PROCEDURE — 99212 OFFICE O/P EST SF 10 MIN: CPT | Performed by: NURSE PRACTITIONER

## 2022-07-18 PROCEDURE — 99212 OFFICE O/P EST SF 10 MIN: CPT

## 2022-07-18 ASSESSMENT — ENCOUNTER SYMPTOMS
SHORTNESS OF BREATH: 0
ABDOMINAL PAIN: 0
BLOOD IN STOOL: 0
EYE DISCHARGE: 0
COUGH: 0

## 2022-07-18 NOTE — PROGRESS NOTES
CHF Clinic at [address] [facility]    Office: [phone] Fax: [phone] [facility]  Rust[address] Jones [phone]  Dept: [phone]  Loc: [phone]    Bhavana Sanabria. is a 78 y.o. male who presents today for CHF evaluation. HPI:     +  shortness of breath, at baseline. Occurs with walking typically  Walked to  CHF Clinic w/o cane today.   +   Fatigue, at baseline  Denies Edema , feels it has improved  Denies chest pain  Denies  palpitations  Pt reports compliance with fluids <= 2L . The patient reports compliance with low Na+ intake. Pt follows closely with Nephrology        Past Medical History:   Diagnosis Date    AICD (automatic cardioverter/defibrillator) present 12/2018    [address] [place] [address]    Anesthesia complication     POTENTIAL ONLY. PATIENT HAS A 1500 ML FLUID RESTRICTION. WIFE CONCERNED PATIENT RECEIVED TOO MUCH IV FLUIDS DURING PATIENTS LAST SURGERY. Anticoagulated     XARELTO    Arthritis     all over    Bladder cancer ([facility] 2019    CAD (coronary artery disease) 2018    OPEN HEART CABG X 2 BYPASS DR SHAH CARDIOLOGIST    Cardiomyopathy ([facility] 2018    CHF (congestive heart failure) ([facility] 06/2019    FLUID RESTRICTION 1500ML/24 HRS    Chronic kidney disease     STAGE 3    Constipation     COPD (chronic obstructive pulmonary disease) ([address] 2009    INHALER USE AS NEEDED    Diabetes mellitus ([address] 2015    R/T MYELOMA MEDS DEXAMETHASONE.  NOT ON THOSE MEDS SO NO LONGER ON RX. A1C IS GOOD.     Difficult intravenous access     DVT of leg (deep venous thrombosis) ([address] 05/2017    RIGHT-TAKES BLOOD THINNER ordered per dr. Susan Vargas provided by the [place]    Former tobacco use     QUIT 08/2018    GERD (gastroesophageal reflux disease)     Hematuria     History of blood transfusion 01/2018    AMEMIA TRANFUSED 2 UNITS NO REACTIONS    History of blood transfusion 02/2018 TRANFUSED 1 UNIT    History of blood transfusion 08/2018    OPEN HEART    Hx of blood clots 05/2017    RIGHT LEG DVT ON BLOOD THINNER    Hyperlipidemia 2009    ON RX    Hypertension 2004    ON RX DR Carito MUNOZ    Multiple myeloma ([address] 2014    Neuropathy 2017    LEGS     Wears dentures     FULL UPPER, PARTIAL LOWER    Wellness examination     DR. RIVERA-PCP LAST SEEN MARCH 2020     Past Surgical History:   Procedure Laterality Date    ABDOMEN SURGERY  2003    STROMAL TUMOR    BLADDER TUMOR EXCISION  05/07/2020    CYSTOSCOPY, TURB TUMOR,    CARDIAC CATHETERIZATION  12/13/2017    right and left heart cath with Dr. Candace Marin  12/17/2018     [address] IMPLANT Right 2018    CATARACT REMOVAL WITH IMPLANT Left 04/2019    COLONOSCOPY  01/14/2018    CYSTOSCOPY  08/08/2019    CYSTOSCOPY N/A 9/20/2019    CYSTOSCOPY, TUR BLADDER TUMOR WITH GYRUS performed by Mk Sagastume MD at [place] [address] N/A 12/6/2019    CYSTOSCOPY, TUR BLADDER TUMOR GYRUS performed by Mk Sagastume MD at [place] [address] N/A 5/7/2020    CYSTOSCOPY, TURB TUMOR, GYRUS performed by Mk Sagastume MD at [place] [address]  09/18/2020    CYSTOSCOPY  09/18/2020    CYSTOSCOPY N/A 9/18/2020    CYSTOSCOPY performed by Mk Sagastume MD at [place] [address]  02/2021    EYE SURGERY Right 2018    [address]     EYE SURGERY Right 2018    CATARACT WITH IOL PLACED    EYE SURGERY Left 04/2019    CATARACT WITH IOL PLACED    HERNIA REPAIR Right 1967    INGUINAL    PACEMAKER PLACEMENT  12/17/2018    AICD PLACED    [place] CABG, ARTERY-VEIN, SINGLE N/A 8/16/2018    CABG CORONARY ARTERY BYPASS ON  PUMP x 2, SWAN LAUREN, ORESTES ([facility]# [id number]) performed by Melia Alcantara MD at [address] FLX DX W/TRAMAINE [place] 1976 PFRMD N/A 1/14/2018    COLONOSCOPY performed by Dafne Dickens MD at [facility] ESOPHAGOGASTRODUODENOSCOPY TRANSORAL DIAGNOSTIC N/A 1/12/2018    EGD DIAGNOSTIC ONLY performed by Robbi Robins MD at [address] Right 04/2018    TURP  may 7th 2020       Family History   Problem Relation Age of Onset    Diabetes Mother     High Blood Pressure Mother     Stroke Father     High Blood Pressure Father     Other Sister         PE    Asthma Sister     Stroke Brother     Asthma Brother     Diabetes Brother     Heart Disease Paternal Grandfather     Prostate Cancer Paternal Grandfather     Asthma Sister     Asthma Sister     Asthma Brother     Asthma Brother     Asthma Sister        Social History     Tobacco Use    Smoking status: Former     Packs/day: 0.50     Years: 30.00     Pack years: 15.00     Types: Cigarettes     Quit date: 8/15/2018     Years since quitting: 3.9    Smokeless tobacco: Never    Tobacco comments:     a little less than a pack a day   Substance Use Topics    Alcohol use: Not Currently     Comment: rarely      Current Outpatient Medications   Medication Sig Dispense Refill    spironolactone (ALDACTONE) 25 MG tablet TAKE ONE TABLET BY MOUTH DAILY 30 tablet 0    potassium chloride (KLOR-CON M) 10 MEQ extended release tablet Take three tabs po in am and take two tabs po in pm. 150 tablet 3    metOLazone (ZAROXOLYN) 2.5 MG tablet Take one tab po every Mon and thurs am. Take 1/2 hour prior to other diuretic medicine. 24 tablet 1    torsemide (DEMADEX) 20 MG tablet TAKE TWO TABLETS BY MOUTH DAILY 172 tablet 3    megestrol (MEGACE) 20 MG tablet TAKE ONE TABLET BY MOUTH DAILY 30 tablet 3    fenofibrate micronized (LOFIBRA) 134 MG capsule Take 134 mg by mouth every morning (before breakfast)       aspirin 81 MG EC tablet Take 81 mg by mouth daily      pregabalin (LYRICA) 75 MG capsule Take 75 mg by mouth 2 times daily. tamsulosin (FLOMAX) 0.4 MG capsule Take 1 capsule by mouth daily 90 capsule 3    rivaroxaban (XARELTO) 20 MG TABS tablet Take 1 tablet by mouth daily (with breakfast) HOLD for 3 days after surgery.  OK to resume if urine clear 30 tablet 0    PARoxetine swelling. Gastrointestinal:  Negative for abdominal pain and blood in stool. Endocrine: Negative for cold intolerance and heat intolerance. Genitourinary:  Negative for dysuria and flank pain. Musculoskeletal:  Negative for joint swelling and myalgias. Skin:  Negative for pallor and rash. Neurological:  Negative for dizziness and headaches. Psychiatric/Behavioral:  Negative for hallucinations and suicidal ideas. Objective:     Physical Exam  Vitals and nursing note reviewed. Constitutional:       Comments:      HENT:      Head: Normocephalic and atraumatic. Eyes:      General: No scleral icterus. Conjunctiva/sclera: Conjunctivae normal.   Cardiovascular:      Rate and Rhythm: Normal rate and regular rhythm. Heart sounds: Normal heart sounds. Comments: AICD noted in L chest wall      Pulmonary:      Effort: Pulmonary effort is normal.      Breath sounds: Normal breath sounds. No wheezing or rales. Abdominal:      Palpations: Abdomen is soft. Musculoskeletal:         General: Normal range of motion. Cervical back: Normal range of motion. Right lower leg: No edema. Left lower leg: No edema. Skin:     General: Skin is warm and dry. Neurological:      Mental Status: He is alert and oriented to person, place, and time. /80   Pulse 79   Resp 16   Wt 179 lb 6.4 oz (81.4 kg)   SpO2 99%   BMI 27.28 kg/m²           Lower Extremity Measurements in cm.    R Calf Circumference (cm): 34 cm  L Calf Circumference (cm): 34 cm  R Ankle Circumference (cm): 22 cm  L Ankle Circumference (cm): 21 cm    CBC:   Lab Results   Component Value Date/Time    WBC 7.4 06/13/2022 03:19 PM    RBC 4.63 06/13/2022 03:19 PM    HGB 13.7 06/13/2022 03:19 PM    HCT 45.0 06/13/2022 03:19 PM    MCV 97.2 06/13/2022 03:19 PM    MCH 29.6 06/13/2022 03:19 PM    MCHC 30.4 06/13/2022 03:19 PM    RDW 12.0 06/13/2022 03:19 PM     06/13/2022 03:19 PM    MPV 10.4 06/13/2022 03:19 PM CMP:    Lab Results   Component Value Date/Time     06/20/2022 11:58 AM    K 4.1 06/20/2022 11:58 AM    CL 97 06/20/2022 11:58 AM    CO2 28 06/20/2022 11:58 AM    BUN 44 06/20/2022 11:58 AM    CREATININE 2.19 06/20/2022 11:58 AM    GFRAA 36 06/20/2022 11:58 AM    LABGLOM 30 06/20/2022 11:58 AM    GLUCOSE 105 06/20/2022 11:58 AM    PROT 6.9 06/13/2022 03:11 PM    LABALBU 4.4 06/13/2022 03:11 PM    CALCIUM 10.8 06/20/2022 11:58 AM    BILITOT 0.32 06/13/2022 03:11 PM    ALKPHOS 54 06/13/2022 03:11 PM    AST 21 06/13/2022 03:14 PM    ALT 13 06/13/2022 03:14 PM     Lab Results   Component Value Date    LABA1C 5.7 12/07/2020     Summary  Dilated left ventricle with severely reduced systolic function. Calculated ejection fraction via Arthur's Method is 12.4 %  Evidence of diastolic dysfunction. Left atrium is severely dilated. Normal right ventricle size with reduced systolic function. AICD lead seen in right atrium/ventricle. Mildly thickened mitral valve leaflets. Moderate mitral regurgitation, central jet. Moderate tricuspid regurgitation. Trivial pulmonic insufficiency. Small circumferential pericardial effusion. Pleural effusion is noted. Signature  ----------------------------------------------------------------------------   Electronically signed by Elsi Kerr(Sonographer) on 06/05/2019 02:36   PM      :Assessment      1. Chronic systolic congestive heart failure ([facility]        :Plan      1. Chronic systolic congestive heart failure (HCC)    Wt is stable   leg measurements have changed minimally,  up in calves, down in ankles  No Optivol Fluid Index done today as scheduled w/cardio next week. On Guideline-Directed Medication Therapy:     No ACEI / ARB / ARNi: d/t allergy   Beta - blocker  Mineralocorticoid receptor antagonists (MRAs)  Diuretic   Pt follows closely with Nephrology              No orders of the defined types were placed in this encounter.     No orders of the defined types were placed in this encounter. Discussed 2000mg/day sodium restricted diet; patient verbalized understanding. Moderate daily exercise encouraged as tolerated. Discussed rest breaks as needed; patient verbalized understanding. Patient instructed to weigh self at the same time of each day, using same clothes and same scale; reinforced teaching to monitor for 3-5 lb weight increase over 1-2 days, and to notify the CHF clinic at [phone] or physician office if weight change noted. Patient verbalized understanding. Risks of smoking discussed with the patient if applicable; patient strongly discouraged to smoke. Patient verbalized understanding. Signs and symptoms of CHF discussed with patient, such as feeling more tired than normal, feeling short of breath, coughing that increases when you lie down, sudden weight gain, swelling of your feet, legs or belly. Patient verbalized understanding to notify the CHF clinic at [phone] or physician office if these symptoms occur. Compliance with plan of care and further disease process causes discussed with patient, patient encouraged to keep all follow up appointments. Patient verbalized understanding. Echocardiogram reviewed. Labs reviewed. Medications reviewed.       Electronically signedby CASSY Rendon CNP on 7/18/2022 at 11:19 AM

## 2022-08-15 ENCOUNTER — HOSPITAL ENCOUNTER (OUTPATIENT)
Dept: OTHER | Age: 77
Discharge: HOME | End: 2022-08-15
Payer: MEDICARE

## 2022-08-15 VITALS
DIASTOLIC BLOOD PRESSURE: 72 MMHG | WEIGHT: 180.4 LBS | RESPIRATION RATE: 20 BRPM | HEART RATE: 80 BPM | SYSTOLIC BLOOD PRESSURE: 104 MMHG | BODY MASS INDEX: 27.43 KG/M2 | OXYGEN SATURATION: 99 %

## 2022-08-15 PROCEDURE — 99212 OFFICE O/P EST SF 10 MIN: CPT

## 2022-08-15 ASSESSMENT — PAIN DESCRIPTION - LOCATION: LOCATION: FOOT

## 2022-08-15 ASSESSMENT — PAIN DESCRIPTION - PAIN TYPE: TYPE: CHRONIC PAIN

## 2022-08-15 ASSESSMENT — PAIN SCALES - GENERAL: PAINLEVEL_OUTOF10: 7

## 2022-08-15 ASSESSMENT — PAIN DESCRIPTION - ORIENTATION: ORIENTATION: LEFT;RIGHT

## 2022-09-12 ENCOUNTER — TELEPHONE (OUTPATIENT)
Dept: OTHER | Age: 77
End: 2022-09-12

## 2022-09-12 RX ORDER — POTASSIUM CHLORIDE 750 MG/1
TABLET, EXTENDED RELEASE ORAL
Qty: 150 TABLET | Refills: 5 | Status: SHIPPED | OUTPATIENT
Start: 2022-09-12 | End: 2022-10-18 | Stop reason: SDUPTHER

## 2022-09-12 RX ORDER — METOLAZONE 2.5 MG/1
TABLET ORAL
Qty: 24 TABLET | Refills: 1 | Status: SHIPPED | OUTPATIENT
Start: 2022-09-12 | End: 2022-09-30

## 2022-09-19 ENCOUNTER — HOSPITAL ENCOUNTER (OUTPATIENT)
Dept: OTHER | Age: 77
Discharge: HOME | End: 2022-09-19
Payer: MEDICARE

## 2022-09-19 VITALS
WEIGHT: 182.6 LBS | OXYGEN SATURATION: 97 % | HEART RATE: 77 BPM | RESPIRATION RATE: 20 BRPM | DIASTOLIC BLOOD PRESSURE: 70 MMHG | SYSTOLIC BLOOD PRESSURE: 108 MMHG | BODY MASS INDEX: 27.76 KG/M2

## 2022-09-19 PROCEDURE — 99212 OFFICE O/P EST SF 10 MIN: CPT

## 2022-09-19 ASSESSMENT — PAIN SCALES - GENERAL: PAINLEVEL_OUTOF10: 8

## 2022-09-19 ASSESSMENT — PAIN DESCRIPTION - DESCRIPTORS: DESCRIPTORS: ACHING;BURNING

## 2022-09-19 ASSESSMENT — PAIN DESCRIPTION - ORIENTATION: ORIENTATION: RIGHT;LEFT

## 2022-09-19 ASSESSMENT — PAIN DESCRIPTION - LOCATION: LOCATION: FOOT

## 2022-09-19 NOTE — PROGRESS NOTES
Date:  2022  Time:  11:33 AM    CHF Clinic at [facility]    Office: [phone] Fax: [phone]    Re:  Manuel Bates. Patient : 1945    Vital Signs: /70   Pulse 77   Resp 20   Wt 182 lb 9.6 oz (82.8 kg)   SpO2 97%   BMI 27.76 kg/m²                       O2 Device: None (Room air)                           No results for input(s): CBC, HGB, HCT, WBC, PLATELET, NA, K, CL, CO2, BUN, CREATININE, GLUCOSE, BNP, INR in the last 72 hours. Respiratory:    Assessment  Charting Type: Reassessment    Breath Sounds  Right Upper Lobe: Clear  Right Middle Lobe: Clear  Right Lower Lobe: Clear  Left Upper Lobe: Clear  Left Lower Lobe: Clear    Cough/Sputum  Cough: Productive  Frequency: Occasional  Sputum Amount: Small  Sputum Color: Clear         Peripheral Vascular  RLE Edema: Trace  LLE Edema: None      Complaints: No new complaints at this time    Physician Orders None    Comment : Arrived for scheduled visit per ambulatory. His weight is up 2.2 lbs from last month. Ongoing mild dyspnea with exertion, resolves with rest. Minimal lower leg edema noted to right leg only. Saw Dr. Brigida Romero, cardiology last week, had device check as well. Has appt. With Dr. Sánchez Rodas, nephrology next week and will get labs prior to visit. Medication list reviewed and updated. Reinforced low sodium diet and fluid restrictions. No s/s acute chf at this time. Next CHF Clinic visit 10/18/22.     Electronically signed by Alex Styles RN on 2022 at 11:33 AM

## 2022-09-22 ENCOUNTER — HOSPITAL ENCOUNTER (OUTPATIENT)
Age: 77
Setting detail: SPECIMEN
Discharge: HOME | End: 2022-09-22

## 2022-09-22 LAB
ANION GAP SERPL CALCULATED.3IONS-SCNC: 12 MMOL/L (ref 9–17)
BUN BLDV-MCNC: 40 MG/DL (ref 8–23)
CALCIUM SERPL-MCNC: 10.3 MG/DL (ref 8.6–10.4)
CHLORIDE BLD-SCNC: 100 MMOL/L (ref 98–107)
CO2: 30 MMOL/L (ref 20–31)
CREAT SERPL-MCNC: 1.94 MG/DL (ref 0.7–1.2)
GFR AFRICAN AMERICAN: 41 ML/MIN
GFR NON-AFRICAN AMERICAN: 34 ML/MIN
GFR SERPL CREATININE-BSD FRML MDRD: ABNORMAL ML/MIN/{1.73_M2}
GLUCOSE BLD-MCNC: 141 MG/DL (ref 70–99)
POTASSIUM SERPL-SCNC: 4.6 MMOL/L (ref 3.7–5.3)
SODIUM BLD-SCNC: 142 MMOL/L (ref 135–144)
VITAMIN D 25-HYDROXY: 24.8 NG/ML

## 2022-09-23 LAB
ALBUMIN SERPL-MCNC: 4.5 G/DL (ref 3.5–5.2)
ALBUMIN/GLOBULIN RATIO: 1.5 (ref 1–2.5)
ALP BLD-CCNC: 57 U/L (ref 40–129)
ALT SERPL-CCNC: 16 U/L (ref 5–41)
ANION GAP SERPL CALCULATED.3IONS-SCNC: 11 MMOL/L (ref 9–17)
AST SERPL-CCNC: 25 U/L
BILIRUB SERPL-MCNC: 0.4 MG/DL (ref 0.3–1.2)
BUN BLDV-MCNC: 41 MG/DL (ref 8–23)
CALCIUM SERPL-MCNC: 10.8 MG/DL (ref 8.6–10.4)
CHLORIDE BLD-SCNC: 100 MMOL/L (ref 98–107)
CO2: 30 MMOL/L (ref 20–31)
CREAT SERPL-MCNC: 1.96 MG/DL (ref 0.7–1.2)
ESTIMATED AVERAGE GLUCOSE: 111 MG/DL
GFR AFRICAN AMERICAN: 41 ML/MIN
GFR NON-AFRICAN AMERICAN: 34 ML/MIN
GFR SERPL CREATININE-BSD FRML MDRD: ABNORMAL ML/MIN/{1.73_M2}
GLUCOSE BLD-MCNC: 139 MG/DL (ref 70–99)
HBA1C MFR BLD: 5.5 % (ref 4–6)
POTASSIUM SERPL-SCNC: 4.3 MMOL/L (ref 3.7–5.3)
PTH INTACT: 57.3 PG/ML (ref 14–72)
SODIUM BLD-SCNC: 141 MMOL/L (ref 135–144)
TOTAL PROTEIN: 7.6 G/DL (ref 6.4–8.3)

## 2022-10-18 ENCOUNTER — HOSPITAL ENCOUNTER (OUTPATIENT)
Dept: OTHER | Age: 77
Discharge: HOME | End: 2022-10-18
Payer: MEDICARE

## 2022-10-18 VITALS
RESPIRATION RATE: 20 BRPM | DIASTOLIC BLOOD PRESSURE: 80 MMHG | SYSTOLIC BLOOD PRESSURE: 120 MMHG | WEIGHT: 183.2 LBS | HEART RATE: 90 BPM | BODY MASS INDEX: 27.86 KG/M2 | OXYGEN SATURATION: 98 %

## 2022-10-18 DIAGNOSIS — E87.6 HYPOKALEMIA: Primary | ICD-10-CM

## 2022-10-18 PROCEDURE — 99212 OFFICE O/P EST SF 10 MIN: CPT

## 2022-10-18 RX ORDER — POTASSIUM CHLORIDE 750 MG/1
TABLET, EXTENDED RELEASE ORAL
Qty: 150 TABLET | Refills: 3 | OUTPATIENT
Start: 2022-10-18

## 2022-10-18 ASSESSMENT — PAIN DESCRIPTION - DESCRIPTORS: DESCRIPTORS: ACHING

## 2022-10-18 ASSESSMENT — PAIN DESCRIPTION - LOCATION: LOCATION: FOOT

## 2022-10-18 ASSESSMENT — PAIN DESCRIPTION - ORIENTATION: ORIENTATION: RIGHT;LEFT

## 2022-10-18 ASSESSMENT — PAIN DESCRIPTION - PAIN TYPE: TYPE: CHRONIC PAIN

## 2022-10-18 ASSESSMENT — PAIN DESCRIPTION - FREQUENCY: FREQUENCY: INTERMITTENT

## 2022-10-18 ASSESSMENT — PAIN SCALES - GENERAL: PAINLEVEL_OUTOF10: 8

## 2022-10-18 NOTE — PROGRESS NOTES
Date:  10/18/2022  Time:  11:40 AM    CHF Clinic at [facility]    Office: [phone] Fax: [phone]    Re:  Bernardo Shabazz. Patient : 1945    Vital Signs: /80   Pulse 90   Resp 20   Wt 183 lb 3.2 oz (83.1 kg)   SpO2 98%   BMI 27.86 kg/m²                       O2 Device: None (Room air)                           No results for input(s): CBC, HGB, HCT, WBC, PLATELET, NA, K, CL, CO2, BUN, CREATININE, GLUCOSE, BNP, INR in the last 72 hours. Respiratory:         Breath Sounds  Right Upper Lobe: Clear  Right Middle Lobe: Clear  Right Lower Lobe: Clear  Left Upper Lobe: Clear  Left Lower Lobe: Clear    Cough/Sputum  Cough: Productive  Frequency: Occasional  Sputum Amount: Small  Sputum Color: Clear         Peripheral Vascular  RLE Edema: None  LLE Edema: None      Complaints: states dry air causes increase shortness of breath / congestion at times      Comment : Wt is up 0.6 lb in one month. Pt states he feels pretty good but has noticed the air is more dry with cooler weather and has experienced some increase shortness of breath at times. Cardiosight optivol fluid index reading  does not show any increased fluid today . Reading showed mod increased fluid levels in September and early October but level today is currently down. Pt has been eating a lot of tomatoes and had some fried chicken last week. Reminded pt to follow a 2 gm sodium diet and fluid limits of 2 liters daily. No acute S/S of CHF noted on assessment today. Encouraged pt to use humidifier in the house when air is so dry. He has not been using nebulizer treatments, but plans to start using when feels sob / congestion. Pt requested a refill for his Potassium supplement. States he has tablets that are too hard to swallow and he wants a refill ordered with capsules instead of tablets. He states he takes all 50 meq pills at the same time. He did not want liquid potassium ordered, but preferred capsules. Writer notified   Hortensia Pickens CNP and she ordered  one months supply of Potassium Chloride 10 meq  take 5 capsules once daily at [facility] in [place] with 3 refills. Pt notified and will  when ready. No  acute S/S of CHF noted today. Next f/u here in one month.      Electronically signed by Neida Mcdaniel RN on 10/18/2022 at 11:40 AM

## 2022-11-16 ENCOUNTER — HOSPITAL ENCOUNTER (OUTPATIENT)
Dept: OTHER | Age: 77
Discharge: HOME | End: 2022-11-16
Payer: COMMERCIAL

## 2022-11-16 VITALS
WEIGHT: 179 LBS | RESPIRATION RATE: 16 BRPM | BODY MASS INDEX: 27.22 KG/M2 | DIASTOLIC BLOOD PRESSURE: 80 MMHG | SYSTOLIC BLOOD PRESSURE: 100 MMHG | HEART RATE: 71 BPM | OXYGEN SATURATION: 100 %

## 2022-11-16 PROCEDURE — 99212 OFFICE O/P EST SF 10 MIN: CPT

## 2022-11-16 NOTE — PROGRESS NOTES
Date:  2022  Time:  11:23 AM    CHF Clinic at [facility]    Office: [phone] Fax: [phone]    Re:  Kandace Garcia. Patient : 1945    Vital Signs: /80   Pulse 71   Resp 16   Wt 179 lb (81.2 kg)   SpO2 100%   BMI 27.22 kg/m²                                                   No results for input(s): CBC, HGB, HCT, WBC, PLATELET, NA, K, CL, CO2, BUN, CREATININE, GLUCOSE, BNP, INR in the last 72 hours. Respiratory:    Assessment  Charting Type: Reassessment    Breath Sounds  Right Upper Lobe: Clear  Right Middle Lobe: Clear  Right Lower Lobe: Clear  Left Upper Lobe: Clear  Left Lower Lobe: Clear    Cough/Sputum  Sputum Amount: Small  Sputum Color: Clear         Peripheral Vascular  RLE Edema: None  LLE Edema: None      Complaints: No new complaints     Physician Orders No new orders     Comment : Weight is down 4 pounds from last months visit . Did not do Optivol reading today having [facility] on 2022. Has no pedal edema and lungs are clear states feeling well. Occasinally SOB relieved by rest. Discussed in detail low sodium diet 2000mg per day and 64 ounces of fluid per day. We will see in 1 month 2022.      Electronically signed by Dalila Holt RN on 2022 at 11:23 AM

## 2022-12-14 ENCOUNTER — HOSPITAL ENCOUNTER (OUTPATIENT)
Dept: OTHER | Age: 77
Discharge: HOME | End: 2022-12-14
Payer: MEDICARE

## 2022-12-14 VITALS
SYSTOLIC BLOOD PRESSURE: 100 MMHG | DIASTOLIC BLOOD PRESSURE: 70 MMHG | HEART RATE: 83 BPM | RESPIRATION RATE: 20 BRPM | WEIGHT: 179 LBS | BODY MASS INDEX: 27.22 KG/M2 | OXYGEN SATURATION: 96 %

## 2022-12-14 DIAGNOSIS — I50.22 CHRONIC SYSTOLIC CONGESTIVE HEART FAILURE (HCC): Primary | Chronic | ICD-10-CM

## 2022-12-14 PROCEDURE — 99212 OFFICE O/P EST SF 10 MIN: CPT

## 2022-12-14 ASSESSMENT — PAIN DESCRIPTION - PAIN TYPE: TYPE: CHRONIC PAIN

## 2022-12-14 ASSESSMENT — PAIN SCALES - GENERAL: PAINLEVEL_OUTOF10: 7

## 2022-12-14 ASSESSMENT — PAIN DESCRIPTION - ORIENTATION: ORIENTATION: RIGHT;LEFT

## 2022-12-14 ASSESSMENT — PAIN DESCRIPTION - LOCATION: LOCATION: FOOT

## 2022-12-14 ASSESSMENT — PAIN DESCRIPTION - ONSET: ONSET: ON-GOING

## 2022-12-14 ASSESSMENT — PAIN DESCRIPTION - DESCRIPTORS: DESCRIPTORS: ACHING;BURNING

## 2022-12-14 NOTE — PROGRESS NOTES
Date:  2022  Time:  11:50 AM    CHF Clinic at [facility]    Office: [phone] Fax: [phone]    Re:  Rossy Santana. Patient : 1945    Vital Signs: /70   Pulse 83   Resp 20   Wt 179 lb (81.2 kg)   SpO2 96%   BMI 27.22 kg/m²                       O2 Device: None (Room air)                           No results for input(s): CBC, HGB, HCT, WBC, PLATELET, NA, K, CL, CO2, BUN, CREATININE, GLUCOSE, BNP, INR in the last 72 hours. Respiratory:         Breath Sounds  Right Upper Lobe: Clear  Right Middle Lobe: Clear  Right Lower Lobe: Clear  Left Upper Lobe: Clear  Left Lower Lobe: Clear    Cough/Sputum  Cough: Productive  Sputum Amount: Small  Sputum Color: Clear         Peripheral Vascular  RLE Edema: None  LLE Edema: None      Complaints: no new complaints. Chronic neuropathy pain in bilateral feet. Physician Orders Per Iris Fernandez CNP : check BMP early next week   2) Educated pt again on importance of following low 2 gm sodium diet and fluid limits of 2 liters daily, and medication compliance 3) OK to follow up at the CHF clinic in one month but pt must get labs completed early next week    Comment : Wt is unchanged in one month. Pt has some occasional shortness of breath and fatigue  with increased exertion but states it is no worse than his  usual. He denies chest pain, dizziness, or any other complaint. other than above. Cardiosight optivol fluid index reading shows increased fluid levels. Pt admits to noncompliance with low sodium diet lately : states he eats out and usuually gets a burger fast food once a week, eats rotisserie chicken frequently throughout the week, also puts small amt of salt on eggs at breakfast. He also states he drank \" a lot \" more water this week. Zara Alston He admits he's skipped his metolazone 2.5 mg every Monday and Thursday for the past week and a half.  But He states he HAS  been taking the Aldactone 25 mg once daily and Demadex 20 mg tab two tablets every day as ordered. Pt states he skipped the metalazone because \" sometimes my mouth is so dry, I just feel dehydrated\". Pt has not had labs completed BMP since end of September. Writer notified Walter Miller CNP. Had recommended that pt get labs done today, pt reluctant, wants to wait until next week to get labs. Pt instructed to get labs done early next week as soon as possible. Reviewed all of above dietary , fluid restriction and medication compliance with pt. He verbalized an understanding. Next f/u here at [address] in one month. Will check pt lab results next week.      Electronically signed by Tania Dexter RN on 12/14/2022 at 11:50 AM

## 2022-12-19 ENCOUNTER — TELEPHONE (OUTPATIENT)
Dept: OTHER | Age: 77
End: 2022-12-19

## 2022-12-19 NOTE — TELEPHONE ENCOUNTER
Writer phoned and spoke with patients wife to remind pt to get BMP lab order done tomorrow. Pt's wife states  that pt  is planning to go to the lab tomorrow.

## 2022-12-20 ENCOUNTER — HOSPITAL ENCOUNTER (OUTPATIENT)
Age: 77
Setting detail: SPECIMEN
Discharge: HOME | End: 2022-12-20

## 2022-12-20 DIAGNOSIS — I50.22 CHRONIC SYSTOLIC CONGESTIVE HEART FAILURE (HCC): Chronic | ICD-10-CM

## 2022-12-20 LAB
ANION GAP SERPL CALCULATED.3IONS-SCNC: 11 MMOL/L (ref 9–17)
BUN BLDV-MCNC: 48 MG/DL (ref 8–23)
CALCIUM SERPL-MCNC: 10.1 MG/DL (ref 8.6–10.4)
CHLORIDE BLD-SCNC: 95 MMOL/L (ref 98–107)
CO2: 28 MMOL/L (ref 20–31)
CREAT SERPL-MCNC: 2.39 MG/DL (ref 0.7–1.2)
ESTIMATED AVERAGE GLUCOSE: 117 MG/DL
GFR SERPL CREATININE-BSD FRML MDRD: 28 ML/MIN/1.73M2
GLUCOSE BLD-MCNC: 120 MG/DL (ref 70–99)
HBA1C MFR BLD: 5.7 % (ref 4–6)
POTASSIUM SERPL-SCNC: 4.5 MMOL/L (ref 3.7–5.3)
SODIUM BLD-SCNC: 134 MMOL/L (ref 135–144)

## 2022-12-22 ENCOUNTER — HOSPITAL ENCOUNTER (OUTPATIENT)
Age: 77
Setting detail: SPECIMEN
Discharge: HOME | End: 2022-12-22

## 2022-12-22 DIAGNOSIS — N18.30 STAGE 3 CHRONIC KIDNEY DISEASE, UNSPECIFIED WHETHER STAGE 3A OR 3B CKD (HCC): ICD-10-CM

## 2022-12-22 LAB
ANION GAP SERPL CALCULATED.3IONS-SCNC: 14 MMOL/L (ref 9–17)
BUN BLDV-MCNC: 54 MG/DL (ref 8–23)
CALCIUM SERPL-MCNC: 10.8 MG/DL (ref 8.6–10.4)
CHLORIDE BLD-SCNC: 95 MMOL/L (ref 98–107)
CO2: 31 MMOL/L (ref 20–31)
CREAT SERPL-MCNC: 2.32 MG/DL (ref 0.7–1.2)
GFR SERPL CREATININE-BSD FRML MDRD: 29 ML/MIN/1.73M2
GLUCOSE BLD-MCNC: 114 MG/DL (ref 70–99)
POTASSIUM SERPL-SCNC: 4.6 MMOL/L (ref 3.7–5.3)
SODIUM BLD-SCNC: 140 MMOL/L (ref 135–144)

## 2023-01-11 ENCOUNTER — HOSPITAL ENCOUNTER (OUTPATIENT)
Dept: OTHER | Age: 78
Discharge: HOME | End: 2023-01-11
Payer: MEDICARE

## 2023-01-11 VITALS
BODY MASS INDEX: 27.64 KG/M2 | OXYGEN SATURATION: 98 % | HEART RATE: 72 BPM | WEIGHT: 181.8 LBS | DIASTOLIC BLOOD PRESSURE: 60 MMHG | RESPIRATION RATE: 20 BRPM | SYSTOLIC BLOOD PRESSURE: 118 MMHG

## 2023-01-11 PROCEDURE — 99212 OFFICE O/P EST SF 10 MIN: CPT

## 2023-01-11 ASSESSMENT — PAIN SCALES - GENERAL: PAINLEVEL_OUTOF10: 9

## 2023-01-11 ASSESSMENT — PAIN DESCRIPTION - FREQUENCY: FREQUENCY: INTERMITTENT

## 2023-01-11 ASSESSMENT — PAIN DESCRIPTION - ORIENTATION: ORIENTATION: RIGHT;LEFT

## 2023-01-11 ASSESSMENT — PAIN DESCRIPTION - LOCATION: LOCATION: FOOT

## 2023-01-11 NOTE — PROGRESS NOTES
Date:  2023  Time:  12:28 PM    CHF Clinic at [facility]    Office: [phone] Fax: [phone]    Re:  Maria Del Carmen Rico. Patient : 1945    Vital Signs: /60   Pulse 72   Resp 20   Wt 181 lb 12.8 oz (82.5 kg)   SpO2 98%   BMI 27.64 kg/m²                       O2 Device: None (Room air)                           No results for input(s): CBC, HGB, HCT, WBC, PLATELET, NA, K, CL, CO2, BUN, CREATININE, GLUCOSE, BNP, INR in the last 72 hours. Respiratory:         Breath Sounds  Right Upper Lobe: Clear  Right Middle Lobe: Clear  Right Lower Lobe: Clear  Left Upper Lobe: Clear  Left Lower Lobe: Clear    Cough/Sputum  Cough: Productive  Sputum Amount: Moderate  Sputum Color: Clear         Peripheral Vascular  RLE Edema: Trace, Pitting  LLE Edema: None      Complaints: no new complaints. Mild increased fluid on cardiosight optivol fluid index reading, noncompliance with  low sodium diet,  fluid restriction of 2 liters and twice weekly  zaroxolyn. See below. Comment : Pt here per ambulation for scheduled f/u visit at the [address] [place] [address]. He states he feels pretty good. Wt up 2 lbs in one month. He Has some shortness of breath with increased exertion but it's no worse than usual, he has + cough , and usual fatigue, trace edema on RLE. Cardiosight optivol fluid index reading shows some increased fluid. He states he has been noncompliant with low sodium diet over the holidays , and has recently also been eating Rotisserie chicken and sausage. He states he drinks a lot of fluids. Writer reminded pt of the importance of following a low 2 gm sodium diet and fluid restriction of 2 liters daily. Encouraged pt to try using ice chips sparingly to cut down on fluid intake. His current diuretics are Zaroxolyn 2.5 mg on Monday and Thursday but he has only been taking it about once a week instead of twice a week. Reminded pt of the importance of taking his medications as ordered.    Pt last had labs  done in December 22, 2022. He was suppose to get labs repeated  ( BMP)  in early January per Dr Jose Quintero note from 12/28/22. The lab order has not been completed yet. Writer printed out  Dr Jose Quintero lab order for the [facility] and encouraged pt to get these drawn for Dr Travis Camacho. Pt has a f/u with Dr Travis Camacho on 2/3/22 at 1050 a.m. Writer asked pt to return here in 2 weeks to recheck fluid levels after following diet and fluid restriction and taking zaroxolyn as ordered. Pt refused. States he will f/u with nephrology in 3 weeks and return here in one month instead. Reviewed all of above with Lavina Cooks CNP .  [place] today's note and cardiosight reading to [facility]       Electronically signed by Sheela De Santiago RN on 1/11/2023 at 12:28 PM

## 2023-01-27 ENCOUNTER — HOSPITAL ENCOUNTER (OUTPATIENT)
Dept: GENERAL RADIOLOGY | Age: 78
End: 2023-01-27
Payer: MEDICARE

## 2023-01-27 ENCOUNTER — HOSPITAL ENCOUNTER (OUTPATIENT)
Age: 78
End: 2023-01-27
Payer: MEDICARE

## 2023-01-27 ENCOUNTER — HOSPITAL ENCOUNTER (OUTPATIENT)
Age: 78
Setting detail: SPECIMEN
Discharge: HOME | End: 2023-01-27

## 2023-01-27 DIAGNOSIS — I48.0 EPISODIC ATRIAL FIBRILLATION (HCC): ICD-10-CM

## 2023-01-27 DIAGNOSIS — Z79.899 DRUG THERAPY: ICD-10-CM

## 2023-01-27 DIAGNOSIS — N18.31 STAGE 3A CHRONIC KIDNEY DISEASE (HCC): ICD-10-CM

## 2023-01-27 DIAGNOSIS — R60.0 BILATERAL LOWER EXTREMITY EDEMA: ICD-10-CM

## 2023-01-27 DIAGNOSIS — I10 HYPERTENSION, ESSENTIAL: ICD-10-CM

## 2023-01-27 DIAGNOSIS — I25.10 DISEASE OF CARDIOVASCULAR SYSTEM: ICD-10-CM

## 2023-01-27 LAB
ALBUMIN SERPL-MCNC: 4.5 G/DL (ref 3.5–5.2)
ALT SERPL-CCNC: 10 U/L (ref 5–41)
ANION GAP SERPL CALCULATED.3IONS-SCNC: 14 MMOL/L (ref 9–17)
AST SERPL-CCNC: 20 U/L
BUN BLDV-MCNC: 54 MG/DL (ref 8–23)
CALCIUM SERPL-MCNC: 10.9 MG/DL (ref 8.6–10.4)
CHLORIDE BLD-SCNC: 95 MMOL/L (ref 98–107)
CHOLESTEROL/HDL RATIO: 3.9
CHOLESTEROL: 148 MG/DL
CO2: 30 MMOL/L (ref 20–31)
CREAT SERPL-MCNC: 2.27 MG/DL (ref 0.7–1.2)
CREATININE URINE: 104.9 MG/DL (ref 39–259)
GFR SERPL CREATININE-BSD FRML MDRD: 29 ML/MIN/1.73M2
GLUCOSE BLD-MCNC: 111 MG/DL (ref 70–99)
HCT VFR BLD CALC: 44.3 % (ref 40.7–50.3)
HDLC SERPL-MCNC: 38 MG/DL
HEMOGLOBIN: 13.7 G/DL (ref 13–17)
LDL CHOLESTEROL: 77 MG/DL (ref 0–130)
MCH RBC QN AUTO: 30.6 PG (ref 25.2–33.5)
MCHC RBC AUTO-ENTMCNC: 30.9 G/DL (ref 28.4–34.8)
MCV RBC AUTO: 98.9 FL (ref 82.6–102.9)
NRBC AUTOMATED: 0 PER 100 WBC
PDW BLD-RTO: 12.5 % (ref 11.8–14.4)
PHOSPHORUS: 4 MG/DL (ref 2.5–4.5)
PLATELET # BLD: 337 K/UL (ref 138–453)
PMV BLD AUTO: 10.6 FL (ref 8.1–13.5)
POTASSIUM SERPL-SCNC: 4 MMOL/L (ref 3.7–5.3)
PTH INTACT: 87.7 PG/ML (ref 14–72)
RBC # BLD: 4.48 M/UL (ref 4.21–5.77)
SODIUM BLD-SCNC: 139 MMOL/L (ref 135–144)
T3 FREE: 2.61 PG/ML (ref 2.02–4.43)
THYROXINE, FREE: 1.34 NG/DL (ref 0.93–1.7)
TOTAL PROTEIN, URINE: 6 MG/DL
TRIGL SERPL-MCNC: 163 MG/DL
TSH SERPL DL<=0.05 MIU/L-ACNC: 2.21 UIU/ML (ref 0.3–5)
URINE TOTAL PROTEIN CREATININE RATIO: 0.06 (ref 0–0.2)
VITAMIN D 25-HYDROXY: 40.3 NG/ML
WBC # BLD: 6.8 K/UL (ref 3.5–11.3)

## 2023-01-27 PROCEDURE — 71046 X-RAY EXAM CHEST 2 VIEWS: CPT

## 2023-01-31 NOTE — PLAN OF CARE
BRONCHOSPASM/BRONCHOCONSTRICTION     ? IMPROVE AERATION/BREATH SOUNDS  ? ADMINISTER BRONCHODILATOR THERAPY AS APPROPRIATE  ? ASSESS BREATH SOUNDS  ? IMPLEMENT AEROSOL/MDI PROTOCOL  ?    PATIENT EDUCATION AS NEEDED Azathioprine Pregnancy And Lactation Text: This medication is Pregnancy Category D and isn't considered safe during pregnancy. It is unknown if this medication is excreted in breast milk.

## 2024-11-07 NOTE — ED NOTES
Akash medina NP at bedside to assess patient.       Trevor Amaya RN  01/11/18 8685
Dr. Lena Solitario at bedside to assess patient.       Shaquille Campoverde RN  01/11/18 6012
Pt rectal exam positive for occult blood in stool per Akash Golden, VICKIE  01/11/18 6583
Report given to Marta Jsutin RN  01/11/18 2564
yes...

## (undated) DEVICE — SUTURE PERMAHAND SZ 2-0 L18IN NONABSORBABLE BLK L26MM SH C012D

## (undated) DEVICE — GLOVE ORANGE PI 7   MSG9070

## (undated) DEVICE — CANNULA PERF L2IN BLNT TIP 2MM VES CLR RADPQ BODY FEM LUER

## (undated) DEVICE — BNDG,ELSTC,MATRIX,STRL,3"X5YD,LF,HOOK&LP: Brand: MEDLINE

## (undated) DEVICE — ELECTRODE PT RET AD L9FT HI MOIST COND ADH HYDRGEL CORDED

## (undated) DEVICE — SUTURE PROL SZ 4-0 L36IN NONABSORBABLE BLU L26MM SH 1/2 CIR 8521H

## (undated) DEVICE — STRAP,CATHETER,ELASTIC,HOOK&LOOP: Brand: MEDLINE

## (undated) DEVICE — Z INACTIVE USE 2635503 SOLUTION IRRIG 3000ML ST H2O USP UROMATIC PLAS CONT

## (undated) DEVICE — CLIP LIG SM TI 20 BLU HNDL FOR OPN AND ENDOSCP SGL APPL

## (undated) DEVICE — DECANTER FLD 9IN ST BG FOR ASEP TRNSF OF FLD

## (undated) DEVICE — GLOVE SURG SZ 65 THK91MIL LTX FREE SYN POLYISOPRENE

## (undated) DEVICE — SUTURE SZ 7 L18IN NONABSORBABLE SIL CCS L48MM 1/2 CIR STRNM M655G

## (undated) DEVICE — BNDG,ELSTC,MATRIX,STRL,4"X5YD,LF,HOOK&LP: Brand: MEDLINE

## (undated) DEVICE — GOWN,AURORA,NONRNF,XL,30/CS: Brand: MEDLINE

## (undated) DEVICE — DRAPE SLUSH DISC W44XL66IN ST FOR RND BSIN HUSH SLUSH SYS

## (undated) DEVICE — HEADREST NEURO DONUT 7 IN

## (undated) DEVICE — PLUG,CATHETER,DRAINAGE PROTECTOR,TUBE: Brand: MEDLINE

## (undated) DEVICE — Device

## (undated) DEVICE — 60 ML SYRINGE,CATHETER TIP: Brand: MONOJECT

## (undated) DEVICE — BAG ENDOSCP TRNSPRT CLR RECLOSABLE 24INX20IN

## (undated) DEVICE — GLOVE SURG SZ 65 L12IN FNGR THK87MIL WHT LTX FREE

## (undated) DEVICE — BLADE SAW W6.35XL32MM STRNM CUT STRNOTMY

## (undated) DEVICE — SUTURE ETHIB EXCL BR GRN TAPR PT 2-0 30 X563H X563H

## (undated) DEVICE — FOGARTY - HYDRAGRIP SURGICAL - CLAMP INSERTS: Brand: FOGARTY HYDRAJAW

## (undated) DEVICE — GAUZE,PACKING STRIP,PLAIN,2"X5YD,STRL,LF: Brand: CURAD

## (undated) DEVICE — BLADE OPHTH D5MM 15DEG GRN W/ RND KNURLED HNDL MICRO-SHARP

## (undated) DEVICE — TOWEL,OR,DSP,ST,BLUE,DLX,XR,4/PK,20PK/CS: Brand: MEDLINE

## (undated) DEVICE — DRESSING HEMSTAT W3INXL4YD WHT IMPREG KAOLIN HYDRPHLC SFT

## (undated) DEVICE — SUTURE PROLENE 8-0 BV1758

## (undated) DEVICE — SUTURE PDS II SZ 0 L27IN ABSRB VLT L36MM CT-1 1/2 CIR Z340H

## (undated) DEVICE — GARMENT,MEDLINE,DVT,INT,CALF,MED, GEN2: Brand: MEDLINE

## (undated) DEVICE — CATHETER URETH 22FR BLLN 30CC 3 W F SPEC INF CTRL BARDX

## (undated) DEVICE — PAD N ADH W3XL4IN POLY COT SFT PERF FLM EASILY CUT ABSRB

## (undated) DEVICE — HYPODERMIC SAFETY NEEDLE: Brand: MAGELLAN

## (undated) DEVICE — SYRINGE MED 50ML LUERLOCK TIP

## (undated) DEVICE — GOWN,SURGICAL,AURORA,SLEEVE: Brand: MEDLINE

## (undated) DEVICE — EVACUATOR URO BLDR W/ ADPT UROVAC

## (undated) DEVICE — COVER,LIGHT HANDLE,FLX,2/PK: Brand: MEDLINE INDUSTRIES, INC.

## (undated) DEVICE — SUTURE VCRL + SZ 2-0 L27IN ABSRB WHT SH 1/2 CIR TAPERCUT VCP417H

## (undated) DEVICE — 1/4 FORCE SURGICAL SPRING CLIP: Brand: STEALTH® SPRING CLIP

## (undated) DEVICE — TUBING, SUCTION, 9/32" X 20', STRAIGHT: Brand: MEDLINE INDUSTRIES, INC.

## (undated) DEVICE — PACK PROCEDURE SURG CYSTO SVMMC LF

## (undated) DEVICE — SUTURE PROL SZ 7-0 L24IN NONABSORBABLE BLU L8MM BV175-6 3/8 8735H

## (undated) DEVICE — RETRACTOR SURG INSRT SUT HLD OCTOBASE

## (undated) DEVICE — STERNUM BLADE, OFFSET (31.7 X 0.64 X 6.3MM)

## (undated) DEVICE — Z DISCONTINUED NO SUB IDED DRAIN SURG 2 COLL PT TB FOR ATS BG OASIS

## (undated) DEVICE — PLATELET CONCENTRATION PACK PROC 14-20 ML SMARTPREP 2

## (undated) DEVICE — DRAPE,REIN 53X77,STERILE: Brand: MEDLINE

## (undated) DEVICE — SUTURE VCRL + SZ 4-0 L18IN ABSRB UD L19MM PS-2 3/8 CIR PRIM VCP496H

## (undated) DEVICE — Device: Brand: PERFECTCUT AORTOTOMY SYSTEM

## (undated) DEVICE — PLEDGET SURG W3.5XL7MM THK1.5MM WHT PTFE RECT FIRM TFE

## (undated) DEVICE — SUTURE VCRL + SZ 3-0 L27IN ABSRB UD L26MM SH 1/2 CIR VCP416H

## (undated) DEVICE — SUTURE PROL SZ 8-0 L18IN NONABSORBABLE BLU L8MM BV175-6 3/8 8740H

## (undated) DEVICE — GLOVE SURG SZ 65 L12IN FNGR THK79MIL GRN LTX FREE

## (undated) DEVICE — DRAINBAG,ANTI-REFLUX TOWER,L/F,2000ML,LL: Brand: MEDLINE

## (undated) DEVICE — Z DISCONTINUED BY MEDLINE USE 2711682 TRAY SKIN PREP DRY W/ PREM GLV

## (undated) DEVICE — GLOVE SURG SZ 75 L12IN FNGR THK87MIL WHT LTX FREE

## (undated) DEVICE — Z INACTIVE USE 2540311 LEAD PACE L475MM CHN A OR V MYOCARDIAL STEROID ELUT SIL

## (undated) DEVICE — 60 ML SYRINGE LUER-LOCK TIP: Brand: MONOJECT

## (undated) DEVICE — 48" PROBE COVER W/GEL, ULTRASOUND, STERILE: Brand: SITE-RITE

## (undated) DEVICE — YANKAUER,POOLE TIP,STERILE,50/CS: Brand: MEDLINE

## (undated) DEVICE — CANNULA PERFUSION 5.5IN 9FR AORTIC ROOT

## (undated) DEVICE — CHLORAPREP 26ML ORANGE

## (undated) DEVICE — Z DISCONTINUED APPLICATOR SURG PREP 0.35OZ 2% CHG 70% ISO ALC W/ HI LT

## (undated) DEVICE — SYRINGE CATH TIP 50ML

## (undated) DEVICE — SUTURE VCRL + SZ 3-0 L27IN ABSRB WHT CT-1 1/2 CIR VCP258H

## (undated) DEVICE — GOWN,AURORA,NONREINFORCED,LARGE: Brand: MEDLINE

## (undated) DEVICE — HF-RESECTION ELECTRODE PLASMALOOP LOOP, MEDIUM, 24 FR., 12°/16°, ESG TURIS: Brand: OLYMPUS

## (undated) DEVICE — SKIN AFFIX SURG ADHESIVE 72/CS 0.55ML: Brand: MEDLINE

## (undated) DEVICE — PACK PROCEDURE SURG OPN HRT

## (undated) DEVICE — GLOVE ORANGE PI 8 1/2   MSG9085

## (undated) DEVICE — BNDG,ELSTC,MATRIX,STRL,6"X5YD,LF,HOOK&LP: Brand: MEDLINE

## (undated) DEVICE — CATHETER THOR 28FR SIL 6 EYE STR HI TEAR STRENGTH

## (undated) DEVICE — SUTURE MCRYL SZ 4-0 L18IN ABSRB UD L19MM PS-2 3/8 CIR PRIM Y496G

## (undated) DEVICE — Device: Brand: VIRTUOSAPH PLUS WITH RADIAL INDICATION

## (undated) DEVICE — INSUFFLATION TUBING SET WITH FILTER, FUNNEL CONNECTOR AND LUER LOCK: Brand: JOSNOE MEDICAL INC

## (undated) DEVICE — DRESSING TRNSPAR W4XL10IN FLM MIC POR SURESITE 123

## (undated) DEVICE — TTL1LYR 16FR10ML 100%SIL TMPST TR: Brand: MEDLINE

## (undated) DEVICE — GLOVE SURG SZ 6 THK91MIL LTX FREE SYN POLYISOPRENE ANTI

## (undated) DEVICE — PACK PROCEDURE SURG OPN HRT ADD ON

## (undated) DEVICE — GOWN,SIRUS,POLYRNF,BRTHSLV,2XL,18/CS: Brand: MEDLINE

## (undated) DEVICE — GLOVE SURG SZ 75 L12IN FNGR THK79MIL GRN LTX FREE

## (undated) DEVICE — PROTECTOR ULN NRV PUR FOAM HK LOOP STRP ANATOMICALLY

## (undated) DEVICE — DRAPE,CVMAX,CARDIOVASCULAR: Brand: MEDLINE

## (undated) DEVICE — GLOVE ORANGE PI 7 1/2   MSG9075

## (undated) DEVICE — CONNECTOR TBNG Y 6IN 1 PLAS LTWT

## (undated) DEVICE — SUTURE PROL SZ 6-0 L18IN NONABSORBABLE BLU RB-2 L13MM 1/2 8714H

## (undated) DEVICE — TUBE CARDIAC SUCTION 6FR SOFT TIP 10FR

## (undated) DEVICE — PLASMABLADE PS210-030S 3.0S LOCK: Brand: PLASMABLADE™